# Patient Record
Sex: FEMALE | Race: WHITE | NOT HISPANIC OR LATINO | Employment: OTHER | URBAN - METROPOLITAN AREA
[De-identification: names, ages, dates, MRNs, and addresses within clinical notes are randomized per-mention and may not be internally consistent; named-entity substitution may affect disease eponyms.]

---

## 2017-03-20 ENCOUNTER — OFFICE VISIT (OUTPATIENT)
Dept: GASTROENTEROLOGY | Facility: CLINIC | Age: 67
End: 2017-03-20
Payer: MEDICARE

## 2017-03-20 VITALS
WEIGHT: 131.38 LBS | HEART RATE: 60 BPM | HEIGHT: 59 IN | SYSTOLIC BLOOD PRESSURE: 138 MMHG | BODY MASS INDEX: 26.48 KG/M2 | DIASTOLIC BLOOD PRESSURE: 80 MMHG

## 2017-03-20 DIAGNOSIS — R19.8 ALTERED BOWEL FUNCTION: Primary | ICD-10-CM

## 2017-03-20 DIAGNOSIS — K58.1 IRRITABLE BOWEL SYNDROME WITH CONSTIPATION: ICD-10-CM

## 2017-03-20 PROCEDURE — 1160F RVW MEDS BY RX/DR IN RCRD: CPT | Mod: S$GLB,,, | Performed by: INTERNAL MEDICINE

## 2017-03-20 PROCEDURE — 99999 PR PBB SHADOW E&M-NEW PATIENT-LVL III: CPT | Mod: PBBFAC,,, | Performed by: INTERNAL MEDICINE

## 2017-03-20 PROCEDURE — 99204 OFFICE O/P NEW MOD 45 MIN: CPT | Mod: S$GLB,,, | Performed by: INTERNAL MEDICINE

## 2017-03-20 RX ORDER — SODIUM, POTASSIUM,MAG SULFATES 17.5-3.13G
SOLUTION, RECONSTITUTED, ORAL ORAL
Qty: 1 BOTTLE | Refills: 0 | Status: ON HOLD | OUTPATIENT
Start: 2017-03-20 | End: 2017-04-06 | Stop reason: HOSPADM

## 2017-03-20 RX ORDER — SERTRALINE HYDROCHLORIDE 100 MG/1
TABLET, FILM COATED ORAL
COMMUNITY
Start: 2017-02-17 | End: 2018-04-20 | Stop reason: SDUPTHER

## 2017-03-20 RX ORDER — WARFARIN SODIUM 5 MG/1
5 TABLET ORAL DAILY
COMMUNITY
End: 2018-04-20 | Stop reason: SDUPTHER

## 2017-03-20 RX ORDER — FUROSEMIDE 20 MG/1
20 TABLET ORAL 2 TIMES DAILY
COMMUNITY
End: 2018-04-20 | Stop reason: SDUPTHER

## 2017-03-20 RX ORDER — ALPRAZOLAM 1 MG/1
1 TABLET ORAL NIGHTLY
Refills: 0 | COMMUNITY
Start: 2017-02-27 | End: 2018-04-20 | Stop reason: SDUPTHER

## 2017-03-20 RX ORDER — ENALAPRIL MALEATE 20 MG/1
TABLET ORAL
COMMUNITY
Start: 2017-02-17 | End: 2018-04-20 | Stop reason: SDUPTHER

## 2017-03-20 RX ORDER — ZOLPIDEM TARTRATE 10 MG/1
10 TABLET ORAL NIGHTLY
Refills: 0 | COMMUNITY
Start: 2017-02-27 | End: 2018-04-20 | Stop reason: SDUPTHER

## 2017-03-20 RX ORDER — BENZONATATE 200 MG/1
CAPSULE ORAL
Refills: 3 | Status: ON HOLD | COMMUNITY
Start: 2016-12-28 | End: 2017-04-06 | Stop reason: HOSPADM

## 2017-03-20 NOTE — MR AVS SNAPSHOT
[facility]  [address] [facility] [address]  Phone: [phone]  Fax: [phone]                  Adalgisa Wright   3/20/2017 3:00 PM   Office Visit    Description:  Female : 1950   Provider:  Sander Ulloa MD   Department:  [facility]           Reason for Visit     Constipation           Diagnoses this Visit        Comments    Altered bowel function    -  Primary     Irritable bowel syndrome with constipation                To Do List           Goals (5 Years of Data)     None      Follow-Up and Disposition     Return if symptoms worsen or fail to improve.       These Medications        Disp Refills Start End    sodium,potassium,mag sulfates (SUPREP BOWEL PREP KIT) 17.5-3.13-1.6 gram SolR 1 Bottle 0 3/20/2017     As directed    Pharmacy: [facility]/pharmacy #[id number] - [place] #: [phone]         [facility] On Call     [facility] On Call Nurse [facility] -  Assistance  Registered nurses in the [facility] provide clinical advisement, health education, appointment booking, and other advisory services.  Call for this free service at [phone].             Medications           Message regarding Medications     Verify the changes and/or additions to your medication regime listed below are the same as discussed with your clinician today.  If any of these changes or additions are incorrect, please notify your healthcare provider.        START taking these NEW medications        Refills    sodium,potassium,mag sulfates (SUPREP BOWEL PREP KIT) 17.5-3.13-1.6 gram SolR 0    Sig: As directed    Class: Normal           Verify that the below list of medications is an accurate representation of the medications you are currently taking.  If none reported, the list may be blank. If incorrect, please contact your healthcare provider. Carry this list with you in case of emergency.           Current Medications      "alprazolam (XANAX) 1 MG tablet Take 1 mg by mouth nightly.    benzonatate (TESSALON) 200 MG capsule TAKE 1 CAPSULE BY MOUTH 3 (THREE) TIMES DAILY AS NEEDED FOR COUGH FOR UP TO 7 DAYS.    enalapril (VASOTEC) 20 MG tablet     furosemide (LASIX) 20 MG tablet Take 20 mg by mouth 2 (two) times daily.    lovastatin (ALTOPREV) 40 mg 24 hr tablet Take 40 mg by mouth every evening.    sertraline (ZOLOFT) 100 MG tablet     sodium,potassium,mag sulfates (SUPREP BOWEL PREP KIT) 17.5-3.13-1.6 gram SolR As directed    warfarin (COUMADIN) 5 MG tablet Take 5 mg by mouth once daily.    zolpidem (AMBIEN) 10 mg Tab Take 10 mg by mouth nightly.           Clinical Reference Information           Your Vitals Were     BP Pulse Height Weight BMI    138/80 60 4' 11" (1.499 m) 59.6 kg (131 lb 6.3 oz) 26.54 kg/m2      Blood Pressure          Most Recent Value    BP  138/80      Allergies as of 3/20/2017     Penicillins      Immunizations Administered on Date of Encounter - 3/20/2017     None      Orders Placed During Today's Visit      Normal Orders This Visit    Case request GI: COLONOSCOPY       [facility] Sign-Up     Activating your [facility] account is as easy as 1-2-3!     1) Visit [email or web address], select Sign Up Now, enter this activation code and your date of birth, then select Next.  [email or web address]  Expires: 5/4/2017  3:04 PM      2) Create a username and password to use when you visit [facility] in the future and select a security question in case you lose your password and select Next.    3) Enter your e-mail address and click Sign Up!    Additional Information  If you have questions, please e-mail [email or web address] or call [phone] to talk to our [facility] staff. Remember, [facility] is NOT to be used for urgent needs. For medical emergencies, dial 911.         Instructions      Constipation (Adult)  Constipation means that you have bowel movements that are less frequent than usual. Stools often become very hard and " difficult to pass.  Constipation is very common. At some point in life it affects almost everyone. Since everyone's bowel habits are different, what is constipation to one person may not be to another. Your healthcare provider may do tests to diagnose constipation. It depends on what he or she finds when evaluating you.    Symptoms of constipation include:  · Abdominal pain  · Bloating  · Vomiting  · Painful bowel movements  · Itching, swelling, bleeding, or pain around the anus  Causes  Constipation can have many causes. These include:  · Diet low in fiber  · Too much dairy  · Not drinking enough liquids  · Lack of exercise or physical activity. This is especially true for older adults.  · Changes in lifestyle or daily routine, including pregnancy, aging, work, and travel  · Frequent use or misuse of laxatives  · Ignoring the urge to have a bowel movement or delaying it until later  · Medicines, such as certain prescription pain medicines, iron supplements, antacids, certain antidepressants, and calcium supplements  · Diseases like irritable bowel syndrome, bowel obstructions, stroke, diabetes, thyroid disease, Parkinson disease, hemorrhoids, and colon cancer  Complications  Potential complications of constipation can include:  · Hemorrhoids  · Rectal bleeding from hemorrhoids or anal fissures (skin tears)  · Hernias  · Dependency on laxatives  · Chronic constipation  · Fecal impaction  · Bowel obstruction or perforation  Home care  All treatment should be done after talking with your healthcare provider. This is especially true if you have another medical problems, are taking prescription medicines, or are an older adult. Treatment most often involves lifestyle changes. You may also need medicines. Your healthcare provider will tell you which will work best for you. Follow the advice below to help avoid this problem in the future.  Lifestyle changes  These lifestyle changes can help prevent constipation:  · Diet.  Eat a high-fiber diet, with fresh fruit and vegetables, and reduce dairy intake, meats, and processed foods  · Fluids. It's important to get enough fluids each day. Drink plenty of water when you eat more fiber. If you are on diet that limits the amount of fluid you can have, talk about this with your healthcare provider.  · Regular exercise. Check with your healthcare provider first.  Medications  Take any medicines as directed. Some laxatives are safe to use only every now and then. Others can be taken on a regular basis. Talk with your doctor or pharmacist if you have questions.  Prescription pain medicines can cause constipation. If you are taking this kind of medicine, ask your healthcare provider if you should also take a stool softener.  Medicines you may take to treat constipation include:  · Fiber supplements  · Stool softeners  · Laxatives  · Enemas  · Rectal suppositories  Follow-up care  Follow up with your healthcare provider if symptoms don't get better in the next few days. You may need to have more tests or see a specialist.  Call 911  Call 911 if any of these occur:  · Trouble breathing  · Stiff, rigid abdomen that is severely painful to touch  · Confusion  · Fainting or loss of consciousness  · Rapid heart rate  · Chest pain  When to seek medical advice  Call your healthcare provider right away if any of these occur:  · Fever over 100.4°F (38°C)  · Failure to resume normal bowel movements  · Pain in your abdomen or back gets worse  · Nausea or vomiting  · Swelling in your abdomen  · Blood in the stool  · Black, tarry stool  · Involuntary weight loss  · Weakness  Date Last Reviewed: 12/30/2015 © 2000-2016 [employer]. [address] [place] [address], [place] [address]. All rights reserved. This information is not intended as a substitute for professional medical care. Always follow your healthcare professional's instructions.        Eating a High-Fiber Diet  Fiber is what gives strength and  structure to plants. Most grains, beans, vegetables, and fruits contain fiber. Foods rich in fiber are often low in calories and fat, and they fill you up more. They may also reduce your risks for certain health problems. To find out the amount of fiber in canned, packaged, or frozen foods, read the Nutrition Facts label. It tells you how much fiber is in a serving.    Types of fiber and their benefits  There are two types of fiber: insoluble and soluble. They both aid digestion and help you maintain a healthy weight.  · Insoluble fiber. This is found in whole grains, cereals, certain fruits and vegetables such as apple skin, corn, and carrots. Insoluble fiber may prevent constipation and reduce the risk for certain types of cancer.  · Soluble fiber. This type of fiber is in oats, beans, and certain fruits and vegetables such as strawberries and peas. Soluble fiber can reduce cholesterol, which may help lower the risk for heart disease. It also helps control blood sugar levels.  Look for high-fiber foods  Try these foods to add fiber to your diet:  · Whole-grain breads and cereals. Try to eat 6 to 8 ounces a day. Include wheat and oat bran cereals, whole-wheat muffins or toast, and corn tortillas in your meals.  · Fruits. Try to eat 2 cups a day. Apples, oranges, strawberries, pears, and bananas are good sources. (Note: Fruit juice is low in fiber.)  · Vegetables. Try to eat at least 2.5 cups a day. Add asparagus, carrots, broccoli, peas, and corn to your meals.  · Beans. One cup of cooked lentils gives you over 15 grams of fiber. Try navy beans, lentils, and chickpeas.  · Seeds. A small handful of seeds gives you about 3 grams of fiber. Try sunflower seeds.  Keep track of your fiber  Keep track of how much fiber you eat. Start by reading food labels. Then eat a variety of foods high in fiber. As you begin to eat more fiber, ask your healthcare provider how much water you should be drinking to keep your digestive  system working smoothly.  You should aim for a certain amount of fiber in your diet each day. If you are a woman, that amount is between 25 and 28 grams per day. Men should aim for 30 to 33 grams per day. After age 50, your daily fiber needs drop to 22 grams for women and 28 grams for men.  Before you reach for the fiber supplements, think about this. Fiber is found naturally in healthy whole foods. It gives you that feeling of fullness after you eat. Taking fiber supplements or eating fiber-enriched foods will not give you this full feeling.  Your fiber intake is a good measure for the quality of your overall diet. If you are missing out on your daily amount of fiber, you may be lacking other important nutrients as well.  Date Last Reviewed: 5/11/2015  © 8350-0649 [employer]. [address] [place] [address]. All rights reserved. This information is not intended as a substitute for professional medical care. Always follow your healthcare professional's instructions.             Language Assistance Services     ATTENTION: Language assistance services are available, free of charge. Please call [phone].      ATENCIÓN: Si habla español, tiene a carter disposición servicios gratuitos de asistencia lingüística. Llame al [phone].     DENIZ Ý: N?u b?n nói Ti?ng Vi?t, có các d?ch v? h? tr? ngôn ng? mi?n phí dành cho b?n. G?i s? [phone].         [facility] complies with applicable Federal civil rights laws and does not discriminate on the basis of race, color, national origin, age, disability, or sex.

## 2017-03-20 NOTE — LETTER
March 23, 2017      Shady Jiménez MD  [address] [facility] [address]           [facility]  [id number] [place] [address]  Phone: [phone]  Fax: [phone]        Patient: Adalgisa Wright   MR Number: [id number]   YOB: 1950   Date of Visit: 3/20/2017       Dear Dr. Shady Jiménez:    Thank you for referring Adalgisa Wright to me for evaluation. Attached you will find relevant portions of my assessment and plan of care.    Assessment:       1. Altered bowel function    2. Irritable bowel syndrome with constipation        Plan:    -     sodium,potassium,mag sulfates (SUPREP BOWEL PREP KIT) 17.5-3.13-1.6 gram SolR; As directed  Dispense: 1 Bottle; COLONOSCOPY  Patient can use Mg Citrate PRN to manage constipation. If this does not work, we can try Amitiza. If you have questions, please do not hesitate to call me. I look forward to following Adalgisa Wright along with you.    Sincerely,    Sander Ulloa MD    Enclosure  CC:  No Recipients    If you would like to receive this communication electronically, please contact [email or web address] or [phone] to request more information on [employer] access. For providers and/or their staff who would like to refer a patient to [facility], please contact us through our one-stop-shop provider referral line, [facility], at [phone].  If you feel you have received this communication in error or would no longer like to receive these types of communications, please e-mail [email or web address]

## 2017-03-20 NOTE — PATIENT INSTRUCTIONS
Constipation (Adult)  Constipation means that you have bowel movements that are less frequent than usual. Stools often become very hard and difficult to pass.  Constipation is very common. At some point in life it affects almost everyone. Since everyone's bowel habits are different, what is constipation to one person may not be to another. Your healthcare provider may do tests to diagnose constipation. It depends on what he or she finds when evaluating you.    Symptoms of constipation include:  · Abdominal pain  · Bloating  · Vomiting  · Painful bowel movements  · Itching, swelling, bleeding, or pain around the anus  Causes  Constipation can have many causes. These include:  · Diet low in fiber  · Too much dairy  · Not drinking enough liquids  · Lack of exercise or physical activity. This is especially true for older adults.  · Changes in lifestyle or daily routine, including pregnancy, aging, work, and travel  · Frequent use or misuse of laxatives  · Ignoring the urge to have a bowel movement or delaying it until later  · Medicines, such as certain prescription pain medicines, iron supplements, antacids, certain antidepressants, and calcium supplements  · Diseases like irritable bowel syndrome, bowel obstructions, stroke, diabetes, thyroid disease, Parkinson disease, hemorrhoids, and colon cancer  Complications  Potential complications of constipation can include:  · Hemorrhoids  · Rectal bleeding from hemorrhoids or anal fissures (skin tears)  · Hernias  · Dependency on laxatives  · Chronic constipation  · Fecal impaction  · Bowel obstruction or perforation  Home care  All treatment should be done after talking with your healthcare provider. This is especially true if you have another medical problems, are taking prescription medicines, or are an older adult. Treatment most often involves lifestyle changes. You may also need medicines. Your healthcare provider will tell you which will work best for you. Follow  the advice below to help avoid this problem in the future.  Lifestyle changes  These lifestyle changes can help prevent constipation:  · Diet. Eat a high-fiber diet, with fresh fruit and vegetables, and reduce dairy intake, meats, and processed foods  · Fluids. It's important to get enough fluids each day. Drink plenty of water when you eat more fiber. If you are on diet that limits the amount of fluid you can have, talk about this with your healthcare provider.  · Regular exercise. Check with your healthcare provider first.  Medications  Take any medicines as directed. Some laxatives are safe to use only every now and then. Others can be taken on a regular basis. Talk with your doctor or pharmacist if you have questions.  Prescription pain medicines can cause constipation. If you are taking this kind of medicine, ask your healthcare provider if you should also take a stool softener.  Medicines you may take to treat constipation include:  · Fiber supplements  · Stool softeners  · Laxatives  · Enemas  · Rectal suppositories  Follow-up care  Follow up with your healthcare provider if symptoms don't get better in the next few days. You may need to have more tests or see a specialist.  Call 911  Call 911 if any of these occur:  · Trouble breathing  · Stiff, rigid abdomen that is severely painful to touch  · Confusion  · Fainting or loss of consciousness  · Rapid heart rate  · Chest pain  When to seek medical advice  Call your healthcare provider right away if any of these occur:  · Fever over 100.4°F (38°C)  · Failure to resume normal bowel movements  · Pain in your abdomen or back gets worse  · Nausea or vomiting  · Swelling in your abdomen  · Blood in the stool  · Black, tarry stool  · Involuntary weight loss  · Weakness  Date Last Reviewed: 12/30/2015  © 2800-9446 [employer]. [address] [place] [address], [place] [address]. All rights reserved. This information is not intended as a substitute for  professional medical care. Always follow your healthcare professional's instructions.        Eating a High-Fiber Diet  Fiber is what gives strength and structure to plants. Most grains, beans, vegetables, and fruits contain fiber. Foods rich in fiber are often low in calories and fat, and they fill you up more. They may also reduce your risks for certain health problems. To find out the amount of fiber in canned, packaged, or frozen foods, read the Nutrition Facts label. It tells you how much fiber is in a serving.    Types of fiber and their benefits  There are two types of fiber: insoluble and soluble. They both aid digestion and help you maintain a healthy weight.  · Insoluble fiber. This is found in whole grains, cereals, certain fruits and vegetables such as apple skin, corn, and carrots. Insoluble fiber may prevent constipation and reduce the risk for certain types of cancer.  · Soluble fiber. This type of fiber is in oats, beans, and certain fruits and vegetables such as strawberries and peas. Soluble fiber can reduce cholesterol, which may help lower the risk for heart disease. It also helps control blood sugar levels.  Look for high-fiber foods  Try these foods to add fiber to your diet:  · Whole-grain breads and cereals. Try to eat 6 to 8 ounces a day. Include wheat and oat bran cereals, whole-wheat muffins or toast, and corn tortillas in your meals.  · Fruits. Try to eat 2 cups a day. Apples, oranges, strawberries, pears, and bananas are good sources. (Note: Fruit juice is low in fiber.)  · Vegetables. Try to eat at least 2.5 cups a day. Add asparagus, carrots, broccoli, peas, and corn to your meals.  · Beans. One cup of cooked lentils gives you over 15 grams of fiber. Try navy beans, lentils, and chickpeas.  · Seeds. A small handful of seeds gives you about 3 grams of fiber. Try sunflower seeds.  Keep track of your fiber  Keep track of how much fiber you eat. Start by reading food labels. Then eat a  variety of foods high in fiber. As you begin to eat more fiber, ask your healthcare provider how much water you should be drinking to keep your digestive system working smoothly.  You should aim for a certain amount of fiber in your diet each day. If you are a woman, that amount is between 25 and 28 grams per day. Men should aim for 30 to 33 grams per day. After age 50, your daily fiber needs drop to 22 grams for women and 28 grams for men.  Before you reach for the fiber supplements, think about this. Fiber is found naturally in healthy whole foods. It gives you that feeling of fullness after you eat. Taking fiber supplements or eating fiber-enriched foods will not give you this full feeling.  Your fiber intake is a good measure for the quality of your overall diet. If you are missing out on your daily amount of fiber, you may be lacking other important nutrients as well.  Date Last Reviewed: 5/11/2015  © 7051-2116 [employer]. [address] [place] [address], [place] [address]. All rights reserved. This information is not intended as a substitute for professional medical care. Always follow your healthcare professional's instructions.

## 2017-03-21 ENCOUNTER — TELEPHONE (OUTPATIENT)
Dept: GASTROENTEROLOGY | Facility: CLINIC | Age: 67
End: 2017-03-21

## 2017-03-21 DIAGNOSIS — K59.00 CONSTIPATION, UNSPECIFIED CONSTIPATION TYPE: Primary | ICD-10-CM

## 2017-03-21 RX ORDER — LUBIPROSTONE 24 UG/1
24 CAPSULE ORAL 2 TIMES DAILY WITH MEALS
Qty: 60 CAPSULE | Refills: 6 | Status: SHIPPED | OUTPATIENT
Start: 2017-03-21 | End: 2018-04-20 | Stop reason: SDUPTHER

## 2017-03-21 NOTE — TELEPHONE ENCOUNTER
----- Message from Jenni Barragan sent at 3/21/2017  7:51 AM CDT -----  Contact: patient  Calling concerning taking a laxative and not sure what the outcome should be. Please call patient ASAP @ [phone]. Thanks, fei

## 2017-03-21 NOTE — TELEPHONE ENCOUNTER
Constipation, unspecified constipation type  -     lubiprostone (AMITIZA) 24 MCG Cap; Take 1 capsule (24 mcg total) by mouth 2 (two) times daily with meals.  Dispense: 60 capsule; Refill: 6

## 2017-03-27 NOTE — PROGRESS NOTES
[facility]  Gastroenterology Note    Patient referred at the request of:  Shady Jiménez MD  [address] [place] [id number]      Subjective:       Patient ID: Adalgisa Wright is a 66 y.o. female.    Chief Complaint: Constipation    Constipation   This is a chronic problem. The current episode started more than 1 year ago. The problem is unchanged. Her stool frequency is 2 to 3 times per week. The stool is described as firm and pellet like. The patient is not on a high fiber diet. She does not exercise regularly. There has not been adequate water intake. Associated symptoms include abdominal pain, bloating, flatus and rectal pain. Pertinent negatives include no anorexia, back pain, diarrhea, difficulty urinating, fecal incontinence, fever, hematochezia, hemorrhoids, melena, nausea, vomiting or weight loss. Risk factors include obesity and stress. She has tried laxatives for the symptoms. The treatment provided no relief. Her past medical history is significant for irritable bowel syndrome and metabolic disease. There is no history of abdominal surgery, endocrine disease, inflammatory bowel disease, neurologic disease, neuromuscular disease, psychiatric history or radiation treatment.     Past Medical History:   Diagnosis Date    Aortic valve defect     GERD (gastroesophageal reflux disease)     Hypertension      Past Surgical History:   Procedure Laterality Date    AORTIC VALVE REPLACEMENT  2003     No current outpatient prescriptions on file prior to visit.     No current facility-administered medications on file prior to visit.      Review of patient's allergies indicates:   Allergen Reactions    Penicillins Rash     Social History     Social History    Marital status: Single     Spouse name: N/A    Number of children: N/A    Years of education: N/A     Occupational History    Not on file.     Social History Main Topics    Smoking status: Never Smoker    Smokeless tobacco: Never  Used    Alcohol use No    Drug use: Not on file    Sexual activity: Not on file     Other Topics Concern    Not on file     Social History Narrative     No current outpatient prescriptions on file prior to visit.     No current facility-administered medications on file prior to visit.      Family History   Problem Relation Age of Onset    Colon cancer Neg Hx      Current Outpatient Prescriptions   Medication Sig Dispense Refill    alprazolam (XANAX) 1 MG tablet Take 1 mg by mouth nightly.  0    benzonatate (TESSALON) 200 MG capsule TAKE 1 CAPSULE BY MOUTH 3 (THREE) TIMES DAILY AS NEEDED FOR COUGH FOR UP TO 7 DAYS.  3    enalapril (VASOTEC) 20 MG tablet       furosemide (LASIX) 20 MG tablet Take 20 mg by mouth 2 (two) times daily.      lovastatin (ALTOPREV) 40 mg 24 hr tablet Take 40 mg by mouth every evening.      lubiprostone (AMITIZA) 24 MCG Cap Take 1 capsule (24 mcg total) by mouth 2 (two) times daily with meals. 60 capsule 6    sertraline (ZOLOFT) 100 MG tablet       sodium,potassium,mag sulfates (SUPREP BOWEL PREP KIT) 17.5-3.13-1.6 gram SolR As directed 1 Bottle 0    warfarin (COUMADIN) 5 MG tablet Take 5 mg by mouth once daily.      zolpidem (AMBIEN) 10 mg Tab Take 10 mg by mouth nightly.  0     No current facility-administered medications for this visit.        Review of Systems   Constitutional: Negative for activity change, chills, diaphoresis, fatigue, fever and weight loss.   HENT: Negative for ear pain, facial swelling, nosebleeds, rhinorrhea, sneezing, sore throat and trouble swallowing.    Eyes: Negative for photophobia, pain and visual disturbance.   Respiratory: Negative for apnea, cough, chest tightness, shortness of breath and wheezing.    Gastrointestinal: Positive for abdominal pain, bloating, constipation, flatus and rectal pain. Negative for anorexia, blood in stool, diarrhea, hematochezia, hemorrhoids, melena, nausea and vomiting.   Genitourinary: Negative for decreased urine  volume, difficulty urinating, dysuria, flank pain and hematuria.   Musculoskeletal: Negative for arthralgias, back pain, gait problem, neck pain and neck stiffness.   Skin: Negative for pallor and rash.   Neurological: Negative for seizures, syncope, speech difficulty, weakness and headaches.   Hematological: Negative for adenopathy. Does not bruise/bleed easily.   Psychiatric/Behavioral: Negative for behavioral problems, confusion and hallucinations.       Objective:      Physical Exam   Constitutional: She is oriented to person, place, and time. She appears well-developed and well-nourished.   HENT:   Head: Normocephalic and atraumatic.   Eyes: EOM are normal. Pupils are equal, round, and reactive to light.   Neck: Normal range of motion. Neck supple. No thyromegaly present.   Cardiovascular: Normal rate, regular rhythm and intact distal pulses.    Murmur heard.  Pulmonary/Chest: Effort normal and breath sounds normal. No respiratory distress. She has no wheezes. She has no rales.   Abdominal: Soft. Bowel sounds are normal. She exhibits no distension and no mass. There is no tenderness.   Musculoskeletal: Normal range of motion. She exhibits no edema or tenderness.   Lymphadenopathy:     She has no cervical adenopathy.   Neurological: She is alert and oriented to person, place, and time. She has normal reflexes. No cranial nerve deficit.   Skin: Skin is warm and dry. No erythema.   Psychiatric: She has a normal mood and affect. Her behavior is normal.       Assessment:       1. Altered bowel function    2. Irritable bowel syndrome with constipation        Plan:       Altered bowel function  -     sodium,potassium,mag sulfates (SUPREP BOWEL PREP KIT) 17.5-3.13-1.6 gram SolR; As directed  Dispense: 1 Bottle; Refill: 0  -     Case request GI: COLONOSCOPY    Irritable bowel syndrome with constipation      Patient can use Mg Citrate PRN to manage constipation. If this does not work, we can try Amitiza.       Risks,  benefits and alternative options were discussed with the patient. Risks including but not limited to infection, bleeding, heart or respiratory problems and perforation that may require surgery were all explained to the patient. The patient had a chance for questions if there were doubts or concerns about the test. The referring provider will be notified that our consultation is complete and available through the patient's records.    Thanks for letting us participate in the care of this nice patient,    Sander Ulloa

## 2017-03-31 ENCOUNTER — TELEPHONE (OUTPATIENT)
Dept: GASTROENTEROLOGY | Facility: CLINIC | Age: 67
End: 2017-03-31

## 2017-03-31 NOTE — TELEPHONE ENCOUNTER
Called and spoke with patient and a message was sent to Dr. Combs to see if their is any other test she can do besides a colonoscopy.

## 2017-03-31 NOTE — TELEPHONE ENCOUNTER
----- Message from Sherry Reyez sent at 3/31/2017  9:46 AM CDT -----  Contact: pt  Please call pt @ [phone] regarding procedure on 4/6, pt need to know what other procedure she can do beside colostomy.

## 2017-04-04 ENCOUNTER — TELEPHONE (OUTPATIENT)
Dept: GASTROENTEROLOGY | Facility: CLINIC | Age: 67
End: 2017-04-04

## 2017-04-04 NOTE — TELEPHONE ENCOUNTER
----- Message from Sean Edwards sent at 4/3/2017  4:31 PM CDT -----  Contact: [phone]   Pt would like to consult with the nurse about procedures.  Please call back  @[phone].  Thanks-[facility]

## 2017-04-04 NOTE — TELEPHONE ENCOUNTER
----- Message from Emely Musa sent at 4/4/2017 12:45 PM CDT -----  Pt at [phone]//states she is going to have a colonoscopy done on Thursday morning//4-6-17//she has questions//please call//pepe/dary

## 2017-04-04 NOTE — TELEPHONE ENCOUNTER
----- Message from Aliya Villaseñor sent at 4/4/2017  1:49 PM CDT -----  Call pt at [phone]//returning your call//ledy shannon

## 2017-04-05 ENCOUNTER — TELEPHONE (OUTPATIENT)
Dept: GASTROENTEROLOGY | Facility: HOSPITAL | Age: 67
End: 2017-04-05

## 2017-04-05 DIAGNOSIS — Z12.11 SPECIAL SCREENING FOR MALIGNANT NEOPLASMS, COLON: Primary | ICD-10-CM

## 2017-04-05 NOTE — TELEPHONE ENCOUNTER
Special screening for malignant neoplasms, colon  -     CT Virtual Colonoscopy Screening; Future; Expected date: 4/5/17      Please schedule.    Thanks

## 2017-04-05 NOTE — TELEPHONE ENCOUNTER
----- Message from Waqas Nicole MA sent at 4/3/2017  4:13 PM CDT -----  Contact: pt  Patient is willing to do the ct.Please put in orders.   ----- Message -----     From: Sander Ulloa MD     Sent: 4/1/2017   9:35 AM       To: Waqas Nicole MA    She needs to stop Coumadin for several days to have the colonoscopy. If not, we can order a Barium Enema, which is an X-Ray or a CT colonography. If she is willing to have CT scan, I can enter the orders.     Thanks    ----- Message -----     From: Waqas Nicole MA     Sent: 3/31/2017   1:58 PM       To: Sander Ulloa MD    Patient stated she does not mind drinking the prep she just don't want to get off her Coumadin.  ----- Message -----     From: Sander Ulloa MD     Sent: 3/31/2017   1:32 PM       To: Waqas Nicole MA    She can do either:  A- a barium enema  B- a CT Colonography.     Both of these are radiology tests but require for her to drink a prep. Please let the patient know.    Sander Ulloa    ----- Message -----     From: Waqas Nicole MA     Sent: 3/31/2017  11:13 AM       To: Sander lUloa MD     Patient is scheduled to have a colonoscopy 4/6/17. Patient stated her cardiologist did okay her to be cleared off coumadin but is scared to have the procedure. She would like to know is their any other test she can do besides the coloscopy.  ----- Message -----     From: Sherry Reyez     Sent: 3/31/2017   9:46 AM       To: Artemio LOZANO Staff    Please call pt @ [phone] regarding procedure on 4/6, pt need to know what other procedure she can do beside colostomy.

## 2017-04-06 ENCOUNTER — ANESTHESIA (OUTPATIENT)
Dept: ENDOSCOPY | Facility: HOSPITAL | Age: 67
End: 2017-04-06
Payer: MEDICARE

## 2017-04-06 ENCOUNTER — HOSPITAL ENCOUNTER (OUTPATIENT)
Facility: HOSPITAL | Age: 67
Discharge: HOME | End: 2017-04-06
Attending: INTERNAL MEDICINE | Admitting: INTERNAL MEDICINE
Payer: MEDICARE

## 2017-04-06 ENCOUNTER — SURGERY (OUTPATIENT)
Age: 67
End: 2017-04-06

## 2017-04-06 ENCOUNTER — ANESTHESIA EVENT (OUTPATIENT)
Dept: ENDOSCOPY | Facility: HOSPITAL | Age: 67
End: 2017-04-06
Payer: MEDICARE

## 2017-04-06 VITALS — RESPIRATION RATE: 17 BRPM

## 2017-04-06 VITALS
BODY MASS INDEX: 25.8 KG/M2 | HEIGHT: 59 IN | OXYGEN SATURATION: 98 % | HEART RATE: 60 BPM | TEMPERATURE: 98 F | WEIGHT: 128 LBS | DIASTOLIC BLOOD PRESSURE: 79 MMHG | SYSTOLIC BLOOD PRESSURE: 125 MMHG | RESPIRATION RATE: 16 BRPM

## 2017-04-06 DIAGNOSIS — R19.8 OTHER SYMPTOMS INVOLVING DIGESTIVE SYSTEM(787.99): ICD-10-CM

## 2017-04-06 DIAGNOSIS — R19.8 ALTERED BOWEL FUNCTION: Primary | ICD-10-CM

## 2017-04-06 DIAGNOSIS — D12.2 BENIGN NEOPLASM OF ASCENDING COLON: ICD-10-CM

## 2017-04-06 PROBLEM — K59.02 ANISMUS: Status: ACTIVE | Noted: 2017-04-06

## 2017-04-06 LAB
INR PPP: 1.1
PROTHROMBIN TIME: 10.9 SEC

## 2017-04-06 PROCEDURE — 25000003 PHARM REV CODE 250: Performed by: INTERNAL MEDICINE

## 2017-04-06 PROCEDURE — 88305 TISSUE EXAM BY PATHOLOGIST: CPT | Performed by: PATHOLOGY

## 2017-04-06 PROCEDURE — 25000003 PHARM REV CODE 250: Performed by: NURSE ANESTHETIST, CERTIFIED REGISTERED

## 2017-04-06 PROCEDURE — 88305 TISSUE EXAM BY PATHOLOGIST: CPT | Mod: 26,,, | Performed by: PATHOLOGY

## 2017-04-06 PROCEDURE — 45385 COLONOSCOPY W/LESION REMOVAL: CPT | Performed by: INTERNAL MEDICINE

## 2017-04-06 PROCEDURE — 37000008 HC ANESTHESIA 1ST 15 MINUTES: Performed by: INTERNAL MEDICINE

## 2017-04-06 PROCEDURE — 85610 PROTHROMBIN TIME: CPT

## 2017-04-06 PROCEDURE — 45385 COLONOSCOPY W/LESION REMOVAL: CPT | Mod: PT,,, | Performed by: INTERNAL MEDICINE

## 2017-04-06 PROCEDURE — 63600175 PHARM REV CODE 636 W HCPCS: Performed by: NURSE ANESTHETIST, CERTIFIED REGISTERED

## 2017-04-06 PROCEDURE — 37000009 HC ANESTHESIA EA ADD 15 MINS: Performed by: INTERNAL MEDICINE

## 2017-04-06 PROCEDURE — 27201089 HC SNARE, DISP (ANY): Performed by: INTERNAL MEDICINE

## 2017-04-06 RX ORDER — SODIUM CHLORIDE, SODIUM LACTATE, POTASSIUM CHLORIDE, CALCIUM CHLORIDE 600; 310; 30; 20 MG/100ML; MG/100ML; MG/100ML; MG/100ML
INJECTION, SOLUTION INTRAVENOUS CONTINUOUS
Status: DISCONTINUED | OUTPATIENT
Start: 2017-04-06 | End: 2017-04-06 | Stop reason: HOSPADM

## 2017-04-06 RX ORDER — ENOXAPARIN SODIUM 150 MG/ML
60 INJECTION SUBCUTANEOUS 2 TIMES DAILY
COMMUNITY
End: 2018-04-20 | Stop reason: SDUPTHER

## 2017-04-06 RX ORDER — PROPOFOL 10 MG/ML
VIAL (ML) INTRAVENOUS
Status: DISCONTINUED | OUTPATIENT
Start: 2017-04-06 | End: 2017-04-06

## 2017-04-06 RX ORDER — LIDOCAINE HYDROCHLORIDE 10 MG/ML
INJECTION INFILTRATION; PERINEURAL
Status: DISCONTINUED | OUTPATIENT
Start: 2017-04-06 | End: 2017-04-06

## 2017-04-06 RX ADMIN — PROPOFOL 30 MG: 10 INJECTION, EMULSION INTRAVENOUS at 11:04

## 2017-04-06 RX ADMIN — LIDOCAINE HYDROCHLORIDE 50 MG: 10 INJECTION, SOLUTION INFILTRATION; PERINEURAL at 11:04

## 2017-04-06 RX ADMIN — PROPOFOL 50 MG: 10 INJECTION, EMULSION INTRAVENOUS at 11:04

## 2017-04-06 RX ADMIN — PROPOFOL 20 MG: 10 INJECTION, EMULSION INTRAVENOUS at 11:04

## 2017-04-06 RX ADMIN — SODIUM CHLORIDE, SODIUM LACTATE, POTASSIUM CHLORIDE, AND CALCIUM CHLORIDE: .6; .31; .03; .02 INJECTION, SOLUTION INTRAVENOUS at 10:04

## 2017-04-06 NOTE — DISCHARGE SUMMARY
Endoscopy Discharge Summary      Admit Date: 4/6/2017    Discharge Date and Time:  4/6/2017 11:50 AM    Attending Physician: Sander Ulloa MD     Discharge Physician: Sander Ulloa MD     Principal Admitting Diagnoses: Altered bowel function         Discharge Diagnosis: The primary encounter diagnosis was Altered bowel function. Diagnoses of Other symptoms involving digestive system and Benign neoplasm of ascending colon were also pertinent to this visit.     Discharged Condition: Good    Indication for Admission: Altered bowel function     Hospital Course: Patient was admitted for an inpatient procedure and tolerated the procedure well with no complications.    Significant Diagnostic Studies: COLONOSCOPY    Pathology (if any):  Specimen (12h ago through future)    Start     Ordered    04/06/17 1143  Specimen to Pathology - Surgery  Once     Comments:  Jar 1 Colon polyp x 2    04/06/17 1150          Disposition: Home.    Bleeding: None    No Implants    Follow Up/Patient Instructions:   Current Discharge Medication List      CONTINUE these medications which have NOT CHANGED    Details   alprazolam (XANAX) 1 MG tablet Take 1 mg by mouth nightly.  Refills: 0      enalapril (VASOTEC) 20 MG tablet       enoxaparin (LOVENOX) 150 mg/mL Syrg Inject 60 mg into the skin 2 (two) times daily.      furosemide (LASIX) 20 MG tablet Take 20 mg by mouth 2 (two) times daily.      lovastatin (ALTOPREV) 40 mg 24 hr tablet Take 40 mg by mouth every evening.      lubiprostone (AMITIZA) 24 MCG Cap Take 1 capsule (24 mcg total) by mouth 2 (two) times daily with meals.  Qty: 60 capsule, Refills: 6    Associated Diagnoses: Constipation, unspecified constipation type      sertraline (ZOLOFT) 100 MG tablet       zolpidem (AMBIEN) 10 mg Tab Take 10 mg by mouth nightly.  Refills: 0      warfarin (COUMADIN) 5 MG tablet Take 5 mg by mouth once daily.         STOP taking these medications       sodium,potassium,mag sulfates (SUPREP BOWEL PREP  KIT) 17.5-3.13-1.6 gram SolR Comments:   Reason for Stopping:         benzonatate (TESSALON) 200 MG capsule Comments:   Reason for Stopping:                 Discharge Procedure Orders  Diet general     Activity as tolerated     Call MD for:  temperature >100.4     Call MD for:  persistent nausea and vomiting     Call MD for:  severe uncontrolled pain     Call MD for:  difficulty breathing, headache or visual disturbances     Call MD for:  redness, tenderness, or signs of infection (pain, swelling, redness, odor or green/yellow discharge around incision site)     Call MD for:  hives     Call MD for:  persistent dizziness or light-headedness     No dressing needed         Follow-up Information     Follow up with Shady Jiménez MD.    Specialty:  Family Medicine    Why:  As needed    Contact information:    [address] [place] [id number] [phone]

## 2017-04-06 NOTE — H&P (VIEW-ONLY)
[facility]  Gastroenterology Note    Patient referred at the request of:  Shady Jiménez MD  [address] [place] [id number]      Subjective:       Patient ID: Adalgisa Wright is a 66 y.o. female.    Chief Complaint: Constipation    Constipation   This is a chronic problem. The current episode started more than 1 year ago. The problem is unchanged. Her stool frequency is 2 to 3 times per week. The stool is described as firm and pellet like. The patient is not on a high fiber diet. She does not exercise regularly. There has not been adequate water intake. Associated symptoms include abdominal pain, bloating, flatus and rectal pain. Pertinent negatives include no anorexia, back pain, diarrhea, difficulty urinating, fecal incontinence, fever, hematochezia, hemorrhoids, melena, nausea, vomiting or weight loss. Risk factors include obesity and stress. She has tried laxatives for the symptoms. The treatment provided no relief. Her past medical history is significant for irritable bowel syndrome and metabolic disease. There is no history of abdominal surgery, endocrine disease, inflammatory bowel disease, neurologic disease, neuromuscular disease, psychiatric history or radiation treatment.     Past Medical History:   Diagnosis Date    Aortic valve defect     GERD (gastroesophageal reflux disease)     Hypertension      Past Surgical History:   Procedure Laterality Date    AORTIC VALVE REPLACEMENT  2003     No current outpatient prescriptions on file prior to visit.     No current facility-administered medications on file prior to visit.      Review of patient's allergies indicates:   Allergen Reactions    Penicillins Rash     Social History     Social History    Marital status: Single     Spouse name: N/A    Number of children: N/A    Years of education: N/A     Occupational History    Not on file.     Social History Main Topics    Smoking status: Never Smoker    Smokeless tobacco: Never  Used    Alcohol use No    Drug use: Not on file    Sexual activity: Not on file     Other Topics Concern    Not on file     Social History Narrative     No current outpatient prescriptions on file prior to visit.     No current facility-administered medications on file prior to visit.      Family History   Problem Relation Age of Onset    Colon cancer Neg Hx      Current Outpatient Prescriptions   Medication Sig Dispense Refill    alprazolam (XANAX) 1 MG tablet Take 1 mg by mouth nightly.  0    benzonatate (TESSALON) 200 MG capsule TAKE 1 CAPSULE BY MOUTH 3 (THREE) TIMES DAILY AS NEEDED FOR COUGH FOR UP TO 7 DAYS.  3    enalapril (VASOTEC) 20 MG tablet       furosemide (LASIX) 20 MG tablet Take 20 mg by mouth 2 (two) times daily.      lovastatin (ALTOPREV) 40 mg 24 hr tablet Take 40 mg by mouth every evening.      lubiprostone (AMITIZA) 24 MCG Cap Take 1 capsule (24 mcg total) by mouth 2 (two) times daily with meals. 60 capsule 6    sertraline (ZOLOFT) 100 MG tablet       sodium,potassium,mag sulfates (SUPREP BOWEL PREP KIT) 17.5-3.13-1.6 gram SolR As directed 1 Bottle 0    warfarin (COUMADIN) 5 MG tablet Take 5 mg by mouth once daily.      zolpidem (AMBIEN) 10 mg Tab Take 10 mg by mouth nightly.  0     No current facility-administered medications for this visit.        Review of Systems   Constitutional: Negative for activity change, chills, diaphoresis, fatigue, fever and weight loss.   HENT: Negative for ear pain, facial swelling, nosebleeds, rhinorrhea, sneezing, sore throat and trouble swallowing.    Eyes: Negative for photophobia, pain and visual disturbance.   Respiratory: Negative for apnea, cough, chest tightness, shortness of breath and wheezing.    Gastrointestinal: Positive for abdominal pain, bloating, constipation, flatus and rectal pain. Negative for anorexia, blood in stool, diarrhea, hematochezia, hemorrhoids, melena, nausea and vomiting.   Genitourinary: Negative for decreased urine  volume, difficulty urinating, dysuria, flank pain and hematuria.   Musculoskeletal: Negative for arthralgias, back pain, gait problem, neck pain and neck stiffness.   Skin: Negative for pallor and rash.   Neurological: Negative for seizures, syncope, speech difficulty, weakness and headaches.   Hematological: Negative for adenopathy. Does not bruise/bleed easily.   Psychiatric/Behavioral: Negative for behavioral problems, confusion and hallucinations.       Objective:      Physical Exam   Constitutional: She is oriented to person, place, and time. She appears well-developed and well-nourished.   HENT:   Head: Normocephalic and atraumatic.   Eyes: EOM are normal. Pupils are equal, round, and reactive to light.   Neck: Normal range of motion. Neck supple. No thyromegaly present.   Cardiovascular: Normal rate, regular rhythm and intact distal pulses.    Murmur heard.  Pulmonary/Chest: Effort normal and breath sounds normal. No respiratory distress. She has no wheezes. She has no rales.   Abdominal: Soft. Bowel sounds are normal. She exhibits no distension and no mass. There is no tenderness.   Musculoskeletal: Normal range of motion. She exhibits no edema or tenderness.   Lymphadenopathy:     She has no cervical adenopathy.   Neurological: She is alert and oriented to person, place, and time. She has normal reflexes. No cranial nerve deficit.   Skin: Skin is warm and dry. No erythema.   Psychiatric: She has a normal mood and affect. Her behavior is normal.       Assessment:       1. Altered bowel function    2. Irritable bowel syndrome with constipation        Plan:       Altered bowel function  -     sodium,potassium,mag sulfates (SUPREP BOWEL PREP KIT) 17.5-3.13-1.6 gram SolR; As directed  Dispense: 1 Bottle; Refill: 0  -     Case request GI: COLONOSCOPY    Irritable bowel syndrome with constipation      Patient can use Mg Citrate PRN to manage constipation. If this does not work, we can try Amitiza.       Risks,  benefits and alternative options were discussed with the patient. Risks including but not limited to infection, bleeding, heart or respiratory problems and perforation that may require surgery were all explained to the patient. The patient had a chance for questions if there were doubts or concerns about the test. The referring provider will be notified that our consultation is complete and available through the patient's records.    Thanks for letting us participate in the care of this nice patient,    Sander Ulloa

## 2017-04-06 NOTE — ANESTHESIA POSTPROCEDURE EVALUATION
"Anesthesia Post Evaluation    Patient: Adalgisa Wright    Procedure(s) Performed: Procedure(s) (LRB):  COLONOSCOPY (Left)    Final Anesthesia Type: MAC  Patient location during evaluation: PACU  Patient participation: Yes- Able to Participate  Level of consciousness: awake  Post-procedure vital signs: reviewed and stable  Pain management: adequate  Airway patency: patent  PONV status at discharge: No PONV  Anesthetic complications: no      Cardiovascular status: blood pressure returned to baseline and hemodynamically stable  Respiratory status: unassisted, spontaneous ventilation and room air  Hydration status: euvolemic  Follow-up not needed.        Visit Vitals    BP (!) 144/89 (BP Location: Left arm, Patient Position: Lying, BP Method: Automatic)    Pulse 68    Temp 36.8 °C (98.3 °F) (Oral)    Resp 16    Ht 4' 11" (1.499 m)    Wt 58.1 kg (128 lb)    LMP     SpO2 97%    Breastfeeding No    BMI 25.85 kg/m2       Pain/Keny Score: Pain Assessment Performed: Yes (4/6/2017 10:01 AM)  Presence of Pain: denies (4/6/2017 10:01 AM)      "

## 2017-04-06 NOTE — INTERVAL H&P NOTE
The patient has been examined and the H&P has been reviewed:    I concur with the findings and no changes have occurred since H&P was written.    Anesthesia/Surgery risks, benefits and alternative options discussed and understood by patient/family.          Active Hospital Problems    Diagnosis  POA    *Altered bowel function [R19.4]  Yes    Anismus [R19.8]  Yes      Resolved Hospital Problems    Diagnosis Date Resolved POA   No resolved problems to display.

## 2017-04-06 NOTE — ANESTHESIA RELEASE NOTE
"Anesthesia Release from PACU Note    Patient: Adalgisa Wright    Procedure(s) Performed: Procedure(s) (LRB):  COLONOSCOPY (Left)    Anesthesia type: MAC    Post pain: Adequate analgesia    Post assessment: no apparent anesthetic complications, tolerated procedure well and no evidence of recall    Last Vitals:   Visit Vitals    BP (!) 144/89 (BP Location: Left arm, Patient Position: Lying, BP Method: Automatic)    Pulse 68    Temp 36.8 °C (98.3 °F) (Oral)    Resp 16    Ht 4' 11" (1.499 m)    Wt 58.1 kg (128 lb)    LMP     SpO2 97%    Breastfeeding No    BMI 25.85 kg/m2       Post vital signs: stable    Level of consciousness: awake    Nausea/Vomiting: no nausea/no vomiting    Complications: none    Airway Patency: patent    Respiratory: unassisted, spontaneous ventilation, room air    Cardiovascular: stable and blood pressure at baseline    Hydration: euvolemic  "

## 2017-04-06 NOTE — IP AVS SNAPSHOT
[address] [place] [address]           Patient Discharge Instructions   Our goal is to set you up for success. This packet includes information on your condition, medications, and your home care.  It will help you care for yourself to prevent having to return to the hospital.     Please ask your nurse if you have any questions.      There are many details to remember when preparing to leave the hospital. Here is what you will need to do:    1. Take your medicine. If you are prescribed medications, review your Medication List on the following pages. You may have new medications to  at the pharmacy and others that you'll need to stop taking. Review the instructions for how and when to take your medications. Talk with your doctor or nurses if you are unsure of what to do.     2. Go to your follow-up appointments. Specific follow-up information is listed in the following pages. Your may be contacted by a nurse or clinical provider about future appointments. Be sure we have all of the phone numbers to reach you. Please contact your provider's office if you are unable to make an appointment.     3. Watch for warning signs. Your doctor or nurse will give you detailed warning signs to watch for and when to call for assistance. These instructions may also include educational information about your condition. If you experience any of warning signs to your health, call your doctor.               ** Verify the list of medication(s) below is accurate and up to date. Carry this with you in case of emergency. If your medications have changed, please notify your healthcare provider.             Medication List      CONTINUE taking these medications        Additional Info                      alprazolam 1 MG tablet   Commonly known as:  XANAX   Refills:  0   Dose:  1 mg    Instructions:  Take 1 mg by mouth nightly.     Begin Date    AM    Noon    PM    Bedtime       enalapril  20 MG tablet   Commonly known as:  VASOTEC   Refills:  0      Begin Date    AM    Noon    PM    Bedtime       enoxaparin 150 mg/mL Syrg   Commonly known as:  LOVENOX   Refills:  0   Dose:  60 mg    Instructions:  Inject 60 mg into the skin 2 (two) times daily.     Begin Date    AM    Noon    PM    Bedtime       furosemide 20 MG tablet   Commonly known as:  LASIX   Refills:  0   Dose:  20 mg    Instructions:  Take 20 mg by mouth 2 (two) times daily.     Begin Date    AM    Noon    PM    Bedtime       lovastatin 40 mg 24 hr tablet   Commonly known as:  ALTOPREV   Refills:  0   Dose:  40 mg    Instructions:  Take 40 mg by mouth every evening.     Begin Date    AM    Noon    PM    Bedtime       lubiprostone 24 MCG Cap   Commonly known as:  AMITIZA   Quantity:  60 capsule   Refills:  6   Dose:  24 mcg    Instructions:  Take 1 capsule (24 mcg total) by mouth 2 (two) times daily with meals.     Begin Date    AM    Noon    PM    Bedtime       sertraline 100 MG tablet   Commonly known as:  ZOLOFT   Refills:  0      Begin Date    AM    Noon    PM    Bedtime       warfarin 5 MG tablet   Commonly known as:  COUMADIN   Refills:  0   Dose:  5 mg    Instructions:  Take 5 mg by mouth once daily.     Begin Date    AM    Noon    PM    Bedtime       zolpidem 10 mg Tab   Commonly known as:  AMBIEN   Refills:  0   Dose:  10 mg    Instructions:  Take 10 mg by mouth nightly.     Begin Date    AM    Noon    PM    Bedtime         STOP taking these medications     benzonatate 200 MG capsule   Commonly known as:  TESSALON       sodium,potassium,mag sulfates 17.5-3.13-1.6 gram Solr   Commonly known as:  SUPREP BOWEL PREP KIT                  Please bring to all follow up appointments:    1. A copy of your discharge instructions.  2. All medicines you are currently taking in their original bottles.  3. Identification and insurance card.    Please arrive 15 minutes ahead of scheduled appointment time.    Please call 24 hours in advance if you  must reschedule your appointment and/or time.        Follow-up Information     Follow up with Shady Jiménez MD.    Specialty:  Family Medicine    Why:  As needed    Contact information:    [address] [place] [address]  [phone]          Discharge Instructions     Future Orders    Activity as tolerated     Call MD for:  difficulty breathing, headache or visual disturbances     Call MD for:  hives     Call MD for:  persistent dizziness or light-headedness     Call MD for:  persistent nausea and vomiting     Call MD for:  redness, tenderness, or signs of infection (pain, swelling, redness, odor or green/yellow discharge around incision site)     Call MD for:  severe uncontrolled pain     Call MD for:  temperature >100.4     Diet general     Questions:    Total calories:      Fat restriction, if any:      Protein restriction, if any:      Na restriction, if any:      Fluid restriction:      Additional restrictions:      No dressing needed         Discharge Instructions       If you develop fevers, severe abdominal pain, significant bleeding, nausea or vomiting, please contact us or come to our Emergency Department at [facility].  Our  contact number is [phone] available for emergencies or any question that you may have.      You may return to normal activities tomorrow.  Written discharge instructions were provided to you today and have them handy since you may not remember our conversation today.       I also recommend that you follow a high fiber diet and take calcium supplements daily as well as to continue your present medications.      If you take Aspirin, please resume taking it at your prior dose today. If we obtained biopsies or removed polyps during your procedure, we will contact you within 5 to 6 days with the results.      Thanks for trusting us with your healthcare needs.      Sincerely,     Sander Ulloa M.D.        To rate your experience with Dr. Ulloa,  "please click on the link below     [email or web address]      Discharge References/Attachments     COLON AND RECTAL POLYPS, UNDERSTANDING (ENGLISH)        Primary Diagnosis     Your primary diagnosis was:  Change In Bowel Habits      Admission Information     Date & Time Provider Department CSN    4/6/2017  8:45 AM Sander Ulloa MD [facility] -  [id number]      Care Providers     Provider Role Specialty Primary office phone    Sander Ulloa MD Attending Provider Gastroenterology [phone]    Sander Ulloa MD Surgeon  Gastroenterology [phone]      Your Vitals Were     BP Pulse Temp Resp Height Weight    144/89 (BP Location: Left arm, Patient Position: Lying, BP Method: Automatic) 68 98.3 °F (36.8 °C) (Oral) 16 4' 11" (1.499 m) 58.1 kg (128 lb)    SpO2 BMI             97% 25.85 kg/m2         Recent Lab Values     No lab values to display.      Pending Labs     Order Current Status    Specimen to Pathology - Surgery Collected (04/06/17 1150)      Allergies as of 4/6/2017        Reactions    Penicillins Rash      [facility]     [facility] Nurse Care Line - 24/7 Assistance  Unless otherwise directed by your provider, please contact [facility], our nurse care line that is available for 24/7 assistance.     Registered nurses in the [facility] provide clinical advisement, health education, appointment booking, and other advisory services.  Call for this free service at [phone].        Advance Directives     An advance directive is a document which, in the event you are no longer able to make decisions for yourself, tells your healthcare team what kind of treatment you do or do not want to receive, or who you would like to make those decisions for you.  If you do not currently have an advance directive, [facility] encourages you to create one.  For more information call:  [phone]), [phone] ([phone]),  or log " on to [email or web address].        Language Assistance Services     ATTENTION: Language assistance services are available, free of charge. Please call [phone].      ATENCIÓN: Si kaitlyn gallego, tiene a carter disposición servicios gratuitos de asistencia lingüística. Llame al [phone].     CHÚ Ý: N?u b?n nói Ti?ng Vi?t, có các d?ch v? h? tr? ngôn ng? mi?n phí dành cho b?n. G?i s? [phone].        Coumadin Discharge Instructions                         [facility] Sign-Up     Activating your [facility] account is as easy as 1-2-3!     1) Visit [email or web address], select Sign Up Now, enter this activation code and your date of birth, then select Next.  [email or web address]  Expires: 5/4/2017  3:04 PM      2) Create a username and password to use when you visit [facility] in the future and select a security question in case you lose your password and select Next.    3) Enter your e-mail address and click Sign Up!    Additional Information  If you have questions, please e-mail [email or web address] or call [phone] to talk to our [facility] staff. Remember, [facility] is NOT to be used for urgent needs. For medical emergencies, dial 911.          [facility] complies with applicable Federal civil rights laws and does not discriminate on the basis of race, color, national origin, age, disability, or sex.

## 2017-04-06 NOTE — TRANSFER OF CARE
"Anesthesia Transfer of Care Note    Patient: Adalgisa Wright    Procedure(s) Performed: Procedure(s) (LRB):  COLONOSCOPY (Left)    Patient location: PACU    Anesthesia Type: MAC    Transport from OR: Transported from OR on room air with adequate spontaneous ventilation    Post pain: adequate analgesia    Post assessment: no apparent anesthetic complications and tolerated procedure well    Post vital signs: stable    Level of consciousness: awake    Nausea/Vomiting: no nausea/vomiting    Complications: none          Last vitals:   Visit Vitals    BP (!) 144/89 (BP Location: Left arm, Patient Position: Lying, BP Method: Automatic)    Pulse 68    Temp 36.8 °C (98.3 °F) (Oral)    Resp 16    Ht 4' 11" (1.499 m)    Wt 58.1 kg (128 lb)    LMP     SpO2 97%    Breastfeeding No    BMI 25.85 kg/m2     "

## 2017-04-06 NOTE — PLAN OF CARE
See anesthesia for meds, vs, and monitoring .Pt adequately sedated per all staff in procedure room. Final timeout done.

## 2017-04-15 NOTE — PROGRESS NOTES
Please let the patient know that biopsies taken during procedure are essentially OK. No further action is needed.     FINAL PATHOLOGIC DIAGNOSIS  Colon polyps:  Normal colonic mucosa with lymphoid aggregate;    Sander Ulloa M.D.

## 2018-04-20 ENCOUNTER — HOSPITAL ENCOUNTER (EMERGENCY)
Facility: HOSPITAL | Age: 68
Discharge: HOME | End: 2018-04-20
Attending: EMERGENCY MEDICINE
Payer: MEDICARE

## 2018-04-20 VITALS
RESPIRATION RATE: 20 BRPM | TEMPERATURE: 98 F | SYSTOLIC BLOOD PRESSURE: 167 MMHG | WEIGHT: 130 LBS | HEIGHT: 59 IN | HEART RATE: 86 BPM | DIASTOLIC BLOOD PRESSURE: 76 MMHG | OXYGEN SATURATION: 97 % | BODY MASS INDEX: 26.21 KG/M2

## 2018-04-20 DIAGNOSIS — E87.6 HYPOKALEMIA: ICD-10-CM

## 2018-04-20 DIAGNOSIS — S09.8XXA BLUNT HEAD TRAUMA: ICD-10-CM

## 2018-04-20 DIAGNOSIS — Z79.01 ANTICOAGULATED ON COUMADIN: ICD-10-CM
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
## 2018-04-20 DIAGNOSIS — S00.03XA RIGHT TEMPORAL FRONTAL SCALP CONTUSIONS, INITIAL ENCOUNTER: Primary | ICD-10-CM

## 2018-04-20 DIAGNOSIS — M54.50 ACUTE RIGHT-SIDED LOW BACK PAIN WITHOUT SCIATICA: ICD-10-CM

## 2018-04-20 DIAGNOSIS — M25.579 ANKLE PAIN: ICD-10-CM

## 2018-04-20 DIAGNOSIS — S90.01XA TRAUMATIC HEMATOMA OF RIGHT ANKLE, INITIAL ENCOUNTER: ICD-10-CM

## 2018-04-20 LAB
ALBUMIN SERPL BCP-MCNC: 4.7 G/DL
ALP SERPL-CCNC: 101 U/L
ALT SERPL W/O P-5'-P-CCNC: 19 U/L
ANION GAP SERPL CALC-SCNC: 15 MMOL/L
APTT BLDCRRT: 39 SEC
AST SERPL-CCNC: 23 U/L
BASOPHILS # BLD AUTO: 0.02 K/UL
BASOPHILS NFR BLD: 0.4 %
BILIRUB SERPL-MCNC: 0.5 MG/DL
BUN SERPL-MCNC: 11 MG/DL
CALCIUM SERPL-MCNC: 9.8 MG/DL
CHLORIDE SERPL-SCNC: 103 MMOL/L
CO2 SERPL-SCNC: 25 MMOL/L
CREAT SERPL-MCNC: 0.7 MG/DL
DIFFERENTIAL METHOD: NORMAL
EOSINOPHIL # BLD AUTO: 0 K/UL
EOSINOPHIL NFR BLD: 0.6 %
ERYTHROCYTE [DISTWIDTH] IN BLOOD BY AUTOMATED COUNT: 13.4 %
EST. GFR  (AFRICAN AMERICAN): >60 ML/MIN/1.73 M^2
EST. GFR  (NON AFRICAN AMERICAN): >60 ML/MIN/1.73 M^2
GLUCOSE SERPL-MCNC: 95 MG/DL
HCT VFR BLD AUTO: 40.1 %
HGB BLD-MCNC: 13.2 G/DL
INR PPP: 2.4
LYMPHOCYTES # BLD AUTO: 2 K/UL
LYMPHOCYTES NFR BLD: 41.7 %
MCH RBC QN AUTO: 30.4 PG
MCHC RBC AUTO-ENTMCNC: 32.9 G/DL
MCV RBC AUTO: 92 FL
MONOCYTES # BLD AUTO: 0.3 K/UL
MONOCYTES NFR BLD: 7.3 %
NEUTROPHILS # BLD AUTO: 2.3 K/UL
NEUTROPHILS NFR BLD: 49.4 %
PLATELET # BLD AUTO: 225 K/UL
PMV BLD AUTO: 10.3 FL
POTASSIUM SERPL-SCNC: 3.3 MMOL/L
PROT SERPL-MCNC: 8.2 G/DL
PROTHROMBIN TIME: 24.4 SEC
RBC # BLD AUTO: 4.34 M/UL
SODIUM SERPL-SCNC: 143 MMOL/L
WBC # BLD AUTO: 4.68 K/UL

## 2018-04-20 PROCEDURE — 85610 PROTHROMBIN TIME: CPT

## 2018-04-20 PROCEDURE — 99284 EMERGENCY DEPT VISIT MOD MDM: CPT | Mod: 25

## 2018-04-20 PROCEDURE — 25000003 PHARM REV CODE 250: Performed by: EMERGENCY MEDICINE

## 2018-04-20 PROCEDURE — 85025 COMPLETE CBC W/AUTO DIFF WBC: CPT

## 2018-04-20 PROCEDURE — 80053 COMPREHEN METABOLIC PANEL: CPT

## 2018-04-20 PROCEDURE — 85730 THROMBOPLASTIN TIME PARTIAL: CPT

## 2018-04-20 RX ORDER — ACETAMINOPHEN 500 MG
500 TABLET ORAL
Status: COMPLETED | OUTPATIENT
Start: 2018-04-20 | End: 2018-04-20

## 2018-04-20 RX ORDER — ACEBUTOLOL HYDROCHLORIDE 200 MG/1
400 CAPSULE ORAL 2 TIMES DAILY
COMMUNITY
End: 2020-04-20

## 2018-04-20 RX ORDER — SERTRALINE HYDROCHLORIDE 100 MG/1
1 TABLET, FILM COATED ORAL DAILY
COMMUNITY
Start: 2018-01-15 | End: 2021-06-07

## 2018-04-20 RX ORDER — ERGOCALCIFEROL 1.25 MG/1
1 CAPSULE ORAL
COMMUNITY
Start: 2017-03-22

## 2018-04-20 RX ORDER — WARFARIN SODIUM 5 MG/1
5 TABLET ORAL
Status: ON HOLD | COMMUNITY
End: 2019-10-09 | Stop reason: SDUPTHER

## 2018-04-20 RX ORDER — ALENDRONATE SODIUM 70 MG/1
70 TABLET ORAL
COMMUNITY
Start: 2018-03-20 | End: 2018-07-20

## 2018-04-20 RX ORDER — FUROSEMIDE 40 MG/1
40 TABLET ORAL DAILY
COMMUNITY

## 2018-04-20 RX ORDER — BUTALBITAL, ACETAMINOPHEN AND CAFFEINE 50; 325; 40 MG/1; MG/1; MG/1
1 TABLET ORAL EVERY 6 HOURS PRN
COMMUNITY
Start: 2018-02-22 | End: 2019-02-22

## 2018-04-20 RX ORDER — ENALAPRIL MALEATE 20 MG/1
20 TABLET ORAL DAILY
COMMUNITY
End: 2019-10-08

## 2018-04-20 RX ORDER — ALPRAZOLAM 1 MG/1
0.5 TABLET ORAL NIGHTLY
COMMUNITY
Start: 2018-04-20 | End: 2018-07-20

## 2018-04-20 RX ORDER — CALC/MAG/B COMPLEX/D3/HERB 61
15 TABLET ORAL DAILY
Status: ON HOLD | COMMUNITY
End: 2019-10-09 | Stop reason: HOSPADM

## 2018-04-20 RX ORDER — LOVASTATIN 40 MG/1
40 TABLET ORAL NIGHTLY
Status: ON HOLD | COMMUNITY
End: 2023-03-18 | Stop reason: HOSPADM

## 2018-04-20 RX ORDER — ZOLPIDEM TARTRATE 10 MG/1
5 TABLET ORAL NIGHTLY PRN
Status: ON HOLD | COMMUNITY
Start: 2018-03-06 | End: 2023-03-18 | Stop reason: HOSPADM

## 2018-04-20 RX ADMIN — ACETAMINOPHEN 500 MG: 500 TABLET, FILM COATED ORAL at 08:04

## 2018-04-21 NOTE — ED PROVIDER NOTES
"Encounter Date: 4/20/2018       History     Chief Complaint   Patient presents with    Fall     c/o falling after being hit by door now has pain to head, right ankle and right lower back pt is on coumadin     The patient is a 67-year-old female who is on chronic oral anticoagulation secondary to mechanical valve replacement.  She presents to the emergency room secondary to "a door falling on her".  Patient had been at a local restaurant when she excused herself from the room to go use the bathroom.  She reports that when she went to go through a double door, one of the halves of the door became unhinged, falling off of the frame, hitting her in the head, this knocked her to the floor.  She landed on her butt.  Then the  Door had  landed on her right ankle.  Patient is on warfarin.  She denies loss of consciousness, nausea, vomiting, severe headache, dizziness, abdominal pain, chest pain, numbness or weakness to one side.  She notes that she did urinate on herself, only in that she was unable to get to the bathroom in time.  Pain is rated as moderate.  Nothing makes it better, nothing makes it worse.  There's been no prior treatment.          Review of patient's allergies indicates:   Allergen Reactions    Penicillins Rash     Past Medical History:   Diagnosis Date    Aortic valve defect     GERD (gastroesophageal reflux disease)     HLD (hyperlipidemia)     Hypertension      Past Surgical History:   Procedure Laterality Date    AORTIC VALVE REPLACEMENT  2003    COLONOSCOPY Left 4/6/2017    Procedure: COLONOSCOPY;  Surgeon: Sander Ulloa MD;  Location: [facility];  Service: Endoscopy;  Laterality: Left;     Family History   Problem Relation Age of Onset    Diabetes Mother     Heart disease Mother     Hypertension Mother     Heart disease Father     Hypertension Father     Hypertension Sister     Hypertension Son     Heart disease Son     Colon cancer Neg Hx      Social History   Substance Use Topics "    Smoking status: Never Smoker    Smokeless tobacco: Never Used    Alcohol use Yes      Comment: occasionally      Review of Systems   Constitutional: Negative for fever.   HENT: Negative for sore throat.    Respiratory: Negative for shortness of breath.    Cardiovascular: Negative for chest pain.   Gastrointestinal: Negative for nausea and vomiting.   Genitourinary: Negative for dysuria.   Musculoskeletal: Positive for joint swelling (Right ankle.). Negative for arthralgias, back pain, neck pain and neck stiffness.   Skin: Negative for rash.   Neurological: Negative for dizziness, weakness and headaches.   Hematological: Does not bruise/bleed easily.       Physical Exam     Initial Vitals [04/20/18 1934]   BP Pulse Resp Temp SpO2   (!) 194/88 83 20 98.5 °F (36.9 °C) 97 %      MAP       123.33         Physical Exam    Nursing note and vitals reviewed.  Constitutional: She appears well-developed and well-nourished.   HENT:   Head: Normocephalic. Head is with abrasion and with contusion. Head is without raccoon's eyes and without Dillard's sign.       Right Ear: Tympanic membrane and external ear normal. No mastoid tenderness. No hemotympanum.   Left Ear: Tympanic membrane and external ear normal. No mastoid tenderness. No hemotympanum.   Eyes: Conjunctivae and EOM are normal. Pupils are equal, round, and reactive to light.   Neck: Trachea normal and normal range of motion. No spinous process tenderness present.   Cardiovascular: Normal rate, regular rhythm and normal heart sounds.   Pulmonary/Chest: Breath sounds normal. No respiratory distress. She has no wheezes. She has no rhonchi. She has no rales. She exhibits no tenderness.   Abdominal: Soft. Bowel sounds are normal. She exhibits no mass. There is no rebound and no guarding.   Musculoskeletal: Normal range of motion.        Right ankle: She exhibits ecchymosis.        Thoracic back: Normal. She exhibits no bony tenderness and no deformity.        Lumbar  back: Normal. She exhibits no bony tenderness and no deformity.   Neurological: She is alert and oriented to person, place, and time. She has normal strength. No cranial nerve deficit or sensory deficit. She displays a negative Romberg sign. GCS eye subscore is 4. GCS verbal subscore is 5. GCS motor subscore is 6.   Cranial nerves II-XII intact   Skin: Skin is warm and dry. Capillary refill takes less than 2 seconds.   There is a hematoma noted to the right medial ankle.  No bony tenderness.  No firm compartments.   Psychiatric: She has a normal mood and affect. Her speech is normal and behavior is normal. Thought content normal.       Right medial ankle        Right frontal/temperal region.        ED Course   Orthopedic Injury  Date/Time: 4/20/2018 10:08 PM  Performed by: JUSTIN NORIEGA  Authorized by: CHICO PRESSLEY     Injury:     Injury location:  Ankle    Location details:  Right ankle    Injury type:  Soft tissue      Pre-procedure assessment:     Neurovascular status: Neurovascularly intact        Selections made in this section will also lock the Injury type section above.:     Immobilization: Ace bandage.  Post-procedure assessment:     Neurovascular status: Neurovascularly intact      Patient tolerance:  Patient tolerated the procedure well with no immediate complications      Labs Reviewed   PROTIME-INR - Abnormal; Notable for the following:        Result Value    Prothrombin Time 24.4 (*)     INR 2.4 (*)     All other components within normal limits   APTT - Abnormal; Notable for the following:     aPTT 39.0 (*)     All other components within normal limits   COMPREHENSIVE METABOLIC PANEL - Abnormal; Notable for the following:     Potassium 3.3 (*)     All other components within normal limits   CBC W/ AUTO DIFFERENTIAL         Results for orders placed or performed during the hospital encounter of 04/20/18   Protime-INR   Result Value Ref Range    Prothrombin Time 24.4 (H) 9.0 - 12.5 sec    INR  2.4 (H) 0.8 - 1.2   APTT   Result Value Ref Range    aPTT 39.0 (H) 21.0 - 32.0 sec   CBC auto differential   Result Value Ref Range    WBC 4.68 3.90 - 12.70 K/uL    RBC 4.34 4.00 - 5.40 M/uL    Hemoglobin 13.2 12.0 - 16.0 g/dL    Hematocrit 40.1 37.0 - 48.5 %    MCV 92 82 - 98 fL    MCH 30.4 27.0 - 31.0 pg    MCHC 32.9 32.0 - 36.0 g/dL    RDW 13.4 11.5 - 14.5 %    Platelets 225 150 - 350 K/uL    MPV 10.3 9.2 - 12.9 fL    Gran # (ANC) 2.3 1.8 - 7.7 K/uL    Lymph # 2.0 1.0 - 4.8 K/uL    Mono # 0.3 0.3 - 1.0 K/uL    Eos # 0.0 0.0 - 0.5 K/uL    Baso # 0.02 0.00 - 0.20 K/uL    Gran% 49.4 38.0 - 73.0 %    Lymph% 41.7 18.0 - 48.0 %    Mono% 7.3 4.0 - 15.0 %    Eosinophil% 0.6 0.0 - 8.0 %    Basophil% 0.4 0.0 - 1.9 %    Differential Method Automated    Comprehensive metabolic panel   Result Value Ref Range    Sodium 143 136 - 145 mmol/L    Potassium 3.3 (L) 3.5 - 5.1 mmol/L    Chloride 103 95 - 110 mmol/L    CO2 25 23 - 29 mmol/L    Glucose 95 70 - 110 mg/dL    BUN, Bld 11 8 - 23 mg/dL    Creatinine 0.7 0.5 - 1.4 mg/dL    Calcium 9.8 8.7 - 10.5 mg/dL    Total Protein 8.2 6.0 - 8.4 g/dL    Albumin 4.7 3.5 - 5.2 g/dL    Total Bilirubin 0.5 0.1 - 1.0 mg/dL    Alkaline Phosphatase 101 55 - 135 U/L    AST 23 10 - 40 U/L    ALT 19 10 - 44 U/L    Anion Gap 15 8 - 16 mmol/L    eGFR if African American >60.0 >60 mL/min/1.73 m^2    eGFR if non African American >60.0 >60 mL/min/1.73 m^2       Imaging Results          X-Ray Ankle Complete Right (Final result)  Result time 04/20/18 20:50:12    Final result by Ryan Proctor III, MD (04/20/18 20:50:12)                 Impression:     No acute abnormality identified.          Electronically signed by: RYAN PROCTOR MD  Date:     04/20/18  Time:    20:50              Narrative:    XR ANKLE COMPLETE 3 VIEW RIGHT    Clinical history: M25.579 Pain in unspecified ankle and joints of unspecified foot    Findings: No fracture is identified. Joint alignment is anatomic. The joint spaces appear  relatively well maintained.No osteochondral defect identified.                             CT Head Without Contrast (Final result)  Result time 04/20/18 19:57:22    Final result by Ryan Proctor III, MD (04/20/18 19:57:22)                 Impression:       No acute intracranial disease identified.  Right frontal scalp contusion.      Electronically signed by: RYAN RPOCTOR MD  Date:     04/20/18  Time:    19:57              Narrative:    Head CT without contrast    Clinical history: S09.8XXA Other specified injuries of head, initial encounter;head trauma, on coumadin     TECHNIQUE: Routine noncontrast axial head CT. All CT scans at this facility use dose modulation, iterative reconstruction, and/or weight based dosing when appropriate to reduce radiation dose to as low as reasonably achievable.    Comparison: none    FINDINGS: There is no evidence of intracranial hemorrhage, mass-effect, hydrocephalus or other acute disease. There is no CT evidence of acute infarct or cerebral edema. The visualized paranasal sinuses and mastoid air cells are clear.  There is a small right frontal scalp contusion.  No calvarial fracture is identified.                                             Medications   acetaminophen tablet 500 mg (500 mg Oral Given 4/20/18 2020)       10:06 PM Reassessment: Dr. Vazquez reassessed the pt.  The pt is resting comfortably and is NAD.  Pt states their sx have improved. Discussed test results, shared treatment plan, specific conditions for return, and the need for f/u.  Answered their questions at this time.  Pt understands and agrees to the plan.  The pt has remained hemodynamically stable through ED course and is stable for discharge.        New Prescriptions    No medications on file        Discontinued Medications    ALPRAZOLAM (XANAX) 1 MG TABLET    Take 1 mg by mouth nightly.    ENALAPRIL (VASOTEC) 20 MG TABLET        ENOXAPARIN (LOVENOX) 150 MG/ML SYRG    Inject 60 mg into the skin 2 (two)  times daily.    FUROSEMIDE (LASIX) 20 MG TABLET    Take 20 mg by mouth 2 (two) times daily.    LOVASTATIN (ALTOPREV) 40 MG 24 HR TABLET    Take 40 mg by mouth every evening.    LUBIPROSTONE (AMITIZA) 24 MCG CAP    Take 1 capsule (24 mcg total) by mouth 2 (two) times daily with meals.    SERTRALINE (ZOLOFT) 100 MG TABLET        WARFARIN (COUMADIN) 5 MG TABLET    Take 5 mg by mouth once daily.    ZOLPIDEM (AMBIEN) 10 MG TAB    Take 10 mg by mouth nightly.       I discussed with patient and/or family/caretaker that evaluation in the ED does not suggest any emergent or life threatening medical conditions requiring immediate intervention beyond what was provided in the ED, and I believe patient is safe for discharge.  Regardless, an unremarkable evaluation in the ED does not preclude the development or presence of a serious of life threatening condition. As such, patient was instructed to return immediately for any worsening or change in current symptoms.             Medications   acetaminophen tablet 500 mg (500 mg Oral Given 4/20/18 2020)       10:10 PM Reassessment: Dr. Vazquez reassessed the pt.  The pt is resting comfortably and is NAD.  Pt states their sx have improved. Discussed test results, shared treatment plan, specific conditions for return, and the need for f/u.  Answered their questions at this time.  Pt understands and agrees to the plan.  The pt has remained hemodynamically stable through ED course and is stable for discharge.  Discussed that was okay for patient to take her usual dose of Xanax tonight.  Discussed with patient and spouse that she is not to take her sleeping pill nor she supposed to take her Lortab tonight.  Head injury instructions were given in detail.  Also compartment syndrome instructions were also given.  Patient is awake alert oriented.  [place] Coma Scale of 15.        New Prescriptions    No medications on file        Discontinued Medications    ALPRAZOLAM (XANAX) 1 MG TABLET     Take 1 mg by mouth nightly.    ENALAPRIL (VASOTEC) 20 MG TABLET        ENOXAPARIN (LOVENOX) 150 MG/ML SYRG    Inject 60 mg into the skin 2 (two) times daily.    FUROSEMIDE (LASIX) 20 MG TABLET    Take 20 mg by mouth 2 (two) times daily.    LOVASTATIN (ALTOPREV) 40 MG 24 HR TABLET    Take 40 mg by mouth every evening.    LUBIPROSTONE (AMITIZA) 24 MCG CAP    Take 1 capsule (24 mcg total) by mouth 2 (two) times daily with meals.    SERTRALINE (ZOLOFT) 100 MG TABLET        WARFARIN (COUMADIN) 5 MG TABLET    Take 5 mg by mouth once daily.    ZOLPIDEM (AMBIEN) 10 MG TAB    Take 10 mg by mouth nightly.       I discussed with patient and/or family/caretaker that evaluation in the ED does not suggest any emergent or life threatening medical conditions requiring immediate intervention beyond what was provided in the ED, and I believe patient is safe for discharge.  Regardless, an unremarkable evaluation in the ED does not preclude the development or presence of a serious of life threatening condition. As such, patient was instructed to return immediately for any worsening or change in current symptoms.           MIPS #[id number]:  A CT scan was ordered by the treating physician secondary to head trauma and the patient being on oral anticoagulants.  Some of the signs include contusion noted to the right temporal/frontal region.     Clinical Impression:       ICD-10-CM ICD-9-CM   1. Right temporal frontal scalp contusions, initial encounter S00.03XA 920   2. Blunt head trauma S09.8XXA 959.01   3. Ankle pain M25.579 719.47   4. Anticoagulated on Coumadin Z51.81 V58.83    Z79.01 V58.61   5. Traumatic hematoma of right ankle, initial encounter S90.01XA 924.21   6. Acute right-sided low back pain without sciatica M54.5 724.2   7. Contact with glass door as cause of accidental injury W25.XXXA E920.8       Disposition:   Disposition: Discharged  Condition: Stable                        Winter EVELYN Vazquez, DO  04/21/18 0344

## 2018-04-21 NOTE — ED NOTES
Pt reports having a sliding door fall on her at restaurant causing her to fall. Reports hitting her head and injuring her R ankle. Denies LOC.

## 2018-06-05 ENCOUNTER — HOSPITAL ENCOUNTER (OUTPATIENT)
Dept: RADIOLOGY | Facility: HOSPITAL | Age: 68
Discharge: HOME | End: 2018-06-05
Attending: INTERNAL MEDICINE
Payer: MEDICARE

## 2018-06-05 DIAGNOSIS — M54.89 OTHER BACK PAIN, UNSPECIFIED CHRONICITY: Primary | ICD-10-CM

## 2018-06-05 DIAGNOSIS — M54.89 OTHER BACK PAIN, UNSPECIFIED CHRONICITY: ICD-10-CM

## 2018-06-05 PROCEDURE — 72100 X-RAY EXAM L-S SPINE 2/3 VWS: CPT | Mod: TC,PO

## 2018-06-05 PROCEDURE — 72100 X-RAY EXAM L-S SPINE 2/3 VWS: CPT | Mod: 26,,, | Performed by: RADIOLOGY

## 2018-07-20 ENCOUNTER — HOSPITAL ENCOUNTER (EMERGENCY)
Facility: HOSPITAL | Age: 68
Discharge: HOME | End: 2018-07-20
Attending: EMERGENCY MEDICINE
Payer: MEDICARE

## 2018-07-20 VITALS
WEIGHT: 134.69 LBS | DIASTOLIC BLOOD PRESSURE: 81 MMHG | SYSTOLIC BLOOD PRESSURE: 135 MMHG | TEMPERATURE: 99 F | HEIGHT: 59 IN | BODY MASS INDEX: 27.15 KG/M2 | OXYGEN SATURATION: 98 % | HEART RATE: 74 BPM | RESPIRATION RATE: 18 BRPM

## 2018-07-20 DIAGNOSIS — S80.211A ABRASION, RIGHT KNEE, INITIAL ENCOUNTER: ICD-10-CM

## 2018-07-20 DIAGNOSIS — S52.124A CLOSED NONDISPLACED FRACTURE OF HEAD OF RIGHT RADIUS, INITIAL ENCOUNTER: Primary | ICD-10-CM
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
## 2018-07-20 DIAGNOSIS — M79.631 RIGHT FOREARM PAIN: ICD-10-CM

## 2018-07-20 DIAGNOSIS — M25.521 RIGHT ELBOW PAIN: ICD-10-CM

## 2018-07-20 PROBLEM — D12.2 BENIGN NEOPLASM OF ASCENDING COLON: Status: RESOLVED | Noted: 2017-04-06 | Resolved: 2018-07-20

## 2018-07-20 PROBLEM — M81.0 POSTMENOPAUSAL OSTEOPOROSIS: Status: ACTIVE | Noted: 2018-07-20

## 2018-07-20 PROBLEM — I10 HYPERTENSION: Status: ACTIVE | Noted: 2018-07-20

## 2018-07-20 PROBLEM — K58.1 IRRITABLE BOWEL SYNDROME WITH CONSTIPATION: Status: ACTIVE | Noted: 2018-04-09

## 2018-07-20 PROBLEM — Z79.01 CHRONIC ANTICOAGULATION: Status: ACTIVE | Noted: 2018-04-09

## 2018-07-20 PROBLEM — M50.30 DDD (DEGENERATIVE DISC DISEASE), CERVICAL: Status: ACTIVE | Noted: 2018-07-20

## 2018-07-20 PROBLEM — K21.9 ACID REFLUX: Status: ACTIVE | Noted: 2018-07-20

## 2018-07-20 PROCEDURE — 25000003 PHARM REV CODE 250: Performed by: NURSE PRACTITIONER

## 2018-07-20 PROCEDURE — 29105 APPLICATION LONG ARM SPLINT: CPT | Mod: RT

## 2018-07-20 PROCEDURE — 99283 EMERGENCY DEPT VISIT LOW MDM: CPT | Mod: 25

## 2018-07-20 RX ORDER — ACETAMINOPHEN 500 MG
1000 TABLET ORAL
Status: COMPLETED | OUTPATIENT
Start: 2018-07-20 | End: 2018-07-20

## 2018-07-20 RX ORDER — HYDROCODONE BITARTRATE AND ACETAMINOPHEN 5; 325 MG/1; MG/1
1 TABLET ORAL
Qty: 12 TABLET | Refills: 0 | Status: SHIPPED | OUTPATIENT
Start: 2018-07-20 | End: 2019-08-05

## 2018-07-20 RX ORDER — ALPRAZOLAM 1 MG/1
1 TABLET ORAL NIGHTLY PRN
COMMUNITY
Start: 2018-06-14 | End: 2020-04-06

## 2018-07-20 RX ORDER — LUBIPROSTONE 8 UG/1
8 CAPSULE ORAL 2 TIMES DAILY PRN
COMMUNITY
Start: 2018-07-02 | End: 2018-08-01

## 2018-07-20 RX ADMIN — ACETAMINOPHEN 1000 MG: 500 TABLET, FILM COATED ORAL at 06:07

## 2018-07-20 NOTE — ED NOTES
Aaox3, skin warm and dry, resp unlabored and even. amb with steady gait. Pain to sheela knees post fall. Tripped while walking in parking lot and pain to right forearm. Pt arrived with arm in sling.

## 2018-07-21 NOTE — ED PROVIDER NOTES
Encounter Date: 7/20/2018       History     Chief Complaint   Patient presents with    Fall     tripped in parking lot at [facility] just prior to arrival . pain to right forearm and sheela knees     The history is provided by the patient.   Fall   The accident occurred just prior to arrival. The fall occurred while walking (states that she tripped while walking in a parking lot). She fell from a height of 3 to 5 ft. She landed on concrete. The point of impact was the right elbow, right knee and left knee. The pain is present in the right elbow and right knee. The pain is at a severity of 10/10. She was ambulatory at the scene. There was no entrapment after the fall. There was no drug use involved in the accident. There was no alcohol use involved in the accident. Pertinent negatives include no neck pain, no back pain, no paresthesias, no fever, no numbness, no abdominal pain, no nausea, no vomiting, no headaches, no hearing loss, no loss of consciousness and no tingling. The symptoms are aggravated by use of the injured limb and activity. Treatments tried: sling. The treatment provided mild relief.       PCP:   Shady Jiménez MD          Review of patient's allergies indicates:   Allergen Reactions    Penicillins Rash     Past Medical History:   Diagnosis Date    Anticoagulant long-term use     Aortic valve defect     Benign neoplasm of ascending colon 4/6/2017    DDD (degenerative disc disease), cervical 7/20/2018    GERD (gastroesophageal reflux disease)     HLD (hyperlipidemia)     Hypertension     Insomnia 12/1/2014    Irritable bowel syndrome with constipation 4/9/2018    Postmenopausal osteoporosis 7/20/2018     Past Surgical History:   Procedure Laterality Date    AORTIC VALVE REPLACEMENT  2003    COLONOSCOPY Left 4/6/2017    Procedure: COLONOSCOPY;  Surgeon: Sander Ulloa MD;  Location: [facility];  Service: Endoscopy;  Laterality: Left;     Family History   Problem Relation Age of Onset     Diabetes Mother     Heart disease Mother     Hypertension Mother     Heart disease Father     Hypertension Father     Hypertension Sister     Hypertension Son     Heart disease Son     Colon cancer Neg Hx      Social History   Substance Use Topics    Smoking status: Never Smoker    Smokeless tobacco: Never Used    Alcohol use Yes      Comment: occasionally      Review of Systems   Constitutional: Negative for chills and fever.   HENT: Negative for congestion and sore throat.    Eyes: Negative for visual disturbance.   Respiratory: Negative for chest tightness and shortness of breath.    Cardiovascular: Negative for chest pain and palpitations.   Gastrointestinal: Negative for abdominal pain, diarrhea, nausea and vomiting.   Genitourinary: Negative for dysuria.   Musculoskeletal: Negative for back pain and neck pain.        Positive for right elbow and forearm pain.    Skin: Positive for wound (superficial abrasion of right knee). Negative for rash.   Neurological: Negative for dizziness, tingling, loss of consciousness, weakness, numbness, headaches and paresthesias.   Hematological: Does not bruise/bleed easily.   Psychiatric/Behavioral: Negative for confusion.       Physical Exam     Initial Vitals [07/20/18 1816]   BP Pulse Resp Temp SpO2   (!) 141/83 78 20 99.2 °F (37.3 °C) 100 %      MAP       --         Physical Exam    Nursing note and vitals reviewed.  Constitutional: She appears well-developed and well-nourished. She is cooperative. She does not appear ill. She appears distressed (secondary to right elbow and forearm pain).   HENT:   Head: Normocephalic and atraumatic.   Nose: Nose normal.   Mouth/Throat: Uvula is midline, oropharynx is clear and moist and mucous membranes are normal.   Eyes: Conjunctivae, EOM and lids are normal. Pupils are equal, round, and reactive to light.   Neck: Trachea normal and normal range of motion. Neck supple.   Cardiovascular: Normal rate, regular rhythm, intact  distal pulses and normal pulses.   Pulmonary/Chest: Effort normal and breath sounds normal. No respiratory distress. She has no wheezes. She has no rhonchi. She has no rales.   Abdominal: Soft. She exhibits no distension and no mass. There is no tenderness. There is no rebound and no guarding.   Musculoskeletal: She exhibits no edema.        Right elbow: She exhibits decreased range of motion and swelling. She exhibits no effusion. Tenderness found. Radial head tenderness noted.        Right forearm: She exhibits tenderness and bony tenderness (proximal aspect of the radial and ulna - no crepitus noted - mild swelling present - neurovascular intact distally). She exhibits no edema and no deformity.   Neurological: She is alert and oriented to person, place, and time. She has normal strength. Gait normal. GCS eye subscore is 4. GCS verbal subscore is 5. GCS motor subscore is 6.   Neurovascular intact to all extremities.    Skin: Skin is warm and dry. Capillary refill takes less than 2 seconds. Abrasion (small superficial abrasion noted to the anterior aspect of the right knee) noted. No rash noted.        Psychiatric: She has a normal mood and affect. Her speech is normal and behavior is normal. Cognition and memory are normal.         ED Course   Orthopedic Injury  Date/Time: 7/20/2018 8:00 PM  Performed by: KAREY YANEZ  Authorized by: KAREY YANEZ     Location procedure was performed:  [facility] EMERGENCY DEPARTMENT  Assisting Provider:  ELIZABETH ODONNELL  Pre-operative diagnosis:  Right radial head fracture  Consent Done?:  Yes  Universal Protocol:     Verbal consent obtained?: Yes      Risks and benefits: Risks, benefits and alternatives were discussed      Consent given by:  Patient    Patient states understanding of procedure being performed: Yes      Site marked: Yes      Imaging studies available: Yes      Required items: Required blood products, implants, devices and special equipment  avialable      Patient identity confirmed:  , MRN, name and verbally with patient    Time Out: Immediately prior to the procedure a time out was called    Injury:     Injury location:  Elbow    Location details:  Right elbow    Injury type:  Fracture    Fracture type: radial head      Fracture type: radial head        Pre-procedure assessment:     Neurovascular status: Neurovascularly intact      Distal perfusion: normal      Neurological function: normal      Range of motion: reduced      Range of motion comment:  Secondary to pain    Patient sedated?: No        Selections made in this section will also lock the Injury type section above.:     Manipulation performed?: No      Immobilization:  Splint    Splint type:  Long arm (dorso-lateral elbow splint)    Supplies used:  Ortho-Glass    Complications: No    Post-procedure assessment:     Neurovascular status: Neurovascularly intact      Distal perfusion: normal      Neurological function: normal      Range of motion: splinted      Patient tolerance:  Patient tolerated the procedure well with no immediate complications           ED Imaging Results:   Imaging Results          X-Ray Elbow Complete Right (Final result)  Result time 18 19:38:50    Final result by Rboert Briscoe MD (18 19:38:50)                 Impression:      Right radial head fracture.      Electronically signed by: Robert Briscoe MD  Date:    2018  Time:    19:38             Narrative:    EXAMINATION:  XR ELBOW COMPLETE 3 VIEW RIGHT    CLINICAL HISTORY:  Pain in right elbow    TECHNIQUE:  Standard radiography performed.    COMPARISON:  None    FINDINGS:  Mildly depressed, multifragmentary right radial head fracture is present.  The fracture extends through the articular surface.    There is a probable small associated joint effusion or hemarthrosis.    Underlying DJD of the elbow joint notably of the ulnar trochlear joint is identified.                                "X-Ray Forearm Right (Final result)  Result time 07/20/18 19:15:56    Final result by Robert Briscoe MD (07/20/18 19:15:56)                 Impression:      Deformity of the radial head, possibly chronic.  Wrist DJD.      Electronically signed by: Robert Briscoe MD  Date:    07/20/2018  Time:    19:15             Narrative:    EXAMINATION:  XR FOREARM RIGHT    CLINICAL HISTORY:  Pain in right forearm    TECHNIQUE:  Standard radiography performed.    COMPARISON:  None    FINDINGS:  The radial head is atypical in appearance.  Possible old fracture.  Please correlate with clinical exam.  If warranted dedicated right elbow x-rays suggested.    The distal forearm is negative for acute fracture.  There are degenerative changes of the wrist joint.                                ED Medications:   Medications   acetaminophen tablet 1,000 mg (1,000 mg Oral Given 7/20/18 1850)           ED Course Vitals  Vitals:    07/20/18 1816 07/20/18 2041   BP: (!) 141/83 135/81   Pulse: 78 74   Resp: 20 18   Temp: 99.2 °F (37.3 °C)    TempSrc: Oral    SpO2: 100% 98%   Weight: 61.1 kg (134 lb 11.2 oz)    Height: 4' 11" (1.499 m)          2025 HOURS RE-EVALUATION & DISPOSITION:   Reassessment at the time of disposition demonstrates that the patient is resting comfortably in no acute distress. She rates her pain as an  8/10 following administration of the Tylenol 1g and splint application.  She has remained hemodynamically stable throughout the entire ED visit and is without objective evidence for acute process requiring urgent intervention or hospitalization. I discussed test results and provided counseling to patient with regard to condition, the treatment plan, specific conditions for return, and the importance of following up with orthopedics as soon as possible. Ms. Wright states that she does not like taking pain medication and declined offer for Norco while in the emergency department. She also states that she is unable to take " NSAIDs due to her use of anticoagulants.  I advised Ms. Wright that I would be prescribing Norco for her pain related to her fracture and that she should eat before taking the pain medication and that she may also cut the pill in half if the Norco 5 mg is too strong.  She was further instructed to avoid driving or operating heavy machinery after taking pain medication.  Answered questions at this time. The patient is stable for discharge.        Medical Decision Making:   History:   Old Records Summarized: records from clinic visits.  Clinical Tests:   Radiological Study: Ordered and Reviewed                      Clinical Impression:       ICD-10-CM ICD-9-CM   1. Closed nondisplaced fracture of head of right radius, initial encounter S52.124A 813.05   2. Right forearm pain M79.631 729.5   3. Right elbow pain M25.521 719.42   4. Fall, initial encounter W19.XXXA E888.9   5. Abrasion, right knee, initial encounter S80.211A 916.0           Disposition:   Disposition: Discharged  Condition: Stable  I discussed with patient that the evaluation in the emergency department does not suggest any emergent or life threatening medical condition requiring immediate intervention beyond what was provided in the ED, and I believe patient is safe for discharge.  Regardless, an unremarkable evaluation in the ED does not preclude the development or presence of a serious of life threatening condition. As such, patient was instructed to return immediately for any worsening or change in current symptoms. I also discussed the results of my evaluation and diagnosis with patient and she concurs with the evaluation and management plan.  Detailed written and verbal instructions provided to patient and she expressed a verbal understanding of the discharge instructions and overall management plan. Reiterated the importance of medication administration and safety and advised patient to follow up with primary care provider in 3-5 days or sooner if  needed.  Also advised patient to return to the ER for any complications.     Regarding FRACTURE CARE, I advised patient and guardian that patient should:  expect some discomfort and take medications as prescribed for pain management; keep extremity elevated above the level of the heart to reduce swelling; apply ice bag to outside of splint to help reduce swelling; and follow up with primary care provider or orthopedic specialist as instructed.  Instructed patient and guardian to keep splint in place to hold fracture in place and prevent further injury and to keep it clean and dry.  Patient and guardian advised to return to the emergency department for any complications (numbness to extremity, lack of circulation, damage to splint, etc).    Regarding FALL PRECAUTIONS, I advised patient to: exercise regularly, keep all appointments with caregivers and bring updated, current list of all medications, keep diet healthy and include calcium and Vitamin D, and drink plenty of fluids.    Also recommended that patient keep home safe by: keeping pathways clear; have carpet installed in bathroom; have enough lighting to clearly see all pathways; keep all cords secured and out of the way; secure carpeting to the floor around all edges; remove throw rugs, or secure them with double-sided tape or special backing; if using stairs, install sturdy handrails; leave a light on at night to help you find way to the bathroom and kitchen; and select shoes with non-slip soles that are not too big or too small for your feet. Other home safety tips: do not leave objects in the bathtub or on the floor of the shower; install grab bars on walls around tubs or shower enclosures, and next to toilets; and install non-skid mats on shower floors and in the bathtub.         Discharge Medication List as of 7/20/2018  8:19 PM      START taking these medications    Details   HYDROcodone-acetaminophen (NORCO) 5-325 mg per tablet Take 1 tablet by mouth  every 4 to 6 hours as needed for Pain., Starting Fri 7/20/2018, Print             Follow-up Information     Call  Shady Jiménez MD.    Specialty:  Family Medicine  Contact information:  [address] [place] [address] [address]IVE  [facility] [address] [phone]             Schedule an appointment as soon as possible for a visit  with Orthopedic Surgeon.                                Justin Parada NP  07/21/18 1148

## 2019-08-05 ENCOUNTER — OFFICE VISIT (OUTPATIENT)
Dept: GASTROENTEROLOGY | Facility: CLINIC | Age: 69
End: 2019-08-05
Payer: MEDICARE

## 2019-08-05 VITALS
HEIGHT: 59 IN | HEART RATE: 68 BPM | SYSTOLIC BLOOD PRESSURE: 130 MMHG | DIASTOLIC BLOOD PRESSURE: 80 MMHG | OXYGEN SATURATION: 99 % | BODY MASS INDEX: 25.2 KG/M2 | WEIGHT: 125 LBS

## 2019-08-05 DIAGNOSIS — K59.09 CHRONIC CONSTIPATION: ICD-10-CM

## 2019-08-05 DIAGNOSIS — D50.9 IRON DEFICIENCY ANEMIA, UNSPECIFIED IRON DEFICIENCY ANEMIA TYPE: Primary | ICD-10-CM

## 2019-08-05 PROCEDURE — 99214 OFFICE O/P EST MOD 30 MIN: CPT | Mod: S$GLB,,, | Performed by: PHYSICIAN ASSISTANT

## 2019-08-05 PROCEDURE — 99214 PR OFFICE/OUTPT VISIT, EST, LEVL IV, 30-39 MIN: ICD-10-PCS | Mod: S$GLB,,, | Performed by: PHYSICIAN ASSISTANT

## 2019-08-05 PROCEDURE — 99999 PR PBB SHADOW E&M-EST. PATIENT-LVL V: CPT | Mod: PBBFAC,,, | Performed by: PHYSICIAN ASSISTANT

## 2019-08-05 PROCEDURE — 99999 PR PBB SHADOW E&M-EST. PATIENT-LVL V: ICD-10-PCS | Mod: PBBFAC,,, | Performed by: PHYSICIAN ASSISTANT

## 2019-08-05 RX ORDER — LUBIPROSTONE 8 UG/1
CAPSULE ORAL
Status: ON HOLD | COMMUNITY
Start: 2019-04-15 | End: 2019-10-09 | Stop reason: HOSPADM

## 2019-08-05 RX ORDER — BUTALBITAL, ACETAMINOPHEN AND CAFFEINE 50; 325; 40 MG/1; MG/1; MG/1
TABLET ORAL
COMMUNITY
Start: 2018-12-05 | End: 2021-01-25

## 2019-08-05 RX ORDER — POLYETHYLENE GLYCOL 3350, SODIUM SULFATE, SODIUM CHLORIDE, POTASSIUM CHLORIDE, ASCORBIC ACID, SODIUM ASCORBATE 7.5-2.691G
KIT ORAL
Status: ON HOLD | COMMUNITY
Start: 2019-07-26 | End: 2019-10-09 | Stop reason: HOSPADM

## 2019-08-05 NOTE — PATIENT INSTRUCTIONS
Start Miralax every morning.   Take Amitiza in the evening before your last meal.         Constipation (Adult)  Constipation means that you have bowel movements that are less frequent than usual. Stools often become very hard and difficult to pass.  Constipation is very common. At some point in life it affects almost everyone. Since everyone's bowel habits are different, what is constipation to one person may not be to another. Your healthcare provider may do tests to diagnose constipation. It depends on what he or she finds when evaluating you.    Symptoms of constipation include:  · Abdominal pain  · Bloating  · Vomiting  · Painful bowel movements  · Itching, swelling, bleeding, or pain around the anus  Causes  Constipation can have many causes. These include:  · Diet low in fiber  · Too much dairy  · Not drinking enough liquids  · Lack of exercise or physical activity. This is especially true for older adults.  · Changes in lifestyle or daily routine, including pregnancy, aging, work, and travel  · Frequent use or misuse of laxatives  · Ignoring the urge to have a bowel movement or delaying it until later  · Medicines, such as certain prescription pain medicines, iron supplements, antacids, certain antidepressants, and calcium supplements  · Diseases like irritable bowel syndrome, bowel obstructions, stroke, diabetes, thyroid disease, Parkinson disease, hemorrhoids, and colon cancer  Complications  Potential complications of constipation can include:  · Hemorrhoids  · Rectal bleeding from hemorrhoids or anal fissures (skin tears)  · Hernias  · Dependency on laxatives  · Chronic constipation  · Fecal impaction  · Bowel obstruction or perforation  Home care  All treatment should be done after talking with your healthcare provider. This is especially true if you have another medical problems, are taking prescription medicines, or are an older adult. Treatment most often involves lifestyle changes. You may also need  medicines. Your healthcare provider will tell you which will work best for you. Follow the advice below to help avoid this problem in the future.  Lifestyle changes  These lifestyle changes can help prevent constipation:  · Diet. Eat a high-fiber diet, with fresh fruit and vegetables, and reduce dairy intake, meats, and processed foods  · Fluids. It's important to get enough fluids each day. Drink plenty of water when you eat more fiber. If you are on diet that limits the amount of fluid you can have, talk about this with your healthcare provider.  · Regular exercise. Check with your healthcare provider first.  Medications  Take any medicines as directed. Some laxatives are safe to use only every now and then. Others can be taken on a regular basis. Talk with your doctor or pharmacist if you have questions.  Prescription pain medicines can cause constipation. If you are taking this kind of medicine, ask your healthcare provider if you should also take a stool softener.  Medicines you may take to treat constipation include:  · Fiber supplements  · Stool softeners  · Laxatives  · Enemas  · Rectal suppositories  Follow-up care  Follow up with your healthcare provider if symptoms don't get better in the next few days. You may need to have more tests or see a specialist.  Call 911  Call 911 if any of these occur:  · Trouble breathing  · Stiff, rigid abdomen that is severely painful to touch  · Confusion  · Fainting or loss of consciousness  · Rapid heart rate  · Chest pain  When to seek medical advice  Call your healthcare provider right away if any of these occur:  · Fever over 100.4°F (38°C)  · Failure to resume normal bowel movements  · Pain in your abdomen or back gets worse  · Nausea or vomiting  · Swelling in your abdomen  · Blood in the stool  · Black, tarry stool  · Involuntary weight loss  · Weakness  Date Last Reviewed: 12/30/2015  © 9737-5927 [employer]. [address] [place] [address], [place]  [address]. All rights reserved. This information is not intended as a substitute for professional medical care. Always follow your healthcare professional's instructions.

## 2019-08-05 NOTE — PROGRESS NOTES
GI OUTPATIENT NOTE    PCP: Shan Stroud No address on file    Chief Complaint   Patient presents with    Anemia       HISTORY OF PRESENT ILLNESS:  68 y.o. female presents to the GI clinic today for initial evaluation. The patient complains of anemia diagnosed by her PCP. She was prescribed an iron supplement but she had GI intolerance. She has baseline constipation with a BM 1-2 times a week. She frequently has to strain and occasionally see blood with wiping. She was prescribed Amitiza 8 mcg but isn't taking it often. She says it causes pain when she takes it with food. She tolerates it better on an empty stomach. She says it produces a BM within an hour, but she is worried about doing that at work. Therefore, she isn't taking the medicine often. She uses a fleets enema occasionally, but hasn't tried anything else. She has intermittent abdominal pain which is better after a BM. She denies weight loss, change in appetite, nausea or vomiting. She takes Prevacid for heartburn, which she says works well. She denies regular NSAID use. Her last colonoscopy was in 2017. She had benign polyps. She didn't have anemia at that time. The patient is on Coumadin for valvular disease. She will need a lovenox bridge.     Past Medical History:   Diagnosis Date    Anticoagulant long-term use     Aortic valve defect     Benign neoplasm of ascending colon 4/6/2017    DDD (degenerative disc disease), cervical 7/20/2018    GERD (gastroesophageal reflux disease)     HLD (hyperlipidemia)     Hypertension     Insomnia 12/1/2014    Irritable bowel syndrome with constipation 4/9/2018    Postmenopausal osteoporosis 7/20/2018       Past Surgical History:   Procedure Laterality Date    AORTIC VALVE REPLACEMENT  2003    COLONOSCOPY Left 4/6/2017    Performed by Sander Ulloa MD at [facility]       Social History     Socioeconomic History    Marital status: Single     Spouse name: Not on file    Number of children: Not on file     Years of education: Not on file    Highest education level: Not on file   Occupational History    Not on file   Social Needs    Financial resource strain: Not on file    Food insecurity:     Worry: Not on file     Inability: Not on file    Transportation needs:     Medical: Not on file     Non-medical: Not on file   Tobacco Use    Smoking status: Never Smoker    Smokeless tobacco: Never Used   Substance and Sexual Activity    Alcohol use: Not Currently     Comment: occasionally     Drug use: No    Sexual activity: Yes     Partners: Male   Lifestyle    Physical activity:     Days per week: Not on file     Minutes per session: Not on file    Stress: Not on file   Relationships    Social connections:     Talks on phone: Not on file     Gets together: Not on file     Attends [employer] service: Not on file     Active member of club or organization: Not on file     Attends meetings of clubs or organizations: Not on file     Relationship status: Not on file   Other Topics Concern    Not on file   Social History Narrative    Not on file       Family History   Problem Relation Age of Onset    Diabetes Mother     Heart disease Mother     Hypertension Mother     Heart disease Father     Hypertension Father     Hypertension Sister     Hypertension Son     Heart disease Son     Colon cancer Neg Hx        MEDS/ALLERGY:  The patient's medications and allergies were reviewed and updated in the EPIC chart.     Review of Systems   Constitutional: Negative for fever.   HENT: Negative for hearing loss.    Eyes: Negative for visual disturbance.   Respiratory: Negative for cough and shortness of breath.    Cardiovascular: Negative for chest pain.   Gastrointestinal:        As per HPI.   Genitourinary: Positive for frequency. Negative for dysuria and hematuria.   Musculoskeletal: Positive for back pain. Negative for arthralgias.   Skin: Negative for rash.   Neurological: Positive for headaches. Negative for  seizures, syncope and numbness.   Hematological: Bruises/bleeds easily.   Psychiatric/Behavioral: The patient is nervous/anxious.        Physical Exam   Constitutional: She is oriented to person, place, and time. Vital signs are normal. She appears well-developed and well-nourished.   HENT:   Head: Normocephalic and atraumatic.   Eyes: EOM are normal.   Neck: Normal range of motion. Neck supple. Carotid bruit is not present. No thyromegaly present.   Cardiovascular: Normal rate and regular rhythm.   Murmur heard.  Pulmonary/Chest: Effort normal and breath sounds normal. No respiratory distress. She has no wheezes.   Abdominal: Soft. Normal appearance and bowel sounds are normal. She exhibits no distension and no mass. There is tenderness (mild, lower abdomen). There is no rebound and no guarding.   Musculoskeletal: She exhibits no edema.   Neurological: She is alert and oriented to person, place, and time. No cranial nerve deficit. Gait normal.   Skin: Skin is warm and dry. No rash noted.   Psychiatric: Thought content normal.       LABS/IMAGING:   Pertinent results were reviewed.     ASSESSMENT:  1. Iron deficiency anemia, unspecified iron deficiency anemia type    2. Chronic constipation        PLAN:  EGD and colonoscopy to be scheduled to work up her iron deficiency anemia.   Refer to Hematology for additional work up and possible iron infusions. She reports intolerance to oral iron.   Start Amitiza every evening and Miralax every morning. Additional information about constipation was included in her AVS.     ORDER SUMMARY:  Orders Placed This Encounter   Procedures    Ambulatory Referral to Hematology / Oncology        Follow up in about 8 weeks (around 9/30/2019).     Thank you for the opportunity to participate in the care of this patient. This consult was designated to me by my supervising physician. He fully participated in the development of the assessment and recommendations.    Isiah Franklin PA-C.

## 2019-08-12 ENCOUNTER — LAB VISIT (OUTPATIENT)
Dept: LAB | Facility: HOSPITAL | Age: 69
End: 2019-08-12
Attending: INTERNAL MEDICINE
Payer: MEDICARE

## 2019-08-12 ENCOUNTER — OFFICE VISIT (OUTPATIENT)
Dept: HEMATOLOGY/ONCOLOGY | Facility: CLINIC | Age: 69
End: 2019-08-12
Payer: MEDICARE

## 2019-08-12 VITALS
DIASTOLIC BLOOD PRESSURE: 77 MMHG | RESPIRATION RATE: 16 BRPM | HEIGHT: 58 IN | WEIGHT: 126.56 LBS | BODY MASS INDEX: 26.57 KG/M2 | TEMPERATURE: 98 F | HEART RATE: 67 BPM | OXYGEN SATURATION: 100 % | SYSTOLIC BLOOD PRESSURE: 144 MMHG

## 2019-08-12 DIAGNOSIS — Z79.01 CHRONIC ANTICOAGULATION: ICD-10-CM

## 2019-08-12 DIAGNOSIS — Z95.2 AORTIC VALVE REPLACED: ICD-10-CM

## 2019-08-12 DIAGNOSIS — D50.0 IRON DEFICIENCY ANEMIA DUE TO CHRONIC BLOOD LOSS: Primary | ICD-10-CM

## 2019-08-12 DIAGNOSIS — D50.0 IRON DEFICIENCY ANEMIA DUE TO CHRONIC BLOOD LOSS: ICD-10-CM

## 2019-08-12 LAB
ALBUMIN SERPL BCP-MCNC: 4.2 G/DL (ref 3.5–5.2)
ALP SERPL-CCNC: 90 U/L (ref 55–135)
ALT SERPL W/O P-5'-P-CCNC: 11 U/L (ref 10–44)
ANION GAP SERPL CALC-SCNC: 7 MMOL/L (ref 8–16)
AST SERPL-CCNC: 16 U/L (ref 10–40)
BASOPHILS # BLD AUTO: 0.01 K/UL (ref 0–0.2)
BASOPHILS NFR BLD: 0.3 % (ref 0–1.9)
BILIRUB SERPL-MCNC: 0.2 MG/DL (ref 0.1–1)
BUN SERPL-MCNC: 21 MG/DL (ref 8–23)
CALCIUM SERPL-MCNC: 9.3 MG/DL (ref 8.7–10.5)
CHLORIDE SERPL-SCNC: 103 MMOL/L (ref 95–110)
CO2 SERPL-SCNC: 27 MMOL/L (ref 23–29)
CREAT SERPL-MCNC: 0.8 MG/DL (ref 0.5–1.4)
CRP SERPL-MCNC: 3.2 MG/L (ref 0–8.2)
DIFFERENTIAL METHOD: ABNORMAL
EOSINOPHIL # BLD AUTO: 0 K/UL (ref 0–0.5)
EOSINOPHIL NFR BLD: 0.5 % (ref 0–8)
ERYTHROCYTE [DISTWIDTH] IN BLOOD BY AUTOMATED COUNT: 17 % (ref 11.5–14.5)
EST. GFR  (AFRICAN AMERICAN): >60 ML/MIN/1.73 M^2
EST. GFR  (NON AFRICAN AMERICAN): >60 ML/MIN/1.73 M^2
FERRITIN SERPL-MCNC: 9 NG/ML (ref 20–300)
GLUCOSE SERPL-MCNC: 96 MG/DL (ref 70–110)
HCT VFR BLD AUTO: 28.3 % (ref 37–48.5)
HGB BLD-MCNC: 8.7 G/DL (ref 12–16)
IRON SERPL-MCNC: 25 UG/DL (ref 30–160)
LDH SERPL L TO P-CCNC: 227 U/L (ref 110–260)
LYMPHOCYTES # BLD AUTO: 1.5 K/UL (ref 1–4.8)
LYMPHOCYTES NFR BLD: 39.3 % (ref 18–48)
MCH RBC QN AUTO: 24.6 PG (ref 27–31)
MCHC RBC AUTO-ENTMCNC: 30.7 G/DL (ref 32–36)
MCV RBC AUTO: 80 FL (ref 82–98)
MONOCYTES # BLD AUTO: 0.3 K/UL (ref 0.3–1)
MONOCYTES NFR BLD: 7.8 % (ref 4–15)
NEUTROPHILS # BLD AUTO: 2 K/UL (ref 1.8–7.7)
NEUTROPHILS NFR BLD: 52.1 % (ref 38–73)
PLATELET # BLD AUTO: 223 K/UL (ref 150–350)
PMV BLD AUTO: 9.5 FL (ref 9.2–12.9)
POTASSIUM SERPL-SCNC: 3.6 MMOL/L (ref 3.5–5.1)
PROT SERPL-MCNC: 7.1 G/DL (ref 6–8.4)
RBC # BLD AUTO: 3.54 M/UL (ref 4–5.4)
SATURATED IRON: 5 % (ref 20–50)
SODIUM SERPL-SCNC: 137 MMOL/L (ref 136–145)
TOTAL IRON BINDING CAPACITY: 549 UG/DL (ref 250–450)
TRANSFERRIN SERPL-MCNC: 371 MG/DL (ref 200–375)
WBC # BLD AUTO: 3.84 K/UL (ref 3.9–12.7)

## 2019-08-12 PROCEDURE — 82728 ASSAY OF FERRITIN: CPT

## 2019-08-12 PROCEDURE — 85025 COMPLETE CBC W/AUTO DIFF WBC: CPT

## 2019-08-12 PROCEDURE — 82607 VITAMIN B-12: CPT

## 2019-08-12 PROCEDURE — 99204 OFFICE O/P NEW MOD 45 MIN: CPT | Mod: S$GLB,,, | Performed by: INTERNAL MEDICINE

## 2019-08-12 PROCEDURE — 83540 ASSAY OF IRON: CPT

## 2019-08-12 PROCEDURE — 83010 ASSAY OF HAPTOGLOBIN QUANT: CPT

## 2019-08-12 PROCEDURE — 80053 COMPREHEN METABOLIC PANEL: CPT

## 2019-08-12 PROCEDURE — 36415 COLL VENOUS BLD VENIPUNCTURE: CPT

## 2019-08-12 PROCEDURE — 83615 LACTATE (LD) (LDH) ENZYME: CPT

## 2019-08-12 PROCEDURE — 99999 PR PBB SHADOW E&M-EST. PATIENT-LVL IV: CPT | Mod: PBBFAC,,, | Performed by: INTERNAL MEDICINE

## 2019-08-12 PROCEDURE — 99204 PR OFFICE/OUTPT VISIT, NEW, LEVL IV, 45-59 MIN: ICD-10-PCS | Mod: S$GLB,,, | Performed by: INTERNAL MEDICINE

## 2019-08-12 PROCEDURE — 82746 ASSAY OF FOLIC ACID SERUM: CPT

## 2019-08-12 PROCEDURE — 99999 PR PBB SHADOW E&M-EST. PATIENT-LVL IV: ICD-10-PCS | Mod: PBBFAC,,, | Performed by: INTERNAL MEDICINE

## 2019-08-12 PROCEDURE — 86140 C-REACTIVE PROTEIN: CPT

## 2019-08-12 NOTE — LETTER
August 12, 2019      Isiah Franklin PA-C  [address]  [place] [address]            [facility]  [address] [facility] [address]  Phone: [phone]  Fax: [phone]          Patient: Adalgisa Wright   MR Number: [id number]   YOB: 1950   Date of Visit: 8/12/2019       Dear Isiah Franklin:    Thank you for referring Adalgisa Wright to me for evaluation. Attached you will find relevant portions of my assessment and plan of care.    If you have questions, please do not hesitate to call me. I look forward to following Adalgisa Wright along with you.    Sincerely,    Landon Haq MD    Enclosure  CC:  No Recipients    If you would like to receive this communication electronically, please contact [email or web address] or [phone] to request more information on [facility] access.    For providers and/or their staff who would like to refer a patient to [facility], please contact us through our one-stop-shop provider referral line, [facility], at [phone].    If you feel you have received this communication in error or would no longer like to receive these types of communications, please e-mail [email or web address]

## 2019-08-12 NOTE — PROGRESS NOTES
Subjective:      Patient ID: Adalgisa Wright is a 68 y.o. female.    Chief Complaint: Anemia    The patient is a 68-year-old  female who presents to the Hematology Oncology Clinic today in consultation to discuss further evaluation and management recommendations for iron deficiency anemia.  The patient has been referred to me by Ms. Isiah TIWARI with gastroenterology.  I have reviewed all the patient's relevant clinical history available in the medical record including in Care everywhere and have utilized this in my evaluation and management recommendations today.  The patient's past medical history significant for mechanical aortic valve replacement approximately 16 years ago.  She is on therapeutic anticoagulation.  She denies any melena, hematochezia, hematemesis, hemoptysis or hematuria.  She denies any fevers, chills or night sweats.  She denies any loss of appetite or unintentional weight loss.  She denies any melena, hematochezia, hematemesis, hemoptysis or hematuria.  The patient is scheduled to undergo endoscopic evaluation including EGD/colonoscopy in the near future.      Review of Systems   Constitutional: Negative for activity change, appetite change, chills, diaphoresis, fatigue, fever and unexpected weight change.   HENT: Negative for congestion, dental problem, ear discharge, ear pain, hearing loss, mouth sores, postnasal drip, sinus pressure, sore throat, tinnitus, trouble swallowing and voice change.    Eyes: Negative for photophobia, pain and visual disturbance.   Respiratory: Negative for cough, chest tightness, shortness of breath and wheezing.    Cardiovascular: Negative for chest pain, palpitations and leg swelling.   Gastrointestinal: Negative for abdominal distention, abdominal pain, blood in stool, constipation, diarrhea, nausea and vomiting.   Endocrine: Negative for cold intolerance, heat intolerance, polydipsia, polyphagia and polyuria.   Genitourinary: Negative for  difficulty urinating, dysuria, flank pain, frequency, hematuria, urgency, vaginal bleeding and vaginal discharge.   Musculoskeletal: Negative for arthralgias, back pain, gait problem, joint swelling and myalgias.   Skin: Negative for pallor and rash.   Allergic/Immunologic: Negative for immunocompromised state.   Neurological: Negative for dizziness, syncope, weakness, light-headedness, numbness and headaches.   Hematological: Negative for adenopathy. Does not bruise/bleed easily.   Psychiatric/Behavioral: Negative for agitation, confusion and dysphoric mood. The patient is not nervous/anxious.        Medication List with Changes/Refills   Current Medications    ACEBUTOLOL (SECTRAL) 200 MG CAPSULE    Take 200 mg by mouth Daily.    ALPRAZOLAM (XANAX) 1 MG TABLET    Take 1 tablet by mouth nightly as needed for Insomnia.    BUTALBITAL-ACETAMINOPHEN-CAFFEINE -40 MG (FIORICET, ESGIC) -40 MG PER TABLET    TAKE ONE TABLET BY MOUTH EVERY SIX HOURS AS NEEDED    ENALAPRIL (VASOTEC) 20 MG TABLET    Take 20 mg by mouth Daily.    ERGOCALCIFEROL (ERGOCALCIFEROL) 50,000 UNIT CAP    Take 1 capsule by mouth every 30 days.    FUROSEMIDE (LASIX) 40 MG TABLET    Take 40 mg by mouth Daily.    LANSOPRAZOLE (PREVACID) 15 MG CAPSULE    Take 15 mg by mouth Daily.    LOVASTATIN (MEVACOR) 40 MG TABLET    Take 40 mg by mouth every evening.    LUBIPROSTONE (AMITIZA) 8 MCG CAP    TAKE 1 CAPSULE BY MOUTH 2 (TWO) TIMES DAILY WITH MEALS FOR 30 DAYS.    MOVIPREP 100-7.5-2.691 GRAM SOLUTION        MULTIVITAMIN (THERAGRAN) TABLET    Take 1 tablet by mouth.    SERTRALINE (ZOLOFT) 100 MG TABLET    Take 1 tablet by mouth Daily.    WARFARIN (COUMADIN) 5 MG TABLET    Take 5 mg by mouth. 5 mg by mouth for two days then alternate with 2.5 mg for one day.    ZOLPIDEM (AMBIEN) 10 MG TAB    Take 5 mg by mouth nightly.     Review of patient's allergies indicates:   Allergen Reactions    Penicillins Rash       Lab: I have reviewed all of the  patient's relevant lab work available in the medical record and have utilized this in my evaluation and management recommendations today    Imaging: I have reviewed all of the patient's diagnostic/imaging results available in the medical record and have utilized this in my evaluation and management recommendations today.      Objective:     Vitals:    08/12/19 1324   BP: (!) 144/77   Pulse: 67   Resp: 16   Temp: 98.2 °F (36.8 °C)       Physical Exam   Constitutional: She is oriented to person, place, and time. She appears well-developed and well-nourished. She is active and cooperative.   HENT:   Head: Normocephalic and atraumatic.   Nose: Nose normal.   Mouth/Throat: Oropharynx is clear and moist. No oropharyngeal exudate.   Eyes: Pupils are equal, round, and reactive to light. No scleral icterus.   Neck: Neck supple. No thyromegaly present.   Cardiovascular: Normal rate, regular rhythm, normal heart sounds and intact distal pulses.   No murmur heard.  Pulmonary/Chest: Effort normal and breath sounds normal. No stridor. No respiratory distress. She has no wheezes. She has no rales.   Abdominal: Soft. Bowel sounds are normal. She exhibits no distension, no ascites and no mass. There is no hepatosplenomegaly. There is no tenderness. There is no rigidity, no rebound and no guarding.   Musculoskeletal: She exhibits no edema or tenderness.   Lymphadenopathy:     She has no cervical adenopathy.   Neurological: She is alert and oriented to person, place, and time. No cranial nerve deficit. She exhibits normal muscle tone. Coordination normal.   Skin: Skin is warm and dry. No rash noted. No pallor.   Psychiatric: She has a normal mood and affect. Her behavior is normal. Judgment and thought content normal.   Nursing note and vitals reviewed.      Assessment:     1. Iron deficiency anemia due to chronic blood loss    2. Aortic valve replaced    3. Chronic anticoagulation        Plan:   1.  I had a detailed discussion with  the patient today with regard to her current clinical situation.  I will check lab work today.  I have recommended proceeding with 2 weekly doses of IV injectafer for treatment of her iron deficiency anemia.  The patient reported that she has tried oral iron supplementation but had to stop it after 3 weeks because of severe problems related to abdominal cramping and constipation.  Risks/benefits/side effects of IV iron therapy were reviewed in detail and she expressed understanding and agreement to proceed with this.  2.  She knows to seek immediate attention for any signs/symptoms of GI/ bleeding.    Follow-up will be determined after above.  She knows to call for any additional questions or new problems.  Iron deficiency anemia due to chronic blood loss  -     CBC auto differential; Future; Expected date: 08/12/2019  -     CMP; Future; Expected date: 08/12/2019  -     Iron and TIBC; Future; Expected date: 08/12/2019  -     Ferritin; Future; Expected date: 08/12/2019  -     C-REACTIVE PROTEIN; Future; Expected date: 08/12/2019  -     Vitamin B12; Future; Expected date: 08/12/2019  -     Folate; Future; Expected date: 08/12/2019  -     Lactate dehydrogenase; Future; Expected date: 08/12/2019  -     Haptoglobin; Future; Expected date: 08/12/2019    Aortic valve replaced  -     CBC auto differential; Future; Expected date: 08/12/2019  -     CMP; Future; Expected date: 08/12/2019  -     Iron and TIBC; Future; Expected date: 08/12/2019  -     Ferritin; Future; Expected date: 08/12/2019  -     C-REACTIVE PROTEIN; Future; Expected date: 08/12/2019  -     Vitamin B12; Future; Expected date: 08/12/2019  -     Folate; Future; Expected date: 08/12/2019  -     Lactate dehydrogenase; Future; Expected date: 08/12/2019  -     Haptoglobin; Future; Expected date: 08/12/2019    Chronic anticoagulation  -     CBC auto differential; Future; Expected date: 08/12/2019  -     CMP; Future; Expected date: 08/12/2019  -     Iron and TIBC;  Future; Expected date: 08/12/2019  -     Ferritin; Future; Expected date: 08/12/2019  -     C-REACTIVE PROTEIN; Future; Expected date: 08/12/2019  -     Vitamin B12; Future; Expected date: 08/12/2019  -     Folate; Future; Expected date: 08/12/2019  -     Lactate dehydrogenase; Future; Expected date: 08/12/2019  -     Haptoglobin; Future; Expected date: 08/12/2019

## 2019-08-13 LAB
FOLATE SERPL-MCNC: 17.1 NG/ML (ref 4–24)
HAPTOGLOB SERPL-MCNC: <10 MG/DL (ref 30–250)
VIT B12 SERPL-MCNC: 437 PG/ML (ref 210–950)

## 2019-08-19 ENCOUNTER — TELEPHONE (OUTPATIENT)
Dept: HEMATOLOGY/ONCOLOGY | Facility: CLINIC | Age: 69
End: 2019-08-19

## 2019-08-19 NOTE — TELEPHONE ENCOUNTER
----- Message from Landon Haq MD sent at 8/16/2019  3:22 PM CDT -----  She needs to be added to my clinic schedule on August 21, 2019 when she comes for her iron infusion.  I need to review her lab results with her.  Thank you.

## 2019-08-21 ENCOUNTER — INFUSION (OUTPATIENT)
Dept: INFUSION THERAPY | Facility: HOSPITAL | Age: 69
End: 2019-08-21
Attending: INTERNAL MEDICINE
Payer: MEDICARE

## 2019-08-21 ENCOUNTER — OFFICE VISIT (OUTPATIENT)
Dept: HEMATOLOGY/ONCOLOGY | Facility: CLINIC | Age: 69
End: 2019-08-21
Payer: MEDICARE

## 2019-08-21 VITALS — DIASTOLIC BLOOD PRESSURE: 75 MMHG | SYSTOLIC BLOOD PRESSURE: 142 MMHG | HEART RATE: 65 BPM

## 2019-08-21 VITALS — WEIGHT: 127.44 LBS | SYSTOLIC BLOOD PRESSURE: 98 MMHG | DIASTOLIC BLOOD PRESSURE: 64 MMHG | BODY MASS INDEX: 26.63 KG/M2

## 2019-08-21 DIAGNOSIS — R77.8 LOW SERUM HAPTOGLOBIN: ICD-10-CM

## 2019-08-21 DIAGNOSIS — D59.4 OTHER NON-AUTOIMMUNE HEMOLYTIC ANEMIAS: ICD-10-CM

## 2019-08-21 DIAGNOSIS — D50.0 IRON DEFICIENCY ANEMIA DUE TO CHRONIC BLOOD LOSS: Primary | ICD-10-CM

## 2019-08-21 DIAGNOSIS — Z79.01 CHRONIC ANTICOAGULATION: ICD-10-CM

## 2019-08-21 DIAGNOSIS — Z95.2 AORTIC VALVE REPLACED: ICD-10-CM

## 2019-08-21 PROCEDURE — 99215 PR OFFICE/OUTPT VISIT, EST, LEVL V, 40-54 MIN: ICD-10-PCS | Mod: 25,S$GLB,, | Performed by: INTERNAL MEDICINE

## 2019-08-21 PROCEDURE — 99999 PR PBB SHADOW E&M-EST. PATIENT-LVL III: ICD-10-PCS | Mod: PBBFAC,,, | Performed by: INTERNAL MEDICINE

## 2019-08-21 PROCEDURE — 25000003 PHARM REV CODE 250: Performed by: INTERNAL MEDICINE

## 2019-08-21 PROCEDURE — 63600175 PHARM REV CODE 636 W HCPCS: Performed by: INTERNAL MEDICINE

## 2019-08-21 PROCEDURE — 96365 THER/PROPH/DIAG IV INF INIT: CPT

## 2019-08-21 PROCEDURE — A4216 STERILE WATER/SALINE, 10 ML: HCPCS | Performed by: INTERNAL MEDICINE

## 2019-08-21 PROCEDURE — 99999 PR PBB SHADOW E&M-EST. PATIENT-LVL III: CPT | Mod: PBBFAC,,, | Performed by: INTERNAL MEDICINE

## 2019-08-21 PROCEDURE — 99215 OFFICE O/P EST HI 40 MIN: CPT | Mod: 25,S$GLB,, | Performed by: INTERNAL MEDICINE

## 2019-08-21 RX ORDER — SODIUM CHLORIDE 0.9 % (FLUSH) 0.9 %
10 SYRINGE (ML) INJECTION
Status: DISCONTINUED | OUTPATIENT
Start: 2019-08-21 | End: 2019-08-21 | Stop reason: HOSPADM

## 2019-08-21 RX ORDER — HEPARIN 100 UNIT/ML
500 SYRINGE INTRAVENOUS
Status: CANCELLED | OUTPATIENT
Start: 2019-08-28

## 2019-08-21 RX ORDER — SODIUM CHLORIDE 0.9 % (FLUSH) 0.9 %
10 SYRINGE (ML) INJECTION
Status: CANCELLED | OUTPATIENT
Start: 2019-08-28

## 2019-08-21 RX ORDER — HEPARIN 100 UNIT/ML
500 SYRINGE INTRAVENOUS
Status: CANCELLED | OUTPATIENT
Start: 2019-08-21

## 2019-08-21 RX ORDER — SODIUM CHLORIDE 0.9 % (FLUSH) 0.9 %
10 SYRINGE (ML) INJECTION
Status: CANCELLED | OUTPATIENT
Start: 2019-08-21

## 2019-08-21 RX ADMIN — FERRIC CARBOXYMALTOSE INJECTION 750 MG: 50 INJECTION, SOLUTION INTRAVENOUS at 02:08

## 2019-08-21 RX ADMIN — Medication 10 ML: at 02:08

## 2019-08-21 NOTE — PROGRESS NOTES
Subjective:      Patient ID: Adalgisa Wright is a 68 y.o. female.    Chief Complaint: Anemia    The patient is a 68-year-old  female who presents to the Hematology Oncology Clinic today in consultation to discuss further evaluation and management recommendations for iron deficiency anemia.  The patient has been referred to me by Ms. Isiah TIWARI with gastroenterology.  I have reviewed all the patient's relevant clinical history available in the medical record including in Care everywhere and have utilized this in my evaluation and management recommendations today.  The patient's past medical history significant for mechanical aortic valve replacement approximately 16 years ago.  She is on therapeutic anticoagulation.  She denies any melena, hematochezia, hematemesis, hemoptysis or hematuria.  She denies any fevers, chills or night sweats.  She denies any loss of appetite or unintentional weight loss.  She denies any melena, hematochezia, hematemesis, hemoptysis or hematuria.  The patient is scheduled to undergo endoscopic evaluation including EGD/colonoscopy in the near future.      Anemia   There has been no abdominal pain, bruising/bleeding easily, confusion, fever, light-headedness, pallor or palpitations. Signs of blood loss that are not present include vaginal bleeding.     Review of Systems   Constitutional: Negative for activity change, appetite change, chills, diaphoresis, fatigue, fever and unexpected weight change.   HENT: Negative for congestion, dental problem, ear discharge, ear pain, hearing loss, mouth sores, postnasal drip, sinus pressure, sore throat, tinnitus, trouble swallowing and voice change.    Eyes: Negative for photophobia, pain and visual disturbance.   Respiratory: Negative for cough, chest tightness, shortness of breath and wheezing.    Cardiovascular: Negative for chest pain, palpitations and leg swelling.   Gastrointestinal: Negative for abdominal distention, abdominal pain,  blood in stool, constipation, diarrhea, nausea and vomiting.   Endocrine: Negative for cold intolerance, heat intolerance, polydipsia, polyphagia and polyuria.   Genitourinary: Negative for difficulty urinating, dysuria, flank pain, frequency, hematuria, urgency, vaginal bleeding and vaginal discharge.   Musculoskeletal: Negative for arthralgias, back pain, gait problem, joint swelling and myalgias.   Skin: Negative for pallor and rash.   Allergic/Immunologic: Negative for immunocompromised state.   Neurological: Negative for dizziness, syncope, weakness, light-headedness, numbness and headaches.   Hematological: Negative for adenopathy. Does not bruise/bleed easily.   Psychiatric/Behavioral: Negative for agitation, confusion and dysphoric mood. The patient is not nervous/anxious.        Medication List with Changes/Refills   Current Medications    ACEBUTOLOL (SECTRAL) 200 MG CAPSULE    Take 400 mg by mouth Daily.     ALPRAZOLAM (XANAX) 1 MG TABLET    Take 1 tablet by mouth nightly as needed for Insomnia.    BUTALBITAL-ACETAMINOPHEN-CAFFEINE -40 MG (FIORICET, ESGIC) -40 MG PER TABLET    TAKE ONE TABLET BY MOUTH EVERY SIX HOURS AS NEEDED    ENALAPRIL (VASOTEC) 20 MG TABLET    Take 20 mg by mouth Daily.    ERGOCALCIFEROL (ERGOCALCIFEROL) 50,000 UNIT CAP    Take 1 capsule by mouth every 30 days.    FUROSEMIDE (LASIX) 40 MG TABLET    Take 40 mg by mouth Daily.    LANSOPRAZOLE (PREVACID) 15 MG CAPSULE    Take 15 mg by mouth Daily.    LOVASTATIN (MEVACOR) 40 MG TABLET    Take 40 mg by mouth every evening.    LUBIPROSTONE (AMITIZA) 8 MCG CAP    TAKE 1 CAPSULE BY MOUTH 2 (TWO) TIMES DAILY WITH MEALS FOR 30 DAYS. PRN    MOVIPREP 100-7.5-2.691 GRAM SOLUTION        MULTIVITAMIN (THERAGRAN) TABLET    Take 1 tablet by mouth.    SERTRALINE (ZOLOFT) 100 MG TABLET    Take 1 tablet by mouth Daily.    WARFARIN (COUMADIN) 5 MG TABLET    Take 5 mg by mouth. 5 mg by mouth for five days then alternate with 2.5 mg for two  days.    ZOLPIDEM (AMBIEN) 10 MG TAB    Take 5 mg by mouth nightly as needed.      Review of patient's allergies indicates:   Allergen Reactions    Penicillins Rash       Lab: I have reviewed all of the patient's relevant lab work available in the medical record and have utilized this in my evaluation and management recommendations today    Imaging: I have reviewed all of the patient's diagnostic/imaging results available in the medical record and have utilized this in my evaluation and management recommendations today.      Objective:     Vitals:    08/21/19 1307   BP: 98/64       Physical Exam   Constitutional: She is oriented to person, place, and time. She appears well-developed and well-nourished. She is active and cooperative.   HENT:   Head: Normocephalic and atraumatic.   Nose: Nose normal.   Mouth/Throat: Oropharynx is clear and moist. No oropharyngeal exudate.   Eyes: Pupils are equal, round, and reactive to light. No scleral icterus.   Neck: Neck supple. No thyromegaly present.   Cardiovascular: Normal rate, regular rhythm, normal heart sounds and intact distal pulses.   No murmur heard.  Pulmonary/Chest: Effort normal and breath sounds normal. No stridor. No respiratory distress. She has no wheezes. She has no rales.   Abdominal: Soft. Bowel sounds are normal. She exhibits no distension, no ascites and no mass. There is no hepatosplenomegaly. There is no tenderness. There is no rigidity, no rebound and no guarding.   Musculoskeletal: She exhibits no edema or tenderness.   Lymphadenopathy:     She has no cervical adenopathy.   Neurological: She is alert and oriented to person, place, and time. No cranial nerve deficit. She exhibits normal muscle tone. Coordination normal.   Skin: Skin is warm and dry. No rash noted. No pallor.   Psychiatric: She has a normal mood and affect. Her behavior is normal. Judgment and thought content normal.   Nursing note and vitals reviewed.      Assessment:     1. Iron  deficiency anemia due to chronic blood loss    2. Aortic valve replaced    3. Chronic anticoagulation    4. Low serum haptoglobin    5. Other non-autoimmune hemolytic anemias        Plan:   1.  I had a detailed discussion with the patient today with regard to her current clinical situation. I have recommended proceeding with 2 weekly doses of IV injectafer for treatment of her iron deficiency anemia.  She is supposed to get her 1st weekly dose today.  The patient reported that she has tried oral iron supplementation but had to stop it after 3 weeks because of severe problems related to abdominal cramping and constipation.  Risks/benefits/side effects of IV iron therapy were reviewed in detail and she expressed understanding and agreement to proceed with this.  2.  She knows to seek immediate attention for any signs/symptoms of GI/ bleeding.  3.  I had a detailed discussion with the patient today with regard to results of recent lab work.  She has low haptoglobin.  This is consistent with her possibly having intravascular hemolysis due to presence of mechanical aortic valve.  We discussed that in her case this might be a slow low-grade intravascular hemolysis as her LDH is noted to be normal. I will check urine hemosiderin today.  The patient will be given copy of her lab work and a copy of this note to take to an appointment with her cardiologist Dr. Mitchell so that her aortic valve can be evaluated.  The rationale for this was reviewed in detail with the patient and her fiancee today and they expressed understanding and agreement with this.    Follow-up in 3 months with labs 1-2 days before clinic visit.  She knows to call sooner if any new problems or questions.   Iron deficiency anemia due to chronic blood loss  -     CBC auto differential; Future; Expected date: 08/21/2019  -     CMP; Future; Expected date: 08/21/2019  -     Reticulocytes; Future; Expected date: 08/21/2019  -     HAPTOGLOBIN; Future;  Expected date: 08/21/2019  -     Iron and TIBC; Future; Expected date: 08/21/2019  -     C-REACTIVE PROTEIN; Future; Expected date: 08/21/2019  -     Lactate dehydrogenase; Future; Expected date: 08/21/2019  -     Hemosiderin, urine; Future; Expected date: 08/21/2019    Aortic valve replaced  -     CBC auto differential; Future; Expected date: 08/21/2019  -     CMP; Future; Expected date: 08/21/2019  -     Reticulocytes; Future; Expected date: 08/21/2019  -     HAPTOGLOBIN; Future; Expected date: 08/21/2019  -     Iron and TIBC; Future; Expected date: 08/21/2019  -     C-REACTIVE PROTEIN; Future; Expected date: 08/21/2019  -     Lactate dehydrogenase; Future; Expected date: 08/21/2019  -     Hemosiderin, urine; Future; Expected date: 08/21/2019    Chronic anticoagulation  -     CBC auto differential; Future; Expected date: 08/21/2019  -     CMP; Future; Expected date: 08/21/2019  -     Reticulocytes; Future; Expected date: 08/21/2019  -     HAPTOGLOBIN; Future; Expected date: 08/21/2019  -     Iron and TIBC; Future; Expected date: 08/21/2019  -     C-REACTIVE PROTEIN; Future; Expected date: 08/21/2019  -     Lactate dehydrogenase; Future; Expected date: 08/21/2019    Low serum haptoglobin  -     CBC auto differential; Future; Expected date: 08/21/2019  -     CMP; Future; Expected date: 08/21/2019  -     Reticulocytes; Future; Expected date: 08/21/2019  -     HAPTOGLOBIN; Future; Expected date: 08/21/2019  -     Iron and TIBC; Future; Expected date: 08/21/2019  -     C-REACTIVE PROTEIN; Future; Expected date: 08/21/2019  -     Lactate dehydrogenase; Future; Expected date: 08/21/2019  -     Hemosiderin, urine; Future; Expected date: 08/21/2019    Other non-autoimmune hemolytic anemias  -     CBC auto differential; Future; Expected date: 08/21/2019  -     CMP; Future; Expected date: 08/21/2019  -     Reticulocytes; Future; Expected date: 08/21/2019  -     HAPTOGLOBIN; Future; Expected date: 08/21/2019  -     Iron and  TIBC; Future; Expected date: 08/21/2019  -     C-REACTIVE PROTEIN; Future; Expected date: 08/21/2019  -     Lactate dehydrogenase; Future; Expected date: 08/21/2019

## 2019-08-21 NOTE — PLAN OF CARE
Problem: Adult Inpatient Plan of Care  Goal: Plan of Care Review  Outcome: Ongoing (interventions implemented as appropriate)  Pt states I've been tired

## 2019-08-28 ENCOUNTER — INFUSION (OUTPATIENT)
Dept: INFUSION THERAPY | Facility: HOSPITAL | Age: 69
End: 2019-08-28
Attending: INTERNAL MEDICINE
Payer: MEDICARE

## 2019-08-28 VITALS
OXYGEN SATURATION: 97 % | DIASTOLIC BLOOD PRESSURE: 60 MMHG | RESPIRATION RATE: 18 BRPM | WEIGHT: 127.44 LBS | BODY MASS INDEX: 26.75 KG/M2 | HEIGHT: 58 IN | SYSTOLIC BLOOD PRESSURE: 110 MMHG | HEART RATE: 75 BPM | TEMPERATURE: 99 F

## 2019-08-28 DIAGNOSIS — D50.0 IRON DEFICIENCY ANEMIA DUE TO CHRONIC BLOOD LOSS: Primary | ICD-10-CM

## 2019-08-28 PROCEDURE — 96365 THER/PROPH/DIAG IV INF INIT: CPT

## 2019-08-28 PROCEDURE — A4216 STERILE WATER/SALINE, 10 ML: HCPCS | Performed by: INTERNAL MEDICINE

## 2019-08-28 PROCEDURE — 63600175 PHARM REV CODE 636 W HCPCS: Mod: JG | Performed by: INTERNAL MEDICINE

## 2019-08-28 PROCEDURE — 25000003 PHARM REV CODE 250: Performed by: INTERNAL MEDICINE

## 2019-08-28 RX ORDER — SODIUM CHLORIDE 0.9 % (FLUSH) 0.9 %
10 SYRINGE (ML) INJECTION
Status: DISCONTINUED | OUTPATIENT
Start: 2019-08-28 | End: 2019-08-28 | Stop reason: HOSPADM

## 2019-08-28 RX ADMIN — Medication 10 ML: at 01:08

## 2019-08-28 RX ADMIN — FERRIC CARBOXYMALTOSE INJECTION 750 MG: 50 INJECTION, SOLUTION INTRAVENOUS at 01:08

## 2019-08-28 NOTE — PLAN OF CARE
"Problem: Adult Inpatient Plan of Care  Goal: Plan of Care Review  Outcome: Ongoing (interventions implemented as appropriate)  Pt. Stated,"I'm doing pretty good."      "

## 2019-08-28 NOTE — DISCHARGE INSTRUCTIONS
[facility] [place]  [address] [address]  [address]  [phone] phone     [phone] fax  Hours of Operation: Monday- Friday 8:00am- 5:00pm  After hours phone  [phone]  Hematology / Oncology Physicians on call      Dr. Bill Corcoran      Please call with any concerns regarding your appointment today.

## 2019-08-28 NOTE — PLAN OF CARE
Problem: Anemia  Goal: Anemia Symptom Improvement  Outcome: Ongoing (interventions implemented as appropriate)  Pt. Has no complaints related to BAILEY today.

## 2019-08-28 NOTE — PLAN OF CARE
Problem: Anemia  Goal: Anemia Symptom Improvement    Intervention: Monitor and Manage Anemia  Pt. Ordered and given injectafer times two doses.

## 2019-09-05 ENCOUNTER — TELEPHONE (OUTPATIENT)
Dept: HEMATOLOGY/ONCOLOGY | Facility: CLINIC | Age: 69
End: 2019-09-05

## 2019-09-05 NOTE — TELEPHONE ENCOUNTER
----- Message from Landon Haq MD sent at 8/26/2019  8:11 AM CDT -----  Please let the patient know that her urine test is negative which is good news.  This means that there may not be a lot of destruction of red blood cells going on. Thank you

## 2019-09-26 ENCOUNTER — HOSPITAL ENCOUNTER (OUTPATIENT)
Dept: RADIOLOGY | Facility: HOSPITAL | Age: 69
Discharge: HOME | End: 2019-09-26
Attending: INTERNAL MEDICINE
Payer: MEDICARE

## 2019-09-26 DIAGNOSIS — M54.9 ACUTE BILATERAL BACK PAIN, UNSPECIFIED BACK LOCATION: ICD-10-CM

## 2019-09-26 PROCEDURE — 72070 X-RAY EXAM THORAC SPINE 2VWS: CPT | Mod: TC,PO

## 2019-09-26 PROCEDURE — 72100 X-RAY EXAM L-S SPINE 2/3 VWS: CPT | Mod: TC,PO

## 2019-09-26 PROCEDURE — 72070 X-RAY EXAM THORAC SPINE 2VWS: CPT | Mod: 26,,, | Performed by: RADIOLOGY

## 2019-09-26 PROCEDURE — 72070 XR THORACIC SPINE AP LATERAL: ICD-10-PCS | Mod: 26,,, | Performed by: RADIOLOGY

## 2019-09-26 PROCEDURE — 72100 X-RAY EXAM L-S SPINE 2/3 VWS: CPT | Mod: 26,,, | Performed by: RADIOLOGY

## 2019-09-26 PROCEDURE — 72100 XR LUMBAR SPINE AP AND LATERAL: ICD-10-PCS | Mod: 26,,, | Performed by: RADIOLOGY

## 2019-10-08 ENCOUNTER — HOSPITAL ENCOUNTER (OUTPATIENT)
Facility: HOSPITAL | Age: 69
Discharge: HOME | End: 2019-10-09
Attending: EMERGENCY MEDICINE | Admitting: EMERGENCY MEDICINE
Payer: MEDICARE

## 2019-10-08 DIAGNOSIS — K62.5 RECTAL BLEEDING: Primary | ICD-10-CM

## 2019-10-08 DIAGNOSIS — K92.2 LOWER GI BLEED: ICD-10-CM

## 2019-10-08 DIAGNOSIS — K21.9 GASTROESOPHAGEAL REFLUX DISEASE WITHOUT ESOPHAGITIS: ICD-10-CM

## 2019-10-08 PROBLEM — E78.5 HLD (HYPERLIPIDEMIA): Status: ACTIVE | Noted: 2019-10-08

## 2019-10-08 LAB
ALBUMIN SERPL BCP-MCNC: 4.6 G/DL (ref 3.5–5.2)
ALP SERPL-CCNC: 121 U/L (ref 55–135)
ALT SERPL W/O P-5'-P-CCNC: 17 U/L (ref 10–44)
ANION GAP SERPL CALC-SCNC: 11 MMOL/L (ref 8–16)
APTT BLDCRRT: 39.6 SEC (ref 21–32)
APTT BLDCRRT: 39.9 SEC (ref 21–32)
AST SERPL-CCNC: 21 U/L (ref 10–40)
BASOPHILS # BLD AUTO: 0.03 K/UL (ref 0–0.2)
BASOPHILS # BLD AUTO: 0.05 K/UL (ref 0–0.2)
BASOPHILS NFR BLD: 0.8 % (ref 0–1.9)
BASOPHILS NFR BLD: 1 % (ref 0–1.9)
BILIRUB SERPL-MCNC: 0.3 MG/DL (ref 0.1–1)
BILIRUB UR QL STRIP: NEGATIVE
BUN SERPL-MCNC: 9 MG/DL (ref 8–23)
CALCIUM SERPL-MCNC: 9.7 MG/DL (ref 8.7–10.5)
CHLORIDE SERPL-SCNC: 105 MMOL/L (ref 95–110)
CLARITY UR REFRACT.AUTO: CLEAR
CO2 SERPL-SCNC: 26 MMOL/L (ref 23–29)
COLOR UR AUTO: YELLOW
CREAT SERPL-MCNC: 0.7 MG/DL (ref 0.5–1.4)
DIFFERENTIAL METHOD: ABNORMAL
DIFFERENTIAL METHOD: ABNORMAL
EOSINOPHIL # BLD AUTO: 0 K/UL (ref 0–0.5)
EOSINOPHIL # BLD AUTO: 0.1 K/UL (ref 0–0.5)
EOSINOPHIL NFR BLD: 0.4 % (ref 0–8)
EOSINOPHIL NFR BLD: 1.3 % (ref 0–8)
ERYTHROCYTE [DISTWIDTH] IN BLOOD BY AUTOMATED COUNT: 20 % (ref 11.5–14.5)
ERYTHROCYTE [DISTWIDTH] IN BLOOD BY AUTOMATED COUNT: 20.2 % (ref 11.5–14.5)
EST. GFR  (AFRICAN AMERICAN): >60 ML/MIN/1.73 M^2
EST. GFR  (NON AFRICAN AMERICAN): >60 ML/MIN/1.73 M^2
GLUCOSE SERPL-MCNC: 90 MG/DL (ref 70–110)
GLUCOSE UR QL STRIP: NEGATIVE
HCT VFR BLD AUTO: 39.4 % (ref 37–48.5)
HCT VFR BLD AUTO: 40 % (ref 37–48.5)
HGB BLD-MCNC: 12.2 G/DL (ref 12–16)
HGB BLD-MCNC: 12.2 G/DL (ref 12–16)
HGB UR QL STRIP: ABNORMAL
IMM GRANULOCYTES # BLD AUTO: 0 K/UL (ref 0–0.04)
IMM GRANULOCYTES # BLD AUTO: 0.02 K/UL (ref 0–0.04)
IMM GRANULOCYTES NFR BLD AUTO: 0 % (ref 0–0.5)
IMM GRANULOCYTES NFR BLD AUTO: 0.4 % (ref 0–0.5)
INR PPP: 1.8 (ref 0.8–1.2)
INR PPP: 1.9 (ref 0.8–1.2)
KETONES UR QL STRIP: NEGATIVE
LEUKOCYTE ESTERASE UR QL STRIP: NEGATIVE
LYMPHOCYTES # BLD AUTO: 1 K/UL (ref 1–4.8)
LYMPHOCYTES # BLD AUTO: 1.1 K/UL (ref 1–4.8)
LYMPHOCYTES NFR BLD: 21 % (ref 18–48)
LYMPHOCYTES NFR BLD: 26.7 % (ref 18–48)
MCH RBC QN AUTO: 28.6 PG (ref 27–31)
MCH RBC QN AUTO: 29.1 PG (ref 27–31)
MCHC RBC AUTO-ENTMCNC: 30.5 G/DL (ref 32–36)
MCHC RBC AUTO-ENTMCNC: 31 G/DL (ref 32–36)
MCV RBC AUTO: 94 FL (ref 82–98)
MCV RBC AUTO: 94 FL (ref 82–98)
MONOCYTES # BLD AUTO: 0.3 K/UL (ref 0.3–1)
MONOCYTES # BLD AUTO: 0.3 K/UL (ref 0.3–1)
MONOCYTES NFR BLD: 6.8 % (ref 4–15)
MONOCYTES NFR BLD: 8.7 % (ref 4–15)
NEUTROPHILS # BLD AUTO: 2.4 K/UL (ref 1.8–7.7)
NEUTROPHILS # BLD AUTO: 3.5 K/UL (ref 1.8–7.7)
NEUTROPHILS NFR BLD: 62.5 % (ref 38–73)
NEUTROPHILS NFR BLD: 70.4 % (ref 38–73)
NITRITE UR QL STRIP: NEGATIVE
NRBC BLD-RTO: 0 /100 WBC
NRBC BLD-RTO: 0 /100 WBC
PH UR STRIP: 6 [PH] (ref 5–8)
PLATELET # BLD AUTO: 202 K/UL (ref 150–350)
PLATELET # BLD AUTO: 219 K/UL (ref 150–350)
PMV BLD AUTO: 10 FL (ref 9.2–12.9)
PMV BLD AUTO: 9.4 FL (ref 9.2–12.9)
POTASSIUM SERPL-SCNC: 3.2 MMOL/L (ref 3.5–5.1)
PROT SERPL-MCNC: 7.4 G/DL (ref 6–8.4)
PROT UR QL STRIP: NEGATIVE
PROTHROMBIN TIME: 19.1 SEC (ref 9–12.5)
PROTHROMBIN TIME: 20 SEC (ref 9–12.5)
RBC # BLD AUTO: 4.19 M/UL (ref 4–5.4)
RBC # BLD AUTO: 4.27 M/UL (ref 4–5.4)
SODIUM SERPL-SCNC: 142 MMOL/L (ref 136–145)
SP GR UR STRIP: 1.01 (ref 1–1.03)
URN SPEC COLLECT METH UR: ABNORMAL
UROBILINOGEN UR STRIP-ACNC: NEGATIVE EU/DL
WBC # BLD AUTO: 3.78 K/UL (ref 3.9–12.7)
WBC # BLD AUTO: 5 K/UL (ref 3.9–12.7)

## 2019-10-08 PROCEDURE — 85730 THROMBOPLASTIN TIME PARTIAL: CPT | Mod: ER

## 2019-10-08 PROCEDURE — 36415 COLL VENOUS BLD VENIPUNCTURE: CPT

## 2019-10-08 PROCEDURE — 81003 URINALYSIS AUTO W/O SCOPE: CPT | Mod: ER

## 2019-10-08 PROCEDURE — 85025 COMPLETE CBC W/AUTO DIFF WBC: CPT | Mod: ER

## 2019-10-08 PROCEDURE — G0378 HOSPITAL OBSERVATION PER HR: HCPCS | Mod: ER

## 2019-10-08 PROCEDURE — 96365 THER/PROPH/DIAG IV INF INIT: CPT

## 2019-10-08 PROCEDURE — 99285 EMERGENCY DEPT VISIT HI MDM: CPT | Mod: ER

## 2019-10-08 PROCEDURE — 80053 COMPREHEN METABOLIC PANEL: CPT | Mod: ER

## 2019-10-08 PROCEDURE — 85610 PROTHROMBIN TIME: CPT | Mod: ER

## 2019-10-08 PROCEDURE — 85610 PROTHROMBIN TIME: CPT | Mod: 91

## 2019-10-08 PROCEDURE — G0378 HOSPITAL OBSERVATION PER HR: HCPCS

## 2019-10-08 PROCEDURE — 85025 COMPLETE CBC W/AUTO DIFF WBC: CPT | Mod: 91

## 2019-10-08 PROCEDURE — 85730 THROMBOPLASTIN TIME PARTIAL: CPT | Mod: 91

## 2019-10-08 PROCEDURE — 63600175 PHARM REV CODE 636 W HCPCS: Performed by: NURSE PRACTITIONER

## 2019-10-08 PROCEDURE — 25000003 PHARM REV CODE 250: Performed by: NURSE PRACTITIONER

## 2019-10-08 RX ORDER — POTASSIUM CHLORIDE 20 MEQ/1
40 TABLET, EXTENDED RELEASE ORAL ONCE
Status: COMPLETED | OUTPATIENT
Start: 2019-10-08 | End: 2019-10-08

## 2019-10-08 RX ORDER — TRAMADOL HYDROCHLORIDE 50 MG/1
50 TABLET ORAL EVERY 8 HOURS PRN
COMMUNITY
Start: 2019-09-26 | End: 2021-01-25

## 2019-10-08 RX ORDER — PANTOPRAZOLE SODIUM 40 MG/1
40 TABLET, DELAYED RELEASE ORAL DAILY
Status: DISCONTINUED | OUTPATIENT
Start: 2019-10-09 | End: 2019-10-09 | Stop reason: HOSPADM

## 2019-10-08 RX ORDER — LUBIPROSTONE 8 UG/1
8 CAPSULE ORAL 2 TIMES DAILY WITH MEALS
Status: DISCONTINUED | OUTPATIENT
Start: 2019-10-08 | End: 2019-10-09 | Stop reason: HOSPADM

## 2019-10-08 RX ORDER — ACETAMINOPHEN 325 MG/1
650 TABLET ORAL EVERY 4 HOURS PRN
Status: DISCONTINUED | OUTPATIENT
Start: 2019-10-08 | End: 2019-10-09 | Stop reason: HOSPADM

## 2019-10-08 RX ORDER — TIZANIDINE HYDROCHLORIDE 2 MG/1
2 CAPSULE, GELATIN COATED ORAL 3 TIMES DAILY PRN
COMMUNITY
Start: 2019-09-26 | End: 2019-10-08

## 2019-10-08 RX ORDER — ZOLPIDEM TARTRATE 5 MG/1
5 TABLET ORAL NIGHTLY PRN
Status: DISCONTINUED | OUTPATIENT
Start: 2019-10-08 | End: 2019-10-09 | Stop reason: HOSPADM

## 2019-10-08 RX ORDER — BUTALBITAL, ACETAMINOPHEN AND CAFFEINE 50; 325; 40 MG/1; MG/1; MG/1
1 TABLET ORAL EVERY 6 HOURS PRN
Status: DISCONTINUED | OUTPATIENT
Start: 2019-10-08 | End: 2019-10-09 | Stop reason: HOSPADM

## 2019-10-08 RX ORDER — ALPRAZOLAM 1 MG/1
1 TABLET ORAL NIGHTLY PRN
Status: DISCONTINUED | OUTPATIENT
Start: 2019-10-08 | End: 2019-10-09 | Stop reason: HOSPADM

## 2019-10-08 RX ORDER — SERTRALINE HYDROCHLORIDE 50 MG/1
100 TABLET, FILM COATED ORAL DAILY
Status: DISCONTINUED | OUTPATIENT
Start: 2019-10-09 | End: 2019-10-09 | Stop reason: HOSPADM

## 2019-10-08 RX ORDER — SODIUM CHLORIDE 0.9 % (FLUSH) 0.9 %
10 SYRINGE (ML) INJECTION
Status: DISCONTINUED | OUTPATIENT
Start: 2019-10-08 | End: 2019-10-09 | Stop reason: HOSPADM

## 2019-10-08 RX ORDER — FUROSEMIDE 40 MG/1
40 TABLET ORAL DAILY
Status: DISCONTINUED | OUTPATIENT
Start: 2019-10-09 | End: 2019-10-09 | Stop reason: HOSPADM

## 2019-10-08 RX ORDER — LOVASTATIN 20 MG/1
40 TABLET ORAL NIGHTLY
Status: DISCONTINUED | OUTPATIENT
Start: 2019-10-08 | End: 2019-10-09 | Stop reason: HOSPADM

## 2019-10-08 RX ORDER — MULTIVITAMIN
1 TABLET ORAL
COMMUNITY
End: 2019-10-08

## 2019-10-08 RX ORDER — ONDANSETRON 8 MG/1
8 TABLET, ORALLY DISINTEGRATING ORAL EVERY 8 HOURS PRN
Status: DISCONTINUED | OUTPATIENT
Start: 2019-10-08 | End: 2019-10-09 | Stop reason: HOSPADM

## 2019-10-08 RX ORDER — TRAMADOL HYDROCHLORIDE 50 MG/1
50 TABLET ORAL EVERY 8 HOURS PRN
Status: DISCONTINUED | OUTPATIENT
Start: 2019-10-08 | End: 2019-10-09 | Stop reason: HOSPADM

## 2019-10-08 RX ORDER — HEPARIN SODIUM,PORCINE/D5W 25000/250
12 INTRAVENOUS SOLUTION INTRAVENOUS CONTINUOUS
Status: DISCONTINUED | OUTPATIENT
Start: 2019-10-08 | End: 2019-10-09

## 2019-10-08 RX ADMIN — ZOLPIDEM TARTRATE 5 MG: 5 TABLET ORAL at 08:10

## 2019-10-08 RX ADMIN — HEPARIN SODIUM 12 UNITS/KG/HR: 10000 INJECTION, SOLUTION INTRAVENOUS at 06:10

## 2019-10-08 RX ADMIN — LOVASTATIN 40 MG: 20 TABLET ORAL at 08:10

## 2019-10-08 RX ADMIN — ALPRAZOLAM 1 MG: 1 TABLET ORAL at 08:10

## 2019-10-08 RX ADMIN — POTASSIUM CHLORIDE 40 MEQ: 20 TABLET, EXTENDED RELEASE ORAL at 06:10

## 2019-10-08 NOTE — ED PROVIDER NOTES
Encounter Date: 10/8/2019       History     Chief Complaint   Patient presents with    Rectal Bleeding     bright red blood this morning in stool      The history is provided by the patient.   Rectal Bleeding    The current episode started today. The onset was gradual. The problem occurs occasionally. The problem has been unchanged. The stool is described as soft and bloody. Pertinent negatives include no fever, no diarrhea, no hematemesis, no nausea, no vomiting, no vaginal discharge, no chest pain, no coughing, no difficulty breathing and no rash.     Review of patient's allergies indicates:   Allergen Reactions    Penicillins Rash     Past Medical History:   Diagnosis Date    Anticoagulant long-term use     Aortic valve defect     Benign neoplasm of ascending colon 4/6/2017    DDD (degenerative disc disease), cervical 7/20/2018    GERD (gastroesophageal reflux disease)     Hemorrhoids     HLD (hyperlipidemia)     Hypertension     Insomnia 12/1/2014    Irritable bowel syndrome with constipation 4/9/2018    Postmenopausal osteoporosis 7/20/2018     Past Surgical History:   Procedure Laterality Date    AORTIC VALVE REPLACEMENT  2003    COLONOSCOPY Left 4/6/2017    Procedure: COLONOSCOPY;  Surgeon: Sander Ulloa MD;  Location: [facility];  Service: Endoscopy;  Laterality: Left;     Family History   Problem Relation Age of Onset    Diabetes Mother     Heart disease Mother     Hypertension Mother     Heart disease Father     Hypertension Father     Hypertension Sister     Hypertension Son     Heart disease Son     Colon cancer Neg Hx      Social History     Tobacco Use    Smoking status: Never Smoker    Smokeless tobacco: Never Used   Substance Use Topics    Alcohol use: Not Currently     Comment: occasionally     Drug use: No     Review of Systems   Constitutional: Negative for fever.   HENT: Negative for sore throat.    Respiratory: Negative for cough and shortness of breath.     Cardiovascular: Negative for chest pain.   Gastrointestinal: Positive for hematochezia. Negative for diarrhea, hematemesis, nausea and vomiting.   Genitourinary: Negative for dysuria and vaginal discharge.   Musculoskeletal: Negative for back pain.   Skin: Negative for rash.   Neurological: Negative for weakness.   Hematological: Does not bruise/bleed easily.       Physical Exam     Initial Vitals [10/08/19 0708]   BP Pulse Resp Temp SpO2   (!) 164/85 77 16 98 °F (36.7 °C) 98 %      MAP       --         Physical Exam    Nursing note and vitals reviewed.  Constitutional: She appears well-developed and well-nourished. No distress.   HENT:   Head: Normocephalic and atraumatic.   Mouth/Throat: Oropharynx is clear and moist.   Eyes: Conjunctivae and EOM are normal. Pupils are equal, round, and reactive to light.   Neck: Normal range of motion. Neck supple.   Cardiovascular: Normal rate, regular rhythm and normal heart sounds. Exam reveals no gallop and no friction rub.    No murmur heard.  Pulmonary/Chest: Breath sounds normal. No respiratory distress. She has no wheezes. She has no rhonchi. She has no rales.   Abdominal: Soft. Bowel sounds are normal. She exhibits no distension and no mass. There is no tenderness. There is no rebound and no guarding.   Musculoskeletal: Normal range of motion. She exhibits no edema or tenderness.   Neurological: She is alert and oriented to person, place, and time. She has normal strength.   Skin: Skin is warm and dry. No rash noted.   Psychiatric: She has a normal mood and affect. Thought content normal.         ED Course   Procedures  Labs Reviewed   CBC W/ AUTO DIFFERENTIAL - Abnormal; Notable for the following components:       Result Value    WBC 3.78 (*)     Mean Corpuscular Hemoglobin Conc 31.0 (*)     RDW 20.2 (*)     All other components within normal limits   COMPREHENSIVE METABOLIC PANEL - Abnormal; Notable for the following components:    Potassium 3.2 (*)     All other  "components within normal limits   URINALYSIS, REFLEX TO URINE CULTURE - Abnormal; Notable for the following components:    Occult Blood UA Trace (*)     All other components within normal limits    Narrative:     Preferred Collection Type->Urine, Clean Catch   APTT - Abnormal; Notable for the following components:    aPTT 39.9 (*)     All other components within normal limits   PROTIME-INR - Abnormal; Notable for the following components:    Prothrombin Time 20.0 (*)     INR 1.9 (*)     All other components within normal limits          Imaging Results    None                 ED Vital Signs:  Vitals:    10/08/19 0708 10/08/19 0738 10/08/19 0746 10/08/19 0801   BP: (!) 164/85 (!) 151/66 (!) 150/71 (!) 152/73   Pulse: 77 80 76 76   Resp: 16      Temp: 98 °F (36.7 °C)      TempSrc: Oral      SpO2: 98% 97% 97% 97%   Weight: 58 kg (127 lb 13.9 oz)      Height: 4' 10" (1.473 m)       10/08/19 0814 10/08/19 0816 10/08/19 0818 10/08/19 0821   BP: (!) 147/63 (S) (!) 155/72 (S) (!) 158/77 (S) (!) 156/75   Pulse:  (S) 83 (S) 80 (S) 78   Resp:       Temp:       TempSrc:       SpO2:  (S) (!) 94% 98% 98%   Weight:       Height:        10/08/19 0831   BP: (!) 152/77   Pulse: 77   Resp:    Temp:    TempSrc:    SpO2: 96%   Weight:    Height:          Abnormal Lab Results:  Labs Reviewed   CBC W/ AUTO DIFFERENTIAL - Abnormal; Notable for the following components:       Result Value    WBC 3.78 (*)     Mean Corpuscular Hemoglobin Conc 31.0 (*)     RDW 20.2 (*)     All other components within normal limits   COMPREHENSIVE METABOLIC PANEL - Abnormal; Notable for the following components:    Potassium 3.2 (*)     All other components within normal limits   URINALYSIS, REFLEX TO URINE CULTURE - Abnormal; Notable for the following components:    Occult Blood UA Trace (*)     All other components within normal limits    Narrative:     Preferred Collection Type->Urine, Clean Catch   APTT - Abnormal; Notable for the following components:    " aPTT 39.9 (*)     All other components within normal limits   PROTIME-INR - Abnormal; Notable for the following components:    Prothrombin Time 20.0 (*)     INR 1.9 (*)     All other components within normal limits          All Lab Results:  Results for orders placed or performed during the hospital encounter of 10/08/19   CBC auto differential   Result Value Ref Range    WBC 3.78 (L) 3.90 - 12.70 K/uL    RBC 4.19 4.00 - 5.40 M/uL    Hemoglobin 12.2 12.0 - 16.0 g/dL    Hematocrit 39.4 37.0 - 48.5 %    Mean Corpuscular Volume 94 82 - 98 fL    Mean Corpuscular Hemoglobin 29.1 27.0 - 31.0 pg    Mean Corpuscular Hemoglobin Conc 31.0 (L) 32.0 - 36.0 g/dL    RDW 20.2 (H) 11.5 - 14.5 %    Platelets 202 150 - 350 K/uL    MPV 9.4 9.2 - 12.9 fL    Immature Granulocytes 0.0 0.0 - 0.5 %    Gran # (ANC) 2.4 1.8 - 7.7 K/uL    Immature Grans (Abs) 0.00 0.00 - 0.04 K/uL    Lymph # 1.0 1.0 - 4.8 K/uL    Mono # 0.3 0.3 - 1.0 K/uL    Eos # 0.1 0.0 - 0.5 K/uL    Baso # 0.03 0.00 - 0.20 K/uL    nRBC 0 0 /100 WBC    Gran% 62.5 38.0 - 73.0 %    Lymph% 26.7 18.0 - 48.0 %    Mono% 8.7 4.0 - 15.0 %    Eosinophil% 1.3 0.0 - 8.0 %    Basophil% 0.8 0.0 - 1.9 %    Differential Method Automated    Comprehensive metabolic panel   Result Value Ref Range    Sodium 142 136 - 145 mmol/L    Potassium 3.2 (L) 3.5 - 5.1 mmol/L    Chloride 105 95 - 110 mmol/L    CO2 26 23 - 29 mmol/L    Glucose 90 70 - 110 mg/dL    BUN, Bld 9 8 - 23 mg/dL    Creatinine 0.7 0.5 - 1.4 mg/dL    Calcium 9.7 8.7 - 10.5 mg/dL    Total Protein 7.4 6.0 - 8.4 g/dL    Albumin 4.6 3.5 - 5.2 g/dL    Total Bilirubin 0.3 0.1 - 1.0 mg/dL    Alkaline Phosphatase 121 55 - 135 U/L    AST 21 10 - 40 U/L    ALT 17 10 - 44 U/L    Anion Gap 11 8 - 16 mmol/L    eGFR if African American >60.0 >60 mL/min/1.73 m^2    eGFR if non African American >60.0 >60 mL/min/1.73 m^2   Urinalysis, Reflex to Urine Culture Urine, Clean Catch   Result Value Ref Range    Specimen UA Urine, Clean Catch     Color,  UA Yellow Yellow, Straw, Padmini    Appearance, UA Clear Clear    pH, UA 6.0 5.0 - 8.0    Specific Gravity, UA 1.010 1.005 - 1.030    Protein, UA Negative Negative    Glucose, UA Negative Negative    Ketones, UA Negative Negative    Bilirubin (UA) Negative Negative    Occult Blood UA Trace (A) Negative    Nitrite, UA Negative Negative    Urobilinogen, UA Negative <2.0 EU/dL    Leukocytes, UA Negative Negative   APTT   Result Value Ref Range    aPTT 39.9 (H) 21.0 - 32.0 sec   Protime-INR   Result Value Ref Range    Prothrombin Time 20.0 (H) 9.0 - 12.5 sec    INR 1.9 (H) 0.8 - 1.2           Imaging Results:  Imaging Results    None            The Emergency Provider reviewed the vital signs and test results, which are outlined above.    ED Discussions:  8:46 AM: Re-evaluated pt. I have discussed test results, shared treatment plan, and the need for admission with patient and family at bedside. Pt and family express understanding at this time and agree with all information. All questions answered. Pt and family have no further questions or concerns at this time. Pt is ready for admit.    All historical, clinical, radiographic, and laboratory findings were reviewed with the patient/family in detail along with the indications for transport to the facility in [place] in order to receive monitoring and repeat cvc, gi consult.  All remaining questions and concerns were addressed at this time and the patient/family communicates understanding and agrees to proceed accordingly.  Similarly, all pertinent details of the encounter were discussed with Dr. Carrillo, via Lou TIWARI who agrees to receive the patient at [facility] for further care as outlined above.  The patient will be transferred by [facility] ambulance services secondary to a need for ongoing cardiac monitoring en route.  Serg Granger MD  8:46 AM                         Clinical Impression:       ICD-10-CM ICD-9-CM   1. Rectal bleeding K62.5 569.3   2.  Lower GI bleed K92.2 578.9           Disposition:   Disposition: Placed in Observation  Condition: Hardik Granger MD  10/08/19 0847

## 2019-10-08 NOTE — ED TRIAGE NOTES
Patient presents to ED with c/o bright red rectal bleeding.  Patient has a history of hemorrhoids and constipation.  Patient is also taking coumadin

## 2019-10-08 NOTE — ASSESSMENT & PLAN NOTE
--H/H 12.0/40.0  --likely 2/2 hemorrhoids - no change in H/H today  --check every 6 hours  --hold coumadin  --Heparin gtt 2/2 mechanical valve  --consider GI consult depending on course

## 2019-10-08 NOTE — HPI
67 y/o female with PMHx of HTN, mechanical valve replacement, who presented to [facility] ED with c/o BRB per rectum that onset suddenly last night. No associated symptoms reported. Symptoms are consistent and moderate in severity. No mitigating or exacerbating factors reported. Patient denies headache, SOB, dizziness, chest pain, palpitations, abd pain, and all other symptoms at this time.    She is a full code and her SDM is her S/O, Rubén.  She will be kept on OBS for GI bleed under the care of Hospital Medicine.

## 2019-10-08 NOTE — SUBJECTIVE & OBJECTIVE
Past Medical History:   Diagnosis Date    Anticoagulant long-term use     Aortic valve defect     Benign neoplasm of ascending colon 4/6/2017    DDD (degenerative disc disease), cervical 7/20/2018    GERD (gastroesophageal reflux disease)     Hemorrhoids     HLD (hyperlipidemia)     Hypertension     Insomnia 12/1/2014    Irritable bowel syndrome with constipation 4/9/2018    Postmenopausal osteoporosis 7/20/2018       Past Surgical History:   Procedure Laterality Date    AORTIC VALVE REPLACEMENT  2003    COLONOSCOPY Left 4/6/2017    Procedure: COLONOSCOPY;  Surgeon: Sander Ulloa MD;  Location: [facility];  Service: Endoscopy;  Laterality: Left;       Review of patient's allergies indicates:   Allergen Reactions    Penicillins Rash       No current facility-administered medications on file prior to encounter.      Current Outpatient Medications on File Prior to Encounter   Medication Sig    acebutolol (SECTRAL) 200 MG capsule Take 400 mg by mouth 2 (two) times daily.     ALPRAZolam (XANAX) 1 MG tablet Take 1 tablet by mouth nightly as needed for Insomnia.    butalbital-acetaminophen-caffeine -40 mg (FIORICET, ESGIC) -40 mg per tablet TAKE ONE TABLET BY MOUTH EVERY SIX HOURS AS NEEDED    ergocalciferol (ERGOCALCIFEROL) 50,000 unit Cap Take 1 capsule by mouth every 30 days.    furosemide (LASIX) 40 MG tablet Take 40 mg by mouth Daily.    lansoprazole (PREVACID) 15 MG capsule Take 15 mg by mouth Daily.    lovastatin (MEVACOR) 40 MG tablet Take 40 mg by mouth every evening.    lubiprostone (AMITIZA) 8 MCG Cap TAKE 1 CAPSULE BY MOUTH 2 (TWO) TIMES DAILY WITH MEALS FOR 30 DAYS. PRN    multivitamin (THERAGRAN) tablet Take 1 tablet by mouth.    sertraline (ZOLOFT) 100 MG tablet Take 1 tablet by mouth Daily.    traMADol (ULTRAM) 50 mg tablet 50 mg every 8 (eight) hours as needed.     warfarin (COUMADIN) 5 MG tablet Take 5 mg by mouth. 5 mg by mouth for five days then alternate with 2.5  mg for two days.    zolpidem (AMBIEN) 10 mg Tab Take 5 mg by mouth nightly as needed.     [DISCONTINUED] tiZANidine 2 mg Cap Take 2 mg by mouth 3 (three) times daily as needed.    MOVIPREP 100-7.5-2.691 gram solution     [DISCONTINUED] enalapril (VASOTEC) 20 MG tablet Take 20 mg by mouth Daily.    [DISCONTINUED] multivitamin with folic acid 400 mcg Tab Take 1 tablet by mouth.     Family History     Problem Relation (Age of Onset)    Diabetes Mother    Heart disease Mother, Father, Son    Hypertension Mother, Father, Sister, Son        Tobacco Use    Smoking status: Never Smoker    Smokeless tobacco: Never Used   Substance and Sexual Activity    Alcohol use: Not Currently     Comment: occasionally     Drug use: No    Sexual activity: Yes     Partners: Male     Review of Systems   Constitutional: Negative for activity change, appetite change, chills, fatigue and fever.   HENT: Negative for congestion, dental problem, ear pain, hearing loss, mouth sores, sinus pressure, sore throat, tinnitus and trouble swallowing.    Eyes: Negative for pain, discharge, redness and visual disturbance.   Respiratory: Negative for apnea, cough, choking, chest tightness, shortness of breath and wheezing.    Cardiovascular: Negative for chest pain, palpitations and leg swelling.   Gastrointestinal: Positive for anal bleeding and constipation. Negative for abdominal distention, abdominal pain, blood in stool, diarrhea, nausea, rectal pain and vomiting.   Endocrine: Negative for cold intolerance, heat intolerance, polydipsia and polyuria.   Genitourinary: Negative for difficulty urinating, dysuria, flank pain, frequency, hematuria and urgency.   Musculoskeletal: Negative for arthralgias, back pain, gait problem, joint swelling, myalgias, neck pain and neck stiffness.   Skin: Negative for color change, rash and wound.   Allergic/Immunologic: Negative.    Neurological: Negative for dizziness, tremors, seizures, syncope, speech  difficulty, light-headedness and headaches.   Hematological: Bruises/bleeds easily.   Psychiatric/Behavioral: Negative for agitation, behavioral problems, confusion and sleep disturbance. The patient is nervous/anxious.    All other systems reviewed and are negative.    Objective:     Vital Signs (Most Recent):  Temp: 98 °F (36.7 °C) (10/08/19 1601)  Pulse: 77 (10/08/19 1601)  Resp: 14 (10/08/19 1601)  BP: (!) 154/69 (10/08/19 1601)  SpO2: 98 % (10/08/19 1601) Vital Signs (24h Range):  Temp:  [98 °F (36.7 °C)-99.5 °F (37.5 °C)] 98 °F (36.7 °C)  Pulse:  [65-83] 77  Resp:  [14-16] 14  SpO2:  [94 %-98 %] 98 %  BP: (120-167)/(59-86) 154/69     Weight: 58 kg (127 lb 13.9 oz)  Body mass index is 26.72 kg/m².    Physical Exam   Constitutional: She is oriented to person, place, and time. She appears well-developed and well-nourished. No distress.   HENT:   Head: Normocephalic and atraumatic.   Nose: Nose normal.   Eyes: Conjunctivae and EOM are normal. Right eye exhibits no discharge. Left eye exhibits no discharge.   Neck: Normal range of motion. Neck supple. No JVD present.   Cardiovascular: Normal rate, regular rhythm, normal heart sounds and intact distal pulses. Exam reveals no gallop and no friction rub.   No murmur heard.  Pulmonary/Chest: Effort normal and breath sounds normal. No respiratory distress. She has no wheezes. She has no rales. She exhibits no tenderness.   Abdominal: Soft. Bowel sounds are normal. She exhibits no distension and no mass. There is no tenderness.   Genitourinary:   Genitourinary Comments: deferred   Musculoskeletal: Normal range of motion. She exhibits no edema, tenderness or deformity.   Neurological: She is alert and oriented to person, place, and time. No cranial nerve deficit. She exhibits normal muscle tone. Coordination normal.   Skin: Skin is warm and dry. Capillary refill takes less than 2 seconds. No rash noted. She is not diaphoretic. No erythema. No pallor.   Psychiatric: She  has a normal mood and affect. Her behavior is normal.   Nursing note and vitals reviewed.        CRANIAL NERVES     CN III, IV, VI   Extraocular motions are normal.        Significant Labs:   Recent Lab Results       10/08/19  0741   10/08/19  0715        Albumin 4.6       Alkaline Phosphatase 121       ALT 17       Anion Gap 11       Appearance, UA   Clear     aPTT 39.9  Comment:  aPTT therapeutic range = 39-69 seconds       AST 21       Baso # 0.03       Basophil% 0.8       Bilirubin (UA)   Negative     BILIRUBIN TOTAL 0.3  Comment:  For infants and newborns, interpretation of results should be based  on gestational age, weight and in agreement with clinical  observations.  Premature Infant recommended reference ranges:  Up to 24 hours.............<8.0 mg/dL  Up to 48 hours............<12.0 mg/dL  3-5 days..................<15.0 mg/dL  6-29 days.................<15.0 mg/dL         BUN, Bld 9       Calcium 9.7       Chloride 105       CO2 26       Color, UA   Yellow     Creatinine 0.7       Differential Method Automated       eGFR if  >60.0       eGFR if non  >60.0  Comment:  Calculation used to obtain the estimated glomerular filtration  rate (eGFR) is the CKD-EPI equation.          Eos # 0.1       Eosinophil% 1.3       Glucose 90       Glucose, UA   Negative     Gran # (ANC) 2.4       Gran% 62.5       Hematocrit 39.4       Hemoglobin 12.2       Immature Grans (Abs) 0.00  Comment:  Mild elevation in immature granulocytes is non specific and   can be seen in a variety of conditions including stress response,   acute inflammation, trauma and pregnancy. Correlation with other   laboratory and clinical findings is essential.         Immature Granulocytes 0.0       Coumadin Monitoring INR 1.9  Comment:  Coumadin Therapy:  2.0 - 3.0 for INR for all indicators except mechanical heart valves  and antiphospholipid syndromes which should use 2.5 - 3.5.         Ketones, UA   Negative      Leukocytes, UA   Negative     Lymph # 1.0       Lymph% 26.7       MCH 29.1       MCHC 31.0       MCV 94       Mono # 0.3       Mono% 8.7       MPV 9.4       NITRITE UA   Negative     nRBC 0       Occult Blood UA   Trace     pH, UA   6.0     Platelets 202       Potassium 3.2       PROTEIN TOTAL 7.4       Protein, UA   Negative  Comment:  Recommend a 24 hour urine protein or a urine   protein/creatinine ratio if globulin induced proteinuria is  clinically suspected.       Protime 20.0       RBC 4.19       RDW 20.2       Sodium 142       Specific Gravity, UA   1.010     Specimen UA   Urine, Clean Catch     UROBILINOGEN UA   Negative     WBC 3.78           All pertinent labs within the past 24 hours have been reviewed.    Significant Imaging: I have reviewed all pertinent imaging results/findings within the past 24 hours.   Imaging Results    None

## 2019-10-08 NOTE — H&P
[facility] Hospital Medicine  History & Physical    Patient Name: Adalgisa Wright  MRN: [id number]  Admission Date: 10/8/2019  Attending Physician: Jacqueline Clifford MD   Primary Care Provider: Shan Stroud MD         Patient information was obtained from patient, spouse/SO, past medical records and ER records.     Subjective:     Principal Problem:Rectal bleeding    Chief Complaint:   Chief Complaint   Patient presents with    Rectal Bleeding     bright red blood this morning in stool         HPI: 67 y/o female with PMHx of HTN, mechanical valve replacement, who presented to [facility] ED with c/o BRB per rectum that onset suddenly last night. No associated symptoms reported. Symptoms are consistent and moderate in severity. No mitigating or exacerbating factors reported. Patient denies headache, SOB, dizziness, chest pain, palpitations, abd pain, and all other symptoms at this time.    She is a full code and her SDM is her S/O, Rubén.  She will be kept on OBS for GI bleed under the care of Hospital Medicine.     Past Medical History:   Diagnosis Date    Anticoagulant long-term use     Aortic valve defect     Benign neoplasm of ascending colon 4/6/2017    DDD (degenerative disc disease), cervical 7/20/2018    GERD (gastroesophageal reflux disease)     Hemorrhoids     HLD (hyperlipidemia)     Hypertension     Insomnia 12/1/2014    Irritable bowel syndrome with constipation 4/9/2018    Postmenopausal osteoporosis 7/20/2018       Past Surgical History:   Procedure Laterality Date    AORTIC VALVE REPLACEMENT  2003    COLONOSCOPY Left 4/6/2017    Procedure: COLONOSCOPY;  Surgeon: Sander Ulloa MD;  Location: [facility];  Service: Endoscopy;  Laterality: Left;       Review of patient's allergies indicates:   Allergen Reactions    Penicillins Rash       No current facility-administered medications on file prior to encounter.      Current Outpatient Medications on File Prior to Encounter    Medication Sig    acebutolol (SECTRAL) 200 MG capsule Take 400 mg by mouth 2 (two) times daily.     ALPRAZolam (XANAX) 1 MG tablet Take 1 tablet by mouth nightly as needed for Insomnia.    butalbital-acetaminophen-caffeine -40 mg (FIORICET, ESGIC) -40 mg per tablet TAKE ONE TABLET BY MOUTH EVERY SIX HOURS AS NEEDED    ergocalciferol (ERGOCALCIFEROL) 50,000 unit Cap Take 1 capsule by mouth every 30 days.    furosemide (LASIX) 40 MG tablet Take 40 mg by mouth Daily.    lansoprazole (PREVACID) 15 MG capsule Take 15 mg by mouth Daily.    lovastatin (MEVACOR) 40 MG tablet Take 40 mg by mouth every evening.    lubiprostone (AMITIZA) 8 MCG Cap TAKE 1 CAPSULE BY MOUTH 2 (TWO) TIMES DAILY WITH MEALS FOR 30 DAYS. PRN    multivitamin (THERAGRAN) tablet Take 1 tablet by mouth.    sertraline (ZOLOFT) 100 MG tablet Take 1 tablet by mouth Daily.    traMADol (ULTRAM) 50 mg tablet 50 mg every 8 (eight) hours as needed.     warfarin (COUMADIN) 5 MG tablet Take 5 mg by mouth. 5 mg by mouth for five days then alternate with 2.5 mg for two days.    zolpidem (AMBIEN) 10 mg Tab Take 5 mg by mouth nightly as needed.     [DISCONTINUED] tiZANidine 2 mg Cap Take 2 mg by mouth 3 (three) times daily as needed.    MOVIPREP 100-7.5-2.691 gram solution     [DISCONTINUED] enalapril (VASOTEC) 20 MG tablet Take 20 mg by mouth Daily.    [DISCONTINUED] multivitamin with folic acid 400 mcg Tab Take 1 tablet by mouth.     Family History     Problem Relation (Age of Onset)    Diabetes Mother    Heart disease Mother, Father, Son    Hypertension Mother, Father, Sister, Son        Tobacco Use    Smoking status: Never Smoker    Smokeless tobacco: Never Used   Substance and Sexual Activity    Alcohol use: Not Currently     Comment: occasionally     Drug use: No    Sexual activity: Yes     Partners: Male     Review of Systems   Constitutional: Negative for activity change, appetite change, chills, fatigue and fever.   HENT:  Negative for congestion, dental problem, ear pain, hearing loss, mouth sores, sinus pressure, sore throat, tinnitus and trouble swallowing.    Eyes: Negative for pain, discharge, redness and visual disturbance.   Respiratory: Negative for apnea, cough, choking, chest tightness, shortness of breath and wheezing.    Cardiovascular: Negative for chest pain, palpitations and leg swelling.   Gastrointestinal: Positive for anal bleeding and constipation. Negative for abdominal distention, abdominal pain, blood in stool, diarrhea, nausea, rectal pain and vomiting.   Endocrine: Negative for cold intolerance, heat intolerance, polydipsia and polyuria.   Genitourinary: Negative for difficulty urinating, dysuria, flank pain, frequency, hematuria and urgency.   Musculoskeletal: Negative for arthralgias, back pain, gait problem, joint swelling, myalgias, neck pain and neck stiffness.   Skin: Negative for color change, rash and wound.   Allergic/Immunologic: Negative.    Neurological: Negative for dizziness, tremors, seizures, syncope, speech difficulty, light-headedness and headaches.   Hematological: Bruises/bleeds easily.   Psychiatric/Behavioral: Negative for agitation, behavioral problems, confusion and sleep disturbance. The patient is nervous/anxious.    All other systems reviewed and are negative.    Objective:     Vital Signs (Most Recent):  Temp: 98 °F (36.7 °C) (10/08/19 1601)  Pulse: 77 (10/08/19 1601)  Resp: 14 (10/08/19 1601)  BP: (!) 154/69 (10/08/19 1601)  SpO2: 98 % (10/08/19 1601) Vital Signs (24h Range):  Temp:  [98 °F (36.7 °C)-99.5 °F (37.5 °C)] 98 °F (36.7 °C)  Pulse:  [65-83] 77  Resp:  [14-16] 14  SpO2:  [94 %-98 %] 98 %  BP: (120-167)/(59-86) 154/69     Weight: 58 kg (127 lb 13.9 oz)  Body mass index is 26.72 kg/m².    Physical Exam   Constitutional: She is oriented to person, place, and time. She appears well-developed and well-nourished. No distress.   HENT:   Head: Normocephalic and atraumatic.   Nose:  Nose normal.   Eyes: Conjunctivae and EOM are normal. Right eye exhibits no discharge. Left eye exhibits no discharge.   Neck: Normal range of motion. Neck supple. No JVD present.   Cardiovascular: Normal rate, regular rhythm, normal heart sounds and intact distal pulses. Exam reveals no gallop and no friction rub.   No murmur heard.  Pulmonary/Chest: Effort normal and breath sounds normal. No respiratory distress. She has no wheezes. She has no rales. She exhibits no tenderness.   Abdominal: Soft. Bowel sounds are normal. She exhibits no distension and no mass. There is no tenderness.   Genitourinary:   Genitourinary Comments: deferred   Musculoskeletal: Normal range of motion. She exhibits no edema, tenderness or deformity.   Neurological: She is alert and oriented to person, place, and time. No cranial nerve deficit. She exhibits normal muscle tone. Coordination normal.   Skin: Skin is warm and dry. Capillary refill takes less than 2 seconds. No rash noted. She is not diaphoretic. No erythema. No pallor.   Psychiatric: She has a normal mood and affect. Her behavior is normal.   Nursing note and vitals reviewed.        CRANIAL NERVES     CN III, IV, VI   Extraocular motions are normal.        Significant Labs:   Recent Lab Results       10/08/19  0741   10/08/19  0715        Albumin 4.6       Alkaline Phosphatase 121       ALT 17       Anion Gap 11       Appearance, UA   Clear     aPTT 39.9  Comment:  aPTT therapeutic range = 39-69 seconds       AST 21       Baso # 0.03       Basophil% 0.8       Bilirubin (UA)   Negative     BILIRUBIN TOTAL 0.3  Comment:  For infants and newborns, interpretation of results should be based  on gestational age, weight and in agreement with clinical  observations.  Premature Infant recommended reference ranges:  Up to 24 hours.............<8.0 mg/dL  Up to 48 hours............<12.0 mg/dL  3-5 days..................<15.0 mg/dL  6-29 days.................<15.0 mg/dL         BUN, Bld 9        Calcium 9.7       Chloride 105       CO2 26       Color, UA   Yellow     Creatinine 0.7       Differential Method Automated       eGFR if  >60.0       eGFR if non  >60.0  Comment:  Calculation used to obtain the estimated glomerular filtration  rate (eGFR) is the CKD-EPI equation.          Eos # 0.1       Eosinophil% 1.3       Glucose 90       Glucose, UA   Negative     Gran # (ANC) 2.4       Gran% 62.5       Hematocrit 39.4       Hemoglobin 12.2       Immature Grans (Abs) 0.00  Comment:  Mild elevation in immature granulocytes is non specific and   can be seen in a variety of conditions including stress response,   acute inflammation, trauma and pregnancy. Correlation with other   laboratory and clinical findings is essential.         Immature Granulocytes 0.0       Coumadin Monitoring INR 1.9  Comment:  Coumadin Therapy:  2.0 - 3.0 for INR for all indicators except mechanical heart valves  and antiphospholipid syndromes which should use 2.5 - 3.5.         Ketones, UA   Negative     Leukocytes, UA   Negative     Lymph # 1.0       Lymph% 26.7       MCH 29.1       MCHC 31.0       MCV 94       Mono # 0.3       Mono% 8.7       MPV 9.4       NITRITE UA   Negative     nRBC 0       Occult Blood UA   Trace     pH, UA   6.0     Platelets 202       Potassium 3.2       PROTEIN TOTAL 7.4       Protein, UA   Negative  Comment:  Recommend a 24 hour urine protein or a urine   protein/creatinine ratio if globulin induced proteinuria is  clinically suspected.       Protime 20.0       RBC 4.19       RDW 20.2       Sodium 142       Specific Gravity, UA   1.010     Specimen UA   Urine, Clean Catch     UROBILINOGEN UA   Negative     WBC 3.78           All pertinent labs within the past 24 hours have been reviewed.    Significant Imaging: I have reviewed all pertinent imaging results/findings within the past 24 hours.   Imaging Results    None           Assessment/Plan:     * Rectal bleeding  --H/H  12.0/40.0  --likely 2/2 hemorrhoids - no change in H/H today  --check every 6 hours  --hold coumadin  --Heparin gtt 2/2 mechanical valve  --consider GI consult depending on course      HLD (hyperlipidemia)  --continue lovastatin  --cardiac diet      Gastroesophageal reflux disease without esophagitis  --pantoprazole         Aortic valve replaced  --hold coumadin  --heparin gtt        Irritable bowel syndrome with constipation  --amitiza BID      VTE Risk Mitigation (From admission, onward)         Ordered     heparin 25,000 units in dextrose 5% (100 units/ml) IV bolus from bag INITIAL BOLUS  Once      10/08/19 1802     heparin 25,000 units in dextrose 5% 250 mL (100 units/mL) infusion LOW INTENSITY nomogram - [facility]  Continuous      10/08/19 1802     heparin 25,000 units in dextrose 5% (100 units/ml) IV bolus from bag - ADDITIONAL PRN BOLUS - 60 units/kg  As needed (PRN)      10/08/19 1802     heparin 25,000 units in dextrose 5% (100 units/ml) IV bolus from bag - ADDITIONAL PRN BOLUS - 30 units/kg  As needed (PRN)      10/08/19 1802     IP VTE HIGH RISK PATIENT  Once      10/08/19 1710     Place sequential compression device  Until discontinued      10/08/19 1710                   ARMIDA Haskins  Department of Hospital Medicine   [facility]

## 2019-10-09 VITALS
DIASTOLIC BLOOD PRESSURE: 65 MMHG | BODY MASS INDEX: 24.39 KG/M2 | HEIGHT: 58 IN | SYSTOLIC BLOOD PRESSURE: 130 MMHG | OXYGEN SATURATION: 99 % | HEART RATE: 67 BPM | WEIGHT: 116.19 LBS | TEMPERATURE: 97 F | RESPIRATION RATE: 16 BRPM

## 2019-10-09 PROBLEM — K62.5 RECTAL BLEEDING: Status: RESOLVED | Noted: 2019-10-08 | Resolved: 2019-10-09

## 2019-10-09 LAB
ANION GAP SERPL CALC-SCNC: 9 MMOL/L (ref 8–16)
APTT BLDCRRT: 103.5 SEC (ref 21–32)
APTT BLDCRRT: 145.7 SEC (ref 21–32)
APTT BLDCRRT: 33.7 SEC (ref 21–32)
BASOPHILS # BLD AUTO: 0.02 K/UL (ref 0–0.2)
BASOPHILS NFR BLD: 0.5 % (ref 0–1.9)
BUN SERPL-MCNC: 10 MG/DL (ref 8–23)
CALCIUM SERPL-MCNC: 9.5 MG/DL (ref 8.7–10.5)
CHLORIDE SERPL-SCNC: 106 MMOL/L (ref 95–110)
CO2 SERPL-SCNC: 25 MMOL/L (ref 23–29)
CREAT SERPL-MCNC: 0.7 MG/DL (ref 0.5–1.4)
DIFFERENTIAL METHOD: ABNORMAL
EOSINOPHIL # BLD AUTO: 0 K/UL (ref 0–0.5)
EOSINOPHIL NFR BLD: 1.1 % (ref 0–8)
ERYTHROCYTE [DISTWIDTH] IN BLOOD BY AUTOMATED COUNT: 19.9 % (ref 11.5–14.5)
EST. GFR  (AFRICAN AMERICAN): >60 ML/MIN/1.73 M^2
EST. GFR  (NON AFRICAN AMERICAN): >60 ML/MIN/1.73 M^2
GLUCOSE SERPL-MCNC: 85 MG/DL (ref 70–110)
HCT VFR BLD AUTO: 35.7 % (ref 37–48.5)
HCT VFR BLD AUTO: 39.7 % (ref 37–48.5)
HGB BLD-MCNC: 11.2 G/DL (ref 12–16)
HGB BLD-MCNC: 12 G/DL (ref 12–16)
IMM GRANULOCYTES # BLD AUTO: 0.01 K/UL (ref 0–0.04)
IMM GRANULOCYTES NFR BLD AUTO: 0.3 % (ref 0–0.5)
INR PPP: 1.6 (ref 0.8–1.2)
LYMPHOCYTES # BLD AUTO: 1.1 K/UL (ref 1–4.8)
LYMPHOCYTES NFR BLD: 27.9 % (ref 18–48)
MCH RBC QN AUTO: 28.3 PG (ref 27–31)
MCHC RBC AUTO-ENTMCNC: 30.2 G/DL (ref 32–36)
MCV RBC AUTO: 94 FL (ref 82–98)
MONOCYTES # BLD AUTO: 0.3 K/UL (ref 0.3–1)
MONOCYTES NFR BLD: 7.4 % (ref 4–15)
NEUTROPHILS # BLD AUTO: 2.4 K/UL (ref 1.8–7.7)
NEUTROPHILS NFR BLD: 62.8 % (ref 38–73)
NRBC BLD-RTO: 0 /100 WBC
PLATELET # BLD AUTO: 181 K/UL (ref 150–350)
PMV BLD AUTO: 10.1 FL (ref 9.2–12.9)
POTASSIUM SERPL-SCNC: 3.5 MMOL/L (ref 3.5–5.1)
PROTHROMBIN TIME: 16.7 SEC (ref 9–12.5)
RBC # BLD AUTO: 4.24 M/UL (ref 4–5.4)
SODIUM SERPL-SCNC: 140 MMOL/L (ref 136–145)
WBC # BLD AUTO: 3.8 K/UL (ref 3.9–12.7)

## 2019-10-09 PROCEDURE — 85025 COMPLETE CBC W/AUTO DIFF WBC: CPT

## 2019-10-09 PROCEDURE — 80048 BASIC METABOLIC PNL TOTAL CA: CPT

## 2019-10-09 PROCEDURE — G0008 ADMIN INFLUENZA VIRUS VAC: HCPCS | Performed by: INTERNAL MEDICINE

## 2019-10-09 PROCEDURE — 85018 HEMOGLOBIN: CPT | Mod: 91

## 2019-10-09 PROCEDURE — 90471 IMMUNIZATION ADMIN: CPT | Performed by: INTERNAL MEDICINE

## 2019-10-09 PROCEDURE — 25000003 PHARM REV CODE 250: Performed by: NURSE PRACTITIONER

## 2019-10-09 PROCEDURE — 85610 PROTHROMBIN TIME: CPT

## 2019-10-09 PROCEDURE — 96366 THER/PROPH/DIAG IV INF ADDON: CPT

## 2019-10-09 PROCEDURE — G0378 HOSPITAL OBSERVATION PER HR: HCPCS

## 2019-10-09 PROCEDURE — 85730 THROMBOPLASTIN TIME PARTIAL: CPT | Mod: 91

## 2019-10-09 PROCEDURE — 36415 COLL VENOUS BLD VENIPUNCTURE: CPT

## 2019-10-09 PROCEDURE — 63600175 PHARM REV CODE 636 W HCPCS: Performed by: NURSE PRACTITIONER

## 2019-10-09 PROCEDURE — 90662 IIV NO PRSV INCREASED AG IM: CPT | Performed by: INTERNAL MEDICINE

## 2019-10-09 PROCEDURE — 63600175 PHARM REV CODE 636 W HCPCS: Performed by: INTERNAL MEDICINE

## 2019-10-09 PROCEDURE — 85014 HEMATOCRIT: CPT

## 2019-10-09 PROCEDURE — 85730 THROMBOPLASTIN TIME PARTIAL: CPT

## 2019-10-09 RX ORDER — LACTULOSE 10 G/15ML
10 SOLUTION ORAL DAILY
Qty: 45 ML | Refills: 0 | Status: SHIPPED | OUTPATIENT
Start: 2019-10-09 | End: 2019-10-11 | Stop reason: SDUPTHER

## 2019-10-09 RX ORDER — WARFARIN SODIUM 5 MG/1
5 TABLET ORAL DAILY
Start: 2019-10-09 | End: 2020-06-19 | Stop reason: DRUGHIGH

## 2019-10-09 RX ORDER — PANTOPRAZOLE SODIUM 20 MG/1
20 TABLET, DELAYED RELEASE ORAL DAILY
Qty: 30 TABLET | Refills: 0 | Status: SHIPPED | OUTPATIENT
Start: 2019-10-09 | End: 2019-12-06 | Stop reason: SDUPTHER

## 2019-10-09 RX ORDER — CYCLOBENZAPRINE HCL 10 MG
10 TABLET ORAL 3 TIMES DAILY PRN
Status: DISCONTINUED | OUTPATIENT
Start: 2019-10-09 | End: 2019-10-09 | Stop reason: HOSPADM

## 2019-10-09 RX ADMIN — CYCLOBENZAPRINE HYDROCHLORIDE 10 MG: 10 TABLET, FILM COATED ORAL at 02:10

## 2019-10-09 RX ADMIN — INFLUENZA A VIRUS A/MICHIGAN/45/2015 X-275 (H1N1) ANTIGEN (FORMALDEHYDE INACTIVATED), INFLUENZA A VIRUS A/SINGAPORE/INFIMH-16-0019/2016 IVR-186 (H3N2) ANTIGEN (FORMALDEHYDE INACTIVATED), AND INFLUENZA B VIRUS B/MARYLAND/15/2016 BX-69A (A B/COLORADO/6/2017-LIKE VIRUS) ANTIGEN (FORMALDEHYDE INACTIVATED) 0.5 ML: 60; 60; 60 INJECTION, SUSPENSION INTRAMUSCULAR at 10:10

## 2019-10-09 RX ADMIN — LUBIPROSTONE 8 MCG: 8 CAPSULE, GELATIN COATED ORAL at 09:10

## 2019-10-09 RX ADMIN — HEPARIN SODIUM 8 UNITS/KG/HR: 10000 INJECTION, SOLUTION INTRAVENOUS at 05:10

## 2019-10-09 RX ADMIN — PANTOPRAZOLE SODIUM 40 MG: 40 TABLET, DELAYED RELEASE ORAL at 09:10

## 2019-10-09 NOTE — DISCHARGE SUMMARY
[facility] -   Hospital Medicine  Discharge Summary      Patient Name: Adalgisa Wright  MRN: [id number]  Admission Date: 10/8/2019  Hospital Length of Stay: 0 days  Discharge Date and Time:  10/09/2019 9:24 AM  Attending Physician: Jacqueline Clifford MD   Discharging Provider: BLADE HaskinsC  Primary Care Provider: Shan Stroud MD      HPI:   69 y/o female with PMHx of HTN, mechanical valve replacement, who presented to [facility] ED with c/o BRB per rectum that onset suddenly last night. No associated symptoms reported. Symptoms are consistent and moderate in severity. No mitigating or exacerbating factors reported. Patient denies headache, SOB, dizziness, chest pain, palpitations, abd pain, and all other symptoms at this time.    She is a full code and her SDM is her S/O, Rubén.  She will be kept on OBS for GI bleed under the care of Hospital Medicine.     * No surgery found *      Hospital Course:   Patient was kept on OBS for rectal bleeding under the care of Hospital Medicine. Coumadin was held and heparin gtt was started because of mechanical valve. Patient had no additional bleeding episodes and hgb was 12.2 on admit, is 12.0 this morning. Had long discussion about constipation and straining while on coumadin. Patient has internal hemorrhoids. Patient will take Lactulose daily and f/u with GI for constipation. She is scheduled for Colonoscopy on 11/7/19. She will f/u with  cardiology coumadin clinic and Isiah Franklin in GI. She missed 1 dose of coumadin, will continue coumadin tonight and have INR checked on Friday. Patient aware of all. Patient was seen and examined today and deemed stable for discharge home.     Consults:     * Rectal bleeding-resolved as of 10/9/2019  --H/H 12.0/40.0  --likely 2/2 hemorrhoids - no change in H/H today  --check every 6 hours  --hold coumadin  --Heparin gtt 2/2 mechanical valve  --consider GI consult depending on course        Final Active  Diagnoses:    Diagnosis Date Noted POA    HLD (hyperlipidemia) [E78.5] 10/08/2019 Yes    Gastroesophageal reflux disease without esophagitis [K21.9] 07/20/2018 Yes    Irritable bowel syndrome with constipation [K58.1] 04/09/2018 Yes    Aortic valve replaced [Z95.2] 12/01/2014 Not Applicable      Problems Resolved During this Admission:    Diagnosis Date Noted Date Resolved POA    PRINCIPAL PROBLEM:  Rectal bleeding [K62.5] 10/08/2019 10/09/2019 Yes       Discharged Condition: stable    Disposition: Home or Self Care    Follow Up:  Follow-up Information     [facility] In 2 days.    Why:  PT/INR follow up  Contact information:  [address] [place] [address] [phone]             Isiah Franklin PA-C In 2 weeks.    Specialty:  Gastroenterology  Why:  hospital follow up  Contact information:  [address]  [place] [id number] [phone]                 Patient Instructions:      Diet Cardiac   Order Comments: Coumadin diet     Notify your health care provider if you experience any of the following:   Order Comments: bleeding     Activity as tolerated       Significant Diagnostic Studies: Labs:   BMP:   Recent Labs   Lab 10/08/19  0741 10/09/19  0440   GLU 90 85    140   K 3.2* 3.5    106   CO2 26 25   BUN 9 10   CREATININE 0.7 0.7   CALCIUM 9.7 9.5   , CMP   Recent Labs   Lab 10/08/19  0741 10/09/19  0440    140   K 3.2* 3.5    106   CO2 26 25   GLU 90 85   BUN 9 10   CREATININE 0.7 0.7   CALCIUM 9.7 9.5   PROT 7.4  --    ALBUMIN 4.6  --    BILITOT 0.3  --    ALKPHOS 121  --    AST 21  --    ALT 17  --    ANIONGAP 11 9   ESTGFRAFRICA >60.0 >60   EGFRNONAA >60.0 >60   , CBC   Recent Labs   Lab 10/08/19  0741 10/08/19  1630 10/09/19  0017 10/09/19  0440   WBC 3.78* 5.00  --  3.80*   HGB 12.2 12.2 11.2* 12.0   HCT 39.4 40.0 35.7* 39.7    219  --  181    and All labs within the past 24 hours have been reviewed    Pending Diagnostic Studies:      None         Medications:  Reconciled Home Medications:      Medication List      START taking these medications    lactulose 10 gram/15 ml 10 gram/15 mL (15 mL) solution  Commonly known as:  CHRONULAC  Take 15 mLs (10 g total) by mouth once daily.        CONTINUE taking these medications    acebutolol 200 MG capsule  Commonly known as:  SECTRAL  Take 400 mg by mouth 2 (two) times daily.     ALPRAZolam 1 MG tablet  Commonly known as:  XANAX  Take 1 tablet by mouth nightly as needed for Insomnia.     butalbital-acetaminophen-caffeine -40 mg -40 mg per tablet  Commonly known as:  FIORICET, ESGIC  TAKE ONE TABLET BY MOUTH EVERY SIX HOURS AS NEEDED     ergocalciferol 50,000 unit Cap  Commonly known as:  ERGOCALCIFEROL  Take 1 capsule by mouth every 30 days.     furosemide 40 MG tablet  Commonly known as:  LASIX  Take 40 mg by mouth Daily.     lansoprazole 15 MG capsule  Commonly known as:  PREVACID  Take 15 mg by mouth Daily.     lovastatin 40 MG tablet  Commonly known as:  MEVACOR  Take 40 mg by mouth every evening.     multivitamin tablet  Commonly known as:  THERAGRAN  Take 1 tablet by mouth.     sertraline 100 MG tablet  Commonly known as:  ZOLOFT  Take 1 tablet by mouth Daily.     traMADol 50 mg tablet  Commonly known as:  ULTRAM  50 mg every 8 (eight) hours as needed.     warfarin 5 MG tablet  Commonly known as:  COUMADIN  Take 5 mg by mouth. 5 mg by mouth for five days then alternate with 2.5 mg for two days.     zolpidem 10 mg Tab  Commonly known as:  AMBIEN  Take 5 mg by mouth nightly as needed.        STOP taking these medications    AMITIZA 8 MCG Cap  Generic drug:  lubiprostone     enalapril 20 MG tablet  Commonly known as:  VASOTEC     MOVIPREP 100-7.5-2.691 gram solution  Generic drug:  polyethylene glycol     multivitamin with folic acid 400 mcg Tab     tiZANidine 2 mg Cap            Indwelling Lines/Drains at time of discharge:   Lines/Drains/Airways     None                 Time spent on the  discharge of patient: 50 minutes  Patient was seen and examined on the date of discharge and determined to be suitable for discharge.         JENNIFER Haskins-VASILE  Department of Hospital Medicine  [facility] -

## 2019-10-09 NOTE — PLAN OF CARE
CM spoke to patient who is awake, alert, and able to make needs known. Patient states that before she came to the hospital she was not using any assistive equipment at home or the use of oxygen. Patient states that she is established with Dr. Stroud and currently goes to the [facility]  [place] coumadin clinic.  Patient states that she has transportation to get to and from her scheduled appointments and has help at home. Patient states that at this time she does not need any resources set up for her. CM provided a transitional care folder, information on advanced directives, information on pharmacy bedside delivery, and discharge planning begins on admission with contact information for any needs/questions.    D/C Plan: Home   PCP: Dr. Stroud   Preferred Pharmacy: [facility]  Discharge transportation: family   [facility]: pending   Pharmacy Bedside Delivery: declined       10/09/19 1026   Discharge Assessment   Assessment Type Discharge Planning Assessment   Confirmed/corrected address and phone number on facesheet? Yes   Assessment information obtained from? Patient   Expected Length of Stay (days)   (tbd )   Communicated expected length of stay with patient/caregiver yes   Prior to hospitilization cognitive status: Alert/Oriented   Prior to hospitalization functional status: Independent   Current cognitive status: Alert/Oriented   Current Functional Status: Independent   Facility Arrived From: Home    Lives With significant other   Able to Return to Prior Arrangements yes   Is patient able to care for self after discharge? Yes   Who are your caregiver(s) and their phone number(s)? Rubén (significant other) [phone]   Patient's perception of discharge disposition home or selfcare   Patient currently being followed by outpatient case management? No   Patient currently receives any other outside agency services? No   Equipment Currently Used at Home none   Do you have any problems affording any  of your prescribed medications? No   Is the patient taking medications as prescribed? yes   Does the patient have transportation home? Yes   Transportation Anticipated family or friend will provide   Does the patient receive services at the Coumadin Clinic? Yes   Discharge Plan A Home with family   Patient/Family in Agreement with Plan yes

## 2019-10-09 NOTE — NURSING
Went over discharge instructions with patient.   Stressed importance of making and keeping all follow ups as well as making prescribed medication changes.   Prescriptions sent to pt's requested pharmacy.  Patient verbalized understanding and has no questions in regards to discharge.  IV and tele monitor removed by primary nurse CASSIE Mora.  PCT sent to pt's room to discharge pt via wheelchair to personal transportation.  Primary nurse notified of pt's discharge status.

## 2019-10-09 NOTE — NURSING
MD made aware of pts critical APTT of 145.7. Followed nomogram orders. Will continue to monitor pt.

## 2019-10-09 NOTE — HOSPITAL COURSE
Patient was kept on OBS for rectal bleeding under the care of Hospital Medicine. Coumadin was held and heparin gtt was started because of mechanical valve. Patient had no additional bleeding episodes and hgb was 12.2 on admit, is 12.0 this morning. Had long discussion about constipation and straining while on coumadin. Patient has internal hemorrhoids. Patient will take Lactulose daily and f/u with GI for constipation. She is scheduled for Colonoscopy on 11/7/19. She will f/u with  cardiology coumadin clinic and Isiah Franklin in GI. She missed 1 dose of coumadin, will continue coumadin tonight and have INR checked on Friday. Patient aware of all. Patient was seen and examined today and deemed stable for discharge home.

## 2019-10-09 NOTE — PLAN OF CARE
Shift assessment completed.    Patient updated on POC for the day, denies pain, sob.   IVF infusing: IV heparin infusing per MD order  Neuro: A&OX4  CVR: Continuous telemetry monitoring maintained, patient SR/SB with HR 60s-50s  Resp: pt on rm air.  GI: pt independently ambulates and can walk to the restroom  : pt ambulates to restroom   MS: pt can independently ambulate.   Skin: no apparent skin issues, will prep patient for possible procedure today.  Reviewed fall precautions with patient.

## 2019-10-09 NOTE — DISCHARGE INSTRUCTIONS
Please note:  Take 7.5mg of coumadin tonight (a one time only), then take 5mg every day EXCEPT take 2.5mg on Tuesdays.

## 2019-10-11 NOTE — TELEPHONE ENCOUNTER
Pt is requesting a refill on her lactulose. She got a prescription from the hospital but has taken 3 doses and is already almost out. Does it come in a larger bottle?  She needs a prescription and with refills? ( And how long does he need to take it? So enough refills to last until her appt on 10/23/19 when she sees you)

## 2019-10-11 NOTE — TELEPHONE ENCOUNTER
----- Message from Mayte Raya sent at 10/11/2019 10:29 AM CDT -----  Contact: Pt   Pt is calling Type:  RX Refill Request    Who Called:  Pt   Refill or New Rx: Refill   RX Name and Strength: lactulose 10 gram/15 ml (CHRONULAC) 10 gram/15 mL (15 mL) solutionHow is the patient currently taking it? (ex. 1XDay): Take 15 mLs (10 g total) by mouth once daily.  Is this a 30 day or 90 day RX:   Preferred Pharmacy with phone number: .  [facility]/pharmacy #[id number] - [place], [place] - [id number] [facility]  [place] [address]  Phone: [phone] Fax: [phone]  Local or Mail Order:Local   Ordering Provider Irlanda Adan, BLADEC, Pt states that she was told to contact CATRACHITA Franklin if she ran out   Would the patient rather a call back or a response via [facility]? Call back   Best Call Back Number: [phone]           .Thank You  Mayte Raya

## 2019-10-14 ENCOUNTER — TELEPHONE (OUTPATIENT)
Dept: GASTROENTEROLOGY | Facility: CLINIC | Age: 69
End: 2019-10-14

## 2019-10-14 RX ORDER — LACTULOSE 10 G/15ML
20 SOLUTION ORAL DAILY
Qty: 900 ML | Refills: 0 | Status: SHIPPED | OUTPATIENT
Start: 2019-10-14 | End: 2019-11-13

## 2019-10-14 NOTE — TELEPHONE ENCOUNTER
----- Message from Aparna Balderas sent at 10/14/2019  8:39 AM CDT -----  Contact: pt   Type:  RX Refill Request    Who Called: pt   Refill or New Rx:refill  RX Name and Strength: lactulose 10 gram/15 ml (CHRONULAC) 10 gram/15 mL (15 mL) solution  How is the patient currently taking it? (ex. 1XDay):  Is this a 30 day or 90 day RX:30  Preferred Pharmacy with phone number:  [facility]/pharmacy #[id number] - [place], [place] [id number]  [place] [address]  Phone: [phone] Fax: [phone]  Local or Mail Order:local   Ordering Provider:   Would the patient rather a call back or a response via [facility]? CALL BACK   Best Call Back Number: [phone] (home)   Additional Information:

## 2019-10-14 NOTE — TELEPHONE ENCOUNTER
Notified pt that her refill for the lactulose was sent to [facility] pharmacy in [place]. Pt wanted to know if it comes in a larger bottle. The size she was getting is only enough for 3 days.   Advised her to check with the pharmacy and see what size Mr Franklin sent her and see what size the pharmacy has. She agreed to plan.

## 2019-10-15 ENCOUNTER — TELEPHONE (OUTPATIENT)
Dept: GASTROENTEROLOGY | Facility: CLINIC | Age: 69
End: 2019-10-15

## 2019-10-15 NOTE — TELEPHONE ENCOUNTER
----- Message from Leslye Nuno sent at 10/15/2019  8:10 AM CDT -----  Contact: PT  Type:  RX Refill Request    Who Called: Pt   Refill or New Rx: Refill  RX Name and Strength: constipation medication   How is the patient currently taking it? (ex. 1XDay): as needed 2-3 times a day  Is this a 30 day or 90 day RX: unknown   Preferred Pharmacy with phone number: [facility] in [place], [place] [facility] [address]  Local or Mail Order:local   Ordering Provider:nelly   Would the patient rather a call back or a response via [facility]? Call back   Best Call Back Number: [phone] (home)   Additional Information: The patient is calling back for the second time in regards to getting the refill,please advise.

## 2019-10-15 NOTE — TELEPHONE ENCOUNTER
Notified pt that her refill has been sent to [facility] pharmacy. Pt verbalized understanding.

## 2019-10-19 RX ORDER — LACTULOSE 10 G/15ML
SOLUTION ORAL; RECTAL
Qty: 45 ML | Refills: 0 | OUTPATIENT
Start: 2019-10-19

## 2019-10-23 ENCOUNTER — OFFICE VISIT (OUTPATIENT)
Dept: GASTROENTEROLOGY | Facility: CLINIC | Age: 69
End: 2019-10-23
Payer: MEDICARE

## 2019-10-23 VITALS
HEART RATE: 92 BPM | DIASTOLIC BLOOD PRESSURE: 78 MMHG | WEIGHT: 121.06 LBS | OXYGEN SATURATION: 97 % | BODY MASS INDEX: 25.41 KG/M2 | SYSTOLIC BLOOD PRESSURE: 116 MMHG | HEIGHT: 58 IN

## 2019-10-23 DIAGNOSIS — D50.9 IRON DEFICIENCY ANEMIA, UNSPECIFIED IRON DEFICIENCY ANEMIA TYPE: Primary | ICD-10-CM

## 2019-10-23 DIAGNOSIS — K59.09 CHRONIC CONSTIPATION: ICD-10-CM

## 2019-10-23 PROCEDURE — 99999 PR PBB SHADOW E&M-EST. PATIENT-LVL IV: ICD-10-PCS | Mod: PBBFAC,,, | Performed by: PHYSICIAN ASSISTANT

## 2019-10-23 PROCEDURE — 99999 PR PBB SHADOW E&M-EST. PATIENT-LVL IV: CPT | Mod: PBBFAC,,, | Performed by: PHYSICIAN ASSISTANT

## 2019-10-23 PROCEDURE — 99213 PR OFFICE/OUTPT VISIT, EST, LEVL III, 20-29 MIN: ICD-10-PCS | Mod: S$GLB,,, | Performed by: PHYSICIAN ASSISTANT

## 2019-10-23 PROCEDURE — 99213 OFFICE O/P EST LOW 20 MIN: CPT | Mod: S$GLB,,, | Performed by: PHYSICIAN ASSISTANT

## 2019-10-23 RX ORDER — ACEBUTOLOL HYDROCHLORIDE 400 MG/1
400 CAPSULE ORAL 2 TIMES DAILY
COMMUNITY
Start: 2019-10-15

## 2019-10-23 NOTE — PROGRESS NOTES
Subjective:      Patient ID: Adalgisa Wright is a 68 y.o. female.    Chief Complaint: Hospital Follow Up (rectal bleeding)    HPI  The patient has a history of iron deficiency anemia. She is here for a hospital follow up. She is scheduled for EGD and colonoscopy November 7. Two weeks ago, she went to the ER for rectal bleeding. She was admitted to [facility] for observation. The bleeding didn't recur and Hgb remained stable. HM thought it was related to hemorrhoids because the patient reported straining the day before. She was started her on lactulose 15 mL bid-tid. Stools have been softer, and she is going at least daily. She is having some pain under her ribs bilaterally.     Review of Systems  As per HPI.     Objective:     Physical Exam   Constitutional: She is oriented to person, place, and time. She appears well-developed and well-nourished. No distress.   HENT:   Head: Normocephalic and atraumatic.   Eyes: EOM are normal.   Cardiovascular: Normal rate and regular rhythm.   Murmur heard.  Pulmonary/Chest: Effort normal and breath sounds normal. No respiratory distress. She has no wheezes.   Abdominal: Soft. Bowel sounds are normal. She exhibits no distension. There is no tenderness.   Neurological: She is alert and oriented to person, place, and time. No cranial nerve deficit.   Skin: She is not diaphoretic.   Psychiatric: Her behavior is normal.       Assessment:     1. Iron deficiency anemia, unspecified iron deficiency anemia type    2. Chronic constipation        Plan:     EGD and colonoscopy as scheduled November 7.   Continue Lactulose bid. Ok to adjust as needed.     Follow up if symptoms worsen or fail to improve.    Thank you for the opportunity to participate in the care of this patient.   Isiah Franklin PA-C.

## 2019-10-25 ENCOUNTER — TELEPHONE (OUTPATIENT)
Dept: GASTROENTEROLOGY | Facility: CLINIC | Age: 69
End: 2019-10-25

## 2019-10-25 NOTE — TELEPHONE ENCOUNTER
----- Message from Bonnie Mcgee sent at 10/25/2019  8:58 AM CDT -----  Contact: Patient  Type:  Needs Medical Advice    Who Called: Patient  Symptoms (please be specific):    How long has patient had these symptoms:    Pharmacy name and phone #:    Would the patient rather a call back or a response via [facility]? call  Best Call Back Number: [phone]  Additional Information: Patient needs to talk top nurse about Lactulose, states its not working, would like to have something else called in.

## 2019-10-25 NOTE — TELEPHONE ENCOUNTER
Pt reports she it taking the lactulose and is having watery stools but it causing a lot of gas. Causing abdomen pain and some back pain. She is taking gas-x but no relief.  She wants to see if she needs to try to take something else instead of the lactulose?

## 2019-11-02 RX ORDER — PANTOPRAZOLE SODIUM 20 MG/1
TABLET, DELAYED RELEASE ORAL
Qty: 30 TABLET | Refills: 0 | OUTPATIENT
Start: 2019-11-02

## 2019-11-04 ENCOUNTER — TELEPHONE (OUTPATIENT)
Dept: GASTROENTEROLOGY | Facility: CLINIC | Age: 69
End: 2019-11-04

## 2019-11-04 NOTE — TELEPHONE ENCOUNTER
----- Message from Isiah Franklin PA-C sent at 11/3/2019  8:02 PM CST -----  Thanks. Put the clearance in a progress note so endo can see it. She needs to get the Lovenox script and instructions from her Cardiologist.   Isiah    ----- Message -----  From: Vijaya John LPN  Sent: 10/31/2019   3:43 PM CST  To: Isiah Franklin PA-C    Pt is cleared by  cardiology center, Dr Lyndon Kemp, to have colonoscopy clearance. Pt needs to do a Lovenox.    Notes under media.

## 2019-11-04 NOTE — TELEPHONE ENCOUNTER
Spoke to pt and advised her that Dr Kemp okay to hold coumadin and will have to do a Lovenox bridge. Pt agreed to plan and was notified.

## 2019-11-06 RX ORDER — LACTULOSE 10 G/15ML
SOLUTION ORAL; RECTAL
Qty: 900 ML | Refills: 0 | OUTPATIENT
Start: 2019-11-06

## 2019-11-07 ENCOUNTER — HOSPITAL ENCOUNTER (OUTPATIENT)
Facility: HOSPITAL | Age: 69
Discharge: HOME | End: 2019-11-07
Attending: INTERNAL MEDICINE | Admitting: INTERNAL MEDICINE
Payer: MEDICARE

## 2019-11-07 ENCOUNTER — ANESTHESIA EVENT (OUTPATIENT)
Dept: ENDOSCOPY | Facility: HOSPITAL | Age: 69
End: 2019-11-07
Payer: MEDICARE

## 2019-11-07 ENCOUNTER — ANESTHESIA (OUTPATIENT)
Dept: ENDOSCOPY | Facility: HOSPITAL | Age: 69
End: 2019-11-07
Payer: MEDICARE

## 2019-11-07 VITALS
HEIGHT: 59 IN | DIASTOLIC BLOOD PRESSURE: 77 MMHG | WEIGHT: 120.13 LBS | RESPIRATION RATE: 17 BRPM | TEMPERATURE: 98 F | SYSTOLIC BLOOD PRESSURE: 132 MMHG | BODY MASS INDEX: 24.22 KG/M2 | HEART RATE: 64 BPM | OXYGEN SATURATION: 98 %

## 2019-11-07 DIAGNOSIS — D50.9 IDA (IRON DEFICIENCY ANEMIA): Primary | ICD-10-CM

## 2019-11-07 DIAGNOSIS — K63.89 COLONIC MASS: Primary | ICD-10-CM

## 2019-11-07 LAB
INR PPP: 1 (ref 0.8–1.2)
PROTHROMBIN TIME: 10.9 SEC (ref 9–12.5)

## 2019-11-07 PROCEDURE — 43239 PR EGD, FLEX, W/BIOPSY, SGL/MULTI: ICD-10-PCS | Mod: 51,,, | Performed by: INTERNAL MEDICINE

## 2019-11-07 PROCEDURE — 45381 COLONOSCOPY SUBMUCOUS NJX: CPT | Performed by: INTERNAL MEDICINE

## 2019-11-07 PROCEDURE — 88305 TISSUE EXAM BY PATHOLOGIST: CPT | Mod: 26,,, | Performed by: PATHOLOGY

## 2019-11-07 PROCEDURE — 37000009 HC ANESTHESIA EA ADD 15 MINS: Performed by: INTERNAL MEDICINE

## 2019-11-07 PROCEDURE — 88341 IMHCHEM/IMCYTCHM EA ADD ANTB: CPT | Mod: 26,,, | Performed by: PATHOLOGY

## 2019-11-07 PROCEDURE — 43239 EGD BIOPSY SINGLE/MULTIPLE: CPT | Mod: 51,,, | Performed by: INTERNAL MEDICINE

## 2019-11-07 PROCEDURE — 88342 TISSUE SPECIMEN TO PATHOLOGY - SURGERY: ICD-10-PCS | Mod: 26,,, | Performed by: PATHOLOGY

## 2019-11-07 PROCEDURE — 88305 TISSUE SPECIMEN TO PATHOLOGY - SURGERY: ICD-10-PCS | Mod: 26,,, | Performed by: PATHOLOGY

## 2019-11-07 PROCEDURE — 25000003 PHARM REV CODE 250: Performed by: NURSE ANESTHETIST, CERTIFIED REGISTERED

## 2019-11-07 PROCEDURE — 45380 COLONOSCOPY AND BIOPSY: CPT | Performed by: INTERNAL MEDICINE

## 2019-11-07 PROCEDURE — 88342 IMHCHEM/IMCYTCHM 1ST ANTB: CPT | Mod: 26,,, | Performed by: PATHOLOGY

## 2019-11-07 PROCEDURE — 63600175 PHARM REV CODE 636 W HCPCS: Performed by: INTERNAL MEDICINE

## 2019-11-07 PROCEDURE — 88305 TISSUE EXAM BY PATHOLOGIST: CPT | Mod: 59 | Performed by: PATHOLOGY

## 2019-11-07 PROCEDURE — 37000008 HC ANESTHESIA 1ST 15 MINUTES: Performed by: INTERNAL MEDICINE

## 2019-11-07 PROCEDURE — 88342 IMHCHEM/IMCYTCHM 1ST ANTB: CPT | Performed by: PATHOLOGY

## 2019-11-07 PROCEDURE — 27201012 HC FORCEPS, HOT/COLD, DISP: Performed by: INTERNAL MEDICINE

## 2019-11-07 PROCEDURE — 45381 PR COLONOSCPY,FLEX,W/DIR SUBMUC INJECT: ICD-10-PCS | Mod: 51,,, | Performed by: INTERNAL MEDICINE

## 2019-11-07 PROCEDURE — 85610 PROTHROMBIN TIME: CPT

## 2019-11-07 PROCEDURE — 88341 PR IHC OR ICC EACH ADD'L SINGLE ANTIBODY  STAINPR: ICD-10-PCS | Mod: 26,,, | Performed by: PATHOLOGY

## 2019-11-07 PROCEDURE — 43239 EGD BIOPSY SINGLE/MULTIPLE: CPT | Performed by: INTERNAL MEDICINE

## 2019-11-07 PROCEDURE — 45381 COLONOSCOPY SUBMUCOUS NJX: CPT | Mod: 51,,, | Performed by: INTERNAL MEDICINE

## 2019-11-07 PROCEDURE — 45380 PR COLONOSCOPY,BIOPSY: ICD-10-PCS | Mod: ,,, | Performed by: INTERNAL MEDICINE

## 2019-11-07 PROCEDURE — 63600175 PHARM REV CODE 636 W HCPCS: Performed by: NURSE ANESTHETIST, CERTIFIED REGISTERED

## 2019-11-07 PROCEDURE — 45380 COLONOSCOPY AND BIOPSY: CPT | Mod: ,,, | Performed by: INTERNAL MEDICINE

## 2019-11-07 RX ORDER — PROPOFOL 10 MG/ML
VIAL (ML) INTRAVENOUS
Status: DISCONTINUED | OUTPATIENT
Start: 2019-11-07 | End: 2019-11-07

## 2019-11-07 RX ORDER — LIDOCAINE HYDROCHLORIDE 10 MG/ML
INJECTION, SOLUTION EPIDURAL; INFILTRATION; INTRACAUDAL; PERINEURAL
Status: DISCONTINUED | OUTPATIENT
Start: 2019-11-07 | End: 2019-11-07

## 2019-11-07 RX ORDER — SODIUM CHLORIDE, SODIUM LACTATE, POTASSIUM CHLORIDE, CALCIUM CHLORIDE 600; 310; 30; 20 MG/100ML; MG/100ML; MG/100ML; MG/100ML
INJECTION, SOLUTION INTRAVENOUS CONTINUOUS
Status: DISCONTINUED | OUTPATIENT
Start: 2019-11-07 | End: 2019-11-07 | Stop reason: HOSPADM

## 2019-11-07 RX ADMIN — PROPOFOL 50 MG: 10 INJECTION, EMULSION INTRAVENOUS at 07:11

## 2019-11-07 RX ADMIN — LIDOCAINE HYDROCHLORIDE 50 MG: 10 INJECTION, SOLUTION EPIDURAL; INFILTRATION; INTRACAUDAL; PERINEURAL at 07:11

## 2019-11-07 RX ADMIN — SODIUM CHLORIDE, SODIUM LACTATE, POTASSIUM CHLORIDE, AND CALCIUM CHLORIDE: 600; 310; 30; 20 INJECTION, SOLUTION INTRAVENOUS at 07:11

## 2019-11-07 RX ADMIN — PROPOFOL 80 MG: 10 INJECTION, EMULSION INTRAVENOUS at 07:11

## 2019-11-07 RX ADMIN — PROPOFOL 50 MG: 10 INJECTION, EMULSION INTRAVENOUS at 08:11

## 2019-11-07 NOTE — ANESTHESIA POSTPROCEDURE EVALUATION
Anesthesia Post Evaluation    Patient: Adalgisa Wright    Procedure(s) Performed: Procedure(s) (LRB):  ESOPHAGOGASTRODUODENOSCOPY (EGD) needs PT/INR (N/A)  COLONOSCOPY (N/A)    Final Anesthesia Type: MAC  Patient location during evaluation: GI PACU  Patient participation: Yes- Able to Participate  Level of consciousness: awake and alert  Post-procedure vital signs: reviewed and stable  Pain management: adequate  Airway patency: patent  PONV status at discharge: No PONV  Anesthetic complications: no      Cardiovascular status: blood pressure returned to baseline  Respiratory status: unassisted and spontaneous ventilation  Hydration status: euvolemic  Follow-up not needed.          Vitals Value Taken Time   /77 11/7/2019  9:00 AM   Temp 36.4 °C (97.5 °F) 11/7/2019  8:30 AM   Pulse 64 11/7/2019  9:00 AM   Resp 17 11/7/2019  9:00 AM   SpO2 98 % 11/7/2019  9:00 AM         Event Time     Out of Recovery 09:12:33          Pain/Keny Score: Keny Score: 10 (11/7/2019  9:00 AM)

## 2019-11-07 NOTE — DISCHARGE SUMMARY
Endoscopy Discharge Summary      Admit Date: 11/7/2019    Discharge Date and Time:  11/7/2019 8:31 AM    Attending Physician: Brenda Ochoa MD     Discharge Physician: Brenda Ochoa MD     Principal Admitting Diagnoses: BAILEY (iron deficiency anemia)         Discharge Diagnosis: The encounter diagnosis was BAILEY (iron deficiency anemia).     Discharged Condition: Good    Indication for Admission: BAILEY (iron deficiency anemia)     Hospital Course: Patient was admitted for an inpatient procedure and tolerated the procedure well with no complications.    Significant Diagnostic Studies: Colonoscopy with biopsy and EGD with biopsy    Pathology (if any):  Specimen (12h ago, onward)     Start     Ordered    11/07/19 0752  Specimen to Pathology - Surgery  Once     Comments:  1. Gastric Boipsies r/o H. Pylori2. Rectal Mass Biopsies      11/07/19 0816                Estimated Blood Loss: 1 ml.    Discussed with: patient.    Disposition: Home.    Follow Up/Patient Instructions:   Current Discharge Medication List      CONTINUE these medications which have NOT CHANGED    Details   !! acebutolol (SECTRAL) 200 MG capsule Take 400 mg by mouth 2 (two) times daily.       !! acebutolol (SECTRAL) 400 mg capsule Take 400 mg by mouth 2 (two) times daily.      ALPRAZolam (XANAX) 1 MG tablet Take 1 tablet by mouth nightly as needed for Insomnia.      ergocalciferol (ERGOCALCIFEROL) 50,000 unit Cap Take 1 capsule by mouth every 30 days.      furosemide (LASIX) 40 MG tablet Take 40 mg by mouth Daily.      lactulose 10 gram/15 ml (CHRONULAC) 10 gram/15 mL (15 mL) solution Take 30 mLs (20 g total) by mouth once daily.  Qty: 900 mL, Refills: 0      linaCLOtide (LINZESS) 145 mcg Cap capsule Take 1 capsule (145 mcg total) by mouth once daily.  Qty: 30 capsule, Refills: 2      lovastatin (MEVACOR) 40 MG tablet Take 40 mg by mouth every evening.      multivitamin (THERAGRAN) tablet Take 1 tablet by mouth.      sertraline (ZOLOFT) 100 MG tablet  Take 1 tablet by mouth Daily.      zolpidem (AMBIEN) 10 mg Tab Take 5 mg by mouth nightly as needed.       butalbital-acetaminophen-caffeine -40 mg (FIORICET, ESGIC) -40 mg per tablet TAKE ONE TABLET BY MOUTH EVERY SIX HOURS AS NEEDED      pantoprazole (PROTONIX) 20 MG tablet Take 1 tablet (20 mg total) by mouth once daily.  Qty: 30 tablet, Refills: 0      traMADol (ULTRAM) 50 mg tablet 50 mg every 8 (eight) hours as needed.     Comments: Quantity prescribed more than 7 day supply? Press F2 and select one:[id number]        warfarin (COUMADIN) 5 MG tablet Take 1 tablet (5 mg total) by mouth Daily. 5 mg by mouth for five days then alternate with 2.5 mg for one day.       !! - Potential duplicate medications found. Please discuss with provider.          Discharge Procedure Orders   Diet general     Call MD for:  temperature >100.4     Call MD for:  persistent nausea and vomiting     Call MD for:  severe uncontrolled pain     Call MD for:  difficulty breathing, headache or visual disturbances     Activity as tolerated       Follow-up Information     Brenda Ochoa MD. Call in 1 week.    Specialty:  Gastroenterology  Why:  For pathology results  Contact information:  [address]  [place] [address] [phone]             Ankur Smith MD. Schedule an appointment as soon as possible for a visit in 1 week.    Specialty:  Colon and Rectal Surgery  Contact information:  [id number] [place] [address] [address] [place] [address] [phone]

## 2019-11-07 NOTE — TRANSFER OF CARE
"Anesthesia Transfer of Care Note    Patient: Adalgisa Wright    Procedure(s) Performed: Procedure(s) (LRB):  ESOPHAGOGASTRODUODENOSCOPY (EGD) needs PT/INR (N/A)  COLONOSCOPY (N/A)    Patient location: GI    Anesthesia Type: MAC    Transport from OR: Transported from OR on room air with adequate spontaneous ventilation    Post pain: adequate analgesia    Post assessment: no apparent anesthetic complications    Post vital signs: stable    Level of consciousness: awake, alert and oriented    Nausea/Vomiting: no nausea/vomiting    Complications: none    Transfer of care protocol was followed      Last vitals:   Visit Vitals  BP (!) 148/81 (BP Location: Left arm, Patient Position: Lying)   Pulse 65   Temp 36.4 °C (97.5 °F) (Temporal)   Resp 18   Ht 4' 11" (1.499 m)   Wt 54.5 kg (120 lb 2.4 oz)   SpO2 98%   Breastfeeding? No   BMI 24.27 kg/m²     "

## 2019-11-07 NOTE — DISCHARGE INSTRUCTIONS

## 2019-11-07 NOTE — PLAN OF CARE
Dr Ochoa came to bedside and discussed findings. NO N/V,  no abdominal pain, no GI bleeding, and vitals stable.  Pt discharged from unit.

## 2019-11-07 NOTE — H&P
PRE PROCEDURE H&P    Patient Name: Adalgisa Wright  MRN: [id number]  : 1950  Date of Procedure:  2019  Referring Physician: Isiah Franklin PA-C  Primary Physician: Shan Stroud MD  Procedure Physician: Brenda Ochoa MD       Planned Procedure: Colonoscopy and EGD  Diagnosis: iron deficiency anemia  Chief Complaint: Same as above    HPI: Patient is an 68 y.o. female is here for the above.     Last colonoscopy: 2 years ago   Family history: negative   Anticoagulation: coumadin, INR 1.0    Past Medical History:   Past Medical History:   Diagnosis Date    Anticoagulant long-term use     Aortic valve defect     Benign neoplasm of ascending colon 2017    DDD (degenerative disc disease), cervical 2018    GERD (gastroesophageal reflux disease)     Hemorrhoids     HLD (hyperlipidemia)     Hypertension     Insomnia 2014    Irritable bowel syndrome with constipation 2018    Postmenopausal osteoporosis 2018        Past Surgical History:  Past Surgical History:   Procedure Laterality Date    AORTIC VALVE REPLACEMENT      COLONOSCOPY Left 2017    Procedure: COLONOSCOPY;  Surgeon: Sander Ulloa MD;  Location: [facility];  Service: Endoscopy;  Laterality: Left;        Home Medications:  Prior to Admission medications    Medication Sig Start Date End Date Taking? Authorizing Provider   acebutolol (SECTRAL) 200 MG capsule Take 400 mg by mouth 2 (two) times daily.    Yes Lyndon Kemp MD   acebutolol (SECTRAL) 400 mg capsule Take 400 mg by mouth 2 (two) times daily. 10/15/19  Yes Historical Provider, MD   ALPRAZolam (XANAX) 1 MG tablet Take 1 tablet by mouth nightly as needed for Insomnia. 18  Yes Historical Provider, MD   ergocalciferol (ERGOCALCIFEROL) 50,000 unit Cap Take 1 capsule by mouth every 30 days. 3/22/17  Yes Historical Provider, MD   furosemide (LASIX) 40 MG tablet Take 40 mg by mouth Daily.   Yes Historical Provider, MD   lactulose 10 gram/15 ml  (CHRONULAC) 10 gram/15 mL (15 mL) solution Take 30 mLs (20 g total) by mouth once daily. 10/14/19 11/13/19 Yes Isiah Franklin PA-C   linaCLOtide (LINZESS) 145 mcg Cap capsule Take 1 capsule (145 mcg total) by mouth once daily. 10/25/19  Yes Isiah Franklin PA-C   lovastatin (MEVACOR) 40 MG tablet Take 40 mg by mouth every evening.   Yes Historical Provider, MD   multivitamin (THERAGRAN) tablet Take 1 tablet by mouth.   Yes Historical Provider, MD   sertraline (ZOLOFT) 100 MG tablet Take 1 tablet by mouth Daily. 1/15/18  Yes Historical Provider, MD   zolpidem (AMBIEN) 10 mg Tab Take 5 mg by mouth nightly as needed.  3/6/18  Yes Shan Stroud MD   butalbital-acetaminophen-caffeine -40 mg (FIORICET, ESGIC) -40 mg per tablet TAKE ONE TABLET BY MOUTH EVERY SIX HOURS AS NEEDED 12/5/18   Historical Provider, MD   pantoprazole (PROTONIX) 20 MG tablet Take 1 tablet (20 mg total) by mouth once daily.  Patient not taking: Reported on 10/23/2019 10/9/19 10/8/20  ARMIDA Bahena   traMADol (ULTRAM) 50 mg tablet 50 mg every 8 (eight) hours as needed.  9/26/19   Historical Provider, MD   warfarin (COUMADIN) 5 MG tablet Take 1 tablet (5 mg total) by mouth Daily. 5 mg by mouth for five days then alternate with 2.5 mg for one day. 10/9/19   ARMIDA Bahena        Allergies:  Review of patient's allergies indicates:   Allergen Reactions    Penicillins Rash        Social History:   Social History     Socioeconomic History    Marital status: Single     Spouse name: Not on file    Number of children: Not on file    Years of education: Not on file    Highest education level: Not on file   Occupational History    Not on file   Social Needs    Financial resource strain: Not on file    Food insecurity:     Worry: Not on file     Inability: Not on file    Transportation needs:     Medical: Not on file     Non-medical: Not on file   Tobacco Use    Smoking status: Never Smoker    Smokeless  "tobacco: Never Used   Substance and Sexual Activity    Alcohol use: Not Currently     Comment: occasionally     Drug use: No    Sexual activity: Yes     Partners: Male   Lifestyle    Physical activity:     Days per week: Not on file     Minutes per session: Not on file    Stress: Not on file   Relationships    Social connections:     Talks on phone: Not on file     Gets together: Not on file     Attends Evangelical service: Not on file     Active member of club or organization: Not on file     Attends meetings of clubs or organizations: Not on file     Relationship status: Not on file   Other Topics Concern    Not on file   Social History Narrative    Not on file       Family History:  Family History   Problem Relation Age of Onset    Diabetes Mother     Heart disease Mother     Hypertension Mother     Heart disease Father     Hypertension Father     Hypertension Sister     Hypertension Son     Heart disease Son     Colon cancer Neg Hx        ROS: No acute cardiac events, no acute respiratory complaints.     Physical Exam (all patients):    BP (!) 148/81 (BP Location: Left arm, Patient Position: Lying)   Pulse 65   Temp 97.5 °F (36.4 °C) (Temporal)   Resp 18   Ht 4' 11" (1.499 m)   Wt 54.5 kg (120 lb 2.4 oz)   SpO2 98%   Breastfeeding? No   BMI 24.27 kg/m²   Lungs: Clear to auscultation bilaterally, respirations unlabored  Heart: Regular rate and rhythm, S1 and S2 normal, no obvious murmurs  Abdomen:         Soft, non-tender, bowel sounds normal, no masses, no organomegaly    Lab Results   Component Value Date    WBC 3.80 (L) 10/09/2019    MCV 94 10/09/2019    RDW 19.9 (H) 10/09/2019     10/09/2019    INR 1.0 11/07/2019    GLU 85 10/09/2019    BUN 10 10/09/2019     10/09/2019    K 3.5 10/09/2019     10/09/2019        SEDATION PLAN: per anesthesia      History reviewed, vital signs satisfactory, cardiopulmonary status satisfactory, sedation options, risks and plans have been " discussed with the patient  All their questions were answered and the patient agrees to the sedation procedures as planned and the patient is deemed an appropriate candidate for the sedation as planned.    Procedure explained to patient, informed consent obtained and placed in chart.    Brenda Ochoa  11/7/2019  7:46 AM

## 2019-11-07 NOTE — [employer] PATIENT INSTRUCTIONS
Discharge Summary/Instructions after an Endoscopic Procedure  Patient Name: Adalgisa Wright  Patient MRN: [id number]  Patient YOB: 1950 Thursday, November 07, 2019 Brenda Ochoa MD  RESTRICTIONS:  During your procedure today, you received medications for sedation.  These   medications may affect your judgment, balance and coordination.  Therefore,   for 24 hours, you have the following restrictions:   - DO NOT drive a car, operate machinery, make legal/financial decisions,   sign important papers or drink alcohol.    ACTIVITY:  Today: no heavy lifting, straining or running due to procedural   sedation/anesthesia.  The following day: return to full activity including work.  DIET:  Eat and drink normally unless instructed otherwise.     TREATMENT FOR COMMON SIDE EFFECTS:  - Mild abdominal pain, nausea, belching, bloating or excessive gas:  rest,   eat lightly and use a heating pad.  - Sore Throat: treat with throat lozenges and/or gargle with warm salt   water.  - Because air was used during the procedure, expelling large amounts of air   from your rectum or belching is normal.  - If a bowel prep was taken, you may not have a bowel movement for 1-3 days.    This is normal.  SYMPTOMS TO WATCH FOR AND REPORT TO YOUR PHYSICIAN:  1. Abdominal pain or bloating, other than gas cramps.  2. Chest pain.  3. Back pain.  4. Signs of infection such as: chills or fever occurring within 24 hours   after the procedure.  5. Rectal bleeding, which would show as bright red, maroon, or black stools.   (A tablespoon of blood from the rectum is not serious, especially if   hemorrhoids are present.)  6. Vomiting.  7. Weakness or dizziness.  GO DIRECTLY TO THE NEAREST EMERGENCY ROOM IF YOU HAVE ANY OF THE FOLLOWING:      Difficulty breathing              Chills and/or fever over 101 F   Persistent vomiting and/or vomiting blood   Severe abdominal pain   Severe chest pain   Black, tarry stools   Bleeding- more than one  tablespoon   Any other symptom or condition that you feel may need urgent attention  Your doctor recommends these additional instructions:  If any biopsies were taken, your doctors clinic will contact you in 1 to 2   weeks with any results.  - Patient has a contact number available for emergencies.  The signs and   symptoms of potential delayed complications were discussed with the   patient.  Return to normal activities tomorrow.  Written discharge   instructions were provided to the patient.   - Discharge patient to home (via wheelchair).   - Continue present medications.   - Resume Coumadin (warfarin) at prior dose today.  Refer to Coumadin Clinic   for further adjustment of therapy.   - Await pathology results.  For questions, problems or results please call your physician Brenda Ochoa MD at Work:  [phone]  If you have any questions about the above instructions, call the GI   department at [phone] or call the endoscopy unit at [phone]   from 7am until 3 pm.  [facility] [place], EMERGENCY ROOM PHONE NUMBER:   [phone]  IF A COMPLICATION OR EMERGENCY SITUATION ARISES AND YOU ARE UNABLE TO REACH   YOUR PHYSICIAN - GO DIRECTLY TO THE EMERGENCY ROOM.  I have read or have had read to me these discharge instructions for my   procedure and have received a written copy.  I understand these   instructions and will follow-up with my physician if I have any questions.     __________________________________       _____________________________________  Nurse Signature                                          Patient/Designated   Responsible Party Signature  MD Brenda Wesley MD  11/7/2019 8:29:53 AM  This report has been verified and signed electronically.  [employer]

## 2019-11-07 NOTE — PROVATION PATIENT INSTRUCTIONS
Discharge Summary/Instructions after an Endoscopic Procedure  Patient Name: Adalgisa Wright  Patient MRN: [id number]  Patient YOB: 1950 Thursday, November 07, 2019 Brenda Ochoa MD  RESTRICTIONS:  During your procedure today, you received medications for sedation.  These   medications may affect your judgment, balance and coordination.  Therefore,   for 24 hours, you have the following restrictions:   - DO NOT drive a car, operate machinery, make legal/financial decisions,   sign important papers or drink alcohol.    ACTIVITY:  Today: no heavy lifting, straining or running due to procedural   sedation/anesthesia.  The following day: return to full activity including work.  DIET:  Eat and drink normally unless instructed otherwise.     TREATMENT FOR COMMON SIDE EFFECTS:  - Mild abdominal pain, nausea, belching, bloating or excessive gas:  rest,   eat lightly and use a heating pad.  - Sore Throat: treat with throat lozenges and/or gargle with warm salt   water.  - Because air was used during the procedure, expelling large amounts of air   from your rectum or belching is normal.  - If a bowel prep was taken, you may not have a bowel movement for 1-3 days.    This is normal.  SYMPTOMS TO WATCH FOR AND REPORT TO YOUR PHYSICIAN:  1. Abdominal pain or bloating, other than gas cramps.  2. Chest pain.  3. Back pain.  4. Signs of infection such as: chills or fever occurring within 24 hours   after the procedure.  5. Rectal bleeding, which would show as bright red, maroon, or black stools.   (A tablespoon of blood from the rectum is not serious, especially if   hemorrhoids are present.)  6. Vomiting.  7. Weakness or dizziness.  GO DIRECTLY TO THE NEAREST EMERGENCY ROOM IF YOU HAVE ANY OF THE FOLLOWING:      Difficulty breathing              Chills and/or fever over 101 F   Persistent vomiting and/or vomiting blood   Severe abdominal pain   Severe chest pain   Black, tarry stools   Bleeding- more than one  tablespoon   Any other symptom or condition that you feel may need urgent attention  Your doctor recommends these additional instructions:  If any biopsies were taken, your doctors clinic will contact you in 1 to 2   weeks with any results.  - Patient has a contact number available for emergencies.  The signs and   symptoms of potential delayed complications were discussed with the   patient.  Return to normal activities tomorrow.  Written discharge   instructions were provided to the patient.   - Discharge patient to home (via wheelchair).   - Resume previous diet today.   - Continue present medications.   - Resume Coumadin (warfarin) at prior dose today.  Refer to Coumadin Clinic   for further adjustment of therapy.   - Await pathology results.   - Refer to a colo-rectal surgeon in 1 week.   - Repeat colonoscopy based on pathology and subsequent treatment.  For questions, problems or results please call your physician Brenda Ochoa MD at Work:  [phone]  If you have any questions about the above instructions, call the GI   department at [phone] or call the endoscopy unit at [phone]   from 7am until 3 pm.  [facility], EMERGENCY ROOM PHONE NUMBER:   [phone]  IF A COMPLICATION OR EMERGENCY SITUATION ARISES AND YOU ARE UNABLE TO REACH   YOUR PHYSICIAN - GO DIRECTLY TO THE EMERGENCY ROOM.  I have read or have had read to me these discharge instructions for my   procedure and have received a written copy.  I understand these   instructions and will follow-up with my physician if I have any questions.     __________________________________       _____________________________________  Nurse Signature                                          Patient/Designated   Responsible Party Signature  MD Brenda Wesley MD  11/7/2019 8:27:01 AM  This report has been verified and signed electronically.  [employer]

## 2019-11-07 NOTE — ANESTHESIA PREPROCEDURE EVALUATION
11/07/2019  Adalgisa Wright is a 68 y.o., female.    Pre-op Assessment    I have reviewed the Patient Summary Reports.     I have reviewed the Nursing Notes.   I have reviewed the Medications.     Review of Systems  Anesthesia Hx:  No problems with previous Anesthesia  Denies Family Hx of Anesthesia complications.   Denies Personal Hx of Anesthesia complications.   Social:  Non-Smoker, No Alcohol Use    Hematology/Oncology:  Hematology Normal   Oncology Normal     Cardiovascular:   Hypertension, well controlled Valvular problems/Murmurs Denies MI.   Denies CABG/stent.      hyperlipidemia S/P AVR   Pulmonary:   Denies COPD.  Denies Asthma.  Denies Sleep Apnea.    Renal/:  Renal/ Normal     Hepatic/GI:   Bowel Prep. GERD, well controlled Denies Liver Disease. Denies Hepatitis.    Musculoskeletal:   Arthritis   Spine Disorders: Degenerative disease and Disc disease    Neurological:   Denies CVA. Denies Seizures.    Endocrine:  Endocrine Normal        Physical Exam  General:  Well nourished    Airway/Jaw/Neck:  Airway Findings: Mouth Opening: Normal Tongue: Normal  General Airway Assessment: Adult  Mallampati: II      Dental:  Dental Findings: In tact, upper partial dentures   Chest/Lungs:  Chest/Lungs Findings: Clear to auscultation, Normal Respiratory Rate     Heart/Vascular:  Heart Findings: Rate: Normal  Rhythm: Regular Rhythm  Sounds: Normal             Anesthesia Plan  Type of Anesthesia, risks & benefits discussed:  Anesthesia Type:  MAC  Patient's Preference:   Intra-op Monitoring Plan: standard ASA monitors  Intra-op Monitoring Plan Comments:   Post Op Pain Control Plan:   Post Op Pain Control Plan Comments:   Induction:   IV  Beta Blocker:  Patient is not currently on a Beta-Blocker (No further documentation required).       Informed Consent: Patient understands risks and agrees with Anesthesia  plan.  Questions answered. Anesthesia consent signed with patient.  ASA Score: 2     Day of Surgery Review of History & Physical: I have interviewed and examined the patient. I have reviewed the patient's H&P dated:  There are no significant changes.  H&P update referred to the surgeon.         Ready For Surgery From Anesthesia Perspective.

## 2019-11-12 ENCOUNTER — TELEPHONE (OUTPATIENT)
Dept: SURGERY | Facility: CLINIC | Age: 69
End: 2019-11-12

## 2019-11-12 NOTE — TELEPHONE ENCOUNTER
Spoke to pt, offered to move her appt with Dr Smith from Monday 11/18/19 at [facility] to Wednesday 11/13/19 at [facility]. Pt advised she would call me back and let me know if she is able to get transportation to [facility] for tomorrow. Gave pt my name and call back number [phone]    ----- Message from Ankur Smith MD sent at 11/11/2019  4:21 PM CST -----  If this lady wants to be seen this week, she can come to [facility] Wednesday so we can get rolling on her workup/treatment.    ----- Message -----  From: Brenda Ochoa MD  Sent: 11/7/2019  10:03 AM CST  To: Ankur Smith MD    Recto-sigmoid mass found on colonoscopy for BAILEY.

## 2019-11-12 NOTE — TELEPHONE ENCOUNTER
Spoke to pt, she advised that she would be able to see you at [facility] tomorrow morning but not in the afternoon. Therefore she wishes to keep her appt for Monday when her  can go with her.   Dr Smith has been notified via In Basket    ----- Message from Rachael Redman sent at 11/12/2019 10:16 AM CST -----  Contact: [phone]  Would like to consult with the nurse, Patient will keep her Appt that is on Monday , Please call back at [phone], Thanks sj

## 2019-11-18 ENCOUNTER — LAB VISIT (OUTPATIENT)
Dept: LAB | Facility: HOSPITAL | Age: 69
End: 2019-11-18
Attending: COLON & RECTAL SURGERY
Payer: MEDICARE

## 2019-11-18 ENCOUNTER — OFFICE VISIT (OUTPATIENT)
Dept: SURGERY | Facility: CLINIC | Age: 69
End: 2019-11-18
Payer: MEDICARE

## 2019-11-18 VITALS
HEART RATE: 68 BPM | WEIGHT: 121.5 LBS | TEMPERATURE: 99 F | BODY MASS INDEX: 24.53 KG/M2 | SYSTOLIC BLOOD PRESSURE: 140 MMHG | DIASTOLIC BLOOD PRESSURE: 74 MMHG

## 2019-11-18 DIAGNOSIS — C19 ADENOCARCINOMA OF RECTOSIGMOID JUNCTION: Primary | ICD-10-CM

## 2019-11-18 DIAGNOSIS — Z95.2 AORTIC VALVE REPLACED: ICD-10-CM

## 2019-11-18 DIAGNOSIS — C19 ADENOCARCINOMA OF RECTOSIGMOID JUNCTION: ICD-10-CM

## 2019-11-18 DIAGNOSIS — Z79.01 CHRONIC ANTICOAGULATION: ICD-10-CM

## 2019-11-18 DIAGNOSIS — D50.9 IRON DEFICIENCY ANEMIA, UNSPECIFIED IRON DEFICIENCY ANEMIA TYPE: ICD-10-CM

## 2019-11-18 LAB
ALBUMIN SERPL BCP-MCNC: 4.6 G/DL (ref 3.5–5.2)
ALP SERPL-CCNC: 108 U/L (ref 55–135)
ALT SERPL W/O P-5'-P-CCNC: 18 U/L (ref 10–44)
ANION GAP SERPL CALC-SCNC: 8 MMOL/L (ref 8–16)
AST SERPL-CCNC: 20 U/L (ref 10–40)
BASOPHILS # BLD AUTO: 0.01 K/UL (ref 0–0.2)
BASOPHILS NFR BLD: 0.3 % (ref 0–1.9)
BILIRUB SERPL-MCNC: 0.3 MG/DL (ref 0.1–1)
BUN SERPL-MCNC: 9 MG/DL (ref 8–23)
CALCIUM SERPL-MCNC: 9.7 MG/DL (ref 8.7–10.5)
CHLORIDE SERPL-SCNC: 105 MMOL/L (ref 95–110)
CO2 SERPL-SCNC: 30 MMOL/L (ref 23–29)
CREAT SERPL-MCNC: 0.8 MG/DL (ref 0.5–1.4)
DIFFERENTIAL METHOD: ABNORMAL
EOSINOPHIL # BLD AUTO: 0 K/UL (ref 0–0.5)
EOSINOPHIL NFR BLD: 0.6 % (ref 0–8)
ERYTHROCYTE [DISTWIDTH] IN BLOOD BY AUTOMATED COUNT: 15.9 % (ref 11.5–14.5)
EST. GFR  (AFRICAN AMERICAN): >60 ML/MIN/1.73 M^2
EST. GFR  (NON AFRICAN AMERICAN): >60 ML/MIN/1.73 M^2
GLUCOSE SERPL-MCNC: 105 MG/DL (ref 70–110)
HCT VFR BLD AUTO: 37.9 % (ref 37–48.5)
HGB BLD-MCNC: 11.9 G/DL (ref 12–16)
IMM GRANULOCYTES # BLD AUTO: 0.01 K/UL (ref 0–0.04)
IMM GRANULOCYTES NFR BLD AUTO: 0.3 % (ref 0–0.5)
LYMPHOCYTES # BLD AUTO: 1.4 K/UL (ref 1–4.8)
LYMPHOCYTES NFR BLD: 42.1 % (ref 18–48)
MCH RBC QN AUTO: 30.3 PG (ref 27–31)
MCHC RBC AUTO-ENTMCNC: 31.4 G/DL (ref 32–36)
MCV RBC AUTO: 96 FL (ref 82–98)
MONOCYTES # BLD AUTO: 0.3 K/UL (ref 0.3–1)
MONOCYTES NFR BLD: 8.2 % (ref 4–15)
NEUTROPHILS # BLD AUTO: 1.7 K/UL (ref 1.8–7.7)
NEUTROPHILS NFR BLD: 48.5 % (ref 38–73)
NRBC BLD-RTO: 0 /100 WBC
PLATELET # BLD AUTO: 227 K/UL (ref 150–350)
PMV BLD AUTO: 9.7 FL (ref 9.2–12.9)
POTASSIUM SERPL-SCNC: 3.1 MMOL/L (ref 3.5–5.1)
PROT SERPL-MCNC: 7.5 G/DL (ref 6–8.4)
RBC # BLD AUTO: 3.93 M/UL (ref 4–5.4)
SODIUM SERPL-SCNC: 143 MMOL/L (ref 136–145)
WBC # BLD AUTO: 3.4 K/UL (ref 3.9–12.7)

## 2019-11-18 PROCEDURE — 99205 PR OFFICE/OUTPT VISIT, NEW, LEVL V, 60-74 MIN: ICD-10-PCS | Mod: 25,S$GLB,, | Performed by: COLON & RECTAL SURGERY

## 2019-11-18 PROCEDURE — 36415 COLL VENOUS BLD VENIPUNCTURE: CPT

## 2019-11-18 PROCEDURE — 99999 PR PBB SHADOW E&M-EST. PATIENT-LVL IV: ICD-10-PCS | Mod: PBBFAC,,, | Performed by: COLON & RECTAL SURGERY

## 2019-11-18 PROCEDURE — 45300 PR PROCTOSIGMOIDOSCOPY,RIGID,DIAG2S: ICD-10-PCS | Mod: S$GLB,,, | Performed by: COLON & RECTAL SURGERY

## 2019-11-18 PROCEDURE — 99999 PR PBB SHADOW E&M-EST. PATIENT-LVL IV: CPT | Mod: PBBFAC,,, | Performed by: COLON & RECTAL SURGERY

## 2019-11-18 PROCEDURE — 45300 PROCTOSIGMOIDOSCOPY DX: CPT | Mod: S$GLB,,, | Performed by: COLON & RECTAL SURGERY

## 2019-11-18 PROCEDURE — 99205 OFFICE O/P NEW HI 60 MIN: CPT | Mod: 25,S$GLB,, | Performed by: COLON & RECTAL SURGERY

## 2019-11-18 PROCEDURE — 82378 CARCINOEMBRYONIC ANTIGEN: CPT

## 2019-11-18 PROCEDURE — 80053 COMPREHEN METABOLIC PANEL: CPT

## 2019-11-18 RX ORDER — METRONIDAZOLE 500 MG/1
500 TABLET ORAL 3 TIMES DAILY
Qty: 3 TABLET | Refills: 0 | Status: ON HOLD | OUTPATIENT
Start: 2019-11-18 | End: 2020-02-13

## 2019-11-18 RX ORDER — METRONIDAZOLE 500 MG/100ML
500 INJECTION, SOLUTION INTRAVENOUS
Status: CANCELLED | OUTPATIENT
Start: 2019-11-18

## 2019-11-18 RX ORDER — GABAPENTIN 100 MG/1
800 CAPSULE ORAL
Status: CANCELLED | OUTPATIENT
Start: 2019-11-18 | End: 2019-11-18

## 2019-11-18 RX ORDER — HEPARIN SODIUM 5000 [USP'U]/ML
5000 INJECTION, SOLUTION INTRAVENOUS; SUBCUTANEOUS
Status: CANCELLED | OUTPATIENT
Start: 2019-11-18 | End: 2019-11-19

## 2019-11-18 RX ORDER — CELECOXIB 100 MG/1
400 CAPSULE ORAL
Status: CANCELLED | OUTPATIENT
Start: 2019-11-18 | End: 2019-11-18

## 2019-11-18 RX ORDER — SODIUM CHLORIDE, SODIUM LACTATE, POTASSIUM CHLORIDE, CALCIUM CHLORIDE 600; 310; 30; 20 MG/100ML; MG/100ML; MG/100ML; MG/100ML
INJECTION, SOLUTION INTRAVENOUS CONTINUOUS
Status: CANCELLED | OUTPATIENT
Start: 2019-11-18

## 2019-11-18 RX ORDER — POLYETHYLENE GLYCOL 3350 17 G/17G
238 POWDER, FOR SOLUTION ORAL DAILY
Qty: 1 BOTTLE | Refills: 0 | Status: ON HOLD | OUTPATIENT
Start: 2019-11-18 | End: 2020-02-13

## 2019-11-18 RX ORDER — LIDOCAINE HYDROCHLORIDE 10 MG/ML
1 INJECTION, SOLUTION EPIDURAL; INFILTRATION; INTRACAUDAL; PERINEURAL ONCE
Status: DISCONTINUED | OUTPATIENT
Start: 2019-11-18 | End: 2020-02-16 | Stop reason: HOSPADM

## 2019-11-18 RX ORDER — ACETAMINOPHEN 325 MG/1
1000 TABLET ORAL ONCE
Status: CANCELLED | OUTPATIENT
Start: 2019-11-18 | End: 2019-11-18

## 2019-11-18 RX ORDER — ONDANSETRON 2 MG/ML
4 INJECTION INTRAMUSCULAR; INTRAVENOUS EVERY 12 HOURS PRN
Status: CANCELLED | OUTPATIENT
Start: 2019-11-18

## 2019-11-18 RX ORDER — NEOMYCIN SULFATE 500 MG/1
1000 TABLET ORAL 3 TIMES DAILY
Qty: 6 TABLET | Refills: 0 | Status: ON HOLD | OUTPATIENT
Start: 2019-11-18 | End: 2020-02-13

## 2019-11-18 NOTE — PROGRESS NOTES
History & Physical    SUBJECTIVE:     CC: rectosigmoid cancer  Ref MD: Brenda Ochoa MD    History of Present Illness:  Patient is a 68 y.o. 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  Patient was undergoing a colonoscopy on November 7, 2019 where a rectosigmoid mass was found and biopsied which showed moderately differentiated adenocarcinoma.  Patient had no other intramucosal lesions at that time.  She is undergoing colonoscopy for iron deficiency anemia.  She had previously been found the month before to have iron deficiency anemia as well as blood when wiping after a bowel movement.  She is on chronic anticoagulation therapy of Coumadin for mechanical heart valve which was done in 2003.  She denies any fever, chills, nausea, vomiting, diarrhea, melena, weight loss or any other signs or symptoms.  She has no personal family history of colorectal cancer or IBD.  Her last colonoscopy prior to this was in 2017 were no lesion was found in this area. Her only significant past surgical history is this mechanical valve replacement.  No episodes of fecal incontinence per her report and some chronic constipation noted. She is referred to Colorectal surgery for evaluation.  I have personally spoken with her referring provider and reviewed her previous records.      Review of patient's allergies indicates:   Allergen Reactions    Penicillins Rash       Current Outpatient Medications   Medication Sig Dispense Refill    acebutolol (SECTRAL) 200 MG capsule Take 400 mg by mouth 2 (two) times daily.       acebutolol (SECTRAL) 400 mg capsule Take 400 mg by mouth 2 (two) times daily.      ALPRAZolam (XANAX) 1 MG tablet Take 1 tablet by mouth nightly as needed for Insomnia.      butalbital-acetaminophen-caffeine -40 mg (FIORICET, ESGIC) -40 mg per tablet TAKE ONE TABLET BY MOUTH EVERY SIX HOURS AS NEEDED      ergocalciferol (ERGOCALCIFEROL) 50,000 unit Cap Take 1 capsule by mouth every 30 days.       furosemide (LASIX) 40 MG tablet Take 40 mg by mouth Daily.      linaCLOtide (LINZESS) 145 mcg Cap capsule Take 1 capsule (145 mcg total) by mouth once daily. 30 capsule 2    lovastatin (MEVACOR) 40 MG tablet Take 40 mg by mouth every evening.      multivitamin (THERAGRAN) tablet Take 1 tablet by mouth.      pantoprazole (PROTONIX) 20 MG tablet Take 1 tablet (20 mg total) by mouth once daily. 30 tablet 0    sertraline (ZOLOFT) 100 MG tablet Take 1 tablet by mouth Daily.      traMADol (ULTRAM) 50 mg tablet 50 mg every 8 (eight) hours as needed.       warfarin (COUMADIN) 5 MG tablet Take 1 tablet (5 mg total) by mouth Daily. 5 mg by mouth for five days then alternate with 2.5 mg for one day.      zolpidem (AMBIEN) 10 mg Tab Take 5 mg by mouth nightly as needed.       metroNIDAZOLE (FLAGYL) 500 MG tablet Take 1 tablet (500 mg total) by mouth 3 (three) times daily. Take initial dose@2pm, second dose@3pm and final dose@10pm day before surgery 3 tablet 0    neomycin (MYCIFRADIN) 500 mg Tab Take 2 tablets (1,000 mg total) by mouth 3 (three) times daily. Take 1st dose@2pm,take 2nd dose@3pm, take last dose@10pm day before surgery 6 tablet 0    polyethylene glycol (GLYCOLAX) 17 gram/dose powder Take 238 g by mouth once daily. Mix with 2L of gatorade, drink all mixed solution starting@12pm day before surgery, finish by 10pm 1 Bottle 0     Current Facility-Administered Medications   Medication Dose Route Frequency Provider Last Rate Last Dose    lidocaine (PF) 10 mg/ml (1%) injection 10 mg  1 mL Intradermal Once Ankur Smith MD           Past Medical History:   Diagnosis Date    Anticoagulant long-term use     Aortic valve defect     Benign neoplasm of ascending colon 4/6/2017    DDD (degenerative disc disease), cervical 7/20/2018    GERD (gastroesophageal reflux disease)     Hemorrhoids     HLD (hyperlipidemia)     Hypertension     Insomnia 12/1/2014    Irritable bowel syndrome with  constipation 4/9/2018    Postmenopausal osteoporosis 7/20/2018     Past Surgical History:   Procedure Laterality Date    AORTIC VALVE REPLACEMENT  2003    COLONOSCOPY Left 4/6/2017    Procedure: COLONOSCOPY;  Surgeon: Sander Ulloa MD;  Location: [facility] ENDO;  Service: Endoscopy;  Laterality: Left;    COLONOSCOPY N/A 11/7/2019    Procedure: COLONOSCOPY;  Surgeon: Brenda Ochoa MD;  Location: [facility] ENDO;  Service: Endoscopy;  Laterality: N/A;    ESOPHAGOGASTRODUODENOSCOPY N/A 11/7/2019    Procedure: ESOPHAGOGASTRODUODENOSCOPY (EGD) needs PT/INR;  Surgeon: Brenda Ochoa MD;  Location: [facility] ENDO;  Service: Endoscopy;  Laterality: N/A;     Family History   Problem Relation Age of Onset    Diabetes Mother     Heart disease Mother     Hypertension Mother     Heart disease Father     Hypertension Father     Hypertension Sister     Hypertension Son     Heart disease Son     Colon cancer Neg Hx      Social History     Tobacco Use    Smoking status: Never Smoker    Smokeless tobacco: Never Used   Substance Use Topics    Alcohol use: Not Currently     Comment: occasionally     Drug use: No        Review of Systems:  Review of Systems   Constitutional: Negative for activity change, appetite change, chills, fatigue, fever and unexpected weight change.   HENT: Negative for congestion, ear pain, sore throat and trouble swallowing.    Eyes: Negative for pain, redness and itching.   Respiratory: Negative for cough, shortness of breath and wheezing.    Cardiovascular: Negative for chest pain, palpitations and leg swelling.   Gastrointestinal: Positive for anal bleeding and constipation. Negative for abdominal distention, abdominal pain, blood in stool, diarrhea, nausea, rectal pain and vomiting.   Endocrine: Negative for cold intolerance, heat intolerance and polyuria.   Genitourinary: Negative for dysuria, flank pain, frequency and hematuria.   Musculoskeletal: Negative for gait problem, joint swelling and neck  pain.   Skin: Negative for color change, rash and wound.   Allergic/Immunologic: Negative for environmental allergies and immunocompromised state.   Neurological: Negative for dizziness, speech difficulty, weakness and numbness.   Psychiatric/Behavioral: Negative for agitation, confusion and hallucinations.       OBJECTIVE:     Vital Signs (Most Recent)  Temp: 98.6 °F (37 °C) (11/18/19 1423)  Pulse: 68 (11/18/19 1423)  BP: (!) 140/74 (11/18/19 1423)     55.1 kg (121 lb 7.6 oz)     Physical Exam:  Physical Exam   Constitutional: She is oriented to person, place, and time. She appears well-developed.   HENT:   Head: Normocephalic and atraumatic.   Eyes: Conjunctivae and EOM are normal.   Neck: Normal range of motion. No thyromegaly present.   Cardiovascular: Normal rate and regular rhythm.   Pulmonary/Chest: Effort normal. No respiratory distress.   Abdominal: Soft. She exhibits no distension and no mass. There is no tenderness.   No previous incisions noted   Genitourinary:   Genitourinary Comments: Anorectal: no external lesions or abnormalities; LOGAN with good tone, no blood, no palpable masses or lesions   Musculoskeletal: Normal range of motion. She exhibits no edema or tenderness.   Neurological: She is alert and oriented to person, place, and time.   Skin: Skin is warm and dry. Capillary refill takes less than 2 seconds. No rash noted.   Psychiatric: She has a normal mood and affect.     Proctoscopy Procedure Note    Pre-procedure diagnosis: Rectosigmoid adenocarcinoma    Post-procedure diagnosis: Rectosigmoid adenocarcinoma    Procedure: Proctoscopy    Surgeon: Ankur Smith MD    Assistant: Clarisse Auguste LPN    Specimen: none    Findings:  Proctoscope inserted to 15 cm.  Moderate amount of stool in the vault limiting visualization completely.  However, no gross masses or lesions seen in the mid or distal rectum.  Most proximal rectum and rectosigmoid junction not completely visualized due to stool  burden.    Patient tolerated procedure well.      Laboratory  Lab Results   Component Value Date    WBC 3.80 (L) 10/09/2019    HGB 12.0 10/09/2019    HCT 39.7 10/09/2019     10/09/2019    ALT 17 10/08/2019    AST 21 10/08/2019     10/09/2019    K 3.5 10/09/2019     10/09/2019    CREATININE 0.7 10/09/2019    BUN 10 10/09/2019    CO2 25 10/09/2019    INR 1.0 11/07/2019       Diagnostic Results:  Reviewed colonoscopy report images well as pathology showing rectosigmoid mass consistent with adenocarcinoma    PATHOLOGY from colonoscopy:   Rectal mass, biopsy:  - Moderately differentiated invasive adenocarcinoma with focal mucosal ulceration arising in a tubular  adenoma, see comment  Comment: Adenocarcinoma measures at least 0.6 cm and occupies approximately 70% of tissue submitted. No  definitive perineural or lymphovascular invasion is identified. This case was seen in consultation with Dr. Amaya  who agrees with the above diagnosis. Please see results of the MMR panel below:  Immunohistochemical stains for MLH1, MSH2, MSH6 & PMS2 reveal distinct nuclear staining for all markers.  These findings indicate an INTACT mismatch repair (MMR) gene function without evidence of significant  microsatellite instability (MSI). Although these findings make the diagnosis of hereditary non-polyposis colon cancer  (HNPCC) or Urbano Syndrome very unlikely in this patient, approximately 5% of colorectal carcinomas with defective  MMR genes and high MSI may show this apparent normal pattern of staining. Correlation with the clinical findings  & family history is encouraged.    ASSESSMENT/PLAN:     68-year-old female with adenocarcinoma of the rectosigmoid junction    - needs to complete staging  - will sent for lab work to include CBC, CMP and CEA level  - will send for CT scan of the chest abdomen pelvis to rule out any metastatic disease.  Also look for primary tumor at the rectosigmoid junction or in sigmoid  colon  - if metastatic workup is negative, but the tumor cannot be definitively localized, she may warrant a flexible sigmoidoscopy prior to surgical intervention to definitively evaluate the tumor  - if metastatic workup is negative, discussed with the patient that she should undergo a minimally invasive versus open sigmoidectomy/LAR for definitive treatment. All risks, benefits and alternatives fully explained to patient. Risks include, but are not limited to, bleeding, infection, anastomotic leak, damage to ureter, damage to other intra-abdominal organs such as colon, rectum, small bowel, stomach, liver, bladder, reproductive organs, sexual dysfunction, urinary dysfunction, postoperative abscess, conversion to open operation, perioperative MI, CVA and death.  All questions field and appropriately answered to patient's satisfaction.  Consent signed and placed on chart.  - she will need to see her cardiologist prior to surgical intervention for cardiac clearance as well as evaluation of her mechanical valve.  - she will need a Lovenox bridge and to hold Coumadin for at least 5 days prior to surgical intervention  - she will need a full mechanical and oral antibiotic bowel prep the day before surgery  - ERAS protocol    Adenocarcinoma of rectosigmoid junction  -     CBC auto differential; Future; Expected date: 11/18/2019  -     Comprehensive metabolic panel; Future; Expected date: 11/18/2019  -     CEA; Future; Expected date: 11/18/2019  -     CT Chest Abdoment Pelvis With Contrast; Future; Expected date: 11/18/2019  -     metroNIDAZOLE (FLAGYL) 500 MG tablet; Take 1 tablet (500 mg total) by mouth 3 (three) times daily. Take initial dose@2pm, second dose@3pm and final dose@10pm day before surgery  Dispense: 3 tablet; Refill: 0  -     neomycin (MYCIFRADIN) 500 mg Tab; Take 2 tablets (1,000 mg total) by mouth 3 (three) times daily. Take 1st dose@2pm,take 2nd dose@3pm, take last dose@10pm day before surgery  Dispense:  6 tablet; Refill: 0  -     polyethylene glycol (GLYCOLAX) 17 gram/dose powder; Take 238 g by mouth once daily. Mix with 2L of gatorade, drink all mixed solution starting@12pm day before surgery, finish by 10pm  Dispense: 1 Bottle; Refill: 0  -     Insert peripheral IV; Standing  -     Case Request Operating Room:  ROBOTIC RESECTION, COLON, LOW ANTERIOR (room 2)  -     Full code; Standing    Other orders  -     lidocaine (PF) 10 mg/ml (1%) injection 10 mg  -     Progressive Mobility Protocol (mobilize patient to their highest level of functioning at least twice daily); Standing      Ankur Smith MD  Colon and Rectal Surgery  [facility]

## 2019-11-18 NOTE — LETTER
November 18, 2019      Brenda Ochoa MD  [address]  [place] [address]            [facility] - Colon [facility] Rectal Surgery  [address], [address]  [place] [address]  Phone: [phone]  Fax: [phone]          Patient: Adalgisa Wright   MR Number: [id number]   YOB: 1950   Date of Visit: 11/18/2019       Dear Dr. Brenda Ochoa:    Thank you for referring Adalgisa Wright to me for evaluation. Attached you will find relevant portions of my assessment and plan of care.    If you have questions, please do not hesitate to call me. I look forward to following Adalgisa Wright along with you.    Sincerely,    Ankur Smith MD    Enclosure  CC:  No Recipients    If you would like to receive this communication electronically, please contact [email or web address] or [phone] to request more information on [employer] access.    For providers and/or their staff who would like to refer a patient to [facility], please contact us through our one-stop-shop provider referral line, [facility], at [phone].    If you feel you have received this communication in error or would no longer like to receive these types of communications, please e-mail [email or web address]

## 2019-11-19 LAB — CEA SERPL-MCNC: 3.5 NG/ML (ref 0–5)

## 2019-11-21 ENCOUNTER — HOSPITAL ENCOUNTER (OUTPATIENT)
Dept: RADIOLOGY | Facility: HOSPITAL | Age: 69
Discharge: HOME | End: 2019-11-21
Attending: COLON & RECTAL SURGERY
Payer: MEDICARE

## 2019-11-21 DIAGNOSIS — C19 ADENOCARCINOMA OF RECTOSIGMOID JUNCTION: ICD-10-CM

## 2019-11-21 PROCEDURE — 74177 CT ABD & PELVIS W/CONTRAST: CPT | Mod: TC

## 2019-11-21 PROCEDURE — 25500020 PHARM REV CODE 255: Performed by: COLON & RECTAL SURGERY

## 2019-11-21 RX ADMIN — IOHEXOL 75 ML: 350 INJECTION, SOLUTION INTRAVENOUS at 05:11

## 2019-11-21 RX ADMIN — IOHEXOL 30 ML: 350 INJECTION, SOLUTION INTRAVENOUS at 02:11

## 2019-11-22 ENCOUNTER — LAB VISIT (OUTPATIENT)
Dept: LAB | Facility: HOSPITAL | Age: 69
End: 2019-11-22
Attending: INTERNAL MEDICINE
Payer: MEDICARE

## 2019-11-22 DIAGNOSIS — Z95.2 AORTIC VALVE REPLACED: ICD-10-CM

## 2019-11-22 DIAGNOSIS — D59.4 OTHER NON-AUTOIMMUNE HEMOLYTIC ANEMIAS: ICD-10-CM

## 2019-11-22 DIAGNOSIS — Z79.01 CHRONIC ANTICOAGULATION: ICD-10-CM

## 2019-11-22 DIAGNOSIS — D50.0 IRON DEFICIENCY ANEMIA DUE TO CHRONIC BLOOD LOSS: ICD-10-CM

## 2019-11-22 DIAGNOSIS — R77.8 LOW SERUM HAPTOGLOBIN: ICD-10-CM

## 2019-11-22 LAB
ALBUMIN SERPL BCP-MCNC: 4.3 G/DL (ref 3.5–5.2)
ALP SERPL-CCNC: 98 U/L (ref 55–135)
ALT SERPL W/O P-5'-P-CCNC: 20 U/L (ref 10–44)
ANION GAP SERPL CALC-SCNC: 8 MMOL/L (ref 8–16)
AST SERPL-CCNC: 19 U/L (ref 10–40)
BASOPHILS # BLD AUTO: 0.02 K/UL (ref 0–0.2)
BASOPHILS NFR BLD: 0.6 % (ref 0–1.9)
BILIRUB SERPL-MCNC: 0.4 MG/DL (ref 0.1–1)
BUN SERPL-MCNC: 11 MG/DL (ref 8–23)
CALCIUM SERPL-MCNC: 9.3 MG/DL (ref 8.7–10.5)
CHLORIDE SERPL-SCNC: 104 MMOL/L (ref 95–110)
CO2 SERPL-SCNC: 29 MMOL/L (ref 23–29)
CREAT SERPL-MCNC: 0.8 MG/DL (ref 0.5–1.4)
CRP SERPL-MCNC: 7.8 MG/L (ref 0–8.2)
DIFFERENTIAL METHOD: ABNORMAL
EOSINOPHIL # BLD AUTO: 0 K/UL (ref 0–0.5)
EOSINOPHIL NFR BLD: 0.6 % (ref 0–8)
ERYTHROCYTE [DISTWIDTH] IN BLOOD BY AUTOMATED COUNT: 15.9 % (ref 11.5–14.5)
EST. GFR  (AFRICAN AMERICAN): >60 ML/MIN/1.73 M^2
EST. GFR  (NON AFRICAN AMERICAN): >60 ML/MIN/1.73 M^2
GLUCOSE SERPL-MCNC: 116 MG/DL (ref 70–110)
HAPTOGLOB SERPL-MCNC: <10 MG/DL (ref 30–250)
HCT VFR BLD AUTO: 37.3 % (ref 37–48.5)
HGB BLD-MCNC: 11.6 G/DL (ref 12–16)
IMM GRANULOCYTES # BLD AUTO: 0.01 K/UL (ref 0–0.04)
IMM GRANULOCYTES NFR BLD AUTO: 0.3 % (ref 0–0.5)
LDH SERPL L TO P-CCNC: 304 U/L (ref 110–260)
LYMPHOCYTES # BLD AUTO: 0.9 K/UL (ref 1–4.8)
LYMPHOCYTES NFR BLD: 24.9 % (ref 18–48)
MCH RBC QN AUTO: 30 PG (ref 27–31)
MCHC RBC AUTO-ENTMCNC: 31.1 G/DL (ref 32–36)
MCV RBC AUTO: 96 FL (ref 82–98)
MONOCYTES # BLD AUTO: 0.3 K/UL (ref 0.3–1)
MONOCYTES NFR BLD: 8.3 % (ref 4–15)
NEUTROPHILS # BLD AUTO: 2.3 K/UL (ref 1.8–7.7)
NEUTROPHILS NFR BLD: 65.3 % (ref 38–73)
NRBC BLD-RTO: 0 /100 WBC
PLATELET # BLD AUTO: 216 K/UL (ref 150–350)
PMV BLD AUTO: 10.2 FL (ref 9.2–12.9)
POTASSIUM SERPL-SCNC: 3.5 MMOL/L (ref 3.5–5.1)
PROT SERPL-MCNC: 7.1 G/DL (ref 6–8.4)
RBC # BLD AUTO: 3.87 M/UL (ref 4–5.4)
RETICS/RBC NFR AUTO: 1.8 % (ref 0.5–2.5)
SODIUM SERPL-SCNC: 141 MMOL/L (ref 136–145)
WBC # BLD AUTO: 3.5 K/UL (ref 3.9–12.7)

## 2019-11-22 PROCEDURE — 83615 LACTATE (LD) (LDH) ENZYME: CPT | Mod: PO

## 2019-11-22 PROCEDURE — 36415 COLL VENOUS BLD VENIPUNCTURE: CPT | Mod: PO

## 2019-11-22 PROCEDURE — 86140 C-REACTIVE PROTEIN: CPT | Mod: PO

## 2019-11-22 PROCEDURE — 83010 ASSAY OF HAPTOGLOBIN QUANT: CPT

## 2019-11-22 PROCEDURE — 85025 COMPLETE CBC W/AUTO DIFF WBC: CPT | Mod: PO

## 2019-11-22 PROCEDURE — 85045 AUTOMATED RETICULOCYTE COUNT: CPT | Mod: PO

## 2019-11-22 PROCEDURE — 83540 ASSAY OF IRON: CPT

## 2019-11-22 PROCEDURE — 80053 COMPREHEN METABOLIC PANEL: CPT | Mod: PO

## 2019-11-23 LAB
IRON SERPL-MCNC: 55 UG/DL (ref 30–160)
SATURATED IRON: 15 % (ref 20–50)
TOTAL IRON BINDING CAPACITY: 376 UG/DL (ref 250–450)
TRANSFERRIN SERPL-MCNC: 254 MG/DL (ref 200–375)

## 2019-11-25 ENCOUNTER — OFFICE VISIT (OUTPATIENT)
Dept: HEMATOLOGY/ONCOLOGY | Facility: CLINIC | Age: 69
End: 2019-11-25
Payer: MEDICARE

## 2019-11-25 VITALS
HEART RATE: 73 BPM | BODY MASS INDEX: 25.54 KG/M2 | WEIGHT: 121.69 LBS | TEMPERATURE: 98 F | SYSTOLIC BLOOD PRESSURE: 138 MMHG | DIASTOLIC BLOOD PRESSURE: 83 MMHG | RESPIRATION RATE: 16 BRPM | HEIGHT: 58 IN

## 2019-11-25 DIAGNOSIS — E78.5 HYPERLIPIDEMIA, UNSPECIFIED HYPERLIPIDEMIA TYPE: ICD-10-CM

## 2019-11-25 DIAGNOSIS — D50.0 IRON DEFICIENCY ANEMIA DUE TO CHRONIC BLOOD LOSS: Primary | ICD-10-CM

## 2019-11-25 DIAGNOSIS — C20 RECTAL ADENOCARCINOMA: ICD-10-CM

## 2019-11-25 DIAGNOSIS — Z95.2 AORTIC VALVE REPLACED: ICD-10-CM

## 2019-11-25 DIAGNOSIS — D50.9 IRON DEFICIENCY ANEMIA, UNSPECIFIED IRON DEFICIENCY ANEMIA TYPE: ICD-10-CM

## 2019-11-25 DIAGNOSIS — R16.0 LIVER MASS: ICD-10-CM

## 2019-11-25 PROCEDURE — 99999 PR PBB SHADOW E&M-EST. PATIENT-LVL IV: ICD-10-PCS | Mod: PBBFAC,,, | Performed by: INTERNAL MEDICINE

## 2019-11-25 PROCEDURE — 99215 PR OFFICE/OUTPT VISIT, EST, LEVL V, 40-54 MIN: ICD-10-PCS | Mod: S$GLB,,, | Performed by: INTERNAL MEDICINE

## 2019-11-25 PROCEDURE — 99999 PR PBB SHADOW E&M-EST. PATIENT-LVL IV: CPT | Mod: PBBFAC,,, | Performed by: INTERNAL MEDICINE

## 2019-11-25 PROCEDURE — 1126F AMNT PAIN NOTED NONE PRSNT: CPT | Mod: S$GLB,,, | Performed by: INTERNAL MEDICINE

## 2019-11-25 PROCEDURE — 1159F MED LIST DOCD IN RCRD: CPT | Mod: S$GLB,,, | Performed by: INTERNAL MEDICINE

## 2019-11-25 PROCEDURE — 1126F PR PAIN SEVERITY QUANTIFIED, NO PAIN PRESENT: ICD-10-PCS | Mod: S$GLB,,, | Performed by: INTERNAL MEDICINE

## 2019-11-25 PROCEDURE — 1159F PR MEDICATION LIST DOCUMENTED IN MEDICAL RECORD: ICD-10-PCS | Mod: S$GLB,,, | Performed by: INTERNAL MEDICINE

## 2019-11-25 PROCEDURE — 99215 OFFICE O/P EST HI 40 MIN: CPT | Mod: S$GLB,,, | Performed by: INTERNAL MEDICINE

## 2019-11-25 RX ORDER — SODIUM CHLORIDE 0.9 % (FLUSH) 0.9 %
10 SYRINGE (ML) INJECTION
Status: CANCELLED | OUTPATIENT
Start: 2019-12-02

## 2019-11-25 RX ORDER — HEPARIN 100 UNIT/ML
500 SYRINGE INTRAVENOUS
Status: CANCELLED | OUTPATIENT
Start: 2019-12-02

## 2019-11-25 RX ORDER — ENOXAPARIN SODIUM 100 MG/ML
INJECTION SUBCUTANEOUS
COMMUNITY
Start: 2019-11-21 | End: 2019-12-05 | Stop reason: SDUPTHER

## 2019-11-25 RX ORDER — SODIUM CHLORIDE 0.9 % (FLUSH) 0.9 %
10 SYRINGE (ML) INJECTION
Status: CANCELLED | OUTPATIENT
Start: 2019-11-25

## 2019-11-25 RX ORDER — HEPARIN 100 UNIT/ML
500 SYRINGE INTRAVENOUS
Status: CANCELLED | OUTPATIENT
Start: 2019-11-25

## 2019-11-25 NOTE — PROGRESS NOTES
Subjective:      DATE OF VISIT: 11/26/2019   ?   ?   Patient ID:?Adalgisa Wright is a 68 y.o. female.?? MR#: [id number]   ?   PRIMARY PROVIDER: Dr. Haq, transferring to Dr. Quintanilla    CHIEF COMPLAINT:  Follow-up?????   ?   ONCOLOGIC DIAGNOSIS: Rectal adenocarcinoma, newly diagnosed during workup for iron deficiency anemia  ?   CURRENT TREATMENT: TBD    HPI    Today I have the pleasure meeting with Ms. Wright as she transfers her care.  She was previously seen by my colleague Dr. Haq last on 08/21/2019 for iron deficiency anemia.  She has a mechanical aortic valve replacement 16 years ago on therapeutic anticoagulation.  Labs notable for low haptoglobin in it is felt hemolytic process may be contributing to her anemia.  She notes having recent EKG as part of preoperative evaluation but has not had follow-up with her cardiologist recently or repeat echocardiogram.  She denies edema, shortness of breath or chest pain.    She was referred to Gastroenterology for further evaluation of iron deficiency anemia and concern for GI bleed.     11/7/19 EGD and colonoscopy revealed a nonobstructing ulcerated mass 20 cm from the anal verge. Pathology:  Moderately differentiated adenocarcinoma, MSI intact.    During her visit with me today she denies melena, hematochezia, hemoptysis or hematuria, abdominal or other pain, constipation/diarrhea, nausea/vomiting or weight loss.      Review of Systems    ?   A comprehensive 14-point review of systems was reviewed with patient and was negative other than as specified above.   ?     Objective:      Physical Exam      ?   Vitals:    11/25/19 0945   BP: 138/83   Pulse: 73   Resp: 16   Temp: 98.2 °F (36.8 °C)      ?   ECOG:?0   General appearance: Generally well appearing, in no acute distress.   Head, eyes, ears, nose, and throat: Oropharynx clear with moist mucous membranes.   Cardiovascular: Regular rate and rhythm, S1, S2, no audible murmurs.   Respiratory: Lungs clear to  auscultation bilaterally.   Abdomen: nontender, nondistended.   Extremities: Warm, without edema.   Neurologic: Alert and oriented. Grossly normal strength, coordination, and gait.   Skin: No rashes, ecchymoses or petechial lesion.   Psychiatric: normal mood and affect, conversant and appropriate    ?   Laboratory:  ?   No visits with results within 1 Day(s) from this visit.   Latest known visit with results is:   Lab Visit on 11/22/2019   Component Date Value Ref Range Status    WBC 11/22/2019 3.50* 3.90 - 12.70 K/uL Final    RBC 11/22/2019 3.87* 4.00 - 5.40 M/uL Final    Hemoglobin 11/22/2019 11.6* 12.0 - 16.0 g/dL Final    Hematocrit 11/22/2019 37.3  37.0 - 48.5 % Final    Mean Corpuscular Volume 11/22/2019 96  82 - 98 fL Final    Mean Corpuscular Hemoglobin 11/22/2019 30.0  27.0 - 31.0 pg Final    Mean Corpuscular Hemoglobin Conc 11/22/2019 31.1* 32.0 - 36.0 g/dL Final    RDW 11/22/2019 15.9* 11.5 - 14.5 % Final    Platelets 11/22/2019 216  150 - 350 K/uL Final    MPV 11/22/2019 10.2  9.2 - 12.9 fL Final    Immature Granulocytes 11/22/2019 0.3  0.0 - 0.5 % Final    Gran # (ANC) 11/22/2019 2.3  1.8 - 7.7 K/uL Final    Immature Grans (Abs) 11/22/2019 0.01  0.00 - 0.04 K/uL Final    Lymph # 11/22/2019 0.9* 1.0 - 4.8 K/uL Final    Mono # 11/22/2019 0.3  0.3 - 1.0 K/uL Final    Eos # 11/22/2019 0.0  0.0 - 0.5 K/uL Final    Baso # 11/22/2019 0.02  0.00 - 0.20 K/uL Final    nRBC 11/22/2019 0  0 /100 WBC Final    Gran% 11/22/2019 65.3  38.0 - 73.0 % Final    Lymph% 11/22/2019 24.9  18.0 - 48.0 % Final    Mono% 11/22/2019 8.3  4.0 - 15.0 % Final    Eosinophil% 11/22/2019 0.6  0.0 - 8.0 % Final    Basophil% 11/22/2019 0.6  0.0 - 1.9 % Final    Differential Method 11/22/2019 Automated   Final    Sodium 11/22/2019 141  136 - 145 mmol/L Final    Potassium 11/22/2019 3.5  3.5 - 5.1 mmol/L Final    Chloride 11/22/2019 104  95 - 110 mmol/L Final    CO2 11/22/2019 29  23 - 29 mmol/L Final    Glucose  11/22/2019 116* 70 - 110 mg/dL Final    BUN, Bld 11/22/2019 11  8 - 23 mg/dL Final    Creatinine 11/22/2019 0.8  0.5 - 1.4 mg/dL Final    Calcium 11/22/2019 9.3  8.7 - 10.5 mg/dL Final    Total Protein 11/22/2019 7.1  6.0 - 8.4 g/dL Final    Albumin 11/22/2019 4.3  3.5 - 5.2 g/dL Final    Total Bilirubin 11/22/2019 0.4  0.1 - 1.0 mg/dL Final    Alkaline Phosphatase 11/22/2019 98  55 - 135 U/L Final    AST 11/22/2019 19  10 - 40 U/L Final    ALT 11/22/2019 20  10 - 44 U/L Final    Anion Gap 11/22/2019 8  8 - 16 mmol/L Final    eGFR if African American 11/22/2019 >60.0  >60 mL/min/1.73 m^2 Final    eGFR if non African American 11/22/2019 >60.0  >60 mL/min/1.73 m^2 Final    Retic 11/22/2019 1.8  0.5 - 2.5 % Final    Haptoglobin 11/22/2019 <10* 30 - 250 mg/dL Final    Iron 11/22/2019 55  30 - 160 ug/dL Final    Transferrin 11/22/2019 254  200 - 375 mg/dL Final    TIBC 11/22/2019 376  250 - 450 ug/dL Final    Saturated Iron 11/22/2019 15* 20 - 50 % Final    CRP 11/22/2019 7.8  0.0 - 8.2 mg/L Final    LD 11/22/2019 304* 110 - 260 U/L Final        Tumor markers    CEA    CEA   Date Value Ref Range Status   11/18/2019 3.5 0.0 - 5.0 ng/mL Final     Comment:     CEA Normal Range:  Non-Smokers: 0-3.0 ng/mL  Smokers:     0-5.0 ng/mL              ? IMAGING  11/21/19  CT CHEST ABDOMEN PELVIS WITH CONTRAST (XPD)    CLINICAL HISTORY:  Malignant neoplasm of rectosigmoid junctionknown rectosigmoid cancer, eval for metastatic disease;    COMPARISON:  None    FINDINGS:  Chest:    The heart is enlarged.  There is no evidence of mediastinal or hilar or axillary lymphadenopathy.  Patient has had a prior median sternotomy.  There are no lung nodules.  No pleural effusions.    Skeletal structures are intact.    Abdomen pelvis:    There appears to be a subtle hypodense lesion involving the right lobe of the liver measuring about 1.7 cm.  There may be a smaller hypodensity more anteriorly measuring 8 mm.    Pancreas is  unremarkable the spleen is unremarkable.    The gallbladder is unremarkable.  There is no bile duct dilatation.    The kidneys are normal.    The aorta and inferior vena cava are unremarkable.    There are no acute bowel abnormalities.     No evidence of appendicitis.  No evidence of diverticulitis.    Bladder is normal. No abnormal masses or fluid collections in the pelvis.  No definite pelvic lymphadenopathy.  No abdominal retroperitoneal lymph node enlargement.    There are few degenerative changes seen involving the lumbar spine as well as a few mild compression fractures of the thoracic spine which appear chronic.      Impression       No evidence of metastatic disease involving the chest.  Findings suspicious for a liver mass possibly related to a metastasis.  Further evaluation recommended with a PET scan or MRI of the liver.    There appear to be a few compression fractures of the thoracic spine which are chronic.  No definite metastatic disease involving the skeleton.       ?   Assessment/Plan:       1. Iron deficiency anemia due to chronic blood loss    2. Rectal adenocarcinoma    3. Liver mass    4. Iron deficiency anemia, unspecified iron deficiency anemia type    5. Aortic valve replaced    6. Hyperlipidemia, unspecified hyperlipidemia type          Plan:     # rectal adenocarcinoma, MSI stable:  CEA negative.  Lesion appears to be in upper rectal/sigmoid adenocarcinoma 20 cm above the anal verge; defer to colorectal surgery if further evaluation via flex sig or MRI pelvis indicated to exclude rectal malignancy indicating alternative chemoradiation approach.  I reviewed her staging imaging with CT chest abdomen pelvis notable for a very subtle hypodensity in the liver less than 2 cm; otherwise no other evidence of metastatic disease; and chronic compression fractures are noted. I recommended further evaluation of liver lesion with PET-CT scheduled for 12/02/2019. I have discussed her case with   Gig[place] and will be in communication as further metastatic workup is completed to determine surgical plans.     # mild normocytic anemia:  Folate and vitamin B12 previously checked within normal limits.  Iron deficiency noted on prior labs on 08/12/2019 with 5% saturation and ferritin 9.  Status post IV iron repeat iron indices show improvement to 15% saturation (ferritin not repeated) and downtrending TIBC and transferrin indicating response  Repeat labs on 11/22/2019 reveal similar mild anemia with hemoglobin 11.6, MCV 96, iron indices show 15% iron saturation.  With iron saturation 15% she may still have degree of iron deficiency and possible ongoing blood loss given evidence of rectal malignancy per above; I have ordered re-dosing of IV iron for 2 doses.  Additionally she has mechanical aortic valve with haptoglobin less than 10 and elevated  suggesting potential hemolytic component to her anemia.  No peripheral smear however to confirm schistocytes.  Recommended follow-up with her cardiologist.    # hyperlipidemia:  Recommended follow-up with PCP.    Follow-Up:   After PET-CT

## 2019-11-27 ENCOUNTER — TELEPHONE (OUTPATIENT)
Dept: RADIOLOGY | Facility: HOSPITAL | Age: 69
End: 2019-11-27

## 2019-11-27 ENCOUNTER — INFUSION (OUTPATIENT)
Dept: INFUSION THERAPY | Facility: HOSPITAL | Age: 69
End: 2019-11-27
Attending: INTERNAL MEDICINE
Payer: MEDICARE

## 2019-11-27 VITALS
HEART RATE: 61 BPM | TEMPERATURE: 98 F | DIASTOLIC BLOOD PRESSURE: 70 MMHG | OXYGEN SATURATION: 95 % | SYSTOLIC BLOOD PRESSURE: 122 MMHG | RESPIRATION RATE: 16 BRPM

## 2019-11-27 DIAGNOSIS — D50.0 IRON DEFICIENCY ANEMIA DUE TO CHRONIC BLOOD LOSS: Primary | ICD-10-CM

## 2019-11-27 PROCEDURE — 63600175 PHARM REV CODE 636 W HCPCS: Performed by: INTERNAL MEDICINE

## 2019-11-27 PROCEDURE — 96365 THER/PROPH/DIAG IV INF INIT: CPT

## 2019-11-27 RX ADMIN — FERRIC CARBOXYMALTOSE INJECTION 750 MG: 50 INJECTION, SOLUTION INTRAVENOUS at 01:11

## 2019-11-27 NOTE — DISCHARGE INSTRUCTIONS
[facility]  [address] [address]  [phone] phone     [phone] fax  Hours of Operation: Monday- Friday 8:00am- 5:00pm  After hours phone  [phone]  Hematology / Oncology Physicians on call      Dr. Bill Quintanilla    Please call with any concerns regarding your appointment today.

## 2019-12-02 ENCOUNTER — TELEPHONE (OUTPATIENT)
Dept: SURGERY | Facility: CLINIC | Age: 69
End: 2019-12-02

## 2019-12-02 ENCOUNTER — HOSPITAL ENCOUNTER (OUTPATIENT)
Dept: RADIOLOGY | Facility: HOSPITAL | Age: 69
Discharge: HOME | End: 2019-12-02
Attending: INTERNAL MEDICINE
Payer: MEDICARE

## 2019-12-02 DIAGNOSIS — R16.0 LIVER MASS: ICD-10-CM

## 2019-12-02 DIAGNOSIS — C20 RECTAL ADENOCARCINOMA: ICD-10-CM

## 2019-12-02 PROCEDURE — 78815 NM PET CT ROUTINE: ICD-10-PCS | Mod: 26,PS,, | Performed by: RADIOLOGY

## 2019-12-02 PROCEDURE — 78815 PET IMAGE W/CT SKULL-THIGH: CPT | Mod: TC

## 2019-12-02 PROCEDURE — A9552 F18 FDG: HCPCS

## 2019-12-02 PROCEDURE — 78815 PET IMAGE W/CT SKULL-THIGH: CPT | Mod: 26,PS,, | Performed by: RADIOLOGY

## 2019-12-02 NOTE — TELEPHONE ENCOUNTER
"After speaking to pt, the following message was sent to Herminia Redman MA:  "Spoke to pt, reminded her that the Sx was pushed back due to her insurance. She needed to be set up with Financial Assistance. Pt stated she remembered. Advised pt to contact her Cardiologist to confirm how he/she wants her to go forward with her Coumadin and Lovenox Bridge since the Sx was moved back to 12/12.- Pt verbalized understanding."        ----- Message from Herminia Redman MA sent at 12/2/2019  3:16 PM CST -----  Patient has questions about her surgery date. States that her paper work states that her surgery is on 12/5/19 but looking at her chart it is on 12/12/19. She has stopped her coumadin and is scheduled to start her Lovenox bridge. States that she spoke with you. I tried reading his note but it did not list the Sx date. Can you please call her    "

## 2019-12-04 ENCOUNTER — INFUSION (OUTPATIENT)
Dept: INFUSION THERAPY | Facility: HOSPITAL | Age: 69
End: 2019-12-04
Attending: INTERNAL MEDICINE
Payer: MEDICARE

## 2019-12-04 ENCOUNTER — OFFICE VISIT (OUTPATIENT)
Dept: HEMATOLOGY/ONCOLOGY | Facility: CLINIC | Age: 69
End: 2019-12-04
Payer: MEDICARE

## 2019-12-04 VITALS
OXYGEN SATURATION: 99 % | HEART RATE: 63 BPM | TEMPERATURE: 98 F | SYSTOLIC BLOOD PRESSURE: 141 MMHG | RESPIRATION RATE: 16 BRPM | DIASTOLIC BLOOD PRESSURE: 78 MMHG

## 2019-12-04 VITALS
SYSTOLIC BLOOD PRESSURE: 142 MMHG | HEIGHT: 58 IN | BODY MASS INDEX: 25.17 KG/M2 | TEMPERATURE: 98 F | DIASTOLIC BLOOD PRESSURE: 79 MMHG | OXYGEN SATURATION: 97 % | HEART RATE: 70 BPM | WEIGHT: 119.94 LBS | RESPIRATION RATE: 16 BRPM

## 2019-12-04 DIAGNOSIS — D50.0 IRON DEFICIENCY ANEMIA DUE TO CHRONIC BLOOD LOSS: Primary | ICD-10-CM

## 2019-12-04 DIAGNOSIS — Z95.2 AORTIC VALVE REPLACED: ICD-10-CM

## 2019-12-04 DIAGNOSIS — Z79.01 CHRONIC ANTICOAGULATION: ICD-10-CM

## 2019-12-04 DIAGNOSIS — D50.0 IRON DEFICIENCY ANEMIA DUE TO CHRONIC BLOOD LOSS: ICD-10-CM

## 2019-12-04 DIAGNOSIS — R16.0 LIVER MASS: Primary | ICD-10-CM

## 2019-12-04 PROCEDURE — 1126F AMNT PAIN NOTED NONE PRSNT: CPT | Mod: S$GLB,,, | Performed by: INTERNAL MEDICINE

## 2019-12-04 PROCEDURE — 99999 PR PBB SHADOW E&M-EST. PATIENT-LVL IV: CPT | Mod: PBBFAC,,, | Performed by: INTERNAL MEDICINE

## 2019-12-04 PROCEDURE — 96365 THER/PROPH/DIAG IV INF INIT: CPT

## 2019-12-04 PROCEDURE — 1126F PR PAIN SEVERITY QUANTIFIED, NO PAIN PRESENT: ICD-10-PCS | Mod: S$GLB,,, | Performed by: INTERNAL MEDICINE

## 2019-12-04 PROCEDURE — 63600175 PHARM REV CODE 636 W HCPCS: Performed by: INTERNAL MEDICINE

## 2019-12-04 PROCEDURE — 99999 PR PBB SHADOW E&M-EST. PATIENT-LVL IV: ICD-10-PCS | Mod: PBBFAC,,, | Performed by: INTERNAL MEDICINE

## 2019-12-04 PROCEDURE — 99214 PR OFFICE/OUTPT VISIT, EST, LEVL IV, 30-39 MIN: ICD-10-PCS | Mod: S$GLB,,, | Performed by: INTERNAL MEDICINE

## 2019-12-04 PROCEDURE — 99214 OFFICE O/P EST MOD 30 MIN: CPT | Mod: S$GLB,,, | Performed by: INTERNAL MEDICINE

## 2019-12-04 PROCEDURE — 1159F PR MEDICATION LIST DOCUMENTED IN MEDICAL RECORD: ICD-10-PCS | Mod: S$GLB,,, | Performed by: INTERNAL MEDICINE

## 2019-12-04 PROCEDURE — 1159F MED LIST DOCD IN RCRD: CPT | Mod: S$GLB,,, | Performed by: INTERNAL MEDICINE

## 2019-12-04 RX ADMIN — FERRIC CARBOXYMALTOSE INJECTION 750 MG: 50 INJECTION, SOLUTION INTRAVENOUS at 01:12

## 2019-12-04 NOTE — H&P (VIEW-ONLY)
Subjective:      DATE OF VISIT: 12/4/2019   ?   ?   Patient ID:?Adalgisa Wright is a 68 y.o. female.?? MR#: [id number]   ?   PRIMARY PROVIDER: Dr. Haq, transferring to Dr. Quintanilla    CHIEF COMPLAINT:  Follow-up?????   ?   ONCOLOGIC DIAGNOSIS: Rectal adenocarcinoma, stage TBD  ?   CURRENT TREATMENT: TBD    HPI    Today I have the pleasure meeting with Ms. Wright as she transfers her care.  She was previously seen by my colleague Dr. Haq last on 08/21/2019 for iron deficiency anemia.  She has a mechanical aortic valve replacement 16 years ago on therapeutic anticoagulation.  Labs notable for low haptoglobin in it is felt hemolytic process may be contributing to her anemia.  She notes having recent EKG as part of preoperative evaluation but has not had follow-up with her cardiologist recently or repeat echocardiogram.  She denies edema, shortness of breath or chest pain.    She was referred to Gastroenterology for further evaluation of iron deficiency anemia and concern for GI bleed.     11/7/19 EGD and colonoscopy revealed a nonobstructing ulcerated mass 20 cm from the anal verge. Pathology:  Moderately differentiated adenocarcinoma, MSI intact.    12/02/2019 PET-CT notable for avid sigmoid lesion SUV 15.7 as well as avid right lobe liver lesion 19 mm, SUV 4.1.  No other areas of avidity concerning for metastatic disease.    INTERVAL EVENTS    She returns to discuss results of testing and next steps.  She denies any clinical changes from her last visit.  Since then she has had IV iron therapy completed today with 2nd dose.  She has been of Coumadin anticoagulation for the last several days due to anticipated surgery; she was not bridged with Lovenox.    Review of Systems    ?   A comprehensive 14-point review of systems was reviewed with patient and was negative other than as specified above.   ?     Objective:      Physical Exam      ?   Vitals:    12/04/19 1449   BP: (!) 142/79   Pulse: 70   Resp: 16    Temp: 98 °F (36.7 °C)      ?   ECOG:?0   General appearance: Generally well appearing, in no acute distress.   No repeat exam today.  ?   Laboratory:  ?   No visits with results within 1 Day(s) from this visit.   Latest known visit with results is:   Lab Visit on 11/22/2019   Component Date Value Ref Range Status    WBC 11/22/2019 3.50* 3.90 - 12.70 K/uL Final    RBC 11/22/2019 3.87* 4.00 - 5.40 M/uL Final    Hemoglobin 11/22/2019 11.6* 12.0 - 16.0 g/dL Final    Hematocrit 11/22/2019 37.3  37.0 - 48.5 % Final    Mean Corpuscular Volume 11/22/2019 96  82 - 98 fL Final    Mean Corpuscular Hemoglobin 11/22/2019 30.0  27.0 - 31.0 pg Final    Mean Corpuscular Hemoglobin Conc 11/22/2019 31.1* 32.0 - 36.0 g/dL Final    RDW 11/22/2019 15.9* 11.5 - 14.5 % Final    Platelets 11/22/2019 216  150 - 350 K/uL Final    MPV 11/22/2019 10.2  9.2 - 12.9 fL Final    Immature Granulocytes 11/22/2019 0.3  0.0 - 0.5 % Final    Gran # (ANC) 11/22/2019 2.3  1.8 - 7.7 K/uL Final    Immature Grans (Abs) 11/22/2019 0.01  0.00 - 0.04 K/uL Final    Lymph # 11/22/2019 0.9* 1.0 - 4.8 K/uL Final    Mono # 11/22/2019 0.3  0.3 - 1.0 K/uL Final    Eos # 11/22/2019 0.0  0.0 - 0.5 K/uL Final    Baso # 11/22/2019 0.02  0.00 - 0.20 K/uL Final    nRBC 11/22/2019 0  0 /100 WBC Final    Gran% 11/22/2019 65.3  38.0 - 73.0 % Final    Lymph% 11/22/2019 24.9  18.0 - 48.0 % Final    Mono% 11/22/2019 8.3  4.0 - 15.0 % Final    Eosinophil% 11/22/2019 0.6  0.0 - 8.0 % Final    Basophil% 11/22/2019 0.6  0.0 - 1.9 % Final    Differential Method 11/22/2019 Automated   Final    Sodium 11/22/2019 141  136 - 145 mmol/L Final    Potassium 11/22/2019 3.5  3.5 - 5.1 mmol/L Final    Chloride 11/22/2019 104  95 - 110 mmol/L Final    CO2 11/22/2019 29  23 - 29 mmol/L Final    Glucose 11/22/2019 116* 70 - 110 mg/dL Final    BUN, Bld 11/22/2019 11  8 - 23 mg/dL Final    Creatinine 11/22/2019 0.8  0.5 - 1.4 mg/dL Final    Calcium 11/22/2019 9.3   8.7 - 10.5 mg/dL Final    Total Protein 11/22/2019 7.1  6.0 - 8.4 g/dL Final    Albumin 11/22/2019 4.3  3.5 - 5.2 g/dL Final    Total Bilirubin 11/22/2019 0.4  0.1 - 1.0 mg/dL Final    Alkaline Phosphatase 11/22/2019 98  55 - 135 U/L Final    AST 11/22/2019 19  10 - 40 U/L Final    ALT 11/22/2019 20  10 - 44 U/L Final    Anion Gap 11/22/2019 8  8 - 16 mmol/L Final    eGFR if African American 11/22/2019 >60.0  >60 mL/min/1.73 m^2 Final    eGFR if non African American 11/22/2019 >60.0  >60 mL/min/1.73 m^2 Final    Retic 11/22/2019 1.8  0.5 - 2.5 % Final    Haptoglobin 11/22/2019 <10* 30 - 250 mg/dL Final    Iron 11/22/2019 55  30 - 160 ug/dL Final    Transferrin 11/22/2019 254  200 - 375 mg/dL Final    TIBC 11/22/2019 376  250 - 450 ug/dL Final    Saturated Iron 11/22/2019 15* 20 - 50 % Final    CRP 11/22/2019 7.8  0.0 - 8.2 mg/L Final    LD 11/22/2019 304* 110 - 260 U/L Final        Tumor markers    CEA    CEA   Date Value Ref Range Status   11/18/2019 3.5 0.0 - 5.0 ng/mL Final     Comment:     CEA Normal Range:  Non-Smokers: 0-3.0 ng/mL  Smokers:     0-5.0 ng/mL              ? IMAGING  11/21/19  CT CHEST ABDOMEN PELVIS WITH CONTRAST (XPD)    CLINICAL HISTORY:  Malignant neoplasm of rectosigmoid junctionknown rectosigmoid cancer, eval for metastatic disease;    COMPARISON:  None    FINDINGS:  Chest:    The heart is enlarged.  There is no evidence of mediastinal or hilar or axillary lymphadenopathy.  Patient has had a prior median sternotomy.  There are no lung nodules.  No pleural effusions.    Skeletal structures are intact.    Abdomen pelvis:    There appears to be a subtle hypodense lesion involving the right lobe of the liver measuring about 1.7 cm.  There may be a smaller hypodensity more anteriorly measuring 8 mm.    Pancreas is unremarkable the spleen is unremarkable.    The gallbladder is unremarkable.  There is no bile duct dilatation.    The kidneys are normal.    The aorta and inferior  vena cava are unremarkable.    There are no acute bowel abnormalities.     No evidence of appendicitis.  No evidence of diverticulitis.    Bladder is normal. No abnormal masses or fluid collections in the pelvis.  No definite pelvic lymphadenopathy.  No abdominal retroperitoneal lymph node enlargement.    There are few degenerative changes seen involving the lumbar spine as well as a few mild compression fractures of the thoracic spine which appear chronic.      Impression       No evidence of metastatic disease involving the chest.  Findings suspicious for a liver mass possibly related to a metastasis.  Further evaluation recommended with a PET scan or MRI of the liver.    There appear to be a few compression fractures of the thoracic spine which are chronic.  No definite metastatic disease involving the skeleton.       ?   Assessment/Plan:       1. Liver mass    2. Chronic anticoagulation          Plan:     # rectal adenocarcinoma, MSI stable:  CEA negative.  Lesion appears to be in upper rectal/sigmoid adenocarcinoma 20 cm above the anal verge. CT chest abdomen pelvis was notable for a very subtle hypodensity in the liver less than 2 cm; otherwise no other evidence of metastatic disease; and chronic compression fractures are noted.12/02/2019 PET-CT was obtained which I have reviewed with her in family member today notable for avid sigmoid lesion SUV 15.7 as well as avid right lobe liver lesion 19 mm, SUV 4.1.  No other areas of avidity concerning for metastatic disease.  I have discussed PET-CT findings with her surgeon Dr. Smith who agrees with recommended IR biopsy of mildly avid liver lesion to clarify if metastatic disease.  Should oligometastatic disease be confirmed we will discuss possible concomitant resection of sigmoid and liver lesion if possible; this may be preferred over neoadjuvant therapy given limited metastatic disease and normal CEA although she would certainly require adjuvant chemotherapy.   We will plan to discuss this in a multidisciplinary fashion following biopsy results.    # Elevated INR/mechanical aortic valve:  On chronic anticoagulation due to mechanical valve.  She had been holding Coumadin without Lovenox bridge over the last couple days.  I discussed with interventional radiology; ideally would arrange biopsy in the next day so that she could limit time off of anticoagulation.  I did discuss concern for prolonged lack of anticoagulation and if not able to be performed in next day I would recommend bridging with Lovenox in collaboration with her cardiologist.    # mild normocytic anemia:  Folate and vitamin B12 previously checked within normal limits.  Iron deficiency noted on prior labs on 08/12/2019 with 5% saturation and ferritin 9.  Status post IV iron repeat iron indices show improvement to 15% saturation (ferritin not repeated) and downtrending TIBC and transferrin indicating response  Repeat labs on 11/22/2019 reveal similar mild anemia with hemoglobin 11.6, MCV 96, iron indices show 15% iron saturation.  With iron saturation 15% she may still have degree of iron deficiency and possible ongoing blood loss given evidence of rectal malignancy per above; I have ordered re-dosing of IV iron for 2 doses, completed 12/04/2019.  Additionally she has mechanical aortic valve with haptoglobin less than 10 and elevated  suggesting potential hemolytic component to her anemia.  No peripheral smear however to confirm schistocytes.  Recommended follow-up with her cardiologist.    # hyperlipidemia:  Recommended follow-up with PCP.    Follow-Up:   One week after biopsy    I have spent 25 minutes in this office visit in the direct face-to-face communication with the patient, with greater than 50% of the time spend in the counseling and coordination of care.

## 2019-12-04 NOTE — DISCHARGE INSTRUCTIONS
[facility]  [address] [address]  [phone] phone     [phone] fax  Hours of Operation: Monday- Friday 8:00am- 5:00pm  After hours phone  [phone]  Hematology / Oncology Physicians on call      Dr. Bill Quintanilla    Please call with any concerns regarding your appointment today.

## 2019-12-04 NOTE — NURSING
Patient did not wait 45 minutes after infusion because patient will remain in clinic to see Dr. Quintanilla. Patient verbalized understanding. Patient discharged without complication.

## 2019-12-04 NOTE — PROGRESS NOTES
Subjective:      DATE OF VISIT: 12/4/2019   ?   ?   Patient ID:?Adalgisa Wright is a 68 y.o. female.?? MR#: [id number]   ?   PRIMARY PROVIDER: Dr. Haq, transferring to Dr. Quintanilla    CHIEF COMPLAINT:  Follow-up?????   ?   ONCOLOGIC DIAGNOSIS: Rectal adenocarcinoma, stage TBD  ?   CURRENT TREATMENT: TBD    HPI    Today I have the pleasure meeting with Ms. Wright as she transfers her care.  She was previously seen by my colleague Dr. Haq last on 08/21/2019 for iron deficiency anemia.  She has a mechanical aortic valve replacement 16 years ago on therapeutic anticoagulation.  Labs notable for low haptoglobin in it is felt hemolytic process may be contributing to her anemia.  She notes having recent EKG as part of preoperative evaluation but has not had follow-up with her cardiologist recently or repeat echocardiogram.  She denies edema, shortness of breath or chest pain.    She was referred to Gastroenterology for further evaluation of iron deficiency anemia and concern for GI bleed.     11/7/19 EGD and colonoscopy revealed a nonobstructing ulcerated mass 20 cm from the anal verge. Pathology:  Moderately differentiated adenocarcinoma, MSI intact.    12/02/2019 PET-CT notable for avid sigmoid lesion SUV 15.7 as well as avid right lobe liver lesion 19 mm, SUV 4.1.  No other areas of avidity concerning for metastatic disease.    INTERVAL EVENTS    She returns to discuss results of testing and next steps.  She denies any clinical changes from her last visit.  Since then she has had IV iron therapy completed today with 2nd dose.  She has been of Coumadin anticoagulation for the last several days due to anticipated surgery; she was not bridged with Lovenox.    Review of Systems    ?   A comprehensive 14-point review of systems was reviewed with patient and was negative other than as specified above.   ?     Objective:      Physical Exam      ?   Vitals:    12/04/19 1449   BP: (!) 142/79   Pulse: 70   Resp: 16    Temp: 98 °F (36.7 °C)      ?   ECOG:?0   General appearance: Generally well appearing, in no acute distress.   No repeat exam today.  ?   Laboratory:  ?   No visits with results within 1 Day(s) from this visit.   Latest known visit with results is:   Lab Visit on 11/22/2019   Component Date Value Ref Range Status    WBC 11/22/2019 3.50* 3.90 - 12.70 K/uL Final    RBC 11/22/2019 3.87* 4.00 - 5.40 M/uL Final    Hemoglobin 11/22/2019 11.6* 12.0 - 16.0 g/dL Final    Hematocrit 11/22/2019 37.3  37.0 - 48.5 % Final    Mean Corpuscular Volume 11/22/2019 96  82 - 98 fL Final    Mean Corpuscular Hemoglobin 11/22/2019 30.0  27.0 - 31.0 pg Final    Mean Corpuscular Hemoglobin Conc 11/22/2019 31.1* 32.0 - 36.0 g/dL Final    RDW 11/22/2019 15.9* 11.5 - 14.5 % Final    Platelets 11/22/2019 216  150 - 350 K/uL Final    MPV 11/22/2019 10.2  9.2 - 12.9 fL Final    Immature Granulocytes 11/22/2019 0.3  0.0 - 0.5 % Final    Gran # (ANC) 11/22/2019 2.3  1.8 - 7.7 K/uL Final    Immature Grans (Abs) 11/22/2019 0.01  0.00 - 0.04 K/uL Final    Lymph # 11/22/2019 0.9* 1.0 - 4.8 K/uL Final    Mono # 11/22/2019 0.3  0.3 - 1.0 K/uL Final    Eos # 11/22/2019 0.0  0.0 - 0.5 K/uL Final    Baso # 11/22/2019 0.02  0.00 - 0.20 K/uL Final    nRBC 11/22/2019 0  0 /100 WBC Final    Gran% 11/22/2019 65.3  38.0 - 73.0 % Final    Lymph% 11/22/2019 24.9  18.0 - 48.0 % Final    Mono% 11/22/2019 8.3  4.0 - 15.0 % Final    Eosinophil% 11/22/2019 0.6  0.0 - 8.0 % Final    Basophil% 11/22/2019 0.6  0.0 - 1.9 % Final    Differential Method 11/22/2019 Automated   Final    Sodium 11/22/2019 141  136 - 145 mmol/L Final    Potassium 11/22/2019 3.5  3.5 - 5.1 mmol/L Final    Chloride 11/22/2019 104  95 - 110 mmol/L Final    CO2 11/22/2019 29  23 - 29 mmol/L Final    Glucose 11/22/2019 116* 70 - 110 mg/dL Final    BUN, Bld 11/22/2019 11  8 - 23 mg/dL Final    Creatinine 11/22/2019 0.8  0.5 - 1.4 mg/dL Final    Calcium 11/22/2019 9.3   8.7 - 10.5 mg/dL Final    Total Protein 11/22/2019 7.1  6.0 - 8.4 g/dL Final    Albumin 11/22/2019 4.3  3.5 - 5.2 g/dL Final    Total Bilirubin 11/22/2019 0.4  0.1 - 1.0 mg/dL Final    Alkaline Phosphatase 11/22/2019 98  55 - 135 U/L Final    AST 11/22/2019 19  10 - 40 U/L Final    ALT 11/22/2019 20  10 - 44 U/L Final    Anion Gap 11/22/2019 8  8 - 16 mmol/L Final    eGFR if African American 11/22/2019 >60.0  >60 mL/min/1.73 m^2 Final    eGFR if non African American 11/22/2019 >60.0  >60 mL/min/1.73 m^2 Final    Retic 11/22/2019 1.8  0.5 - 2.5 % Final    Haptoglobin 11/22/2019 <10* 30 - 250 mg/dL Final    Iron 11/22/2019 55  30 - 160 ug/dL Final    Transferrin 11/22/2019 254  200 - 375 mg/dL Final    TIBC 11/22/2019 376  250 - 450 ug/dL Final    Saturated Iron 11/22/2019 15* 20 - 50 % Final    CRP 11/22/2019 7.8  0.0 - 8.2 mg/L Final    LD 11/22/2019 304* 110 - 260 U/L Final        Tumor markers    CEA    CEA   Date Value Ref Range Status   11/18/2019 3.5 0.0 - 5.0 ng/mL Final     Comment:     CEA Normal Range:  Non-Smokers: 0-3.0 ng/mL  Smokers:     0-5.0 ng/mL              ? IMAGING  11/21/19  CT CHEST ABDOMEN PELVIS WITH CONTRAST (XPD)    CLINICAL HISTORY:  Malignant neoplasm of rectosigmoid junctionknown rectosigmoid cancer, eval for metastatic disease;    COMPARISON:  None    FINDINGS:  Chest:    The heart is enlarged.  There is no evidence of mediastinal or hilar or axillary lymphadenopathy.  Patient has had a prior median sternotomy.  There are no lung nodules.  No pleural effusions.    Skeletal structures are intact.    Abdomen pelvis:    There appears to be a subtle hypodense lesion involving the right lobe of the liver measuring about 1.7 cm.  There may be a smaller hypodensity more anteriorly measuring 8 mm.    Pancreas is unremarkable the spleen is unremarkable.    The gallbladder is unremarkable.  There is no bile duct dilatation.    The kidneys are normal.    The aorta and inferior  vena cava are unremarkable.    There are no acute bowel abnormalities.     No evidence of appendicitis.  No evidence of diverticulitis.    Bladder is normal. No abnormal masses or fluid collections in the pelvis.  No definite pelvic lymphadenopathy.  No abdominal retroperitoneal lymph node enlargement.    There are few degenerative changes seen involving the lumbar spine as well as a few mild compression fractures of the thoracic spine which appear chronic.      Impression       No evidence of metastatic disease involving the chest.  Findings suspicious for a liver mass possibly related to a metastasis.  Further evaluation recommended with a PET scan or MRI of the liver.    There appear to be a few compression fractures of the thoracic spine which are chronic.  No definite metastatic disease involving the skeleton.       ?   Assessment/Plan:       1. Liver mass    2. Chronic anticoagulation          Plan:     # rectal adenocarcinoma, MSI stable:  CEA negative.  Lesion appears to be in upper rectal/sigmoid adenocarcinoma 20 cm above the anal verge. CT chest abdomen pelvis was notable for a very subtle hypodensity in the liver less than 2 cm; otherwise no other evidence of metastatic disease; and chronic compression fractures are noted.12/02/2019 PET-CT was obtained which I have reviewed with her in family member today notable for avid sigmoid lesion SUV 15.7 as well as avid right lobe liver lesion 19 mm, SUV 4.1.  No other areas of avidity concerning for metastatic disease.  I have discussed PET-CT findings with her surgeon Dr. Smith who agrees with recommended IR biopsy of mildly avid liver lesion to clarify if metastatic disease.  Should oligometastatic disease be confirmed we will discuss possible concomitant resection of sigmoid and liver lesion if possible; this may be preferred over neoadjuvant therapy given limited metastatic disease and normal CEA although she would certainly require adjuvant chemotherapy.   We will plan to discuss this in a multidisciplinary fashion following biopsy results.    # Elevated INR/mechanical aortic valve:  On chronic anticoagulation due to mechanical valve.  She had been holding Coumadin without Lovenox bridge over the last couple days.  I discussed with interventional radiology; ideally would arrange biopsy in the next day so that she could limit time off of anticoagulation.  I did discuss concern for prolonged lack of anticoagulation and if not able to be performed in next day I would recommend bridging with Lovenox in collaboration with her cardiologist.    # mild normocytic anemia:  Folate and vitamin B12 previously checked within normal limits.  Iron deficiency noted on prior labs on 08/12/2019 with 5% saturation and ferritin 9.  Status post IV iron repeat iron indices show improvement to 15% saturation (ferritin not repeated) and downtrending TIBC and transferrin indicating response  Repeat labs on 11/22/2019 reveal similar mild anemia with hemoglobin 11.6, MCV 96, iron indices show 15% iron saturation.  With iron saturation 15% she may still have degree of iron deficiency and possible ongoing blood loss given evidence of rectal malignancy per above; I have ordered re-dosing of IV iron for 2 doses, completed 12/04/2019.  Additionally she has mechanical aortic valve with haptoglobin less than 10 and elevated  suggesting potential hemolytic component to her anemia.  No peripheral smear however to confirm schistocytes.  Recommended follow-up with her cardiologist.    # hyperlipidemia:  Recommended follow-up with PCP.    Follow-Up:   One week after biopsy    I have spent 25 minutes in this office visit in the direct face-to-face communication with the patient, with greater than 50% of the time spend in the counseling and coordination of care.

## 2019-12-05 ENCOUNTER — TELEPHONE (OUTPATIENT)
Dept: HEMATOLOGY/ONCOLOGY | Facility: CLINIC | Age: 69
End: 2019-12-05

## 2019-12-05 ENCOUNTER — TELEPHONE (OUTPATIENT)
Dept: RADIOLOGY | Facility: HOSPITAL | Age: 69
End: 2019-12-05

## 2019-12-05 DIAGNOSIS — Z79.01 CHRONIC ANTICOAGULATION: Primary | ICD-10-CM

## 2019-12-05 RX ORDER — ENOXAPARIN SODIUM 100 MG/ML
60 INJECTION SUBCUTANEOUS 2 TIMES DAILY
Qty: 2 SYRINGE | Refills: 0 | Status: SHIPPED | OUTPATIENT
Start: 2019-12-05 | End: 2020-04-06

## 2019-12-05 NOTE — TELEPHONE ENCOUNTER
Called patient and scheduled for liver mass biopsy for Monday, December 9 at 1000am. Pt verbalized understanding. Instructed pt to hold lovenox dose Sunday pm and Monday am. Instructed to have nothing to eat or drink after midnight and she will need a  for the procedure. Pt verbalized understanding and all questions answered.

## 2019-12-05 NOTE — TELEPHONE ENCOUNTER
Patient was scheduled for IR biopsy of liver lesion on Monday.  I instructed her to bridge with Lovenox.  She already has 60 mg Lovenox shots.  I instructed her to start taking today twice daily with last dose Sunday morning and to hold dose on Sunday evening and Monday morning.  I asked her to please discussed with interventional radiology staff is okay to restart Coumadin and Lovenox bridge on Tuesday pending outcome procedure.  She signed understanding.  I have refilled to additional 60 mg syringes to get her through to prior to procedure.  I discussed that bridging after procedure she can likely switch to once daily dosing and can prescribe this later so as not to confuse.

## 2019-12-06 ENCOUNTER — TELEPHONE (OUTPATIENT)
Dept: RADIOLOGY | Facility: HOSPITAL | Age: 69
End: 2019-12-06

## 2019-12-06 DIAGNOSIS — R16.0 LIVER MASS: ICD-10-CM

## 2019-12-06 DIAGNOSIS — Z79.01 CHRONIC ANTICOAGULATION: Primary | ICD-10-CM

## 2019-12-06 RX ORDER — PANTOPRAZOLE SODIUM 20 MG/1
20 TABLET, DELAYED RELEASE ORAL DAILY
Qty: 30 TABLET | Refills: 11 | Status: SHIPPED | OUTPATIENT
Start: 2019-12-06 | End: 2020-10-05 | Stop reason: SDUPTHER

## 2019-12-06 NOTE — TELEPHONE ENCOUNTER
Spoke to patient regarding pre procedure instructions.  Pt to be at hospital at 915. Npo after mn except for small sips of water with medications. Must have ride.  All questions answered, verbalized understanding of instructions.

## 2019-12-09 ENCOUNTER — HOSPITAL ENCOUNTER (OUTPATIENT)
Dept: RADIOLOGY | Facility: HOSPITAL | Age: 69
Discharge: HOME | End: 2019-12-09
Attending: INTERNAL MEDICINE
Payer: MEDICARE

## 2019-12-09 VITALS
DIASTOLIC BLOOD PRESSURE: 67 MMHG | HEART RATE: 60 BPM | OXYGEN SATURATION: 96 % | TEMPERATURE: 98 F | SYSTOLIC BLOOD PRESSURE: 144 MMHG | BODY MASS INDEX: 24.77 KG/M2 | RESPIRATION RATE: 20 BRPM | HEIGHT: 58 IN | WEIGHT: 118 LBS

## 2019-12-09 DIAGNOSIS — R16.0 LIVER MASS: ICD-10-CM

## 2019-12-09 PROCEDURE — 77012 CT SCAN FOR NEEDLE BIOPSY: CPT | Mod: TC

## 2019-12-09 PROCEDURE — 88307 PR  SURG PATH,LEVEL V: ICD-10-PCS | Mod: 26,,, | Performed by: PATHOLOGY

## 2019-12-09 PROCEDURE — 88313 SPECIAL STAINS GROUP 2: CPT | Mod: 26,,, | Performed by: PATHOLOGY

## 2019-12-09 PROCEDURE — 63600175 PHARM REV CODE 636 W HCPCS: Performed by: RADIOLOGY

## 2019-12-09 PROCEDURE — 88313 SPECIAL STAINS GROUP 2: CPT | Performed by: PATHOLOGY

## 2019-12-09 PROCEDURE — 88313 PR  SPECIAL STAINS,GROUP II: ICD-10-PCS | Mod: 26,,, | Performed by: PATHOLOGY

## 2019-12-09 PROCEDURE — 88307 TISSUE EXAM BY PATHOLOGIST: CPT | Mod: 26,,, | Performed by: PATHOLOGY

## 2019-12-09 PROCEDURE — 88307 TISSUE EXAM BY PATHOLOGIST: CPT | Performed by: PATHOLOGY

## 2019-12-09 RX ORDER — FENTANYL CITRATE 50 UG/ML
INJECTION, SOLUTION INTRAMUSCULAR; INTRAVENOUS CODE/TRAUMA/SEDATION MEDICATION
Status: COMPLETED | OUTPATIENT
Start: 2019-12-09 | End: 2019-12-09

## 2019-12-09 RX ORDER — MIDAZOLAM HYDROCHLORIDE 1 MG/ML
INJECTION INTRAMUSCULAR; INTRAVENOUS CODE/TRAUMA/SEDATION MEDICATION
Status: COMPLETED | OUTPATIENT
Start: 2019-12-09 | End: 2019-12-09

## 2019-12-09 RX ADMIN — MIDAZOLAM HYDROCHLORIDE 0.5 MG: 1 INJECTION, SOLUTION INTRAMUSCULAR; INTRAVENOUS at 10:12

## 2019-12-09 RX ADMIN — FENTANYL CITRATE 25 MCG: 50 INJECTION, SOLUTION INTRAMUSCULAR; INTRAVENOUS at 10:12

## 2019-12-09 NOTE — SEDATION DOCUMENTATION
Procedure complete. Patient tolerated well. VS Stable. Pt transferred to stretcher. Awake and alert, able to follow simple commands. Denies pain or discomfort. Bandaid at right side abdomen  Puncture site, site WDL. Transported to radiology recovery room. Will continue to monitor.

## 2019-12-09 NOTE — DISCHARGE SUMMARY
Pre Op Diagnosis: liver mass     Post Op Diagnosis: same     Procedure:  Liver mass biopsy     Procedure performed by: Dimitry GRANT, Champ CHO     Written Informed Consent Obtained: Yes     Specimen Removed:  yes     Estimated Blood Loss:  minimal     Findings: Local anesthesia and moderate sedation were used.     The patient tolerated the procedure well and there were no complications.      Sterile technique was performed in the RUQ, lidocaine was used as a local anesthetic.  Multiple samples taken from the liver mass.  Pt tolerated the procedure well without immediate complications.  Please see radiologist report for details. F/u with PCP and/or ordering physician.

## 2019-12-09 NOTE — SEDATION DOCUMENTATION
Patient on CT table in Supine position, BUE above head, CM in place, Vital Signs stable. Pt and family verbalized understanding of procedure.

## 2019-12-09 NOTE — PLAN OF CARE
Pt and Significant other educated on discharge instructions related to the liver mass biopsy procedure and sedation. In addition, patient and sig. other educated on signs and symptoms to watch for to return to hospital. Both verbalized understanding and were able to teach back. IV removed. Site WDL, dressed with 2x4 and Kerlix. Vital Signs stable and no pain or discomfort noted. Patient wheeled out via wheelchair. Significant other transportating patient home.

## 2019-12-16 ENCOUNTER — TELEPHONE (OUTPATIENT)
Dept: HEMATOLOGY/ONCOLOGY | Facility: CLINIC | Age: 69
End: 2019-12-16

## 2019-12-16 NOTE — NURSING
Noted pt coming in today for liver biopsy result from biopsy on 12/9.  No results noted in chart so I contacted pathology.  Spoke with Dr. Chi in pathology here in [place].  Dr. Chi stated that she had sent the path to Laureen Fatima in [place] To review prior to submitting final result.  Dr. Chi stated that she will call the [place] Pathologist and see when she will be finished.  Notified Dr. Quintanilla that path results are not back at this time and pt is scheduled to see provider.  Explained to provider that I have been in contact with Dr. Chi and that she is waiting on someone else to look at the slides.  Dr. Quintanilla stated that if we do not have the path it would be a wasted appt for the pt and that we should reschedule it to when path is back.  Contacted pt regarding appt.  Notified pt that the pathology is not yet back and that we do not need to see her in clinic today unless she has another problem.  Pt stated that she does not at this time.  Explained to pt that I am personally going to be watching for the pathology report and call her to schedule  appt once the result is back.  Pt verbalized understanding and will be awaiting my phone call.

## 2019-12-17 ENCOUNTER — TELEPHONE (OUTPATIENT)
Dept: SURGERY | Facility: CLINIC | Age: 69
End: 2019-12-17

## 2019-12-17 NOTE — TELEPHONE ENCOUNTER
"Pt stated she was concerned that she would miss a call letting her know about her path results from her liver biopsy - I advised her Delmis May is keeping an eye out for the results and that she will let her know as soon as the results come back. Confirmed we have the correct contact numbers for pt. Pt stated "Yes those are correct." She also had me confirm we have her boyfriends' contact number, (it's in her demographics) Pt stated "I'm just a worry wart" Advised pt that it's okay and that we will let her know as soon as we know. Pt verbalized understanding.     ----- Message from Rima John sent at 12/17/2019 11:39 AM CST -----  Contact: pt  States she is scheduled for surgery on Thursday ans she's trying to see what's going on. Please fall pt [phone]. Thank you     "

## 2019-12-18 ENCOUNTER — TELEPHONE (OUTPATIENT)
Dept: HEMATOLOGY/ONCOLOGY | Facility: CLINIC | Age: 69
End: 2019-12-18

## 2019-12-18 LAB
FINAL PATHOLOGIC DIAGNOSIS: NORMAL
GROSS: NORMAL
MICROSCOPIC EXAM: NORMAL

## 2019-12-18 NOTE — NURSING
Pathology report back.  Notified Dr. Quintanilla of results and she would like to see pt back in clinic tomorrow morning to discuss.  Called pt and scheduled pt to come for 0820 tomorrow morning.  Pt given date, time, and location of appt/  Pt verbalized understanding of all appt info and verbalized her appreciation.

## 2019-12-19 ENCOUNTER — OFFICE VISIT (OUTPATIENT)
Dept: HEMATOLOGY/ONCOLOGY | Facility: CLINIC | Age: 69
End: 2019-12-19
Payer: MEDICARE

## 2019-12-19 VITALS
RESPIRATION RATE: 18 BRPM | HEIGHT: 58 IN | HEART RATE: 65 BPM | SYSTOLIC BLOOD PRESSURE: 146 MMHG | WEIGHT: 121.5 LBS | OXYGEN SATURATION: 95 % | BODY MASS INDEX: 25.5 KG/M2 | TEMPERATURE: 98 F | DIASTOLIC BLOOD PRESSURE: 74 MMHG

## 2019-12-19 DIAGNOSIS — Z79.01 CHRONIC ANTICOAGULATION: ICD-10-CM

## 2019-12-19 DIAGNOSIS — C20 RECTAL ADENOCARCINOMA: Primary | ICD-10-CM

## 2019-12-19 DIAGNOSIS — Z95.2 AORTIC VALVE REPLACED: ICD-10-CM

## 2019-12-19 DIAGNOSIS — D50.0 IRON DEFICIENCY ANEMIA DUE TO CHRONIC BLOOD LOSS: ICD-10-CM

## 2019-12-19 DIAGNOSIS — R16.0 LIVER MASS: ICD-10-CM

## 2019-12-19 PROCEDURE — 1126F PR PAIN SEVERITY QUANTIFIED, NO PAIN PRESENT: ICD-10-PCS | Mod: S$GLB,,, | Performed by: INTERNAL MEDICINE

## 2019-12-19 PROCEDURE — 99213 OFFICE O/P EST LOW 20 MIN: CPT | Mod: S$GLB,,, | Performed by: INTERNAL MEDICINE

## 2019-12-19 PROCEDURE — 1159F MED LIST DOCD IN RCRD: CPT | Mod: S$GLB,,, | Performed by: INTERNAL MEDICINE

## 2019-12-19 PROCEDURE — 1126F AMNT PAIN NOTED NONE PRSNT: CPT | Mod: S$GLB,,, | Performed by: INTERNAL MEDICINE

## 2019-12-19 PROCEDURE — 99999 PR PBB SHADOW E&M-EST. PATIENT-LVL III: CPT | Mod: PBBFAC,,, | Performed by: INTERNAL MEDICINE

## 2019-12-19 PROCEDURE — 1159F PR MEDICATION LIST DOCUMENTED IN MEDICAL RECORD: ICD-10-PCS | Mod: S$GLB,,, | Performed by: INTERNAL MEDICINE

## 2019-12-19 PROCEDURE — 99999 PR PBB SHADOW E&M-EST. PATIENT-LVL III: ICD-10-PCS | Mod: PBBFAC,,, | Performed by: INTERNAL MEDICINE

## 2019-12-19 PROCEDURE — 99213 PR OFFICE/OUTPT VISIT, EST, LEVL III, 20-29 MIN: ICD-10-PCS | Mod: S$GLB,,, | Performed by: INTERNAL MEDICINE

## 2019-12-19 NOTE — H&P (VIEW-ONLY)
Subjective:      DATE OF VISIT: 12/19/2019   ?   ?   Patient ID:?Adalgisa Wright is a 69 y.o. female.?? MR#: [id number]   ?   PRIMARY PROVIDER: Dr. Quintanilla    CHIEF COMPLAINT:  Follow-up?????   ?   ONCOLOGIC DIAGNOSIS: Rectal adenocarcinoma  ?   CURRENT TREATMENT: TBD    HPI    Today I have the pleasure meeting with Ms. Wright as she transfers her care.  She was previously seen by my colleague Dr. Haq last on 08/21/2019 for iron deficiency anemia.  She has a mechanical aortic valve replacement 16 years ago on therapeutic anticoagulation.  Labs notable for low haptoglobin in it is felt hemolytic process may be contributing to her anemia.  She notes having recent EKG as part of preoperative evaluation but has not had follow-up with her cardiologist recently or repeat echocardiogram.  She denies edema, shortness of breath or chest pain.    She was referred to Gastroenterology for further evaluation of iron deficiency anemia and concern for GI bleed.     11/7/19 EGD and colonoscopy revealed a nonobstructing ulcerated mass 20 cm from the anal verge. Pathology:  Moderately differentiated adenocarcinoma, MSI intact.    12/02/2019 PET-CT notable for avid sigmoid lesion SUV 15.7 as well as avid right lobe liver lesion 19 mm, SUV 4.1.  No other areas of avidity concerning for metastatic disease.    12/9/19 liver biopsy, Pathology:  Diagnosis 1.  Liver mass, biopsy:   - Not diagnostic of malignancy   - Scant atypical liver sampling, see comments     Comments: The sections show a scant fragmented sample of liver tissue with   psuedoglands in one core and mild architectural distortion. Cytologic atypiia   is not prominent. Reticulin stain shows mildly irregular pattern of staining.   No portal tracts present for evaluation. Back to back pseudoglands raises the   possibly of a well differetniated hepatocellular lesion. However, the scant   sample limits interpretation. Due to the presence of a clinical mass.   Resampling is  indicated.          INTERVAL EVENTS    She returns with family to discuss results of liver biopsy, see further discussion below.    Review of Systems    ?   A comprehensive 14-point review of systems was reviewed with patient and was negative other than as specified above.   ?     Objective:      Physical Exam      ?   Vitals:    12/19/19 0921   BP: (!) 146/74   Pulse: 65   Resp: 18   Temp: 97.5 °F (36.4 °C)      ?   ECOG:?0   General appearance: Generally well appearing, in no acute distress.   No repeat exam today.  ?   Laboratory:  ?   No visits with results within 1 Day(s) from this visit.   Latest known visit with results is:   Hospital Outpatient Visit on 12/09/2019   Component Date Value Ref Range Status    Final Pathologic Diagnosis 12/09/2019    Final                    Value:1.  Liver mass, biopsy:  - Not diagnostic of malignancy  - Scant atypical liver sampling, see comments    Comments: The sections show a scant fragmented sample of liver tissue with  psuedoglands in one core and mild architectural distortion. Cytologic atypiia  is not prominent. Reticulin stain shows mildly irregular pattern of staining.  No portal tracts present for evaluation. Back to back pseudoglands raises the  possibly of a well differetniated hepatocellular lesion. However, the scant  sample limits interpretation. Due to the presence of a clinical mass.  Resampling is indicated.      Gross 12/09/2019    Final                    Value:1.  ID/AP Label:  [id number], designated liver mass, rule out metastatic disease    Received in formalin labeled with patient's name medical record number  multiple yellow-tan needle core biopsy fragments ranging in size from 0.3-1.2  cm in length.  The specimens are filtered through a biopsy bag and submitted  entirely in cassette [id number].      Microscopic Exam 12/09/2019 Reticulin  stain is mildly irregular. The controls reacted appropriately.   Final        Tumor markers    CEA    CEA   Date  Value Ref Range Status   11/18/2019 3.5 0.0 - 5.0 ng/mL Final     Comment:     CEA Normal Range:  Non-Smokers: 0-3.0 ng/mL  Smokers:     0-5.0 ng/mL              ? IMAGING  11/21/19  CT CHEST ABDOMEN PELVIS WITH CONTRAST (XPD)    CLINICAL HISTORY:  Malignant neoplasm of rectosigmoid junctionknown rectosigmoid cancer, eval for metastatic disease;    COMPARISON:  None    FINDINGS:  Chest:    The heart is enlarged.  There is no evidence of mediastinal or hilar or axillary lymphadenopathy.  Patient has had a prior median sternotomy.  There are no lung nodules.  No pleural effusions.    Skeletal structures are intact.    Abdomen pelvis:    There appears to be a subtle hypodense lesion involving the right lobe of the liver measuring about 1.7 cm.  There may be a smaller hypodensity more anteriorly measuring 8 mm.    Pancreas is unremarkable the spleen is unremarkable.    The gallbladder is unremarkable.  There is no bile duct dilatation.    The kidneys are normal.    The aorta and inferior vena cava are unremarkable.    There are no acute bowel abnormalities.     No evidence of appendicitis.  No evidence of diverticulitis.    Bladder is normal. No abnormal masses or fluid collections in the pelvis.  No definite pelvic lymphadenopathy.  No abdominal retroperitoneal lymph node enlargement.    There are few degenerative changes seen involving the lumbar spine as well as a few mild compression fractures of the thoracic spine which appear chronic.      Impression       No evidence of metastatic disease involving the chest.  Findings suspicious for a liver mass possibly related to a metastasis.  Further evaluation recommended with a PET scan or MRI of the liver.    There appear to be a few compression fractures of the thoracic spine which are chronic.  No definite metastatic disease involving the skeleton.       ?   Assessment/Plan:       1. Rectal adenocarcinoma    2. Liver mass    3. Iron deficiency anemia due to chronic  blood loss    4. Chronic anticoagulation    5. Aortic valve replaced          Plan:     # rectal adenocarcinoma, MSI stable:  CEA negative.  Lesion appears to be in upper rectal/sigmoid adenocarcinoma 20 cm above the anal verge. CT chest abdomen pelvis was notable for a very subtle hypodensity in the liver less than 2 cm; otherwise no other evidence of metastatic disease; and chronic compression fractures are noted.12/02/2019 PET-CT notable for avid sigmoid lesion SUV 15.7 as well as avid right lobe liver lesion 19 mm, SUV 4.1.  No other areas of avidity concerning for metastatic disease.  Today today I discussed with her liver biopsy was not diagnostic of metastatic disease in the liver however unclear if representative sample.  I have discussed her case with Dr. Smith and will plan to discuss her case at our multidisciplinary tumor conference 12/20/2019.  I reviewed potential options including repeat attempted biopsy, pursuing surgical resection of primary and intraoperative evaluation of liver, and close surveillance of liver lesion.  We discussed adjuvant chemotherapy depending on final pathology.  I will plan to call her following multidisciplinary discussion about next steps.    # chronic anticoagulation with a  mechanical aortic valve:  On chronic anticoagulation due to mechanical valve.  Recommend follow-up with anticoagulation clinic.  She understands with surgery will need bridging again.    # mild normocytic anemia:  Folate and vitamin B12 previously checked within normal limits.  Iron deficiency noted on prior labs on 08/12/2019 with 5% saturation and ferritin 9.  Status post IV iron repeat iron indices show improvement to 15% saturation (ferritin not repeated) and downtrending TIBC and transferrin indicating response  Repeat labs on 11/22/2019 reveal similar mild anemia with hemoglobin 11.6, MCV 96, iron indices show 15% iron saturation.  With iron saturation 15% she may still have degree of iron  deficiency and possible ongoing blood loss given evidence of rectal malignancy per above; I have ordered re-dosing of IV iron for 2 doses, completed 12/04/2019.  Additionally she has mechanical aortic valve with haptoglobin less than 10 and elevated  suggesting potential hemolytic component to her anemia.  No peripheral smear however to confirm schistocytes.  Recommended follow-up with her cardiologist.    # hyperlipidemia:  Recommended follow-up with PCP.    Follow-Up:   Per above    I have spent 15 minutes in this office visit in the direct face-to-face communication with the patient, with greater than 50% of the time spend in the counseling and coordination of care.

## 2019-12-19 NOTE — PROGRESS NOTES
Subjective:      DATE OF VISIT: 12/19/2019   ?   ?   Patient ID:?Adalgisa Wright is a 69 y.o. female.?? MR#: [id number]   ?   PRIMARY PROVIDER: Dr. Quintanilla    CHIEF COMPLAINT:  Follow-up?????   ?   ONCOLOGIC DIAGNOSIS: Rectal adenocarcinoma  ?   CURRENT TREATMENT: TBD    HPI    Today I have the pleasure meeting with Ms. Wright as she transfers her care.  She was previously seen by my colleague Dr. Haq last on 08/21/2019 for iron deficiency anemia.  She has a mechanical aortic valve replacement 16 years ago on therapeutic anticoagulation.  Labs notable for low haptoglobin in it is felt hemolytic process may be contributing to her anemia.  She notes having recent EKG as part of preoperative evaluation but has not had follow-up with her cardiologist recently or repeat echocardiogram.  She denies edema, shortness of breath or chest pain.    She was referred to Gastroenterology for further evaluation of iron deficiency anemia and concern for GI bleed.     11/7/19 EGD and colonoscopy revealed a nonobstructing ulcerated mass 20 cm from the anal verge. Pathology:  Moderately differentiated adenocarcinoma, MSI intact.    12/02/2019 PET-CT notable for avid sigmoid lesion SUV 15.7 as well as avid right lobe liver lesion 19 mm, SUV 4.1.  No other areas of avidity concerning for metastatic disease.    12/9/19 liver biopsy, Pathology:  Diagnosis 1.  Liver mass, biopsy:   - Not diagnostic of malignancy   - Scant atypical liver sampling, see comments     Comments: The sections show a scant fragmented sample of liver tissue with   psuedoglands in one core and mild architectural distortion. Cytologic atypiia   is not prominent. Reticulin stain shows mildly irregular pattern of staining.   No portal tracts present for evaluation. Back to back pseudoglands raises the   possibly of a well differetniated hepatocellular lesion. However, the scant   sample limits interpretation. Due to the presence of a clinical mass.   Resampling is  indicated.          INTERVAL EVENTS    She returns with family to discuss results of liver biopsy, see further discussion below.    Review of Systems    ?   A comprehensive 14-point review of systems was reviewed with patient and was negative other than as specified above.   ?     Objective:      Physical Exam      ?   Vitals:    12/19/19 0921   BP: (!) 146/74   Pulse: 65   Resp: 18   Temp: 97.5 °F (36.4 °C)      ?   ECOG:?0   General appearance: Generally well appearing, in no acute distress.   No repeat exam today.  ?   Laboratory:  ?   No visits with results within 1 Day(s) from this visit.   Latest known visit with results is:   Hospital Outpatient Visit on 12/09/2019   Component Date Value Ref Range Status    Final Pathologic Diagnosis 12/09/2019    Final                    Value:1.  Liver mass, biopsy:  - Not diagnostic of malignancy  - Scant atypical liver sampling, see comments    Comments: The sections show a scant fragmented sample of liver tissue with  psuedoglands in one core and mild architectural distortion. Cytologic atypiia  is not prominent. Reticulin stain shows mildly irregular pattern of staining.  No portal tracts present for evaluation. Back to back pseudoglands raises the  possibly of a well differetniated hepatocellular lesion. However, the scant  sample limits interpretation. Due to the presence of a clinical mass.  Resampling is indicated.      Gross 12/09/2019    Final                    Value:1.  ID/AP Label:  [id number], designated liver mass, rule out metastatic disease    Received in formalin labeled with patient's name medical record number  multiple yellow-tan needle core biopsy fragments ranging in size from 0.3-1.2  cm in length.  The specimens are filtered through a biopsy bag and submitted  entirely in cassette [id number].      Microscopic Exam 12/09/2019 Reticulin  stain is mildly irregular. The controls reacted appropriately.   Final        Tumor markers    CEA    CEA   Date  Value Ref Range Status   11/18/2019 3.5 0.0 - 5.0 ng/mL Final     Comment:     CEA Normal Range:  Non-Smokers: 0-3.0 ng/mL  Smokers:     0-5.0 ng/mL              ? IMAGING  11/21/19  CT CHEST ABDOMEN PELVIS WITH CONTRAST (XPD)    CLINICAL HISTORY:  Malignant neoplasm of rectosigmoid junctionknown rectosigmoid cancer, eval for metastatic disease;    COMPARISON:  None    FINDINGS:  Chest:    The heart is enlarged.  There is no evidence of mediastinal or hilar or axillary lymphadenopathy.  Patient has had a prior median sternotomy.  There are no lung nodules.  No pleural effusions.    Skeletal structures are intact.    Abdomen pelvis:    There appears to be a subtle hypodense lesion involving the right lobe of the liver measuring about 1.7 cm.  There may be a smaller hypodensity more anteriorly measuring 8 mm.    Pancreas is unremarkable the spleen is unremarkable.    The gallbladder is unremarkable.  There is no bile duct dilatation.    The kidneys are normal.    The aorta and inferior vena cava are unremarkable.    There are no acute bowel abnormalities.     No evidence of appendicitis.  No evidence of diverticulitis.    Bladder is normal. No abnormal masses or fluid collections in the pelvis.  No definite pelvic lymphadenopathy.  No abdominal retroperitoneal lymph node enlargement.    There are few degenerative changes seen involving the lumbar spine as well as a few mild compression fractures of the thoracic spine which appear chronic.      Impression       No evidence of metastatic disease involving the chest.  Findings suspicious for a liver mass possibly related to a metastasis.  Further evaluation recommended with a PET scan or MRI of the liver.    There appear to be a few compression fractures of the thoracic spine which are chronic.  No definite metastatic disease involving the skeleton.       ?   Assessment/Plan:       1. Rectal adenocarcinoma    2. Liver mass    3. Iron deficiency anemia due to chronic  blood loss    4. Chronic anticoagulation    5. Aortic valve replaced          Plan:     # rectal adenocarcinoma, MSI stable:  CEA negative.  Lesion appears to be in upper rectal/sigmoid adenocarcinoma 20 cm above the anal verge. CT chest abdomen pelvis was notable for a very subtle hypodensity in the liver less than 2 cm; otherwise no other evidence of metastatic disease; and chronic compression fractures are noted.12/02/2019 PET-CT notable for avid sigmoid lesion SUV 15.7 as well as avid right lobe liver lesion 19 mm, SUV 4.1.  No other areas of avidity concerning for metastatic disease.  Today today I discussed with her liver biopsy was not diagnostic of metastatic disease in the liver however unclear if representative sample.  I have discussed her case with Dr. Smith and will plan to discuss her case at our multidisciplinary tumor conference 12/20/2019.  I reviewed potential options including repeat attempted biopsy, pursuing surgical resection of primary and intraoperative evaluation of liver, and close surveillance of liver lesion.  We discussed adjuvant chemotherapy depending on final pathology.  I will plan to call her following multidisciplinary discussion about next steps.    # chronic anticoagulation with a  mechanical aortic valve:  On chronic anticoagulation due to mechanical valve.  Recommend follow-up with anticoagulation clinic.  She understands with surgery will need bridging again.    # mild normocytic anemia:  Folate and vitamin B12 previously checked within normal limits.  Iron deficiency noted on prior labs on 08/12/2019 with 5% saturation and ferritin 9.  Status post IV iron repeat iron indices show improvement to 15% saturation (ferritin not repeated) and downtrending TIBC and transferrin indicating response  Repeat labs on 11/22/2019 reveal similar mild anemia with hemoglobin 11.6, MCV 96, iron indices show 15% iron saturation.  With iron saturation 15% she may still have degree of iron  deficiency and possible ongoing blood loss given evidence of rectal malignancy per above; I have ordered re-dosing of IV iron for 2 doses, completed 12/04/2019.  Additionally she has mechanical aortic valve with haptoglobin less than 10 and elevated  suggesting potential hemolytic component to her anemia.  No peripheral smear however to confirm schistocytes.  Recommended follow-up with her cardiologist.    # hyperlipidemia:  Recommended follow-up with PCP.    Follow-Up:   Per above    I have spent 15 minutes in this office visit in the direct face-to-face communication with the patient, with greater than 50% of the time spend in the counseling and coordination of care.

## 2019-12-20 ENCOUNTER — TUMOR BOARD CONFERENCE (OUTPATIENT)
Dept: SURGERY | Facility: CLINIC | Age: 69
End: 2019-12-20

## 2019-12-20 ENCOUNTER — TELEPHONE (OUTPATIENT)
Dept: HEMATOLOGY/ONCOLOGY | Facility: CLINIC | Age: 69
End: 2019-12-20

## 2019-12-20 NOTE — NURSING
Called pt per Dr. Quintanilla request to give tumor board recommendation.  Notified pt that her case was reviewed this morning and it was found that they need to do another biopsy of lesion to obtain enough tissue for diagnosis.  Explained that after the meeting this morning there has been conversation between Dr. Moraes, Dr. Quintanilla, and Dr. Smith and they all agreed on the biopsy.  Explained to pt that I am going to contact radiology to have them schedule and that she will either hear back from me or from radiology to schedule.  Pt verbalized understanding and appreciation for the call.

## 2019-12-20 NOTE — PROGRESS NOTES
Tumor Board Documentation      Adalgisa Wright was presented by Ankur Smith MD at our Tumor Board on 12/20/2019, which included representatives from Medical Oncology, Surgical Oncology, Radiation Oncology, Pathology, Navigation, Radiology.    Adalgisa currently presents as a current patient with Colon cancer, with history of the following treatments:  .    Additionally, we reviewed previous medical and familial history, history of present illness, and recent lab results along with all available histopathologic and imaging studies. The tumor board considered available treatment options and made the following recommendations:     Biopsy    Re-biopsy of liver lesion with IR/Dr. Dominguez - could consult liver transplant team to see what they think.   If liver is oligometastatic colon ca met, resect. If liver is new primary, treat separately    The following procedures/referrals were also placed: No orders of the defined types were placed in this encounter.      Clinical Trial Status: Not discussed     National site-specific guidelines were discussed with respect to the case.    Tumor board is a meeting of clinicians from various specialty areas who evaluate and discuss patients for whom a multidisciplinary approach is being considered. Final determinations in the plan of care are those of the provider(s). The responsibility for follow up of recommendations given during tumor board is that of the provider.     Ankur Smith MD

## 2019-12-27 ENCOUNTER — TELEPHONE (OUTPATIENT)
Dept: HEMATOLOGY/ONCOLOGY | Facility: CLINIC | Age: 69
End: 2019-12-27

## 2019-12-27 NOTE — TELEPHONE ENCOUNTER
Called patient to discuss biopsy denial and possibly moving it to after the new year under her new insurance. Patient did not answer, left voicemail with my callback number requesting return call at earliest convenience

## 2019-12-27 NOTE — TELEPHONE ENCOUNTER
Patient called me back, we discussed the denial of her CT guided biopsy. Explained to patient that preservice states she is out of network on her current plan for [facility]. However, she will be switching to [employer] in the new year, and they believe that should not be a problem with authorization. Suggested to patient that we hold the biopsy for a few more days and reschedule after the 1st of the year. Patient does not have her new member ID handy right now but will call me later and provide it to me. Then we can reschedule the biopsy for another date.     Cancelled biopsy for 12/30. Patient verbalized understanding of all information

## 2019-12-27 NOTE — TELEPHONE ENCOUNTER
----- Message from Joan Avina sent at 12/27/2019 10:37 AM CST -----  Contact: Pt  Pt asked that nurse return her call as soon as today.

## 2019-12-27 NOTE — TELEPHONE ENCOUNTER
Nurse informed pt to notify Dr Quintanilla's nurse as soon as possible with new insurance information so her procedure can be scheduled.

## 2019-12-27 NOTE — TELEPHONE ENCOUNTER
Nurse spoke to Ms Wright , she wanted to inform Dr Quintanilla that she will contact her cardiologist regarding the procedure being put off until January and he blood thinners/injcetion.

## 2019-12-30 ENCOUNTER — TELEPHONE (OUTPATIENT)
Dept: HEMATOLOGY/ONCOLOGY | Facility: CLINIC | Age: 69
End: 2019-12-30

## 2019-12-30 NOTE — TELEPHONE ENCOUNTER
----- Message from Teri Osman sent at 12/27/2019 11:34 AM CST -----  Contact: pt  Requesting a call back regarding procedure.Please call back at [phone].      Thanks,  Teri Osman

## 2020-01-02 ENCOUNTER — TELEPHONE (OUTPATIENT)
Dept: HEMATOLOGY/ONCOLOGY | Facility: CLINIC | Age: 70
End: 2020-01-02

## 2020-01-02 NOTE — TELEPHONE ENCOUNTER
Called patient regarding new insurance and rescheduling IR guided biopsy. Patient's new insurance was added to the system today, rescheduled biopsy for Wednesday. Reviewed NPO at midnight, hold coumadin for 5 days prior, and where to check in for appt. Patient is going to call her cardiologist again, she cannot remember their instructions related to lovenox. TOld her to call me if she can't get them on the phone, and I'll send an urgent message. Verbalized understanding.

## 2020-01-06 ENCOUNTER — TELEPHONE (OUTPATIENT)
Dept: RADIOLOGY | Facility: HOSPITAL | Age: 70
End: 2020-01-06

## 2020-01-08 ENCOUNTER — HOSPITAL ENCOUNTER (OUTPATIENT)
Dept: RADIOLOGY | Facility: HOSPITAL | Age: 70
Discharge: HOME | End: 2020-01-08
Attending: INTERNAL MEDICINE
Payer: COMMERCIAL

## 2020-01-08 VITALS
OXYGEN SATURATION: 100 % | BODY MASS INDEX: 25.19 KG/M2 | HEIGHT: 58 IN | WEIGHT: 120 LBS | DIASTOLIC BLOOD PRESSURE: 89 MMHG | HEART RATE: 67 BPM | SYSTOLIC BLOOD PRESSURE: 162 MMHG | RESPIRATION RATE: 15 BRPM

## 2020-01-08 DIAGNOSIS — Z79.01 CHRONIC ANTICOAGULATION: Primary | ICD-10-CM

## 2020-01-08 DIAGNOSIS — Z01.818 PRE-OP TESTING: ICD-10-CM

## 2020-01-08 DIAGNOSIS — R16.0 LIVER MASS: ICD-10-CM

## 2020-01-08 DIAGNOSIS — C20 RECTAL ADENOCARCINOMA: ICD-10-CM

## 2020-01-08 PROCEDURE — 25000003 PHARM REV CODE 250: Performed by: RADIOLOGY

## 2020-01-08 PROCEDURE — 88342 IMHCHEM/IMCYTCHM 1ST ANTB: CPT | Performed by: PATHOLOGY

## 2020-01-08 PROCEDURE — 88313 SPECIAL STAINS GROUP 2: CPT | Mod: 59 | Performed by: PATHOLOGY

## 2020-01-08 PROCEDURE — 77012 CT SCAN FOR NEEDLE BIOPSY: CPT | Mod: TC

## 2020-01-08 PROCEDURE — 88313 PR  SPECIAL STAINS,GROUP II: ICD-10-PCS | Mod: 26,,, | Performed by: PATHOLOGY

## 2020-01-08 PROCEDURE — 88341 IMHCHEM/IMCYTCHM EA ADD ANTB: CPT | Performed by: PATHOLOGY

## 2020-01-08 PROCEDURE — 88307 TISSUE EXAM BY PATHOLOGIST: CPT | Performed by: PATHOLOGY

## 2020-01-08 PROCEDURE — 88307 TISSUE EXAM BY PATHOLOGIST: CPT | Mod: 26,,, | Performed by: PATHOLOGY

## 2020-01-08 PROCEDURE — 88307 PR  SURG PATH,LEVEL V: ICD-10-PCS | Mod: 26,,, | Performed by: PATHOLOGY

## 2020-01-08 PROCEDURE — 88313 SPECIAL STAINS GROUP 2: CPT | Mod: 26,,, | Performed by: PATHOLOGY

## 2020-01-08 PROCEDURE — 63600175 PHARM REV CODE 636 W HCPCS: Performed by: RADIOLOGY

## 2020-01-08 RX ORDER — FENTANYL CITRATE 50 UG/ML
INJECTION, SOLUTION INTRAMUSCULAR; INTRAVENOUS CODE/TRAUMA/SEDATION MEDICATION
Status: COMPLETED | OUTPATIENT
Start: 2020-01-08 | End: 2020-01-08

## 2020-01-08 RX ORDER — LIDOCAINE HYDROCHLORIDE 10 MG/ML
INJECTION INFILTRATION; PERINEURAL CODE/TRAUMA/SEDATION MEDICATION
Status: COMPLETED | OUTPATIENT
Start: 2020-01-08 | End: 2020-01-08

## 2020-01-08 RX ORDER — MIDAZOLAM HYDROCHLORIDE 1 MG/ML
INJECTION INTRAMUSCULAR; INTRAVENOUS CODE/TRAUMA/SEDATION MEDICATION
Status: COMPLETED | OUTPATIENT
Start: 2020-01-08 | End: 2020-01-08

## 2020-01-08 RX ADMIN — MIDAZOLAM HYDROCHLORIDE 1 MG: 1 INJECTION, SOLUTION INTRAMUSCULAR; INTRAVENOUS at 09:01

## 2020-01-08 RX ADMIN — LIDOCAINE HYDROCHLORIDE 5 ML: 10 INJECTION, SOLUTION INFILTRATION; PERINEURAL at 09:01

## 2020-01-08 RX ADMIN — FENTANYL CITRATE 50 MCG: 50 INJECTION, SOLUTION INTRAMUSCULAR; INTRAVENOUS at 09:01

## 2020-01-08 NOTE — SEDATION DOCUMENTATION
Patient placed on CT table supine with bilateral arms above head.  VSS, CM in place,  Patient verbalizes understanding of procedure

## 2020-01-08 NOTE — PLAN OF CARE
Recovery complete.  VSS.  Band aid to puncture site - C/D/I.  Patient has a little soreness.  Discussed DC instructions with patient and spouse.  Both verbalized understanding.  Patient dc'd via WC with spouse to home

## 2020-01-08 NOTE — DISCHARGE INSTRUCTIONS
Recovery After Procedural Sedation (Adult)  You have been given medicine by vein to make you sleep during your surgery. This may have included both a pain medicine and sleeping medicine. Most of the effects have worn off. But you may still have some drowsiness for the next 6 to 8 hours.  Home care  Follow these guidelines when you get home:  · For the next 8 hours, you should be watched by a responsible adult. This person should make sure your condition is not getting worse.  · Don't drink any alcohol for the next 24 hours.  · Don't drive, operate dangerous machinery, or make important business or personal decisions during the next 24 hours.  Note: Your healthcare provider may tell you not to take any medicine by mouth for pain or sleep in the next 4 hours. These medicines may react with the medicines you were given in the hospital. This could cause a much stronger response than usual.  Follow-up care  Follow up with your healthcare provider if you are not alert and back to your usual level of activity within 12 hours.  When to seek medical advice  Call your healthcare provider right away if any of these occur:  · Drowsiness gets worse  · Weakness or dizziness gets worse  · Repeated vomiting  · You can't be awakened   Date Last Reviewed: 10/18/2016  © 2045-3133 [employer]. [address] [place] [address], [place] [address]. All rights reserved. This information is not intended as a substitute for professional medical care. Always follow your healthcare professional's instructions.  Discharge Instructions for Liver Biopsy  You had a procedure called liver biopsy. A healthcare provider used a special needle to remove a small piece of tissue from your liver. Then it was examined for signs of damage or disease. A liver biopsy is ordered after other tests have shown that your liver is not working properly. You may also have a liver biopsy when liver disease is suspected, to determine whether there is too much  iron in the liver, or to rule out cancer.  Home care  Recommendations include the following:   · Because you had anesthesia, you should not drive until the day after your biopsy.   · Remove the bandage covering the biopsy site 48 hours after the procedure.  · Rest for 6 hours and take it easy when you arrive home.  · Dont shower for 24 hours after the biopsy. If you wish, you may wash yourself with a sponge or washcloth. When you are able to shower, dont scrub the site. Gently wash the area and pat it dry.  · Dont lift anything heavier than 10 pounds for up to 1 week after the procedure, or as advised by your healthcare provider.  · Don't do strenuous activities or exercises for up to 1 week after the procedure.  · Ask your healthcare provider when you can return to work.  · Do not start taking blood thinners without clear instructions from your healthcare provider.  Follow-up care  Make a follow-up appointment as directed by our staff.     When to call your healthcare provider  Call your healthcare provider immediately if you have any of the following:  · Bleeding from the biopsy site  · Dizziness or lightheadedness  · Sudden or increased shortness of breath  · Sudden chest pain  · Fever of 100.4°F (38.0°C) or higher, or as directed by your healthcare provider  · Shaking chills  · Yellow eyes or skin  · Increasing redness, tenderness, or swelling at the biopsy site  · Drainage from the biopsy site  · Opening of the biopsy site  · Vomiting blood  · Rectal bleeding or bloody stools  · Increasing pain, with or without activity, in the liver or belly area, or pain shooting to the right shoulder   Date Last Reviewed: 7/1/2016 © 2000-2017 [employer]. [address] [place] [address]. All rights reserved. This information is not intended as a substitute for professional medical care. Always follow your healthcare professional's instructions.

## 2020-01-08 NOTE — SEDATION DOCUMENTATION
Procedure complete.  VSS,  Patient tolerated well.  Pressure held to puncture site.  Band aid to puncture site - C/D/I.

## 2020-01-17 ENCOUNTER — TELEPHONE (OUTPATIENT)
Dept: HEMATOLOGY/ONCOLOGY | Facility: CLINIC | Age: 70
End: 2020-01-17

## 2020-01-17 NOTE — TELEPHONE ENCOUNTER
----- Message from Sherry Reyez sent at 1/17/2020  2:06 PM CST -----  Contact: pt  Type:  Test Results    Who Called: Patient  Name of Test (Lab/Mammo/Etc):Lab Results  Date of Test: 1/8  Ordering Provider: Dr Quintanilla  Where the test was performed: [facility]  Would the patient rather a call back or a response via [facility]? Call back  Best Call Back Number: [phone]  Additional Information:  na

## 2020-01-20 NOTE — TELEPHONE ENCOUNTER
Nurse left a message with pathology to get a possible date result will post.pt notified nurse will call with any update information.

## 2020-01-22 ENCOUNTER — TELEPHONE (OUTPATIENT)
Dept: HEMATOLOGY/ONCOLOGY | Facility: CLINIC | Age: 70
End: 2020-01-22

## 2020-01-22 LAB
FINAL PATHOLOGIC DIAGNOSIS: NORMAL
GROSS: NORMAL

## 2020-01-22 RX ORDER — LINACLOTIDE 145 UG/1
CAPSULE, GELATIN COATED ORAL
Qty: 30 CAPSULE | Refills: 5 | Status: SHIPPED | OUTPATIENT
Start: 2020-01-22 | End: 2021-02-26 | Stop reason: ALTCHOICE

## 2020-01-22 NOTE — TELEPHONE ENCOUNTER
----- Message from Monserrat Jimenez sent at 1/22/2020  1:23 PM CST -----  Contact: self [phone]  .Type:  Test Results    Who Called: Adalgisa Wright  Name of Test (Lab/Mammo/Etc): Biopsy  Date of Test: 01/08/20  Ordering Provider: Dr. Quintanilla  Where the test was performed: [facility]  Would the patient rather a call back or a response via [facility]? Call back  Best Call Back Number: [phone]  Additional Information:

## 2020-01-22 NOTE — TELEPHONE ENCOUNTER
I called patient to let her know about biopsy results which do not show evidence of malignancy in the liver.  Sent message to Dr. Smith colorectal surgery to discuss next steps in management of primary.

## 2020-01-23 ENCOUNTER — TELEPHONE (OUTPATIENT)
Dept: HEMATOLOGY/ONCOLOGY | Facility: CLINIC | Age: 70
End: 2020-01-23

## 2020-01-23 NOTE — TELEPHONE ENCOUNTER
----- Message from Tigist Balderas sent at 1/23/2020  2:59 PM CST -----  Contact: Patient   Patient got a miss call and she think it may have came from this office, Please call her at [phone].    Thanks  Td

## 2020-01-27 ENCOUNTER — OFFICE VISIT (OUTPATIENT)
Dept: SURGERY | Facility: CLINIC | Age: 70
End: 2020-01-27
Payer: COMMERCIAL

## 2020-01-27 ENCOUNTER — HOSPITAL ENCOUNTER (OUTPATIENT)
Dept: RADIOLOGY | Facility: HOSPITAL | Age: 70
Discharge: HOME | End: 2020-01-27
Attending: COLON & RECTAL SURGERY
Payer: COMMERCIAL

## 2020-01-27 VITALS
DIASTOLIC BLOOD PRESSURE: 81 MMHG | TEMPERATURE: 98 F | HEART RATE: 67 BPM | WEIGHT: 122.56 LBS | BODY MASS INDEX: 25.62 KG/M2 | SYSTOLIC BLOOD PRESSURE: 137 MMHG

## 2020-01-27 DIAGNOSIS — K76.9 LIVER LESION: ICD-10-CM

## 2020-01-27 DIAGNOSIS — C19 ADENOCARCINOMA OF RECTOSIGMOID JUNCTION: Primary | ICD-10-CM

## 2020-01-27 PROCEDURE — 1126F PR PAIN SEVERITY QUANTIFIED, NO PAIN PRESENT: ICD-10-PCS | Mod: S$GLB,,, | Performed by: COLON & RECTAL SURGERY

## 2020-01-27 PROCEDURE — 25500020 PHARM REV CODE 255: Performed by: COLON & RECTAL SURGERY

## 2020-01-27 PROCEDURE — 99214 PR OFFICE/OUTPT VISIT, EST, LEVL IV, 30-39 MIN: ICD-10-PCS | Mod: S$GLB,,, | Performed by: COLON & RECTAL SURGERY

## 2020-01-27 PROCEDURE — 99999 PR PBB SHADOW E&M-EST. PATIENT-LVL III: CPT | Mod: PBBFAC,,, | Performed by: COLON & RECTAL SURGERY

## 2020-01-27 PROCEDURE — 99999 PR PBB SHADOW E&M-EST. PATIENT-LVL III: ICD-10-PCS | Mod: PBBFAC,,, | Performed by: COLON & RECTAL SURGERY

## 2020-01-27 PROCEDURE — 1101F PR PT FALLS ASSESS DOC 0-1 FALLS W/OUT INJ PAST YR: ICD-10-PCS | Mod: CPTII,S$GLB,, | Performed by: COLON & RECTAL SURGERY

## 2020-01-27 PROCEDURE — 74183 MRI ABDOMEN W WO CONTRAST: ICD-10-PCS | Mod: 26,,, | Performed by: RADIOLOGY

## 2020-01-27 PROCEDURE — 1101F PT FALLS ASSESS-DOCD LE1/YR: CPT | Mod: CPTII,S$GLB,, | Performed by: COLON & RECTAL SURGERY

## 2020-01-27 PROCEDURE — 1159F MED LIST DOCD IN RCRD: CPT | Mod: S$GLB,,, | Performed by: COLON & RECTAL SURGERY

## 2020-01-27 PROCEDURE — 74183 MRI ABD W/O CNTR FLWD CNTR: CPT | Mod: 26,,, | Performed by: RADIOLOGY

## 2020-01-27 PROCEDURE — 99214 OFFICE O/P EST MOD 30 MIN: CPT | Mod: S$GLB,,, | Performed by: COLON & RECTAL SURGERY

## 2020-01-27 PROCEDURE — 1159F PR MEDICATION LIST DOCUMENTED IN MEDICAL RECORD: ICD-10-PCS | Mod: S$GLB,,, | Performed by: COLON & RECTAL SURGERY

## 2020-01-27 PROCEDURE — 74183 MRI ABD W/O CNTR FLWD CNTR: CPT | Mod: TC

## 2020-01-27 PROCEDURE — A9581 GADOXETATE DISODIUM INJ: HCPCS | Performed by: COLON & RECTAL SURGERY

## 2020-01-27 PROCEDURE — 1126F AMNT PAIN NOTED NONE PRSNT: CPT | Mod: S$GLB,,, | Performed by: COLON & RECTAL SURGERY

## 2020-01-27 RX ADMIN — GADOXETATE DISODIUM 5 ML: 181.43 INJECTION, SOLUTION INTRAVENOUS at 11:01

## 2020-01-27 NOTE — PROGRESS NOTES
History & Physical    SUBJECTIVE:     CC: rectosigmoid cancer  Ref MD: Brenda Ochoa MD    History of Present Illness:  Patient is a 69 y.o. 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  Patient was undergoing a colonoscopy on November 7, 2019 where a rectosigmoid mass was found and biopsied which showed moderately differentiated adenocarcinoma.  Patient had no other intramucosal lesions at that time.  She is undergoing colonoscopy for iron deficiency anemia.  She had previously been found the month before to have iron deficiency anemia as well as blood when wiping after a bowel movement.  She is on chronic anticoagulation therapy of Coumadin for mechanical heart valve which was done in 2003.  She denies any fever, chills, nausea, vomiting, diarrhea, melena, weight loss or any other signs or symptoms.  She has no personal family history of colorectal cancer or IBD.  Her last colonoscopy prior to this was in 2017 were no lesion was found in this area. Her only significant past surgical history is this mechanical valve replacement.  No episodes of fecal incontinence per her report and some chronic constipation noted. She is referred to Colorectal surgery for evaluation.  I have personally spoken with her referring provider and reviewed her previous records.    Interval history:  Since last clinic visit, the patient has done well.  She has undergone a CT scan which was concerning for possible liver lesion.  PET scan showed FDG avid lesion in this area of the liver, as well as the primary location of the rectosigmoid.  Patient underwent 2 separate IR biopsies of the liver lesion that did not show any definitive evidence of metastatic disease. Represents to discuss further treatment.  Denies any current fever, chills, nausea, vomiting, diarrhea.  Still taking Linzess for constipation.  Denies any blood per rectum.    Review of patient's allergies indicates:   Allergen Reactions    Penicillins Rash        Current Outpatient Medications   Medication Sig Dispense Refill    acebutolol (SECTRAL) 200 MG capsule Take 400 mg by mouth 2 (two) times daily.       acebutolol (SECTRAL) 400 mg capsule Take 400 mg by mouth 2 (two) times daily.      ALPRAZolam (XANAX) 1 MG tablet Take 1 tablet by mouth nightly as needed for Insomnia.      butalbital-acetaminophen-caffeine -40 mg (FIORICET, ESGIC) -40 mg per tablet TAKE ONE TABLET BY MOUTH EVERY SIX HOURS AS NEEDED      enoxaparin (LOVENOX) 60 mg/0.6 mL Syrg Inject 0.6 mLs (60 mg total) into the skin 2 (two) times daily. 2 Syringe 0    ergocalciferol (ERGOCALCIFEROL) 50,000 unit Cap Take 1 capsule by mouth every 30 days.      furosemide (LASIX) 40 MG tablet Take 40 mg by mouth Daily.      LINZESS 145 mcg Cap capsule TAKE 1 CAPSULE (145 MCG TOTAL) BY MOUTH ONCE DAILY. 30 capsule 5    lovastatin (MEVACOR) 40 MG tablet Take 40 mg by mouth every evening.      metroNIDAZOLE (FLAGYL) 500 MG tablet Take 1 tablet (500 mg total) by mouth 3 (three) times daily. Take initial dose@2pm, second dose@3pm and final dose@10pm day before surgery 3 tablet 0    multivitamin (THERAGRAN) tablet Take 1 tablet by mouth.      neomycin (MYCIFRADIN) 500 mg Tab Take 2 tablets (1,000 mg total) by mouth 3 (three) times daily. Take 1st dose@2pm,take 2nd dose@3pm, take last dose@10pm day before surgery 6 tablet 0    pantoprazole (PROTONIX) 20 MG tablet Take 1 tablet (20 mg total) by mouth once daily. 30 tablet 11    polyethylene glycol (GLYCOLAX) 17 gram/dose powder Take 238 g by mouth once daily. Mix with 2L of gatorade, drink all mixed solution starting@12pm day before surgery, finish by 10pm 1 Bottle 0    sertraline (ZOLOFT) 100 MG tablet Take 1 tablet by mouth Daily.      traMADol (ULTRAM) 50 mg tablet 50 mg every 8 (eight) hours as needed.       warfarin (COUMADIN) 5 MG tablet Take 1 tablet (5 mg total) by mouth Daily. 5 mg by mouth for five days then alternate with 2.5 mg  for one day.      zolpidem (AMBIEN) 10 mg Tab Take 5 mg by mouth nightly as needed.        Current Facility-Administered Medications   Medication Dose Route Frequency Provider Last Rate Last Dose    lidocaine (PF) 10 mg/ml (1%) injection 10 mg  1 mL Intradermal Once Ankur Smith MD           Past Medical History:   Diagnosis Date    Anticoagulant long-term use     Aortic valve defect     Benign neoplasm of ascending colon 4/6/2017    DDD (degenerative disc disease), cervical 7/20/2018    GERD (gastroesophageal reflux disease)     Hemorrhoids     HLD (hyperlipidemia)     Hypertension     Insomnia 12/1/2014    Irritable bowel syndrome with constipation 4/9/2018    Postmenopausal osteoporosis 7/20/2018     Past Surgical History:   Procedure Laterality Date    AORTIC VALVE REPLACEMENT  2003    COLONOSCOPY Left 4/6/2017    Procedure: COLONOSCOPY;  Surgeon: Sander Ulloa MD;  Location: [facility];  Service: Endoscopy;  Laterality: Left;    COLONOSCOPY N/A 11/7/2019    Procedure: COLONOSCOPY;  Surgeon: Brenda Ochoa MD;  Location: [facility];  Service: Endoscopy;  Laterality: N/A;    ESOPHAGOGASTRODUODENOSCOPY N/A 11/7/2019    Procedure: ESOPHAGOGASTRODUODENOSCOPY (EGD) needs PT/INR;  Surgeon: Brenda Ochoa MD;  Location: [facility];  Service: Endoscopy;  Laterality: N/A;     Family History   Problem Relation Age of Onset    Diabetes Mother     Heart disease Mother     Hypertension Mother     Heart disease Father     Hypertension Father     Hypertension Sister     Hypertension Son     Heart disease Son     Colon cancer Neg Hx      Social History     Tobacco Use    Smoking status: Never Smoker    Smokeless tobacco: Never Used   Substance Use Topics    Alcohol use: Not Currently     Comment: occasionally     Drug use: No        Review of Systems:  Review of Systems   Constitutional: Negative for activity change, appetite change, chills, fatigue, fever and unexpected weight change.    HENT: Negative for congestion, ear pain, sore throat and trouble swallowing.    Eyes: Negative for pain, redness and itching.   Respiratory: Negative for cough, shortness of breath and wheezing.    Cardiovascular: Negative for chest pain, palpitations and leg swelling.   Gastrointestinal: Positive for constipation. Negative for abdominal distention, abdominal pain, anal bleeding, blood in stool, diarrhea, nausea, rectal pain and vomiting.   Endocrine: Negative for cold intolerance, heat intolerance and polyuria.   Genitourinary: Negative for dysuria, flank pain, frequency and hematuria.   Musculoskeletal: Negative for gait problem, joint swelling and neck pain.   Skin: Negative for color change, rash and wound.   Allergic/Immunologic: Negative for environmental allergies and immunocompromised state.   Neurological: Negative for dizziness, speech difficulty, weakness and numbness.   Psychiatric/Behavioral: Negative for agitation, confusion and hallucinations.       OBJECTIVE:     Vital Signs (Most Recent)  Temp: 97.6 °F (36.4 °C) (01/27/20 0857)  Pulse: 67 (01/27/20 0857)  BP: 137/81 (01/27/20 0857)     55.6 kg (122 lb 9.2 oz)     Physical Exam:  Physical Exam   Constitutional: She is oriented to person, place, and time. She appears well-developed.   HENT:   Head: Normocephalic and atraumatic.   Eyes: Conjunctivae and EOM are normal.   Neck: Normal range of motion. No thyromegaly present.   Cardiovascular: Normal rate and regular rhythm.   Pulmonary/Chest: Effort normal. No respiratory distress.   Abdominal: Soft. She exhibits no distension and no mass. There is no tenderness.   No previous incisions noted   Genitourinary:   Genitourinary Comments: Anorectal: deferred   Musculoskeletal: Normal range of motion. She exhibits no edema or tenderness.   Neurological: She is alert and oriented to person, place, and time.   Skin: Skin is warm and dry. Capillary refill takes less than 2 seconds. No rash noted.    Psychiatric: She has a normal mood and affect.     Laboratory  Lab Results   Component Value Date    WBC 2.57 (L) 01/08/2020    HGB 11.9 (L) 01/08/2020    HCT 38.0 01/08/2020     (L) 01/08/2020    ALT 20 11/22/2019    AST 19 11/22/2019     11/22/2019    K 3.5 11/22/2019     11/22/2019    CREATININE 0.8 11/22/2019    BUN 11 11/22/2019    CO2 29 11/22/2019    INR 1.1 01/08/2020       Diagnostic Results:  Reviewed colonoscopy report images well as pathology showing rectosigmoid mass consistent with adenocarcinoma    PATHOLOGY from colonoscopy:   Rectal mass, biopsy:  - Moderately differentiated invasive adenocarcinoma with focal mucosal ulceration arising in a tubular  adenoma, see comment  Comment: Adenocarcinoma measures at least 0.6 cm and occupies approximately 70% of tissue submitted. No  definitive perineural or lymphovascular invasion is identified. This case was seen in consultation with Dr. Amaya  who agrees with the above diagnosis. Please see results of the MMR panel below:  Immunohistochemical stains for MLH1, MSH2, MSH6 & PMS2 reveal distinct nuclear staining for all markers.  These findings indicate an INTACT mismatch repair (MMR) gene function without evidence of significant  microsatellite instability (MSI). Although these findings make the diagnosis of hereditary non-polyposis colon cancer  (HNPCC) or Urbano Syndrome very unlikely in this patient, approximately 5% of colorectal carcinomas with defective  MMR genes and high MSI may show this apparent normal pattern of staining. Correlation with the clinical findings  & family history is encouraged.    PATH IR biopsies:  1. Liver mass, biopsy:  - Not diagnostic of malignancy  - Scant atypical liver sampling, see comments  Comments: The sections show a scant fragmented sample of liver tissue with psuedoglands in one core  and mild architectural distortion. Cytologic atypiia is not prominent. Reticulin stain shows mildly  irregular  pattern of staining. No portal tracts present for evaluation. Back to back pseudoglands raises the possibly of  a well differetniated hepatocellular lesion. However, the scant sample limits interpretation. Due to the  presence of a clinical mass. Resampling is indicated.    LIVER MASS, BIOPSY:  Negative for malignancy  Benign liver parenchyma  Macrosteatosis - 5%  Glycogenated nuclei  Negative for steatohepatitis  Minimal focal sinusoidal dilatation  Focal bile duct dilatation  Negative for fibrosis  Patient's history of rectal carcinoma is noticed. There is no evidence of metastatic malignancy in this biopsy.  If clinically suspicious, sampling issues should be considered.  Special stains trichrome, reticulin, beta-catenin and glutamine synthatase have also been performed and  support the above diagnosis.    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 to liver    - given concern for metastatic disease but 2-biopsies, after discussion with surgical oncology in [place], will plan for MRI of the liver with [facility] to evaluate the liver further  - she may require synchronous resection of her primary as well as the liver lesion  - will discussed at tumor board as well  - will have patient return to clinic within 1 week to discuss further treatment    Adenocarcinoma of rectosigmoid junction    Liver lesion  -     Cancel: MRI Abdomen With Contrast; Future; Expected date: 01/27/2020  -     MRI Abdomen W WO Contrast; Future; Expected date: 01/27/2020      Ankur Smith MD  Colon and Rectal Surgery  [facility]

## 2020-02-03 ENCOUNTER — OFFICE VISIT (OUTPATIENT)
Dept: SURGERY | Facility: CLINIC | Age: 70
End: 2020-02-03
Payer: MEDICARE

## 2020-02-03 VITALS
HEART RATE: 63 BPM | WEIGHT: 121.25 LBS | TEMPERATURE: 98 F | SYSTOLIC BLOOD PRESSURE: 124 MMHG | BODY MASS INDEX: 25.34 KG/M2 | DIASTOLIC BLOOD PRESSURE: 79 MMHG

## 2020-02-03 DIAGNOSIS — C20 RECTAL ADENOCARCINOMA: ICD-10-CM

## 2020-02-03 DIAGNOSIS — C19 ADENOCARCINOMA OF RECTOSIGMOID JUNCTION: Primary | ICD-10-CM

## 2020-02-03 PROCEDURE — 1126F AMNT PAIN NOTED NONE PRSNT: CPT | Mod: S$GLB,,, | Performed by: COLON & RECTAL SURGERY

## 2020-02-03 PROCEDURE — 1101F PT FALLS ASSESS-DOCD LE1/YR: CPT | Mod: CPTII,S$GLB,, | Performed by: COLON & RECTAL SURGERY

## 2020-02-03 PROCEDURE — 99214 OFFICE O/P EST MOD 30 MIN: CPT | Mod: S$GLB,,, | Performed by: COLON & RECTAL SURGERY

## 2020-02-03 PROCEDURE — 1159F MED LIST DOCD IN RCRD: CPT | Mod: S$GLB,,, | Performed by: COLON & RECTAL SURGERY

## 2020-02-03 PROCEDURE — 99999 PR PBB SHADOW E&M-EST. PATIENT-LVL III: CPT | Mod: PBBFAC,,, | Performed by: COLON & RECTAL SURGERY

## 2020-02-03 PROCEDURE — 1101F PR PT FALLS ASSESS DOC 0-1 FALLS W/OUT INJ PAST YR: ICD-10-PCS | Mod: CPTII,S$GLB,, | Performed by: COLON & RECTAL SURGERY

## 2020-02-03 PROCEDURE — 99214 PR OFFICE/OUTPT VISIT, EST, LEVL IV, 30-39 MIN: ICD-10-PCS | Mod: S$GLB,,, | Performed by: COLON & RECTAL SURGERY

## 2020-02-03 PROCEDURE — 1159F PR MEDICATION LIST DOCUMENTED IN MEDICAL RECORD: ICD-10-PCS | Mod: S$GLB,,, | Performed by: COLON & RECTAL SURGERY

## 2020-02-03 PROCEDURE — 1126F PR PAIN SEVERITY QUANTIFIED, NO PAIN PRESENT: ICD-10-PCS | Mod: S$GLB,,, | Performed by: COLON & RECTAL SURGERY

## 2020-02-03 PROCEDURE — 99999 PR PBB SHADOW E&M-EST. PATIENT-LVL III: ICD-10-PCS | Mod: PBBFAC,,, | Performed by: COLON & RECTAL SURGERY

## 2020-02-03 RX ORDER — SODIUM CHLORIDE, SODIUM LACTATE, POTASSIUM CHLORIDE, CALCIUM CHLORIDE 600; 310; 30; 20 MG/100ML; MG/100ML; MG/100ML; MG/100ML
INJECTION, SOLUTION INTRAVENOUS CONTINUOUS
Status: CANCELLED | OUTPATIENT
Start: 2020-02-03

## 2020-02-03 RX ORDER — ONDANSETRON 2 MG/ML
4 INJECTION INTRAMUSCULAR; INTRAVENOUS EVERY 6 HOURS PRN
Status: CANCELLED | OUTPATIENT
Start: 2020-02-03

## 2020-02-03 RX ORDER — LIDOCAINE HYDROCHLORIDE 10 MG/ML
1 INJECTION, SOLUTION EPIDURAL; INFILTRATION; INTRACAUDAL; PERINEURAL ONCE
Status: DISCONTINUED | OUTPATIENT
Start: 2020-02-03 | End: 2020-02-16 | Stop reason: HOSPADM

## 2020-02-03 RX ORDER — METRONIDAZOLE 500 MG/100ML
500 INJECTION, SOLUTION INTRAVENOUS
Status: CANCELLED | OUTPATIENT
Start: 2020-02-03

## 2020-02-03 RX ORDER — HEPARIN SODIUM 5000 [USP'U]/ML
5000 INJECTION, SOLUTION INTRAVENOUS; SUBCUTANEOUS
Status: CANCELLED | OUTPATIENT
Start: 2020-02-03 | End: 2020-02-03

## 2020-02-03 NOTE — H&P (VIEW-ONLY)
History & Physical    SUBJECTIVE:     CC: rectosigmoid cancer  Ref MD: Brenda Ochoa MD    History of Present Illness:  Patient is a 69 y.o. 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  Patient was undergoing a colonoscopy on November 7, 2019 where a rectosigmoid mass was found and biopsied which showed moderately differentiated adenocarcinoma.  Patient had no other intramucosal lesions at that time.  She is undergoing colonoscopy for iron deficiency anemia.  She had previously been found the month before to have iron deficiency anemia as well as blood when wiping after a bowel movement.  She is on chronic anticoagulation therapy of Coumadin for mechanical heart valve which was done in 2003.  She denies any fever, chills, nausea, vomiting, diarrhea, melena, weight loss or any other signs or symptoms.  She has no personal family history of colorectal cancer or IBD.  Her last colonoscopy prior to this was in 2017 were no lesion was found in this area. Her only significant past surgical history is this mechanical valve replacement.  No episodes of fecal incontinence per her report and some chronic constipation noted. She is referred to Colorectal surgery for evaluation.  I have personally spoken with her referring provider and reviewed her previous records.    Interval history:  Since last clinic visit, the patient underwent MRI of the liver showing likely benign lesion.  Discussed with surgical oncology in [place] to feels it is reasonable to have surveillance of the area and proceed with primary tumor resection.  Continues to deny any fever, chills, nausea, vomiting or diarrhea.  Taking Linzess for constipation.      Review of patient's allergies indicates:   Allergen Reactions    Penicillins Rash       Current Outpatient Medications   Medication Sig Dispense Refill    acebutolol (SECTRAL) 200 MG capsule Take 400 mg by mouth 2 (two) times daily.       acebutolol (SECTRAL) 400 mg capsule Take  400 mg by mouth 2 (two) times daily.      ALPRAZolam (XANAX) 1 MG tablet Take 1 tablet by mouth nightly as needed for Insomnia.      butalbital-acetaminophen-caffeine -40 mg (FIORICET, ESGIC) -40 mg per tablet TAKE ONE TABLET BY MOUTH EVERY SIX HOURS AS NEEDED      enoxaparin (LOVENOX) 60 mg/0.6 mL Syrg Inject 0.6 mLs (60 mg total) into the skin 2 (two) times daily. 2 Syringe 0    ergocalciferol (ERGOCALCIFEROL) 50,000 unit Cap Take 1 capsule by mouth every 30 days.      furosemide (LASIX) 40 MG tablet Take 40 mg by mouth Daily.      LINZESS 145 mcg Cap capsule TAKE 1 CAPSULE (145 MCG TOTAL) BY MOUTH ONCE DAILY. 30 capsule 5    lovastatin (MEVACOR) 40 MG tablet Take 40 mg by mouth every evening.      metroNIDAZOLE (FLAGYL) 500 MG tablet Take 1 tablet (500 mg total) by mouth 3 (three) times daily. Take initial dose@2pm, second dose@3pm and final dose@10pm day before surgery 3 tablet 0    multivitamin (THERAGRAN) tablet Take 1 tablet by mouth.      neomycin (MYCIFRADIN) 500 mg Tab Take 2 tablets (1,000 mg total) by mouth 3 (three) times daily. Take 1st dose@2pm,take 2nd dose@3pm, take last dose@10pm day before surgery 6 tablet 0    pantoprazole (PROTONIX) 20 MG tablet Take 1 tablet (20 mg total) by mouth once daily. 30 tablet 11    polyethylene glycol (GLYCOLAX) 17 gram/dose powder Take 238 g by mouth once daily. Mix with 2L of gatorade, drink all mixed solution starting@12pm day before surgery, finish by 10pm 1 Bottle 0    sertraline (ZOLOFT) 100 MG tablet Take 1 tablet by mouth Daily.      traMADol (ULTRAM) 50 mg tablet 50 mg every 8 (eight) hours as needed.       warfarin (COUMADIN) 5 MG tablet Take 1 tablet (5 mg total) by mouth Daily. 5 mg by mouth for five days then alternate with 2.5 mg for one day.      zolpidem (AMBIEN) 10 mg Tab Take 5 mg by mouth nightly as needed.        Current Facility-Administered Medications   Medication Dose Route Frequency Provider Last Rate Last Dose     lidocaine (PF) 10 mg/ml (1%) injection 10 mg  1 mL Intradermal Once Ankur Smith MD        lidocaine (PF) 10 mg/ml (1%) injection 10 mg  1 mL Intradermal Once Ankur Smith MD           Past Medical History:   Diagnosis Date    Anticoagulant long-term use     Aortic valve defect     Benign neoplasm of ascending colon 4/6/2017    DDD (degenerative disc disease), cervical 7/20/2018    GERD (gastroesophageal reflux disease)     Hemorrhoids     HLD (hyperlipidemia)     Hypertension     Insomnia 12/1/2014    Irritable bowel syndrome with constipation 4/9/2018    Postmenopausal osteoporosis 7/20/2018     Past Surgical History:   Procedure Laterality Date    AORTIC VALVE REPLACEMENT  2003    COLONOSCOPY Left 4/6/2017    Procedure: COLONOSCOPY;  Surgeon: Sander Ulloa MD;  Location: [facility];  Service: Endoscopy;  Laterality: Left;    COLONOSCOPY N/A 11/7/2019    Procedure: COLONOSCOPY;  Surgeon: Brenda Ochoa MD;  Location: [facility];  Service: Endoscopy;  Laterality: N/A;    ESOPHAGOGASTRODUODENOSCOPY N/A 11/7/2019    Procedure: ESOPHAGOGASTRODUODENOSCOPY (EGD) needs PT/INR;  Surgeon: Brenda Ochoa MD;  Location: [facility];  Service: Endoscopy;  Laterality: N/A;     Family History   Problem Relation Age of Onset    Diabetes Mother     Heart disease Mother     Hypertension Mother     Heart disease Father     Hypertension Father     Hypertension Sister     Hypertension Son     Heart disease Son     Colon cancer Neg Hx      Social History     Tobacco Use    Smoking status: Never Smoker    Smokeless tobacco: Never Used   Substance Use Topics    Alcohol use: Not Currently     Comment: occasionally     Drug use: No        Review of Systems:  Review of Systems   Constitutional: Negative for activity change, appetite change, chills, fatigue, fever and unexpected weight change.   HENT: Negative for congestion, ear pain, sore throat and trouble swallowing.    Eyes: Negative for pain,  redness and itching.   Respiratory: Negative for cough, shortness of breath and wheezing.    Cardiovascular: Negative for chest pain, palpitations and leg swelling.   Gastrointestinal: Positive for constipation. Negative for abdominal distention, abdominal pain, anal bleeding, blood in stool, diarrhea, nausea, rectal pain and vomiting.   Endocrine: Negative for cold intolerance, heat intolerance and polyuria.   Genitourinary: Negative for dysuria, flank pain, frequency and hematuria.   Musculoskeletal: Negative for gait problem, joint swelling and neck pain.   Skin: Negative for color change, rash and wound.   Allergic/Immunologic: Negative for environmental allergies and immunocompromised state.   Neurological: Negative for dizziness, speech difficulty, weakness and numbness.   Psychiatric/Behavioral: Negative for agitation, confusion and hallucinations.       OBJECTIVE:     Vital Signs (Most Recent)  Temp: 97.6 °F (36.4 °C) (02/03/20 0937)  Pulse: 63 (02/03/20 0937)  BP: 124/79 (02/03/20 0937)     55 kg (121 lb 4.1 oz)     Physical Exam:  Physical Exam   Constitutional: She is oriented to person, place, and time. She appears well-developed.   HENT:   Head: Normocephalic and atraumatic.   Eyes: Conjunctivae and EOM are normal.   Neck: Normal range of motion. No thyromegaly present.   Cardiovascular: Normal rate and regular rhythm.   Pulmonary/Chest: Effort normal. No respiratory distress.   Abdominal: Soft. She exhibits no distension and no mass. There is no tenderness.   No previous incisions noted   Genitourinary:   Genitourinary Comments: Anorectal: deferred   Musculoskeletal: Normal range of motion. She exhibits no edema or tenderness.   Neurological: She is alert and oriented to person, place, and time.   Skin: Skin is warm and dry. Capillary refill takes less than 2 seconds. No rash noted.   Psychiatric: She has a normal mood and affect.     Laboratory  Lab Results   Component Value Date    WBC 2.57 (L)  01/08/2020    HGB 11.9 (L) 01/08/2020    HCT 38.0 01/08/2020     (L) 01/08/2020    ALT 22 01/27/2020    AST 23 01/27/2020     01/27/2020    K 4.0 01/27/2020     01/27/2020    CREATININE 0.8 01/27/2020    BUN 11 01/27/2020    CO2 28 01/27/2020    INR 1.1 01/08/2020       Diagnostic Results:  Reviewed colonoscopy report images well as pathology showing rectosigmoid mass consistent with adenocarcinoma    MRI Liver:  FINDINGS:  There is a rounded lesion in the posterior right hepatic lobe (segment 7) which correlates with prior CT findings and is essentially unchanged in size measuring to 18 mm.  This lesion is isointense on precontrast T1 and T2 sequences and appears isointense to liver parenchyma on postcontrast images.  No definite associated internal enhancement or washout demonstrated.  No definite capsular enhancement.  This lesion does appear to demonstrate some mild intrinsic T1 signal dropout on out of phase images, suggesting high intracellular lipid content and may potentially represent focal fatty infiltration.  No associated restricted diffusion.  Few scattered subcentimeter nonenhancing cyst are present in the liver.    No discrete biliary, splenic, pancreatic, adrenal, or renal abnormality demonstrated.  No upper abdominal adenopathy demonstrated.      Impression       1. Rounded 18 mm lesion in the posterior right hepatic lobe which correlates with prior CT findings.  Lesion remains indeterminate.  While the lack of enhancement and suspected high intracellular lipid content suggests benign etiology, malignancy can not be entirely excluded.  Consider close interval follow-up.         PATHOLOGY from colonoscopy:   Rectal mass, biopsy:  - Moderately differentiated invasive adenocarcinoma with focal mucosal ulceration arising in a tubular  adenoma, see comment  Comment: Adenocarcinoma measures at least 0.6 cm and occupies approximately 70% of tissue submitted. No  definitive perineural or  lymphovascular invasion is identified. This case was seen in consultation with Dr. Amaya  who agrees with the above diagnosis. Please see results of the MMR panel below:  Immunohistochemical stains for MLH1, MSH2, MSH6 & PMS2 reveal distinct nuclear staining for all markers.  These findings indicate an INTACT mismatch repair (MMR) gene function without evidence of significant  microsatellite instability (MSI). Although these findings make the diagnosis of hereditary non-polyposis colon cancer  (HNPCC) or Urbano Syndrome very unlikely in this patient, approximately 5% of colorectal carcinomas with defective  MMR genes and high MSI may show this apparent normal pattern of staining. Correlation with the clinical findings  & family history is encouraged.    PATH IR biopsies:  1. Liver mass, biopsy:  - Not diagnostic of malignancy  - Scant atypical liver sampling, see comments  Comments: The sections show a scant fragmented sample of liver tissue with psuedoglands in one core  and mild architectural distortion. Cytologic atypiia is not prominent. Reticulin stain shows mildly irregular  pattern of staining. No portal tracts present for evaluation. Back to back pseudoglands raises the possibly of  a well differetniated hepatocellular lesion. However, the scant sample limits interpretation. Due to the  presence of a clinical mass. Resampling is indicated.    LIVER MASS, BIOPSY:  Negative for malignancy  Benign liver parenchyma  Macrosteatosis - 5%  Glycogenated nuclei  Negative for steatohepatitis  Minimal focal sinusoidal dilatation  Focal bile duct dilatation  Negative for fibrosis  Patient's history of rectal carcinoma is noticed. There is no evidence of metastatic malignancy in this biopsy.  If clinically suspicious, sampling issues should be considered.  Special stains trichrome, reticulin, beta-catenin and glutamine synthatase have also been performed and  support the above diagnosis.    ASSESSMENT/PLAN:      69-year-old female with adenocarcinoma of the rectosigmoid junction with liver lesion with negative biopsy x2 and negative MRI liver for definitive metastatic disease    - after discussion with surgical oncology in [place], felt it best to proceed with either primary resection of primary tumor with surveillance of the liver lesion versus synchronous resection.  After discussion of the risks and benefits of both options with the patient, she has chosen to proceed with resection of the primary tumor in surveillance the liver, which is reasonable given the negative biopsy x2 and MRI findings.  - will plan for robotic/laparoscopic vs open LAR in the near future  - All risks, benefits and alternatives fully explained to patient. Risks include, but are not limited to, bleeding, infection, anastomotic leak, damage to ureter, damage to other intra-abdominal organs such as colon, rectum, small bowel, stomach, liver, bladder, reproductive organs, sexual dysfunction, urinary dysfunction, postoperative abscess, conversion to open operation, perioperative MI, CVA and death.  All questions field and appropriately answered to patient's satisfaction.  Consent signed and placed on chart.  - oral abx and mechanical bowel prep prior to surgery  - patient will contact her Cardiology office and hold Coumadin for at least 5 days prior to surgery with a Lovenox bridge to to her mechanical heart valve  - ERAS protocol    Ankur Smith MD  Colon and Rectal Surgery  [facility] [place]

## 2020-02-03 NOTE — PROGRESS NOTES
History & Physical    SUBJECTIVE:     CC: rectosigmoid cancer  Ref MD: Brenda Ochoa MD    History of Present Illness:  Patient is a 69 y.o. 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  Patient was undergoing a colonoscopy on November 7, 2019 where a rectosigmoid mass was found and biopsied which showed moderately differentiated adenocarcinoma.  Patient had no other intramucosal lesions at that time.  She is undergoing colonoscopy for iron deficiency anemia.  She had previously been found the month before to have iron deficiency anemia as well as blood when wiping after a bowel movement.  She is on chronic anticoagulation therapy of Coumadin for mechanical heart valve which was done in 2003.  She denies any fever, chills, nausea, vomiting, diarrhea, melena, weight loss or any other signs or symptoms.  She has no personal family history of colorectal cancer or IBD.  Her last colonoscopy prior to this was in 2017 were no lesion was found in this area. Her only significant past surgical history is this mechanical valve replacement.  No episodes of fecal incontinence per her report and some chronic constipation noted. She is referred to Colorectal surgery for evaluation.  I have personally spoken with her referring provider and reviewed her previous records.    Interval history:  Since last clinic visit, the patient underwent MRI of the liver showing likely benign lesion.  Discussed with surgical oncology in [place] to feels it is reasonable to have surveillance of the area and proceed with primary tumor resection.  Continues to deny any fever, chills, nausea, vomiting or diarrhea.  Taking Linzess for constipation.      Review of patient's allergies indicates:   Allergen Reactions    Penicillins Rash       Current Outpatient Medications   Medication Sig Dispense Refill    acebutolol (SECTRAL) 200 MG capsule Take 400 mg by mouth 2 (two) times daily.       acebutolol (SECTRAL) 400 mg capsule Take  400 mg by mouth 2 (two) times daily.      ALPRAZolam (XANAX) 1 MG tablet Take 1 tablet by mouth nightly as needed for Insomnia.      butalbital-acetaminophen-caffeine -40 mg (FIORICET, ESGIC) -40 mg per tablet TAKE ONE TABLET BY MOUTH EVERY SIX HOURS AS NEEDED      enoxaparin (LOVENOX) 60 mg/0.6 mL Syrg Inject 0.6 mLs (60 mg total) into the skin 2 (two) times daily. 2 Syringe 0    ergocalciferol (ERGOCALCIFEROL) 50,000 unit Cap Take 1 capsule by mouth every 30 days.      furosemide (LASIX) 40 MG tablet Take 40 mg by mouth Daily.      LINZESS 145 mcg Cap capsule TAKE 1 CAPSULE (145 MCG TOTAL) BY MOUTH ONCE DAILY. 30 capsule 5    lovastatin (MEVACOR) 40 MG tablet Take 40 mg by mouth every evening.      metroNIDAZOLE (FLAGYL) 500 MG tablet Take 1 tablet (500 mg total) by mouth 3 (three) times daily. Take initial dose@2pm, second dose@3pm and final dose@10pm day before surgery 3 tablet 0    multivitamin (THERAGRAN) tablet Take 1 tablet by mouth.      neomycin (MYCIFRADIN) 500 mg Tab Take 2 tablets (1,000 mg total) by mouth 3 (three) times daily. Take 1st dose@2pm,take 2nd dose@3pm, take last dose@10pm day before surgery 6 tablet 0    pantoprazole (PROTONIX) 20 MG tablet Take 1 tablet (20 mg total) by mouth once daily. 30 tablet 11    polyethylene glycol (GLYCOLAX) 17 gram/dose powder Take 238 g by mouth once daily. Mix with 2L of gatorade, drink all mixed solution starting@12pm day before surgery, finish by 10pm 1 Bottle 0    sertraline (ZOLOFT) 100 MG tablet Take 1 tablet by mouth Daily.      traMADol (ULTRAM) 50 mg tablet 50 mg every 8 (eight) hours as needed.       warfarin (COUMADIN) 5 MG tablet Take 1 tablet (5 mg total) by mouth Daily. 5 mg by mouth for five days then alternate with 2.5 mg for one day.      zolpidem (AMBIEN) 10 mg Tab Take 5 mg by mouth nightly as needed.        Current Facility-Administered Medications   Medication Dose Route Frequency Provider Last Rate Last Dose     lidocaine (PF) 10 mg/ml (1%) injection 10 mg  1 mL Intradermal Once Ankur Smith MD        lidocaine (PF) 10 mg/ml (1%) injection 10 mg  1 mL Intradermal Once Ankur Smith MD           Past Medical History:   Diagnosis Date    Anticoagulant long-term use     Aortic valve defect     Benign neoplasm of ascending colon 4/6/2017    DDD (degenerative disc disease), cervical 7/20/2018    GERD (gastroesophageal reflux disease)     Hemorrhoids     HLD (hyperlipidemia)     Hypertension     Insomnia 12/1/2014    Irritable bowel syndrome with constipation 4/9/2018    Postmenopausal osteoporosis 7/20/2018     Past Surgical History:   Procedure Laterality Date    AORTIC VALVE REPLACEMENT  2003    COLONOSCOPY Left 4/6/2017    Procedure: COLONOSCOPY;  Surgeon: Sander Ulloa MD;  Location: [facility];  Service: Endoscopy;  Laterality: Left;    COLONOSCOPY N/A 11/7/2019    Procedure: COLONOSCOPY;  Surgeon: Brenda Ochoa MD;  Location: [facility];  Service: Endoscopy;  Laterality: N/A;    ESOPHAGOGASTRODUODENOSCOPY N/A 11/7/2019    Procedure: ESOPHAGOGASTRODUODENOSCOPY (EGD) needs PT/INR;  Surgeon: Brenda Ochoa MD;  Location: [facility];  Service: Endoscopy;  Laterality: N/A;     Family History   Problem Relation Age of Onset    Diabetes Mother     Heart disease Mother     Hypertension Mother     Heart disease Father     Hypertension Father     Hypertension Sister     Hypertension Son     Heart disease Son     Colon cancer Neg Hx      Social History     Tobacco Use    Smoking status: Never Smoker    Smokeless tobacco: Never Used   Substance Use Topics    Alcohol use: Not Currently     Comment: occasionally     Drug use: No        Review of Systems:  Review of Systems   Constitutional: Negative for activity change, appetite change, chills, fatigue, fever and unexpected weight change.   HENT: Negative for congestion, ear pain, sore throat and trouble swallowing.    Eyes: Negative for pain,  redness and itching.   Respiratory: Negative for cough, shortness of breath and wheezing.    Cardiovascular: Negative for chest pain, palpitations and leg swelling.   Gastrointestinal: Positive for constipation. Negative for abdominal distention, abdominal pain, anal bleeding, blood in stool, diarrhea, nausea, rectal pain and vomiting.   Endocrine: Negative for cold intolerance, heat intolerance and polyuria.   Genitourinary: Negative for dysuria, flank pain, frequency and hematuria.   Musculoskeletal: Negative for gait problem, joint swelling and neck pain.   Skin: Negative for color change, rash and wound.   Allergic/Immunologic: Negative for environmental allergies and immunocompromised state.   Neurological: Negative for dizziness, speech difficulty, weakness and numbness.   Psychiatric/Behavioral: Negative for agitation, confusion and hallucinations.       OBJECTIVE:     Vital Signs (Most Recent)  Temp: 97.6 °F (36.4 °C) (02/03/20 0937)  Pulse: 63 (02/03/20 0937)  BP: 124/79 (02/03/20 0937)     55 kg (121 lb 4.1 oz)     Physical Exam:  Physical Exam   Constitutional: She is oriented to person, place, and time. She appears well-developed.   HENT:   Head: Normocephalic and atraumatic.   Eyes: Conjunctivae and EOM are normal.   Neck: Normal range of motion. No thyromegaly present.   Cardiovascular: Normal rate and regular rhythm.   Pulmonary/Chest: Effort normal. No respiratory distress.   Abdominal: Soft. She exhibits no distension and no mass. There is no tenderness.   No previous incisions noted   Genitourinary:   Genitourinary Comments: Anorectal: deferred   Musculoskeletal: Normal range of motion. She exhibits no edema or tenderness.   Neurological: She is alert and oriented to person, place, and time.   Skin: Skin is warm and dry. Capillary refill takes less than 2 seconds. No rash noted.   Psychiatric: She has a normal mood and affect.     Laboratory  Lab Results   Component Value Date    WBC 2.57 (L)  01/08/2020    HGB 11.9 (L) 01/08/2020    HCT 38.0 01/08/2020     (L) 01/08/2020    ALT 22 01/27/2020    AST 23 01/27/2020     01/27/2020    K 4.0 01/27/2020     01/27/2020    CREATININE 0.8 01/27/2020    BUN 11 01/27/2020    CO2 28 01/27/2020    INR 1.1 01/08/2020       Diagnostic Results:  Reviewed colonoscopy report images well as pathology showing rectosigmoid mass consistent with adenocarcinoma    MRI Liver:  FINDINGS:  There is a rounded lesion in the posterior right hepatic lobe (segment 7) which correlates with prior CT findings and is essentially unchanged in size measuring to 18 mm.  This lesion is isointense on precontrast T1 and T2 sequences and appears isointense to liver parenchyma on postcontrast images.  No definite associated internal enhancement or washout demonstrated.  No definite capsular enhancement.  This lesion does appear to demonstrate some mild intrinsic T1 signal dropout on out of phase images, suggesting high intracellular lipid content and may potentially represent focal fatty infiltration.  No associated restricted diffusion.  Few scattered subcentimeter nonenhancing cyst are present in the liver.    No discrete biliary, splenic, pancreatic, adrenal, or renal abnormality demonstrated.  No upper abdominal adenopathy demonstrated.      Impression       1. Rounded 18 mm lesion in the posterior right hepatic lobe which correlates with prior CT findings.  Lesion remains indeterminate.  While the lack of enhancement and suspected high intracellular lipid content suggests benign etiology, malignancy can not be entirely excluded.  Consider close interval follow-up.         PATHOLOGY from colonoscopy:   Rectal mass, biopsy:  - Moderately differentiated invasive adenocarcinoma with focal mucosal ulceration arising in a tubular  adenoma, see comment  Comment: Adenocarcinoma measures at least 0.6 cm and occupies approximately 70% of tissue submitted. No  definitive perineural or  lymphovascular invasion is identified. This case was seen in consultation with Dr. Amaya  who agrees with the above diagnosis. Please see results of the MMR panel below:  Immunohistochemical stains for MLH1, MSH2, MSH6 & PMS2 reveal distinct nuclear staining for all markers.  These findings indicate an INTACT mismatch repair (MMR) gene function without evidence of significant  microsatellite instability (MSI). Although these findings make the diagnosis of hereditary non-polyposis colon cancer  (HNPCC) or Urbano Syndrome very unlikely in this patient, approximately 5% of colorectal carcinomas with defective  MMR genes and high MSI may show this apparent normal pattern of staining. Correlation with the clinical findings  & family history is encouraged.    PATH IR biopsies:  1. Liver mass, biopsy:  - Not diagnostic of malignancy  - Scant atypical liver sampling, see comments  Comments: The sections show a scant fragmented sample of liver tissue with psuedoglands in one core  and mild architectural distortion. Cytologic atypiia is not prominent. Reticulin stain shows mildly irregular  pattern of staining. No portal tracts present for evaluation. Back to back pseudoglands raises the possibly of  a well differetniated hepatocellular lesion. However, the scant sample limits interpretation. Due to the  presence of a clinical mass. Resampling is indicated.    LIVER MASS, BIOPSY:  Negative for malignancy  Benign liver parenchyma  Macrosteatosis - 5%  Glycogenated nuclei  Negative for steatohepatitis  Minimal focal sinusoidal dilatation  Focal bile duct dilatation  Negative for fibrosis  Patient's history of rectal carcinoma is noticed. There is no evidence of metastatic malignancy in this biopsy.  If clinically suspicious, sampling issues should be considered.  Special stains trichrome, reticulin, beta-catenin and glutamine synthatase have also been performed and  support the above diagnosis.    ASSESSMENT/PLAN:      69-year-old female with adenocarcinoma of the rectosigmoid junction with liver lesion with negative biopsy x2 and negative MRI liver for definitive metastatic disease    - after discussion with surgical oncology in [place], felt it best to proceed with either primary resection of primary tumor with surveillance of the liver lesion versus synchronous resection.  After discussion of the risks and benefits of both options with the patient, she has chosen to proceed with resection of the primary tumor in surveillance the liver, which is reasonable given the negative biopsy x2 and MRI findings.  - will plan for robotic/laparoscopic vs open LAR in the near future  - All risks, benefits and alternatives fully explained to patient. Risks include, but are not limited to, bleeding, infection, anastomotic leak, damage to ureter, damage to other intra-abdominal organs such as colon, rectum, small bowel, stomach, liver, bladder, reproductive organs, sexual dysfunction, urinary dysfunction, postoperative abscess, conversion to open operation, perioperative MI, CVA and death.  All questions field and appropriately answered to patient's satisfaction.  Consent signed and placed on chart.  - oral abx and mechanical bowel prep prior to surgery  - patient will contact her Cardiology office and hold Coumadin for at least 5 days prior to surgery with a Lovenox bridge to to her mechanical heart valve  - ERAS protocol    Ankur Smith MD  Colon and Rectal Surgery  [facility]

## 2020-02-07 PROBLEM — C20 RECTAL ADENOCARCINOMA: Status: ACTIVE | Noted: 2019-11-07

## 2020-02-07 NOTE — PROGRESS NOTES
[facility] UGI MULTIDISCIPLINARY TUMOR BOARD  PATIENT REVIEW FORM   ____________________________________________________________    CLINIC #: [id number]  DATE: 2/10/2020    DIAGNOSIS: sigmoid colon CA    PRESENTER: Serg Blue    PATIENT SUMMARY:   This 68 y/o female followed in [place] for BAILEY. EGD and c-scope performed in 11/2019 for concern for GIB. Found nonobstructing ulcerated 20 cm mass at anal verge. Pathology of bx in 11/2019 revealed moderately differentiated invasive adenocarcinoma.  PET avid lesion in liver seen in 12/2019. First liver biopsy in 12/2019 inconclusive, but pathology was benign on repeat bx of liver last month.   Reviewed MRI liver - appears to be benign lesion, primary diff dx would be adenoma. Reviewed pathology.     BOARD RECOMMENDATIONS:   Proceed with primary resection - lap sigmoid colectomy with surveillance of liver  Consider ablation or wedge liver bx to R posterior liver    CONSULT NEEDED:     [] Surgery    [] Hem/Onc    [] Rad/Onc    [] Dietary                 [] Social Service    [] Psychology       [] AES  [] Radiology     Clinical Stage: Tumor ** Node(s) ** Metastasis **      GROUP STAGE:  [] O    [] 1A    [] IB    [] IIA    [] IIB     [] IIIA     [] IIIB     [] IIIC    []IV                               [] Local recurrence     [] Regional recurrence     [] Distant recurrence   Metastatic site(s): none         [x] Joann'[facility] Treatment Guidelines reviewed and care planned is consistent with guidelines.         (i.e., NCCN, NCI, PD, ACO, AUA, etc.)    PRESENTATION AT CANCER CONFERENCE:         [x] Prospective    [] Retrospective     [] Follow-Up

## 2020-02-10 ENCOUNTER — TUMOR BOARD CONFERENCE (OUTPATIENT)
Dept: SURGERY | Facility: CLINIC | Age: 70
End: 2020-02-10

## 2020-02-10 DIAGNOSIS — C20 RECTAL ADENOCARCINOMA: ICD-10-CM

## 2020-02-11 NOTE — PRE ADMISSION SCREENING
Pre op instructions reviewed with patient per phone:    To confirm, Your surgeon has instructed you:  Surgery is scheduled 02/13/20 at 0700.      Please report to [facility] 1st floor main lobby by 0530.   Pre admit office to call afternoon prior to surgery with final arrival time      INSTRUCTIONS IMPORTANT!!!  ¨ Do not eat or smoke after 12 midnight, but may have water/apple juice 3 hours prior to surgery. OK to brush teeth, no gum, candy or mints!    ¨ Take only these medicines with a small swallow of water-morning of surgery.  Acebutolol, Protonix, Zoloft    Reviewed with pt the instructions on how to take Flagyl, Neomycin and mixing the Glycolax/Miralax with 2L of Gatorade. Instructed pt to finish drinking the Glycolax by 2200. Also reminded pt to remain on a clear liquid day the entire day prior to surgery. Pt verbalized understanding all instructions and was able to repeat them back to me.    ____  Do not wear makeup, including mascara.  ____  No powder, lotions or creams to surgical area.  ____  Please remove all jewelry, including piercings and leave at home.  ____  No money or valuables needed. Please leave at home.  ____  Please bring identification and insurance information to hospital.  ____  If going home the same day, arrange for a ride home. You will not be able to   drive if Anesthesia was used.  ____  Children, under 12 years old, must remain in the waiting room with an adult.  They are not allowed in patient areas.  ____  Wear loose fitting clothing. Allow for dressings, bandages.  ____  Stop Aspirin, Ibuprofen, Motrin and Aleve at least 5-7 days before surgery, unless otherwise instructed by your doctor, or the nurse.   You MAY use Tylenol/acetaminophen until day of surgery.  ____  If you take diabetic medication, do not take am of surgery unless instructed by   Doctor.  ____ Stop taking any Fish Oil supplement or any Vitamins that contain Vitamin E at least 5 days prior  to surgery.          Bathing Instructions-- The night before surgery and the morning prior to coming to the hospital:   -Do not shave the surgical area.   -Shower and wash your hair and body as usual with anti-bacterial  soap and shampoo.   -Rinse your hair and body completely.   -Use one packet of hibiclens to wash the surgical site (using your hand) gently for 5 minutes.  Do not scrub you skin too hard.   -Do not use hibiclens on your head, face, or genitals.   -Do not wash with anti-bacterial soap after you use the hibiclens.   -Rinse your body thoroughly.   -Dry with clean, soft towel.  Do not use lotion, cream, deodorant, or powders on   the surgical site.    Use antibacterial soap in place of hibiclens if your surgery is on the head, face or genitals.         Surgical Site Infection    Prevention of surgical site infections:     -Keep incisions clean and dry.   -Do not soak/submerge incisions in water until completely healed.   -Do not apply lotions, powders, creams, or deodorants to site.   -Always make sure hands are cleaned with antibacterial soap/ alcohol-based   prior to touching the surgical site.  (This includes doctors, nurses, staff, and yourself.)    Signs and symptoms:   -Redness and pain around the area where you had surgery   -Drainage of cloudy fluid from your surgical wound   -Fever over 100.4  I have read or had read and explained to me, and understand the above information.

## 2020-02-13 ENCOUNTER — ANESTHESIA (OUTPATIENT)
Dept: SURGERY | Facility: HOSPITAL | Age: 70
DRG: 330 | End: 2020-02-13
Payer: MEDICARE

## 2020-02-13 ENCOUNTER — ANESTHESIA EVENT (OUTPATIENT)
Dept: SURGERY | Facility: HOSPITAL | Age: 70
DRG: 330 | End: 2020-02-13
Payer: MEDICARE

## 2020-02-13 ENCOUNTER — HOSPITAL ENCOUNTER (INPATIENT)
Facility: HOSPITAL | Age: 70
LOS: 3 days | Discharge: HOME | DRG: 330 | End: 2020-02-16
Attending: COLON & RECTAL SURGERY | Admitting: COLON & RECTAL SURGERY
Payer: MEDICARE

## 2020-02-13 DIAGNOSIS — C20 RECTAL ADENOCARCINOMA: ICD-10-CM

## 2020-02-13 DIAGNOSIS — I10 HYPERTENSION, UNSPECIFIED TYPE: Primary | ICD-10-CM

## 2020-02-13 DIAGNOSIS — Z95.2 AORTIC VALVE REPLACED: ICD-10-CM

## 2020-02-13 DIAGNOSIS — E78.5 HYPERLIPIDEMIA, UNSPECIFIED HYPERLIPIDEMIA TYPE: ICD-10-CM

## 2020-02-13 DIAGNOSIS — C19 ADENOCARCINOMA OF RECTOSIGMOID JUNCTION: ICD-10-CM

## 2020-02-13 LAB
ABO + RH BLD: NORMAL
BLD GP AB SCN CELLS X3 SERPL QL: NORMAL
INR PPP: 1.1 (ref 0.8–1.2)
PROTHROMBIN TIME: 11.9 SEC (ref 9–12.5)

## 2020-02-13 PROCEDURE — 63600175 PHARM REV CODE 636 W HCPCS: Performed by: COLON & RECTAL SURGERY

## 2020-02-13 PROCEDURE — 36000712 HC OR TIME LEV V 1ST 15 MIN: Performed by: COLON & RECTAL SURGERY

## 2020-02-13 PROCEDURE — 25000003 PHARM REV CODE 250: Performed by: NURSE ANESTHETIST, CERTIFIED REGISTERED

## 2020-02-13 PROCEDURE — 27000221 HC OXYGEN, UP TO 24 HOURS

## 2020-02-13 PROCEDURE — 63600175 PHARM REV CODE 636 W HCPCS: Performed by: NURSE ANESTHETIST, CERTIFIED REGISTERED

## 2020-02-13 PROCEDURE — 25000003 PHARM REV CODE 250: Performed by: COLON & RECTAL SURGERY

## 2020-02-13 PROCEDURE — 37000009 HC ANESTHESIA EA ADD 15 MINS: Performed by: COLON & RECTAL SURGERY

## 2020-02-13 PROCEDURE — 94799 UNLISTED PULMONARY SVC/PX: CPT

## 2020-02-13 PROCEDURE — 44227 PR LAP, SURG CLOSE ENTEROSTOMY RESECT ANAST: ICD-10-PCS | Mod: ,,, | Performed by: COLON & RECTAL SURGERY

## 2020-02-13 PROCEDURE — 99900035 HC TECH TIME PER 15 MIN (STAT)

## 2020-02-13 PROCEDURE — 27201423 OPTIME MED/SURG SUP & DEVICES STERILE SUPPLY: Performed by: COLON & RECTAL SURGERY

## 2020-02-13 PROCEDURE — 88309 TISSUE EXAM BY PATHOLOGIST: CPT | Mod: 26,,, | Performed by: PATHOLOGY

## 2020-02-13 PROCEDURE — 71000033 HC RECOVERY, INTIAL HOUR: Performed by: COLON & RECTAL SURGERY

## 2020-02-13 PROCEDURE — 85610 PROTHROMBIN TIME: CPT

## 2020-02-13 PROCEDURE — 37000008 HC ANESTHESIA 1ST 15 MINUTES: Performed by: COLON & RECTAL SURGERY

## 2020-02-13 PROCEDURE — 88302 TISSUE EXAM BY PATHOLOGIST: CPT | Mod: 26,,, | Performed by: PATHOLOGY

## 2020-02-13 PROCEDURE — 88302 TISSUE EXAM BY PATHOLOGIST: CPT | Performed by: PATHOLOGY

## 2020-02-13 PROCEDURE — C9290 INJ, BUPIVACAINE LIPOSOME: HCPCS | Performed by: COLON & RECTAL SURGERY

## 2020-02-13 PROCEDURE — 88302 PR  SURG PATH,LEVEL II: ICD-10-PCS | Mod: 26,,, | Performed by: PATHOLOGY

## 2020-02-13 PROCEDURE — 36000713 HC OR TIME LEV V EA ADD 15 MIN: Performed by: COLON & RECTAL SURGERY

## 2020-02-13 PROCEDURE — 88309 TISSUE EXAM BY PATHOLOGIST: CPT | Mod: 59 | Performed by: PATHOLOGY

## 2020-02-13 PROCEDURE — 86901 BLOOD TYPING SEROLOGIC RH(D): CPT

## 2020-02-13 PROCEDURE — C1729 CATH, DRAINAGE: HCPCS | Performed by: COLON & RECTAL SURGERY

## 2020-02-13 PROCEDURE — 44227 LAP CLOSE ENTEROSTOMY: CPT | Mod: ,,, | Performed by: COLON & RECTAL SURGERY

## 2020-02-13 PROCEDURE — S0030 INJECTION, METRONIDAZOLE: HCPCS | Performed by: COLON & RECTAL SURGERY

## 2020-02-13 PROCEDURE — 11000001 HC ACUTE MED/SURG PRIVATE ROOM

## 2020-02-13 PROCEDURE — 88309 PR  SURG PATH,LEVEL VI: ICD-10-PCS | Mod: 26,,, | Performed by: PATHOLOGY

## 2020-02-13 RX ORDER — PANTOPRAZOLE SODIUM 20 MG/1
20 TABLET, DELAYED RELEASE ORAL DAILY
Status: DISCONTINUED | OUTPATIENT
Start: 2020-02-14 | End: 2020-02-13 | Stop reason: SDUPTHER

## 2020-02-13 RX ORDER — CIPROFLOXACIN 2 MG/ML
400 INJECTION, SOLUTION INTRAVENOUS
Status: COMPLETED | OUTPATIENT
Start: 2020-02-13 | End: 2020-02-13

## 2020-02-13 RX ORDER — ACETAMINOPHEN 10 MG/ML
1000 INJECTION, SOLUTION INTRAVENOUS ONCE
Status: DISCONTINUED | OUTPATIENT
Start: 2020-02-13 | End: 2020-02-13 | Stop reason: HOSPADM

## 2020-02-13 RX ORDER — ONDANSETRON 2 MG/ML
4 INJECTION INTRAMUSCULAR; INTRAVENOUS DAILY PRN
Status: DISCONTINUED | OUTPATIENT
Start: 2020-02-13 | End: 2020-02-13 | Stop reason: HOSPADM

## 2020-02-13 RX ORDER — ONDANSETRON 2 MG/ML
4 INJECTION INTRAMUSCULAR; INTRAVENOUS EVERY 6 HOURS PRN
Status: DISCONTINUED | OUTPATIENT
Start: 2020-02-13 | End: 2020-02-16 | Stop reason: HOSPADM

## 2020-02-13 RX ORDER — KETOROLAC TROMETHAMINE 30 MG/ML
15 INJECTION, SOLUTION INTRAMUSCULAR; INTRAVENOUS EVERY 8 HOURS PRN
Status: DISCONTINUED | OUTPATIENT
Start: 2020-02-13 | End: 2020-02-13 | Stop reason: HOSPADM

## 2020-02-13 RX ORDER — METRONIDAZOLE 500 MG/100ML
500 INJECTION, SOLUTION INTRAVENOUS
Status: COMPLETED | OUTPATIENT
Start: 2020-02-13 | End: 2020-02-13

## 2020-02-13 RX ORDER — PROPOFOL 10 MG/ML
VIAL (ML) INTRAVENOUS
Status: DISCONTINUED | OUTPATIENT
Start: 2020-02-13 | End: 2020-02-13

## 2020-02-13 RX ORDER — ONDANSETRON 2 MG/ML
4 INJECTION INTRAMUSCULAR; INTRAVENOUS EVERY 6 HOURS PRN
Status: DISCONTINUED | OUTPATIENT
Start: 2020-02-13 | End: 2020-02-13 | Stop reason: HOSPADM

## 2020-02-13 RX ORDER — DIPHENHYDRAMINE HYDROCHLORIDE 50 MG/ML
12.5 INJECTION INTRAMUSCULAR; INTRAVENOUS EVERY 6 HOURS PRN
Status: DISCONTINUED | OUTPATIENT
Start: 2020-02-13 | End: 2020-02-16 | Stop reason: HOSPADM

## 2020-02-13 RX ORDER — NEOSTIGMINE METHYLSULFATE 1 MG/ML
INJECTION, SOLUTION INTRAVENOUS
Status: DISCONTINUED | OUTPATIENT
Start: 2020-02-13 | End: 2020-02-13

## 2020-02-13 RX ORDER — ROCURONIUM BROMIDE 10 MG/ML
INJECTION, SOLUTION INTRAVENOUS
Status: DISCONTINUED | OUTPATIENT
Start: 2020-02-13 | End: 2020-02-13

## 2020-02-13 RX ORDER — SERTRALINE HYDROCHLORIDE 50 MG/1
100 TABLET, FILM COATED ORAL DAILY
Status: DISCONTINUED | OUTPATIENT
Start: 2020-02-14 | End: 2020-02-16 | Stop reason: HOSPADM

## 2020-02-13 RX ORDER — PANTOPRAZOLE SODIUM 40 MG/1
40 TABLET, DELAYED RELEASE ORAL
Status: DISCONTINUED | OUTPATIENT
Start: 2020-02-14 | End: 2020-02-16 | Stop reason: HOSPADM

## 2020-02-13 RX ORDER — SODIUM CHLORIDE, SODIUM LACTATE, POTASSIUM CHLORIDE, CALCIUM CHLORIDE 600; 310; 30; 20 MG/100ML; MG/100ML; MG/100ML; MG/100ML
INJECTION, SOLUTION INTRAVENOUS CONTINUOUS
Status: DISCONTINUED | OUTPATIENT
Start: 2020-02-13 | End: 2020-02-15

## 2020-02-13 RX ORDER — GABAPENTIN 300 MG/1
300 CAPSULE ORAL 3 TIMES DAILY
Status: DISCONTINUED | OUTPATIENT
Start: 2020-02-13 | End: 2020-02-16 | Stop reason: HOSPADM

## 2020-02-13 RX ORDER — LIDOCAINE HYDROCHLORIDE 20 MG/ML
INJECTION INTRAVENOUS
Status: DISCONTINUED | OUTPATIENT
Start: 2020-02-13 | End: 2020-02-13

## 2020-02-13 RX ORDER — FENTANYL CITRATE 50 UG/ML
INJECTION, SOLUTION INTRAMUSCULAR; INTRAVENOUS
Status: DISCONTINUED | OUTPATIENT
Start: 2020-02-13 | End: 2020-02-13

## 2020-02-13 RX ORDER — CHLORHEXIDINE GLUCONATE ORAL RINSE 1.2 MG/ML
10 SOLUTION DENTAL 2 TIMES DAILY
Status: DISCONTINUED | OUTPATIENT
Start: 2020-02-13 | End: 2020-02-16 | Stop reason: HOSPADM

## 2020-02-13 RX ORDER — BUPIVACAINE HYDROCHLORIDE 2.5 MG/ML
INJECTION, SOLUTION EPIDURAL; INFILTRATION; INTRACAUDAL
Status: DISCONTINUED | OUTPATIENT
Start: 2020-02-13 | End: 2020-02-13 | Stop reason: HOSPADM

## 2020-02-13 RX ORDER — MIDAZOLAM HYDROCHLORIDE 1 MG/ML
INJECTION, SOLUTION INTRAMUSCULAR; INTRAVENOUS
Status: DISCONTINUED | OUTPATIENT
Start: 2020-02-13 | End: 2020-02-13

## 2020-02-13 RX ORDER — ACETAMINOPHEN 10 MG/ML
1000 INJECTION, SOLUTION INTRAVENOUS EVERY 8 HOURS
Status: COMPLETED | OUTPATIENT
Start: 2020-02-14 | End: 2020-02-14

## 2020-02-13 RX ORDER — LOVASTATIN 20 MG/1
40 TABLET ORAL NIGHTLY
Status: DISCONTINUED | OUTPATIENT
Start: 2020-02-13 | End: 2020-02-16 | Stop reason: HOSPADM

## 2020-02-13 RX ORDER — HEPARIN SODIUM 5000 [USP'U]/ML
5000 INJECTION, SOLUTION INTRAVENOUS; SUBCUTANEOUS
Status: DISCONTINUED | OUTPATIENT
Start: 2020-02-13 | End: 2020-02-13

## 2020-02-13 RX ORDER — SUCCINYLCHOLINE CHLORIDE 20 MG/ML
INJECTION INTRAMUSCULAR; INTRAVENOUS
Status: DISCONTINUED | OUTPATIENT
Start: 2020-02-13 | End: 2020-02-13

## 2020-02-13 RX ORDER — ONDANSETRON 2 MG/ML
INJECTION INTRAMUSCULAR; INTRAVENOUS
Status: DISCONTINUED | OUTPATIENT
Start: 2020-02-13 | End: 2020-02-13

## 2020-02-13 RX ORDER — OXYCODONE HYDROCHLORIDE 5 MG/1
5 TABLET ORAL EVERY 4 HOURS PRN
Status: DISCONTINUED | OUTPATIENT
Start: 2020-02-13 | End: 2020-02-16 | Stop reason: HOSPADM

## 2020-02-13 RX ORDER — ACETAMINOPHEN 10 MG/ML
INJECTION, SOLUTION INTRAVENOUS
Status: DISCONTINUED | OUTPATIENT
Start: 2020-02-13 | End: 2020-02-13

## 2020-02-13 RX ORDER — OXYCODONE HYDROCHLORIDE 5 MG/1
10 TABLET ORAL EVERY 4 HOURS PRN
Status: DISCONTINUED | OUTPATIENT
Start: 2020-02-13 | End: 2020-02-16 | Stop reason: HOSPADM

## 2020-02-13 RX ORDER — HYDROMORPHONE HYDROCHLORIDE 2 MG/ML
INJECTION, SOLUTION INTRAMUSCULAR; INTRAVENOUS; SUBCUTANEOUS
Status: DISCONTINUED | OUTPATIENT
Start: 2020-02-13 | End: 2020-02-13

## 2020-02-13 RX ORDER — SODIUM CHLORIDE, SODIUM LACTATE, POTASSIUM CHLORIDE, CALCIUM CHLORIDE 600; 310; 30; 20 MG/100ML; MG/100ML; MG/100ML; MG/100ML
INJECTION, SOLUTION INTRAVENOUS CONTINUOUS
Status: DISCONTINUED | OUTPATIENT
Start: 2020-02-13 | End: 2020-02-13

## 2020-02-13 RX ORDER — HYDROMORPHONE HYDROCHLORIDE 2 MG/ML
0.2 INJECTION, SOLUTION INTRAMUSCULAR; INTRAVENOUS; SUBCUTANEOUS EVERY 5 MIN PRN
Status: DISCONTINUED | OUTPATIENT
Start: 2020-02-13 | End: 2020-02-13 | Stop reason: HOSPADM

## 2020-02-13 RX ORDER — ACEBUTOLOL HYDROCHLORIDE 200 MG/1
400 CAPSULE ORAL 2 TIMES DAILY
Status: DISCONTINUED | OUTPATIENT
Start: 2020-02-13 | End: 2020-02-16 | Stop reason: HOSPADM

## 2020-02-13 RX ORDER — GLYCOPYRROLATE 0.2 MG/ML
INJECTION INTRAMUSCULAR; INTRAVENOUS
Status: DISCONTINUED | OUTPATIENT
Start: 2020-02-13 | End: 2020-02-13

## 2020-02-13 RX ORDER — CHLORHEXIDINE GLUCONATE ORAL RINSE 1.2 MG/ML
10 SOLUTION DENTAL 2 TIMES DAILY
Status: DISCONTINUED | OUTPATIENT
Start: 2020-02-13 | End: 2020-02-13

## 2020-02-13 RX ORDER — ONDANSETRON 2 MG/ML
4 INJECTION INTRAMUSCULAR; INTRAVENOUS EVERY 6 HOURS PRN
Status: DISCONTINUED | OUTPATIENT
Start: 2020-02-13 | End: 2020-02-13 | Stop reason: SDUPTHER

## 2020-02-13 RX ADMIN — SODIUM CHLORIDE, SODIUM LACTATE, POTASSIUM CHLORIDE, AND CALCIUM CHLORIDE: .6; .31; .03; .02 INJECTION, SOLUTION INTRAVENOUS at 08:02

## 2020-02-13 RX ADMIN — ROBINUL 0.4 MG: 0.2 INJECTION INTRAMUSCULAR; INTRAVENOUS at 07:02

## 2020-02-13 RX ADMIN — LOVASTATIN 40 MG: 20 TABLET ORAL at 10:02

## 2020-02-13 RX ADMIN — CIPROFLOXACIN 400 MG: 2 INJECTION INTRAVENOUS at 03:02

## 2020-02-13 RX ADMIN — ROCURONIUM BROMIDE 45 MG: 10 INJECTION, SOLUTION INTRAVENOUS at 03:02

## 2020-02-13 RX ADMIN — METRONIDAZOLE 500 MG: 500 SOLUTION INTRAVENOUS at 03:02

## 2020-02-13 RX ADMIN — MIDAZOLAM 2 MG: 1 INJECTION INTRAMUSCULAR; INTRAVENOUS at 02:02

## 2020-02-13 RX ADMIN — HYDROMORPHONE HYDROCHLORIDE 0.5 MG: 2 INJECTION INTRAMUSCULAR; INTRAVENOUS; SUBCUTANEOUS at 07:02

## 2020-02-13 RX ADMIN — FENTANYL CITRATE 100 MCG: 50 INJECTION, SOLUTION INTRAMUSCULAR; INTRAVENOUS at 02:02

## 2020-02-13 RX ADMIN — BUPIVACAINE 266 MG: 13.3 INJECTION, SUSPENSION, LIPOSOMAL INFILTRATION at 03:02

## 2020-02-13 RX ADMIN — SODIUM CHLORIDE, SODIUM LACTATE, POTASSIUM CHLORIDE, AND CALCIUM CHLORIDE: 600; 310; 30; 20 INJECTION, SOLUTION INTRAVENOUS at 02:02

## 2020-02-13 RX ADMIN — ONDANSETRON 4 MG: 2 INJECTION, SOLUTION INTRAMUSCULAR; INTRAVENOUS at 06:02

## 2020-02-13 RX ADMIN — SODIUM CHLORIDE, SODIUM LACTATE, POTASSIUM CHLORIDE, AND CALCIUM CHLORIDE: 600; 310; 30; 20 INJECTION, SOLUTION INTRAVENOUS at 09:02

## 2020-02-13 RX ADMIN — ROCURONIUM BROMIDE 30 MG: 10 INJECTION, SOLUTION INTRAVENOUS at 04:02

## 2020-02-13 RX ADMIN — SUCCINYLCHOLINE CHLORIDE 140 MG: 20 INJECTION, SOLUTION INTRAMUSCULAR; INTRAVENOUS at 02:02

## 2020-02-13 RX ADMIN — PROPOFOL 150 MG: 10 INJECTION, EMULSION INTRAVENOUS at 02:02

## 2020-02-13 RX ADMIN — NEOSTIGMINE METHYLSULFATE 3 MG: 1 INJECTION INTRAVENOUS at 07:02

## 2020-02-13 RX ADMIN — CHLORHEXIDINE GLUCONATE 0.12% ORAL RINSE 10 ML: 1.2 LIQUID ORAL at 08:02

## 2020-02-13 RX ADMIN — Medication 100 MG: at 02:02

## 2020-02-13 RX ADMIN — ACEBUTOLOL HYDROCHLORIDE 400 MG: 200 CAPSULE ORAL at 10:02

## 2020-02-13 RX ADMIN — ACETAMINOPHEN 1000 MG: 10 INJECTION, SOLUTION INTRAVENOUS at 06:02

## 2020-02-13 RX ADMIN — ROCURONIUM BROMIDE 5 MG: 10 INJECTION, SOLUTION INTRAVENOUS at 02:02

## 2020-02-13 NOTE — PLAN OF CARE
Ambulated without difficulty to preop. Dr king at bedside. Informed pt took lovenox 60mg at 0430 this am. Coumadin last taken sat with lovenox bridge per cardiologist  . Pt was not instructed to hold morning of surgery. Do not admin heparin ordered preop. Dr king discussed at length with pt, zenaida and her son. Decision made to admit pt and delay surgery until 3pm. Pt agreed with plan. Will continue to monitor until room available.

## 2020-02-13 NOTE — PROGRESS NOTES
Called to bathroom by pt for c/o smal amt blood noted to tissue after bowel movement. Pt reports rust colo liquid stool. Dr king informed. No new orders at this time.

## 2020-02-13 NOTE — PROGRESS NOTES
Ok for pt to have clear liquids until 12noon per chidi alvarez and christine. ivf started at 75 mls/hr. Pt states she just wants water. Pt transferred to pt bed for comfort. Will continue to monitor. Surgery scheduled for 3pm.

## 2020-02-13 NOTE — PROGRESS NOTES
Pt doing well. Without complaints. jannie bottle and 1/2 of water today. Nothing by mouth after 12pm. ivf cont at 75ml/hr as ordered. Pt able to ambulate without difficulty to and from the bathroom prn. Family in and out at bedside. Pt in bed in preop. Will proceed to surgery at aprox 3pm. Pt will be admitted to med/tele after surgery.

## 2020-02-13 NOTE — INTERVAL H&P NOTE
The patient has been examined and the H&P has been reviewed:    I concur with the findings and no changes have occurred since H&P was written.   Patient did her prep well and is ready for surgery. She transitioned off of Coumadin therapy and on to therapeutic anticoagulation with Lovenox.  However she did take a dose of therapeutic Lovenox very early this morning prior to coming to the hospital therefore we will wait additional 12 hr prior to proceeding to the operating room.    Anesthesia/Surgery risks, benefits and alternative options discussed and understood by patient/family.          Active Hospital Problems    Diagnosis  POA    Rectal adenocarcinoma [C20]  Yes      Resolved Hospital Problems   No resolved problems to display.

## 2020-02-14 LAB
ANION GAP SERPL CALC-SCNC: 9 MMOL/L (ref 8–16)
APTT BLDCRRT: 30.8 SEC (ref 21–32)
APTT BLDCRRT: 49.2 SEC (ref 21–32)
BASOPHILS # BLD AUTO: 0.02 K/UL (ref 0–0.2)
BASOPHILS # BLD AUTO: 0.03 K/UL (ref 0–0.2)
BASOPHILS NFR BLD: 0.4 % (ref 0–1.9)
BASOPHILS NFR BLD: 0.6 % (ref 0–1.9)
BUN SERPL-MCNC: 4 MG/DL (ref 8–23)
CALCIUM SERPL-MCNC: 9.1 MG/DL (ref 8.7–10.5)
CHLORIDE SERPL-SCNC: 106 MMOL/L (ref 95–110)
CO2 SERPL-SCNC: 27 MMOL/L (ref 23–29)
CREAT SERPL-MCNC: 0.6 MG/DL (ref 0.5–1.4)
DIFFERENTIAL METHOD: ABNORMAL
DIFFERENTIAL METHOD: ABNORMAL
EOSINOPHIL # BLD AUTO: 0 K/UL (ref 0–0.5)
EOSINOPHIL # BLD AUTO: 0.1 K/UL (ref 0–0.5)
EOSINOPHIL NFR BLD: 0 % (ref 0–8)
EOSINOPHIL NFR BLD: 1.4 % (ref 0–8)
ERYTHROCYTE [DISTWIDTH] IN BLOOD BY AUTOMATED COUNT: 12.7 % (ref 11.5–14.5)
ERYTHROCYTE [DISTWIDTH] IN BLOOD BY AUTOMATED COUNT: 12.7 % (ref 11.5–14.5)
EST. GFR  (AFRICAN AMERICAN): >60 ML/MIN/1.73 M^2
EST. GFR  (NON AFRICAN AMERICAN): >60 ML/MIN/1.73 M^2
GLUCOSE SERPL-MCNC: 90 MG/DL (ref 70–110)
HCT VFR BLD AUTO: 35.1 % (ref 37–48.5)
HCT VFR BLD AUTO: 35.2 % (ref 37–48.5)
HGB BLD-MCNC: 11 G/DL (ref 12–16)
HGB BLD-MCNC: 11.1 G/DL (ref 12–16)
IMM GRANULOCYTES # BLD AUTO: 0.01 K/UL (ref 0–0.04)
IMM GRANULOCYTES # BLD AUTO: 0.06 K/UL (ref 0–0.04)
IMM GRANULOCYTES NFR BLD AUTO: 0.2 % (ref 0–0.5)
IMM GRANULOCYTES NFR BLD AUTO: 1.2 % (ref 0–0.5)
INR PPP: 1.1 (ref 0.8–1.2)
LYMPHOCYTES # BLD AUTO: 0.7 K/UL (ref 1–4.8)
LYMPHOCYTES # BLD AUTO: 0.8 K/UL (ref 1–4.8)
LYMPHOCYTES NFR BLD: 14.7 % (ref 18–48)
LYMPHOCYTES NFR BLD: 15.7 % (ref 18–48)
MAGNESIUM SERPL-MCNC: 1.8 MG/DL (ref 1.6–2.6)
MCH RBC QN AUTO: 31.8 PG (ref 27–31)
MCH RBC QN AUTO: 32.2 PG (ref 27–31)
MCHC RBC AUTO-ENTMCNC: 31.3 G/DL (ref 32–36)
MCHC RBC AUTO-ENTMCNC: 31.5 G/DL (ref 32–36)
MCV RBC AUTO: 101 FL (ref 82–98)
MCV RBC AUTO: 102 FL (ref 82–98)
MONOCYTES # BLD AUTO: 0.4 K/UL (ref 0.3–1)
MONOCYTES # BLD AUTO: 0.6 K/UL (ref 0.3–1)
MONOCYTES NFR BLD: 11.1 % (ref 4–15)
MONOCYTES NFR BLD: 9.1 % (ref 4–15)
NEUTROPHILS # BLD AUTO: 3.5 K/UL (ref 1.8–7.7)
NEUTROPHILS # BLD AUTO: 3.6 K/UL (ref 1.8–7.7)
NEUTROPHILS NFR BLD: 71 % (ref 38–73)
NEUTROPHILS NFR BLD: 74.6 % (ref 38–73)
NRBC BLD-RTO: 0 /100 WBC
NRBC BLD-RTO: 0 /100 WBC
PHOSPHATE SERPL-MCNC: 3.1 MG/DL (ref 2.7–4.5)
PLATELET # BLD AUTO: 168 K/UL (ref 150–350)
PLATELET # BLD AUTO: 172 K/UL (ref 150–350)
PMV BLD AUTO: 10 FL (ref 9.2–12.9)
PMV BLD AUTO: 10.2 FL (ref 9.2–12.9)
POTASSIUM SERPL-SCNC: 3.6 MMOL/L (ref 3.5–5.1)
PROTHROMBIN TIME: 11.9 SEC (ref 9–12.5)
RBC # BLD AUTO: 3.45 M/UL (ref 4–5.4)
RBC # BLD AUTO: 3.46 M/UL (ref 4–5.4)
SODIUM SERPL-SCNC: 142 MMOL/L (ref 136–145)
WBC # BLD AUTO: 4.84 K/UL (ref 3.9–12.7)
WBC # BLD AUTO: 4.97 K/UL (ref 3.9–12.7)

## 2020-02-14 PROCEDURE — 80048 BASIC METABOLIC PNL TOTAL CA: CPT

## 2020-02-14 PROCEDURE — 99900035 HC TECH TIME PER 15 MIN (STAT)

## 2020-02-14 PROCEDURE — 21400001 HC TELEMETRY ROOM

## 2020-02-14 PROCEDURE — 25000003 PHARM REV CODE 250: Performed by: NURSE PRACTITIONER

## 2020-02-14 PROCEDURE — 85730 THROMBOPLASTIN TIME PARTIAL: CPT

## 2020-02-14 PROCEDURE — 99024 POSTOP FOLLOW-UP VISIT: CPT | Mod: ,,, | Performed by: COLON & RECTAL SURGERY

## 2020-02-14 PROCEDURE — 85025 COMPLETE CBC W/AUTO DIFF WBC: CPT | Mod: 91

## 2020-02-14 PROCEDURE — 63600175 PHARM REV CODE 636 W HCPCS: Performed by: COLON & RECTAL SURGERY

## 2020-02-14 PROCEDURE — 99024 PR POST-OP FOLLOW-UP VISIT: ICD-10-PCS | Mod: ,,, | Performed by: COLON & RECTAL SURGERY

## 2020-02-14 PROCEDURE — 83735 ASSAY OF MAGNESIUM: CPT

## 2020-02-14 PROCEDURE — 94761 N-INVAS EAR/PLS OXIMETRY MLT: CPT

## 2020-02-14 PROCEDURE — 94799 UNLISTED PULMONARY SVC/PX: CPT

## 2020-02-14 PROCEDURE — 11000001 HC ACUTE MED/SURG PRIVATE ROOM

## 2020-02-14 PROCEDURE — 36415 COLL VENOUS BLD VENIPUNCTURE: CPT

## 2020-02-14 PROCEDURE — 63600175 PHARM REV CODE 636 W HCPCS: Performed by: NURSE PRACTITIONER

## 2020-02-14 PROCEDURE — 25000003 PHARM REV CODE 250: Performed by: COLON & RECTAL SURGERY

## 2020-02-14 PROCEDURE — 85610 PROTHROMBIN TIME: CPT

## 2020-02-14 PROCEDURE — 84100 ASSAY OF PHOSPHORUS: CPT

## 2020-02-14 RX ORDER — ACETAMINOPHEN 325 MG/1
650 TABLET ORAL EVERY 6 HOURS
Status: DISCONTINUED | OUTPATIENT
Start: 2020-02-14 | End: 2020-02-16 | Stop reason: HOSPADM

## 2020-02-14 RX ORDER — TALC
6 POWDER (GRAM) TOPICAL NIGHTLY PRN
Status: DISCONTINUED | OUTPATIENT
Start: 2020-02-14 | End: 2020-02-16 | Stop reason: HOSPADM

## 2020-02-14 RX ORDER — HEPARIN SODIUM,PORCINE/D5W 25000/250
12 INTRAVENOUS SOLUTION INTRAVENOUS CONTINUOUS
Status: DISCONTINUED | OUTPATIENT
Start: 2020-02-14 | End: 2020-02-15

## 2020-02-14 RX ADMIN — ONDANSETRON 4 MG: 2 INJECTION INTRAMUSCULAR; INTRAVENOUS at 12:02

## 2020-02-14 RX ADMIN — LOVASTATIN 40 MG: 20 TABLET ORAL at 09:02

## 2020-02-14 RX ADMIN — PANTOPRAZOLE SODIUM 40 MG: 40 TABLET, DELAYED RELEASE ORAL at 05:02

## 2020-02-14 RX ADMIN — ACETAMINOPHEN 650 MG: 325 TABLET ORAL at 07:02

## 2020-02-14 RX ADMIN — SODIUM CHLORIDE, SODIUM LACTATE, POTASSIUM CHLORIDE, AND CALCIUM CHLORIDE: .6; .31; .03; .02 INJECTION, SOLUTION INTRAVENOUS at 11:02

## 2020-02-14 RX ADMIN — SERTRALINE HYDROCHLORIDE 100 MG: 50 TABLET ORAL at 05:02

## 2020-02-14 RX ADMIN — ACEBUTOLOL HYDROCHLORIDE 400 MG: 200 CAPSULE ORAL at 09:02

## 2020-02-14 RX ADMIN — ACETAMINOPHEN 1000 MG: 10 INJECTION, SOLUTION INTRAVENOUS at 05:02

## 2020-02-14 RX ADMIN — CHLORHEXIDINE GLUCONATE 0.12% ORAL RINSE 10 ML: 1.2 LIQUID ORAL at 08:02

## 2020-02-14 RX ADMIN — MELATONIN 6 MG: at 09:02

## 2020-02-14 RX ADMIN — HEPARIN SODIUM 12 UNITS/KG/HR: 10000 INJECTION, SOLUTION INTRAVENOUS at 02:02

## 2020-02-14 RX ADMIN — GABAPENTIN 300 MG: 300 CAPSULE ORAL at 09:02

## 2020-02-14 RX ADMIN — OXYCODONE 10 MG: 5 TABLET ORAL at 12:02

## 2020-02-14 RX ADMIN — GABAPENTIN 300 MG: 300 CAPSULE ORAL at 03:02

## 2020-02-14 RX ADMIN — CHLORHEXIDINE GLUCONATE 0.12% ORAL RINSE 10 ML: 1.2 LIQUID ORAL at 09:02

## 2020-02-14 RX ADMIN — ACETAMINOPHEN 1000 MG: 10 INJECTION, SOLUTION INTRAVENOUS at 01:02

## 2020-02-14 RX ADMIN — OXYCODONE 10 MG: 5 TABLET ORAL at 01:02

## 2020-02-14 RX ADMIN — OXYCODONE 10 MG: 5 TABLET ORAL at 05:02

## 2020-02-14 NOTE — ANESTHESIA POSTPROCEDURE EVALUATION
Anesthesia Post Evaluation    Patient: Adalgisa Wright    Procedure(s) Performed: Procedure(s) (LRB):  XI ROBOTIC RESECTION, COLON, LOW ANTERIOR (N/A)  BLOCK, TRANSVERSUS ABDOMINIS PLANE (N/A)  SIGMOIDOSCOPY, FLEXIBLE (N/A)    Final Anesthesia Type: general    Patient location during evaluation: PACU  Patient participation: Yes- Able to Participate  Level of consciousness: awake and alert  Post-procedure vital signs: reviewed and stable  Pain management: adequate  Airway patency: patent    PONV status at discharge: No PONV  Anesthetic complications: no      Cardiovascular status: blood pressure returned to baseline  Respiratory status: unassisted  Hydration status: euvolemic  Follow-up not needed.          Vitals Value Taken Time   /79 2/13/2020  2:00 PM   Temp 36.9 °C (98.4 °F) 2/13/2020  2:00 PM   Pulse 67 2/13/2020  2:00 PM   Resp 18 2/13/2020  2:00 PM   SpO2 95 % 2/13/2020  2:00 PM         No case tracking events are documented in the log.      Pain/Keny Score: No data recorded

## 2020-02-14 NOTE — OP NOTE
[facility]  Surgery Department  Operative Note    SUMMARY     Date of Procedure: 2/13/2020     Procedure:  1.  Robotic low anterior resection with colorectal anastomosis  2.  Laparoscopic transversus abdominis plane block  3.  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  Robotic low anterior resection with colorectal anastomosis  2.  Laparoscopic transversus abdominis plane block  3.  Flexible sigmoidoscopy    Indications for Procedure:  A 69-year-old female who was found to have upper rectal/rectosigmoid adenocarcinoma who presents for definitive surgical resection    Findings of the Procedure:  Tattoo in tumor just at the rectosigmoid junction and below it in the upper rectum.  No evidence of metastatic disease    Description of the Procedure:  Patient was brought to the operating placed supine on table. General endotracheal anesthesia was then induced.  Patient was then moved into the lithotomy position.  Schmidt catheter was then sterilely placed.  Rectal washout was then performed in usual fashion.  The abdomen was then prepped and draped usual sterile fashion. A preop surgical time-out was then performed confirming the correct patient, procedure and preoperative medications given.    A left upper quadrant 8 mm incision was made just below the costal margin and a Veress needle was inserted into the abdominal cavity and confirmed in good position with aspiration and saline drop test. The abdomen was then insufflated to a pressure 15 mm of mercury.  An 8 mm robotic trocar was then inserted using laparoscopic camera via Optiview technique in usual fashion. The abdomen was then checked for any signs  of injury upon entry which there was none.  The abdomen was then checked for any signs of metastatic disease both in the peritoneal lining, the omentum, the small bowel and the liver which there was none.  A laparoscopic transversus abdominis plane block was then performed using a mixture of 20 cc of Exparel, 30 cc of 0.25% bupivacaine plain and 10 cc of injectable saline mixture.  12.5 cc injected into each abdominal quadrant into the transversus abdominis plane in all 4 quadrants under direct laparoscopic visualization for a total of 50 cc given for the block.  Remaining 10 cc of this mixture was used at the end of the case for local wound infiltration.  Additional 8 mm robotic trocars were then placed from left upper quadrant to the right lower quadrant in usual fashion with care to have no injuries upon entry.  The small bowel was swept out of the pelvis and the robot was docked in usual fashion.    The tattoo was identified with palpable tumor at the rectosigmoid junction and in the upper rectum as expected.  The sigmoid colon was then elevated and the JEOVANNY pedicle was identified. The mesentery was scored inferior to the superior rectal artery and a medial to lateral dissection was undertaken with attempt to identify the ureter.  Initially the ureter could not be safely identified and therefore a lateral to medial dissection was then undertaken with mobilizing the sigmoid colon descending colon off of the retroperitoneal structures along the white line of Toldt with sharp dissection. Care was taken not to enter the retroperitoneum during this time.  The left ureter was then easily identified laterally to the sigmoid colon and its mesentery. At this point, a medial to lateral dissection was then undertaken again and the ureter was then finally identified and a medial to lateral dissection.  This dissection was carried up as far as could be superiorly and inferiorly taking care to sweep down the ureter and the  retroperitoneum during the process.  Once the ureter was safely away, the JEOVANNY pedicle was identified and was carefully dissected out using the robotic vessel sealer.  The JEOVANNY was dissected out via high ligation where it had its takeoff from the aorta and a high ligation of the JEOVANNY was then performed using the vessel sealer.  Care was taken not to catch any retroperitoneal structures in the ureter was identified and safely away during this time.  The stump was then checked and found to be hemostatic. The IMV was then taken in a similar fashion near this location.  Attention was then turned to lateral mobilization and the descending colon was then fully mobilized up to the splenic flexure off of its lateral attachments to lateral abdominal wall as well as to the posterior retroperitoneal structures enteritis fascia with blunt dissection.     Once the descending colon was fully mobilized, attention was turned to the rectosigmoid junction. The retrorectal plane was entered posteriorly and dissection was carried down using sharp dissection to around 5 cm beneath the tattoo and tumor.  Once this was taken down to this level, the lateral dissection was carried down to the same level using the vessel sealer with care taken not to injure the lateral sidewall and with both ureters on the left and right identified. Once we were safely below the tumor and tattoo and a point of transection was chosen just above the peritoneal reflection, a mesenteric window was made beneath the rectal wall at this location and the mesorectum was transected using the vessel sealer.  Once this was accomplished, the right lower quadrant port was then upsized to a 12 mm port. A robotic 60 mm blue load was then used to transect the rectum at this chosen location in the mid to upper rectum.  Two loads of the stapler were used to completely transect the rectum.  With the rectum fully taken, attention was turned to an area above the tumor and at least 5  cm proximal to this where the JEOVANNY pedicle was.  The mesentery just proximal to the JEOVANNY was then taken using the vessel sealer up near the descending colon wall. Care was taken not to take the full mesentery as this would be taken later with the anastomosis. The robot was de docked.      A left lower quadrant incision was then made and the fascia was sharply incised.  The rectus muscle was spread and the peritoneum was then sharply incised using electrocautery to gain access to the peritoneal cavity. The abdomen was then desufflated. A an Amor wound retractor was placed through this defect and the specimen was exteriorized through the left lower quadrant incision. The tumor was palpable at least 5 cm above the distal resection margin.  The JEOVANNY pedicle was identified and the mesentery was identified where the sigmoid descending junction was.  A mesenteric window was made beneath this location and the mesentery was flash to confirm pulsatile blood flow at this junction and then tied with 0 silk suture in usual fashion. The bowel was then clamped at the descending sigmoid junction with a Blue clamp and the bowel was divided using a scalpel. The specimen was then passed off the field for final pathology. The Blue clamp was removed from the descending colon stump and a 2-0 Prolene suture was used to create a pursestring and the anvil of a 29 mm EEA suture was then inserted into this stump and the pursestring was completed in usual fashion. A small amount of mesenteric fat was cleared off of the anvil.  The anvil was then placed back within the abdominal cavity and the wound retractor was clamped to allow for re-insufflated in.      The abdomen was then reinsufflated.  The anvil reach easily into the pelvis without tension. EEA sizers were then used via the anus and found to easily reach the end of the rectal stump at the staple line.  The 29 mm EEA stapler was then inserted to the anus up to the rectal stump and  the spike was brought out.  Anvil was then  to the spike and the mesentery was checked to ensure there was no twisting which there was none and the anastomosis was checked to make sure there was no tension which there was none.  A end-to-end stapled 29 mm EEA anastomosis was then performed in usual fashion.  After withdrawing of the stapler, the donuts were checked and found to be completely intact. The pelvis was then filled with irrigation and the proximal descending colon was clamped.  A flexible sigmoidoscope was then inserted to the anus to level the anastomosis which was then insufflated.  There was a negative air leak test and the anastomosis appeared completely intact and hemostatic on direct endoscopic view.  The colon and rectum were then desufflated in usual fashion. The irrigation was then sucked out of the pelvis.  A [employer] device was then used to close the right lower quadrant 12 mm port after was removed using a 0 Vicryl suture in a figure-of-eight fashion. Once this was remove the remaining ports were then removed and the wound protector was removed and the abdomen was desufflated. The fascia of the extraction site left lower quadrant was then closed with running 0 Prolene suture. All wounds were then copiously irrigated and made hemostatic.  Additional local infiltration was then completed.  The skin was then closed of all the sites using 4-0 Monocryl suture.  Skin glue was applied. The patient was then moved into the supine position.  She was woken from general endotracheal anesthesia taken the postanesthesia care in stable condition. She tolerated procedure well.  All sponge, needle and instrument counts were correct at the end of the case.    Significant Surgical Tasks Conducted by the Assistant(s), if Applicable: N/A    Complications: No    Estimated Blood Loss (EBL): 25mL           Implants: * No implants in log *    Specimens:   Specimen (12h ago, onward)    None                   Condition: Good    Disposition: PACU - hemodynamically stable.    Attestation: I performed the procedure.

## 2020-02-14 NOTE — ASSESSMENT & PLAN NOTE
She is on chronic anticoagulation with Coumadin , will plan to start Heparin infusion in AM, discussed with primary team .

## 2020-02-14 NOTE — SUBJECTIVE & OBJECTIVE
"Interval History: Pt describes her abdominal pain "4" (1-10). Denies nausea/vomiting. Moving around well.     Review of Systems   Constitutional: Negative for chills and fatigue.   HENT: Negative for congestion, ear pain, facial swelling, sinus pressure and sore throat.    Eyes: Negative for pain.   Respiratory: Negative for apnea, chest tightness, shortness of breath and stridor.    Cardiovascular: Negative for chest pain, palpitations and leg swelling.   Gastrointestinal: Positive for abdominal pain. Negative for abdominal distention, diarrhea and nausea.   Endocrine: Negative for polydipsia and polyphagia.   Genitourinary: Negative for decreased urine volume, difficulty urinating, frequency and genital sores.   Musculoskeletal: Negative for arthralgias and gait problem.   Skin: Positive for wound.   Neurological: Negative for light-headedness and headaches.   Hematological: Negative for adenopathy.   Psychiatric/Behavioral: Negative for agitation, confusion and decreased concentration.     Objective:     Vital Signs (Most Recent):  Temp: 98.1 °F (36.7 °C) (02/14/20 1210)  Pulse: 78 (02/14/20 1210)  Resp: 18 (02/14/20 1210)  BP: 135/61 (02/14/20 1210)  SpO2: (!) 94 % (02/14/20 1210) Vital Signs (24h Range):  Temp:  [97.6 °F (36.4 °C)-99.5 °F (37.5 °C)] 98.1 °F (36.7 °C)  Pulse:  [72-84] 78  Resp:  [11-18] 18  SpO2:  [93 %-100 %] 94 %  BP: (124-146)/(61-78) 135/61     Weight: 53.5 kg (117 lb 15.1 oz)  Body mass index is 24.65 kg/m².    Intake/Output Summary (Last 24 hours) at 2/14/2020 1535  Last data filed at 2/14/2020 1200  Gross per 24 hour   Intake 2713.75 ml   Output 850 ml   Net 1863.75 ml      Physical Exam   Constitutional: She is oriented to person, place, and time. She appears well-developed and well-nourished.   HENT:   Head: Normocephalic and atraumatic.   Eyes: Pupils are equal, round, and reactive to light. EOM are normal.   Neck: Normal range of motion. Neck supple. No tracheal deviation present. No " thyromegaly present.   Cardiovascular: Normal rate and regular rhythm.   Mechanical click noted   Pulmonary/Chest: No respiratory distress. She has no wheezes. She has no rales.   Abdominal: Soft. Bowel sounds are normal. She exhibits no distension. There is no tenderness.   Scattered lap sites with surgical glue - approximated   Neurological: She is alert and oriented to person, place, and time. She has normal reflexes. No cranial nerve deficit. Coordination normal.   Skin: Skin is warm and dry. No pallor.   Psychiatric: She has a normal mood and affect. Judgment and thought content normal.   Nursing note and vitals reviewed.      Significant Labs:   CBC:   Recent Labs   Lab 02/14/20  0503 02/14/20  1237   WBC 4.84 4.97   HGB 11.0* 11.1*   HCT 35.1* 35.2*    168     CMP:   Recent Labs   Lab 02/14/20  0503      K 3.6      CO2 27   GLU 90   BUN 4*   CREATININE 0.6   CALCIUM 9.1   ANIONGAP 9   EGFRNONAA >60     All pertinent labs within the past 24 hours have been reviewed.    Significant Imaging: I have reviewed all pertinent imaging results/findings within the past 24 hours.

## 2020-02-14 NOTE — HOSPITAL COURSE
S/P 1.  Robotic low anterior resection with colorectal anastomosis  2.  Laparoscopic transversus abdominis plane block  3.  Flexible sigmoidoscopy  General Surgery is following. IV fluids and regular diet in progress. Heparin infusion for mechanical aortic valve started 2/14/2020.      As of 02/15 Labs reviewed, stable. Discussed case with Dr. Smith, stop heparin gtt today. Lovenox bridge. Coumadin to start tomorrow, 02/16. Pt will need f/u with primary cardiologist, Dr. Kemp at [facility], upon discharge. Cardiologist also manages INR level. Likely d/c in AM. On 2/16/2020, pt verbalized symptom improvement and remains stable post procedure.  Coumadin resumed and pt to follow up with established Cardiologist to manage INR.  Pt seen and examined today and deemed suitable for discharge to home per primary team.  Pt denies any evidence of active bleeding.

## 2020-02-14 NOTE — CONSULTS
[facility] Medicine  Consult Note    Patient Name: Adalgisa Wright  MRN: [id number]  Admission Date: 2/13/2020  Hospital Length of Stay: 0 days  Attending Physician: Ankur Smith MD   Primary Care Provider: Shan Stroud MD           Patient information was obtained from patient, past medical records and ER records.     Consults  Subjective:     Principal Problem: <principal problem not specified>    Chief Complaint: No chief complaint on file.       HPI: 68 year old woman with history of hypertension s/p mechanical aortic   valve replacement on lovenox bridge ,adenocarcinoma of the rectosigmoid junction with liver lesion with negative biopsy x2 and negative MRI liver for definitive metastatic disease.She had Robotic low anterior resection with colorectal anastomosis today . Hospital medicine was consulted for medical management.   Her fiancee of 16 years is in the room with her at this time.    Past Medical History:   Diagnosis Date    Anticoagulant long-term use     Aortic valve defect     Benign neoplasm of ascending colon 4/6/2017    Cancer     CHF (congestive heart failure)     DDD (degenerative disc disease), cervical 7/20/2018    GERD (gastroesophageal reflux disease)     Hemorrhoids     HLD (hyperlipidemia)     Hypertension     Insomnia 12/1/2014    Irritable bowel syndrome with constipation 4/9/2018    Postmenopausal osteoporosis 7/20/2018       Past Surgical History:   Procedure Laterality Date    AORTIC VALVE REPLACEMENT  2003    COLONOSCOPY Left 4/6/2017    Procedure: COLONOSCOPY;  Surgeon: Sander Ulloa MD;  Location: [facility];  Service: Endoscopy;  Laterality: Left;    COLONOSCOPY N/A 11/7/2019    Procedure: COLONOSCOPY;  Surgeon: Brenda Ochoa MD;  Location: [facility];  Service: Endoscopy;  Laterality: N/A;    ESOPHAGOGASTRODUODENOSCOPY N/A 11/7/2019    Procedure: ESOPHAGOGASTRODUODENOSCOPY (EGD) needs PT/INR;  Surgeon: Brenda Ochoa MD;   Location: [facility];  Service: Endoscopy;  Laterality: N/A;       Review of patient's allergies indicates:   Allergen Reactions    Penicillins Rash       No current facility-administered medications on file prior to encounter.      Current Outpatient Medications on File Prior to Encounter   Medication Sig    acebutolol (SECTRAL) 400 mg capsule Take 400 mg by mouth 2 (two) times daily.    ALPRAZolam (XANAX) 1 MG tablet Take 1 tablet by mouth nightly as needed for Insomnia.    enoxaparin (LOVENOX) 60 mg/0.6 mL Syrg Inject 0.6 mLs (60 mg total) into the skin 2 (two) times daily.    furosemide (LASIX) 40 MG tablet Take 40 mg by mouth Daily.    LINZESS 145 mcg Cap capsule TAKE 1 CAPSULE (145 MCG TOTAL) BY MOUTH ONCE DAILY. (Patient taking differently: daily as needed. )    lovastatin (MEVACOR) 40 MG tablet Take 40 mg by mouth every evening.    pantoprazole (PROTONIX) 20 MG tablet Take 1 tablet (20 mg total) by mouth once daily.    sertraline (ZOLOFT) 100 MG tablet Take 1 tablet by mouth Daily.    zolpidem (AMBIEN) 10 mg Tab Take 5 mg by mouth nightly as needed.     [DISCONTINUED] metroNIDAZOLE (FLAGYL) 500 MG tablet Take 1 tablet (500 mg total) by mouth 3 (three) times daily. Take initial dose@2pm, second dose@3pm and final dose@10pm day before surgery    [DISCONTINUED] neomycin (MYCIFRADIN) 500 mg Tab Take 2 tablets (1,000 mg total) by mouth 3 (three) times daily. Take 1st dose@2pm,take 2nd dose@3pm, take last dose@10pm day before surgery    [DISCONTINUED] polyethylene glycol (GLYCOLAX) 17 gram/dose powder Take 238 g by mouth once daily. Mix with 2L of gatorade, drink all mixed solution starting@12pm day before surgery, finish by 10pm    acebutolol (SECTRAL) 200 MG capsule Take 400 mg by mouth 2 (two) times daily.     butalbital-acetaminophen-caffeine -40 mg (FIORICET, ESGIC) -40 mg per tablet TAKE ONE TABLET BY MOUTH EVERY SIX HOURS AS NEEDED    ergocalciferol (ERGOCALCIFEROL) 50,000 unit  Cap Take 1 capsule by mouth every 30 days.    multivitamin (THERAGRAN) tablet Take 1 tablet by mouth.    traMADol (ULTRAM) 50 mg tablet 50 mg every 8 (eight) hours as needed.     warfarin (COUMADIN) 5 MG tablet Take 1 tablet (5 mg total) by mouth Daily. 5 mg by mouth for five days then alternate with 2.5 mg for one day.     Family History     Problem Relation (Age of Onset)    Diabetes Mother    Heart disease Mother, Father, Son    Hypertension Mother, Father, Sister, Son        Tobacco Use    Smoking status: Never Smoker    Smokeless tobacco: Never Used   Substance and Sexual Activity    Alcohol use: Not Currently    Drug use: No    Sexual activity: Yes     Partners: Male     Review of Systems   Constitutional: Negative for chills and fatigue.   HENT: Negative for congestion, ear pain, facial swelling, sinus pressure and sore throat.    Eyes: Negative for pain.   Respiratory: Negative for apnea, chest tightness, shortness of breath and stridor.    Cardiovascular: Negative for chest pain, palpitations and leg swelling.   Gastrointestinal: Positive for abdominal pain. Negative for abdominal distention, diarrhea and nausea.   Endocrine: Negative for polydipsia and polyphagia.   Genitourinary: Negative for decreased urine volume, difficulty urinating, frequency and genital sores.   Musculoskeletal: Negative for arthralgias and gait problem.   Neurological: Negative for light-headedness and headaches.   Hematological: Negative for adenopathy.   Psychiatric/Behavioral: Negative for agitation, confusion and decreased concentration.     Objective:     Vital Signs (Most Recent):  Temp: 97.7 °F (36.5 °C) (02/13/20 2011)  Pulse: 79 (02/13/20 2044)  Resp: 16 (02/13/20 2044)  BP: (!) 146/68 (02/13/20 2011)  SpO2: 99 % (02/13/20 2044) Vital Signs (24h Range):  Temp:  [97.7 °F (36.5 °C)-98.5 °F (36.9 °C)] 97.7 °F (36.5 °C)  Pulse:  [64-79] 79  Resp:  [11-18] 16  SpO2:  [95 %-100 %] 99 %  BP: (124-170)/(67-79) 146/68      Weight: 53.5 kg (117 lb 15.1 oz)  Body mass index is 24.65 kg/m².    Physical Exam   Constitutional: She is oriented to person, place, and time. She appears well-developed and well-nourished.   HENT:   Head: Normocephalic and atraumatic.   Eyes: Pupils are equal, round, and reactive to light. EOM are normal.   Neck: Normal range of motion. Neck supple. No tracheal deviation present. No thyromegaly present.   Cardiovascular: Normal rate and regular rhythm.   Mechanical click noted   Pulmonary/Chest: No respiratory distress. She has no wheezes. She has no rales.   Abdominal: Soft. Bowel sounds are normal. She exhibits no distension. There is no tenderness.   Recent abdominal surgery scar noted    Neurological: She is alert and oriented to person, place, and time. She has normal reflexes. No cranial nerve deficit. Coordination normal.   Skin: Skin is warm and dry. No pallor.   Psychiatric: She has a normal mood and affect. Judgment and thought content normal.   Nursing note and vitals reviewed.      Significant Labs: BMP: No results for input(s): GLU, NA, K, CL, CO2, BUN, CREATININE, CALCIUM, MG in the last 48 hours.  CBC: No results for input(s): WBC, HGB, HCT, PLT in the last 48 hours.  All pertinent labs within the past 24 hours have been reviewed.    Significant Imaging: I have reviewed and interpreted all pertinent imaging results/findings within the past 24 hours.    Assessment/Plan:     Rectal adenocarcinoma    S/p resection -will continue gen surgery /oncology follow up     HLD (hyperlipidemia)    On lovastatin     Aortic valve replaced    She is on chronic anticoagulation with Coumadin , will plan to start Heparin infusion in AM, discussed with primary team .     Hypertension    On Acetebutol -will monitor BP      VTE Risk Mitigation (From admission, onward)         Ordered     IP VTE HIGH RISK PATIENT  Once      02/13/20 2016     Place sequential compression device  Until discontinued      02/13/20 2016                     Thank you for your consult. I will follow-up with patient. Please contact us if you have any additional questions.    Rubén Woodson MD  Department of Hospital Medicine   [facility]

## 2020-02-14 NOTE — HOSPITAL COURSE
02/13/2020: Underwent robotic LAR    02/14/2020: POD1.  No acute events overnight. Pain well controlled.  No BM or flatus.  Tolerating diet.     02/15/2020: POD2. Multiple BMs, initially had blood but now nonbloody. Tolerating diet. Pain well controlled. Mild distention. Hep gtt stopped and lvnx started.    02/16/2020: POD3.  Continues to have bowel function with nonbloody bowel movements and flatus.  Tolerating regular diet.  Ambulating well.  Pain well controlled.  Distention resolved. Coumadin restarted and remains on th.lvnx.  Ready for discharge.

## 2020-02-14 NOTE — SUBJECTIVE & OBJECTIVE
Interval History: No acute events overnight. Pain well controlled.  No BM or flatus.  Tolerating diet.   Medications:  Continuous Infusions:   lactated ringers 75 mL/hr at 02/13/20 2035     Scheduled Meds:   acebutoloL  400 mg Oral BID    acetaminophen  1,000 mg Intravenous Q8H    acetaminophen  650 mg Oral Q6H    chlorhexidine  10 mL Mouth/Throat BID    gabapentin  300 mg Oral TID    lovastatin  40 mg Oral QHS    nozaseptin   Each Nostril BID    pantoprazole  40 mg Oral Before breakfast    sertraline  100 mg Oral Daily     PRN Meds:diphenhydrAMINE, ondansetron, oxyCODONE, oxyCODONE, promethazine (PHENERGAN) IVPB     Review of patient's allergies indicates:   Allergen Reactions    Penicillins Rash     Objective:     Vital Signs (Most Recent):  Temp: 99.5 °F (37.5 °C) (02/14/20 0727)  Pulse: 74 (02/14/20 0727)  Resp: 16 (02/14/20 0727)  BP: (!) 146/66 (02/14/20 0727)  SpO2: (!) 93 % (02/14/20 0817) Vital Signs (24h Range):  Temp:  [97.6 °F (36.4 °C)-99.5 °F (37.5 °C)] 99.5 °F (37.5 °C)  Pulse:  [67-84] 74  Resp:  [11-18] 16  SpO2:  [93 %-100 %] 93 %  BP: (124-165)/(66-79) 146/66     Weight: 53.5 kg (117 lb 15.1 oz)  Body mass index is 24.65 kg/m².    Intake/Output - Last 3 Shifts       02/12 0700 - 02/13 0659 02/13 0700 - 02/14 0659 02/14 0700 - 02/15 0659    P.O.  725     I.V. (mL/kg)  2252.5 (42.1)     IV Piggyback  100     Total Intake(mL/kg)  3077.5 (57.5)     Urine (mL/kg/hr)  800 (0.6)     Blood  50     Total Output  850     Net  +2227.5                  Physical Exam   Constitutional: She is oriented to person, place, and time. She appears well-developed.   HENT:   Head: Normocephalic and atraumatic.   Eyes: Conjunctivae and EOM are normal.   Neck: Normal range of motion. No thyromegaly present.   Cardiovascular: Normal rate and regular rhythm.   Pulmonary/Chest: Effort normal. No respiratory distress.   Abdominal:   Soft, nondistended, appropriately TTP; incisions c/d/i without erythema or drainage    Musculoskeletal: Normal range of motion. She exhibits no edema or tenderness.   Neurological: She is alert and oriented to person, place, and time.   Skin: Skin is warm and dry. Capillary refill takes less than 2 seconds. No rash noted.   Psychiatric: She has a normal mood and affect.       Significant Labs:  CBC:   Recent Labs   Lab 02/14/20  0503   WBC 4.84   RBC 3.46*   HGB 11.0*   HCT 35.1*      *   MCH 31.8*   MCHC 31.3*     BMP:   Recent Labs   Lab 02/14/20  0503   GLU 90      K 3.6      CO2 27   BUN 4*   CREATININE 0.6   CALCIUM 9.1   MG 1.8

## 2020-02-14 NOTE — HPI
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 Hospital medicine was consulted for medical management.   Her fiancee of 16 years is in the room with her at this time.

## 2020-02-14 NOTE — ASSESSMENT & PLAN NOTE
S/p resection -will continue gen surgery /oncology follow up   IV fluids  Prn analgesia, prn antiemetics  Regular diet

## 2020-02-14 NOTE — PROGRESS NOTES
[facility]  Colorectal Surgery  Progress Note    Subjective:     History of Present Illness:  69-year-old female with rectal/rectosigmoid adenocarcinoma who presents for definitive surgical resection    Post-Op Info:  Procedure(s) (LRB):  XI ROBOTIC RESECTION, COLON, LOW ANTERIOR (N/A)  BLOCK, TRANSVERSUS ABDOMINIS PLANE (N/A)  SIGMOIDOSCOPY, FLEXIBLE (N/A)   1 Day Post-Op     Interval History: No acute events overnight. Pain well controlled.  No BM or flatus.  Tolerating diet.   Medications:  Continuous Infusions:   lactated ringers 75 mL/hr at 02/13/20 2035     Scheduled Meds:   acebutoloL  400 mg Oral BID    acetaminophen  1,000 mg Intravenous Q8H    acetaminophen  650 mg Oral Q6H    chlorhexidine  10 mL Mouth/Throat BID    gabapentin  300 mg Oral TID    lovastatin  40 mg Oral QHS    nozaseptin   Each Nostril BID    pantoprazole  40 mg Oral Before breakfast    sertraline  100 mg Oral Daily     PRN Meds:diphenhydrAMINE, ondansetron, oxyCODONE, oxyCODONE, promethazine (PHENERGAN) IVPB     Review of patient's allergies indicates:   Allergen Reactions    Penicillins Rash     Objective:     Vital Signs (Most Recent):  Temp: 99.5 °F (37.5 °C) (02/14/20 0727)  Pulse: 74 (02/14/20 0727)  Resp: 16 (02/14/20 0727)  BP: (!) 146/66 (02/14/20 0727)  SpO2: (!) 93 % (02/14/20 0817) Vital Signs (24h Range):  Temp:  [97.6 °F (36.4 °C)-99.5 °F (37.5 °C)] 99.5 °F (37.5 °C)  Pulse:  [67-84] 74  Resp:  [11-18] 16  SpO2:  [93 %-100 %] 93 %  BP: (124-165)/(66-79) 146/66     Weight: 53.5 kg (117 lb 15.1 oz)  Body mass index is 24.65 kg/m².    Intake/Output - Last 3 Shifts       02/12 0700 - 02/13 0659 02/13 0700 - 02/14 0659 02/14 0700 - 02/15 0659    P.O.  725     I.V. (mL/kg)  2252.5 (42.1)     IV Piggyback  100     Total Intake(mL/kg)  3077.5 (57.5)     Urine (mL/kg/hr)  800 (0.6)     Blood  50     Total Output  850     Net  +2227.5                  Physical Exam   Constitutional: She is oriented to  person, place, and time. She appears well-developed.   HENT:   Head: Normocephalic and atraumatic.   Eyes: Conjunctivae and EOM are normal.   Neck: Normal range of motion. No thyromegaly present.   Cardiovascular: Normal rate and regular rhythm.   Pulmonary/Chest: Effort normal. No respiratory distress.   Abdominal:   Soft, nondistended, appropriately TTP; incisions c/d/i without erythema or drainage   Musculoskeletal: Normal range of motion. She exhibits no edema or tenderness.   Neurological: She is alert and oriented to person, place, and time.   Skin: Skin is warm and dry. Capillary refill takes less than 2 seconds. No rash noted.   Psychiatric: She has a normal mood and affect.       Significant Labs:  CBC:   Recent Labs   Lab 02/14/20  0503   WBC 4.84   RBC 3.46*   HGB 11.0*   HCT 35.1*      *   MCH 31.8*   MCHC 31.3*     BMP:   Recent Labs   Lab 02/14/20  0503   GLU 90      K 3.6      CO2 27   BUN 4*   CREATININE 0.6   CALCIUM 9.1   MG 1.8     Assessment/Plan:     Rectal adenocarcinoma  70yo F with rectal adenocarcinoma now s/p robotic LAR on 2/13/2020, doing well postop    - Reg diet  - minimize IVF  - PO pain meds  - OOB, ambulate  - await return of bowel function  - Will likely start hep gtt for anticoagulation for now     HLD (hyperlipidemia)  Management per hospital medicine    Aortic valve replaced  Management per hospital medicine    Hypertension  Management per hospital medicine        Ankur Smith MD  Colorectal Surgery  [facility]

## 2020-02-14 NOTE — HPI
69-year-old female with rectal/rectosigmoid adenocarcinoma who presents for definitive surgical resection

## 2020-02-14 NOTE — PLAN OF CARE
02/14/20 0844   Discharge Assessment   Assessment Type Discharge Planning Assessment   Confirmed/corrected address and phone number on facesheet? Yes   Assessment information obtained from? Patient   Prior to hospitilization cognitive status: Alert/Oriented   Prior to hospitalization functional status: Independent   Current cognitive status: Alert/Oriented   Current Functional Status: Independent   Lives With significant other   Able to Return to Prior Arrangements yes   Is patient able to care for self after discharge? Yes   Who are your caregiver(s) and their phone number(s)? Rubén Godinez (significant other) [phone]   Patient's perception of discharge disposition home or selfcare   Readmission Within the Last 30 Days no previous admission in last 30 days   Patient currently being followed by outpatient case management? No   Patient currently receives any other outside agency services? No   Equipment Currently Used at Home none   Do you have any problems affording any of your prescribed medications? No   Is the patient taking medications as prescribed? yes   Does the patient have transportation home? Yes   Transportation Anticipated family or friend will provide   Does the patient receive services at the [facility] Clinic? No   Discharge Plan A Home with family   Discharge Plan B Home Health   DME Needed Upon Discharge  none   Patient/Family in Agreement with Plan yes     Met with pt and her boyfriend at bedside for DC assessment. Pt lives at home with her boyfriend and does not use any DME. Pt may benefit from HH pending hospital course. SWer explained the possibility of HH to pt and received a signed preference form for [facility] should it be appropriate. No other CM DC needs identified at this time. SWer provided a transitional care folder, information on advanced directives, information on pharmacy bedside delivery, and discharge planning begins on admission with contact information for any needs/questions. Pt  opted for bedside medication delivery. Her usual pharmacy is [facility] in [place]. Information below.    [facility]/pharmacy #[id number] - [place], [place] - [address] [address]  [address] [facility] [address]  Phone: [phone] Fax: [phone]    PCP: MD Deni Alarcon LMSW 2/14/2020 8:51 AM

## 2020-02-14 NOTE — PLAN OF CARE
Pt complains of generalized abdominal pain. Pain medication is effective. Lap sites are intact. Abdomen is non distended. Pt denies n/v. Pt tolerating diet. IV fluids are infusing. No injuries. Will continue to monitor. 12 hour chart check is completed.

## 2020-02-14 NOTE — PROGRESS NOTES
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 Hospital medicine was consulted for medical management.   Her fiancee of 16 years is in the room with her at this time.    Overview/Hospital Course:  S/P 1.  Robotic low anterior resection with colorectal anastomosis  2.  Laparoscopic transversus abdominis plane block  3.  Flexible sigmoidoscopy  General Surgery is following. IV fluids and regular diet in progress. Heparin infusion for mechanical aortic valve started 2/14/2020.        Interval History: Pt describes her abdominal pain "4" (1-10). Denies nausea/vomiting. Moving around well.     Review of Systems   Constitutional: Negative for chills and fatigue.   HENT: Negative for congestion, ear pain, facial swelling, sinus pressure and sore throat.    Eyes: Negative for pain.   Respiratory: Negative for apnea, chest tightness, shortness of breath and stridor.    Cardiovascular: Negative for chest pain, palpitations and leg swelling.   Gastrointestinal: Positive for abdominal pain. Negative for abdominal distention, diarrhea and nausea.   Endocrine: Negative for polydipsia and polyphagia.   Genitourinary: Negative for decreased urine volume, difficulty urinating, frequency and genital sores.   Musculoskeletal: Negative for arthralgias and gait problem.   Skin: Positive for " wound.   Neurological: Negative for light-headedness and headaches.   Hematological: Negative for adenopathy.   Psychiatric/Behavioral: Negative for agitation, confusion and decreased concentration.     Objective:     Vital Signs (Most Recent):  Temp: 98.1 °F (36.7 °C) (02/14/20 1210)  Pulse: 78 (02/14/20 1210)  Resp: 18 (02/14/20 1210)  BP: 135/61 (02/14/20 1210)  SpO2: (!) 94 % (02/14/20 1210) Vital Signs (24h Range):  Temp:  [97.6 °F (36.4 °C)-99.5 °F (37.5 °C)] 98.1 °F (36.7 °C)  Pulse:  [72-84] 78  Resp:  [11-18] 18  SpO2:  [93 %-100 %] 94 %  BP: (124-146)/(61-78) 135/61     Weight: 53.5 kg (117 lb 15.1 oz)  Body mass index is 24.65 kg/m².    Intake/Output Summary (Last 24 hours) at 2/14/2020 1535  Last data filed at 2/14/2020 1200  Gross per 24 hour   Intake 2713.75 ml   Output 850 ml   Net 1863.75 ml      Physical Exam   Constitutional: She is oriented to person, place, and time. She appears well-developed and well-nourished.   HENT:   Head: Normocephalic and atraumatic.   Eyes: Pupils are equal, round, and reactive to light. EOM are normal.   Neck: Normal range of motion. Neck supple. No tracheal deviation present. No thyromegaly present.   Cardiovascular: Normal rate and regular rhythm.   Mechanical click noted   Pulmonary/Chest: No respiratory distress. She has no wheezes. She has no rales.   Abdominal: Soft. Bowel sounds are normal. She exhibits no distension. There is no tenderness.   Scattered lap sites with surgical glue - approximated   Neurological: She is alert and oriented to person, place, and time. She has normal reflexes. No cranial nerve deficit. Coordination normal.   Skin: Skin is warm and dry. No pallor.   Psychiatric: She has a normal mood and affect. Judgment and thought content normal.   Nursing note and vitals reviewed.      Significant Labs:   CBC:   Recent Labs   Lab 02/14/20  0503 02/14/20  1237   WBC 4.84 4.97   HGB 11.0* 11.1*   HCT 35.1* 35.2*    168     CMP:   Recent Labs    Lab 02/14/20  0503      K 3.6      CO2 27   GLU 90   BUN 4*   CREATININE 0.6   CALCIUM 9.1   ANIONGAP 9   EGFRNONAA >60     All pertinent labs within the past 24 hours have been reviewed.    Significant Imaging: I have reviewed all pertinent imaging results/findings within the past 24 hours.      Assessment/Plan:      Rectal adenocarcinoma    S/p resection -will continue gen surgery /oncology follow up   IV fluids  Prn analgesia, prn antiemetics  Regular diet    HLD (hyperlipidemia)    On lovastatin     Aortic valve replaced    She is on chronic anticoagulation with Coumadin , will plan to start Heparin infusion in AM, discussed with primary team .   2/14/2020 - heparin infusion started as advised by Dr. Smith    Hypertension  On Acetebutol -will monitor BP      VTE Risk Mitigation (From admission, onward)         Ordered     heparin 25,000 units in dextrose 5% 250 mL (100 units/mL) infusion LOW INTENSITY nomogram - [facility]  Continuous     Question:  Heparin Infusion Adjustment (DO NOT MODIFY ANSWER)  Answer:  \[email or web address]\Images\Pharmacy\HeparinInfusions\heparin LOW INTENSITY nomogram for [facility] [id number].pdf    02/14/20 1203     heparin 25,000 units in dextrose 5% (100 units/ml) IV bolus from bag - ADDITIONAL PRN BOLUS - 60 units/kg  As needed (PRN)     Question:  Heparin Infusion Adjustment (DO NOT MODIFY ANSWER)  Answer:  \[email or web address]\epic\Images\Pharmacy\HeparinInfusions\heparin LOW INTENSITY nomogram for [facility] [id number].pdf    02/14/20 1203     heparin 25,000 units in dextrose 5% (100 units/ml) IV bolus from bag - ADDITIONAL PRN BOLUS - 30 units/kg  As needed (PRN)     Question:  Heparin Infusion Adjustment (DO NOT MODIFY ANSWER)  Answer:  \[email or web address]\epic\Images\Pharmacy\HeparinInfusions\heparin LOW INTENSITY nomogram for [facility] [id number].pdf    02/14/20 1203     IP VTE HIGH RISK PATIENT  Once      02/13/20 2016     Place sequential compression device  Until discontinued      02/13/20 2016                       Ese Vásquez NP  Department of Hospital Medicine   [facility]

## 2020-02-14 NOTE — SUBJECTIVE & OBJECTIVE
Past Medical History:   Diagnosis Date    Anticoagulant long-term use     Aortic valve defect     Benign neoplasm of ascending colon 4/6/2017    Cancer     CHF (congestive heart failure)     DDD (degenerative disc disease), cervical 7/20/2018    GERD (gastroesophageal reflux disease)     Hemorrhoids     HLD (hyperlipidemia)     Hypertension     Insomnia 12/1/2014    Irritable bowel syndrome with constipation 4/9/2018    Postmenopausal osteoporosis 7/20/2018       Past Surgical History:   Procedure Laterality Date    AORTIC VALVE REPLACEMENT  2003    COLONOSCOPY Left 4/6/2017    Procedure: COLONOSCOPY;  Surgeon: Sander Ulloa MD;  Location: [facility] ENDO;  Service: Endoscopy;  Laterality: Left;    COLONOSCOPY N/A 11/7/2019    Procedure: COLONOSCOPY;  Surgeon: Brenda Ochoa MD;  Location: [facility] ENDO;  Service: Endoscopy;  Laterality: N/A;    ESOPHAGOGASTRODUODENOSCOPY N/A 11/7/2019    Procedure: ESOPHAGOGASTRODUODENOSCOPY (EGD) needs PT/INR;  Surgeon: Brenda Ochoa MD;  Location: [facility];  Service: Endoscopy;  Laterality: N/A;       Review of patient's allergies indicates:   Allergen Reactions    Penicillins Rash       No current facility-administered medications on file prior to encounter.      Current Outpatient Medications on File Prior to Encounter   Medication Sig    acebutolol (SECTRAL) 400 mg capsule Take 400 mg by mouth 2 (two) times daily.    ALPRAZolam (XANAX) 1 MG tablet Take 1 tablet by mouth nightly as needed for Insomnia.    enoxaparin (LOVENOX) 60 mg/0.6 mL Syrg Inject 0.6 mLs (60 mg total) into the skin 2 (two) times daily.    furosemide (LASIX) 40 MG tablet Take 40 mg by mouth Daily.    LINZESS 145 mcg Cap capsule TAKE 1 CAPSULE (145 MCG TOTAL) BY MOUTH ONCE DAILY. (Patient taking differently: daily as needed. )    lovastatin (MEVACOR) 40 MG tablet Take 40 mg by mouth every evening.    pantoprazole (PROTONIX) 20 MG tablet Take 1 tablet (20 mg total) by mouth once daily.     sertraline (ZOLOFT) 100 MG tablet Take 1 tablet by mouth Daily.    zolpidem (AMBIEN) 10 mg Tab Take 5 mg by mouth nightly as needed.     [DISCONTINUED] metroNIDAZOLE (FLAGYL) 500 MG tablet Take 1 tablet (500 mg total) by mouth 3 (three) times daily. Take initial dose@2pm, second dose@3pm and final dose@10pm day before surgery    [DISCONTINUED] neomycin (MYCIFRADIN) 500 mg Tab Take 2 tablets (1,000 mg total) by mouth 3 (three) times daily. Take 1st dose@2pm,take 2nd dose@3pm, take last dose@10pm day before surgery    [DISCONTINUED] polyethylene glycol (GLYCOLAX) 17 gram/dose powder Take 238 g by mouth once daily. Mix with 2L of gatorade, drink all mixed solution starting@12pm day before surgery, finish by 10pm    acebutolol (SECTRAL) 200 MG capsule Take 400 mg by mouth 2 (two) times daily.     butalbital-acetaminophen-caffeine -40 mg (FIORICET, ESGIC) -40 mg per tablet TAKE ONE TABLET BY MOUTH EVERY SIX HOURS AS NEEDED    ergocalciferol (ERGOCALCIFEROL) 50,000 unit Cap Take 1 capsule by mouth every 30 days.    multivitamin (THERAGRAN) tablet Take 1 tablet by mouth.    traMADol (ULTRAM) 50 mg tablet 50 mg every 8 (eight) hours as needed.     warfarin (COUMADIN) 5 MG tablet Take 1 tablet (5 mg total) by mouth Daily. 5 mg by mouth for five days then alternate with 2.5 mg for one day.     Family History     Problem Relation (Age of Onset)    Diabetes Mother    Heart disease Mother, Father, Son    Hypertension Mother, Father, Sister, Son        Tobacco Use    Smoking status: Never Smoker    Smokeless tobacco: Never Used   Substance and Sexual Activity    Alcohol use: Not Currently    Drug use: No    Sexual activity: Yes     Partners: Male     Review of Systems   Constitutional: Negative for chills and fatigue.   HENT: Negative for congestion, ear pain, facial swelling, sinus pressure and sore throat.    Eyes: Negative for pain.   Respiratory: Negative for apnea, chest tightness, shortness of  breath and stridor.    Cardiovascular: Negative for chest pain, palpitations and leg swelling.   Gastrointestinal: Positive for abdominal pain. Negative for abdominal distention, diarrhea and nausea.   Endocrine: Negative for polydipsia and polyphagia.   Genitourinary: Negative for decreased urine volume, difficulty urinating, frequency and genital sores.   Musculoskeletal: Negative for arthralgias and gait problem.   Neurological: Negative for light-headedness and headaches.   Hematological: Negative for adenopathy.   Psychiatric/Behavioral: Negative for agitation, confusion and decreased concentration.     Objective:     Vital Signs (Most Recent):  Temp: 97.7 °F (36.5 °C) (02/13/20 2011)  Pulse: 79 (02/13/20 2044)  Resp: 16 (02/13/20 2044)  BP: (!) 146/68 (02/13/20 2011)  SpO2: 99 % (02/13/20 2044) Vital Signs (24h Range):  Temp:  [97.7 °F (36.5 °C)-98.5 °F (36.9 °C)] 97.7 °F (36.5 °C)  Pulse:  [64-79] 79  Resp:  [11-18] 16  SpO2:  [95 %-100 %] 99 %  BP: (124-170)/(67-79) 146/68     Weight: 53.5 kg (117 lb 15.1 oz)  Body mass index is 24.65 kg/m².    Physical Exam   Constitutional: She is oriented to person, place, and time. She appears well-developed and well-nourished.   HENT:   Head: Normocephalic and atraumatic.   Eyes: Pupils are equal, round, and reactive to light. EOM are normal.   Neck: Normal range of motion. Neck supple. No tracheal deviation present. No thyromegaly present.   Cardiovascular: Normal rate and regular rhythm.   Mechanical click noted   Pulmonary/Chest: No respiratory distress. She has no wheezes. She has no rales.   Abdominal: Soft. Bowel sounds are normal. She exhibits no distension. There is no tenderness.   Recent abdominal surgery scar noted    Neurological: She is alert and oriented to person, place, and time. She has normal reflexes. No cranial nerve deficit. Coordination normal.   Skin: Skin is warm and dry. No pallor.   Psychiatric: She has a normal mood and affect. Judgment and  thought content normal.   Nursing note and vitals reviewed.      Significant Labs: BMP: No results for input(s): GLU, NA, K, CL, CO2, BUN, CREATININE, CALCIUM, MG in the last 48 hours.  CBC: No results for input(s): WBC, HGB, HCT, PLT in the last 48 hours.  All pertinent labs within the past 24 hours have been reviewed.    Significant Imaging: I have reviewed and interpreted all pertinent imaging results/findings within the past 24 hours.

## 2020-02-14 NOTE — ANESTHESIA RELEASE NOTE
"Anesthesia Release from PACU Note    Patient: Adalgisa Wright    Procedure(s) Performed: Procedure(s) (LRB):  XI ROBOTIC RESECTION, COLON, LOW ANTERIOR (N/A)  BLOCK, TRANSVERSUS ABDOMINIS PLANE (N/A)  SIGMOIDOSCOPY, FLEXIBLE (N/A)    Anesthesia type: general    Post pain: Adequate analgesia    Post assessment: no apparent anesthetic complications    Last Vitals:   Visit Vitals  BP (!) 165/79 (BP Location: Right arm, Patient Position: Lying)   Pulse 67   Temp 36.9 °C (98.4 °F) (Skin)   Resp 18   Ht 4' 10" (1.473 m)   Wt 53.5 kg (117 lb 15.1 oz)   SpO2 95%   Breastfeeding? No   BMI 24.65 kg/m²       Post vital signs: stable    Level of consciousness: awake    Nausea/Vomiting: no nausea/no vomiting    Complications: none    Airway Patency: patent    Respiratory: unassisted    Cardiovascular: stable and blood pressure at baseline    Hydration: euvolemic  "

## 2020-02-14 NOTE — ASSESSMENT & PLAN NOTE
She is on chronic anticoagulation with Coumadin , will plan to start Heparin infusion in AM, discussed with primary team .   2/14/2020 - heparin infusion started as advised by Dr. Smith

## 2020-02-14 NOTE — TRANSFER OF CARE
"Anesthesia Transfer of Care Note    Patient: Adalgisa Wright    Procedure(s) Performed: Procedure(s) (LRB):  XI ROBOTIC RESECTION, COLON, LOW ANTERIOR (N/A)  BLOCK, TRANSVERSUS ABDOMINIS PLANE (N/A)  SIGMOIDOSCOPY, FLEXIBLE (N/A)    Patient location: PACU    Anesthesia Type: general    Transport from OR: Transported from OR on room air with adequate spontaneous ventilation    Post pain: adequate analgesia    Post assessment: no apparent anesthetic complications    Post vital signs: stable    Level of consciousness: awake    Nausea/Vomiting: no nausea/vomiting    Complications: none    Transfer of care protocol was followed      Last vitals:   Visit Vitals  BP (!) 165/79 (BP Location: Right arm, Patient Position: Lying)   Pulse 67   Temp 36.9 °C (98.4 °F) (Skin)   Resp 18   Ht 4' 10" (1.473 m)   Wt 53.5 kg (117 lb 15.1 oz)   SpO2 95%   Breastfeeding? No   BMI 24.65 kg/m²     "

## 2020-02-14 NOTE — ASSESSMENT & PLAN NOTE
68yo F with rectal adenocarcinoma now s/p robotic LAR on 2/13/2020, doing well postop    - Reg diet  - minimize IVF  - PO pain meds  - OOB, ambulate  - await return of bowel function  - Will likely start hep gtt for anticoagulation for now

## 2020-02-15 LAB
ANION GAP SERPL CALC-SCNC: 7 MMOL/L (ref 8–16)
APTT BLDCRRT: 43.5 SEC (ref 21–32)
APTT BLDCRRT: 43.5 SEC (ref 21–32)
BASOPHILS # BLD AUTO: 0.02 K/UL (ref 0–0.2)
BASOPHILS # BLD AUTO: 0.02 K/UL (ref 0–0.2)
BASOPHILS NFR BLD: 0.4 % (ref 0–1.9)
BASOPHILS NFR BLD: 0.4 % (ref 0–1.9)
BUN SERPL-MCNC: 5 MG/DL (ref 8–23)
CALCIUM SERPL-MCNC: 9.4 MG/DL (ref 8.7–10.5)
CHLORIDE SERPL-SCNC: 106 MMOL/L (ref 95–110)
CO2 SERPL-SCNC: 29 MMOL/L (ref 23–29)
CREAT SERPL-MCNC: 0.7 MG/DL (ref 0.5–1.4)
DIFFERENTIAL METHOD: ABNORMAL
DIFFERENTIAL METHOD: ABNORMAL
EOSINOPHIL # BLD AUTO: 0 K/UL (ref 0–0.5)
EOSINOPHIL # BLD AUTO: 0 K/UL (ref 0–0.5)
EOSINOPHIL NFR BLD: 0.2 % (ref 0–8)
EOSINOPHIL NFR BLD: 0.2 % (ref 0–8)
ERYTHROCYTE [DISTWIDTH] IN BLOOD BY AUTOMATED COUNT: 12.9 % (ref 11.5–14.5)
ERYTHROCYTE [DISTWIDTH] IN BLOOD BY AUTOMATED COUNT: 12.9 % (ref 11.5–14.5)
EST. GFR  (AFRICAN AMERICAN): >60 ML/MIN/1.73 M^2
EST. GFR  (NON AFRICAN AMERICAN): >60 ML/MIN/1.73 M^2
GLUCOSE SERPL-MCNC: 106 MG/DL (ref 70–110)
HCT VFR BLD AUTO: 35.4 % (ref 37–48.5)
HCT VFR BLD AUTO: 35.4 % (ref 37–48.5)
HGB BLD-MCNC: 10.6 G/DL (ref 12–16)
HGB BLD-MCNC: 10.6 G/DL (ref 12–16)
IMM GRANULOCYTES # BLD AUTO: 0.03 K/UL (ref 0–0.04)
IMM GRANULOCYTES # BLD AUTO: 0.03 K/UL (ref 0–0.04)
IMM GRANULOCYTES NFR BLD AUTO: 0.7 % (ref 0–0.5)
IMM GRANULOCYTES NFR BLD AUTO: 0.7 % (ref 0–0.5)
LYMPHOCYTES # BLD AUTO: 1 K/UL (ref 1–4.8)
LYMPHOCYTES # BLD AUTO: 1 K/UL (ref 1–4.8)
LYMPHOCYTES NFR BLD: 20.9 % (ref 18–48)
LYMPHOCYTES NFR BLD: 20.9 % (ref 18–48)
MAGNESIUM SERPL-MCNC: 1.9 MG/DL (ref 1.6–2.6)
MCH RBC QN AUTO: 31.5 PG (ref 27–31)
MCH RBC QN AUTO: 31.5 PG (ref 27–31)
MCHC RBC AUTO-ENTMCNC: 29.9 G/DL (ref 32–36)
MCHC RBC AUTO-ENTMCNC: 29.9 G/DL (ref 32–36)
MCV RBC AUTO: 105 FL (ref 82–98)
MCV RBC AUTO: 105 FL (ref 82–98)
MONOCYTES # BLD AUTO: 0.4 K/UL (ref 0.3–1)
MONOCYTES # BLD AUTO: 0.4 K/UL (ref 0.3–1)
MONOCYTES NFR BLD: 9.2 % (ref 4–15)
MONOCYTES NFR BLD: 9.2 % (ref 4–15)
NEUTROPHILS # BLD AUTO: 3.2 K/UL (ref 1.8–7.7)
NEUTROPHILS # BLD AUTO: 3.2 K/UL (ref 1.8–7.7)
NEUTROPHILS NFR BLD: 68.6 % (ref 38–73)
NEUTROPHILS NFR BLD: 68.6 % (ref 38–73)
NRBC BLD-RTO: 0 /100 WBC
NRBC BLD-RTO: 0 /100 WBC
PHOSPHATE SERPL-MCNC: 2.6 MG/DL (ref 2.7–4.5)
PLATELET # BLD AUTO: 164 K/UL (ref 150–350)
PLATELET # BLD AUTO: 164 K/UL (ref 150–350)
PMV BLD AUTO: 10.4 FL (ref 9.2–12.9)
PMV BLD AUTO: 10.4 FL (ref 9.2–12.9)
POTASSIUM SERPL-SCNC: 3.8 MMOL/L (ref 3.5–5.1)
RBC # BLD AUTO: 3.36 M/UL (ref 4–5.4)
RBC # BLD AUTO: 3.36 M/UL (ref 4–5.4)
SODIUM SERPL-SCNC: 142 MMOL/L (ref 136–145)
WBC # BLD AUTO: 4.59 K/UL (ref 3.9–12.7)
WBC # BLD AUTO: 4.59 K/UL (ref 3.9–12.7)

## 2020-02-15 PROCEDURE — 25000003 PHARM REV CODE 250: Performed by: NURSE PRACTITIONER

## 2020-02-15 PROCEDURE — 99900035 HC TECH TIME PER 15 MIN (STAT)

## 2020-02-15 PROCEDURE — 84100 ASSAY OF PHOSPHORUS: CPT

## 2020-02-15 PROCEDURE — 99024 PR POST-OP FOLLOW-UP VISIT: ICD-10-PCS | Mod: ,,, | Performed by: COLON & RECTAL SURGERY

## 2020-02-15 PROCEDURE — 94760 N-INVAS EAR/PLS OXIMETRY 1: CPT

## 2020-02-15 PROCEDURE — 11000001 HC ACUTE MED/SURG PRIVATE ROOM

## 2020-02-15 PROCEDURE — 85025 COMPLETE CBC W/AUTO DIFF WBC: CPT

## 2020-02-15 PROCEDURE — 80048 BASIC METABOLIC PNL TOTAL CA: CPT

## 2020-02-15 PROCEDURE — 99024 POSTOP FOLLOW-UP VISIT: CPT | Mod: ,,, | Performed by: COLON & RECTAL SURGERY

## 2020-02-15 PROCEDURE — 36415 COLL VENOUS BLD VENIPUNCTURE: CPT

## 2020-02-15 PROCEDURE — 85730 THROMBOPLASTIN TIME PARTIAL: CPT

## 2020-02-15 PROCEDURE — 25000003 PHARM REV CODE 250: Performed by: COLON & RECTAL SURGERY

## 2020-02-15 PROCEDURE — 94799 UNLISTED PULMONARY SVC/PX: CPT

## 2020-02-15 PROCEDURE — 63600175 PHARM REV CODE 636 W HCPCS: Performed by: COLON & RECTAL SURGERY

## 2020-02-15 PROCEDURE — 83735 ASSAY OF MAGNESIUM: CPT

## 2020-02-15 PROCEDURE — 21400001 HC TELEMETRY ROOM

## 2020-02-15 RX ORDER — ENOXAPARIN SODIUM 100 MG/ML
60 INJECTION SUBCUTANEOUS
Status: DISCONTINUED | OUTPATIENT
Start: 2020-02-15 | End: 2020-02-16 | Stop reason: HOSPADM

## 2020-02-15 RX ORDER — ALPRAZOLAM 0.5 MG/1
0.5 TABLET ORAL EVERY 12 HOURS PRN
Status: DISCONTINUED | OUTPATIENT
Start: 2020-02-15 | End: 2020-02-16 | Stop reason: HOSPADM

## 2020-02-15 RX ADMIN — ACEBUTOLOL HYDROCHLORIDE 400 MG: 200 CAPSULE ORAL at 09:02

## 2020-02-15 RX ADMIN — MELATONIN 6 MG: at 09:02

## 2020-02-15 RX ADMIN — PANTOPRAZOLE SODIUM 40 MG: 40 TABLET, DELAYED RELEASE ORAL at 05:02

## 2020-02-15 RX ADMIN — GABAPENTIN 300 MG: 300 CAPSULE ORAL at 03:02

## 2020-02-15 RX ADMIN — GABAPENTIN 300 MG: 300 CAPSULE ORAL at 09:02

## 2020-02-15 RX ADMIN — ACETAMINOPHEN 650 MG: 325 TABLET ORAL at 06:02

## 2020-02-15 RX ADMIN — LOVASTATIN 40 MG: 20 TABLET ORAL at 09:02

## 2020-02-15 RX ADMIN — ALPRAZOLAM 0.5 MG: 0.5 TABLET ORAL at 09:02

## 2020-02-15 RX ADMIN — ACETAMINOPHEN 650 MG: 325 TABLET ORAL at 12:02

## 2020-02-15 RX ADMIN — SERTRALINE HYDROCHLORIDE 100 MG: 50 TABLET ORAL at 06:02

## 2020-02-15 RX ADMIN — ENOXAPARIN SODIUM 60 MG: 100 INJECTION SUBCUTANEOUS at 10:02

## 2020-02-15 RX ADMIN — CHLORHEXIDINE GLUCONATE 0.12% ORAL RINSE 10 ML: 1.2 LIQUID ORAL at 09:02

## 2020-02-15 RX ADMIN — ACETAMINOPHEN 650 MG: 325 TABLET ORAL at 11:02

## 2020-02-15 RX ADMIN — OXYCODONE 5 MG: 5 TABLET ORAL at 10:02

## 2020-02-15 RX ADMIN — ENOXAPARIN SODIUM 60 MG: 100 INJECTION SUBCUTANEOUS at 11:02

## 2020-02-15 NOTE — PROGRESS NOTES
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 Hospital medicine was consulted for medical management.   Her fiancee of 16 years is in the room with her at this time.    Overview/Hospital Course:  S/P 1.  Robotic low anterior resection with colorectal anastomosis  2.  Laparoscopic transversus abdominis plane block  3.  Flexible sigmoidoscopy  General Surgery is following. IV fluids and regular diet in progress. Heparin infusion for mechanical aortic valve started 2/14/2020.      As of 02/15 Labs reviewed, stable. Discussed case with Dr. Smith, stop heparin gtt today. Lovenox bridge. Coumadin to start tomorrow, 02/16. Pt will need f/u with primary cardiologist, Dr. Kemp at [facility], upon discharge. Cardiologist also manages INR level. Likely d/c in AM.     Interval History: Pt seen and examined. Pt has no complaints at this time. Will continue to monitor.     Review of Systems   Constitutional: Negative for chills and fatigue.   HENT: Negative for congestion, ear pain, facial swelling, sinus pressure and sore throat.    Eyes: Negative for pain.   Respiratory: Negative for apnea, chest tightness, shortness of breath and stridor.    Cardiovascular: Negative for chest pain, palpitations and leg swelling.   Gastrointestinal: Positive for abdominal pain. Negative for abdominal distention,  diarrhea and nausea.   Endocrine: Negative for polydipsia and polyphagia.   Genitourinary: Negative for decreased urine volume, difficulty urinating, frequency and genital sores.   Musculoskeletal: Negative for arthralgias and gait problem.   Skin: Positive for wound.   Neurological: Negative for light-headedness and headaches.   Hematological: Negative for adenopathy.   Psychiatric/Behavioral: Negative for agitation, confusion and decreased concentration.     Objective:     Vital Signs (Most Recent):  Temp: 98 °F (36.7 °C) (02/15/20 1239)  Pulse: 86 (02/15/20 1239)  Resp: 18 (02/15/20 1239)  BP: 132/84 (02/15/20 1239)  SpO2: (!) 94 % (02/15/20 1239) Vital Signs (24h Range):  Temp:  [97.9 °F (36.6 °C)-99.2 °F (37.3 °C)] 98 °F (36.7 °C)  Pulse:  [68-86] 86  Resp:  [15-18] 18  SpO2:  [91 %-95 %] 94 %  BP: (131-158)/(65-84) 132/84     Weight: 53.5 kg (117 lb 15.1 oz)  Body mass index is 24.65 kg/m².    Intake/Output Summary (Last 24 hours) at 2/15/2020 1415  Last data filed at 2/15/2020 1239  Gross per 24 hour   Intake 3945.28 ml   Output 3200 ml   Net 745.28 ml      Physical Exam   Constitutional: She is oriented to person, place, and time. She appears well-developed and well-nourished. She is cooperative. She is easily aroused. No distress.   HENT:   Head: Normocephalic and atraumatic.   Eyes: Pupils are equal, round, and reactive to light. EOM are normal.   Neck: Normal range of motion. Neck supple. No tracheal deviation present. No thyromegaly present.   Cardiovascular: Normal rate, regular rhythm and intact distal pulses.   Mechanical click noted   Pulmonary/Chest: Effort normal and breath sounds normal. No accessory muscle usage or stridor. No tachypnea. No respiratory distress. She has no wheezes. She has no rhonchi. She has no rales. She exhibits no tenderness.   Abdominal: Soft. Bowel sounds are normal. She exhibits no distension. There is no tenderness.   Scattered lap sites with surgical glue -  approximated  No erythema, edema, and/or drainage noted   Neurological: She is alert, oriented to person, place, and time and easily aroused. She has normal reflexes. No cranial nerve deficit. Coordination normal.   Skin: Skin is warm and dry. Capillary refill takes less than 2 seconds. No pallor.   Psychiatric: She has a normal mood and affect. Her behavior is normal. Judgment and thought content normal.   Nursing note and vitals reviewed.      Significant Labs:   BMP:   Recent Labs   Lab 02/15/20  0441         K 3.8      CO2 29   BUN 5*   CREATININE 0.7   CALCIUM 9.4   MG 1.9     CBC:   Recent Labs   Lab 02/14/20  0503 02/14/20  1237 02/15/20  0441   WBC 4.84 4.97 4.59  4.59   HGB 11.0* 11.1* 10.6*  10.6*   HCT 35.1* 35.2* 35.4*  35.4*    168 164  164     Magnesium:   Recent Labs   Lab 02/14/20  0503 02/15/20  0441   MG 1.8 1.9       Significant Imaging: I have reviewed all pertinent imaging results/findings within the past 24 hours.      Assessment/Plan:      * Rectal adenocarcinoma  General surgery primary  S/p resection - will continue gen surgery/oncology follow up   Prn analgesia, prn antiemetics  Regular diet    HLD (hyperlipidemia)  - Continue lovastatin     Aortic valve replaced    She is on chronic anticoagulation with Coumadin , will plan to start Heparin infusion in AM, discussed with primary team .   2/14/2020 - heparin infusion started as advised by Dr. Smith  02/15 -- discussed with Dr. Smith -- stop heparin gtt. lovenox bridge. Start coumadin tomorrow, 02/16. F/U with primary cardiologist Dr. Kemp with [facility] upon discharge    Hypertension  - On Acetebutol - stable. Continue monitor BP      VTE Risk Mitigation (From admission, onward)         Ordered     enoxaparin injection 60 mg  Every 12 hours (non-standard times)      02/15/20 0955     IP VTE HIGH RISK PATIENT  Once      02/13/20 2016     Place sequential compression device  Until discontinued      02/13/20  2016                      JENNIFER Lehman  Department of Hospital Medicine   [facility]

## 2020-02-15 NOTE — PLAN OF CARE
IVF infusing. Heparin gtt initiated. PRN Oxy given, relief noted. Pt ambulated in hallway several times. NADN. Will continue to monitor.

## 2020-02-15 NOTE — ASSESSMENT & PLAN NOTE
General surgery primary  S/p resection - will continue gen surgery/oncology follow up   Prn analgesia, prn antiemetics  Regular diet

## 2020-02-15 NOTE — NURSING
Pt had a bm with black tarry red stringed in appearance. Heparin was previous stopped for 1st 6 hour blood draw. Advised hospital medicine and was advised to hold heparin and make surgery aware. Sent secure message to on call Sarah and awaiting further instructions.

## 2020-02-15 NOTE — ASSESSMENT & PLAN NOTE
70yo F with rectal adenocarcinoma now s/p robotic LAR on 2/13/2020, doing well postop    - Reg diet  - minimize IVF  - PO pain meds  - OOB, ambulate  - Will convert hep gtt to th.lvnx. Will likely restart coumadin tomorrow

## 2020-02-15 NOTE — NURSING
Per Dr. Sarah pereyra for black tarry after surgery, just monitor for bright red stool. Aptt is back at 49.2 making it therapeutic range, will start back up per Dr. Smith and hospital medicine. Started infusion back at 2209. aptt will be due around 0409 am.

## 2020-02-15 NOTE — PLAN OF CARE
Chart reviewed, pt resting in bed with call light and personal belongings within reach. Vitals stable. Heparin infusing @12 units. Lr infusing at 75ml/hr. Will remove mccoy at 0500 rounds. Incision lab sites intact with no drainage. Heart monitor 8604, NS. Pt absent of injury throughout shift, will continue to montior.

## 2020-02-15 NOTE — ASSESSMENT & PLAN NOTE
She is on chronic anticoagulation with Coumadin , will plan to start Heparin infusion in AM, discussed with primary team .   2/14/2020 - heparin infusion started as advised by Dr. Smith  02/15 -- discussed with Dr. Smith -- stop heparin gtt. lovenox bridge. Start coumadin tomorrow, 02/16. F/U with primary cardiologist Dr. Kemp with [facility] upon discharge

## 2020-02-15 NOTE — SUBJECTIVE & OBJECTIVE
Interval History: Multiple BMs, initially had blood but now nonbloody. Tolerating diet. Pain well controlled. Mild distention.    Medications:  Continuous Infusions:   heparin (porcine) in D5W 12 Units/kg/hr (02/15/20 0434)    lactated ringers 75 mL/hr at 02/14/20 1118     Scheduled Meds:   acebutoloL  400 mg Oral BID    acetaminophen  650 mg Oral Q6H    chlorhexidine  10 mL Mouth/Throat BID    gabapentin  300 mg Oral TID    lovastatin  40 mg Oral QHS    nozaseptin   Each Nostril BID    pantoprazole  40 mg Oral Before breakfast    sertraline  100 mg Oral Daily     PRN Meds:diphenhydrAMINE, heparin (PORCINE), heparin (PORCINE), melatonin, ondansetron, oxyCODONE, oxyCODONE, promethazine (PHENERGAN) IVPB     Review of patient's allergies indicates:   Allergen Reactions    Penicillins Rash     Objective:     Vital Signs (Most Recent):  Temp: 98.7 °F (37.1 °C) (02/15/20 0738)  Pulse: 74 (02/15/20 0829)  Resp: 18 (02/15/20 0829)  BP: (!) 158/71 (02/15/20 0738)  SpO2: 95 % (02/15/20 0829) Vital Signs (24h Range):  Temp:  [97.9 °F (36.6 °C)-99.2 °F (37.3 °C)] 98.7 °F (37.1 °C)  Pulse:  [68-84] 74  Resp:  [15-18] 18  SpO2:  [91 %-95 %] 95 %  BP: (131-158)/(61-71) 158/71     Weight: 53.5 kg (117 lb 15.1 oz)  Body mass index is 24.65 kg/m².    Intake/Output - Last 3 Shifts       02/13 0700 - 02/14 0659 02/14 0700 - 02/15 0659 02/15 0700 - 02/16 0659    P.O. 725 960     I.V. (mL/kg) 2252.5 (42.1) 1821.1 (34)     IV Piggyback 100      Total Intake(mL/kg) 3077.5 (57.5) 2781.1 (52)     Urine (mL/kg/hr) 800 (0.6) 2450 (1.9) 350 (2.3)    Stool  0     Blood 50      Total Output 850 2450 350    Net +2227.5 +331.1 -350           Stool Occurrence  2 x           Physical Exam   Constitutional: She is oriented to person, place, and time. She appears well-developed.   HENT:   Head: Normocephalic and atraumatic.   Eyes: Conjunctivae and EOM are normal.   Neck: Normal range of motion. No thyromegaly present.   Cardiovascular:  Normal rate and regular rhythm.   Pulmonary/Chest: Effort normal. No respiratory distress.   Abdominal:   Soft, nondistended, appropriately TTP; incisions c/d/i without erythema or drainage   Musculoskeletal: Normal range of motion. She exhibits no edema or tenderness.   Neurological: She is alert and oriented to person, place, and time.   Skin: Skin is warm and dry. Capillary refill takes less than 2 seconds. No rash noted.   Psychiatric: She has a normal mood and affect.       Significant Labs:  CBC:   Recent Labs   Lab 02/15/20  0441   WBC 4.59  4.59   RBC 3.36*  3.36*   HGB 10.6*  10.6*   HCT 35.4*  35.4*     164   *  105*   MCH 31.5*  31.5*   MCHC 29.9*  29.9*     BMP:   Recent Labs   Lab 02/15/20  0441         K 3.8      CO2 29   BUN 5*   CREATININE 0.7   CALCIUM 9.4   MG 1.9     CMP:   Recent Labs   Lab 02/15/20  0441      CALCIUM 9.4      K 3.8   CO2 29      BUN 5*   CREATININE 0.7     LFTs: No results for input(s): ALT, AST, ALKPHOS, BILITOT, PROT, ALBUMIN in the last 168 hours.  Coagulation:   Recent Labs   Lab 02/14/20  1237  02/15/20  0441   LABPROT 11.9  --   --    INR 1.1  --   --    APTT 30.8   < > 43.5*  43.5*    < > = values in this interval not displayed.

## 2020-02-15 NOTE — SUBJECTIVE & OBJECTIVE
Interval History: Pt seen and examined. Pt has no complaints at this time. Will continue to monitor.     Review of Systems   Constitutional: Negative for chills and fatigue.   HENT: Negative for congestion, ear pain, facial swelling, sinus pressure and sore throat.    Eyes: Negative for pain.   Respiratory: Negative for apnea, chest tightness, shortness of breath and stridor.    Cardiovascular: Negative for chest pain, palpitations and leg swelling.   Gastrointestinal: Positive for abdominal pain. Negative for abdominal distention, diarrhea and nausea.   Endocrine: Negative for polydipsia and polyphagia.   Genitourinary: Negative for decreased urine volume, difficulty urinating, frequency and genital sores.   Musculoskeletal: Negative for arthralgias and gait problem.   Skin: Positive for wound.   Neurological: Negative for light-headedness and headaches.   Hematological: Negative for adenopathy.   Psychiatric/Behavioral: Negative for agitation, confusion and decreased concentration.     Objective:     Vital Signs (Most Recent):  Temp: 98 °F (36.7 °C) (02/15/20 1239)  Pulse: 86 (02/15/20 1239)  Resp: 18 (02/15/20 1239)  BP: 132/84 (02/15/20 1239)  SpO2: (!) 94 % (02/15/20 1239) Vital Signs (24h Range):  Temp:  [97.9 °F (36.6 °C)-99.2 °F (37.3 °C)] 98 °F (36.7 °C)  Pulse:  [68-86] 86  Resp:  [15-18] 18  SpO2:  [91 %-95 %] 94 %  BP: (131-158)/(65-84) 132/84     Weight: 53.5 kg (117 lb 15.1 oz)  Body mass index is 24.65 kg/m².    Intake/Output Summary (Last 24 hours) at 2/15/2020 1415  Last data filed at 2/15/2020 1239  Gross per 24 hour   Intake 3945.28 ml   Output 3200 ml   Net 745.28 ml      Physical Exam   Constitutional: She is oriented to person, place, and time. She appears well-developed and well-nourished. She is cooperative. She is easily aroused. No distress.   HENT:   Head: Normocephalic and atraumatic.   Eyes: Pupils are equal, round, and reactive to light. EOM are normal.   Neck: Normal range of motion.  Neck supple. No tracheal deviation present. No thyromegaly present.   Cardiovascular: Normal rate, regular rhythm and intact distal pulses.   Mechanical click noted   Pulmonary/Chest: Effort normal and breath sounds normal. No accessory muscle usage or stridor. No tachypnea. No respiratory distress. She has no wheezes. She has no rhonchi. She has no rales. She exhibits no tenderness.   Abdominal: Soft. Bowel sounds are normal. She exhibits no distension. There is no tenderness.   Scattered lap sites with surgical glue - approximated  No erythema, edema, and/or drainage noted   Neurological: She is alert, oriented to person, place, and time and easily aroused. She has normal reflexes. No cranial nerve deficit. Coordination normal.   Skin: Skin is warm and dry. Capillary refill takes less than 2 seconds. No pallor.   Psychiatric: She has a normal mood and affect. Her behavior is normal. Judgment and thought content normal.   Nursing note and vitals reviewed.      Significant Labs:   BMP:   Recent Labs   Lab 02/15/20  0441         K 3.8      CO2 29   BUN 5*   CREATININE 0.7   CALCIUM 9.4   MG 1.9     CBC:   Recent Labs   Lab 02/14/20  0503 02/14/20  1237 02/15/20  0441   WBC 4.84 4.97 4.59  4.59   HGB 11.0* 11.1* 10.6*  10.6*   HCT 35.1* 35.2* 35.4*  35.4*    168 164  164     Magnesium:   Recent Labs   Lab 02/14/20  0503 02/15/20  0441   MG 1.8 1.9       Significant Imaging: I have reviewed all pertinent imaging results/findings within the past 24 hours.

## 2020-02-15 NOTE — PLAN OF CARE
Discussed poc with pt, vs wnl, no acute distress noted, ivfs and heparin drip d/c'ed, aptt has been therapeutic for the last three draws, repeat am labs from here on out, remains injury free, voiding on her own, pain under control with meds, chart check complete, will cont to monitor

## 2020-02-15 NOTE — PROGRESS NOTES
[facility]  Colorectal Surgery  Progress Note    Subjective:     History of Present Illness:  69-year-old female with rectal/rectosigmoid adenocarcinoma who presents for definitive surgical resection    Post-Op Info:  Procedure(s) (LRB):  XI ROBOTIC RESECTION, COLON, LOW ANTERIOR (N/A)  BLOCK, TRANSVERSUS ABDOMINIS PLANE (N/A)  SIGMOIDOSCOPY, FLEXIBLE (N/A)   2 Days Post-Op     Interval History: Multiple BMs, initially had blood but now nonbloody. Tolerating diet. Pain well controlled. Mild distention.    Medications:  Continuous Infusions:   heparin (porcine) in D5W 12 Units/kg/hr (02/15/20 0434)    lactated ringers 75 mL/hr at 02/14/20 1118     Scheduled Meds:   acebutoloL  400 mg Oral BID    acetaminophen  650 mg Oral Q6H    chlorhexidine  10 mL Mouth/Throat BID    gabapentin  300 mg Oral TID    lovastatin  40 mg Oral QHS    nozaseptin   Each Nostril BID    pantoprazole  40 mg Oral Before breakfast    sertraline  100 mg Oral Daily     PRN Meds:diphenhydrAMINE, heparin (PORCINE), heparin (PORCINE), melatonin, ondansetron, oxyCODONE, oxyCODONE, promethazine (PHENERGAN) IVPB     Review of patient's allergies indicates:   Allergen Reactions    Penicillins Rash     Objective:     Vital Signs (Most Recent):  Temp: 98.7 °F (37.1 °C) (02/15/20 0738)  Pulse: 74 (02/15/20 0829)  Resp: 18 (02/15/20 0829)  BP: (!) 158/71 (02/15/20 0738)  SpO2: 95 % (02/15/20 0829) Vital Signs (24h Range):  Temp:  [97.9 °F (36.6 °C)-99.2 °F (37.3 °C)] 98.7 °F (37.1 °C)  Pulse:  [68-84] 74  Resp:  [15-18] 18  SpO2:  [91 %-95 %] 95 %  BP: (131-158)/(61-71) 158/71     Weight: 53.5 kg (117 lb 15.1 oz)  Body mass index is 24.65 kg/m².    Intake/Output - Last 3 Shifts       02/13 0700 - 02/14 0659 02/14 0700 - 02/15 0659 02/15 0700 - 02/16 0659    P.O. 725 960     I.V. (mL/kg) 2252.5 (42.1) 1821.1 (34)     IV Piggyback 100      Total Intake(mL/kg) 3077.5 (57.5) 2781.1 (52)     Urine (mL/kg/hr) 800 (0.6) 2450 (1.9) 350  (2.3)    Stool  0     Blood 50      Total Output 850 2450 350    Net +2227.5 +331.1 -350           Stool Occurrence  2 x           Physical Exam   Constitutional: She is oriented to person, place, and time. She appears well-developed.   HENT:   Head: Normocephalic and atraumatic.   Eyes: Conjunctivae and EOM are normal.   Neck: Normal range of motion. No thyromegaly present.   Cardiovascular: Normal rate and regular rhythm.   Pulmonary/Chest: Effort normal. No respiratory distress.   Abdominal:   Soft, nondistended, appropriately TTP; incisions c/d/i without erythema or drainage   Musculoskeletal: Normal range of motion. She exhibits no edema or tenderness.   Neurological: She is alert and oriented to person, place, and time.   Skin: Skin is warm and dry. Capillary refill takes less than 2 seconds. No rash noted.   Psychiatric: She has a normal mood and affect.       Significant Labs:  CBC:   Recent Labs   Lab 02/15/20  0441   WBC 4.59  4.59   RBC 3.36*  3.36*   HGB 10.6*  10.6*   HCT 35.4*  35.4*     164   *  105*   MCH 31.5*  31.5*   MCHC 29.9*  29.9*     BMP:   Recent Labs   Lab 02/15/20  0441         K 3.8      CO2 29   BUN 5*   CREATININE 0.7   CALCIUM 9.4   MG 1.9     CMP:   Recent Labs   Lab 02/15/20  0441      CALCIUM 9.4      K 3.8   CO2 29      BUN 5*   CREATININE 0.7     LFTs: No results for input(s): ALT, AST, ALKPHOS, BILITOT, PROT, ALBUMIN in the last 168 hours.  Coagulation:   Recent Labs   Lab 02/14/20  1237  02/15/20  0441   LABPROT 11.9  --   --    INR 1.1  --   --    APTT 30.8   < > 43.5*  43.5*    < > = values in this interval not displayed.     Assessment/Plan:     Rectal adenocarcinoma  70yo F with rectal adenocarcinoma now s/p robotic LAR on 2/13/2020, doing well postop    - Reg diet  - minimize IVF  - PO pain meds  - OOB, ambulate  - Will convert hep gtt to th.lvnx. Will likely restart coumadin tomorrow    HLD  (hyperlipidemia)  Management per hospital medicine    Aortic valve replaced  Management per hospital medicine    Hypertension  Management per hospital medicine        Ankur Smith MD  Colorectal Surgery  [facility]

## 2020-02-16 VITALS
BODY MASS INDEX: 24.76 KG/M2 | OXYGEN SATURATION: 95 % | HEIGHT: 58 IN | DIASTOLIC BLOOD PRESSURE: 70 MMHG | HEART RATE: 71 BPM | SYSTOLIC BLOOD PRESSURE: 152 MMHG | TEMPERATURE: 98 F | WEIGHT: 117.94 LBS | RESPIRATION RATE: 18 BRPM

## 2020-02-16 LAB
ANION GAP SERPL CALC-SCNC: 7 MMOL/L (ref 8–16)
APTT BLDCRRT: 32.1 SEC (ref 21–32)
BASOPHILS # BLD AUTO: 0.02 K/UL (ref 0–0.2)
BASOPHILS NFR BLD: 0.6 % (ref 0–1.9)
BUN SERPL-MCNC: 5 MG/DL (ref 8–23)
CALCIUM SERPL-MCNC: 8.8 MG/DL (ref 8.7–10.5)
CHLORIDE SERPL-SCNC: 107 MMOL/L (ref 95–110)
CO2 SERPL-SCNC: 31 MMOL/L (ref 23–29)
CREAT SERPL-MCNC: 0.6 MG/DL (ref 0.5–1.4)
DIFFERENTIAL METHOD: ABNORMAL
EOSINOPHIL # BLD AUTO: 0 K/UL (ref 0–0.5)
EOSINOPHIL NFR BLD: 1.2 % (ref 0–8)
ERYTHROCYTE [DISTWIDTH] IN BLOOD BY AUTOMATED COUNT: 12.8 % (ref 11.5–14.5)
EST. GFR  (AFRICAN AMERICAN): >60 ML/MIN/1.73 M^2
EST. GFR  (NON AFRICAN AMERICAN): >60 ML/MIN/1.73 M^2
GLUCOSE SERPL-MCNC: 94 MG/DL (ref 70–110)
HCT VFR BLD AUTO: 32.5 % (ref 37–48.5)
HGB BLD-MCNC: 10.1 G/DL (ref 12–16)
IMM GRANULOCYTES # BLD AUTO: 0.04 K/UL (ref 0–0.04)
IMM GRANULOCYTES NFR BLD AUTO: 1.2 % (ref 0–0.5)
LYMPHOCYTES # BLD AUTO: 1.1 K/UL (ref 1–4.8)
LYMPHOCYTES NFR BLD: 32.4 % (ref 18–48)
MAGNESIUM SERPL-MCNC: 1.8 MG/DL (ref 1.6–2.6)
MCH RBC QN AUTO: 32.3 PG (ref 27–31)
MCHC RBC AUTO-ENTMCNC: 31.1 G/DL (ref 32–36)
MCV RBC AUTO: 104 FL (ref 82–98)
MONOCYTES # BLD AUTO: 0.4 K/UL (ref 0.3–1)
MONOCYTES NFR BLD: 10.6 % (ref 4–15)
NEUTROPHILS # BLD AUTO: 1.8 K/UL (ref 1.8–7.7)
NEUTROPHILS NFR BLD: 54 % (ref 38–73)
NRBC BLD-RTO: 0 /100 WBC
PHOSPHATE SERPL-MCNC: 2.5 MG/DL (ref 2.7–4.5)
PLATELET # BLD AUTO: 135 K/UL (ref 150–350)
PMV BLD AUTO: 10.6 FL (ref 9.2–12.9)
POTASSIUM SERPL-SCNC: 3.2 MMOL/L (ref 3.5–5.1)
RBC # BLD AUTO: 3.13 M/UL (ref 4–5.4)
SODIUM SERPL-SCNC: 145 MMOL/L (ref 136–145)
WBC # BLD AUTO: 3.3 K/UL (ref 3.9–12.7)

## 2020-02-16 PROCEDURE — 80048 BASIC METABOLIC PNL TOTAL CA: CPT

## 2020-02-16 PROCEDURE — 84100 ASSAY OF PHOSPHORUS: CPT

## 2020-02-16 PROCEDURE — 94761 N-INVAS EAR/PLS OXIMETRY MLT: CPT

## 2020-02-16 PROCEDURE — 63600175 PHARM REV CODE 636 W HCPCS: Performed by: COLON & RECTAL SURGERY

## 2020-02-16 PROCEDURE — 36415 COLL VENOUS BLD VENIPUNCTURE: CPT

## 2020-02-16 PROCEDURE — 85730 THROMBOPLASTIN TIME PARTIAL: CPT

## 2020-02-16 PROCEDURE — 96372 THER/PROPH/DIAG INJ SC/IM: CPT

## 2020-02-16 PROCEDURE — 99900035 HC TECH TIME PER 15 MIN (STAT)

## 2020-02-16 PROCEDURE — 99024 PR POST-OP FOLLOW-UP VISIT: ICD-10-PCS | Mod: ,,, | Performed by: COLON & RECTAL SURGERY

## 2020-02-16 PROCEDURE — 85025 COMPLETE CBC W/AUTO DIFF WBC: CPT

## 2020-02-16 PROCEDURE — 25000003 PHARM REV CODE 250: Performed by: NURSE PRACTITIONER

## 2020-02-16 PROCEDURE — 25000003 PHARM REV CODE 250: Performed by: COLON & RECTAL SURGERY

## 2020-02-16 PROCEDURE — 83735 ASSAY OF MAGNESIUM: CPT

## 2020-02-16 PROCEDURE — 99024 POSTOP FOLLOW-UP VISIT: CPT | Mod: ,,, | Performed by: COLON & RECTAL SURGERY

## 2020-02-16 RX ORDER — WARFARIN SODIUM 5 MG/1
5 TABLET ORAL
Status: DISCONTINUED | OUTPATIENT
Start: 2020-02-22 | End: 2020-02-16 | Stop reason: HOSPADM

## 2020-02-16 RX ORDER — WARFARIN SODIUM 5 MG/1
5 TABLET ORAL
Status: DISCONTINUED | OUTPATIENT
Start: 2020-02-16 | End: 2020-02-16 | Stop reason: HOSPADM

## 2020-02-16 RX ORDER — POTASSIUM CHLORIDE 20 MEQ/1
40 TABLET, EXTENDED RELEASE ORAL ONCE
Status: COMPLETED | OUTPATIENT
Start: 2020-02-16 | End: 2020-02-16

## 2020-02-16 RX ORDER — WARFARIN 2.5 MG/1
2.5 TABLET ORAL
Status: DISCONTINUED | OUTPATIENT
Start: 2020-02-18 | End: 2020-02-16 | Stop reason: HOSPADM

## 2020-02-16 RX ORDER — ACETAMINOPHEN 325 MG/1
650 TABLET ORAL EVERY 6 HOURS
Refills: 0
Start: 2020-02-16 | End: 2021-02-26 | Stop reason: ALTCHOICE

## 2020-02-16 RX ORDER — OXYCODONE HYDROCHLORIDE 5 MG/1
5 TABLET ORAL EVERY 4 HOURS PRN
Qty: 20 TABLET | Refills: 0 | Status: SHIPPED | OUTPATIENT
Start: 2020-02-16 | End: 2021-01-25

## 2020-02-16 RX ADMIN — PANTOPRAZOLE SODIUM 40 MG: 40 TABLET, DELAYED RELEASE ORAL at 05:02

## 2020-02-16 RX ADMIN — GABAPENTIN 300 MG: 300 CAPSULE ORAL at 09:02

## 2020-02-16 RX ADMIN — ACETAMINOPHEN 650 MG: 325 TABLET ORAL at 11:02

## 2020-02-16 RX ADMIN — POTASSIUM CHLORIDE 40 MEQ: 1500 TABLET, EXTENDED RELEASE ORAL at 09:02

## 2020-02-16 RX ADMIN — OXYCODONE 5 MG: 5 TABLET ORAL at 12:02

## 2020-02-16 RX ADMIN — CHLORHEXIDINE GLUCONATE 0.12% ORAL RINSE 10 ML: 1.2 LIQUID ORAL at 09:02

## 2020-02-16 RX ADMIN — ACETAMINOPHEN 650 MG: 325 TABLET ORAL at 05:02

## 2020-02-16 RX ADMIN — WARFARIN SODIUM 5 MG: 5 TABLET ORAL at 11:02

## 2020-02-16 RX ADMIN — ENOXAPARIN SODIUM 60 MG: 100 INJECTION SUBCUTANEOUS at 11:02

## 2020-02-16 RX ADMIN — ACEBUTOLOL HYDROCHLORIDE 400 MG: 200 CAPSULE ORAL at 09:02

## 2020-02-16 NOTE — DISCHARGE SUMMARY
[facility] -   Colorectal Surgery  Discharge Summary      Patient Name: Adalgisa Wright  MRN: [id number]  Admission Date: 2/13/2020  Hospital Length of Stay: 3 days  Discharge Date and Time:  02/16/2020 10:46 AM  Attending Physician: Brandie Mathew MD   Discharging Provider: Brandie Mathew MD  Primary Care Provider: Shan Stroud MD    HPI:   69-year-old female with rectal/rectosigmoid adenocarcinoma who presents for definitive surgical resection    Procedure(s) (LRB):  XI ROBOTIC RESECTION, COLON, LOW ANTERIOR (N/A)  BLOCK, TRANSVERSUS ABDOMINIS PLANE (N/A)  SIGMOIDOSCOPY, FLEXIBLE (N/A)      Indwelling Lines/Drains at time of discharge:   Lines/Drains/Airways     None               Hospital Course: 02/13/2020: Underwent robotic LAR    02/14/2020: POD1.  No acute events overnight. Pain well controlled.  No BM or flatus.  Tolerating diet.     02/15/2020: POD2. Multiple BMs, initially had blood but now nonbloody. Tolerating diet. Pain well controlled. Mild distention. Hep gtt stopped and lvnx started.    02/16/2020: POD3.  Continues to have bowel function with nonbloody bowel movements and flatus.  Tolerating regular diet.  Ambulating well.  Pain well controlled.  Distention resolved. Coumadin restarted and remains on th.lvnx.  Ready for discharge.    Consults:   Consults (From admission, onward)        Status Ordering Provider     Inpatient consult to Internal Medicine  Once     Provider:  Chiquis Ruiz NP    Acknowledged BRANDIE MATHEW     Pharmacy to dose Warfarin consult  Once     Provider:  (Not yet assigned)    Acknowledged KHANH CAMARA          Significant Diagnostic Studies: Labs:   BMP:   Recent Labs   Lab 02/15/20  0441 02/16/20  0434    94    145   K 3.8 3.2*    107   CO2 29 31*   BUN 5* 5*   CREATININE 0.7 0.6   CALCIUM 9.4 8.8   MG 1.9 1.8   , CMP   Recent Labs   Lab 02/15/20  0441 02/16/20  0434    145   K 3.8 3.2*    107   CO2 29 31*     94   BUN 5* 5*   CREATININE 0.7 0.6   CALCIUM 9.4 8.8   ANIONGAP 7* 7*   ESTGFRAFRICA >60 >60   EGFRNONAA >60 >60   , CBC   Recent Labs   Lab 02/14/20  1237 02/15/20  0441 02/16/20  0434   WBC 4.97 4.59  4.59 3.30*   HGB 11.1* 10.6*  10.6* 10.1*   HCT 35.2* 35.4*  35.4* 32.5*    164  164 135*    and INR   Lab Results   Component Value Date    INR 1.1 02/14/2020    INR 1.1 02/13/2020    INR 1.1 01/08/2020       Pending Diagnostic Studies:     Procedure Component Value Units Date/Time    Specimen to Pathology, Surgery General Surgery [[id number]] Collected:  02/13/20 1924    Order Status:  Sent Lab Status:  In process Updated:  02/14/20 0825        Final Active Diagnoses:    Diagnosis Date Noted POA    PRINCIPAL PROBLEM:  Rectal adenocarcinoma [C20] 11/07/2019 Yes    HLD (hyperlipidemia) [E78.5] 10/08/2019 Yes    Hypertension [I10] 07/20/2018 Yes    Aortic valve replaced [Z95.2] 12/01/2014 Not Applicable      Problems Resolved During this Admission:      Discharged Condition: good    Disposition: Home or Self Care    Follow Up:  Follow-up Information     Ankur Smith MD In 2 weeks.    Specialty:  Colon and Rectal Surgery  Why:  postop check  Contact information:  [address] [place] [address] DR Tiffany SALAS [address] [phone]                 Patient Instructions:      Diet Adult Regular     Lifting restrictions   Order Comments: No lifting over 10 lb for 6 weeks following surgery     Other restrictions (specify):   Order Comments: No baths for 2 weeks.  Showers only.  No submerging incisions underneath water to include baths, hot tubs, pools or other bodies of water.     No driving until:   Order Comments: Off narcotic pain medication and able to safely react to other drivers     Notify your health care provider if you experience any of the following:  increased confusion or weakness     Notify your health care provider if you experience any of the following:  persistent dizziness,  light-headedness, or visual disturbances     Notify your health care provider if you experience any of the following:  severe persistent headache     Notify your health care provider if you experience any of the following:  worsening rash     Notify your health care provider if you experience any of the following:  difficulty breathing or increased cough     Notify your health care provider if you experience any of the following:  redness, tenderness, or signs of infection (pain, swelling, redness, odor or green/yellow discharge around incision site)     Notify your health care provider if you experience any of the following:  severe uncontrolled pain     Notify your health care provider if you experience any of the following:  persistent nausea and vomiting or diarrhea     Notify your health care provider if you experience any of the following:  temperature >100.4     No dressing needed     Activity as tolerated     Medications:  Reconciled Home Medications:      Medication List      START taking these medications    acetaminophen 325 MG tablet  Commonly known as:  TYLENOL  Take 2 tablets (650 mg total) by mouth every 6 (six) hours.     nozaseptin  nasal   Commonly known as:  NOZIN  1 each by Each Nostril route 2 (two) times daily.     oxyCODONE 5 MG immediate release tablet  Commonly known as:  ROXICODONE  Take 1 tablet (5 mg total) by mouth every 4 (four) hours as needed.        CHANGE how you take these medications    Linzess 145 mcg Cap capsule  Generic drug:  linaCLOtide  TAKE 1 CAPSULE (145 MCG TOTAL) BY MOUTH ONCE DAILY.  What changed:  See the new instructions.        CONTINUE taking these medications    * acebutoloL 200 MG capsule  Commonly known as:  SECTRAL  Take 400 mg by mouth 2 (two) times daily.     * acebutoloL 400 mg capsule  Commonly known as:  SECTRAL  Take 400 mg by mouth 2 (two) times daily.     ALPRAZolam 1 MG tablet  Commonly known as:  XANAX  Take 1 tablet by mouth nightly as needed  for Insomnia.     butalbital-acetaminophen-caffeine -40 mg -40 mg per tablet  Commonly known as:  FIORICET, ESGIC  TAKE ONE TABLET BY MOUTH EVERY SIX HOURS AS NEEDED     enoxaparin 60 mg/0.6 mL Syrg  Commonly known as:  LOVENOX  Inject 0.6 mLs (60 mg total) into the skin 2 (two) times daily.     ergocalciferol 50,000 unit Cap  Commonly known as:  ERGOCALCIFEROL  Take 1 capsule by mouth every 30 days.     furosemide 40 MG tablet  Commonly known as:  LASIX  Take 40 mg by mouth Daily.     lovastatin 40 MG tablet  Commonly known as:  MEVACOR  Take 40 mg by mouth every evening.     multivitamin tablet  Commonly known as:  THERAGRAN  Take 1 tablet by mouth.     pantoprazole 20 MG tablet  Commonly known as:  PROTONIX  Take 1 tablet (20 mg total) by mouth once daily.     sertraline 100 MG tablet  Commonly known as:  ZOLOFT  Take 1 tablet by mouth Daily.     traMADol 50 mg tablet  Commonly known as:  ULTRAM  50 mg every 8 (eight) hours as needed.     warfarin 5 MG tablet  Commonly known as:  COUMADIN  Take 1 tablet (5 mg total) by mouth Daily. 5 mg by mouth for five days then alternate with 2.5 mg for one day.     zolpidem 10 mg Tab  Commonly known as:  AMBIEN  Take 5 mg by mouth nightly as needed.         * This list has 2 medication(s) that are the same as other medications prescribed for you. Read the directions carefully, and ask your doctor or other care provider to review them with you.            STOP taking these medications    metroNIDAZOLE 500 MG tablet  Commonly known as:  FLAGYL     neomycin 500 mg Tab  Commonly known as:  MYCIFRADIN     polyethylene glycol 17 gram/dose powder  Commonly known as:  GLYCOLAX          Time spent on the discharge of patient: 35 minutes    Ankur Smith MD  Colorectal Surgery  [facility] -

## 2020-02-16 NOTE — CONSULTS
Clinical Pharmacy Consult Note: Coumadin Dosing and Monitoring     Adalgisa mansfield Coumadin will be dosed and monitored by Pharmacy at the request of Keshia Obando NP.   Indication: s/p Atrial valve replacement, paroxysmal atrial fibrillation   Target INR is 2-3.   INR   Date Value Ref Range Status   02/14/2020 1.1 0.8 - 1.2 Final     Comment:     Coumadin Therapy:  2.0 - 3.0 for INR for all indicators except mechanical heart valves  and antiphospholipid syndromes which should use 2.5 - 3.5.       Patient will be continued on her home dose of 5 mg mg per day every day except for 2.5 mg on Tues/Thurs.  PT/INR will be monitored daily. Dose adjustments will be made accordingly.    Pharmacy will attempt to educate patient today.     Thank you for allowing us to participate in this patient's care.     Ines Alonso 2/16/2020 10:29 AM

## 2020-02-16 NOTE — ASSESSMENT & PLAN NOTE
She is on chronic anticoagulation with Coumadin   - Heparin infusion initiated    2/14/2020 - heparin infusion started as advised by Dr. Smith  02/15 -- discussed with Dr. Smith -- stop heparin gtt. lovenox bridge. Start coumadin tomorrow, 02/16-F/U with primary cardiologist Dr. Kemp with [facility] upon discharge to manage INR

## 2020-02-16 NOTE — PLAN OF CARE
Remains free from harm, no c/o pain, tolerating diet, positions self, no acute distress noted. 24 hour chart check complete.

## 2020-02-16 NOTE — PROGRESS NOTES
[facility] Hospital Medicine  Progress Note    Patient Name: Adalgisa Wright  MRN: [id number]  Patient Class: IP- Surgery Admit   Admission Date: 2/13/2020  Length of Stay: 3 days  Attending Physician: Ankur Smith MD  Primary Care Provider: Shan Stroud MD        Subjective:     Principal Problem:Rectal adenocarcinoma        HPI:  68 year old woman with history of hypertension s/p mechanical aortic   valve replacement on lovenox bridge ,adenocarcinoma of the rectosigmoid junction with liver lesion with negative biopsy x2 and negative MRI liver for definitive metastatic disease.She had Robotic low anterior resection with colorectal anastomosis today . Hospital medicine was consulted for medical management.   Her fiancee of 16 years is in the room with her at this time.    Overview/Hospital Course:  S/P 1.  Robotic low anterior resection with colorectal anastomosis  2.  Laparoscopic transversus abdominis plane block  3.  Flexible sigmoidoscopy  General Surgery is following. IV fluids and regular diet in progress. Heparin infusion for mechanical aortic valve started 2/14/2020.      As of 02/15 Labs reviewed, stable. Discussed case with Dr. Smith, stop heparin gtt today. Lovenox bridge. Coumadin to start tomorrow, 02/16. Pt will need f/u with primary cardiologist, Dr. Kemp at [facility], upon discharge. Cardiologist also manages INR level. Likely d/c in AM. On 2/16/2020, pt verbalized symptom improvement and remains stable post procedure.  Coumadin resumed and pt to follow up with established Cardiologist to manage INR.  Pt seen and examined today and deemed suitable for discharge to home per primary team.  Pt denies any evidence of active bleeding.     Interval History: pt verbalized symptom improvement and remains stable post procedure.  Coumadin resumed and pt to follow up with established Cardiologist to manage INR.  Pt seen and examined today and deemed suitable for discharge to home  per primary team.  Pt denies any evidence of active bleeding. Potassium replaced.        Review of Systems   Constitutional: Negative for chills and fatigue.   HENT: Negative for congestion, ear pain, facial swelling, sinus pressure and sore throat.    Eyes: Negative for pain.   Respiratory: Negative for apnea, chest tightness, shortness of breath and stridor.    Cardiovascular: Negative for chest pain, palpitations and leg swelling.   Gastrointestinal: Positive for abdominal pain. Negative for abdominal distention, diarrhea and nausea.   Endocrine: Negative for polydipsia and polyphagia.   Genitourinary: Negative for decreased urine volume, difficulty urinating, frequency and genital sores.   Musculoskeletal: Negative for arthralgias and gait problem.   Skin: Positive for wound.   Neurological: Negative for light-headedness and headaches.   Hematological: Negative for adenopathy.   Psychiatric/Behavioral: Negative for agitation, confusion and decreased concentration.     Objective:     Vital Signs (Most Recent):  Temp: 98.3 °F (36.8 °C) (02/16/20 0757)  Pulse: 71 (02/16/20 0915)  Resp: 18 (02/16/20 0915)  BP: (!) 152/70 (02/16/20 0757)  SpO2: 95 % (02/16/20 0915) Vital Signs (24h Range):  Temp:  [98 °F (36.7 °C)-98.3 °F (36.8 °C)] 98.3 °F (36.8 °C)  Pulse:  [69-88] 71  Resp:  [17-18] 18  SpO2:  [93 %-96 %] 95 %  BP: (112-152)/(55-84) 152/70     Weight: 53.5 kg (117 lb 15.1 oz)  Body mass index is 24.65 kg/m².    Intake/Output Summary (Last 24 hours) at 2/16/2020 1158  Last data filed at 2/16/2020 1007  Gross per 24 hour   Intake 1480 ml   Output --   Net 1480 ml      Physical Exam   Constitutional: She is oriented to person, place, and time. She appears well-developed and well-nourished. She is cooperative. She is easily aroused. No distress.   HENT:   Head: Normocephalic and atraumatic.   Eyes: Pupils are equal, round, and reactive to light. EOM are normal.   Neck: Normal range of motion. Neck supple. No tracheal  deviation present. No thyromegaly present.   Cardiovascular: Normal rate, regular rhythm and intact distal pulses.   Mechanical click noted   Pulmonary/Chest: Effort normal and breath sounds normal. No accessory muscle usage or stridor. No tachypnea. No respiratory distress. She has no wheezes. She has no rhonchi. She has no rales. She exhibits no tenderness.   Abdominal: Soft. Bowel sounds are normal. She exhibits no distension. There is no tenderness.   Scattered lap sites with surgical glue - approximated  No erythema, edema, and/or drainage noted   Neurological: She is alert, oriented to person, place, and time and easily aroused. She has normal reflexes. No cranial nerve deficit. Coordination normal.   Skin: Skin is warm and dry. Capillary refill takes less than 2 seconds. No pallor.   Psychiatric: She has a normal mood and affect. Her behavior is normal. Judgment and thought content normal.   Nursing note and vitals reviewed.      Significant Labs:   CBC:   Recent Labs   Lab 02/14/20  1237 02/15/20  0441 02/16/20  0434   WBC 4.97 4.59  4.59 3.30*   HGB 11.1* 10.6*  10.6* 10.1*   HCT 35.2* 35.4*  35.4* 32.5*    164  164 135*     CMP:   Recent Labs   Lab 02/15/20  0441 02/16/20  0434    145   K 3.8 3.2*    107   CO2 29 31*    94   BUN 5* 5*   CREATININE 0.7 0.6   CALCIUM 9.4 8.8   ANIONGAP 7* 7*   EGFRNONAA >60 >60     Magnesium:   Recent Labs   Lab 02/15/20  0441 02/16/20  0434   MG 1.9 1.8       Significant Imaging:           Assessment/Plan:      * Rectal adenocarcinoma  General surgery primary  S/p resection - will continue gen surgery/oncology follow up   Prn analgesia  prn antiemetics  Regular diet tolerated     HLD (hyperlipidemia)  - Continue lovastatin     Aortic valve replaced  She is on chronic anticoagulation with Coumadin   - Heparin infusion initiated    2/14/2020 - heparin infusion started as advised by Dr. Smith  02/15 -- discussed with Dr. Smith -- stop heparin  gtt. lovenox bridge. Start coumadin tomorrow, 02/16-F/U with primary cardiologist Dr. Kemp with [facility] upon discharge to manage INR    Hypertension  - On Acetebutol - stable. Continue monitor BP      VTE Risk Mitigation (From admission, onward)         Ordered     warfarin (COUMADIN) tablet 5 mg  Every Saturday 02/16/20 1029     warfarin (COUMADIN) tablet 2.5 mg  Every Tues, Thurs 02/16/20 1029     warfarin (COUMADIN) tablet 5 mg  Every Mon, Wed, Fri, Sun      02/16/20 1029     enoxaparin injection 60 mg  Every 12 hours (non-standard times)      02/15/20 0955     IP VTE HIGH RISK PATIENT  Once      02/13/20 2016     Place sequential compression device  Until discontinued      02/13/20 2016                      Keshia Obando NP  Department of Hospital Medicine   [facility]

## 2020-02-16 NOTE — ASSESSMENT & PLAN NOTE
General surgery primary  S/p resection - will continue gen surgery/oncology follow up   Prn analgesia  prn antiemetics  Regular diet tolerated

## 2020-02-16 NOTE — SUBJECTIVE & OBJECTIVE
Interval History: pt verbalized symptom improvement and remains stable post procedure.  Coumadin resumed and pt to follow up with established Cardiologist to manage INR.  Pt seen and examined today and deemed suitable for discharge to home per primary team.  Pt denies any evidence of active bleeding. Potassium replaced.        Review of Systems   Constitutional: Negative for chills and fatigue.   HENT: Negative for congestion, ear pain, facial swelling, sinus pressure and sore throat.    Eyes: Negative for pain.   Respiratory: Negative for apnea, chest tightness, shortness of breath and stridor.    Cardiovascular: Negative for chest pain, palpitations and leg swelling.   Gastrointestinal: Positive for abdominal pain. Negative for abdominal distention, diarrhea and nausea.   Endocrine: Negative for polydipsia and polyphagia.   Genitourinary: Negative for decreased urine volume, difficulty urinating, frequency and genital sores.   Musculoskeletal: Negative for arthralgias and gait problem.   Skin: Positive for wound.   Neurological: Negative for light-headedness and headaches.   Hematological: Negative for adenopathy.   Psychiatric/Behavioral: Negative for agitation, confusion and decreased concentration.     Objective:     Vital Signs (Most Recent):  Temp: 98.3 °F (36.8 °C) (02/16/20 0757)  Pulse: 71 (02/16/20 0915)  Resp: 18 (02/16/20 0915)  BP: (!) 152/70 (02/16/20 0757)  SpO2: 95 % (02/16/20 0915) Vital Signs (24h Range):  Temp:  [98 °F (36.7 °C)-98.3 °F (36.8 °C)] 98.3 °F (36.8 °C)  Pulse:  [69-88] 71  Resp:  [17-18] 18  SpO2:  [93 %-96 %] 95 %  BP: (112-152)/(55-84) 152/70     Weight: 53.5 kg (117 lb 15.1 oz)  Body mass index is 24.65 kg/m².    Intake/Output Summary (Last 24 hours) at 2/16/2020 1158  Last data filed at 2/16/2020 1007  Gross per 24 hour   Intake 1480 ml   Output --   Net 1480 ml      Physical Exam   Constitutional: She is oriented to person, place, and time. She appears well-developed and  well-nourished. She is cooperative. She is easily aroused. No distress.   HENT:   Head: Normocephalic and atraumatic.   Eyes: Pupils are equal, round, and reactive to light. EOM are normal.   Neck: Normal range of motion. Neck supple. No tracheal deviation present. No thyromegaly present.   Cardiovascular: Normal rate, regular rhythm and intact distal pulses.   Mechanical click noted   Pulmonary/Chest: Effort normal and breath sounds normal. No accessory muscle usage or stridor. No tachypnea. No respiratory distress. She has no wheezes. She has no rhonchi. She has no rales. She exhibits no tenderness.   Abdominal: Soft. Bowel sounds are normal. She exhibits no distension. There is no tenderness.   Scattered lap sites with surgical glue - approximated  No erythema, edema, and/or drainage noted   Neurological: She is alert, oriented to person, place, and time and easily aroused. She has normal reflexes. No cranial nerve deficit. Coordination normal.   Skin: Skin is warm and dry. Capillary refill takes less than 2 seconds. No pallor.   Psychiatric: She has a normal mood and affect. Her behavior is normal. Judgment and thought content normal.   Nursing note and vitals reviewed.      Significant Labs:   CBC:   Recent Labs   Lab 02/14/20  1237 02/15/20  0441 02/16/20  0434   WBC 4.97 4.59  4.59 3.30*   HGB 11.1* 10.6*  10.6* 10.1*   HCT 35.2* 35.4*  35.4* 32.5*    164  164 135*     CMP:   Recent Labs   Lab 02/15/20  0441 02/16/20  0434    145   K 3.8 3.2*    107   CO2 29 31*    94   BUN 5* 5*   CREATININE 0.7 0.6   CALCIUM 9.4 8.8   ANIONGAP 7* 7*   EGFRNONAA >60 >60     Magnesium:   Recent Labs   Lab 02/15/20  0441 02/16/20  0434   MG 1.9 1.8       Significant Imaging:

## 2020-02-16 NOTE — PLAN OF CARE
Remains injury free. Denies c/o pain. Lap sites to abdomen CDI. Tolerating diet. Bowel movements with  absence of blood. Ambulates without difficulty. Adequate for discharge.

## 2020-02-17 ENCOUNTER — TELEPHONE (OUTPATIENT)
Dept: SURGERY | Facility: CLINIC | Age: 70
End: 2020-02-17

## 2020-02-17 NOTE — PLAN OF CARE
02/17/20 1420   Final Note   Assessment Type Final Discharge Note   Anticipated Discharge Disposition Home   Right Care Referral Info   Post Acute Recommendation No Care

## 2020-02-17 NOTE — TELEPHONE ENCOUNTER
Message was forwarded to Dr Smith via In Basket    ----- Message from Mis Mathew sent at 2/17/2020  7:51 AM CST -----  Contact: Adalgisa pt  Type: Needs Medical Advice    Who Called:  Adalgisa  Symptoms (please be specific):  Pt had surgery yesterday/she has been passing blood.  How long has patient had these symptoms:  Last night  Pharmacy name and phone #:    [id number] - [place], [place] - [id number] [facility]  [place] [address]  Phone: [phone] Fax: [phone]      Best Call Back Number: [phone]  Additional Information: Pls call pt to adv if this is normal.

## 2020-02-17 NOTE — TELEPHONE ENCOUNTER
After talking to Dr Smith I spoke to pt - Advised that the bleeding she is experiencing is normal - Advised that as long as she continues to see improvement each day it's ok. Instructed her to let us know should it go back to bleeding a lot or if this persist towards the end of the week. Pt verbalized understanding     ----- Message from Tova Kendrick sent at 2/17/2020 10:39 AM CST -----  Contact: self  Patient requesting a call back regarding her recent surgery. She states that she has been experiencing some bleeding and is concerned. This is pt's second message. Please call pt back at [phone].

## 2020-02-18 ENCOUNTER — NURSE TRIAGE (OUTPATIENT)
Dept: ADMINISTRATIVE | Facility: CLINIC | Age: 70
End: 2020-02-18

## 2020-02-18 NOTE — TELEPHONE ENCOUNTER
Patient called in to return a call to the post discharge unit. The patient was advised per protocol and was advised to call back with any questions/concerns or symptoms the patient verbalized understanding.     Reason for Disposition   Health Information question, no triage required and triager able to answer question    Additional Information   Negative: [1] Caller is not with the adult (patient) AND [2] reporting urgent symptoms   Negative: Lab result questions   Negative: Medication questions   Negative: Caller can't be reached by phone   Negative: Caller has already spoken to PCP or another triager   Negative: RN needs further essential information from caller in order to complete triage   Negative: Requesting regular office appointment   Negative: [1] Caller requesting NON-URGENT health information AND [2] PCP's office is the best resource    Protocols used: INFORMATION ONLY CALL-A-

## 2020-02-24 LAB
FINAL PATHOLOGIC DIAGNOSIS: NORMAL
GROSS: NORMAL

## 2020-02-26 NOTE — PHYSICIAN QUERY
PT Name: Adalgisa Wright  MR #: [id number]    Physician Query Form - Pathology Findings Clarification     CDS: Tisha BYERS RN  Contact information: [email or web address]  Phone number: [phone]  This form is a permanent document in the medical record.     Query Date: February 26, 2020      By submitting this query, we are merely seeking further clarification of documentation.  Please utilize your independent clinical judgment when addressing the question(s) below.      The medical record contains the following:     Findings Supporting Clinical Information Location in Medical Record   Six of fourteen lymph nodes are positive for metastatic adenocarcinoma          Patient is a 69 y.o. 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  Patient was undergoing a colonoscopy on November 7, 2019 where a rectosigmoid mass was found and biopsied which showed moderately differentiated adenocarcinoma.  Patient had no other intramucosal lesions at that time.    Procedure:  1.  Robotic low anterior resection with colorectal anastomosis  2.  Laparoscopic transversus abdominis plane block  3.  Flexible sigmoidoscopy    Indications for Procedure:  A 69-year-old female who was found to have upper rectal/rectosigmoid adenocarcinoma who presents for definitive surgical resection     Findings of the Procedure:  Tattoo in tumor just at the rectosigmoid junction and below it in the upper rectum.  No evidence of metastatic disease         H&P (View-Only) 2/3/20            Op Note 2/13/20          Op Note 2/13/20        Op Note 2/13/20     Please document the clinical significance of the Pathologists findings of _Six of fourteen lymph nodes are positive for metastatic adenocarcinoma _____.    [  x ] I agree with the Pathology Findings   [   ] I do not agree with the Pathology Findings   [   ] Other/Clarification of Findings:   [  ] Clinically Undetermined       Please document in your progress notes daily for the  duration of treatment until resolved and include in your discharge summary.

## 2020-02-27 ENCOUNTER — TELEPHONE (OUTPATIENT)
Dept: HEMATOLOGY/ONCOLOGY | Facility: CLINIC | Age: 70
End: 2020-02-27

## 2020-02-27 NOTE — TELEPHONE ENCOUNTER
----- Message from Danika Quintanilla MD sent at 2/27/2020  4:33 PM CST -----  Please book next week sometime I have available, do not double book.  ----- Message -----  From: Ankur Smith MD  Sent: 2/27/2020   2:56 PM CST  To: Danika Quintanilla MD, #    This is the patient with now T3N2 high rectal cancer. Will need adjuvant chemotherapy. She's coming to see me on Monday 3/2 at 10am. Will Dr Quintanilla be at the cancer center then or in clinic elsewhere then? She'd like to be seen same time to discuss chemotx if possible.    Thanks,  Sin Smith

## 2020-02-28 ENCOUNTER — TELEPHONE (OUTPATIENT)
Dept: SURGERY | Facility: CLINIC | Age: 70
End: 2020-02-28

## 2020-02-28 NOTE — TELEPHONE ENCOUNTER
Spoke to pt - Sent Dr Smith an In Basket message letting him know pt is inquiring about Liver Biopsy results. - Advised pt as soon as I hear back from Dr Smith I will call back and let her know. Pt verbalized understanding     ----- Message from Clyde Copeland sent at 2/28/2020  9:04 AM CST -----  Contact: self  Requesting call back from Nurse Robb regarding going over liver biopsy from previous surgery. Please call back at [phone].    Thanks,  Clyde Copeland

## 2020-02-28 NOTE — TELEPHONE ENCOUNTER
Spoke to and advised pt there was no liver biopsy done during her surgery. - Pt verbalized understanding.    ----- Message from Ankur Smith MD sent at 2/28/2020 10:35 AM CST -----  Contact: self  There was no liver biopsy done during surgery    ----- Message -----  From: Clarisse Baltazar LPN  Sent: 2/28/2020   9:39 AM CST  To: Ankur Smith MD    Pt states she spoke to you yesterday, and was wondering if the results from her liver biopsy have come back.     ----- Message -----  From: Clyde Copeland  Sent: 2/28/2020   9:04 AM CST  To: Sarah Pascual Staff    Requesting call back from Nurse Clarisse regarding going over liver biopsy from previous surgery. Please call back at [phone].    Thanks,  Clyde Copeland

## 2020-03-02 ENCOUNTER — OFFICE VISIT (OUTPATIENT)
Dept: SURGERY | Facility: CLINIC | Age: 70
End: 2020-03-02
Payer: MEDICARE

## 2020-03-02 ENCOUNTER — HOSPITAL ENCOUNTER (OUTPATIENT)
Dept: RADIOLOGY | Facility: HOSPITAL | Age: 70
Discharge: HOME | End: 2020-03-02
Attending: COLON & RECTAL SURGERY
Payer: MEDICARE

## 2020-03-02 ENCOUNTER — HOSPITAL ENCOUNTER (OUTPATIENT)
Dept: CARDIOLOGY | Facility: HOSPITAL | Age: 70
Discharge: HOME | End: 2020-03-02
Attending: COLON & RECTAL SURGERY
Payer: MEDICARE

## 2020-03-02 VITALS
BODY MASS INDEX: 24.56 KG/M2 | DIASTOLIC BLOOD PRESSURE: 68 MMHG | HEART RATE: 66 BPM | TEMPERATURE: 97 F | SYSTOLIC BLOOD PRESSURE: 122 MMHG | WEIGHT: 117.5 LBS

## 2020-03-02 DIAGNOSIS — C20 RECTAL ADENOCARCINOMA: Primary | ICD-10-CM

## 2020-03-02 DIAGNOSIS — Z01.818 PRE-OP TESTING: ICD-10-CM

## 2020-03-02 DIAGNOSIS — Z01.818 PRE-OP TESTING: Primary | ICD-10-CM

## 2020-03-02 PROCEDURE — 93010 EKG 12-LEAD: ICD-10-PCS | Mod: ,,, | Performed by: INTERNAL MEDICINE

## 2020-03-02 PROCEDURE — 71046 X-RAY EXAM CHEST 2 VIEWS: CPT | Mod: 26,,, | Performed by: RADIOLOGY

## 2020-03-02 PROCEDURE — 99999 PR PBB SHADOW E&M-EST. PATIENT-LVL III: CPT | Mod: PBBFAC,,, | Performed by: COLON & RECTAL SURGERY

## 2020-03-02 PROCEDURE — 71046 X-RAY EXAM CHEST 2 VIEWS: CPT | Mod: TC

## 2020-03-02 PROCEDURE — 99024 PR POST-OP FOLLOW-UP VISIT: ICD-10-PCS | Mod: S$GLB,,, | Performed by: COLON & RECTAL SURGERY

## 2020-03-02 PROCEDURE — 71046 XR CHEST PA AND LATERAL: ICD-10-PCS | Mod: 26,,, | Performed by: RADIOLOGY

## 2020-03-02 PROCEDURE — 99024 POSTOP FOLLOW-UP VISIT: CPT | Mod: S$GLB,,, | Performed by: COLON & RECTAL SURGERY

## 2020-03-02 PROCEDURE — 93010 ELECTROCARDIOGRAM REPORT: CPT | Mod: ,,, | Performed by: INTERNAL MEDICINE

## 2020-03-02 PROCEDURE — 93005 ELECTROCARDIOGRAM TRACING: CPT

## 2020-03-02 PROCEDURE — 99999 PR PBB SHADOW E&M-EST. PATIENT-LVL III: ICD-10-PCS | Mod: PBBFAC,,, | Performed by: COLON & RECTAL SURGERY

## 2020-03-02 RX ORDER — LIDOCAINE HYDROCHLORIDE 10 MG/ML
1 INJECTION, SOLUTION EPIDURAL; INFILTRATION; INTRACAUDAL; PERINEURAL ONCE
Status: DISCONTINUED | OUTPATIENT
Start: 2020-03-02 | End: 2023-03-18 | Stop reason: HOSPADM

## 2020-03-02 RX ORDER — SODIUM CHLORIDE 9 MG/ML
INJECTION, SOLUTION INTRAVENOUS CONTINUOUS
Status: CANCELLED | OUTPATIENT
Start: 2020-03-02

## 2020-03-02 RX ORDER — ONDANSETRON 2 MG/ML
4 INJECTION INTRAMUSCULAR; INTRAVENOUS EVERY 12 HOURS PRN
Status: CANCELLED | OUTPATIENT
Start: 2020-03-02

## 2020-03-02 NOTE — H&P (VIEW-ONLY)
History & Physical    SUBJECTIVE:     CC: rectosigmoid cancer  Ref MD: Brenda Ochoa MD    History of Present Illness:  Patient is a 69 y.o. 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  Patient was undergoing a colonoscopy on November 7, 2019 where a rectosigmoid mass was found and biopsied which showed moderately differentiated adenocarcinoma.  Patient had no other intramucosal lesions at that time.  She is undergoing colonoscopy for iron deficiency anemia.  She had previously been found the month before to have iron deficiency anemia as well as blood when wiping after a bowel movement.  She is on chronic anticoagulation therapy of Coumadin for mechanical heart valve which was done in 2003.  She denies any fever, chills, nausea, vomiting, diarrhea, melena, weight loss or any other signs or symptoms.  She has no personal family history of colorectal cancer or IBD.  Her last colonoscopy prior to this was in 2017 were no lesion was found in this area. Her only significant past surgical history is this mechanical valve replacement.  No episodes of fecal incontinence per her report and some chronic constipation noted. She is referred to Colorectal surgery for evaluation.  I have personally spoken with her referring provider and reviewed her previous records.    2/13/2020: Robotic LAR with CR anastomosis    Interval history:  Since last clinic visit, the patient underwent robotic LAR on 02/13/2020.  Final pathology showed a T3 N2 rectal adenocarcinoma.  Postoperatively she has recovered well. She had uneventful course in the hospital and at home.  She is tolerating regular diet having normal bowel movements.  Still has some constipation postoperatively but feels it is slightly improved.  She denies any fever, chills, nausea, vomiting.  No blood in the stool or hematochezia or melena.    Review of patient's allergies indicates:   Allergen Reactions    Penicillins Rash       Current Outpatient  Medications   Medication Sig Dispense Refill    acebutolol (SECTRAL) 200 MG capsule Take 400 mg by mouth 2 (two) times daily.       acebutolol (SECTRAL) 400 mg capsule Take 400 mg by mouth 2 (two) times daily.      acetaminophen (TYLENOL) 325 MG tablet Take 2 tablets (650 mg total) by mouth every 6 (six) hours.  0    ALPRAZolam (XANAX) 1 MG tablet Take 1 tablet by mouth nightly as needed for Insomnia.      butalbital-acetaminophen-caffeine -40 mg (FIORICET, ESGIC) -40 mg per tablet TAKE ONE TABLET BY MOUTH EVERY SIX HOURS AS NEEDED      enoxaparin (LOVENOX) 60 mg/0.6 mL Syrg Inject 0.6 mLs (60 mg total) into the skin 2 (two) times daily. 2 Syringe 0    ergocalciferol (ERGOCALCIFEROL) 50,000 unit Cap Take 1 capsule by mouth every 30 days.      furosemide (LASIX) 40 MG tablet Take 40 mg by mouth Daily.      LINZESS 145 mcg Cap capsule TAKE 1 CAPSULE (145 MCG TOTAL) BY MOUTH ONCE DAILY. (Patient taking differently: daily as needed. ) 30 capsule 5    lovastatin (MEVACOR) 40 MG tablet Take 40 mg by mouth every evening.      multivitamin (THERAGRAN) tablet Take 1 tablet by mouth.      nozaseptin (NOZIN) nasal  1 each by Each Nostril route 2 (two) times daily. 1 applicator 0    oxyCODONE (ROXICODONE) 5 MG immediate release tablet Take 1 tablet (5 mg total) by mouth every 4 (four) hours as needed. 20 tablet 0    pantoprazole (PROTONIX) 20 MG tablet Take 1 tablet (20 mg total) by mouth once daily. 30 tablet 11    sertraline (ZOLOFT) 100 MG tablet Take 1 tablet by mouth Daily.      traMADol (ULTRAM) 50 mg tablet 50 mg every 8 (eight) hours as needed.       warfarin (COUMADIN) 5 MG tablet Take 1 tablet (5 mg total) by mouth Daily. 5 mg by mouth for five days then alternate with 2.5 mg for one day.      zolpidem (AMBIEN) 10 mg Tab Take 5 mg by mouth nightly as needed.        Current Facility-Administered Medications   Medication Dose Route Frequency Provider Last Rate Last Dose     lidocaine (PF) 10 mg/ml (1%) injection 10 mg  1 mL Intradermal Once Ankur Smith MD           Past Medical History:   Diagnosis Date    Anticoagulant long-term use     Aortic valve defect     Benign neoplasm of ascending colon 4/6/2017    Cancer     CHF (congestive heart failure)     DDD (degenerative disc disease), cervical 7/20/2018    GERD (gastroesophageal reflux disease)     Hemorrhoids     HLD (hyperlipidemia)     Hypertension     Insomnia 12/1/2014    Irritable bowel syndrome with constipation 4/9/2018    Postmenopausal osteoporosis 7/20/2018     Past Surgical History:   Procedure Laterality Date    AORTIC VALVE REPLACEMENT  2003    COLONOSCOPY Left 4/6/2017    Procedure: COLONOSCOPY;  Surgeon: Sander Ulloa MD;  Location: [facility] ENDO;  Service: Endoscopy;  Laterality: Left;    COLONOSCOPY N/A 11/7/2019    Procedure: COLONOSCOPY;  Surgeon: Brenda Ochoa MD;  Location: [facility];  Service: Endoscopy;  Laterality: N/A;    ESOPHAGOGASTRODUODENOSCOPY N/A 11/7/2019    Procedure: ESOPHAGOGASTRODUODENOSCOPY (EGD) needs PT/INR;  Surgeon: Brenda Ochoa MD;  Location: [facility];  Service: Endoscopy;  Laterality: N/A;    FLEXIBLE SIGMOIDOSCOPY N/A 2/13/2020    Procedure: SIGMOIDOSCOPY, FLEXIBLE;  Surgeon: Ankur Smith MD;  Location: [facility] OR;  Service: General;  Laterality: N/A;    INJECTION OF ANESTHETIC AGENT INTO TISSUE PLANE DEFINED BY TRANSVERSUS ABDOMINIS MUSCLE N/A 2/13/2020    Procedure: BLOCK, TRANSVERSUS ABDOMINIS PLANE;  Surgeon: Ankur Smith MD;  Location: [facility] OR;  Service: General;  Laterality: N/A;    ROBOT-ASSISTED LOW ANTERIOR RESECTION OF COLON N/A 2/13/2020    Procedure: XI ROBOTIC RESECTION, COLON, LOW ANTERIOR;  Surgeon: Ankur Smith MD;  Location: [facility] OR;  Service: General;  Laterality: N/A;     Family History   Problem Relation Age of Onset    Diabetes Mother     Heart disease Mother     Hypertension Mother     Heart disease Father      Hypertension Father     Hypertension Sister     Hypertension Son     Heart disease Son     Colon cancer Neg Hx      Social History     Tobacco Use    Smoking status: Never Smoker    Smokeless tobacco: Never Used   Substance Use Topics    Alcohol use: Not Currently    Drug use: No        Review of Systems:  Review of Systems   Constitutional: Negative for activity change, appetite change, chills, fatigue, fever and unexpected weight change.   HENT: Negative for congestion, ear pain, sore throat and trouble swallowing.    Eyes: Negative for pain, redness and itching.   Respiratory: Negative for cough, shortness of breath and wheezing.    Cardiovascular: Negative for chest pain, palpitations and leg swelling.   Gastrointestinal: Positive for constipation. Negative for abdominal distention, abdominal pain, anal bleeding, blood in stool, diarrhea, nausea, rectal pain and vomiting.   Endocrine: Negative for cold intolerance, heat intolerance and polyuria.   Genitourinary: Negative for dysuria, flank pain, frequency and hematuria.   Musculoskeletal: Negative for gait problem, joint swelling and neck pain.   Skin: Negative for color change, rash and wound.   Allergic/Immunologic: Negative for environmental allergies and immunocompromised state.   Neurological: Negative for dizziness, speech difficulty, weakness and numbness.   Psychiatric/Behavioral: Negative for agitation, confusion and hallucinations.       OBJECTIVE:     Vital Signs (Most Recent)  Temp: 97.3 °F (36.3 °C) (03/02/20 0932)  Pulse: 66 (03/02/20 0932)  BP: 122/68 (03/02/20 0932)     53.3 kg (117 lb 8.1 oz)     Physical Exam:  Physical Exam   Constitutional: She is oriented to person, place, and time. She appears well-developed.   HENT:   Head: Normocephalic and atraumatic.   Eyes: Conjunctivae and EOM are normal.   Neck: Normal range of motion. No thyromegaly present.   Cardiovascular: Normal rate and regular rhythm.   Pulmonary/Chest: Effort normal.  No respiratory distress.   Abdominal: Soft. She exhibits no distension and no mass. There is no tenderness.   Incisions well-healing, clean, dry and intact with skin glue still in place; no erythema or drainage   Musculoskeletal: Normal range of motion. She exhibits no edema or tenderness.   Neurological: She is alert and oriented to person, place, and time.   Skin: Skin is warm and dry. Capillary refill takes less than 2 seconds. No rash noted.   Psychiatric: She has a normal mood and affect.     Laboratory  Lab Results   Component Value Date    WBC 3.30 (L) 02/16/2020    HGB 10.1 (L) 02/16/2020    HCT 32.5 (L) 02/16/2020     (L) 02/16/2020    ALT 22 01/27/2020    AST 23 01/27/2020     02/16/2020    K 3.2 (L) 02/16/2020     02/16/2020    CREATININE 0.6 02/16/2020    BUN 5 (L) 02/16/2020    CO2 31 (H) 02/16/2020    INR 1.1 02/14/2020       Diagnostic Results:  Reviewed colonoscopy report images well as pathology showing rectosigmoid mass consistent with adenocarcinoma    MRI Liver:  FINDINGS:  There is a rounded lesion in the posterior right hepatic lobe (segment 7) which correlates with prior CT findings and is essentially unchanged in size measuring to 18 mm.  This lesion is isointense on precontrast T1 and T2 sequences and appears isointense to liver parenchyma on postcontrast images.  No definite associated internal enhancement or washout demonstrated.  No definite capsular enhancement.  This lesion does appear to demonstrate some mild intrinsic T1 signal dropout on out of phase images, suggesting high intracellular lipid content and may potentially represent focal fatty infiltration.  No associated restricted diffusion.  Few scattered subcentimeter nonenhancing cyst are present in the liver.    No discrete biliary, splenic, pancreatic, adrenal, or renal abnormality demonstrated.  No upper abdominal adenopathy demonstrated.      Impression       1. Rounded 18 mm lesion in the posterior right  hepatic lobe which correlates with prior CT findings.  Lesion remains indeterminate.  While the lack of enhancement and suspected high intracellular lipid content suggests benign etiology, malignancy can not be entirely excluded.  Consider close interval follow-up.         PATHOLOGY from colonoscopy:   Rectal mass, biopsy:  - Moderately differentiated invasive adenocarcinoma with focal mucosal ulceration arising in a tubular  adenoma, see comment  Comment: Adenocarcinoma measures at least 0.6 cm and occupies approximately 70% of tissue submitted. No  definitive perineural or lymphovascular invasion is identified. This case was seen in consultation with Dr. Amaya  who agrees with the above diagnosis. Please see results of the MMR panel below:  Immunohistochemical stains for MLH1, MSH2, MSH6 & PMS2 reveal distinct nuclear staining for all markers.  These findings indicate an INTACT mismatch repair (MMR) gene function without evidence of significant  microsatellite instability (MSI). Although these findings make the diagnosis of hereditary non-polyposis colon cancer  (HNPCC) or Urbano Syndrome very unlikely in this patient, approximately 5% of colorectal carcinomas with defective  MMR genes and high MSI may show this apparent normal pattern of staining. Correlation with the clinical findings  & family history is encouraged.    PATH IR biopsies:  1. Liver mass, biopsy:  - Not diagnostic of malignancy  - Scant atypical liver sampling, see comments  Comments: The sections show a scant fragmented sample of liver tissue with psuedoglands in one core  and mild architectural distortion. Cytologic atypiia is not prominent. Reticulin stain shows mildly irregular  pattern of staining. No portal tracts present for evaluation. Back to back pseudoglands raises the possibly of  a well differetniated hepatocellular lesion. However, the scant sample limits interpretation. Due to the  presence of a clinical mass. Resampling is  indicated.    LIVER MASS, BIOPSY:  Negative for malignancy  Benign liver parenchyma  Macrosteatosis - 5%  Glycogenated nuclei  Negative for steatohepatitis  Minimal focal sinusoidal dilatation  Focal bile duct dilatation  Negative for fibrosis  Patient's history of rectal carcinoma is noticed. There is no evidence of metastatic malignancy in this biopsy.  If clinically suspicious, sampling issues should be considered.  Special stains trichrome, reticulin, beta-catenin and glutamine synthatase have also been performed and  support the above diagnosis.    PATHOLOGY from surgery Feb 2020:  1. SIGMOID AND UPPER RECTUM, RESECTION:  Adenocarcinoma, moderately differentiated  Margins negative  Six of fourteen lymph nodes are positive for metastatic adenocarcinoma (6/14)  Many tumor deposits in mesenteric tissue  2. ANASTOMOTIC DONUT:  Negative for malignancy  SYNOPTIC REPORT  Procedure - Low-anterior resection  Tumor Site - Rectum  Tumor Location - Circumferential  Tumor Size - 2.5 x 2cm  Macroscopic Tumor Perforation - Not Identified  Macroscopic Intactness Of Mesorectum - Not applicable  Histologic Type - Adenocarcinoma  Histologic Grade - Moderately differentiated  Microscopic Tumor Extension - Tumor invades subserosal adipose tissue  Margins - Proximal, distal and mesenteric margins are negative (grossly mesentric margin is 4.0cm from the  closest tumor)  Treatment Effect - Not applicable  Lymphovascular Invasion - Present  Perineural Invasion - Not Identified  Tumor Deposits - Present, multiple  Pathologic Staging - p T3 N2 Mx    ASSESSMENT/PLAN:     69-year-old female with adenocarcinoma of the rectosigmoid junction with liver lesion with negative biopsy x2 and negative MRI liver for definitive metastatic disease who is now s/p robotic LAR on 2/13/2020 with final path showing Stage III (T3N2) rectal adenocarcinoma    - given the stage III disease, patient will require adjuvant chemotherapy.  Will place a MediPort in  the near future for chemotherapy administration.  - All risks, benefits and alternatives fully explained to patient. Risks include, but are not limited to, bleeding, infection, port malfunction, port site infection, pneumothorax requiring chest tube insertion, damage to great vessels, cardiac arrhythmias, perioperative MI, CVA and death.  All questions field and appropriately answered to patient's satisfaction.  Consent signed and placed on chart.  - Will send back to [facility] for discussion and initiation of chemotx. Will advise to hold chemo until at least 1 month postop  - Will need lovenox bridge prior to port placement and hold coumadin. She will coordinate this with her cardiologist like last time  - RTC 3 months    Ankur Smith MD  Colon and Rectal Surgery  [facility]

## 2020-03-02 NOTE — PROGRESS NOTES
History & Physical    SUBJECTIVE:     CC: rectosigmoid cancer  Ref MD: Brenda Ochoa MD    History of Present Illness:  Patient is a 69 y.o. 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  Patient was undergoing a colonoscopy on November 7, 2019 where a rectosigmoid mass was found and biopsied which showed moderately differentiated adenocarcinoma.  Patient had no other intramucosal lesions at that time.  She is undergoing colonoscopy for iron deficiency anemia.  She had previously been found the month before to have iron deficiency anemia as well as blood when wiping after a bowel movement.  She is on chronic anticoagulation therapy of Coumadin for mechanical heart valve which was done in 2003.  She denies any fever, chills, nausea, vomiting, diarrhea, melena, weight loss or any other signs or symptoms.  She has no personal family history of colorectal cancer or IBD.  Her last colonoscopy prior to this was in 2017 were no lesion was found in this area. Her only significant past surgical history is this mechanical valve replacement.  No episodes of fecal incontinence per her report and some chronic constipation noted. She is referred to Colorectal surgery for evaluation.  I have personally spoken with her referring provider and reviewed her previous records.    2/13/2020: Robotic LAR with CR anastomosis    Interval history:  Since last clinic visit, the patient underwent robotic LAR on 02/13/2020.  Final pathology showed a T3 N2 rectal adenocarcinoma.  Postoperatively she has recovered well. She had uneventful course in the hospital and at home.  She is tolerating regular diet having normal bowel movements.  Still has some constipation postoperatively but feels it is slightly improved.  She denies any fever, chills, nausea, vomiting.  No blood in the stool or hematochezia or melena.    Review of patient's allergies indicates:   Allergen Reactions    Penicillins Rash       Current Outpatient  Medications   Medication Sig Dispense Refill    acebutolol (SECTRAL) 200 MG capsule Take 400 mg by mouth 2 (two) times daily.       acebutolol (SECTRAL) 400 mg capsule Take 400 mg by mouth 2 (two) times daily.      acetaminophen (TYLENOL) 325 MG tablet Take 2 tablets (650 mg total) by mouth every 6 (six) hours.  0    ALPRAZolam (XANAX) 1 MG tablet Take 1 tablet by mouth nightly as needed for Insomnia.      butalbital-acetaminophen-caffeine -40 mg (FIORICET, ESGIC) -40 mg per tablet TAKE ONE TABLET BY MOUTH EVERY SIX HOURS AS NEEDED      enoxaparin (LOVENOX) 60 mg/0.6 mL Syrg Inject 0.6 mLs (60 mg total) into the skin 2 (two) times daily. 2 Syringe 0    ergocalciferol (ERGOCALCIFEROL) 50,000 unit Cap Take 1 capsule by mouth every 30 days.      furosemide (LASIX) 40 MG tablet Take 40 mg by mouth Daily.      LINZESS 145 mcg Cap capsule TAKE 1 CAPSULE (145 MCG TOTAL) BY MOUTH ONCE DAILY. (Patient taking differently: daily as needed. ) 30 capsule 5    lovastatin (MEVACOR) 40 MG tablet Take 40 mg by mouth every evening.      multivitamin (THERAGRAN) tablet Take 1 tablet by mouth.      nozaseptin (NOZIN) nasal  1 each by Each Nostril route 2 (two) times daily. 1 applicator 0    oxyCODONE (ROXICODONE) 5 MG immediate release tablet Take 1 tablet (5 mg total) by mouth every 4 (four) hours as needed. 20 tablet 0    pantoprazole (PROTONIX) 20 MG tablet Take 1 tablet (20 mg total) by mouth once daily. 30 tablet 11    sertraline (ZOLOFT) 100 MG tablet Take 1 tablet by mouth Daily.      traMADol (ULTRAM) 50 mg tablet 50 mg every 8 (eight) hours as needed.       warfarin (COUMADIN) 5 MG tablet Take 1 tablet (5 mg total) by mouth Daily. 5 mg by mouth for five days then alternate with 2.5 mg for one day.      zolpidem (AMBIEN) 10 mg Tab Take 5 mg by mouth nightly as needed.        Current Facility-Administered Medications   Medication Dose Route Frequency Provider Last Rate Last Dose     lidocaine (PF) 10 mg/ml (1%) injection 10 mg  1 mL Intradermal Once Ankur Smith MD           Past Medical History:   Diagnosis Date    Anticoagulant long-term use     Aortic valve defect     Benign neoplasm of ascending colon 4/6/2017    Cancer     CHF (congestive heart failure)     DDD (degenerative disc disease), cervical 7/20/2018    GERD (gastroesophageal reflux disease)     Hemorrhoids     HLD (hyperlipidemia)     Hypertension     Insomnia 12/1/2014    Irritable bowel syndrome with constipation 4/9/2018    Postmenopausal osteoporosis 7/20/2018     Past Surgical History:   Procedure Laterality Date    AORTIC VALVE REPLACEMENT  2003    COLONOSCOPY Left 4/6/2017    Procedure: COLONOSCOPY;  Surgeon: Sander Ulloa MD;  Location: [facility];  Service: Endoscopy;  Laterality: Left;    COLONOSCOPY N/A 11/7/2019    Procedure: COLONOSCOPY;  Surgeon: Brenda Ochoa MD;  Location: [facility];  Service: Endoscopy;  Laterality: N/A;    ESOPHAGOGASTRODUODENOSCOPY N/A 11/7/2019    Procedure: ESOPHAGOGASTRODUODENOSCOPY (EGD) needs PT/INR;  Surgeon: Brenda Ochoa MD;  Location: [facility];  Service: Endoscopy;  Laterality: N/A;    FLEXIBLE SIGMOIDOSCOPY N/A 2/13/2020    Procedure: SIGMOIDOSCOPY, FLEXIBLE;  Surgeon: Ankur Smith MD;  Location: [facility];  Service: General;  Laterality: N/A;    INJECTION OF ANESTHETIC AGENT INTO TISSUE PLANE DEFINED BY TRANSVERSUS ABDOMINIS MUSCLE N/A 2/13/2020    Procedure: BLOCK, TRANSVERSUS ABDOMINIS PLANE;  Surgeon: Ankur Smith MD;  Location: [facility];  Service: General;  Laterality: N/A;    ROBOT-ASSISTED LOW ANTERIOR RESECTION OF COLON N/A 2/13/2020    Procedure: XI ROBOTIC RESECTION, COLON, LOW ANTERIOR;  Surgeon: Ankur Smith MD;  Location: [facility];  Service: General;  Laterality: N/A;     Family History   Problem Relation Age of Onset    Diabetes Mother     Heart disease Mother     Hypertension Mother     Heart disease Father      Hypertension Father     Hypertension Sister     Hypertension Son     Heart disease Son     Colon cancer Neg Hx      Social History     Tobacco Use    Smoking status: Never Smoker    Smokeless tobacco: Never Used   Substance Use Topics    Alcohol use: Not Currently    Drug use: No        Review of Systems:  Review of Systems   Constitutional: Negative for activity change, appetite change, chills, fatigue, fever and unexpected weight change.   HENT: Negative for congestion, ear pain, sore throat and trouble swallowing.    Eyes: Negative for pain, redness and itching.   Respiratory: Negative for cough, shortness of breath and wheezing.    Cardiovascular: Negative for chest pain, palpitations and leg swelling.   Gastrointestinal: Positive for constipation. Negative for abdominal distention, abdominal pain, anal bleeding, blood in stool, diarrhea, nausea, rectal pain and vomiting.   Endocrine: Negative for cold intolerance, heat intolerance and polyuria.   Genitourinary: Negative for dysuria, flank pain, frequency and hematuria.   Musculoskeletal: Negative for gait problem, joint swelling and neck pain.   Skin: Negative for color change, rash and wound.   Allergic/Immunologic: Negative for environmental allergies and immunocompromised state.   Neurological: Negative for dizziness, speech difficulty, weakness and numbness.   Psychiatric/Behavioral: Negative for agitation, confusion and hallucinations.       OBJECTIVE:     Vital Signs (Most Recent)  Temp: 97.3 °F (36.3 °C) (03/02/20 0932)  Pulse: 66 (03/02/20 0932)  BP: 122/68 (03/02/20 0932)     53.3 kg (117 lb 8.1 oz)     Physical Exam:  Physical Exam   Constitutional: She is oriented to person, place, and time. She appears well-developed.   HENT:   Head: Normocephalic and atraumatic.   Eyes: Conjunctivae and EOM are normal.   Neck: Normal range of motion. No thyromegaly present.   Cardiovascular: Normal rate and regular rhythm.   Pulmonary/Chest: Effort normal.  No respiratory distress.   Abdominal: Soft. She exhibits no distension and no mass. There is no tenderness.   Incisions well-healing, clean, dry and intact with skin glue still in place; no erythema or drainage   Musculoskeletal: Normal range of motion. She exhibits no edema or tenderness.   Neurological: She is alert and oriented to person, place, and time.   Skin: Skin is warm and dry. Capillary refill takes less than 2 seconds. No rash noted.   Psychiatric: She has a normal mood and affect.     Laboratory  Lab Results   Component Value Date    WBC 3.30 (L) 02/16/2020    HGB 10.1 (L) 02/16/2020    HCT 32.5 (L) 02/16/2020     (L) 02/16/2020    ALT 22 01/27/2020    AST 23 01/27/2020     02/16/2020    K 3.2 (L) 02/16/2020     02/16/2020    CREATININE 0.6 02/16/2020    BUN 5 (L) 02/16/2020    CO2 31 (H) 02/16/2020    INR 1.1 02/14/2020       Diagnostic Results:  Reviewed colonoscopy report images well as pathology showing rectosigmoid mass consistent with adenocarcinoma    MRI Liver:  FINDINGS:  There is a rounded lesion in the posterior right hepatic lobe (segment 7) which correlates with prior CT findings and is essentially unchanged in size measuring to 18 mm.  This lesion is isointense on precontrast T1 and T2 sequences and appears isointense to liver parenchyma on postcontrast images.  No definite associated internal enhancement or washout demonstrated.  No definite capsular enhancement.  This lesion does appear to demonstrate some mild intrinsic T1 signal dropout on out of phase images, suggesting high intracellular lipid content and may potentially represent focal fatty infiltration.  No associated restricted diffusion.  Few scattered subcentimeter nonenhancing cyst are present in the liver.    No discrete biliary, splenic, pancreatic, adrenal, or renal abnormality demonstrated.  No upper abdominal adenopathy demonstrated.      Impression       1. Rounded 18 mm lesion in the posterior right  hepatic lobe which correlates with prior CT findings.  Lesion remains indeterminate.  While the lack of enhancement and suspected high intracellular lipid content suggests benign etiology, malignancy can not be entirely excluded.  Consider close interval follow-up.         PATHOLOGY from colonoscopy:   Rectal mass, biopsy:  - Moderately differentiated invasive adenocarcinoma with focal mucosal ulceration arising in a tubular  adenoma, see comment  Comment: Adenocarcinoma measures at least 0.6 cm and occupies approximately 70% of tissue submitted. No  definitive perineural or lymphovascular invasion is identified. This case was seen in consultation with Dr. Amaya  who agrees with the above diagnosis. Please see results of the MMR panel below:  Immunohistochemical stains for MLH1, MSH2, MSH6 & PMS2 reveal distinct nuclear staining for all markers.  These findings indicate an INTACT mismatch repair (MMR) gene function without evidence of significant  microsatellite instability (MSI). Although these findings make the diagnosis of hereditary non-polyposis colon cancer  (HNPCC) or Urbano Syndrome very unlikely in this patient, approximately 5% of colorectal carcinomas with defective  MMR genes and high MSI may show this apparent normal pattern of staining. Correlation with the clinical findings  & family history is encouraged.    PATH IR biopsies:  1. Liver mass, biopsy:  - Not diagnostic of malignancy  - Scant atypical liver sampling, see comments  Comments: The sections show a scant fragmented sample of liver tissue with psuedoglands in one core  and mild architectural distortion. Cytologic atypiia is not prominent. Reticulin stain shows mildly irregular  pattern of staining. No portal tracts present for evaluation. Back to back pseudoglands raises the possibly of  a well differetniated hepatocellular lesion. However, the scant sample limits interpretation. Due to the  presence of a clinical mass. Resampling is  indicated.    LIVER MASS, BIOPSY:  Negative for malignancy  Benign liver parenchyma  Macrosteatosis - 5%  Glycogenated nuclei  Negative for steatohepatitis  Minimal focal sinusoidal dilatation  Focal bile duct dilatation  Negative for fibrosis  Patient's history of rectal carcinoma is noticed. There is no evidence of metastatic malignancy in this biopsy.  If clinically suspicious, sampling issues should be considered.  Special stains trichrome, reticulin, beta-catenin and glutamine synthatase have also been performed and  support the above diagnosis.    PATHOLOGY from surgery Feb 2020:  1. SIGMOID AND UPPER RECTUM, RESECTION:  Adenocarcinoma, moderately differentiated  Margins negative  Six of fourteen lymph nodes are positive for metastatic adenocarcinoma (6/14)  Many tumor deposits in mesenteric tissue  2. ANASTOMOTIC DONUT:  Negative for malignancy  SYNOPTIC REPORT  Procedure - Low-anterior resection  Tumor Site - Rectum  Tumor Location - Circumferential  Tumor Size - 2.5 x 2cm  Macroscopic Tumor Perforation - Not Identified  Macroscopic Intactness Of Mesorectum - Not applicable  Histologic Type - Adenocarcinoma  Histologic Grade - Moderately differentiated  Microscopic Tumor Extension - Tumor invades subserosal adipose tissue  Margins - Proximal, distal and mesenteric margins are negative (grossly mesentric margin is 4.0cm from the  closest tumor)  Treatment Effect - Not applicable  Lymphovascular Invasion - Present  Perineural Invasion - Not Identified  Tumor Deposits - Present, multiple  Pathologic Staging - p T3 N2 Mx    ASSESSMENT/PLAN:     69-year-old female with adenocarcinoma of the rectosigmoid junction with liver lesion with negative biopsy x2 and negative MRI liver for definitive metastatic disease who is now s/p robotic LAR on 2/13/2020 with final path showing Stage III (T3N2) rectal adenocarcinoma    - given the stage III disease, patient will require adjuvant chemotherapy.  Will place a MediPort in  the near future for chemotherapy administration.  - All risks, benefits and alternatives fully explained to patient. Risks include, but are not limited to, bleeding, infection, port malfunction, port site infection, pneumothorax requiring chest tube insertion, damage to great vessels, cardiac arrhythmias, perioperative MI, CVA and death.  All questions field and appropriately answered to patient's satisfaction.  Consent signed and placed on chart.  - Will send back to [facility] for discussion and initiation of chemotx. Will advise to hold chemo until at least 1 month postop  - Will need lovenox bridge prior to port placement and hold coumadin. She will coordinate this with her cardiologist like last time  - RTC 3 months    Ankur Smith MD  Colon and Rectal Surgery  [facility]

## 2020-03-03 ENCOUNTER — OFFICE VISIT (OUTPATIENT)
Dept: HEMATOLOGY/ONCOLOGY | Facility: CLINIC | Age: 70
End: 2020-03-03
Payer: MEDICARE

## 2020-03-03 VITALS
OXYGEN SATURATION: 98 % | BODY MASS INDEX: 24.39 KG/M2 | HEIGHT: 58 IN | DIASTOLIC BLOOD PRESSURE: 72 MMHG | WEIGHT: 116.19 LBS | SYSTOLIC BLOOD PRESSURE: 127 MMHG | TEMPERATURE: 98 F | HEART RATE: 63 BPM

## 2020-03-03 DIAGNOSIS — C18.9 COLON ADENOCARCINOMA: ICD-10-CM

## 2020-03-03 PROCEDURE — 99214 OFFICE O/P EST MOD 30 MIN: CPT | Mod: S$GLB,,, | Performed by: INTERNAL MEDICINE

## 2020-03-03 PROCEDURE — 99214 PR OFFICE/OUTPT VISIT, EST, LEVL IV, 30-39 MIN: ICD-10-PCS | Mod: S$GLB,,, | Performed by: INTERNAL MEDICINE

## 2020-03-03 PROCEDURE — 99999 PR PBB SHADOW E&M-EST. PATIENT-LVL III: CPT | Mod: PBBFAC,,, | Performed by: INTERNAL MEDICINE

## 2020-03-03 PROCEDURE — 99999 PR PBB SHADOW E&M-EST. PATIENT-LVL III: ICD-10-PCS | Mod: PBBFAC,,, | Performed by: INTERNAL MEDICINE

## 2020-03-03 PROCEDURE — 1126F AMNT PAIN NOTED NONE PRSNT: CPT | Mod: S$GLB,,, | Performed by: INTERNAL MEDICINE

## 2020-03-03 PROCEDURE — 1126F PR PAIN SEVERITY QUANTIFIED, NO PAIN PRESENT: ICD-10-PCS | Mod: S$GLB,,, | Performed by: INTERNAL MEDICINE

## 2020-03-03 PROCEDURE — 1101F PT FALLS ASSESS-DOCD LE1/YR: CPT | Mod: CPTII,S$GLB,, | Performed by: INTERNAL MEDICINE

## 2020-03-03 PROCEDURE — 1159F MED LIST DOCD IN RCRD: CPT | Mod: S$GLB,,, | Performed by: INTERNAL MEDICINE

## 2020-03-03 PROCEDURE — 3074F SYST BP LT 130 MM HG: CPT | Mod: CPTII,S$GLB,, | Performed by: INTERNAL MEDICINE

## 2020-03-03 PROCEDURE — 1159F PR MEDICATION LIST DOCUMENTED IN MEDICAL RECORD: ICD-10-PCS | Mod: S$GLB,,, | Performed by: INTERNAL MEDICINE

## 2020-03-03 PROCEDURE — 3078F DIAST BP <80 MM HG: CPT | Mod: CPTII,S$GLB,, | Performed by: INTERNAL MEDICINE

## 2020-03-03 PROCEDURE — 1101F PR PT FALLS ASSESS DOC 0-1 FALLS W/OUT INJ PAST YR: ICD-10-PCS | Mod: CPTII,S$GLB,, | Performed by: INTERNAL MEDICINE

## 2020-03-03 PROCEDURE — 3074F PR MOST RECENT SYSTOLIC BLOOD PRESSURE < 130 MM HG: ICD-10-PCS | Mod: CPTII,S$GLB,, | Performed by: INTERNAL MEDICINE

## 2020-03-03 PROCEDURE — 3078F PR MOST RECENT DIASTOLIC BLOOD PRESSURE < 80 MM HG: ICD-10-PCS | Mod: CPTII,S$GLB,, | Performed by: INTERNAL MEDICINE

## 2020-03-03 NOTE — PLAN OF CARE
START ON PATHWAY REGIMEN - Colorectal    COS67        Oxaliplatin (Eloxatin(R))       Leucovorin       5-Fluorouracil       5-Fluorouracil           Additional Orders: **Note: order sheet contains two q14 day cycles**    **Always confirm dose/schedule in your pharmacy ordering system**    Patient Characteristics:  Postoperative without Neoadjuvant Therapy (Pathologic Staging), Colon, Stage   III, High Risk (pT4 or pN2)  Therapeutic Status: Postoperative without Neoadjuvant Therapy (Pathologic   Staging)  Tumor Location: Colon  AJCC M Category: cM0  AJCC T Category: pT3  AJCC N Category: pN2  AJCC 8 Stage Grouping: Unknown  Intent of Therapy:  Curative Intent, Discussed with Patient

## 2020-03-03 NOTE — PATIENT INSTRUCTIONS
- NP teaching FOLFOX   - port placement with Dr. Smith already set  - C1D1 3/23/20 requested with labs

## 2020-03-03 NOTE — PROGRESS NOTES
Subjective:      DATE OF VISIT: 3/3/2020   ?   ?   Patient ID:?Adalgisa Wright is a 69 y.o. female.?? MR#: [id number]   ?   PRIMARY PROVIDER: Dr. Quintanilla    CHIEF COMPLAINT:  Follow-up??postoperatively  ?   ONCOLOGIC DIAGNOSIS: Rectal adenocarcinoma  ?   CURRENT TREATMENT: FOLFOX, C1D1 tentatively 3/23/20    HPI    Today I have the pleasure meeting with Ms. Wright as she transfers her care.  She was previously seen by my colleague Dr. Haq last on 08/21/2019 for iron deficiency anemia.  She has a mechanical aortic valve replacement 16 years ago on therapeutic anticoagulation.  Labs notable for low haptoglobin in it is felt hemolytic process may be contributing to her anemia.  She notes having recent EKG as part of preoperative evaluation but has not had follow-up with her cardiologist recently or repeat echocardiogram.  She denies edema, shortness of breath or chest pain.    She was referred to Gastroenterology for further evaluation of iron deficiency anemia and concern for GI bleed.     11/7/19 EGD and colonoscopy revealed a nonobstructing ulcerated mass 20 cm from the anal verge. Pathology:  Moderately differentiated adenocarcinoma, MSI intact.    12/02/2019 PET-CT notable for avid sigmoid lesion SUV 15.7 as well as avid right lobe liver lesion 19 mm, SUV 4.1.  No other areas of avidity concerning for metastatic disease.    12/9/19 liver biopsy, Pathology:  Diagnosis 1.  Liver mass, biopsy:   - Not diagnostic of malignancy   - Scant atypical liver sampling, see comments     Comments: The sections show a scant fragmented sample of liver tissue with   psuedoglands in one core and mild architectural distortion. Cytologic atypiia   is not prominent. Reticulin stain shows mildly irregular pattern of staining.   No portal tracts present for evaluation. Back to back pseudoglands raises the   possibly of a well differetniated hepatocellular lesion. However, the scant   sample limits interpretation. Due to the presence  of a clinical mass.   Resampling is indicated.          INTERVAL EVENTS    She returns with family to discuss results of liver biopsy, see further discussion below.    Review of Systems    ?   A comprehensive 14-point review of systems was reviewed with patient and was negative other than as specified above.   ?     Objective:      Physical Exam      ?   Vitals:    03/03/20 1511   BP: 127/72   Pulse: 63   Temp: 97.9 °F (36.6 °C)      ?   ECOG:?0   General appearance: Generally well appearing, in no acute distress.   No repeat exam today.  ?   Laboratory:  ?   No visits with results within 1 Day(s) from this visit.   Latest known visit with results is:   Admission on 02/13/2020, Discharged on 02/16/2020   Component Date Value Ref Range Status    Group & Rh 02/13/2020 O POS   Final    Indirect Virginie 02/13/2020 NEG   Final    Prothrombin Time 02/13/2020 11.9  9.0 - 12.5 sec Final    INR 02/13/2020 1.1  0.8 - 1.2 Final    Final Pathologic Diagnosis 02/13/2020    Final                    Value:1. SIGMOID AND UPPER RECTUM, RESECTION:  Adenocarcinoma, moderately differentiated  Margins negative  Six of fourteen lymph nodes are positive for metastatic adenocarcinoma (6/14)  Many tumor deposits in mesenteric tissue    2. ANASTOMOTIC DONUT:  Negative for malignancy    SYNOPTIC REPORT    Procedure - Low-anterior resection  Tumor Site - Rectum  Tumor Location - Circumferential  Tumor Size - 2.5 x 2cm  Macroscopic Tumor Perforation - Not Identified  Macroscopic Intactness Of Mesorectum - Not applicable  Histologic Type - Adenocarcinoma  Histologic Grade - Moderately differentiated  Microscopic Tumor Extension - Tumor invades subserosal adipose tissue  Margins - Proximal, distal and mesenteric margins are negative (grossly  mesentric margin is 4.0cm from the closest tumor)  Treatment Effect - Not applicable  Lymphovascular Invasion - Present  Perineural Invasion - Not Identified  Tumor Deposits - Present,  "multiple  Pathologic Staging - p T3 N2 Mx  Number Of Lymph Nodes Examined - 14                            Number Of Lymph Nodes Involved - 6  Ancillary studies: Per EPIC records, MMR has been performed at outside  facility and shows "Immunohistochemical stains for MLH1, MSH2, MSH6 & PMS2  reveal distinct nuclear staining for all markers".  Tumor block ¿ 1E  Non-tumor block ¿ 1A      Gross 02/13/2020    Final                    Value:1:  Surgery ID [id number];  Pathology ID:  [id number]  1.  Received in formalin labeled "sigmoid colon and upper rectum" is a 10.5 x  2 cm segment of colon with one stapled margin and one open margin.  The  serosa is pale tan and smooth with tattoo ink 1 cm from the stapled margin.  The peritoneal reflection could not be grossly determined.  Sectioning  reveals a 2.5 x 2 cm fungating, ulcerated lesion with elevated edges, 2.5 cm  from the stapled margin, and 5 cm from the open margin.  The lesion grossly  invades through the muscularis propria and involves the surrounding  mesenteric fat.  Sectioning the fat reveals a 1 cm pale tan, firm area,  candidate positive lymph node.  Representative sections are embedded in  cassette 861, 1A-1Q.    1A:  Stapled margin  1B:  Open margin  1C-1G:  Lesion  1H:  Uninvolved mucosa  1I:  3 candidate lymph nodes  1J:  Firm candidate lymph node, serially sectioned  1K:  Single candidate lymph node  1L-1Q:  Fat for lymph node identification    Grossed by: Raheem Alves   2: Surgery ID [id number];  Pathology ID:  [id number]  2.  Received in formalin labeled "anastomotic donut" is a 1.7 x 1.5 x 0.8 cm  ring of colon mucosa.  Representative sections are embedded in cassettes 861,  2A.    Grossed by: Lyndon Alves      Sodium 02/14/2020 142  136 - 145 mmol/L Final    Potassium 02/14/2020 3.6  3.5 - 5.1 mmol/L Final    Chloride 02/14/2020 106  95 - 110 mmol/L Final    CO2 02/14/2020 27  23 - 29 mmol/L Final    Glucose 02/14/2020 90  70 - 110 " mg/dL Final    BUN, Bld 02/14/2020 4* 8 - 23 mg/dL Final    Creatinine 02/14/2020 0.6  0.5 - 1.4 mg/dL Final    Calcium 02/14/2020 9.1  8.7 - 10.5 mg/dL Final    Anion Gap 02/14/2020 9  8 - 16 mmol/L Final    eGFR if African American 02/14/2020 >60  >60 mL/min/1.73 m^2 Final    eGFR if non African American 02/14/2020 >60  >60 mL/min/1.73 m^2 Final    WBC 02/14/2020 4.84  3.90 - 12.70 K/uL Final    RBC 02/14/2020 3.46* 4.00 - 5.40 M/uL Final    Hemoglobin 02/14/2020 11.0* 12.0 - 16.0 g/dL Final    Hematocrit 02/14/2020 35.1* 37.0 - 48.5 % Final    Mean Corpuscular Volume 02/14/2020 101* 82 - 98 fL Final    Mean Corpuscular Hemoglobin 02/14/2020 31.8* 27.0 - 31.0 pg Final    Mean Corpuscular Hemoglobin Conc 02/14/2020 31.3* 32.0 - 36.0 g/dL Final    RDW 02/14/2020 12.7  11.5 - 14.5 % Final    Platelets 02/14/2020 172  150 - 350 K/uL Final    MPV 02/14/2020 10.2  9.2 - 12.9 fL Final    Immature Granulocytes 02/14/2020 1.2* 0.0 - 0.5 % Final    Gran # (ANC) 02/14/2020 3.6  1.8 - 7.7 K/uL Final    Immature Grans (Abs) 02/14/2020 0.06* 0.00 - 0.04 K/uL Final    Lymph # 02/14/2020 0.7* 1.0 - 4.8 K/uL Final    Mono # 02/14/2020 0.4  0.3 - 1.0 K/uL Final    Eos # 02/14/2020 0.0  0.0 - 0.5 K/uL Final    Baso # 02/14/2020 0.02  0.00 - 0.20 K/uL Final    nRBC 02/14/2020 0  0 /100 WBC Final    Gran% 02/14/2020 74.6* 38.0 - 73.0 % Final    Lymph% 02/14/2020 14.7* 18.0 - 48.0 % Final    Mono% 02/14/2020 9.1  4.0 - 15.0 % Final    Eosinophil% 02/14/2020 0.0  0.0 - 8.0 % Final    Basophil% 02/14/2020 0.4  0.0 - 1.9 % Final    Differential Method 02/14/2020 Automated   Final    Magnesium 02/14/2020 1.8  1.6 - 2.6 mg/dL Final    Phosphorus 02/14/2020 3.1  2.7 - 4.5 mg/dL Final    aPTT 02/14/2020 30.8  21.0 - 32.0 sec Final    Prothrombin Time 02/14/2020 11.9  9.0 - 12.5 sec Final    INR 02/14/2020 1.1  0.8 - 1.2 Final    WBC 02/14/2020 4.97  3.90 - 12.70 K/uL Final    RBC 02/14/2020 3.45* 4.00 -  5.40 M/uL Final    Hemoglobin 02/14/2020 11.1* 12.0 - 16.0 g/dL Final    Hematocrit 02/14/2020 35.2* 37.0 - 48.5 % Final    Mean Corpuscular Volume 02/14/2020 102* 82 - 98 fL Final    Mean Corpuscular Hemoglobin 02/14/2020 32.2* 27.0 - 31.0 pg Final    Mean Corpuscular Hemoglobin Conc 02/14/2020 31.5* 32.0 - 36.0 g/dL Final    RDW 02/14/2020 12.7  11.5 - 14.5 % Final    Platelets 02/14/2020 168  150 - 350 K/uL Final    MPV 02/14/2020 10.0  9.2 - 12.9 fL Final    Immature Granulocytes 02/14/2020 0.2  0.0 - 0.5 % Final    Gran # (ANC) 02/14/2020 3.5  1.8 - 7.7 K/uL Final    Immature Grans (Abs) 02/14/2020 0.01  0.00 - 0.04 K/uL Final    Lymph # 02/14/2020 0.8* 1.0 - 4.8 K/uL Final    Mono # 02/14/2020 0.6  0.3 - 1.0 K/uL Final    Eos # 02/14/2020 0.1  0.0 - 0.5 K/uL Final    Baso # 02/14/2020 0.03  0.00 - 0.20 K/uL Final    nRBC 02/14/2020 0  0 /100 WBC Final    Gran% 02/14/2020 71.0  38.0 - 73.0 % Final    Lymph% 02/14/2020 15.7* 18.0 - 48.0 % Final    Mono% 02/14/2020 11.1  4.0 - 15.0 % Final    Eosinophil% 02/14/2020 1.4  0.0 - 8.0 % Final    Basophil% 02/14/2020 0.6  0.0 - 1.9 % Final    Differential Method 02/14/2020 Automated   Final    aPTT 02/14/2020 49.2* 21.0 - 32.0 sec Final    Sodium 02/15/2020 142  136 - 145 mmol/L Final    Potassium 02/15/2020 3.8  3.5 - 5.1 mmol/L Final    Chloride 02/15/2020 106  95 - 110 mmol/L Final    CO2 02/15/2020 29  23 - 29 mmol/L Final    Glucose 02/15/2020 106  70 - 110 mg/dL Final    BUN, Bld 02/15/2020 5* 8 - 23 mg/dL Final    Creatinine 02/15/2020 0.7  0.5 - 1.4 mg/dL Final    Calcium 02/15/2020 9.4  8.7 - 10.5 mg/dL Final    Anion Gap 02/15/2020 7* 8 - 16 mmol/L Final    eGFR if African American 02/15/2020 >60  >60 mL/min/1.73 m^2 Final    eGFR if non African American 02/15/2020 >60  >60 mL/min/1.73 m^2 Final    WBC 02/15/2020 4.59  3.90 - 12.70 K/uL Final    RBC 02/15/2020 3.36* 4.00 - 5.40 M/uL Final    Hemoglobin 02/15/2020 10.6*  12.0 - 16.0 g/dL Final    Hematocrit 02/15/2020 35.4* 37.0 - 48.5 % Final    Mean Corpuscular Volume 02/15/2020 105* 82 - 98 fL Final    Mean Corpuscular Hemoglobin 02/15/2020 31.5* 27.0 - 31.0 pg Final    Mean Corpuscular Hemoglobin Conc 02/15/2020 29.9* 32.0 - 36.0 g/dL Final    RDW 02/15/2020 12.9  11.5 - 14.5 % Final    Platelets 02/15/2020 164  150 - 350 K/uL Final    MPV 02/15/2020 10.4  9.2 - 12.9 fL Final    Immature Granulocytes 02/15/2020 0.7* 0.0 - 0.5 % Final    Gran # (ANC) 02/15/2020 3.2  1.8 - 7.7 K/uL Final    Immature Grans (Abs) 02/15/2020 0.03  0.00 - 0.04 K/uL Final    Lymph # 02/15/2020 1.0  1.0 - 4.8 K/uL Final    Mono # 02/15/2020 0.4  0.3 - 1.0 K/uL Final    Eos # 02/15/2020 0.0  0.0 - 0.5 K/uL Final    Baso # 02/15/2020 0.02  0.00 - 0.20 K/uL Final    nRBC 02/15/2020 0  0 /100 WBC Final    Gran% 02/15/2020 68.6  38.0 - 73.0 % Final    Lymph% 02/15/2020 20.9  18.0 - 48.0 % Final    Mono% 02/15/2020 9.2  4.0 - 15.0 % Final    Eosinophil% 02/15/2020 0.2  0.0 - 8.0 % Final    Basophil% 02/15/2020 0.4  0.0 - 1.9 % Final    Differential Method 02/15/2020 Automated   Final    Magnesium 02/15/2020 1.9  1.6 - 2.6 mg/dL Final    Phosphorus 02/15/2020 2.6* 2.7 - 4.5 mg/dL Final    aPTT 02/15/2020 43.5* 21.0 - 32.0 sec Final    WBC 02/15/2020 4.59  3.90 - 12.70 K/uL Final    RBC 02/15/2020 3.36* 4.00 - 5.40 M/uL Final    Hemoglobin 02/15/2020 10.6* 12.0 - 16.0 g/dL Final    Hematocrit 02/15/2020 35.4* 37.0 - 48.5 % Final    Mean Corpuscular Volume 02/15/2020 105* 82 - 98 fL Final    Mean Corpuscular Hemoglobin 02/15/2020 31.5* 27.0 - 31.0 pg Final    Mean Corpuscular Hemoglobin Conc 02/15/2020 29.9* 32.0 - 36.0 g/dL Final    RDW 02/15/2020 12.9  11.5 - 14.5 % Final    Platelets 02/15/2020 164  150 - 350 K/uL Final    MPV 02/15/2020 10.4  9.2 - 12.9 fL Final    Immature Granulocytes 02/15/2020 0.7* 0.0 - 0.5 % Final    Gran # (ANC) 02/15/2020 3.2  1.8 - 7.7 K/uL Final     Immature Grans (Abs) 02/15/2020 0.03  0.00 - 0.04 K/uL Final    Lymph # 02/15/2020 1.0  1.0 - 4.8 K/uL Final    Mono # 02/15/2020 0.4  0.3 - 1.0 K/uL Final    Eos # 02/15/2020 0.0  0.0 - 0.5 K/uL Final    Baso # 02/15/2020 0.02  0.00 - 0.20 K/uL Final    nRBC 02/15/2020 0  0 /100 WBC Final    Gran% 02/15/2020 68.6  38.0 - 73.0 % Final    Lymph% 02/15/2020 20.9  18.0 - 48.0 % Final    Mono% 02/15/2020 9.2  4.0 - 15.0 % Final    Eosinophil% 02/15/2020 0.2  0.0 - 8.0 % Final    Basophil% 02/15/2020 0.4  0.0 - 1.9 % Final    Differential Method 02/15/2020 Automated   Final    aPTT 02/15/2020 43.5* 21.0 - 32.0 sec Final    Sodium 02/16/2020 145  136 - 145 mmol/L Final    Potassium 02/16/2020 3.2* 3.5 - 5.1 mmol/L Final    Chloride 02/16/2020 107  95 - 110 mmol/L Final    CO2 02/16/2020 31* 23 - 29 mmol/L Final    Glucose 02/16/2020 94  70 - 110 mg/dL Final    BUN, Bld 02/16/2020 5* 8 - 23 mg/dL Final    Creatinine 02/16/2020 0.6  0.5 - 1.4 mg/dL Final    Calcium 02/16/2020 8.8  8.7 - 10.5 mg/dL Final    Anion Gap 02/16/2020 7* 8 - 16 mmol/L Final    eGFR if African American 02/16/2020 >60  >60 mL/min/1.73 m^2 Final    eGFR if non African American 02/16/2020 >60  >60 mL/min/1.73 m^2 Final    WBC 02/16/2020 3.30* 3.90 - 12.70 K/uL Final    RBC 02/16/2020 3.13* 4.00 - 5.40 M/uL Final    Hemoglobin 02/16/2020 10.1* 12.0 - 16.0 g/dL Final    Hematocrit 02/16/2020 32.5* 37.0 - 48.5 % Final    Mean Corpuscular Volume 02/16/2020 104* 82 - 98 fL Final    Mean Corpuscular Hemoglobin 02/16/2020 32.3* 27.0 - 31.0 pg Final    Mean Corpuscular Hemoglobin Conc 02/16/2020 31.1* 32.0 - 36.0 g/dL Final    RDW 02/16/2020 12.8  11.5 - 14.5 % Final    Platelets 02/16/2020 135* 150 - 350 K/uL Final    MPV 02/16/2020 10.6  9.2 - 12.9 fL Final    Immature Granulocytes 02/16/2020 1.2* 0.0 - 0.5 % Final    Gran # (ANC) 02/16/2020 1.8  1.8 - 7.7 K/uL Final    Immature Grans (Abs) 02/16/2020 0.04  0.00 - 0.04  K/uL Final    Lymph # 02/16/2020 1.1  1.0 - 4.8 K/uL Final    Mono # 02/16/2020 0.4  0.3 - 1.0 K/uL Final    Eos # 02/16/2020 0.0  0.0 - 0.5 K/uL Final    Baso # 02/16/2020 0.02  0.00 - 0.20 K/uL Final    nRBC 02/16/2020 0  0 /100 WBC Final    Gran% 02/16/2020 54.0  38.0 - 73.0 % Final    Lymph% 02/16/2020 32.4  18.0 - 48.0 % Final    Mono% 02/16/2020 10.6  4.0 - 15.0 % Final    Eosinophil% 02/16/2020 1.2  0.0 - 8.0 % Final    Basophil% 02/16/2020 0.6  0.0 - 1.9 % Final    Differential Method 02/16/2020 Automated   Final    Magnesium 02/16/2020 1.8  1.6 - 2.6 mg/dL Final    Phosphorus 02/16/2020 2.5* 2.7 - 4.5 mg/dL Final    aPTT 02/16/2020 32.1* 21.0 - 32.0 sec Final        Tumor markers    CEA    CEA   Date Value Ref Range Status   11/18/2019 3.5 0.0 - 5.0 ng/mL Final     Comment:     CEA Normal Range:  Non-Smokers: 0-3.0 ng/mL  Smokers:     0-5.0 ng/mL              ? IMAGING  11/21/19  CT CHEST ABDOMEN PELVIS WITH CONTRAST ([facility])    CLINICAL HISTORY:  Malignant neoplasm of rectosigmoid junctionknown rectosigmoid cancer, eval for metastatic disease;    COMPARISON:  None    FINDINGS:  Chest:    The heart is enlarged.  There is no evidence of mediastinal or hilar or axillary lymphadenopathy.  Patient has had a prior median sternotomy.  There are no lung nodules.  No pleural effusions.    Skeletal structures are intact.    Abdomen pelvis:    There appears to be a subtle hypodense lesion involving the right lobe of the liver measuring about 1.7 cm.  There may be a smaller hypodensity more anteriorly measuring 8 mm.    Pancreas is unremarkable the spleen is unremarkable.    The gallbladder is unremarkable.  There is no bile duct dilatation.    The kidneys are normal.    The aorta and inferior vena cava are unremarkable.    There are no acute bowel abnormalities.     No evidence of appendicitis.  No evidence of diverticulitis.    Bladder is normal. No abnormal masses or fluid collections in the pelvis.  No  definite pelvic lymphadenopathy.  No abdominal retroperitoneal lymph node enlargement.    There are few degenerative changes seen involving the lumbar spine as well as a few mild compression fractures of the thoracic spine which appear chronic.      Impression       No evidence of metastatic disease involving the chest.  Findings suspicious for a liver mass possibly related to a metastasis.  Further evaluation recommended with a PET scan or MRI of the liver.    There appear to be a few compression fractures of the thoracic spine which are chronic.  No definite metastatic disease involving the skeleton.       ?   Assessment/Plan:       1. Colon adenocarcinoma          Plan:     # rectal adenocarcinoma, MSI stable:  02/13/2020 hemicolectomy.  I have reviewed her pathology positive for moderately differentiated adenocarcinoma .  I discussed in detail  with her and accompanying family today T3 N2 disease with tumor deposits in mesentery, negative margins.  I discussed indication for adjuvant chemotherapy given high risk stage III disease to decrease risk of recurrence.  I also discussed importance of close follow-up of subtle hypodensity in the liver less than 2 cm biopsy x2 negative.  We will arrange for chemotherapy teaching for FOLFOX adjuvant therapy and have already reviewed schedule and potential toxicities and management.  They will have formal chemotherapy teaching with nurse practitioner and consent.    - NP teaching FOLFOX   - port placement with Dr. Smith already set  - C1D1 3/23/20 requested with labs    # chronic anticoagulation with a  mechanical aortic valve:  On chronic anticoagulation due to mechanical valve.  Recommend follow-up with anticoagulation clinic.  She understands with port placement we need to hold, asked her to please discussed with anticoagulation clinic/Dr. Smith's staff.    # mild normocytic anemia:  Folate and vitamin B12 previously checked within normal limits.  Iron deficiency noted  on prior labs on 08/12/2019 with 5% saturation and ferritin 9.  Status post IV iron repeat iron indices show improvement to 15% saturation (ferritin not repeated) and downtrending TIBC and transferrin indicating response  Repeat labs on 11/22/2019 reveal similar mild anemia with hemoglobin 11.6, MCV 96, iron indices show 15% iron saturation.  With iron saturation 15% she may still have degree of iron deficiency and possible ongoing blood loss given evidence of rectal malignancy per above; I have ordered re-dosing of IV iron for 2 doses, completed 12/04/2019.  Additionally she has mechanical aortic valve with haptoglobin less than 10 and elevated  suggesting potential hemolytic component to her anemia.  No peripheral smear however to confirm schistocytes.  Recommended follow-up with her cardiologist.    # hyperlipidemia:  Recommended follow-up with PCP.    Follow-Up:   - NP teaching FOLFOX   - port placement with Dr. Smith already set  - C1D1 3/23/20 requested with labs    I have spent 25 minutes in this office visit in the direct face-to-face communication with the patient, with greater than 50% of the time spend in the counseling and coordination of care.

## 2020-03-04 ENCOUNTER — TELEPHONE (OUTPATIENT)
Dept: HEMATOLOGY/ONCOLOGY | Facility: CLINIC | Age: 70
End: 2020-03-04

## 2020-03-05 ENCOUNTER — DOCUMENTATION ONLY (OUTPATIENT)
Dept: HEMATOLOGY/ONCOLOGY | Facility: CLINIC | Age: 70
End: 2020-03-05

## 2020-03-05 ENCOUNTER — TELEPHONE (OUTPATIENT)
Dept: SURGERY | Facility: CLINIC | Age: 70
End: 2020-03-05

## 2020-03-05 ENCOUNTER — DOCUMENTATION ONLY (OUTPATIENT)
Dept: PHARMACY | Facility: HOSPITAL | Age: 70
End: 2020-03-05

## 2020-03-05 NOTE — NURSING
Spoke with patient over the phone regarding setting up chemotherapy appts.  My role as nurse navigator and my direct contact information reviewed and she stated understanding.  Chemo education with NP, Lab, MD and chemo appts set up together . Pt is in agreement with dates times and locations.  She requested chemo start on 3/23/20.  She will have her mediport placed by Dr. Smith 3/17/20 and is in the process of getting set up for coumadin bridge prior to that procedure . Referral center has been notified of new chemo start and will process insurance auth.  Social work and pharmacy also notified of new pt start. She will see  when she comes in for teaching. I will follow insurance auth and she will notify me of any further concerns.   Oncology Navigation   Intake  Cancer Type: GI (colon)  MD Assigned: [email or web address]  Initial Nurse Navigator Contact: 03/04/20  Contact Method: Pool basket  Date Worked: 03/05/20  Appointment Date: 03/10/20  Schedule to Appointment Timeline (days): 5  Multiple appointments: Yes  Start of Treatment: 03/23/20 (pt request)     Treatment  Treatment Type(s): Chemotherapy  Chemotherapy Regimen: FOLFOX       Procedures: Port / PICC           Acuity  Treatment Tolerability: Has not started treatment yet/treatment fully completed and side effects resolved  Comorbidities in Medical History: 2   Needed: 0  Support: 0  Transportation: 0  Verbalizes the need for more education: 1  Navigation Acuity: 3     Follow Up  Follow up in about 13 days (around 3/18/2020).

## 2020-03-05 NOTE — TELEPHONE ENCOUNTER
"Spoke to pt, she needed claification on what Dr Smith needed Pre Operatively from her cardiologist - Advised she will need lovenox bridge prior to port placement and hold coumadin. She will coordinate this with her cardiologist like last time. - Pt stated, "That's what I thought, but I just wanted to make sure. - Instructed pt to call back if she has any further questions or concerns.     ----- Message from Anita Rock sent at 3/5/2020  9:53 AM CST -----  Contact: pt   Type:  Needs Medical Advice    Who Called: JOSEFA REYNOSO S   Symptoms (please be specific):  How long has patient had these symptoms:   Pharmacy name and phone #:   Would the patient rather a call back or a response via [facility]? Call   Best Call Back Number:  [phone] (home)   Additional Information: Pt is requesting a call back from the nurse in regards to the port and coumadin that the pt has to bridge please     "

## 2020-03-10 ENCOUNTER — OFFICE VISIT (OUTPATIENT)
Dept: HEMATOLOGY/ONCOLOGY | Facility: CLINIC | Age: 70
End: 2020-03-10
Payer: MEDICARE

## 2020-03-10 DIAGNOSIS — Z71.9 ENCOUNTER FOR EDUCATION: ICD-10-CM

## 2020-03-10 DIAGNOSIS — T45.1X5A CHEMOTHERAPY-INDUCED NAUSEA: Primary | ICD-10-CM

## 2020-03-10 DIAGNOSIS — C18.9 COLON ADENOCARCINOMA: ICD-10-CM

## 2020-03-10 DIAGNOSIS — R16.0 LIVER MASS: ICD-10-CM

## 2020-03-10 DIAGNOSIS — R11.0 CHEMOTHERAPY-INDUCED NAUSEA: Primary | ICD-10-CM

## 2020-03-10 PROCEDURE — 99215 PR OFFICE/OUTPT VISIT, EST, LEVL V, 40-54 MIN: ICD-10-PCS | Mod: S$GLB,,, | Performed by: NURSE PRACTITIONER

## 2020-03-10 PROCEDURE — 99999 PR PBB SHADOW E&M-EST. PATIENT-LVL I: ICD-10-PCS | Mod: PBBFAC,,, | Performed by: NURSE PRACTITIONER

## 2020-03-10 PROCEDURE — 99999 PR PBB SHADOW E&M-EST. PATIENT-LVL I: CPT | Mod: PBBFAC,,, | Performed by: NURSE PRACTITIONER

## 2020-03-10 PROCEDURE — 1159F PR MEDICATION LIST DOCUMENTED IN MEDICAL RECORD: ICD-10-PCS | Mod: S$GLB,,, | Performed by: NURSE PRACTITIONER

## 2020-03-10 PROCEDURE — 1101F PT FALLS ASSESS-DOCD LE1/YR: CPT | Mod: CPTII,S$GLB,, | Performed by: NURSE PRACTITIONER

## 2020-03-10 PROCEDURE — 1159F MED LIST DOCD IN RCRD: CPT | Mod: S$GLB,,, | Performed by: NURSE PRACTITIONER

## 2020-03-10 PROCEDURE — 1101F PR PT FALLS ASSESS DOC 0-1 FALLS W/OUT INJ PAST YR: ICD-10-PCS | Mod: CPTII,S$GLB,, | Performed by: NURSE PRACTITIONER

## 2020-03-10 PROCEDURE — 99215 OFFICE O/P EST HI 40 MIN: CPT | Mod: S$GLB,,, | Performed by: NURSE PRACTITIONER

## 2020-03-10 RX ORDER — DEXAMETHASONE 4 MG/1
TABLET ORAL
Qty: 24 TABLET | Refills: 0 | Status: SHIPPED | OUTPATIENT
Start: 2020-03-10 | End: 2020-06-17

## 2020-03-10 RX ORDER — ONDANSETRON HYDROCHLORIDE 8 MG/1
8 TABLET, FILM COATED ORAL EVERY 12 HOURS PRN
Qty: 30 TABLET | Refills: 2 | Status: SHIPPED | OUTPATIENT
Start: 2020-03-10 | End: 2020-05-31

## 2020-03-10 RX ORDER — PROCHLORPERAZINE MALEATE 5 MG
5 TABLET ORAL EVERY 6 HOURS PRN
Qty: 30 TABLET | Refills: 1 | Status: SHIPPED | OUTPATIENT
Start: 2020-03-10 | End: 2020-03-30 | Stop reason: SDUPTHER

## 2020-03-10 NOTE — PROGRESS NOTES
68 y/o female for chemotherapy teaching. Patient given the Navigation Notebook. Explained the contents of the chemotherapy binder. Discussed with patient the rationale for chemotherapy, how it works, the process of treatment, potential side effects and symptoms to report.  Chemo consent discussed signed, witnessed, and scanned into media.  Zofran and compazine prescribed and discussed.  Dexamethasone 4mg - take 2 tabs (8mg) on day 2 and day 3 of post chemotherapy.      Reviewed the specific medication and gave them a handout describing the potential side effects of 5-FU, Oxaliplatin, Leucovorin, and Neulasta.  Advanced directives previously received.       Discussed potential side effects such as:  Nausea and vomiting  Myelosuppression  Fatigue  Anorexia  Alopecia  Stomatitis  Diarrhea  Cystitis  Gastritis  Fever > 100.4  Antiemetics instructions  Skin care  Constipation  Photosensitivity  Sunscreen  Small freq meals  Increased protein  Increased calories  Vitamin support   Taste alterations  Neuropathys type 1 and type 2  Hydration  Renal toxicity  Liver toxicity  [facility] resources  Thrombocytopenia precautions  Hand and foot syndrome- symptoms and self care tips  Posterior Reversible Encephalopathy syndrome  Increased Ammonia  Cardiac toxicity  Pulmonary toxicity  Secondary malignancies later in life      Phone numbers to contact healthcare team provided to patient.  Patient verbalized understanding plan of care and how to contact healthcare team if needed.

## 2020-03-12 NOTE — PRE ADMISSION SCREENING
Pre op instructions reviewed with patient per phone:    To confirm, Your surgeon has instructed you:  Surgery is scheduled 03/17/20 at 0700.      [facility] main [facility] by 0530.  Pre admit office to call afternoon prior to surgery with final arrival time.      INSTRUCTIONS IMPORTANT!!!  ¨ No smoking after 12 midnight, the night before surgery.  ¨ No solid food after 12 midnight, but you may have clear liquids up until 3 hours prior to surgery.  This includes: grape, cranberry, and apple juice (not orange, and no coffee.)   ¨ OK to brush teeth, but no gum, candy or mints!    ¨ Take only these medicines with a small swallow of water-morning of surgery.  Acebutolol, protonix, zoloft  ____  Do not wear makeup, including mascara.  ____  No powder, lotions or creams to surgical area.  ____  Please remove all jewelry, including piercings and leave at home.  ____  No money or valuables needed. Please leave at home.  ____  Please bring identification and insurance information to hospital.  ____  If going home the same day, arrange for a ride home. You will not be able to   drive if Anesthesia was used.  ____  Children, under 12 years old, must remain in the waiting room with an adult.  They are not allowed in patient areas.  ____  Wear loose fitting clothing. Allow for dressings, bandages.  ____  Stop Aspirin, Ibuprofen, Motrin and Aleve at least 5-7 days before surgery, unless otherwise instructed by your doctor, or the nurse.   You MAY use Tylenol/acetaminophen until day of surgery.  ____  If you take diabetic medication, do not take am of surgery unless instructed by   Doctor.  ____ Stop taking any Fish Oil supplement or any Vitamins that contain Vitamin E at least 5 days prior to surgery.          Bathing Instructions-- The night before surgery and the morning prior to coming to the hospital:   -Do not shave the surgical area.   -Shower and wash your hair and body as  usual with your regular soap and shampoo.   -Rinse your hair and body completely.   -Use one packet of hibiclens to wash the surgical site (using your hand) gently for 5 minutes.  Do not scrub you skin too hard.   -Do not use hibiclens on your head, face, or genitals.   -Do not wash with regular soap after you use the hibiclens.   -Rinse your body thoroughly.   -Dry with clean, soft towel.  Do not use lotion, cream, deodorant, or powders on   the surgical site.    Use antibacterial soap in place of hibiclens if your surgery is on the head, face or genitals.         Surgical Site Infection    Prevention of surgical site infections:     -Keep incisions clean and dry.   -Do not soak/submerge incisions in water until completely healed.   -Do not apply lotions, powders, creams, or deodorants to site.   -Always make sure hands are cleaned with antibacterial soap/ alcohol-based   prior to touching the surgical site.  (This includes doctors, nurses, staff, and yourself.)    Signs and symptoms:   -Redness and pain around the area where you had surgery   -Drainage of cloudy fluid from your surgical wound   -Fever over 100.4  I have read or had read and explained to me, and understand the above information.

## 2020-03-17 ENCOUNTER — HOSPITAL ENCOUNTER (OUTPATIENT)
Facility: HOSPITAL | Age: 70
Discharge: HOME | End: 2020-03-17
Attending: COLON & RECTAL SURGERY | Admitting: COLON & RECTAL SURGERY
Payer: MEDICARE

## 2020-03-17 ENCOUNTER — ANESTHESIA (OUTPATIENT)
Dept: SURGERY | Facility: HOSPITAL | Age: 70
End: 2020-03-17
Payer: MEDICARE

## 2020-03-17 ENCOUNTER — ANESTHESIA EVENT (OUTPATIENT)
Dept: SURGERY | Facility: HOSPITAL | Age: 70
End: 2020-03-17
Payer: MEDICARE

## 2020-03-17 DIAGNOSIS — C20 RECTAL ADENOCARCINOMA: ICD-10-CM

## 2020-03-17 PROCEDURE — 36000706: Performed by: COLON & RECTAL SURGERY

## 2020-03-17 PROCEDURE — 25000003 PHARM REV CODE 250: Performed by: NURSE ANESTHETIST, CERTIFIED REGISTERED

## 2020-03-17 PROCEDURE — S0077 INJECTION, CLINDAMYCIN PHOSP: HCPCS | Performed by: COLON & RECTAL SURGERY

## 2020-03-17 PROCEDURE — C1788 PORT, INDWELLING, IMP: HCPCS | Performed by: COLON & RECTAL SURGERY

## 2020-03-17 PROCEDURE — 37000009 HC ANESTHESIA EA ADD 15 MINS: Performed by: COLON & RECTAL SURGERY

## 2020-03-17 PROCEDURE — 36561 PR INSERT TUNNELED CV CATH WITH PORT: ICD-10-PCS | Mod: 79,RT,, | Performed by: COLON & RECTAL SURGERY

## 2020-03-17 PROCEDURE — 63600175 PHARM REV CODE 636 W HCPCS: Performed by: COLON & RECTAL SURGERY

## 2020-03-17 PROCEDURE — 36561 INSERT TUNNELED CV CATH: CPT | Mod: 79,RT,, | Performed by: COLON & RECTAL SURGERY

## 2020-03-17 PROCEDURE — 25000003 PHARM REV CODE 250: Performed by: COLON & RECTAL SURGERY

## 2020-03-17 PROCEDURE — 37000008 HC ANESTHESIA 1ST 15 MINUTES: Performed by: COLON & RECTAL SURGERY

## 2020-03-17 PROCEDURE — 77001 CHG FLUOROGUIDE CNTRL VEN ACCESS,PLACE,REPLACE,REMOVE: ICD-10-PCS | Mod: 26,,, | Performed by: COLON & RECTAL SURGERY

## 2020-03-17 PROCEDURE — 36000707: Performed by: COLON & RECTAL SURGERY

## 2020-03-17 PROCEDURE — 71000033 HC RECOVERY, INTIAL HOUR: Performed by: COLON & RECTAL SURGERY

## 2020-03-17 PROCEDURE — 71000015 HC POSTOP RECOV 1ST HR: Performed by: COLON & RECTAL SURGERY

## 2020-03-17 PROCEDURE — 63600175 PHARM REV CODE 636 W HCPCS: Performed by: NURSE ANESTHETIST, CERTIFIED REGISTERED

## 2020-03-17 PROCEDURE — 77001 FLUOROGUIDE FOR VEIN DEVICE: CPT | Mod: 26,,, | Performed by: COLON & RECTAL SURGERY

## 2020-03-17 DEVICE — PORT POWER CLEAR VIEW: Type: IMPLANTABLE DEVICE | Site: NECK | Status: FUNCTIONAL

## 2020-03-17 RX ORDER — CLINDAMYCIN PHOSPHATE 900 MG/50ML
900 INJECTION, SOLUTION INTRAVENOUS
Status: COMPLETED | OUTPATIENT
Start: 2020-03-17 | End: 2020-03-17

## 2020-03-17 RX ORDER — FENTANYL CITRATE 50 UG/ML
25 INJECTION, SOLUTION INTRAMUSCULAR; INTRAVENOUS EVERY 5 MIN PRN
Status: DISCONTINUED | OUTPATIENT
Start: 2020-03-17 | End: 2020-03-17 | Stop reason: HOSPADM

## 2020-03-17 RX ORDER — PROPOFOL 10 MG/ML
VIAL (ML) INTRAVENOUS
Status: DISCONTINUED | OUTPATIENT
Start: 2020-03-17 | End: 2020-03-17

## 2020-03-17 RX ORDER — BUPIVACAINE HYDROCHLORIDE AND EPINEPHRINE 2.5; 5 MG/ML; UG/ML
INJECTION, SOLUTION EPIDURAL; INFILTRATION; INTRACAUDAL; PERINEURAL
Status: DISCONTINUED | OUTPATIENT
Start: 2020-03-17 | End: 2020-03-17 | Stop reason: HOSPADM

## 2020-03-17 RX ORDER — FENTANYL CITRATE 50 UG/ML
INJECTION, SOLUTION INTRAMUSCULAR; INTRAVENOUS
Status: DISCONTINUED | OUTPATIENT
Start: 2020-03-17 | End: 2020-03-17

## 2020-03-17 RX ORDER — MIDAZOLAM HYDROCHLORIDE 1 MG/ML
INJECTION, SOLUTION INTRAMUSCULAR; INTRAVENOUS
Status: DISCONTINUED | OUTPATIENT
Start: 2020-03-17 | End: 2020-03-17

## 2020-03-17 RX ORDER — ONDANSETRON 2 MG/ML
4 INJECTION INTRAMUSCULAR; INTRAVENOUS DAILY PRN
Status: DISCONTINUED | OUTPATIENT
Start: 2020-03-17 | End: 2020-03-17 | Stop reason: HOSPADM

## 2020-03-17 RX ORDER — SODIUM CHLORIDE 9 MG/ML
INJECTION, SOLUTION INTRAVENOUS CONTINUOUS
Status: DISCONTINUED | OUTPATIENT
Start: 2020-03-17 | End: 2020-03-17 | Stop reason: HOSPADM

## 2020-03-17 RX ORDER — KETOROLAC TROMETHAMINE 30 MG/ML
15 INJECTION, SOLUTION INTRAMUSCULAR; INTRAVENOUS EVERY 8 HOURS PRN
Status: DISCONTINUED | OUTPATIENT
Start: 2020-03-17 | End: 2020-03-17 | Stop reason: HOSPADM

## 2020-03-17 RX ORDER — ONDANSETRON 2 MG/ML
4 INJECTION INTRAMUSCULAR; INTRAVENOUS EVERY 12 HOURS PRN
Status: DISCONTINUED | OUTPATIENT
Start: 2020-03-17 | End: 2020-03-17 | Stop reason: HOSPADM

## 2020-03-17 RX ORDER — LIDOCAINE HYDROCHLORIDE 20 MG/ML
INJECTION INTRAVENOUS
Status: DISCONTINUED | OUTPATIENT
Start: 2020-03-17 | End: 2020-03-17

## 2020-03-17 RX ORDER — PHENYLEPHRINE HYDROCHLORIDE 10 MG/ML
INJECTION INTRAVENOUS
Status: DISCONTINUED | OUTPATIENT
Start: 2020-03-17 | End: 2020-03-17

## 2020-03-17 RX ORDER — GLYCOPYRROLATE 0.2 MG/ML
INJECTION INTRAMUSCULAR; INTRAVENOUS
Status: DISCONTINUED | OUTPATIENT
Start: 2020-03-17 | End: 2020-03-17

## 2020-03-17 RX ORDER — OXYCODONE AND ACETAMINOPHEN 5; 325 MG/1; MG/1
1 TABLET ORAL
Status: DISCONTINUED | OUTPATIENT
Start: 2020-03-17 | End: 2020-03-17 | Stop reason: HOSPADM

## 2020-03-17 RX ORDER — SODIUM CHLORIDE, SODIUM LACTATE, POTASSIUM CHLORIDE, CALCIUM CHLORIDE 600; 310; 30; 20 MG/100ML; MG/100ML; MG/100ML; MG/100ML
INJECTION, SOLUTION INTRAVENOUS CONTINUOUS PRN
Status: DISCONTINUED | OUTPATIENT
Start: 2020-03-17 | End: 2020-03-17

## 2020-03-17 RX ORDER — ACETAMINOPHEN 325 MG/1
325 TABLET ORAL EVERY 6 HOURS PRN
Refills: 0
Start: 2020-03-17 | End: 2020-05-31

## 2020-03-17 RX ORDER — ONDANSETRON 2 MG/ML
INJECTION INTRAMUSCULAR; INTRAVENOUS
Status: DISCONTINUED | OUTPATIENT
Start: 2020-03-17 | End: 2020-03-17

## 2020-03-17 RX ORDER — OXYCODONE HYDROCHLORIDE 5 MG/1
5 TABLET ORAL EVERY 4 HOURS PRN
Status: DISCONTINUED | OUTPATIENT
Start: 2020-03-17 | End: 2020-03-17 | Stop reason: HOSPADM

## 2020-03-17 RX ORDER — HEPARIN 100 UNIT/ML
SYRINGE INTRAVENOUS
Status: DISCONTINUED | OUTPATIENT
Start: 2020-03-17 | End: 2020-03-17 | Stop reason: HOSPADM

## 2020-03-17 RX ORDER — OXYCODONE HYDROCHLORIDE 5 MG/1
10 TABLET ORAL EVERY 4 HOURS PRN
Status: DISCONTINUED | OUTPATIENT
Start: 2020-03-17 | End: 2020-03-17 | Stop reason: HOSPADM

## 2020-03-17 RX ADMIN — FENTANYL CITRATE 25 MCG: 50 INJECTION, SOLUTION INTRAMUSCULAR; INTRAVENOUS at 08:03

## 2020-03-17 RX ADMIN — PROPOFOL 50 MG: 10 INJECTION, EMULSION INTRAVENOUS at 08:03

## 2020-03-17 RX ADMIN — Medication 100 MG: at 08:03

## 2020-03-17 RX ADMIN — MIDAZOLAM 2 MG: 1 INJECTION INTRAMUSCULAR; INTRAVENOUS at 08:03

## 2020-03-17 RX ADMIN — PROPOFOL 10 MG: 10 INJECTION, EMULSION INTRAVENOUS at 08:03

## 2020-03-17 RX ADMIN — PHENYLEPHRINE HYDROCHLORIDE 200 MCG: 10 INJECTION INTRAVENOUS at 08:03

## 2020-03-17 RX ADMIN — SODIUM CHLORIDE, SODIUM LACTATE, POTASSIUM CHLORIDE, AND CALCIUM CHLORIDE: 600; 310; 30; 20 INJECTION, SOLUTION INTRAVENOUS at 08:03

## 2020-03-17 RX ADMIN — PROPOFOL 20 MG: 10 INJECTION, EMULSION INTRAVENOUS at 09:03

## 2020-03-17 RX ADMIN — PROPOFOL 20 MG: 10 INJECTION, EMULSION INTRAVENOUS at 08:03

## 2020-03-17 RX ADMIN — CLINDAMYCIN PHOSPHATE 900 MG: 18 INJECTION, SOLUTION INTRAVENOUS at 08:03

## 2020-03-17 RX ADMIN — PHENYLEPHRINE HYDROCHLORIDE 100 MCG: 10 INJECTION INTRAVENOUS at 08:03

## 2020-03-17 RX ADMIN — PROPOFOL 30 MG: 10 INJECTION, EMULSION INTRAVENOUS at 08:03

## 2020-03-17 RX ADMIN — ROBINUL 0.2 MG: 0.2 INJECTION INTRAMUSCULAR; INTRAVENOUS at 08:03

## 2020-03-17 RX ADMIN — ONDANSETRON 4 MG: 2 INJECTION, SOLUTION INTRAMUSCULAR; INTRAVENOUS at 08:03

## 2020-03-17 NOTE — ANESTHESIA POSTPROCEDURE EVALUATION
Anesthesia Post Evaluation    Patient: Adalgisa Wright    Procedure(s) Performed: Procedure(s) (LRB):  [id number] (N/A)    Final Anesthesia Type: MAC    Patient location during evaluation: PACU  Patient participation: Yes- Able to Participate  Level of consciousness: awake and alert  Post-procedure vital signs: reviewed and stable  Pain management: adequate  Airway patency: patent  BENJAMIN mitigation strategies: Extubation while patient is awake  PONV status at discharge: No PONV  Anesthetic complications: no      Cardiovascular status: hemodynamically stable  Respiratory status: spontaneous ventilation  Hydration status: euvolemic  Follow-up not needed.          Vitals Value Taken Time   /70 3/17/2020  9:36 AM   Temp 36.4 °C (97.5 °F) 3/17/2020  9:30 AM   Pulse 88 3/17/2020  9:39 AM   Resp 10 3/17/2020  9:39 AM   SpO2 100 % 3/17/2020  9:39 AM   Vitals shown include unvalidated device data.      No case tracking events are documented in the log.      Pain/Keny Score: No data recorded

## 2020-03-17 NOTE — OP NOTE
[facility]  Surgery Department  Operative Note    SUMMARY     Date of Procedure: 3/17/2020     Procedure:   1. MediPort insertion  2.  Fluoroscopic guidance for MediPort insertion    Surgeon(s) and Role:     * Ankur Smith MD - Primary    Assisting Surgeon: None    Pre-Operative Diagnosis: Rectal adenocarcinoma [C20]    Post-Operative Diagnosis: Post-Op Diagnosis Codes:     * Rectal adenocarcinoma [C20]    Anesthesia: Monitor Anesthesia Care    Technical Procedures Used:   1. MediPort insertion  2.  Fluoroscopic guidance for MediPort insertion    Indications for Procedure:  69-year-old female with rectal adenocarcinoma who presents for MediPort insertion for neoadjuvant chemotherapy    Findings of the Procedure:  No suitable subclavian vein accessed or seen on ultrasound guidance therefore right IJ vein used for port insertion    Description of the Procedure: Patient was brought to the operating room placed supine on the table. MAC anesthesia was then induced. The chest and neck was then prepped and draped in usual sterile fashion. A preoperative surgical time-out was then performed confirming correct patient, procedure and preoperative medications given.  Small incision was made just beneath the right clavicle lateral to the curve of the clavicle.  A needle and syringe were then used to attempt to access the right subclavian vein but after 2 passes there was no access of the vein and therefore the ultrasound machine was brought onto the field and there was no suitable subclavian vein visualized the could be easily accessed and therefore the decision was made to change to an IJ approach.  A needle stick was then made in the right neck under ultrasound guidance and the right internal jugular vein was accessed where dark red blood and non pulsatile flow were found.  A guidewire was inserted through the needle until ectopy was confirmed on cardiac monitoring.  Fluoroscopy was then used to confirm  location of the wire into the right IJ vein terminating at the SVC.  Needle was removed and guidewire left in place. The vein dilator and insertion sheath were then guided over the guidewire.  The guidewire and inner portions of the insertion sheath/dilator were then removed leaving only the insertion sheath present.  A finger was placed over the opening to the insertion sheath to prevent air embolus.  Catheter for the port was then injected with heparinized saline and inserted directly into the insertion sheath.  Fluoroscopy was then used to confirm location of the catheter at the tip of the SVC and right atrium.  The catheter was held in place with forceps while the insertion sheath was peeled away until was fully removed. The distal end of the catheter remained clamped with the hemostats to prevent air embolus.  An incision 2 cm beneath the clavicle was then fashioned horizontal direction that was approximately 4 cm wide.  A subcutaneous pocket was then created with sharp and blunt dissection above the level of the muscle fascia beneath the subcutaneous fat.  This was fashioned wide enough to accommodate the port. The catheter was then attached to a tunneling device was then tunneled from the needle insertion site to the port site. Once there it was cut to length to allow no kinking after insertion into the port.  Prior to cutting, a hemostat was placed on the proximal portion of the catheter to prevent air embolus.  The remaining catheter was then attached to the port which was inserted beneath the skin in the pocket previously created and the hemostat was removed. The Santoyo needle was then used to access the port through the skin and blood was easily aspirated and was then flushed with heparinized saline. Hemostasis was ensured and the port pocket.  Deep dermal stitches of 3-0 Vicryl suture then used to close the defect where the port placement was made.  Skin was then run with a running 4-0 Monocryl suture.   Interrupted 4-0 Monocryl suture was then used to close the site at the needle insertion. Skin glue was then applied to the sites. The patient was then woken from general endotracheal anesthesia.  She was taken postanesthesia care unit in stable condition where she will undergo chest x-ray to confirm placement and rule out pneumothorax.  She tolerated procedure well.    Significant Surgical Tasks Conducted by the Assistant(s), if Applicable: N/A    Complications: No    Estimated Blood Loss (EBL): 10 mL           Implants:   Implant Name Type Inv. Item Serial No.  Lot No. LRB No. Used   PORT POWER CLEAR VIEW - [id number]  PORT POWER CLEAR VIEW  [employer] [id number] Right 1       Specimens:   Specimen (12h ago, onward)    None                  Condition: Good    Disposition: PACU - hemodynamically stable.    Attestation: I performed the procedure.

## 2020-03-17 NOTE — INTERVAL H&P NOTE
The patient has been examined and the H&P has been reviewed:    I concur with the findings and no changes have occurred since H&P was written.    Anesthesia/Surgery risks, benefits and alternative options discussed and understood by patient/family.          Active Hospital Problems    Diagnosis  POA    Rectal adenocarcinoma [C20]  Yes      Resolved Hospital Problems   No resolved problems to display.

## 2020-03-17 NOTE — PLAN OF CARE
Pt AAOX3, denies pain/discomfort. Pre-op checklist done. Side effects of anesthesia and expectations during recovery discussed. Pt verbalized understanding. Questions answered. Will cont to monitor.    Verified patient contact - adilson Whitman, [phone]  Vehicle - Jamar Payne, brown color, parked in the handicap parking    Pt's belongings in locker 1

## 2020-03-17 NOTE — PLAN OF CARE
Discharge instructions discussed with patient. Verbalized understanding. Family in car due to covid-19 precautions

## 2020-03-17 NOTE — DISCHARGE INSTRUCTIONS
Begin Lovenox tomorrow and administer every day until Coumadin levels are therapeutic.  Restart Coumadin on Thursday.  You will see your cardiologist regarding Coumadin levels.

## 2020-03-17 NOTE — TREATMENT PLAN
Patient and fiance instructed on lovenox and coumadin.  Also, typed in discharge instructions. Both verbalizes understanding and knows pt. Needs to followup with cardiologist.

## 2020-03-17 NOTE — DISCHARGE SUMMARY
[facility]  Discharge Note  Short Stay    Admit Date: 3/17/2020    Discharge Date and Time: 3/17/2020 10:20 AM     Attending Physician: Ankur Smith     Discharge Provider: Ankur Smith    Diagnoses:  Active Hospital Problems    Diagnosis  POA    Rectal adenocarcinoma [C20]  Yes      Resolved Hospital Problems   No resolved problems to display.       Discharged Condition: good    Hospital Course: Patient was admitted for an outpatient procedure and tolerated the procedure well with no complications.    Final Diagnoses: Same as principal problem.    Disposition: Home or Self Care    Follow up/Patient Instructions:    Medications:  Reconciled Home Medications:      Medication List      CHANGE how you take these medications    * acetaminophen 325 MG tablet  Commonly known as:  TYLENOL  Take 2 tablets (650 mg total) by mouth every 6 (six) hours.  What changed:  Another medication with the same name was added. Make sure you understand how and when to take each.     * acetaminophen 325 MG tablet  Commonly known as:  TYLENOL  Take 1 tablet (325 mg total) by mouth every 6 (six) hours as needed for Pain.  What changed:  You were already taking a medication with the same name, and this prescription was added. Make sure you understand how and when to take each.     LINZESS 145 mcg Cap capsule  Generic drug:  linaCLOtide  TAKE 1 CAPSULE (145 MCG TOTAL) BY MOUTH ONCE DAILY.  What changed:  See the new instructions.         * This list has 2 medication(s) that are the same as other medications prescribed for you. Read the directions carefully, and ask your doctor or other care provider to review them with you.            CONTINUE taking these medications    * acebutoloL 200 MG capsule  Commonly known as:  SECTRAL  Take 400 mg by mouth 2 (two) times daily.     * acebutoloL 400 mg capsule  Commonly known as:  SECTRAL  Take 400 mg by mouth 2 (two) times daily.     ALPRAZolam 1 MG tablet  Commonly known as:   XANAX  Take 1 tablet by mouth nightly as needed for Insomnia.     butalbital-acetaminophen-caffeine -40 mg -40 mg per tablet  Commonly known as:  FIORICET, ESGIC  TAKE ONE TABLET BY MOUTH EVERY SIX HOURS AS NEEDED     dexAMETHasone 4 MG Tab  Commonly known as:  DECADRON  Take 8 mg (2 tabs) by mouth on day 2 and day 3 following each cycle of chemo     enoxaparin 60 mg/0.6 mL Syrg  Commonly known as:  LOVENOX  Inject 0.6 mLs (60 mg total) into the skin 2 (two) times daily.     ergocalciferol 50,000 unit Cap  Commonly known as:  ERGOCALCIFEROL  Take 1 capsule by mouth every 30 days.     furosemide 40 MG tablet  Commonly known as:  LASIX  Take 40 mg by mouth Daily.     lovastatin 40 MG tablet  Commonly known as:  MEVACOR  Take 40 mg by mouth every evening.     multivitamin tablet  Commonly known as:  THERAGRAN  Take 1 tablet by mouth.     nozaseptin  nasal   Commonly known as:  NOZIN  1 each by Each Nostril route 2 (two) times daily.     ondansetron 8 MG tablet  Commonly known as:  ZOFRAN  Take 1 tablet (8 mg total) by mouth every 12 (twelve) hours as needed for Nausea.     oxyCODONE 5 MG immediate release tablet  Commonly known as:  ROXICODONE  Take 1 tablet (5 mg total) by mouth every 4 (four) hours as needed.     pantoprazole 20 MG tablet  Commonly known as:  PROTONIX  Take 1 tablet (20 mg total) by mouth once daily.     potassium 99 mg Tab  Take by mouth once.     prochlorperazine 5 MG tablet  Commonly known as:  COMPAZINE  Take 1 tablet (5 mg total) by mouth every 6 (six) hours as needed for Nausea.     sertraline 100 MG tablet  Commonly known as:  ZOLOFT  Take 1 tablet by mouth Daily.     traMADoL 50 mg tablet  Commonly known as:  ULTRAM  50 mg every 8 (eight) hours as needed.     warfarin 5 MG tablet  Commonly known as:  COUMADIN  Take 1 tablet (5 mg total) by mouth Daily. 5 mg by mouth for five days then alternate with 2.5 mg for one day.     zolpidem 10 mg Tab  Commonly known as:   AMBIEN  Take 5 mg by mouth nightly as needed.         * This list has 2 medication(s) that are the same as other medications prescribed for you. Read the directions carefully, and ask your doctor or other care provider to review them with you.              Discharge Procedure Orders   Diet general     Call MD for:  extreme fatigue     Call MD for:  persistent dizziness or light-headedness     Call MD for:  hives     Call MD for:  redness, tenderness, or signs of infection (pain, swelling, redness, odor or green/yellow discharge around incision site)     Call MD for:  difficulty breathing, headache or visual disturbances     Call MD for:  severe uncontrolled pain     Call MD for:  persistent nausea and vomiting     Call MD for:  temperature >100.4     No dressing needed     Follow-up Information     Shan Stroud MD.    Specialty:  Internal Medicine  Why:  As needed  Contact information:  [id number] [facility] [id number] [phone]                   Discharge Procedure Orders (must include Diet, Follow-up, Activity):   Discharge Procedure Orders (must include Diet, Follow-up, Activity)   Diet general     Call MD for:  extreme fatigue     Call MD for:  persistent dizziness or light-headedness     Call MD for:  hives     Call MD for:  redness, tenderness, or signs of infection (pain, swelling, redness, odor or green/yellow discharge around incision site)     Call MD for:  difficulty breathing, headache or visual disturbances     Call MD for:  severe uncontrolled pain     Call MD for:  persistent nausea and vomiting     Call MD for:  temperature >100.4     No dressing needed

## 2020-03-17 NOTE — ANESTHESIA PREPROCEDURE EVALUATION
03/17/2020  Adalgisa Wright is a 69 y.o., female.    Pre-op Assessment    I have reviewed the Patient Summary Reports.     I have reviewed the Nursing Notes.   I have reviewed the Medications.     Review of Systems  Anesthesia Hx:  No problems with previous Anesthesia    Social:  Non-Smoker    Hematology/Oncology:         -- Anemia:   Cardiovascular:   Hypertension Valvular problems/Murmurs hyperlipidemia Aortic Valve Replacement 2003   Pulmonary:  Pulmonary Normal    Renal/:  Renal/ Normal     Hepatic/GI:   GERD Rectal adenocarcinoma    Irritable bowel syndrome with constipation   Musculoskeletal:   Arthritis  DDD (degenerative disc disease), cervical   Neurological:  Neurology Normal    Endocrine:  Endocrine Normal        Physical Exam  General:  Well nourished    Airway/Jaw/Neck:  Airway Findings: Mouth Opening: Normal Tongue: Normal  General Airway Assessment: Adult  Mallampati: II  TM Distance: 4 - 6 cm  Jaw/Neck Findings:  Neck ROM: Normal ROM      Dental:  Dental Findings: In tact, Upper partial dentures   Chest/Lungs:  Chest/Lungs Findings: Clear to auscultation, Normal Respiratory Rate     Heart/Vascular:  Heart Findings: Rate: Normal  Rhythm: Regular Rhythm  Sounds: Normal        Mental Status:  Mental Status Findings:  Cooperative, Alert and Oriented         Anesthesia Plan  Type of Anesthesia, risks & benefits discussed:  Anesthesia Type:  general, MAC  Patient's Preference: MAC  Intra-op Monitoring Plan: standard ASA monitors and arterial line  Intra-op Monitoring Plan Comments:   Post Op Pain Control Plan: multimodal analgesia and per primary service following discharge from PACU  Post Op Pain Control Plan Comments:   Induction:   IV  Beta Blocker:  Patient is on a Beta-Blocker and has received one dose within the past 24 hours (No further documentation required).       Informed Consent:  Patient understands risks and agrees with Anesthesia plan.  Questions answered. Anesthesia consent signed with patient.  ASA Score: 2     Day of Surgery Review of History & Physical:  There are no significant changes.          Ready For Surgery From Anesthesia Perspective.         Patient Active Problem List   Diagnosis    Anismus    Benign neoplasm of ascending colon    Postmenopausal osteoporosis    Irritable bowel syndrome with constipation    Hypertension    Aortic valve replaced    Chronic anticoagulation    DDD (degenerative disc disease), cervical    Gastroesophageal reflux disease without esophagitis    Iron deficiency anemia due to chronic blood loss    HLD (hyperlipidemia)    Rectal adenocarcinoma    Colon adenocarcinoma       Chemistry        Component Value Date/Time     02/16/2020 0434    K 3.2 (L) 02/16/2020 0434     02/16/2020 0434    CO2 31 (H) 02/16/2020 0434    BUN 5 (L) 02/16/2020 0434    CREATININE 0.6 02/16/2020 0434    GLU 94 02/16/2020 0434        Component Value Date/Time    CALCIUM 8.8 02/16/2020 0434    ALKPHOS 133 01/27/2020 0931    AST 23 01/27/2020 0931    ALT 22 01/27/2020 0931    BILITOT 0.4 01/27/2020 0931    ESTGFRAFRICA >60 02/16/2020 0434    EGFRNONAA >60 02/16/2020 0434        Lab Results   Component Value Date    WBC 3.30 (L) 02/16/2020    HGB 10.1 (L) 02/16/2020    HCT 32.5 (L) 02/16/2020     (H) 02/16/2020     (L) 02/16/2020

## 2020-03-17 NOTE — TRANSFER OF CARE
"Anesthesia Transfer of Care Note    Patient: Adalgisa Wright    Procedure(s) Performed: Procedure(s) (LRB):  [id number] (N/A)    Patient location: PACU    Anesthesia Type: MAC    Transport from OR: Transported from OR on room air with adequate spontaneous ventilation    Post pain: adequate analgesia    Post assessment: no apparent anesthetic complications and tolerated procedure well    Post vital signs: stable    Level of consciousness: awake    Nausea/Vomiting: no nausea/vomiting    Complications: none    Transfer of care protocol was followed      Last vitals:   Visit Vitals  BP (!) 174/71   Pulse 65   Temp 36.5 °C (97.7 °F) (Temporal)   Resp 18   Ht 4' 10" (1.473 m)   Wt 53.2 kg (117 lb 4.6 oz)   SpO2 98%   Breastfeeding? No   BMI 24.51 kg/m²     "

## 2020-03-18 VITALS
SYSTOLIC BLOOD PRESSURE: 126 MMHG | DIASTOLIC BLOOD PRESSURE: 73 MMHG | BODY MASS INDEX: 24.62 KG/M2 | TEMPERATURE: 98 F | HEART RATE: 85 BPM | WEIGHT: 117.31 LBS | HEIGHT: 58 IN | RESPIRATION RATE: 10 BRPM | OXYGEN SATURATION: 95 %

## 2020-03-23 ENCOUNTER — OFFICE VISIT (OUTPATIENT)
Dept: HEMATOLOGY/ONCOLOGY | Facility: CLINIC | Age: 70
End: 2020-03-23
Payer: MEDICARE

## 2020-03-23 ENCOUNTER — TELEPHONE (OUTPATIENT)
Dept: HEMATOLOGY/ONCOLOGY | Facility: CLINIC | Age: 70
End: 2020-03-23

## 2020-03-23 ENCOUNTER — INFUSION (OUTPATIENT)
Dept: INFUSION THERAPY | Facility: HOSPITAL | Age: 70
End: 2020-03-23
Attending: INTERNAL MEDICINE
Payer: MEDICARE

## 2020-03-23 VITALS
RESPIRATION RATE: 16 BRPM | TEMPERATURE: 97 F | DIASTOLIC BLOOD PRESSURE: 73 MMHG | BODY MASS INDEX: 24.43 KG/M2 | WEIGHT: 116.38 LBS | HEIGHT: 58 IN | SYSTOLIC BLOOD PRESSURE: 135 MMHG | OXYGEN SATURATION: 100 % | HEART RATE: 66 BPM

## 2020-03-23 VITALS
SYSTOLIC BLOOD PRESSURE: 132 MMHG | OXYGEN SATURATION: 98 % | DIASTOLIC BLOOD PRESSURE: 78 MMHG | RESPIRATION RATE: 18 BRPM | HEART RATE: 68 BPM | TEMPERATURE: 98 F

## 2020-03-23 DIAGNOSIS — Z79.01 CHRONIC ANTICOAGULATION: ICD-10-CM

## 2020-03-23 DIAGNOSIS — D50.0 ANEMIA DUE TO CHRONIC BLOOD LOSS: ICD-10-CM

## 2020-03-23 DIAGNOSIS — C18.9 COLON ADENOCARCINOMA: Primary | ICD-10-CM

## 2020-03-23 DIAGNOSIS — D72.819 LEUKOPENIA, UNSPECIFIED TYPE: ICD-10-CM

## 2020-03-23 PROCEDURE — 99215 PR OFFICE/OUTPT VISIT, EST, LEVL V, 40-54 MIN: ICD-10-PCS | Mod: S$GLB,,, | Performed by: INTERNAL MEDICINE

## 2020-03-23 PROCEDURE — 1101F PR PT FALLS ASSESS DOC 0-1 FALLS W/OUT INJ PAST YR: ICD-10-PCS | Mod: CPTII,S$GLB,, | Performed by: INTERNAL MEDICINE

## 2020-03-23 PROCEDURE — 99999 PR PBB SHADOW E&M-EST. PATIENT-LVL IV: ICD-10-PCS | Mod: PBBFAC,,, | Performed by: INTERNAL MEDICINE

## 2020-03-23 PROCEDURE — 96368 THER/DIAG CONCURRENT INF: CPT

## 2020-03-23 PROCEDURE — 3075F SYST BP GE 130 - 139MM HG: CPT | Mod: CPTII,S$GLB,, | Performed by: INTERNAL MEDICINE

## 2020-03-23 PROCEDURE — 96415 CHEMO IV INFUSION ADDL HR: CPT

## 2020-03-23 PROCEDURE — 63600175 PHARM REV CODE 636 W HCPCS: Performed by: INTERNAL MEDICINE

## 2020-03-23 PROCEDURE — 99999 PR PBB SHADOW E&M-EST. PATIENT-LVL IV: CPT | Mod: PBBFAC,,, | Performed by: INTERNAL MEDICINE

## 2020-03-23 PROCEDURE — 96416 CHEMO PROLONG INFUSE W/PUMP: CPT

## 2020-03-23 PROCEDURE — 99215 OFFICE O/P EST HI 40 MIN: CPT | Mod: S$GLB,,, | Performed by: INTERNAL MEDICINE

## 2020-03-23 PROCEDURE — 3078F PR MOST RECENT DIASTOLIC BLOOD PRESSURE < 80 MM HG: ICD-10-PCS | Mod: CPTII,S$GLB,, | Performed by: INTERNAL MEDICINE

## 2020-03-23 PROCEDURE — 96411 CHEMO IV PUSH ADDL DRUG: CPT

## 2020-03-23 PROCEDURE — 1101F PT FALLS ASSESS-DOCD LE1/YR: CPT | Mod: CPTII,S$GLB,, | Performed by: INTERNAL MEDICINE

## 2020-03-23 PROCEDURE — 3075F PR MOST RECENT SYSTOLIC BLOOD PRESS GE 130-139MM HG: ICD-10-PCS | Mod: CPTII,S$GLB,, | Performed by: INTERNAL MEDICINE

## 2020-03-23 PROCEDURE — 96367 TX/PROPH/DG ADDL SEQ IV INF: CPT

## 2020-03-23 PROCEDURE — 1126F PR PAIN SEVERITY QUANTIFIED, NO PAIN PRESENT: ICD-10-PCS | Mod: S$GLB,,, | Performed by: INTERNAL MEDICINE

## 2020-03-23 PROCEDURE — 1126F AMNT PAIN NOTED NONE PRSNT: CPT | Mod: S$GLB,,, | Performed by: INTERNAL MEDICINE

## 2020-03-23 PROCEDURE — 96413 CHEMO IV INFUSION 1 HR: CPT

## 2020-03-23 PROCEDURE — 3078F DIAST BP <80 MM HG: CPT | Mod: CPTII,S$GLB,, | Performed by: INTERNAL MEDICINE

## 2020-03-23 PROCEDURE — 1159F MED LIST DOCD IN RCRD: CPT | Mod: S$GLB,,, | Performed by: INTERNAL MEDICINE

## 2020-03-23 PROCEDURE — 1159F PR MEDICATION LIST DOCUMENTED IN MEDICAL RECORD: ICD-10-PCS | Mod: S$GLB,,, | Performed by: INTERNAL MEDICINE

## 2020-03-23 RX ORDER — FLUOROURACIL 50 MG/ML
400 INJECTION, SOLUTION INTRAVENOUS
Status: COMPLETED | OUTPATIENT
Start: 2020-03-23 | End: 2020-03-23

## 2020-03-23 RX ORDER — SODIUM CHLORIDE 0.9 % (FLUSH) 0.9 %
10 SYRINGE (ML) INJECTION
Status: CANCELLED | OUTPATIENT
Start: 2020-03-25

## 2020-03-23 RX ORDER — SODIUM CHLORIDE 0.9 % (FLUSH) 0.9 %
10 SYRINGE (ML) INJECTION
Status: CANCELLED | OUTPATIENT
Start: 2020-03-23

## 2020-03-23 RX ORDER — HEPARIN 100 UNIT/ML
500 SYRINGE INTRAVENOUS
Status: CANCELLED | OUTPATIENT
Start: 2020-03-25

## 2020-03-23 RX ORDER — DIPHENHYDRAMINE HYDROCHLORIDE 50 MG/ML
50 INJECTION INTRAMUSCULAR; INTRAVENOUS ONCE AS NEEDED
Status: DISCONTINUED | OUTPATIENT
Start: 2020-03-23 | End: 2020-03-23 | Stop reason: HOSPADM

## 2020-03-23 RX ORDER — FLUOROURACIL 50 MG/ML
400 INJECTION, SOLUTION INTRAVENOUS
Status: CANCELLED | OUTPATIENT
Start: 2020-03-23

## 2020-03-23 RX ORDER — EPINEPHRINE 0.3 MG/.3ML
0.3 INJECTION SUBCUTANEOUS ONCE AS NEEDED
Status: CANCELLED | OUTPATIENT
Start: 2020-03-23

## 2020-03-23 RX ORDER — HEPARIN 100 UNIT/ML
500 SYRINGE INTRAVENOUS
Status: CANCELLED | OUTPATIENT
Start: 2020-03-23

## 2020-03-23 RX ORDER — DIPHENHYDRAMINE HYDROCHLORIDE 50 MG/ML
50 INJECTION INTRAMUSCULAR; INTRAVENOUS ONCE AS NEEDED
Status: CANCELLED | OUTPATIENT
Start: 2020-03-23

## 2020-03-23 RX ADMIN — PALONOSETRON HYDROCHLORIDE: 0.25 INJECTION, SOLUTION INTRAVENOUS at 09:03

## 2020-03-23 RX ADMIN — FLUOROURACIL 3530 MG: 50 INJECTION, SOLUTION INTRAVENOUS at 12:03

## 2020-03-23 RX ADMIN — FLUOROURACIL 590 MG: 50 INJECTION, SOLUTION INTRAVENOUS at 12:03

## 2020-03-23 RX ADMIN — LEUCOVORIN CALCIUM 590 MG: 350 INJECTION, POWDER, LYOPHILIZED, FOR SOLUTION INTRAMUSCULAR; INTRAVENOUS at 10:03

## 2020-03-23 RX ADMIN — SODIUM CHLORIDE: 9 INJECTION, SOLUTION INTRAVENOUS at 09:03

## 2020-03-23 RX ADMIN — DEXTROSE: 5 SOLUTION INTRAVENOUS at 10:03

## 2020-03-23 RX ADMIN — OXALIPLATIN 125 MG: 5 INJECTION, SOLUTION INTRAVENOUS at 10:03

## 2020-03-23 NOTE — PLAN OF CARE
Problem: Adult Inpatient Plan of Care  Goal: Plan of Care Review  Outcome: Ongoing, Progressing  Flowsheets (Taken 3/23/2020 1131)  Plan of Care Reviewed With: patient  Goal: Patient-Specific Goal (Individualization)  Outcome: Ongoing, Progressing  Flowsheets (Taken 3/23/2020 1131)  Individualized Care Needs: pt like pillow and blanket . She like ice on her port prior accessing it .  Anxieties, Fears or Concerns: Pt a little anxious today becuase her first day of treatment .     Pt .  Reported feeling great , denies any SOB or pain.

## 2020-03-23 NOTE — PATIENT INSTRUCTIONS
- chemo C1D1 today, disconnect on Wednesday  - RV televisit in 1 week  - RV in 2 weeks with labs and C2 chemo

## 2020-03-23 NOTE — PROGRESS NOTES
Subjective:      DATE OF VISIT: 3/23/2020   ?   ?   Patient ID:?Adalgisa Wright is a 69 y.o. female.?? MR#: [id number]   ?   PRIMARY PROVIDER: Dr. Quintanilla    CHIEF COMPLAINT:  Follow-up??postoperatively  ?   ONCOLOGIC DIAGNOSIS: Rectal adenocarcinoma, Stage III, pT3 pN0 pM0  ?   CURRENT TREATMENT: FOLFOX, C1D1 3/23/20    HPI    Today I have the pleasure meeting with Ms. Wright as she transfers her care.  She was previously seen by my colleague Dr. Haq last on 08/21/2019 for iron deficiency anemia.  She has a mechanical aortic valve replacement 16 years ago on therapeutic anticoagulation.  Labs notable for low haptoglobin in it is felt hemolytic process may be contributing to her anemia.  She notes having recent EKG as part of preoperative evaluation but has not had follow-up with her cardiologist recently or repeat echocardiogram.  She denies edema, shortness of breath or chest pain.    She was referred to Gastroenterology for further evaluation of iron deficiency anemia and concern for GI bleed.     11/7/19 EGD and colonoscopy revealed a nonobstructing ulcerated mass 20 cm from the anal verge. Pathology:  Moderately differentiated adenocarcinoma, MSI intact.    12/02/2019 PET-CT notable for avid sigmoid lesion SUV 15.7 as well as avid right lobe liver lesion 19 mm, SUV 4.1.  No other areas of avidity concerning for metastatic disease.    12/9/19 liver biopsy, Pathology:  Diagnosis 1.  Liver mass, biopsy:   - Not diagnostic of malignancy   - Scant atypical liver sampling, see comments     Comments: The sections show a scant fragmented sample of liver tissue with   psuedoglands in one core and mild architectural distortion. Cytologic atypiia   is not prominent. Reticulin stain shows mildly irregular pattern of staining.   No portal tracts present for evaluation. Back to back pseudoglands raises the   possibly of a well differetniated hepatocellular lesion. However, the scant   sample limits interpretation. Due to  the presence of a clinical mass.   Resampling is indicated.      02/13/2020 surgical resection of sigmoid and upper rectum  Pathology:  Adenocarcinoma, moderately differentiated, 2.5 x 2 cm, margins negative, 6 of 14 lymph nodes positive for metastatic adenocarcinoma, many tumor deposits and mesenteric tissue, pT3 pN2, MSI intact    03/23/2020 cycle 1 day 1 FOLFOX     INTERVAL EVENTS    She is doing well today and prepared for initiation of chemotherapy.  No new issues or concerns.    Review of Systems    ?   A comprehensive 14-point review of systems was reviewed with patient and was negative other than as specified above.   ?     Objective:      Physical Exam      ?   Vitals:    03/23/20 0834   BP: 135/73   Pulse: 66   Resp: 16   Temp: 97.2 °F (36.2 °C)      ?   ECOG:?0   General appearance: Generally well appearing, in no acute distress.   Head, eyes, ears, nose, and throat: moist mucous membranes.   Respiratory:  Normal work of breathing  Abdomen: nontender, nondistended.   Extremities: Warm, without edema.   Neurologic: Alert and oriented. Grossly normal strength, coordination, and gait.   Skin: No rashes, ecchymoses or petechial lesion.   Psychiatric:  Normal mood and affect.    ?   Laboratory:  ?   Lab Visit on 03/23/2020   Component Date Value Ref Range Status    WBC 03/23/2020 3.11* 3.90 - 12.70 K/uL Final    RBC 03/23/2020 3.96* 4.00 - 5.40 M/uL Final    Hemoglobin 03/23/2020 12.3  12.0 - 16.0 g/dL Final    Hematocrit 03/23/2020 39.9  37.0 - 48.5 % Final    Mean Corpuscular Volume 03/23/2020 101* 82 - 98 fL Final    Mean Corpuscular Hemoglobin 03/23/2020 31.1* 27.0 - 31.0 pg Final    Mean Corpuscular Hemoglobin Conc 03/23/2020 30.8* 32.0 - 36.0 g/dL Final    RDW 03/23/2020 12.3  11.5 - 14.5 % Final    Platelets 03/23/2020 182  150 - 350 K/uL Final    MPV 03/23/2020 9.9  9.2 - 12.9 fL Final    Immature Granulocytes 03/23/2020 0.3  0.0 - 0.5 % Final    Gran # (ANC) 03/23/2020 1.7* 1.8 - 7.7 K/uL  Final    Immature Grans (Abs) 03/23/2020 0.01  0.00 - 0.04 K/uL Final    Lymph # 03/23/2020 1.1  1.0 - 4.8 K/uL Final    Mono # 03/23/2020 0.3  0.3 - 1.0 K/uL Final    Eos # 03/23/2020 0.1  0.0 - 0.5 K/uL Final    Baso # 03/23/2020 0.02  0.00 - 0.20 K/uL Final    nRBC 03/23/2020 0  0 /100 WBC Final    Gran% 03/23/2020 54.1  38.0 - 73.0 % Final    Lymph% 03/23/2020 34.1  18.0 - 48.0 % Final    Mono% 03/23/2020 8.0  4.0 - 15.0 % Final    Eosinophil% 03/23/2020 2.9  0.0 - 8.0 % Final    Basophil% 03/23/2020 0.6  0.0 - 1.9 % Final    Differential Method 03/23/2020 Automated   Final    Sodium 03/23/2020 144  136 - 145 mmol/L Final    Potassium 03/23/2020 3.6  3.5 - 5.1 mmol/L Final    Chloride 03/23/2020 105  95 - 110 mmol/L Final    CO2 03/23/2020 27  23 - 29 mmol/L Final    Glucose 03/23/2020 99  70 - 110 mg/dL Final    BUN, Bld 03/23/2020 9  8 - 23 mg/dL Final    Creatinine 03/23/2020 0.8  0.5 - 1.4 mg/dL Final    Calcium 03/23/2020 9.8  8.7 - 10.5 mg/dL Final    Total Protein 03/23/2020 7.2  6.0 - 8.4 g/dL Final    Albumin 03/23/2020 4.4  3.5 - 5.2 g/dL Final    Total Bilirubin 03/23/2020 0.4  0.1 - 1.0 mg/dL Final    Alkaline Phosphatase 03/23/2020 96  55 - 135 U/L Final    AST 03/23/2020 20  10 - 40 U/L Final    ALT 03/23/2020 20  10 - 44 U/L Final    Anion Gap 03/23/2020 12  8 - 16 mmol/L Final    eGFR if African American 03/23/2020 >60  >60 mL/min/1.73 m^2 Final    eGFR if non African American 03/23/2020 >60  >60 mL/min/1.73 m^2 Final        Tumor markers    CEA    CEA   Date Value Ref Range Status   11/18/2019 3.5 0.0 - 5.0 ng/mL Final     Comment:     CEA Normal Range:  Non-Smokers: 0-3.0 ng/mL  Smokers:     0-5.0 ng/mL              ? IMAGING  11/21/19  CT CHEST ABDOMEN PELVIS WITH CONTRAST (XPD)    CLINICAL HISTORY:  Malignant neoplasm of rectosigmoid junctionknown rectosigmoid cancer, eval for metastatic disease;    COMPARISON:  None    FINDINGS:  Chest:    The heart is enlarged.   There is no evidence of mediastinal or hilar or axillary lymphadenopathy.  Patient has had a prior median sternotomy.  There are no lung nodules.  No pleural effusions.    Skeletal structures are intact.    Abdomen pelvis:    There appears to be a subtle hypodense lesion involving the right lobe of the liver measuring about 1.7 cm.  There may be a smaller hypodensity more anteriorly measuring 8 mm.    Pancreas is unremarkable the spleen is unremarkable.    The gallbladder is unremarkable.  There is no bile duct dilatation.    The kidneys are normal.    The aorta and inferior vena cava are unremarkable.    There are no acute bowel abnormalities.     No evidence of appendicitis.  No evidence of diverticulitis.    Bladder is normal. No abnormal masses or fluid collections in the pelvis.  No definite pelvic lymphadenopathy.  No abdominal retroperitoneal lymph node enlargement.    There are few degenerative changes seen involving the lumbar spine as well as a few mild compression fractures of the thoracic spine which appear chronic.      Impression       No evidence of metastatic disease involving the chest.  Findings suspicious for a liver mass possibly related to a metastasis.  Further evaluation recommended with a PET scan or MRI of the liver.    There appear to be a few compression fractures of the thoracic spine which are chronic.  No definite metastatic disease involving the skeleton.       ?   Assessment/Plan:       1. Colon adenocarcinoma    2. Chronic anticoagulation    3. Anemia due to chronic blood loss          Plan:     # rectal adenocarcinoma, pT3 pN2 pM0, Stage III, MSI stable:  Initiating adjuvant chemotherapy today, C1D1 3/23/20.  She had chemotherapy teaching and all questions answered, antiemetics prescribed and has Imodium stool softeners as needed at home.  She will, on Wednesday for disconnect and Neulasta on body.    # chronic anticoagulation with a  mechanical aortic valve:  On chronic  anticoagulation due to mechanical valve.  INR checked with outside cardiology and asked her to reach out to see if she would like to use [facility] instead.  She will let us know.    # leukopenia:  Mild leukopenia ANC 1.7.  Will proceed with therapy today and Neulasta on day 3.  Infectious precautions discussed and importance of calling if signs or symptoms of infection or fever.    #  mild normocytic anemia: IV iron for 2 doses, completed 12/04/2019.  Repeat labs with resolution of anemia.  Continue to monitor throughout therapy.     # hyperlipidemia:  Recommended follow-up with PCP.    Follow-Up:   - chemo C1D1 today, disconnect on Wednesday  - RV televisit in 1 week  - RV in 2 weeks with labs and C2 chemo

## 2020-03-23 NOTE — DISCHARGE INSTRUCTIONS
[facility]  [address] [address]  [address]  [phone] phone     [phone] fax  Hours of Operation: Monday- Friday 8:00am- 5:00pm  After hours phone  [phone]  Hematology / Oncology Physicians on call      Dr. Bill Carey, CATRACHITA Mcdonald NP Tyesha Taylor, NP    Please call with any concerns regarding your appointment today.FALL PREVENTION   Falls often occur due to slipping, tripping or losing your balance. Here are ways to reduce your risk of falling again.   Was there anything that caused your fall that can be fixed, removed or replaced?   Make your home safe by keeping walkways clear of objects you may trip over.   Use non-slip pads under rugs.   Do not walk in poorly lit areas.   Do not stand on chairs or wobbly ladders.   Use caution when reaching overhead or looking upward. This position can cause a loss of balance.   Be sure your shoes fit properly, have non-slip bottoms and are in good condition.   Be cautious when going up and down stairs, curbs, and when walking on uneven sidewalks.   If your balance is poor, consider using a cane or walker.   If your fall was related to alcohol use, stop or limit alcohol intake.   If your fall was related to use of sleeping medicines, talk to your doctor about this. You may need to reduce your dosage at bedtime if you awaken during the night to go to the bathroom.   To reduce the need for nighttime bathroom trips:   Avoid drinking fluids for several hours before going to bed   Empty your bladder before going to bed   Men can keep a urinal at the bedside   © 1513-2268 [facility] [facility], [address] [place] [address], [place] [address]. All rights reserved. This information is not intended as a substitute for professional medical care. Always follow your healthcare professional's instructions.  Support Groups/Classes    Support groups  "and classes are being offered at the   [facility] and [facility]    "Cooking with Cancer" (Nutrition Class):  Second Wednesday of each month   at noon at the Cancer Center.  Metastatic Support Group:  Third Tuesday of each month   at noon at the Cancer Center.  Next Steps Class/Group: Second and fourth Thursday of each month at noon at the Cancer Center.  Hope Chest (Breast Cancer Support Group): First Tuesday of each month   at 5:30pm at the [facility] location.  [employer]: [facility]: Second and third Tuesday of each month from 7:30am - 2pm.  [facility]: First and fourth Tuesday of each month from 7:30am - 2pm    If you are interested in attending or would like more information please ask our social workers or your nurse!WAYS TO HELP PREVENT INFECTION         WASH YOUR HANDS OFTEN DURING THE DAY, ESPECIALLY BEFORE YOU EAT, AFTER USING THE BATHROOM, AND AFTER TOUCHING ANIMALS     STAY AWAY FROM PEOPLE WHO HAVE ILLNESSES YOU CAN CATCH; SUCH AS COLDS, FLU, CHICKEN POX     TRY TO AVOID CROWDS     STAY AWAY FROM CHILDREN WHO RECENTLY HAVE RECEIVED LIVE VIRUS VACCINES     MAINTAIN GOOD MOUTH CARE     DO NOT SQUEEZE OR SCRATCH PIMPLES     CLEAN CUTS & SCRAPES RIGHT AWAY AND DAILY UNTIL HEALED WITH WARM WATER, SOAP & AN ANTISEPTIC     AVOID CONTACT WITH LITTER BOXES, BIRD CAGES, & FISH TANKS     AVOID STANDING WATER, IE., BIRD BATHS, FLOWER POTS/VASES, OR HUMIDIFIERS     WEAR GLOVES WHEN GARDENING OR CLEANING UP AFTER OTHERS, ESPECIALLY BABIES & SMALL CHILDREN    DO NOT EAT RAW FISH, SEAFOOD, MEAT, OR EGGS  "

## 2020-03-23 NOTE — TELEPHONE ENCOUNTER
----- Message from Tigist Balderas sent at 3/23/2020  2:51 PM CDT -----  Contact: Patient   Adalgisa would like a call back at [phone], Regards to some question that she has about her medication.    Thanks  Td

## 2020-03-25 ENCOUNTER — INFUSION (OUTPATIENT)
Dept: INFUSION THERAPY | Facility: HOSPITAL | Age: 70
End: 2020-03-25
Attending: INTERNAL MEDICINE
Payer: MEDICARE

## 2020-03-25 VITALS
HEIGHT: 58 IN | TEMPERATURE: 99 F | BODY MASS INDEX: 24.43 KG/M2 | DIASTOLIC BLOOD PRESSURE: 72 MMHG | OXYGEN SATURATION: 96 % | SYSTOLIC BLOOD PRESSURE: 132 MMHG | HEART RATE: 70 BPM | WEIGHT: 116.38 LBS | RESPIRATION RATE: 18 BRPM

## 2020-03-25 DIAGNOSIS — C18.9 COLON ADENOCARCINOMA: Primary | ICD-10-CM

## 2020-03-25 DIAGNOSIS — R30.0 DYSURIA: Primary | ICD-10-CM

## 2020-03-25 PROCEDURE — 63600175 PHARM REV CODE 636 W HCPCS: Mod: JG | Performed by: INTERNAL MEDICINE

## 2020-03-25 PROCEDURE — 25000003 PHARM REV CODE 250: Performed by: INTERNAL MEDICINE

## 2020-03-25 PROCEDURE — 96377 APPLICATON ON-BODY INJECTOR: CPT

## 2020-03-25 PROCEDURE — A4216 STERILE WATER/SALINE, 10 ML: HCPCS | Performed by: INTERNAL MEDICINE

## 2020-03-25 RX ORDER — SODIUM CHLORIDE 0.9 % (FLUSH) 0.9 %
10 SYRINGE (ML) INJECTION
Status: DISCONTINUED | OUTPATIENT
Start: 2020-03-25 | End: 2020-03-25 | Stop reason: HOSPADM

## 2020-03-25 RX ORDER — HEPARIN 100 UNIT/ML
500 SYRINGE INTRAVENOUS
Status: DISCONTINUED | OUTPATIENT
Start: 2020-03-25 | End: 2020-03-25 | Stop reason: HOSPADM

## 2020-03-25 RX ADMIN — PEGFILGRASTIM 6 MG: KIT SUBCUTANEOUS at 10:03

## 2020-03-25 RX ADMIN — HEPARIN 500 UNITS: 100 SYRINGE at 10:03

## 2020-03-25 RX ADMIN — Medication 10 ML: at 10:03

## 2020-03-25 NOTE — NURSING
1001am: Pt here for 5FU continuous pump d/c. Pump stopped and disconnected.  Existing dressing appeared clean and intact.  Right chestwall mediport  Flushed with 10ml NS and 5 ml heparin solution.  Needle D/C, site without redness, swelling, or drainage noted.  Dressing applied.  Patient tolerated well.  Patient to return to clinic in 2 weeks.

## 2020-03-25 NOTE — DISCHARGE INSTRUCTIONS
[facility]  [address] [address]  [address]  [phone] phone     [phone] fax  Hours of Operation: Monday- Friday 8:00am- 5:00pm  After hours phone  [phone]  Hematology / Oncology Physicians on call      CATRACHITA Gonzalez Dr., Dr., Dr., Dr., NP Sydney Prescott, NP Tyesha Taylor, NP    Please call with any concerns regarding your appointment today.HOME CARE AFTER CHEMOTHERAPY   Meals   Many patients feel sick and lose their appetites during treatment. Eat small meals several times a day. Choose bland foods with little taste or smell if you have problems with nausea. Be sure to cook all food thoroughly. This kills bacteria and helps you avoid intestinal infection. Soft foods are easier to swallow and digest.   Activity   Exercise keeps you strong and keeps your heart and lungs active. Talk to your doctor about an appropriate exercise program for you.   Skin Care   To prevent a skin infection, bathe or shower once a day. Use a moisturizing soap and wash with warm water. Avoid very hot or cold water. Chemotherapy can make your skin dry . Apply moisturizing lotion to help relieve dry skin. Some drugs used in high doses can cause slight burns to appear (like sunburn). Ask for a special cream to help relieve the burn and protect your skin.   Prevent Mouth Sores   During chemotherapy, many people get mouth sores. Do the following to help prevent mouth sores or to ease discomfort.   Brush your teeth with a soft-bristle toothbrush after every meal.  Don't use dental floss if your platelet count is below 50,000. Your doctor or nurse will tell you if this is the case.  Use an oral swab or special soft toothbrush if your gums bleed during regular brushing.  Use mouthwash as directed. If you can't tolerate commercial mouthwash, use salt and baking soda to clean your mouth. Mix 1 teaspoon of salt and 1  teaspoon of baking soda into a glass of water. Swish and spit.  Call your doctor or return to this facility if you develop any of the following:   Sore throat   White patches in the mouth or throat   Fever of 100.4ºF (38ºC) or higher, or as directed by your healthcare provider  © 2000-2011 [facility], [address] [place] [address]. All rights reserved. This information is not intended as a substitute for professional medical care. Always follow your healthcare professional's   FALL PREVENTION   Falls often occur due to slipping, tripping or losing your balance. Here are ways to reduce your risk of falling again.   Was there anything that caused your fall that can be fixed, removed or replaced?   Make your home safe by keeping walkways clear of objects you may trip over.   Use non-slip pads under rugs.   Do not walk in poorly lit areas.   Do not stand on chairs or wobbly ladders.   Use caution when reaching overhead or looking upward. This position can cause a loss of balance.   Be sure your shoes fit properly, have non-slip bottoms and are in good condition.   Be cautious when going up and down stairs, curbs, and when walking on uneven sidewalks.   If your balance is poor, consider using a cane or walker.   If your fall was related to alcohol use, stop or limit alcohol intake.   If your fall was related to use of sleeping medicines, talk to your doctor about this. You may need to reduce your dosage at bedtime if you awaken during the night to go to the bathroom.   To reduce the need for nighttime bathroom trips:   Avoid drinking fluids for several hours before going to bed   Empty your bladder before going to bed   Men can keep a urinal at the bedside   © 2000-2011 [facility], [address] [place] [address]. All rights reserved. This information is not intended as a substitute for professional medical care. Always follow your healthcare professional's instructions.  Support  "Groups/Classes    Support groups and classes are being offered at the   [facility] and [facility]!!    "Cooking with Cancer" (Nutrition Class):  Second Wednesday of each month   at noon at the Cancer Center.  Metastatic Support Group:  Third Tuesday of each month   at noon at the Cancer Center.  Next Steps Class/Group: Second and fourth Thursday of each month at noon at the Cancer Center.  Hope Chest (Breast Cancer Support Group): First Tuesday of each month   at 5:30pm at the [facility] location.  [employer]: [facility]: Second and third Tuesday of each month from 7:30am - 2pm.  [facility]: First and fourth Tuesday of each month from 7:30am - 2pm    If you are interested in attending or would like more information please ask our social workers or your nurse!    "

## 2020-03-26 ENCOUNTER — TELEPHONE (OUTPATIENT)
Dept: INFUSION THERAPY | Facility: HOSPITAL | Age: 70
End: 2020-03-26

## 2020-03-26 NOTE — TELEPHONE ENCOUNTER
----- Message from Danika Quintanilla MD sent at 3/26/2020  7:21 AM CDT -----  Please call Ms. Noriegagas to let her know that urine test looks very good.  no evidence of infection.

## 2020-03-30 ENCOUNTER — OFFICE VISIT (OUTPATIENT)
Dept: HEMATOLOGY/ONCOLOGY | Facility: CLINIC | Age: 70
End: 2020-03-30
Payer: MEDICARE

## 2020-03-30 DIAGNOSIS — R16.0 LIVER MASS: ICD-10-CM

## 2020-03-30 DIAGNOSIS — T45.1X5A CHEMOTHERAPY-INDUCED NAUSEA: ICD-10-CM

## 2020-03-30 DIAGNOSIS — R11.0 CHEMOTHERAPY-INDUCED NAUSEA: ICD-10-CM

## 2020-03-30 DIAGNOSIS — C18.9 COLON ADENOCARCINOMA: Primary | ICD-10-CM

## 2020-03-30 PROCEDURE — 1101F PR PT FALLS ASSESS DOC 0-1 FALLS W/OUT INJ PAST YR: ICD-10-PCS | Mod: CPTII,95,, | Performed by: INTERNAL MEDICINE

## 2020-03-30 PROCEDURE — 1101F PT FALLS ASSESS-DOCD LE1/YR: CPT | Mod: CPTII,95,, | Performed by: INTERNAL MEDICINE

## 2020-03-30 PROCEDURE — 1159F MED LIST DOCD IN RCRD: CPT | Mod: 95,,, | Performed by: INTERNAL MEDICINE

## 2020-03-30 PROCEDURE — 99214 PR OFFICE/OUTPT VISIT, EST, LEVL IV, 30-39 MIN: ICD-10-PCS | Mod: 95,,, | Performed by: INTERNAL MEDICINE

## 2020-03-30 PROCEDURE — 99214 OFFICE O/P EST MOD 30 MIN: CPT | Mod: 95,,, | Performed by: INTERNAL MEDICINE

## 2020-03-30 PROCEDURE — 1159F PR MEDICATION LIST DOCUMENTED IN MEDICAL RECORD: ICD-10-PCS | Mod: 95,,, | Performed by: INTERNAL MEDICINE

## 2020-03-30 RX ORDER — PROCHLORPERAZINE MALEATE 5 MG
5 TABLET ORAL EVERY 6 HOURS PRN
Qty: 30 TABLET | Refills: 1 | Status: SHIPPED | OUTPATIENT
Start: 2020-03-30 | End: 2020-06-22 | Stop reason: SDUPTHER

## 2020-03-30 NOTE — PROGRESS NOTES
Subjective:      DATE OF VISIT: 3/30/2020   ?   The patient location is:  home  The chief complaint leading to consultation is:  On chemotherapy follow-up after 1st cycle  Visit type: Virtual visit with synchronous audio and video  Total time spent with patient:  10  Each patient to whom he or she provides medical services by telemedicine is:  (1) informed of the relationship between the physician and patient and the respective role of any other health care provider with respect to management of the patient; and (2) notified that he or she may decline to receive medical services by telemedicine and may withdraw from such care at any time.    Notes:     ?   Patient ID:?Adalgisa Wright is a 69 y.o. female.?? MR#: [id number]   ?   PRIMARY PROVIDER: Dr. Quintanilla    CHIEF COMPLAINT:  Follow-up??postoperatively  ?   ONCOLOGIC DIAGNOSIS: Rectal adenocarcinoma, Stage III, pT3 pN0 pM0  ?   CURRENT TREATMENT: FOLFOX, C1D1 3/23/20    HPI    Ms. Wright was previously seen by my colleague Dr. Haq last on 08/21/2019 for iron deficiency anemia.  She has a mechanical aortic valve replacement 16 years ago on therapeutic anticoagulation.  Labs notable for low haptoglobin in it is felt hemolytic process may be contributing to her anemia.  She notes having recent EKG as part of preoperative evaluation but has not had follow-up with her cardiologist recently or repeat echocardiogram.  She denies edema, shortness of breath or chest pain.    She was referred to Gastroenterology for further evaluation of iron deficiency anemia and concern for GI bleed.     11/7/19 EGD and colonoscopy revealed a nonobstructing ulcerated mass 20 cm from the anal verge. Pathology:  Moderately differentiated adenocarcinoma, MSI intact.    12/02/2019 PET-CT notable for avid sigmoid lesion SUV 15.7 as well as avid right lobe liver lesion 19 mm, SUV 4.1.  No other areas of avidity concerning for metastatic disease.    12/9/19 liver biopsy,  Pathology:  Diagnosis 1.  Liver mass, biopsy:   - Not diagnostic of malignancy   - Scant atypical liver sampling, see comments     Comments: The sections show a scant fragmented sample of liver tissue with   psuedoglands in one core and mild architectural distortion. Cytologic atypiia   is not prominent. Reticulin stain shows mildly irregular pattern of staining.   No portal tracts present for evaluation. Back to back pseudoglands raises the   possibly of a well differetniated hepatocellular lesion. However, the scant   sample limits interpretation. Due to the presence of a clinical mass.   Resampling is indicated.      02/13/2020 surgical resection of sigmoid and upper rectum  Pathology:  Adenocarcinoma, moderately differentiated, 2.5 x 2 cm, margins negative, 6 of 14 lymph nodes positive for metastatic adenocarcinoma, many tumor deposits and mesenteric tissue, pT3 pN2, MSI intact    03/23/2020 cycle 1 day 1 FOLFOX     INTERVAL EVENTS    Today she is following up with me from her home via telemedicine visit.  She does note nausea without vomiting.  She has used Zofran twice daily.  She does have diminished appetite.  No mouth sores.  No diarrhea, fever, chills or infectious symptoms.    Review of Systems    ?   A comprehensive 14-point review of systems was reviewed with patient and was negative other than as specified above.   ?     Objective:      Physical Exam      ?   There were no vitals filed for this visit.     ECOG:?0   General appearance: Generally well appearing, in no acute distress.   Head, eyes, ears, nose, and throat: moist mucous membranes.   Respiratory:  Normal work of breathing   Extremities: Warm, without edema.   Neurologic: Alert and oriented. Grossly normal strength, coordination, and gait.   Skin: No rashes, ecchymoses or petechial lesion.   Psychiatric:  Normal mood and affect.    ?   Laboratory:  ?   No visits with results within 1 Day(s) from this visit.   Latest known visit with results  is:   Lab Visit on 03/25/2020   Component Date Value Ref Range Status    Specimen UA 03/25/2020 Urine, Clean Catch   Final    Color, UA 03/25/2020 Straw  Yellow, Straw, Padmini Final    Appearance, UA 03/25/2020 Clear  Clear Final    pH, UA 03/25/2020 6.0  5.0 - 8.0 Final    Specific [place], UA 03/25/2020 1.000* 1.005 - 1.030 Final    Protein, UA 03/25/2020 Negative  Negative Final    Glucose, UA 03/25/2020 Negative  Negative Final    Ketones, UA 03/25/2020 Negative  Negative Final    Bilirubin (UA) 03/25/2020 Negative  Negative Final    Occult Blood UA 03/25/2020 Negative  Negative Final    Nitrite, UA 03/25/2020 Negative  Negative Final    Leukocytes, UA 03/25/2020 Negative  Negative Final      No labs today    Tumor markers    CEA    CEA   Date Value Ref Range Status   11/18/2019 3.5 0.0 - 5.0 ng/mL Final     Comment:     CEA Normal Range:  Non-Smokers: 0-3.0 ng/mL  Smokers:     0-5.0 ng/mL              ? IMAGING  11/21/19  CT CHEST ABDOMEN PELVIS WITH CONTRAST (XPD)    CLINICAL HISTORY:  Malignant neoplasm of rectosigmoid junctionknown rectosigmoid cancer, eval for metastatic disease;    COMPARISON:  None    FINDINGS:  Chest:    The heart is enlarged.  There is no evidence of mediastinal or hilar or axillary lymphadenopathy.  Patient has had a prior median sternotomy.  There are no lung nodules.  No pleural effusions.    Skeletal structures are intact.    Abdomen pelvis:    There appears to be a subtle hypodense lesion involving the right lobe of the liver measuring about 1.7 cm.  There may be a smaller hypodensity more anteriorly measuring 8 mm.    Pancreas is unremarkable the spleen is unremarkable.    The gallbladder is unremarkable.  There is no bile duct dilatation.    The kidneys are normal.    The aorta and inferior vena cava are unremarkable.    There are no acute bowel abnormalities.     No evidence of appendicitis.  No evidence of diverticulitis.    Bladder is normal. No abnormal masses or  fluid collections in the pelvis.  No definite pelvic lymphadenopathy.  No abdominal retroperitoneal lymph node enlargement.    There are few degenerative changes seen involving the lumbar spine as well as a few mild compression fractures of the thoracic spine which appear chronic.      Impression       No evidence of metastatic disease involving the chest.  Findings suspicious for a liver mass possibly related to a metastasis.  Further evaluation recommended with a PET scan or MRI of the liver.    There appear to be a few compression fractures of the thoracic spine which are chronic.  No definite metastatic disease involving the skeleton.       ?   Assessment/Plan:       No diagnosis found.      Plan:     # rectal adenocarcinoma, pT3 pN2 pM0, Stage III, MSI stable:  Initiating adjuvant chemotherapy C1D1 3/23/20.  She has had nausea poorly controlled.  Supportive care per below.  Already scheduled for cycle 2 in 1 week.    # Nausea:  Currently using Zofran twice daily and recommended increasing to q.8 hours with addition of Compazine p.r.n..  She is has use dexamethasone for days 2 and 3.  Advise her to call if still poorly controlled nausea.    # chronic anticoagulation with a  mechanical aortic valve:  On chronic anticoagulation due to mechanical valve.  INR checked with outside cardiology and asked her to reach out to see if she would like to use [facility] instead.  She will let us know.    # leukopenia:  Mild leukopenia ANC 1.7.  Will proceed with therapy today and Neulasta on day 3.  Infectious precautions discussed and importance of calling if signs or symptoms of infection or fever.    #  mild normocytic anemia: IV iron for 2 doses, completed 12/04/2019.  Repeat labs with resolution of anemia.  Continue to monitor throughout therapy.     # hyperlipidemia:  Recommended follow-up with PCP.    Follow-Up:   - RV in 1 week with labs and C2 chemo

## 2020-04-06 ENCOUNTER — INFUSION (OUTPATIENT)
Dept: INFUSION THERAPY | Facility: HOSPITAL | Age: 70
End: 2020-04-06
Attending: INTERNAL MEDICINE
Payer: MEDICARE

## 2020-04-06 ENCOUNTER — OFFICE VISIT (OUTPATIENT)
Dept: HEMATOLOGY/ONCOLOGY | Facility: CLINIC | Age: 70
End: 2020-04-06
Payer: MEDICARE

## 2020-04-06 VITALS
RESPIRATION RATE: 18 BRPM | HEART RATE: 79 BPM | SYSTOLIC BLOOD PRESSURE: 136 MMHG | TEMPERATURE: 98 F | DIASTOLIC BLOOD PRESSURE: 80 MMHG | OXYGEN SATURATION: 99 %

## 2020-04-06 VITALS
DIASTOLIC BLOOD PRESSURE: 81 MMHG | HEIGHT: 58 IN | TEMPERATURE: 97 F | BODY MASS INDEX: 24.54 KG/M2 | OXYGEN SATURATION: 98 % | WEIGHT: 116.88 LBS | RESPIRATION RATE: 16 BRPM | SYSTOLIC BLOOD PRESSURE: 142 MMHG | HEART RATE: 81 BPM

## 2020-04-06 DIAGNOSIS — Z79.01 CHRONIC ANTICOAGULATION: ICD-10-CM

## 2020-04-06 DIAGNOSIS — C18.9 COLON ADENOCARCINOMA: Primary | ICD-10-CM

## 2020-04-06 DIAGNOSIS — D63.0 ANEMIA IN NEOPLASTIC DISEASE: ICD-10-CM

## 2020-04-06 DIAGNOSIS — E87.6 HYPOKALEMIA: ICD-10-CM

## 2020-04-06 DIAGNOSIS — G47.00 INSOMNIA, UNSPECIFIED TYPE: ICD-10-CM

## 2020-04-06 DIAGNOSIS — D69.6 THROMBOCYTOPENIA: ICD-10-CM

## 2020-04-06 DIAGNOSIS — R04.0 EPISTAXIS: ICD-10-CM

## 2020-04-06 PROCEDURE — 96367 TX/PROPH/DG ADDL SEQ IV INF: CPT

## 2020-04-06 PROCEDURE — 3074F PR MOST RECENT SYSTOLIC BLOOD PRESSURE < 130 MM HG: ICD-10-PCS | Mod: CPTII,95,, | Performed by: INTERNAL MEDICINE

## 2020-04-06 PROCEDURE — 1126F AMNT PAIN NOTED NONE PRSNT: CPT | Mod: 95,,, | Performed by: INTERNAL MEDICINE

## 2020-04-06 PROCEDURE — 3079F PR MOST RECENT DIASTOLIC BLOOD PRESSURE 80-89 MM HG: ICD-10-PCS | Mod: CPTII,95,, | Performed by: INTERNAL MEDICINE

## 2020-04-06 PROCEDURE — 3074F SYST BP LT 130 MM HG: CPT | Mod: CPTII,95,, | Performed by: INTERNAL MEDICINE

## 2020-04-06 PROCEDURE — 1159F PR MEDICATION LIST DOCUMENTED IN MEDICAL RECORD: ICD-10-PCS | Mod: 95,,, | Performed by: INTERNAL MEDICINE

## 2020-04-06 PROCEDURE — 3079F DIAST BP 80-89 MM HG: CPT | Mod: CPTII,95,, | Performed by: INTERNAL MEDICINE

## 2020-04-06 PROCEDURE — 96413 CHEMO IV INFUSION 1 HR: CPT

## 2020-04-06 PROCEDURE — 96411 CHEMO IV PUSH ADDL DRUG: CPT

## 2020-04-06 PROCEDURE — 63600175 PHARM REV CODE 636 W HCPCS: Performed by: INTERNAL MEDICINE

## 2020-04-06 PROCEDURE — 1101F PR PT FALLS ASSESS DOC 0-1 FALLS W/OUT INJ PAST YR: ICD-10-PCS | Mod: CPTII,95,, | Performed by: INTERNAL MEDICINE

## 2020-04-06 PROCEDURE — 25000003 PHARM REV CODE 250: Performed by: INTERNAL MEDICINE

## 2020-04-06 PROCEDURE — 1126F PR PAIN SEVERITY QUANTIFIED, NO PAIN PRESENT: ICD-10-PCS | Mod: 95,,, | Performed by: INTERNAL MEDICINE

## 2020-04-06 PROCEDURE — 1101F PT FALLS ASSESS-DOCD LE1/YR: CPT | Mod: CPTII,95,, | Performed by: INTERNAL MEDICINE

## 2020-04-06 PROCEDURE — 96415 CHEMO IV INFUSION ADDL HR: CPT

## 2020-04-06 PROCEDURE — 1159F MED LIST DOCD IN RCRD: CPT | Mod: 95,,, | Performed by: INTERNAL MEDICINE

## 2020-04-06 PROCEDURE — 99215 PR OFFICE/OUTPT VISIT, EST, LEVL V, 40-54 MIN: ICD-10-PCS | Mod: 95,,, | Performed by: INTERNAL MEDICINE

## 2020-04-06 PROCEDURE — 96416 CHEMO PROLONG INFUSE W/PUMP: CPT

## 2020-04-06 PROCEDURE — 96368 THER/DIAG CONCURRENT INF: CPT

## 2020-04-06 PROCEDURE — 99215 OFFICE O/P EST HI 40 MIN: CPT | Mod: 95,,, | Performed by: INTERNAL MEDICINE

## 2020-04-06 RX ORDER — POTASSIUM CHLORIDE 750 MG/1
20 TABLET, EXTENDED RELEASE ORAL
Status: CANCELLED
Start: 2020-04-06

## 2020-04-06 RX ORDER — SODIUM CHLORIDE 0.9 % (FLUSH) 0.9 %
10 SYRINGE (ML) INJECTION
Status: CANCELLED | OUTPATIENT
Start: 2020-04-06

## 2020-04-06 RX ORDER — DIPHENHYDRAMINE HYDROCHLORIDE 50 MG/ML
50 INJECTION INTRAMUSCULAR; INTRAVENOUS ONCE AS NEEDED
Status: CANCELLED | OUTPATIENT
Start: 2020-04-06

## 2020-04-06 RX ORDER — SODIUM CHLORIDE 0.9 % (FLUSH) 0.9 %
10 SYRINGE (ML) INJECTION
Status: CANCELLED | OUTPATIENT
Start: 2020-04-08

## 2020-04-06 RX ORDER — EPINEPHRINE 0.3 MG/.3ML
0.3 INJECTION SUBCUTANEOUS ONCE AS NEEDED
Status: CANCELLED | OUTPATIENT
Start: 2020-04-06

## 2020-04-06 RX ORDER — FLUOROURACIL 50 MG/ML
400 INJECTION, SOLUTION INTRAVENOUS
Status: COMPLETED | OUTPATIENT
Start: 2020-04-06 | End: 2020-04-06

## 2020-04-06 RX ORDER — ALPRAZOLAM 1 MG/1
1 TABLET ORAL 2 TIMES DAILY PRN
Status: ON HOLD | COMMUNITY
Start: 2020-03-30 | End: 2023-03-18 | Stop reason: HOSPADM

## 2020-04-06 RX ORDER — POTASSIUM CHLORIDE 20 MEQ/1
20 TABLET, EXTENDED RELEASE ORAL
Status: COMPLETED | OUTPATIENT
Start: 2020-04-06 | End: 2020-04-06

## 2020-04-06 RX ORDER — FLUOROURACIL 50 MG/ML
400 INJECTION, SOLUTION INTRAVENOUS
Status: DISCONTINUED | OUTPATIENT
Start: 2020-04-06 | End: 2020-04-06

## 2020-04-06 RX ORDER — FLUOROURACIL 50 MG/ML
400 INJECTION, SOLUTION INTRAVENOUS
Status: CANCELLED | OUTPATIENT
Start: 2020-04-06

## 2020-04-06 RX ORDER — HEPARIN 100 UNIT/ML
500 SYRINGE INTRAVENOUS
Status: CANCELLED | OUTPATIENT
Start: 2020-04-08

## 2020-04-06 RX ORDER — HEPARIN 100 UNIT/ML
500 SYRINGE INTRAVENOUS
Status: CANCELLED | OUTPATIENT
Start: 2020-04-06

## 2020-04-06 RX ORDER — EPINEPHRINE 1 MG/ML
0.3 INJECTION, SOLUTION INTRACARDIAC; INTRAMUSCULAR; INTRAVENOUS; SUBCUTANEOUS ONCE AS NEEDED
Status: DISCONTINUED | OUTPATIENT
Start: 2020-04-06 | End: 2020-04-06 | Stop reason: HOSPADM

## 2020-04-06 RX ORDER — DIPHENHYDRAMINE HYDROCHLORIDE 50 MG/ML
50 INJECTION INTRAMUSCULAR; INTRAVENOUS ONCE AS NEEDED
Status: DISCONTINUED | OUTPATIENT
Start: 2020-04-06 | End: 2020-04-06 | Stop reason: HOSPADM

## 2020-04-06 RX ORDER — POTASSIUM CHLORIDE 20 MEQ/1
20 TABLET, EXTENDED RELEASE ORAL DAILY
Qty: 6 TABLET | Refills: 0 | Status: SHIPPED | OUTPATIENT
Start: 2020-04-06 | End: 2020-04-12

## 2020-04-06 RX ADMIN — FLUOROURACIL 3530 MG: 50 INJECTION, SOLUTION INTRAVENOUS at 01:04

## 2020-04-06 RX ADMIN — FLUOROURACIL 590 MG: 50 INJECTION, SOLUTION INTRAVENOUS at 01:04

## 2020-04-06 RX ADMIN — OXALIPLATIN 125 MG: 5 INJECTION, SOLUTION, CONCENTRATE INTRAVENOUS at 11:04

## 2020-04-06 RX ADMIN — POTASSIUM CHLORIDE 20 MEQ: 1500 TABLET, EXTENDED RELEASE ORAL at 10:04

## 2020-04-06 RX ADMIN — PALONOSETRON HYDROCHLORIDE: 0.25 INJECTION, SOLUTION INTRAVENOUS at 10:04

## 2020-04-06 RX ADMIN — LEUCOVORIN CALCIUM 600 MG: 500 INJECTION, POWDER, LYOPHILIZED, FOR SOLUTION INTRAMUSCULAR; INTRAVENOUS at 11:04

## 2020-04-06 RX ADMIN — SODIUM CHLORIDE: 9 INJECTION, SOLUTION INTRAVENOUS at 10:04

## 2020-04-06 NOTE — DISCHARGE INSTRUCTIONS
[facility]  [address] [facility]  [address] [address]  [phone] phone     [phone] fax  Hours of Operation: Monday- Friday 8:00am- 5:00pm  After hours phone  [phone]  Hematology / Oncology Physicians on call      Dr. Bill Carey, CATRACHITA Mcdonald NP Tyesha Taylor, NP    Please call with any concerns regarding your appointment today.        FALL PREVENTION   Falls often occur due to slipping, tripping or losing your balance. Here are ways to reduce your risk of falling again.   Was there anything that caused your fall that can be fixed, removed or replaced?   Make your home safe by keeping walkways clear of objects you may trip over.   Use non-slip pads under rugs.   Do not walk in poorly lit areas.   Do not stand on chairs or wobbly ladders.   Use caution when reaching overhead or looking upward. This position can cause a loss of balance.   Be sure your shoes fit properly, have non-slip bottoms and are in good condition.   Be cautious when going up and down stairs, curbs, and when walking on uneven sidewalks.   If your balance is poor, consider using a cane or walker.   If your fall was related to alcohol use, stop or limit alcohol intake.   If your fall was related to use of sleeping medicines, talk to your doctor about this. You may need to reduce your dosage at bedtime if you awaken during the night to go to the bathroom.   To reduce the need for nighttime bathroom trips:   Avoid drinking fluids for several hours before going to bed   Empty your bladder before going to bed   Men can keep a urinal at the bedside   © 6477-6231 [facility] [facility], [address] [place] [address], [place] [address]. All rights reserved. This information is not intended as a substitute for professional medical care. Always follow your healthcare professional's instructions.        Support  "Groups/Classes    Support groups and classes are being offered at the   [facility] and [facility]    "Cooking with Cancer" (Nutrition Class):  Second Wednesday of each month   at noon at the Cancer Center.  Metastatic Support Group:  Third Tuesday of each month   at noon at the [facility].  Next Steps Class/Group: Second and fourth Thursday of each month at noon at the [facility].  [facility] (Breast Cancer Support Group): First Tuesday of each month   at 5:30pm at the [facility].    If you are interested in attending or would like more information please ask our social workers or your nurse!            HOME CARE AFTER CHEMOTHERAPY   Meals   Many patients feel sick and lose their appetites during treatment. Eat small meals several times a day. Choose bland foods with little taste or smell if you have problems with nausea. Be sure to cook all food thoroughly. This kills bacteria and helps you avoid intestinal infection. Soft foods are easier to swallow and digest.   Activity   Exercise keeps you strong and keeps your heart and lungs active. Talk to your doctor about an appropriate exercise program for you.   Skin Care   To prevent a skin infection, bathe or shower once a day. Use a moisturizing soap and wash with warm water. Avoid very hot or cold water. Chemotherapy can make your skin dry . Apply moisturizing lotion to help relieve dry skin. Some drugs used in high doses can cause slight burns to appear (like sunburn). Ask for a special cream to help relieve the burn and protect your skin.   Prevent Mouth Sores   During chemotherapy, many people get mouth sores. Do the following to help prevent mouth sores or to ease discomfort.   Brush your teeth with a soft-bristle toothbrush after every meal.  Don't use dental floss if your platelet count is below 50,000. Your doctor or nurse will tell you if this is the case.  Use an oral swab or special soft toothbrush if your gums bleed during regular " brushing.  Use mouthwash as directed. If you can't tolerate commercial mouthwash, use salt and baking soda to clean your mouth. Mix 1 teaspoon of salt and 1 teaspoon of baking soda into a glass of water. Swish and spit.  Call your doctor or return to this facility if you develop any of the following:   Sore throat   White patches in the mouth or throat   Fever of 100.4ºF (38ºC) or higher, or as directed by your healthcare provider  © 4813-8380 [employer], [address] [place] [address]. All rights reserved. This information is not intended as a substitute for professional medical care. Always follow your healthcare professional's instructions.          WAYS TO HELP PREVENT INFECTION         WASH YOUR HANDS OFTEN DURING THE DAY, ESPECIALLY BEFORE YOU EAT, AFTER USING THE BATHROOM, AND AFTER TOUCHING ANIMALS     STAY AWAY FROM PEOPLE WHO HAVE ILLNESSES YOU CAN CATCH; SUCH AS COLDS, FLU, CHICKEN POX     TRY TO AVOID CROWDS     STAY AWAY FROM CHILDREN WHO RECENTLY HAVE RECEIVED LIVE VIRUS VACCINES     MAINTAIN GOOD MOUTH CARE     DO NOT SQUEEZE OR SCRATCH PIMPLES     CLEAN CUTS & SCRAPES RIGHT AWAY AND DAILY UNTIL HEALED WITH WARM WATER, SOAP & AN ANTISEPTIC     AVOID CONTACT WITH LITTER BOXES, BIRD CAGES, & FISH TANKS     AVOID STANDING WATER, IE., BIRD BATHS, FLOWER POTS/VASES, OR HUMIDIFIERS     WEAR GLOVES WHEN GARDENING OR CLEANING UP AFTER OTHERS, ESPECIALLY BABIES & SMALL CHILDREN    DO NOT EAT RAW FISH, SEAFOOD, MEAT, OR EGGS

## 2020-04-06 NOTE — PATIENT INSTRUCTIONS
- chemo today pending CMP  - RV in 2 weeks with chemo and labs prior  - 40K potassium in infusion if possible

## 2020-04-06 NOTE — PLAN OF CARE
"  Problem: Adult Inpatient Plan of Care  Goal: Plan of Care Review  Outcome: Ongoing, Progressing  Flowsheets (Taken 4/6/2020 1044)  Plan of Care Reviewed With: patient  Goal: Patient-Specific Goal (Individualization)  Outcome: Ongoing, Progressing  Flowsheets (Taken 4/6/2020 1044)  Individualized Care Needs: pt like pillow and blanket .  Anxieties, Fears or Concerns: none  Patient-Specific Goals (Include Timeframe): patient will tolerate infusion today     Patient states, "I feel good today." Patient stable. To be administered FOLFOX infusion per MD order. Kdur 20 meq administered po per MD order. Patient to RV on 04/08/2020 for pump dc.   "

## 2020-04-06 NOTE — PROGRESS NOTES
Subjective:      DATE OF VISIT: 4/6/2020   ?   The patient location is:  clinic  The chief complaint leading to consultation is:  On chemotherapy follow-up  Visit type: Virtual visit with synchronous audio and video  Total time spent with patient:  15  Each patient to whom he or she provides medical services by telemedicine is:  (1) informed of the relationship between the physician and patient and the respective role of any other health care provider with respect to management of the patient; and (2) notified that he or she may decline to receive medical services by telemedicine and may withdraw from such care at any time.    Notes:     ?   Patient ID:?Adalgisa Wright is a 69 y.o. female.?? MR#: [id number]   ?   PRIMARY PROVIDER: Dr. Quintanilla    CHIEF COMPLAINT:  Follow-up??postoperatively  ?   ONCOLOGIC DIAGNOSIS: Rectal adenocarcinoma, Stage III, pT3 pN0 pM0  ?   CURRENT TREATMENT: FOLFOX, C1D1 3/23/20    HPI    Ms. Wright was previously seen by my colleague Dr. Haq last on 08/21/2019 for iron deficiency anemia.  She has a mechanical aortic valve replacement 16 years ago on therapeutic anticoagulation.  Labs notable for low haptoglobin in it is felt hemolytic process may be contributing to her anemia.  She notes having recent EKG as part of preoperative evaluation but has not had follow-up with her cardiologist recently or repeat echocardiogram.  She denies edema, shortness of breath or chest pain.    She was referred to Gastroenterology for further evaluation of iron deficiency anemia and concern for GI bleed.     11/7/19 EGD and colonoscopy revealed a nonobstructing ulcerated mass 20 cm from the anal verge. Pathology:  Moderately differentiated adenocarcinoma, MSI intact.    12/02/2019 PET-CT notable for avid sigmoid lesion SUV 15.7 as well as avid right lobe liver lesion 19 mm, SUV 4.1.  No other areas of avidity concerning for metastatic disease.    12/9/19 liver biopsy, Pathology:  Diagnosis 1.  Liver  mass, biopsy:   - Not diagnostic of malignancy   - Scant atypical liver sampling, see comments     Comments: The sections show a scant fragmented sample of liver tissue with   psuedoglands in one core and mild architectural distortion. Cytologic atypiia   is not prominent. Reticulin stain shows mildly irregular pattern of staining.   No portal tracts present for evaluation. Back to back pseudoglands raises the   possibly of a well differetniated hepatocellular lesion. However, the scant   sample limits interpretation. Due to the presence of a clinical mass.   Resampling is indicated.      02/13/2020 surgical resection of sigmoid and upper rectum  Pathology:  Adenocarcinoma, moderately differentiated, 2.5 x 2 cm, margins negative, 6 of 14 lymph nodes positive for metastatic adenocarcinoma, many tumor deposits and mesenteric tissue, pT3 pN2, MSI intact    03/23/2020 cycle 1 day 1 FOLFOX     INTERVAL EVENTS    Today she is following up with me from clinic via telemedicine visit.  She endorses a couple days of nausea without vomiting.  She has had a couple episodes of nose bleeds which dropped after holding pressure.  No other evidence of GI or other bleeding.  She has soreness primarily in right bicep particularly with use for which he is using BenGay.  She does endorse insomnia.  No fevers or chills or other infectious symptoms.    Review of Systems    ?   A comprehensive 14-point review of systems was reviewed with patient and was negative other than as specified above.   ?     Objective:      Physical Exam      ?   Vitals:    04/06/20 0812   BP: (!) 142/81   Pulse: 81   Resp: 16   Temp: 97.2 °F (36.2 °C)        ECOG:?0   General appearance: Generally well appearing, in no acute distress, wearing mask.   Head, eyes, ears, nose, and throat: moist mucous membranes.   Respiratory:  Normal work of breathing   Extremities: Warm, without edema.   Neurologic: Alert and oriented. Grossly normal strength, coordination, and  gait.   Skin: No rashes, ecchymoses or petechial lesion.   Psychiatric:  Normal mood and affect.    ?   Laboratory:  ?   Lab Visit on 04/06/2020   Component Date Value Ref Range Status    WBC 04/06/2020 10.20  3.90 - 12.70 K/uL Final    RBC 04/06/2020 3.53* 4.00 - 5.40 M/uL Final    Hemoglobin 04/06/2020 11.4* 12.0 - 16.0 g/dL Final    Hematocrit 04/06/2020 35.4* 37.0 - 48.5 % Final    Mean Corpuscular Volume 04/06/2020 100* 82 - 98 fL Final    Mean Corpuscular Hemoglobin 04/06/2020 32.3* 27.0 - 31.0 pg Final    Mean Corpuscular Hemoglobin Conc 04/06/2020 32.2  32.0 - 36.0 g/dL Final    RDW 04/06/2020 12.8  11.5 - 14.5 % Final    Platelets 04/06/2020 116* 150 - 350 K/uL Final    MPV 04/06/2020 10.9  9.2 - 12.9 fL Final    Immature Granulocytes 04/06/2020 CANCELED  0.0 - 0.5 % Final    Immature Grans (Abs) 04/06/2020 CANCELED  0.00 - 0.04 K/uL Final    Lymph # 04/06/2020 CANCELED  1.0 - 4.8 K/uL Final    Mono # 04/06/2020 CANCELED  0.3 - 1.0 K/uL Final    Eos # 04/06/2020 CANCELED  0.0 - 0.5 K/uL Final    Baso # 04/06/2020 CANCELED  0.00 - 0.20 K/uL Final    nRBC 04/06/2020 1* 0 /100 WBC Final    Gran% 04/06/2020 77.0* 38.0 - 73.0 % Final    Lymph% 04/06/2020 16.0* 18.0 - 48.0 % Final    Mono% 04/06/2020 4.0  4.0 - 15.0 % Final    Eosinophil% 04/06/2020 1.0  0.0 - 8.0 % Final    Basophil% 04/06/2020 0.0  0.0 - 1.9 % Final    Metamyelocytes 04/06/2020 2.0  % Final    Differential Method 04/06/2020 Manual   Final    Sodium 04/06/2020 142  136 - 145 mmol/L Final    Potassium 04/06/2020 3.2* 3.5 - 5.1 mmol/L Final    Chloride 04/06/2020 100  95 - 110 mmol/L Final    CO2 04/06/2020 31* 23 - 29 mmol/L Final    Glucose 04/06/2020 109  70 - 110 mg/dL Final    BUN, Bld 04/06/2020 13  8 - 23 mg/dL Final    Creatinine 04/06/2020 0.7  0.5 - 1.4 mg/dL Final    Calcium 04/06/2020 8.5* 8.7 - 10.5 mg/dL Final    Total Protein 04/06/2020 6.6  6.0 - 8.4 g/dL Final    Albumin 04/06/2020 3.8  3.5 - 5.2  g/dL Final    Total Bilirubin 04/06/2020 0.2  0.1 - 1.0 mg/dL Final    Alkaline Phosphatase 04/06/2020 136* 55 - 135 U/L Final    AST 04/06/2020 20  10 - 40 U/L Final    ALT 04/06/2020 18  10 - 44 U/L Final    Anion Gap 04/06/2020 11  8 - 16 mmol/L Final    eGFR if African American 04/06/2020 >60  >60 mL/min/1.73 m^2 Final    eGFR if non African American 04/06/2020 >60  >60 mL/min/1.73 m^2 Final      No labs today    Tumor markers    CEA    CEA   Date Value Ref Range Status   11/18/2019 3.5 0.0 - 5.0 ng/mL Final     Comment:     CEA Normal Range:  Non-Smokers: 0-3.0 ng/mL  Smokers:     0-5.0 ng/mL              ? IMAGING  11/21/19  CT CHEST ABDOMEN PELVIS WITH CONTRAST (XPD)    CLINICAL HISTORY:  Malignant neoplasm of rectosigmoid junctionknown rectosigmoid cancer, eval for metastatic disease;    COMPARISON:  None    FINDINGS:  Chest:    The heart is enlarged.  There is no evidence of mediastinal or hilar or axillary lymphadenopathy.  Patient has had a prior median sternotomy.  There are no lung nodules.  No pleural effusions.    Skeletal structures are intact.    Abdomen pelvis:    There appears to be a subtle hypodense lesion involving the right lobe of the liver measuring about 1.7 cm.  There may be a smaller hypodensity more anteriorly measuring 8 mm.    Pancreas is unremarkable the spleen is unremarkable.    The gallbladder is unremarkable.  There is no bile duct dilatation.    The kidneys are normal.    The aorta and inferior vena cava are unremarkable.    There are no acute bowel abnormalities.     No evidence of appendicitis.  No evidence of diverticulitis.    Bladder is normal. No abnormal masses or fluid collections in the pelvis.  No definite pelvic lymphadenopathy.  No abdominal retroperitoneal lymph node enlargement.    There are few degenerative changes seen involving the lumbar spine as well as a few mild compression fractures of the thoracic spine which appear chronic.      Impression       No  evidence of metastatic disease involving the chest.  Findings suspicious for a liver mass possibly related to a metastasis.  Further evaluation recommended with a PET scan or MRI of the liver.    There appear to be a few compression fractures of the thoracic spine which are chronic.  No definite metastatic disease involving the skeleton.       ?   Assessment/Plan:       1. Colon adenocarcinoma    2. Anemia in neoplastic disease    3. Thrombocytopenia    4. Epistaxis    5. Chronic anticoagulation    6. Insomnia, unspecified type    7. Hypokalemia          Plan:     # rectal adenocarcinoma, pT3 pN2 pM0, Stage III, MSI stable:  Initiating adjuvant chemotherapy C1D1 3/23/20.  She has tolerated well her 1st cycle.  Labs reviewed okay to proceed with cycle 2 today.  Mild hypokalemia and will give 20 mEq potassium in treatment area today and sent home with 20 mEq for the next 2 days.  No notable diarrhea.  Will continue to monitor.    # Nausea:  Currently using Zofran twice daily and recommended increasing to q.8 hours with addition of Compazine p.r.n..  She is has used dexamethasone for days 2 and 3 can continue with this cycle.  Advise her to call if still poorly controlled nausea.    # chronic anticoagulation with a  mechanical aortic valve:  On chronic anticoagulation due to mechanical valve.  INR checked with outside cardiology and asked her to reach out to see if she would like to use [facility] clinic instead.  She will let us know.    # epistaxis:  Mild and ceasing with holding pressure.  Likely related to ongoing anticoagulation and mild thrombocytopenia.  Advised her to hold pressure in call if any more notable bleeding or bleeding elsewhere.    #  mild normocytic anemia: IV iron for 2 doses, completed 12/04/2019.  Repeat labs with resolution of anemia and now mild worsening likely related to antineoplastic therapy.  Continue to monitor throughout therapy.     # insomnia:  Recommended trial of  melatonin over-the-counter and good sleep hygiene.  Dexamethasone on days 2 and 3 May be contributing and advised taking early in the morning so as not to disturb sleep.    Follow-Up:   - RV in 1 week with labs and C2 chemo

## 2020-04-07 ENCOUNTER — TELEPHONE (OUTPATIENT)
Dept: RADIOLOGY | Facility: HOSPITAL | Age: 70
End: 2020-04-07

## 2020-04-08 ENCOUNTER — INFUSION (OUTPATIENT)
Dept: INFUSION THERAPY | Facility: HOSPITAL | Age: 70
End: 2020-04-08
Attending: INTERNAL MEDICINE
Payer: MEDICARE

## 2020-04-08 VITALS
DIASTOLIC BLOOD PRESSURE: 77 MMHG | HEART RATE: 78 BPM | OXYGEN SATURATION: 97 % | SYSTOLIC BLOOD PRESSURE: 126 MMHG | RESPIRATION RATE: 18 BRPM | TEMPERATURE: 98 F

## 2020-04-08 DIAGNOSIS — C18.9 COLON ADENOCARCINOMA: Primary | ICD-10-CM

## 2020-04-08 DIAGNOSIS — R04.0 EPISTAXIS: Primary | ICD-10-CM

## 2020-04-08 PROCEDURE — 25000003 PHARM REV CODE 250: Performed by: INTERNAL MEDICINE

## 2020-04-08 PROCEDURE — 96377 APPLICATON ON-BODY INJECTOR: CPT

## 2020-04-08 PROCEDURE — 63600175 PHARM REV CODE 636 W HCPCS: Mod: JG | Performed by: INTERNAL MEDICINE

## 2020-04-08 PROCEDURE — A4216 STERILE WATER/SALINE, 10 ML: HCPCS | Performed by: INTERNAL MEDICINE

## 2020-04-08 RX ORDER — HEPARIN 100 UNIT/ML
500 SYRINGE INTRAVENOUS
Status: DISCONTINUED | OUTPATIENT
Start: 2020-04-08 | End: 2020-04-08 | Stop reason: HOSPADM

## 2020-04-08 RX ORDER — SODIUM CHLORIDE 0.9 % (FLUSH) 0.9 %
10 SYRINGE (ML) INJECTION
Status: DISCONTINUED | OUTPATIENT
Start: 2020-04-08 | End: 2020-04-08 | Stop reason: HOSPADM

## 2020-04-08 RX ADMIN — PEGFILGRASTIM 6 MG: KIT SUBCUTANEOUS at 11:04

## 2020-04-08 RX ADMIN — Medication 10 ML: at 11:04

## 2020-04-08 RX ADMIN — HEPARIN 500 UNITS: 100 SYRINGE at 11:04

## 2020-04-08 NOTE — NURSING
.Pt here for 5FU continuous pump d/c. Pump stopped and disconnected.  Existing dressing appeared clean and intact.  __R__ chestwall mediport  Flushed with 10ml NS and 5 ml heparin solution.  Needle D/C, site without redness, swelling, or drainage noted.  Dressing applied.  Patient tolerated well.  Patient to return to clinic in 2 weeks.

## 2020-04-14 DIAGNOSIS — Z03.818 ENCOUNTER FOR OBSERVATION FOR SUSPECTED EXPOSURE TO OTHER BIOLOGICAL AGENTS RULED OUT: Primary | ICD-10-CM

## 2020-04-14 DIAGNOSIS — C20 RECTAL ADENOCARCINOMA: ICD-10-CM

## 2020-04-16 ENCOUNTER — LAB VISIT (OUTPATIENT)
Dept: LAB | Facility: HOSPITAL | Age: 70
End: 2020-04-16
Attending: INTERNAL MEDICINE
Payer: MEDICARE

## 2020-04-16 DIAGNOSIS — Z03.818 ENCOUNTER FOR OBSERVATION FOR SUSPECTED EXPOSURE TO OTHER BIOLOGICAL AGENTS RULED OUT: ICD-10-CM

## 2020-04-16 DIAGNOSIS — C20 RECTAL ADENOCARCINOMA: ICD-10-CM

## 2020-04-17 ENCOUNTER — TELEPHONE (OUTPATIENT)
Dept: SURGERY | Facility: CLINIC | Age: 70
End: 2020-04-17

## 2020-04-17 NOTE — TELEPHONE ENCOUNTER
"Pt states on the 8th she noticed what looked like a "Small pimple with pus at the top, by the surgical site. So I started cleaning the site real good then put a band aid over it. This morning it had gotten so big I finally squeezed it and all the pus came out. It looks a lot better now."  Advised pt to continue keeping the site clean but do not cover it with a band aid as that will cause the area to fester. Advised that is she sees the site is getting worse,  and does not continue looking and getting better, to let us know. - Pt verbalized understanding     ----- Message from Iveth Bassett LPN sent at 4/17/2020 11:03 AM CDT -----  Patient is complaining of an infection at surgical site.    Iveth Painter   ----- Message -----  From: Ana David  Sent: 4/17/2020   9:51 AM CDT  To: Dwight BERMUDE[facility] Staff    ..Type:  Needs Medical Advice    Who Called: pt   Symptoms (please be specific): staph  How long has patient had these symptoms:  2 weeks   Pharmacy name and phone #:  .  [facility]/pharmacy #[id number] - [place], [place] - [address]  [address]  [place] [address]  Phone: [phone] Fax: [phone]        Would the patient rather a call back or a response via [facility]? Call back   Best Call Back Number: [phone]  Additional Information: Pt is requesting a call form nurse to discuss she got a staph infection after treatment       "

## 2020-04-20 ENCOUNTER — LAB VISIT (OUTPATIENT)
Dept: LAB | Facility: HOSPITAL | Age: 70
End: 2020-04-20
Attending: INTERNAL MEDICINE
Payer: MEDICARE

## 2020-04-20 ENCOUNTER — OFFICE VISIT (OUTPATIENT)
Dept: HEMATOLOGY/ONCOLOGY | Facility: CLINIC | Age: 70
End: 2020-04-20
Payer: MEDICARE

## 2020-04-20 VITALS
RESPIRATION RATE: 16 BRPM | TEMPERATURE: 98 F | OXYGEN SATURATION: 97 % | HEIGHT: 58 IN | HEART RATE: 69 BPM | BODY MASS INDEX: 24.85 KG/M2 | WEIGHT: 118.38 LBS | DIASTOLIC BLOOD PRESSURE: 75 MMHG | SYSTOLIC BLOOD PRESSURE: 118 MMHG

## 2020-04-20 DIAGNOSIS — L03.311 CELLULITIS OF ABDOMINAL WALL: Primary | ICD-10-CM

## 2020-04-20 DIAGNOSIS — D63.0 ANEMIA IN NEOPLASTIC DISEASE: ICD-10-CM

## 2020-04-20 DIAGNOSIS — C20 RECTAL ADENOCARCINOMA: ICD-10-CM

## 2020-04-20 DIAGNOSIS — C18.9 COLON ADENOCARCINOMA: ICD-10-CM

## 2020-04-20 DIAGNOSIS — R04.0 EPISTAXIS: ICD-10-CM

## 2020-04-20 DIAGNOSIS — D69.6 THROMBOCYTOPENIA: ICD-10-CM

## 2020-04-20 DIAGNOSIS — Z79.01 CHRONIC ANTICOAGULATION: ICD-10-CM

## 2020-04-20 LAB
ALBUMIN SERPL BCP-MCNC: 4.1 G/DL (ref 3.5–5.2)
ALP SERPL-CCNC: 157 U/L (ref 55–135)
ALT SERPL W/O P-5'-P-CCNC: 25 U/L (ref 10–44)
ANION GAP SERPL CALC-SCNC: 11 MMOL/L (ref 8–16)
AST SERPL-CCNC: 26 U/L (ref 10–40)
BASOPHILS # BLD AUTO: ABNORMAL K/UL (ref 0–0.2)
BASOPHILS NFR BLD: 0 % (ref 0–1.9)
BILIRUB SERPL-MCNC: 0.2 MG/DL (ref 0.1–1)
BUN SERPL-MCNC: 11 MG/DL (ref 8–23)
CALCIUM SERPL-MCNC: 9.6 MG/DL (ref 8.7–10.5)
CHLORIDE SERPL-SCNC: 101 MMOL/L (ref 95–110)
CO2 SERPL-SCNC: 29 MMOL/L (ref 23–29)
CREAT SERPL-MCNC: 0.7 MG/DL (ref 0.5–1.4)
DIFFERENTIAL METHOD: ABNORMAL
EOSINOPHIL # BLD AUTO: ABNORMAL K/UL (ref 0–0.5)
EOSINOPHIL NFR BLD: 0 % (ref 0–8)
ERYTHROCYTE [DISTWIDTH] IN BLOOD BY AUTOMATED COUNT: 14.1 % (ref 11.5–14.5)
EST. GFR  (AFRICAN AMERICAN): >60 ML/MIN/1.73 M^2
EST. GFR  (NON AFRICAN AMERICAN): >60 ML/MIN/1.73 M^2
GLUCOSE SERPL-MCNC: 96 MG/DL (ref 70–110)
HCT VFR BLD AUTO: 38.3 % (ref 37–48.5)
HGB BLD-MCNC: 11.9 G/DL (ref 12–16)
IMM GRANULOCYTES # BLD AUTO: ABNORMAL K/UL (ref 0–0.04)
IMM GRANULOCYTES NFR BLD AUTO: ABNORMAL % (ref 0–0.5)
LYMPHOCYTES # BLD AUTO: ABNORMAL K/UL (ref 1–4.8)
LYMPHOCYTES NFR BLD: 13 % (ref 18–48)
MCH RBC QN AUTO: 31.9 PG (ref 27–31)
MCHC RBC AUTO-ENTMCNC: 31.1 G/DL (ref 32–36)
MCV RBC AUTO: 103 FL (ref 82–98)
MONOCYTES # BLD AUTO: ABNORMAL K/UL (ref 0.3–1)
MONOCYTES NFR BLD: 6 % (ref 4–15)
MYELOCYTES NFR BLD MANUAL: 4 %
NEUTROPHILS NFR BLD: 72 % (ref 38–73)
NEUTS BAND NFR BLD MANUAL: 5 %
NRBC BLD-RTO: 1 /100 WBC
PLATELET # BLD AUTO: 140 K/UL (ref 150–350)
PMV BLD AUTO: 10.8 FL (ref 9.2–12.9)
POTASSIUM SERPL-SCNC: 3.6 MMOL/L (ref 3.5–5.1)
PROT SERPL-MCNC: 7.2 G/DL (ref 6–8.4)
RBC # BLD AUTO: 3.73 M/UL (ref 4–5.4)
SODIUM SERPL-SCNC: 141 MMOL/L (ref 136–145)
WBC # BLD AUTO: 13.23 K/UL (ref 3.9–12.7)

## 2020-04-20 PROCEDURE — 99215 OFFICE O/P EST HI 40 MIN: CPT | Mod: S$GLB,,, | Performed by: INTERNAL MEDICINE

## 2020-04-20 PROCEDURE — 1126F PR PAIN SEVERITY QUANTIFIED, NO PAIN PRESENT: ICD-10-PCS | Mod: S$GLB,,, | Performed by: INTERNAL MEDICINE

## 2020-04-20 PROCEDURE — 99215 PR OFFICE/OUTPT VISIT, EST, LEVL V, 40-54 MIN: ICD-10-PCS | Mod: S$GLB,,, | Performed by: INTERNAL MEDICINE

## 2020-04-20 PROCEDURE — 3074F PR MOST RECENT SYSTOLIC BLOOD PRESSURE < 130 MM HG: ICD-10-PCS | Mod: CPTII,S$GLB,, | Performed by: INTERNAL MEDICINE

## 2020-04-20 PROCEDURE — 1101F PT FALLS ASSESS-DOCD LE1/YR: CPT | Mod: CPTII,S$GLB,, | Performed by: INTERNAL MEDICINE

## 2020-04-20 PROCEDURE — 1101F PR PT FALLS ASSESS DOC 0-1 FALLS W/OUT INJ PAST YR: ICD-10-PCS | Mod: CPTII,S$GLB,, | Performed by: INTERNAL MEDICINE

## 2020-04-20 PROCEDURE — 99999 PR PBB SHADOW E&M-EST. PATIENT-LVL V: ICD-10-PCS | Mod: PBBFAC,,, | Performed by: INTERNAL MEDICINE

## 2020-04-20 PROCEDURE — 3078F PR MOST RECENT DIASTOLIC BLOOD PRESSURE < 80 MM HG: ICD-10-PCS | Mod: CPTII,S$GLB,, | Performed by: INTERNAL MEDICINE

## 2020-04-20 PROCEDURE — 99999 PR PBB SHADOW E&M-EST. PATIENT-LVL V: CPT | Mod: PBBFAC,,, | Performed by: INTERNAL MEDICINE

## 2020-04-20 PROCEDURE — 1159F PR MEDICATION LIST DOCUMENTED IN MEDICAL RECORD: ICD-10-PCS | Mod: S$GLB,,, | Performed by: INTERNAL MEDICINE

## 2020-04-20 PROCEDURE — 3078F DIAST BP <80 MM HG: CPT | Mod: CPTII,S$GLB,, | Performed by: INTERNAL MEDICINE

## 2020-04-20 PROCEDURE — 1126F AMNT PAIN NOTED NONE PRSNT: CPT | Mod: S$GLB,,, | Performed by: INTERNAL MEDICINE

## 2020-04-20 PROCEDURE — 36415 COLL VENOUS BLD VENIPUNCTURE: CPT

## 2020-04-20 PROCEDURE — 85027 COMPLETE CBC AUTOMATED: CPT

## 2020-04-20 PROCEDURE — 80053 COMPREHEN METABOLIC PANEL: CPT

## 2020-04-20 PROCEDURE — 1159F MED LIST DOCD IN RCRD: CPT | Mod: S$GLB,,, | Performed by: INTERNAL MEDICINE

## 2020-04-20 PROCEDURE — 85007 BL SMEAR W/DIFF WBC COUNT: CPT

## 2020-04-20 PROCEDURE — 3074F SYST BP LT 130 MM HG: CPT | Mod: CPTII,S$GLB,, | Performed by: INTERNAL MEDICINE

## 2020-04-20 RX ORDER — HEPARIN 100 UNIT/ML
500 SYRINGE INTRAVENOUS
Status: CANCELLED | OUTPATIENT
Start: 2020-04-28

## 2020-04-20 RX ORDER — FLUOROURACIL 50 MG/ML
400 INJECTION, SOLUTION INTRAVENOUS
Status: CANCELLED | OUTPATIENT
Start: 2020-04-28

## 2020-04-20 RX ORDER — SODIUM CHLORIDE 0.9 % (FLUSH) 0.9 %
10 SYRINGE (ML) INJECTION
Status: CANCELLED | OUTPATIENT
Start: 2020-04-28

## 2020-04-20 RX ORDER — SODIUM CHLORIDE 0.9 % (FLUSH) 0.9 %
10 SYRINGE (ML) INJECTION
Status: CANCELLED | OUTPATIENT
Start: 2020-04-30

## 2020-04-20 RX ORDER — CEPHALEXIN 500 MG/1
500 CAPSULE ORAL 4 TIMES DAILY
Qty: 40 CAPSULE | Refills: 0 | Status: SHIPPED | OUTPATIENT
Start: 2020-04-20 | End: 2020-04-30

## 2020-04-20 RX ORDER — EPINEPHRINE 0.3 MG/.3ML
0.3 INJECTION SUBCUTANEOUS ONCE AS NEEDED
Status: CANCELLED | OUTPATIENT
Start: 2020-04-28

## 2020-04-20 RX ORDER — DIPHENHYDRAMINE HYDROCHLORIDE 50 MG/ML
50 INJECTION INTRAMUSCULAR; INTRAVENOUS ONCE AS NEEDED
Status: CANCELLED | OUTPATIENT
Start: 2020-04-28

## 2020-04-20 RX ORDER — HEPARIN 100 UNIT/ML
500 SYRINGE INTRAVENOUS
Status: CANCELLED | OUTPATIENT
Start: 2020-04-30

## 2020-04-20 NOTE — PROGRESS NOTES
Subjective:      DATE OF VISIT: 4/20/2020   ?    ?   Patient ID:?Adalgisa Wright is a 69 y.o. female.?? MR#: [id number]   ?   PRIMARY PROVIDER: Dr. Quintanilla    CHIEF COMPLAINT:  Follow-up  ?   ONCOLOGIC DIAGNOSIS: Rectal adenocarcinoma, Stage III, pT3 pN0 pM0  ?   CURRENT TREATMENT: FOLFOX, C1D1 3/23/20    HPI    Ms. Wright was previously seen by my colleague Dr. Haq last on 08/21/2019 for iron deficiency anemia.  She has a mechanical aortic valve replacement 16 years ago on therapeutic anticoagulation.  Labs notable for low haptoglobin in it is felt hemolytic process may be contributing to her anemia.  She notes having recent EKG as part of preoperative evaluation but has not had follow-up with her cardiologist recently or repeat echocardiogram.  She denies edema, shortness of breath or chest pain.    She was referred to Gastroenterology for further evaluation of iron deficiency anemia and concern for GI bleed.     11/7/19 EGD and colonoscopy revealed a nonobstructing ulcerated mass 20 cm from the anal verge. Pathology:  Moderately differentiated adenocarcinoma, MSI intact.    12/02/2019 PET-CT notable for avid sigmoid lesion SUV 15.7 as well as avid right lobe liver lesion 19 mm, SUV 4.1.  No other areas of avidity concerning for metastatic disease.    12/9/19 liver biopsy, Pathology:  Diagnosis 1.  Liver mass, biopsy:   - Not diagnostic of malignancy   - Scant atypical liver sampling, see comments     Comments: The sections show a scant fragmented sample of liver tissue with   psuedoglands in one core and mild architectural distortion. Cytologic atypiia   is not prominent. Reticulin stain shows mildly irregular pattern of staining.   No portal tracts present for evaluation. Back to back pseudoglands raises the   possibly of a well differetniated hepatocellular lesion. However, the scant   sample limits interpretation. Due to the presence of a clinical mass.   Resampling is indicated.      02/13/2020 surgical  resection of sigmoid and upper rectum  Pathology:  Adenocarcinoma, moderately differentiated, 2.5 x 2 cm, margins negative, 6 of 14 lymph nodes positive for metastatic adenocarcinoma, many tumor deposits and mesenteric tissue, pT3 pN2, MSI intact    03/23/2020 cycle 1 day 1 FOLFOX     INTERVAL EVENTS    Over the last week she developed boil on lower right abdomen for which she is using topical bacitracin ointment.  Area is warm with associated tenderness.  It has been draining.  No fever or chills.  She is otherwise feeling well with good energy and has been very active at home.  No notable side effects from treatment.  Prior trace nosebleeds have resolved.    Review of Systems    ?   A comprehensive 14-point review of systems was reviewed with patient and was negative other than as specified above.   ?     Objective:      Physical Exam      ?   Vitals:    04/20/20 0907   BP: 118/75   Pulse: 69   Resp: 16   Temp: 97.7 °F (36.5 °C)        ECOG:?0   General appearance: Generally well appearing, in no acute distress, wearing mask.   Head, eyes, ears, nose, and throat: moist mucous membranes.   Respiratory:  Normal work of breathing   Extremities: Warm, without edema.   Neurologic: Alert and oriented. Grossly normal strength, coordination, and gait.   Skin:  2 cm round open skin lesion with warmth no exudate, mild tenderness to palpation.  Psychiatric:  Normal mood and affect.    ?   Laboratory:  ?   Lab Visit on 04/20/2020   Component Date Value Ref Range Status    WBC 04/20/2020 13.23* 3.90 - 12.70 K/uL Final    RBC 04/20/2020 3.73* 4.00 - 5.40 M/uL Final    Hemoglobin 04/20/2020 11.9* 12.0 - 16.0 g/dL Final    Hematocrit 04/20/2020 38.3  37.0 - 48.5 % Final    Mean Corpuscular Volume 04/20/2020 103* 82 - 98 fL Final    Mean Corpuscular Hemoglobin 04/20/2020 31.9* 27.0 - 31.0 pg Final    Mean Corpuscular Hemoglobin Conc 04/20/2020 31.1* 32.0 - 36.0 g/dL Final    RDW 04/20/2020 14.1  11.5 - 14.5 % Final     Platelets 04/20/2020 140* 150 - 350 K/uL Final    MPV 04/20/2020 10.8  9.2 - 12.9 fL Final      CMP  Sodium   Date Value Ref Range Status   04/16/2020 140 136 - 145 mmol/L Final     Potassium   Date Value Ref Range Status   04/16/2020 3.7 3.5 - 5.1 mmol/L Final     Chloride   Date Value Ref Range Status   04/16/2020 102 95 - 110 mmol/L Final     CO2   Date Value Ref Range Status   04/16/2020 26 23 - 29 mmol/L Final     Glucose   Date Value Ref Range Status   04/16/2020 104 70 - 110 mg/dL Final     BUN, Bld   Date Value Ref Range Status   04/16/2020 17 8 - 23 mg/dL Final     Creatinine   Date Value Ref Range Status   04/16/2020 0.7 0.5 - 1.4 mg/dL Final     Calcium   Date Value Ref Range Status   04/16/2020 9.4 8.7 - 10.5 mg/dL Final     Total Protein   Date Value Ref Range Status   04/16/2020 6.7 6.0 - 8.4 g/dL Final     Albumin   Date Value Ref Range Status   04/16/2020 4.2 3.5 - 5.2 g/dL Final     Total Bilirubin   Date Value Ref Range Status   04/16/2020 0.3 0.1 - 1.0 mg/dL Final     Comment:     For infants and newborns, interpretation of results should be based  on gestational age, weight and in agreement with clinical  observations.  Premature Infant recommended reference ranges:  Up to 24 hours.............<8.0 mg/dL  Up to 48 hours............<12.0 mg/dL  3-5 days..................<15.0 mg/dL  6-29 days.................<15.0 mg/dL       Alkaline Phosphatase   Date Value Ref Range Status   04/16/2020 155 (H) 55 - 135 U/L Final     AST   Date Value Ref Range Status   04/16/2020 17 10 - 40 U/L Final     ALT   Date Value Ref Range Status   04/16/2020 19 10 - 44 U/L Final     Anion Gap   Date Value Ref Range Status   04/16/2020 12 8 - 16 mmol/L Final     eGFR if    Date Value Ref Range Status   04/16/2020 >60 >60 mL/min/1.73 m^2 Final     eGFR if non    Date Value Ref Range Status   04/16/2020 >60 >60 mL/min/1.73 m^2 Final     Comment:     Calculation used to obtain the estimated  glomerular filtration  rate (eGFR) is the CKD-EPI equation.            Tumor markers    CEA    CEA   Date Value Ref Range Status   11/18/2019 3.5 0.0 - 5.0 ng/mL Final     Comment:     CEA Normal Range:  Non-Smokers: 0-3.0 ng/mL  Smokers:     0-5.0 ng/mL              ? IMAGING  11/21/19  CT CHEST ABDOMEN PELVIS WITH CONTRAST (XPD)    CLINICAL HISTORY:  Malignant neoplasm of rectosigmoid junctionknown rectosigmoid cancer, eval for metastatic disease;    COMPARISON:  None    FINDINGS:  Chest:    The heart is enlarged.  There is no evidence of mediastinal or hilar or axillary lymphadenopathy.  Patient has had a prior median sternotomy.  There are no lung nodules.  No pleural effusions.    Skeletal structures are intact.    Abdomen pelvis:    There appears to be a subtle hypodense lesion involving the right lobe of the liver measuring about 1.7 cm.  There may be a smaller hypodensity more anteriorly measuring 8 mm.    Pancreas is unremarkable the spleen is unremarkable.    The gallbladder is unremarkable.  There is no bile duct dilatation.    The kidneys are normal.    The aorta and inferior vena cava are unremarkable.    There are no acute bowel abnormalities.     No evidence of appendicitis.  No evidence of diverticulitis.    Bladder is normal. No abnormal masses or fluid collections in the pelvis.  No definite pelvic lymphadenopathy.  No abdominal retroperitoneal lymph node enlargement.    There are few degenerative changes seen involving the lumbar spine as well as a few mild compression fractures of the thoracic spine which appear chronic.      Impression       No evidence of metastatic disease involving the chest.  Findings suspicious for a liver mass possibly related to a metastasis.  Further evaluation recommended with a PET scan or MRI of the liver.    There appear to be a few compression fractures of the thoracic spine which are chronic.  No definite metastatic disease involving the skeleton.       ?    Assessment/Plan:       1. Cellulitis of abdominal wall    2. Rectal adenocarcinoma    3. Epistaxis    4. Chronic anticoagulation    5. Anemia in neoplastic disease    6. Thrombocytopenia          Plan:     # rectal adenocarcinoma, pT3 pN2 pM0, Stage III, MSI stable:  Initiating adjuvant chemotherapy C1D1 3/23/20.  She has tolerated therapy well with mild stable anemia and thrombocytopenia.  We will need to hold today due to skin infection, see below.  Will have her return in 1 week to re-evaluate.    # Cellulitis of abdominal wall:  No active draining/pr a Linzess or fever.  Recommended oral antibiotic treatment with close monitoring.  She does have possible penicillin allergy as a baby with rash.  Prescribed Keflex and will have her return in 1 week.  Discussed importance of    # chronic anticoagulation with a  mechanical aortic valve:  On chronic anticoagulation due to mechanical valve.  INR checked with outside cardiology and asked her to reach out to see if she would like to use [facility] clinic instead.  She will let us know.    # epistaxis:  Resolved, continue to monitor.  #  mild normocytic anemia: IV iron for 2 doses, completed 12/04/2019.  Repeat labs with resolution of anemia and now mild worsening likely related to antineoplastic therapy.  Continue to monitor throughout therapy.     # insomnia:  Recommended trial of melatonin over-the-counter and good sleep hygiene.  Dexamethasone on days 2 and 3 May be contributing and advised taking early in the morning so as not to disturb sleep.    Follow-Up:   Holding treatment today  Revisit in 1 week for re-evaluation possible chemotherapy and labs.

## 2020-04-24 DIAGNOSIS — Z03.818 ENCOUNTER FOR OBSERVATION FOR SUSPECTED EXPOSURE TO OTHER BIOLOGICAL AGENTS RULED OUT: Primary | ICD-10-CM

## 2020-04-27 ENCOUNTER — LAB VISIT (OUTPATIENT)
Dept: OTOLARYNGOLOGY | Facility: CLINIC | Age: 70
End: 2020-04-27
Payer: MEDICARE

## 2020-04-27 DIAGNOSIS — Z03.818 ENCOUNTER FOR OBSERVATION FOR SUSPECTED EXPOSURE TO OTHER BIOLOGICAL AGENTS RULED OUT: ICD-10-CM

## 2020-04-27 PROCEDURE — U0002 COVID-19 LAB TEST NON-CDC: HCPCS

## 2020-04-28 ENCOUNTER — INFUSION (OUTPATIENT)
Dept: INFUSION THERAPY | Facility: HOSPITAL | Age: 70
End: 2020-04-28
Attending: INTERNAL MEDICINE
Payer: MEDICARE

## 2020-04-28 ENCOUNTER — OFFICE VISIT (OUTPATIENT)
Dept: HEMATOLOGY/ONCOLOGY | Facility: CLINIC | Age: 70
End: 2020-04-28
Payer: MEDICARE

## 2020-04-28 VITALS
DIASTOLIC BLOOD PRESSURE: 72 MMHG | OXYGEN SATURATION: 98 % | SYSTOLIC BLOOD PRESSURE: 126 MMHG | WEIGHT: 118.19 LBS | HEART RATE: 72 BPM | TEMPERATURE: 98 F | RESPIRATION RATE: 16 BRPM | BODY MASS INDEX: 24.81 KG/M2 | HEIGHT: 58 IN

## 2020-04-28 VITALS
HEART RATE: 70 BPM | TEMPERATURE: 98 F | SYSTOLIC BLOOD PRESSURE: 137 MMHG | WEIGHT: 118.19 LBS | DIASTOLIC BLOOD PRESSURE: 71 MMHG | BODY MASS INDEX: 24.7 KG/M2 | OXYGEN SATURATION: 97 %

## 2020-04-28 DIAGNOSIS — L03.311 CELLULITIS OF ABDOMINAL WALL: ICD-10-CM

## 2020-04-28 DIAGNOSIS — C18.9 COLON ADENOCARCINOMA: ICD-10-CM

## 2020-04-28 DIAGNOSIS — D63.0 ANEMIA IN NEOPLASTIC DISEASE: ICD-10-CM

## 2020-04-28 DIAGNOSIS — Z79.01 CHRONIC ANTICOAGULATION: ICD-10-CM

## 2020-04-28 DIAGNOSIS — Z03.818 ENCOUNTER FOR OBSERVATION FOR SUSPECTED EXPOSURE TO OTHER BIOLOGICAL AGENTS RULED OUT: Primary | ICD-10-CM

## 2020-04-28 DIAGNOSIS — C18.9 COLON ADENOCARCINOMA: Primary | ICD-10-CM

## 2020-04-28 LAB — SARS-COV-2 RNA RESP QL NAA+PROBE: NOT DETECTED

## 2020-04-28 PROCEDURE — 99215 PR OFFICE/OUTPT VISIT, EST, LEVL V, 40-54 MIN: ICD-10-PCS | Mod: S$GLB,,, | Performed by: INTERNAL MEDICINE

## 2020-04-28 PROCEDURE — 96368 THER/DIAG CONCURRENT INF: CPT

## 2020-04-28 PROCEDURE — 96411 CHEMO IV PUSH ADDL DRUG: CPT

## 2020-04-28 PROCEDURE — 99999 PR PBB SHADOW E&M-EST. PATIENT-LVL III: CPT | Mod: PBBFAC,,, | Performed by: INTERNAL MEDICINE

## 2020-04-28 PROCEDURE — 1126F AMNT PAIN NOTED NONE PRSNT: CPT | Mod: S$GLB,,, | Performed by: INTERNAL MEDICINE

## 2020-04-28 PROCEDURE — 96413 CHEMO IV INFUSION 1 HR: CPT

## 2020-04-28 PROCEDURE — 1101F PR PT FALLS ASSESS DOC 0-1 FALLS W/OUT INJ PAST YR: ICD-10-PCS | Mod: CPTII,S$GLB,, | Performed by: INTERNAL MEDICINE

## 2020-04-28 PROCEDURE — 25000003 PHARM REV CODE 250: Performed by: INTERNAL MEDICINE

## 2020-04-28 PROCEDURE — 99215 OFFICE O/P EST HI 40 MIN: CPT | Mod: S$GLB,,, | Performed by: INTERNAL MEDICINE

## 2020-04-28 PROCEDURE — 3075F PR MOST RECENT SYSTOLIC BLOOD PRESS GE 130-139MM HG: ICD-10-PCS | Mod: CPTII,S$GLB,, | Performed by: INTERNAL MEDICINE

## 2020-04-28 PROCEDURE — 63600175 PHARM REV CODE 636 W HCPCS: Performed by: INTERNAL MEDICINE

## 2020-04-28 PROCEDURE — 1101F PT FALLS ASSESS-DOCD LE1/YR: CPT | Mod: CPTII,S$GLB,, | Performed by: INTERNAL MEDICINE

## 2020-04-28 PROCEDURE — 96415 CHEMO IV INFUSION ADDL HR: CPT

## 2020-04-28 PROCEDURE — 3078F DIAST BP <80 MM HG: CPT | Mod: CPTII,S$GLB,, | Performed by: INTERNAL MEDICINE

## 2020-04-28 PROCEDURE — A4216 STERILE WATER/SALINE, 10 ML: HCPCS | Performed by: INTERNAL MEDICINE

## 2020-04-28 PROCEDURE — 1159F MED LIST DOCD IN RCRD: CPT | Mod: S$GLB,,, | Performed by: INTERNAL MEDICINE

## 2020-04-28 PROCEDURE — 3078F PR MOST RECENT DIASTOLIC BLOOD PRESSURE < 80 MM HG: ICD-10-PCS | Mod: CPTII,S$GLB,, | Performed by: INTERNAL MEDICINE

## 2020-04-28 PROCEDURE — 99999 PR PBB SHADOW E&M-EST. PATIENT-LVL III: ICD-10-PCS | Mod: PBBFAC,,, | Performed by: INTERNAL MEDICINE

## 2020-04-28 PROCEDURE — 3075F SYST BP GE 130 - 139MM HG: CPT | Mod: CPTII,S$GLB,, | Performed by: INTERNAL MEDICINE

## 2020-04-28 PROCEDURE — 96416 CHEMO PROLONG INFUSE W/PUMP: CPT

## 2020-04-28 PROCEDURE — 1159F PR MEDICATION LIST DOCUMENTED IN MEDICAL RECORD: ICD-10-PCS | Mod: S$GLB,,, | Performed by: INTERNAL MEDICINE

## 2020-04-28 PROCEDURE — 1126F PR PAIN SEVERITY QUANTIFIED, NO PAIN PRESENT: ICD-10-PCS | Mod: S$GLB,,, | Performed by: INTERNAL MEDICINE

## 2020-04-28 PROCEDURE — 96367 TX/PROPH/DG ADDL SEQ IV INF: CPT

## 2020-04-28 RX ORDER — SODIUM CHLORIDE 0.9 % (FLUSH) 0.9 %
10 SYRINGE (ML) INJECTION
Status: DISCONTINUED | OUTPATIENT
Start: 2020-04-28 | End: 2020-04-28 | Stop reason: HOSPADM

## 2020-04-28 RX ORDER — FLUOROURACIL 50 MG/ML
400 INJECTION, SOLUTION INTRAVENOUS
Status: COMPLETED | OUTPATIENT
Start: 2020-04-28 | End: 2020-04-28

## 2020-04-28 RX ADMIN — DEXTROSE MONOHYDRATE: 50 INJECTION, SOLUTION INTRAVENOUS at 11:04

## 2020-04-28 RX ADMIN — FLUOROURACIL 590 MG: 50 INJECTION, SOLUTION INTRAVENOUS at 01:04

## 2020-04-28 RX ADMIN — LEUCOVORIN CALCIUM 590 MG: 500 INJECTION, POWDER, LYOPHILIZED, FOR SOLUTION INTRAMUSCULAR; INTRAVENOUS at 11:04

## 2020-04-28 RX ADMIN — OXALIPLATIN 125 MG: 5 INJECTION, SOLUTION INTRAVENOUS at 11:04

## 2020-04-28 RX ADMIN — PALONOSETRON HYDROCHLORIDE: 0.25 INJECTION, SOLUTION INTRAVENOUS at 11:04

## 2020-04-28 RX ADMIN — SODIUM CHLORIDE 3530 MG: 9 INJECTION, SOLUTION INTRAVENOUS at 02:04

## 2020-04-28 RX ADMIN — SODIUM CHLORIDE, PRESERVATIVE FREE 10 ML: 5 INJECTION INTRAVENOUS at 10:04

## 2020-04-28 RX ADMIN — SODIUM CHLORIDE: 9 INJECTION, SOLUTION INTRAVENOUS at 10:04

## 2020-04-28 NOTE — PROGRESS NOTES
Subjective:      DATE OF VISIT: 4/28/2020   ?    ?   Patient ID:?Adalgisa Wright is a 69 y.o. female.?? MR#: [id number]   ?   PRIMARY PROVIDER: Dr. Quintanilla    CHIEF COMPLAINT:  Follow-up  ?   ONCOLOGIC DIAGNOSIS: Rectal adenocarcinoma, Stage III, pT3 pN0 pM0  ?   CURRENT TREATMENT: FOLFOX, C1D1 3/23/20    HPI    Ms. Wright was previously seen by my colleague Dr. Haq last on 08/21/2019 for iron deficiency anemia.  She has a mechanical aortic valve replacement 16 years ago on therapeutic anticoagulation.  Labs notable for low haptoglobin in it is felt hemolytic process may be contributing to her anemia.  She notes having recent EKG as part of preoperative evaluation but has not had follow-up with her cardiologist recently or repeat echocardiogram.  She denies edema, shortness of breath or chest pain.    She was referred to Gastroenterology for further evaluation of iron deficiency anemia and concern for GI bleed.     11/7/19 EGD and colonoscopy revealed a nonobstructing ulcerated mass 20 cm from the anal verge. Pathology:  Moderately differentiated adenocarcinoma, MSI intact.    12/02/2019 PET-CT notable for avid sigmoid lesion SUV 15.7 as well as avid right lobe liver lesion 19 mm, SUV 4.1.  No other areas of avidity concerning for metastatic disease.    12/9/19 liver biopsy, Pathology:  Diagnosis 1.  Liver mass, biopsy:   - Not diagnostic of malignancy   - Scant atypical liver sampling, see comments     Comments: The sections show a scant fragmented sample of liver tissue with   psuedoglands in one core and mild architectural distortion. Cytologic atypiia   is not prominent. Reticulin stain shows mildly irregular pattern of staining.   No portal tracts present for evaluation. Back to back pseudoglands raises the   possibly of a well differetniated hepatocellular lesion. However, the scant   sample limits interpretation. Due to the presence of a clinical mass.   Resampling is indicated.      02/13/2020 surgical  resection of sigmoid and upper rectum  Pathology:  Adenocarcinoma, moderately differentiated, 2.5 x 2 cm, margins negative, 6 of 14 lymph nodes positive for metastatic adenocarcinoma, many tumor deposits and mesenteric tissue, pT3 pN2, MSI intact    03/23/2020 cycle 1 day 1 FOLFOX     INTERVAL EVENTS    Treatment held last week due to cellulitis on abdomen which is improved on Keflex.  No fevers or chills.    Review of Systems    ?   A comprehensive 14-point review of systems was reviewed with patient and was negative other than as specified above.   ?     Objective:      Physical Exam      ?   Vitals:    04/28/20 0857   BP: 137/71   Pulse: 70   Temp: 97.7 °F (36.5 °C)        ECOG:?0   General appearance: Generally well appearing, in no acute distress, wearing mask.   Head, eyes, ears, nose, and throat: moist mucous membranes.   Respiratory:  Normal work of breathing   Extremities: Warm, without edema.   Neurologic: Alert and oriented. Grossly normal strength, coordination, and gait.   Skin:  Abdominal cellulitis with diminished erythema, no warmth or exudate or tenderness palpation  Psychiatric:  Normal mood and affect.    ?   Laboratory:  ?   Lab Visit on 04/28/2020   Component Date Value Ref Range Status    WBC 04/28/2020 4.38  3.90 - 12.70 K/uL Final    RBC 04/28/2020 3.66* 4.00 - 5.40 M/uL Final    Hemoglobin 04/28/2020 11.4* 12.0 - 16.0 g/dL Final    Hematocrit 04/28/2020 36.6* 37.0 - 48.5 % Final    Mean Corpuscular Volume 04/28/2020 100* 82 - 98 fL Final    Mean Corpuscular Hemoglobin 04/28/2020 31.1* 27.0 - 31.0 pg Final    Mean Corpuscular Hemoglobin Conc 04/28/2020 31.1* 32.0 - 36.0 g/dL Final    RDW 04/28/2020 14.6* 11.5 - 14.5 % Final    Platelets 04/28/2020 169  150 - 350 K/uL Final    MPV 04/28/2020 10.5  9.2 - 12.9 fL Final    Immature Granulocytes 04/28/2020 2.1* 0.0 - 0.5 % Final    Gran # (ANC) 04/28/2020 3.0  1.8 - 7.7 K/uL Final    Immature Grans (Abs) 04/28/2020 0.09* 0.00 - 0.04  K/uL Final    Lymph # 04/28/2020 0.8* 1.0 - 4.8 K/uL Final    Mono # 04/28/2020 0.5  0.3 - 1.0 K/uL Final    Eos # 04/28/2020 0.0  0.0 - 0.5 K/uL Final    Baso # 04/28/2020 0.06  0.00 - 0.20 K/uL Final    nRBC 04/28/2020 0  0 /100 WBC Final    Gran% 04/28/2020 67.9  38.0 - 73.0 % Final    Lymph% 04/28/2020 17.8* 18.0 - 48.0 % Final    Mono% 04/28/2020 10.3  4.0 - 15.0 % Final    Eosinophil% 04/28/2020 0.5  0.0 - 8.0 % Final    Basophil% 04/28/2020 1.4  0.0 - 1.9 % Final    Differential Method 04/28/2020 Automated   Final    Sodium 04/28/2020 143  136 - 145 mmol/L Final    Potassium 04/28/2020 3.4* 3.5 - 5.1 mmol/L Final    Chloride 04/28/2020 104  95 - 110 mmol/L Final    CO2 04/28/2020 28  23 - 29 mmol/L Final    Glucose 04/28/2020 81  70 - 110 mg/dL Final    BUN, Bld 04/28/2020 11  8 - 23 mg/dL Final    Creatinine 04/28/2020 0.7  0.5 - 1.4 mg/dL Final    Calcium 04/28/2020 9.4  8.7 - 10.5 mg/dL Final    Total Protein 04/28/2020 6.9  6.0 - 8.4 g/dL Final    Albumin 04/28/2020 3.8  3.5 - 5.2 g/dL Final    Total Bilirubin 04/28/2020 0.4  0.1 - 1.0 mg/dL Final    Alkaline Phosphatase 04/28/2020 110  55 - 135 U/L Final    AST 04/28/2020 21  10 - 40 U/L Final    ALT 04/28/2020 19  10 - 44 U/L Final    Anion Gap 04/28/2020 11  8 - 16 mmol/L Final    eGFR if African American 04/28/2020 >60  >60 mL/min/1.73 m^2 Final    eGFR if non African American 04/28/2020 >60  >60 mL/min/1.73 m^2 Final      CMP  Sodium   Date Value Ref Range Status   04/28/2020 143 136 - 145 mmol/L Final     Potassium   Date Value Ref Range Status   04/28/2020 3.4 (L) 3.5 - 5.1 mmol/L Final     Chloride   Date Value Ref Range Status   04/28/2020 104 95 - 110 mmol/L Final     CO2   Date Value Ref Range Status   04/28/2020 28 23 - 29 mmol/L Final     Glucose   Date Value Ref Range Status   04/28/2020 81 70 - 110 mg/dL Final     BUN, Bld   Date Value Ref Range Status   04/28/2020 11 8 - 23 mg/dL Final     Creatinine   Date  Value Ref Range Status   04/28/2020 0.7 0.5 - 1.4 mg/dL Final     Calcium   Date Value Ref Range Status   04/28/2020 9.4 8.7 - 10.5 mg/dL Final     Total Protein   Date Value Ref Range Status   04/28/2020 6.9 6.0 - 8.4 g/dL Final     Albumin   Date Value Ref Range Status   04/28/2020 3.8 3.5 - 5.2 g/dL Final     Total Bilirubin   Date Value Ref Range Status   04/28/2020 0.4 0.1 - 1.0 mg/dL Final     Comment:     For infants and newborns, interpretation of results should be based  on gestational age, weight and in agreement with clinical  observations.  Premature Infant recommended reference ranges:  Up to 24 hours.............<8.0 mg/dL  Up to 48 hours............<12.0 mg/dL  3-5 days..................<15.0 mg/dL  6-29 days.................<15.0 mg/dL       Alkaline Phosphatase   Date Value Ref Range Status   04/28/2020 110 55 - 135 U/L Final     AST   Date Value Ref Range Status   04/28/2020 21 10 - 40 U/L Final     ALT   Date Value Ref Range Status   04/28/2020 19 10 - 44 U/L Final     Anion Gap   Date Value Ref Range Status   04/28/2020 11 8 - 16 mmol/L Final     eGFR if    Date Value Ref Range Status   04/28/2020 >60 >60 mL/min/1.73 m^2 Final     eGFR if non    Date Value Ref Range Status   04/28/2020 >60 >60 mL/min/1.73 m^2 Final     Comment:     Calculation used to obtain the estimated glomerular filtration  rate (eGFR) is the CKD-EPI equation.            Tumor markers    CEA    CEA   Date Value Ref Range Status   11/18/2019 3.5 0.0 - 5.0 ng/mL Final     Comment:     CEA Normal Range:  Non-Smokers: 0-3.0 ng/mL  Smokers:     0-5.0 ng/mL              ? IMAGING  11/21/19  CT CHEST ABDOMEN PELVIS WITH CONTRAST (XPD)    CLINICAL HISTORY:  Malignant neoplasm of rectosigmoid junctionknown rectosigmoid cancer, eval for metastatic disease;    COMPARISON:  None    FINDINGS:  Chest:    The heart is enlarged.  There is no evidence of mediastinal or hilar or axillary lymphadenopathy.   Patient has had a prior median sternotomy.  There are no lung nodules.  No pleural effusions.    Skeletal structures are intact.    Abdomen pelvis:    There appears to be a subtle hypodense lesion involving the right lobe of the liver measuring about 1.7 cm.  There may be a smaller hypodensity more anteriorly measuring 8 mm.    Pancreas is unremarkable the spleen is unremarkable.    The gallbladder is unremarkable.  There is no bile duct dilatation.    The kidneys are normal.    The aorta and inferior vena cava are unremarkable.    There are no acute bowel abnormalities.     No evidence of appendicitis.  No evidence of diverticulitis.    Bladder is normal. No abnormal masses or fluid collections in the pelvis.  No definite pelvic lymphadenopathy.  No abdominal retroperitoneal lymph node enlargement.    There are few degenerative changes seen involving the lumbar spine as well as a few mild compression fractures of the thoracic spine which appear chronic.      Impression       No evidence of metastatic disease involving the chest.  Findings suspicious for a liver mass possibly related to a metastasis.  Further evaluation recommended with a PET scan or MRI of the liver.    There appear to be a few compression fractures of the thoracic spine which are chronic.  No definite metastatic disease involving the skeleton.       ?   Assessment/Plan:       1. Colon adenocarcinoma    2. Cellulitis of abdominal wall    3. Chronic anticoagulation    4. Anemia in neoplastic disease          Plan:     # rectal adenocarcinoma, pT3 pN2 pM0, Stage III, MSI stable:  Initiating adjuvant chemotherapy C1D1 3/23/20.  She has tolerated therapy well with mild stable anemia and thrombocytopenia.  Improvement of cellulitis and clinically well.  Labs reviewed and notable for resolution leukocytosis and okay to proceed with treatment today with close follow-up of cellulitis.    # Cellulitis of abdominal wall:  Significant improvement after week  of Keflex, recommended continuation complete course.  No fevers or other concerning signs of infection at this time will proceed with chemotherapy with close monitoring.  Expressed importance of calling area of cellulitis worsens or develops fever.    # chronic anticoagulation with a  mechanical aortic valve:  On chronic anticoagulation due to mechanical valve.      # epistaxis:  Resolved, continue to monitor.     #  mild normocytic anemia: IV iron for 2 doses, completed 12/04/2019.  Repeat labs with resolution of anemia and now mild worsening likely related to antineoplastic therapy.  Continue to monitor throughout therapy.     # insomnia:  Recommended trial of melatonin over-the-counter and good sleep hygiene.  Dexamethasone on days 2 and 3 May be contributing and advised taking early in the morning so as not to disturb sleep.    Follow-Up:   - FOLFOX today (delayed 1 week b/c celllulitis improved)  - RV in 2 weeks with labs and chemo

## 2020-04-28 NOTE — DISCHARGE INSTRUCTIONS
"[facility]  [address] [address]  or  [address]  [phone] phone     [phone] fax  Hours of Operation: Monday- Friday 8:00am- 5:00pm  After hours phone  [phone]  Hematology / Oncology Physicians on call      Dr. Bill Carey, CATRACHITA Mcdonald NP Tyesha Taylor, NP    Please call with any concerns regarding your appointment today.  Support Groups/Classes    Support groups and classes are being offered at the   [facility] and [facility]!!    "Cooking with Cancer" (Nutrition Class):  Second Wednesday of each month   at noon at the [facility].  Metastatic Support Group:  Third Tuesday of each month   at noon at the [facility].  Next Steps Class/Group: Second and fourth Thursday of each month at noon at the [facility].  [facility] (Breast Cancer Support Group): First Tuesday of each month   at 5:30pm at the [facility] location.  [employer]: [facility]: Second and third Tuesday of each month from 7:30am - 2pm.  [facility]: First and fourth Tuesday of each month from 7:30am - 2pm    If you are interested in attending or would like more information please ask our social workers or your nurse!  WAYS TO HELP PREVENT INFECTION         WASH YOUR HANDS OFTEN DURING THE DAY, ESPECIALLY BEFORE YOU EAT, AFTER USING THE BATHROOM, AND AFTER TOUCHING ANIMALS     STAY AWAY FROM PEOPLE WHO HAVE ILLNESSES YOU CAN CATCH; SUCH AS COLDS, FLU, CHICKEN POX     TRY TO AVOID CROWDS     STAY AWAY FROM CHILDREN WHO RECENTLY HAVE RECEIVED LIVE VIRUS VACCINES     MAINTAIN GOOD MOUTH CARE     DO NOT SQUEEZE OR SCRATCH PIMPLES     CLEAN CUTS & SCRAPES RIGHT AWAY AND DAILY UNTIL HEALED WITH WARM WATER, SOAP & AN ANTISEPTIC     AVOID CONTACT WITH LITTER BOXES, BIRD CAGES, & FISH TANKS     AVOID STANDING WATER, IE., BIRD BATHS, FLOWER POTS/VASES, OR HUMIDIFIERS     WEAR " GLOVES WHEN GARDENING OR CLEANING UP AFTER OTHERS, ESPECIALLY BABIES & SMALL CHILDREN     DO NOT EAT RAW FISH, SEAFOOD, MEAT, OR EGGS  FALL PREVENTION   Falls often occur due to slipping, tripping or losing your balance. Here are ways to reduce your risk of falling again.   Was there anything that caused your fall that can be fixed, removed or replaced?   Make your home safe by keeping walkways clear of objects you may trip over.   Use non-slip pads under rugs.   Do not walk in poorly lit areas.   Do not stand on chairs or wobbly ladders.   Use caution when reaching overhead or looking upward. This position can cause a loss of balance.   Be sure your shoes fit properly, have non-slip bottoms and are in good condition.   Be cautious when going up and down stairs, curbs, and when walking on uneven sidewalks.   If your balance is poor, consider using a cane or walker.   If your fall was related to alcohol use, stop or limit alcohol intake.   If your fall was related to use of sleeping medicines, talk to your doctor about this. You may need to reduce your dosage at bedtime if you awaken during the night to go to the bathroom.   To reduce the need for nighttime bathroom trips:   Avoid drinking fluids for several hours before going to bed   Empty your bladder before going to bed   Men can keep a urinal at the bedside   © 8786-4061 [facility], [address] [place] [address], [place] [address]. All rights reserved. This information is not intended as a substitute for professional medical care. Always follow your healthcare professional's instructions.

## 2020-04-28 NOTE — PLAN OF CARE
Problem: Anemia (Chemotherapy Effects)  Goal: Anemia Symptom Improvement  Outcome: Ongoing, Progressing     Problem: Urinary Bleeding Risk or Actual (Chemotherapy Effects)  Goal: Absence of Hematuria  Outcome: Ongoing, Progressing     Problem: Nausea and Vomiting (Chemotherapy Effects)  Goal: Fluid and Electrolyte Balance  Outcome: Ongoing, Progressing     Problem: Neutropenia (Chemotherapy Effects)  Goal: Absence of Infection  Outcome: Ongoing, Progressing     Problem: Oral Mucositis (Chemotherapy Effects)  Goal: Improved Oral Mucous Membrane Integrity  Outcome: Ongoing, Progressing     Problem: Thrombocytopenia Bleeding Risk (Chemotherapy Effects)  Goal: Absence of Bleeding  Outcome: Ongoing, Progressing     Problem: Adult Inpatient Plan of Care  Goal: Plan of Care Review  Outcome: Ongoing, Progressing  Goal: Patient-Specific Goal (Individualization)  Outcome: Ongoing, Progressing  Flowsheets (Taken 4/28/2020 1602)  Individualized Care Needs: feet elevated, warm blanket, plillow, and snack provided  Anxieties, Fears or Concerns: none  Patient-Specific Goals (Include Timeframe): To tolerate infusion today    Patient reports, feeling weak and tired today.

## 2020-04-30 ENCOUNTER — INFUSION (OUTPATIENT)
Dept: INFUSION THERAPY | Facility: HOSPITAL | Age: 70
End: 2020-04-30
Attending: INTERNAL MEDICINE
Payer: MEDICARE

## 2020-04-30 VITALS
RESPIRATION RATE: 16 BRPM | OXYGEN SATURATION: 98 % | DIASTOLIC BLOOD PRESSURE: 76 MMHG | SYSTOLIC BLOOD PRESSURE: 131 MMHG | TEMPERATURE: 98 F | HEART RATE: 71 BPM

## 2020-04-30 DIAGNOSIS — C18.9 COLON ADENOCARCINOMA: Primary | ICD-10-CM

## 2020-04-30 PROCEDURE — 96377 APPLICATON ON-BODY INJECTOR: CPT

## 2020-04-30 PROCEDURE — 25000003 PHARM REV CODE 250: Performed by: INTERNAL MEDICINE

## 2020-04-30 PROCEDURE — 63600175 PHARM REV CODE 636 W HCPCS: Mod: JG | Performed by: INTERNAL MEDICINE

## 2020-04-30 PROCEDURE — A4216 STERILE WATER/SALINE, 10 ML: HCPCS | Performed by: INTERNAL MEDICINE

## 2020-04-30 RX ORDER — HEPARIN 100 UNIT/ML
500 SYRINGE INTRAVENOUS
Status: DISCONTINUED | OUTPATIENT
Start: 2020-04-30 | End: 2020-04-30 | Stop reason: HOSPADM

## 2020-04-30 RX ORDER — SODIUM CHLORIDE 0.9 % (FLUSH) 0.9 %
10 SYRINGE (ML) INJECTION
Status: DISCONTINUED | OUTPATIENT
Start: 2020-04-30 | End: 2020-04-30 | Stop reason: HOSPADM

## 2020-04-30 RX ADMIN — Medication 10 ML: at 11:04

## 2020-04-30 RX ADMIN — PEGFILGRASTIM 6 MG: KIT SUBCUTANEOUS at 11:04

## 2020-04-30 RX ADMIN — HEPARIN 500 UNITS: 100 SYRINGE at 11:04

## 2020-04-30 NOTE — DISCHARGE INSTRUCTIONS
[facility]  [address] [address]  [address]  [phone] phone     [phone] fax  Hours of Operation: Monday- Friday 8:00am- 5:00pm  After hours phone  [phone]  Hematology / Oncology Physicians on call      CATRACHITA Gonzalez Dr., Dr., Dr., Dr., NP Sydney Prescott, NP Tyesha Taylor, NP    Please call with any concerns regarding your appointment today.    HOME CARE AFTER CHEMOTHERAPY   Meals   Many patients feel sick and lose their appetites during treatment. Eat small meals several times a day. Choose bland foods with little taste or smell if you have problems with nausea. Be sure to cook all food thoroughly. This kills bacteria and helps you avoid intestinal infection. Soft foods are easier to swallow and digest.   Activity   Exercise keeps you strong and keeps your heart and lungs active. Talk to your doctor about an appropriate exercise program for you.   Skin Care   To prevent a skin infection, bathe or shower once a day. Use a moisturizing soap and wash with warm water. Avoid very hot or cold water. Chemotherapy can make your skin dry . Apply moisturizing lotion to help relieve dry skin. Some drugs used in high doses can cause slight burns to appear (like sunburn). Ask for a special cream to help relieve the burn and protect your skin.   Prevent Mouth Sores   During chemotherapy, many people get mouth sores. Do the following to help prevent mouth sores or to ease discomfort.   Brush your teeth with a soft-bristle toothbrush after every meal.  Don't use dental floss if your platelet count is below 50,000. Your doctor or nurse will tell you if this is the case.  Use an oral swab or special soft toothbrush if your gums bleed during regular brushing.  Use mouthwash as directed. If you can't tolerate commercial mouthwash, use salt and baking soda to clean your mouth. Mix 1 teaspoon of salt and 1  teaspoon of baking soda into a glass of water. Swish and spit.  Call your doctor or return to this facility if you develop any of the following:   Sore throat   White patches in the mouth or throat   Fever of 100.4ºF (38ºC) or higher, or as directed by your healthcare provider  © 2000-2011 [facility], [address] [place] [address]. All rights reserved. This information is not intended as a substitute for professional medical care. Always follow your healthcare professional's   FALL PREVENTION   Falls often occur due to slipping, tripping or losing your balance. Here are ways to reduce your risk of falling again.   Was there anything that caused your fall that can be fixed, removed or replaced?   Make your home safe by keeping walkways clear of objects you may trip over.   Use non-slip pads under rugs.   Do not walk in poorly lit areas.   Do not stand on chairs or wobbly ladders.   Use caution when reaching overhead or looking upward. This position can cause a loss of balance.   Be sure your shoes fit properly, have non-slip bottoms and are in good condition.   Be cautious when going up and down stairs, curbs, and when walking on uneven sidewalks.   If your balance is poor, consider using a cane or walker.   If your fall was related to alcohol use, stop or limit alcohol intake.   If your fall was related to use of sleeping medicines, talk to your doctor about this. You may need to reduce your dosage at bedtime if you awaken during the night to go to the bathroom.   To reduce the need for nighttime bathroom trips:   Avoid drinking fluids for several hours before going to bed   Empty your bladder before going to bed   Men can keep a urinal at the bedside   © 2000-2011 [facility], [address] [place] [address]. All rights reserved. This information is not intended as a substitute for professional medical care. Always follow your healthcare professional's instructions.  WAYS TO HELP  PREVENT INFECTION         WASH YOUR HANDS OFTEN DURING THE DAY, ESPECIALLY BEFORE YOU EAT, AFTER USING THE BATHROOM, AND AFTER TOUCHING ANIMALS     STAY AWAY FROM PEOPLE WHO HAVE ILLNESSES YOU CAN CATCH; SUCH AS COLDS, FLU, CHICKEN POX     TRY TO AVOID CROWDS     STAY AWAY FROM CHILDREN WHO RECENTLY HAVE RECEIVED LIVE VIRUS VACCINES     MAINTAIN GOOD MOUTH CARE     DO NOT SQUEEZE OR SCRATCH PIMPLES     CLEAN CUTS & SCRAPES RIGHT AWAY AND DAILY UNTIL HEALED WITH WARM WATER, SOAP & AN ANTISEPTIC     AVOID CONTACT WITH LITTER BOXES, BIRD CAGES, & FISH TANKS     AVOID STANDING WATER, IE., BIRD BATHS, FLOWER POTS/VASES, OR HUMIDIFIERS     WEAR GLOVES WHEN GARDENING OR CLEANING UP AFTER OTHERS, ESPECIALLY BABIES & SMALL CHILDREN     DO NOT EAT RAW FISH, SEAFOOD, MEAT, OR EGGS

## 2020-05-06 ENCOUNTER — LAB VISIT (OUTPATIENT)
Dept: OTOLARYNGOLOGY | Facility: CLINIC | Age: 70
End: 2020-05-06
Payer: MEDICARE

## 2020-05-06 DIAGNOSIS — Z03.818 ENCOUNTER FOR OBSERVATION FOR SUSPECTED EXPOSURE TO OTHER BIOLOGICAL AGENTS RULED OUT: ICD-10-CM

## 2020-05-06 LAB — SARS-COV-2 RNA RESP QL NAA+PROBE: NOT DETECTED

## 2020-05-06 PROCEDURE — U0002 COVID-19 LAB TEST NON-CDC: HCPCS

## 2020-05-09 NOTE — ASSESSMENT & PLAN NOTE
Anemia is multifactorial, including chronic inflammation, chemotherapy and iron deficiency.  Recently transfused with 2 course of IV iron therapy.  Continue to monitor no evidence of abnormal bleed.

## 2020-05-09 NOTE — ASSESSMENT & PLAN NOTE
Rectal adenocarcinoma, MSI stable on adjuvant chemotherapy with FOLFOX.  Reviewed labs, no concerning cytopenia.  Plan to proceed with treatment today.  Recent cellulitis resolved with antibiotics.  No clinical evidence of infection.    Ordered repeat PET-CT scan for restaging.  Previous PET-CT done in December of 2019 was again reviewed with patient.  Plan to see her back in 2 weeks with repeat labs.  Follow-up with primary oncologist

## 2020-05-09 NOTE — PROGRESS NOTES
Subjective:      DATE OF VISIT: 5/11/2020   ?    ?   Patient ID:?Adalgisa Wright is a 69 y.o. female.?? MR#: [id number]   ?   PRIMARY PROVIDER: Dr. Quintanilla    CHIEF COMPLAINT:  Follow-up  ?   ONCOLOGIC DIAGNOSIS: Rectal adenocarcinoma, Stage III, pT3 pN0 pM0  ?   CURRENT TREATMENT: FOLFOX, C1D1 3/23/20    HPI    Ms. Wright was previously seen by my colleague Dr. Haq last on 08/21/2019 for iron deficiency anemia.  She has a mechanical aortic valve replacement 16 years ago on therapeutic anticoagulation.  Labs notable for low haptoglobin in it is felt hemolytic process may be contributing to her anemia.  She notes having recent EKG as part of preoperative evaluation but has not had follow-up with her cardiologist recently or repeat echocardiogram.  She denies edema, shortness of breath or chest pain.    She was referred to Gastroenterology for further evaluation of iron deficiency anemia and concern for GI bleed.     11/7/19 EGD and colonoscopy revealed a nonobstructing ulcerated mass 20 cm from the anal verge. Pathology:  Moderately differentiated adenocarcinoma, MSI intact.    12/02/2019 PET-CT notable for avid sigmoid lesion SUV 15.7 as well as avid right lobe liver lesion 19 mm, SUV 4.1.  No other areas of avidity concerning for metastatic disease.    12/9/19 liver biopsy, Pathology:  Diagnosis 1.  Liver mass, biopsy:   - Not diagnostic of malignancy   - Scant atypical liver sampling, see comments     Comments: The sections show a scant fragmented sample of liver tissue with   psuedoglands in one core and mild architectural distortion. Cytologic atypiia   is not prominent. Reticulin stain shows mildly irregular pattern of staining.   No portal tracts present for evaluation. Back to back pseudoglands raises the   possibly of a well differetniated hepatocellular lesion. However, the scant   sample limits interpretation. Due to the presence of a clinical mass.   Resampling is indicated.      02/13/2020 surgical  resection of sigmoid and upper rectum  Pathology:  Adenocarcinoma, moderately differentiated, 2.5 x 2 cm, margins negative, 6 of 14 lymph nodes positive for metastatic adenocarcinoma, many tumor deposits and mesenteric tissue, pT3 pN2, MSI intact    03/23/2020 cycle 1 day 1 FOLFOX     INTERVAL EVENTS  69-year-old female with stage III rectal adenocarcinoma on adjuvant chemotherapy with FOLFOX.  Presents for cycle 4 of treatment.  Denies mucositis, fever, chills, nausea or vomiting, abdominal pain.  Had recent cellulitis that was treated with Keflex.    Review of Systems    ?   A comprehensive 14-point review of systems was reviewed with patient and was negative other than as specified above.   ?     Objective:      Physical Exam      ?   Vitals:    05/11/20 0741   BP: 126/74   Pulse: 67   Temp: 97.1 °F (36.2 °C)        ECOG:?0   General appearance: Generally well appearing, in no acute distress, wearing mask.   Head, eyes, ears, nose, and throat: moist mucous membranes.   Respiratory:  Normal work of breathing   Extremities: Warm, without edema.   Neurologic: Alert and oriented. Grossly normal strength, coordination, and gait.   Skin:  Abdominal cellulitis with diminished erythema, no warmth or exudate or tenderness palpation  Psychiatric:  Normal mood and affect.    ?   Laboratory:  ?   Lab Visit on 05/11/2020   Component Date Value Ref Range Status    WBC 05/11/2020 10.37  3.90 - 12.70 K/uL Final    RBC 05/11/2020 3.94* 4.00 - 5.40 M/uL Final    Hemoglobin 05/11/2020 12.5  12.0 - 16.0 g/dL Final    Hematocrit 05/11/2020 41.1  37.0 - 48.5 % Final    Mean Corpuscular Volume 05/11/2020 104* 82 - 98 fL Final    Mean Corpuscular Hemoglobin 05/11/2020 31.7* 27.0 - 31.0 pg Final    Mean Corpuscular Hemoglobin Conc 05/11/2020 30.4* 32.0 - 36.0 g/dL Final    RDW 05/11/2020 16.0* 11.5 - 14.5 % Final    Platelets 05/11/2020 115* 150 - 350 K/uL Final    MPV 05/11/2020 10.9  9.2 - 12.9 fL Final    Sodium 05/11/2020  144  136 - 145 mmol/L Final    Potassium 05/11/2020 3.7  3.5 - 5.1 mmol/L Final    Chloride 05/11/2020 104  95 - 110 mmol/L Final    CO2 05/11/2020 29  23 - 29 mmol/L Final    Glucose 05/11/2020 91  70 - 110 mg/dL Final    BUN, Bld 05/11/2020 9  8 - 23 mg/dL Final    Creatinine 05/11/2020 0.8  0.5 - 1.4 mg/dL Final    Calcium 05/11/2020 10.3  8.7 - 10.5 mg/dL Final    Total Protein 05/11/2020 7.6  6.0 - 8.4 g/dL Final    Albumin 05/11/2020 4.3  3.5 - 5.2 g/dL Final    Total Bilirubin 05/11/2020 0.2  0.1 - 1.0 mg/dL Final    Alkaline Phosphatase 05/11/2020 234* 55 - 135 U/L Final    AST 05/11/2020 43* 10 - 40 U/L Final    ALT 05/11/2020 45* 10 - 44 U/L Final    Anion Gap 05/11/2020 11  8 - 16 mmol/L Final    eGFR if African American 05/11/2020 >60  >60 mL/min/1.73 m^2 Final    eGFR if non African American 05/11/2020 >60  >60 mL/min/1.73 m^2 Final      CMP  Sodium   Date Value Ref Range Status   05/11/2020 144 136 - 145 mmol/L Final     Potassium   Date Value Ref Range Status   05/11/2020 3.7 3.5 - 5.1 mmol/L Final     Chloride   Date Value Ref Range Status   05/11/2020 104 95 - 110 mmol/L Final     CO2   Date Value Ref Range Status   05/11/2020 29 23 - 29 mmol/L Final     Glucose   Date Value Ref Range Status   05/11/2020 91 70 - 110 mg/dL Final     BUN, Bld   Date Value Ref Range Status   05/11/2020 9 8 - 23 mg/dL Final     Creatinine   Date Value Ref Range Status   05/11/2020 0.8 0.5 - 1.4 mg/dL Final     Calcium   Date Value Ref Range Status   05/11/2020 10.3 8.7 - 10.5 mg/dL Final     Total Protein   Date Value Ref Range Status   05/11/2020 7.6 6.0 - 8.4 g/dL Final     Albumin   Date Value Ref Range Status   05/11/2020 4.3 3.5 - 5.2 g/dL Final     Total Bilirubin   Date Value Ref Range Status   05/11/2020 0.2 0.1 - 1.0 mg/dL Final     Comment:     For infants and newborns, interpretation of results should be based  on gestational age, weight and in agreement with clinical  observations.  Premature  Infant recommended reference ranges:  Up to 24 hours.............<8.0 mg/dL  Up to 48 hours............<12.0 mg/dL  3-5 days..................<15.0 mg/dL  6-29 days.................<15.0 mg/dL       Alkaline Phosphatase   Date Value Ref Range Status   05/11/2020 234 (H) 55 - 135 U/L Final     AST   Date Value Ref Range Status   05/11/2020 43 (H) 10 - 40 U/L Final     ALT   Date Value Ref Range Status   05/11/2020 45 (H) 10 - 44 U/L Final     Anion Gap   Date Value Ref Range Status   05/11/2020 11 8 - 16 mmol/L Final     eGFR if    Date Value Ref Range Status   05/11/2020 >60 >60 mL/min/1.73 m^2 Final     eGFR if non    Date Value Ref Range Status   05/11/2020 >60 >60 mL/min/1.73 m^2 Final     Comment:     Calculation used to obtain the estimated glomerular filtration  rate (eGFR) is the CKD-EPI equation.            Tumor markers    CEA    CEA   Date Value Ref Range Status   11/18/2019 3.5 0.0 - 5.0 ng/mL Final     Comment:     CEA Normal Range:  Non-Smokers: 0-3.0 ng/mL  Smokers:     0-5.0 ng/mL              ? IMAGING  11/21/19  CT CHEST ABDOMEN PELVIS WITH CONTRAST (XPD)    CLINICAL HISTORY:  Malignant neoplasm of rectosigmoid junctionknown rectosigmoid cancer, eval for metastatic disease;    COMPARISON:  None    FINDINGS:  Chest:    The heart is enlarged.  There is no evidence of mediastinal or hilar or axillary lymphadenopathy.  Patient has had a prior median sternotomy.  There are no lung nodules.  No pleural effusions.    Skeletal structures are intact.    Abdomen pelvis:    There appears to be a subtle hypodense lesion involving the right lobe of the liver measuring about 1.7 cm.  There may be a smaller hypodensity more anteriorly measuring 8 mm.    Pancreas is unremarkable the spleen is unremarkable.    The gallbladder is unremarkable.  There is no bile duct dilatation.    The kidneys are normal.    The aorta and inferior vena cava are unremarkable.    There are no acute bowel  abnormalities.     No evidence of appendicitis.  No evidence of diverticulitis.    Bladder is normal. No abnormal masses or fluid collections in the pelvis.  No definite pelvic lymphadenopathy.  No abdominal retroperitoneal lymph node enlargement.    There are few degenerative changes seen involving the lumbar spine as well as a few mild compression fractures of the thoracic spine which appear chronic.      Impression       No evidence of metastatic disease involving the chest.  Findings suspicious for a liver mass possibly related to a metastasis.  Further evaluation recommended with a PET scan or MRI of the liver.    There appear to be a few compression fractures of the thoracic spine which are chronic.  No definite metastatic disease involving the skeleton.       ?   Assessment/Plan:       1. Colon adenocarcinoma    2. Iron deficiency anemia due to chronic blood loss    3. Chronic anticoagulation    4. Abnormal findings on diagnostic imaging of other parts of digestive tract           Plan:     Colon adenocarcinoma  Rectal adenocarcinoma, MSI stable on adjuvant chemotherapy with FOLFOX.  Reviewed labs, no concerning cytopenia.  Plan to proceed with treatment today.  Recent cellulitis resolved with antibiotics.  No clinical evidence of infection.    Ordered repeat PET-CT scan for restaging.  Previous PET-CT done in December of 2019 was again reviewed with patient.  Plan to see her back in 2 weeks with repeat labs.  Follow-up with primary oncologist    Iron deficiency anemia due to chronic blood loss  Anemia is multifactorial, including chronic inflammation, chemotherapy and iron deficiency.  Recently transfused with 2 course of IV iron therapy.  Continue to monitor no evidence of abnormal bleed.    Chronic anticoagulation  Chronic anticoagulation due to mechanical valve.  Follows with Cardiology service.      Justin Brown MD

## 2020-05-11 ENCOUNTER — OFFICE VISIT (OUTPATIENT)
Dept: HEMATOLOGY/ONCOLOGY | Facility: CLINIC | Age: 70
End: 2020-05-11
Payer: MEDICARE

## 2020-05-11 ENCOUNTER — INFUSION (OUTPATIENT)
Dept: INFUSION THERAPY | Facility: HOSPITAL | Age: 70
End: 2020-05-11
Attending: INTERNAL MEDICINE
Payer: MEDICARE

## 2020-05-11 VITALS
SYSTOLIC BLOOD PRESSURE: 135 MMHG | DIASTOLIC BLOOD PRESSURE: 72 MMHG | HEART RATE: 67 BPM | RESPIRATION RATE: 18 BRPM | HEIGHT: 58 IN | OXYGEN SATURATION: 98 % | WEIGHT: 119.25 LBS | BODY MASS INDEX: 25.03 KG/M2 | TEMPERATURE: 97 F

## 2020-05-11 VITALS
OXYGEN SATURATION: 95 % | DIASTOLIC BLOOD PRESSURE: 74 MMHG | HEIGHT: 58 IN | SYSTOLIC BLOOD PRESSURE: 126 MMHG | BODY MASS INDEX: 25.03 KG/M2 | WEIGHT: 119.25 LBS | TEMPERATURE: 97 F | HEART RATE: 67 BPM

## 2020-05-11 DIAGNOSIS — R93.3 ABNORMAL FINDINGS ON DIAGNOSTIC IMAGING OF OTHER PARTS OF DIGESTIVE TRACT: ICD-10-CM

## 2020-05-11 DIAGNOSIS — Z79.01 CHRONIC ANTICOAGULATION: ICD-10-CM

## 2020-05-11 DIAGNOSIS — Z03.818 ENCNTR FOR OBS FOR SUSP EXPSR TO OTH BIOLG AGENTS RULED OUT: ICD-10-CM

## 2020-05-11 DIAGNOSIS — C18.9 COLON ADENOCARCINOMA: Primary | ICD-10-CM

## 2020-05-11 DIAGNOSIS — C18.9 COLON ADENOCARCINOMA: ICD-10-CM

## 2020-05-11 DIAGNOSIS — D50.0 IRON DEFICIENCY ANEMIA DUE TO CHRONIC BLOOD LOSS: ICD-10-CM

## 2020-05-11 PROCEDURE — 3078F PR MOST RECENT DIASTOLIC BLOOD PRESSURE < 80 MM HG: ICD-10-PCS | Mod: CPTII,S$GLB,, | Performed by: INTERNAL MEDICINE

## 2020-05-11 PROCEDURE — 3078F DIAST BP <80 MM HG: CPT | Mod: CPTII,S$GLB,, | Performed by: INTERNAL MEDICINE

## 2020-05-11 PROCEDURE — 99215 PR OFFICE/OUTPT VISIT, EST, LEVL V, 40-54 MIN: ICD-10-PCS | Mod: S$GLB,,, | Performed by: INTERNAL MEDICINE

## 2020-05-11 PROCEDURE — 1101F PR PT FALLS ASSESS DOC 0-1 FALLS W/OUT INJ PAST YR: ICD-10-PCS | Mod: CPTII,S$GLB,, | Performed by: INTERNAL MEDICINE

## 2020-05-11 PROCEDURE — 96415 CHEMO IV INFUSION ADDL HR: CPT

## 2020-05-11 PROCEDURE — 99999 PR PBB SHADOW E&M-EST. PATIENT-LVL IV: ICD-10-PCS | Mod: PBBFAC,,, | Performed by: INTERNAL MEDICINE

## 2020-05-11 PROCEDURE — 96416 CHEMO PROLONG INFUSE W/PUMP: CPT

## 2020-05-11 PROCEDURE — 3074F PR MOST RECENT SYSTOLIC BLOOD PRESSURE < 130 MM HG: ICD-10-PCS | Mod: CPTII,S$GLB,, | Performed by: INTERNAL MEDICINE

## 2020-05-11 PROCEDURE — 63600175 PHARM REV CODE 636 W HCPCS: Performed by: INTERNAL MEDICINE

## 2020-05-11 PROCEDURE — 96413 CHEMO IV INFUSION 1 HR: CPT

## 2020-05-11 PROCEDURE — 1159F PR MEDICATION LIST DOCUMENTED IN MEDICAL RECORD: ICD-10-PCS | Mod: S$GLB,,, | Performed by: INTERNAL MEDICINE

## 2020-05-11 PROCEDURE — 3074F SYST BP LT 130 MM HG: CPT | Mod: CPTII,S$GLB,, | Performed by: INTERNAL MEDICINE

## 2020-05-11 PROCEDURE — 96367 TX/PROPH/DG ADDL SEQ IV INF: CPT

## 2020-05-11 PROCEDURE — 99215 OFFICE O/P EST HI 40 MIN: CPT | Mod: S$GLB,,, | Performed by: INTERNAL MEDICINE

## 2020-05-11 PROCEDURE — 1126F AMNT PAIN NOTED NONE PRSNT: CPT | Mod: S$GLB,,, | Performed by: INTERNAL MEDICINE

## 2020-05-11 PROCEDURE — 96368 THER/DIAG CONCURRENT INF: CPT

## 2020-05-11 PROCEDURE — 1159F MED LIST DOCD IN RCRD: CPT | Mod: S$GLB,,, | Performed by: INTERNAL MEDICINE

## 2020-05-11 PROCEDURE — 99999 PR PBB SHADOW E&M-EST. PATIENT-LVL IV: CPT | Mod: PBBFAC,,, | Performed by: INTERNAL MEDICINE

## 2020-05-11 PROCEDURE — 96411 CHEMO IV PUSH ADDL DRUG: CPT

## 2020-05-11 PROCEDURE — 1101F PT FALLS ASSESS-DOCD LE1/YR: CPT | Mod: CPTII,S$GLB,, | Performed by: INTERNAL MEDICINE

## 2020-05-11 PROCEDURE — 1126F PR PAIN SEVERITY QUANTIFIED, NO PAIN PRESENT: ICD-10-PCS | Mod: S$GLB,,, | Performed by: INTERNAL MEDICINE

## 2020-05-11 PROCEDURE — 25000003 PHARM REV CODE 250: Performed by: INTERNAL MEDICINE

## 2020-05-11 RX ORDER — SODIUM CHLORIDE 0.9 % (FLUSH) 0.9 %
10 SYRINGE (ML) INJECTION
Status: CANCELLED | OUTPATIENT
Start: 2020-05-13

## 2020-05-11 RX ORDER — DIPHENHYDRAMINE HYDROCHLORIDE 50 MG/ML
50 INJECTION INTRAMUSCULAR; INTRAVENOUS ONCE AS NEEDED
Status: CANCELLED | OUTPATIENT
Start: 2020-05-11

## 2020-05-11 RX ORDER — HEPARIN 100 UNIT/ML
500 SYRINGE INTRAVENOUS
Status: CANCELLED | OUTPATIENT
Start: 2020-05-13

## 2020-05-11 RX ORDER — SODIUM CHLORIDE 0.9 % (FLUSH) 0.9 %
10 SYRINGE (ML) INJECTION
Status: CANCELLED | OUTPATIENT
Start: 2020-05-11

## 2020-05-11 RX ORDER — EPINEPHRINE 0.3 MG/.3ML
0.3 INJECTION SUBCUTANEOUS ONCE AS NEEDED
Status: CANCELLED | OUTPATIENT
Start: 2020-05-11

## 2020-05-11 RX ORDER — FLUOROURACIL 50 MG/ML
400 INJECTION, SOLUTION INTRAVENOUS
Status: COMPLETED | OUTPATIENT
Start: 2020-05-11 | End: 2020-05-11

## 2020-05-11 RX ORDER — HEPARIN 100 UNIT/ML
500 SYRINGE INTRAVENOUS
Status: CANCELLED | OUTPATIENT
Start: 2020-05-11

## 2020-05-11 RX ORDER — FLUOROURACIL 50 MG/ML
400 INJECTION, SOLUTION INTRAVENOUS
Status: CANCELLED | OUTPATIENT
Start: 2020-05-11

## 2020-05-11 RX ADMIN — FLUOROURACIL 590 MG: 50 INJECTION, SOLUTION INTRAVENOUS at 11:05

## 2020-05-11 RX ADMIN — OXALIPLATIN 125 MG: 5 INJECTION, SOLUTION INTRAVENOUS at 09:05

## 2020-05-11 RX ADMIN — PALONOSETRON HYDROCHLORIDE: 0.25 INJECTION, SOLUTION INTRAVENOUS at 08:05

## 2020-05-11 RX ADMIN — DEXTROSE MONOHYDRATE: 50 INJECTION, SOLUTION INTRAVENOUS at 08:05

## 2020-05-11 RX ADMIN — LEUCOVORIN CALCIUM 600 MG: 500 INJECTION, POWDER, LYOPHILIZED, FOR SOLUTION INTRAMUSCULAR; INTRAVENOUS at 09:05

## 2020-05-11 RX ADMIN — SODIUM CHLORIDE 3530 MG: 9 INJECTION, SOLUTION INTRAVENOUS at 11:05

## 2020-05-11 NOTE — DISCHARGE INSTRUCTIONS
[facility]  [address] [address]  [address]  [phone] phone     [phone] fax  Hours of Operation: Monday- Friday 8:00am- 5:00pm  After hours phone  [phone]  Hematology / Oncology Physicians on call      CATRACHITA Gonzalez Dr., Dr., Dr., Dr., NP Sydney Prescott, NP Tyesha Taylor, NP    Please call with any concerns regarding your appointment today.

## 2020-05-11 NOTE — NURSING
Pt tolerated FOLFOX well. No adverse reaction noted. Pt education reinforced on chemo regimen, side effects, what to expect, and when to call . Pt verbalized understanding. I reviewed pt calendar w/ pt and understanding verbalized.   Right chest PAC remains accessed with 5FU pump connected infusing at a continuous rate of 2.2 ml/hr.  Dressing clean, dry, and intact.

## 2020-05-11 NOTE — PLAN OF CARE
Pt states she feels well today, on and off fatigue and weakness in her legs.    Infection precautions reviewed.  Pt states full understanding to call with any sign of infection, including temp >100.4.

## 2020-05-13 ENCOUNTER — INFUSION (OUTPATIENT)
Dept: INFUSION THERAPY | Facility: HOSPITAL | Age: 70
End: 2020-05-13
Attending: INTERNAL MEDICINE
Payer: MEDICARE

## 2020-05-13 VITALS
OXYGEN SATURATION: 95 % | HEART RATE: 67 BPM | TEMPERATURE: 99 F | DIASTOLIC BLOOD PRESSURE: 77 MMHG | SYSTOLIC BLOOD PRESSURE: 125 MMHG | RESPIRATION RATE: 16 BRPM

## 2020-05-13 DIAGNOSIS — C18.9 COLON ADENOCARCINOMA: Primary | ICD-10-CM

## 2020-05-13 PROCEDURE — A4216 STERILE WATER/SALINE, 10 ML: HCPCS | Performed by: INTERNAL MEDICINE

## 2020-05-13 PROCEDURE — 96377 APPLICATON ON-BODY INJECTOR: CPT

## 2020-05-13 PROCEDURE — 25000003 PHARM REV CODE 250: Performed by: INTERNAL MEDICINE

## 2020-05-13 PROCEDURE — 63600175 PHARM REV CODE 636 W HCPCS: Mod: JG | Performed by: INTERNAL MEDICINE

## 2020-05-13 RX ORDER — HEPARIN 100 UNIT/ML
500 SYRINGE INTRAVENOUS
Status: DISCONTINUED | OUTPATIENT
Start: 2020-05-13 | End: 2020-05-13 | Stop reason: HOSPADM

## 2020-05-13 RX ORDER — SODIUM CHLORIDE 0.9 % (FLUSH) 0.9 %
10 SYRINGE (ML) INJECTION
Status: DISCONTINUED | OUTPATIENT
Start: 2020-05-13 | End: 2020-05-13 | Stop reason: HOSPADM

## 2020-05-13 RX ADMIN — PEGFILGRASTIM 6 MG: KIT SUBCUTANEOUS at 10:05

## 2020-05-13 RX ADMIN — Medication 10 ML: at 09:05

## 2020-05-13 RX ADMIN — HEPARIN 500 UNITS: 100 SYRINGE at 09:05

## 2020-05-13 NOTE — DISCHARGE INSTRUCTIONS
[facility]  [address] [address]  [address]  [phone] phone     [phone] fax  Hours of Operation: Monday- Friday 8:00am- 5:00pm  After hours phone  [phone]  Hematology / Oncology Physicians on call      CATRACHITA Gonzalez Dr., Dr., Dr., Dr., NP Sydney Prescott, NP Tyesha Taylor, NP    Please call with any concerns regarding your appointment today.    HOME CARE AFTER CHEMOTHERAPY   Meals   Many patients feel sick and lose their appetites during treatment. Eat small meals several times a day. Choose bland foods with little taste or smell if you have problems with nausea. Be sure to cook all food thoroughly. This kills bacteria and helps you avoid intestinal infection. Soft foods are easier to swallow and digest.   Activity   Exercise keeps you strong and keeps your heart and lungs active. Talk to your doctor about an appropriate exercise program for you.   Skin Care   To prevent a skin infection, bathe or shower once a day. Use a moisturizing soap and wash with warm water. Avoid very hot or cold water. Chemotherapy can make your skin dry . Apply moisturizing lotion to help relieve dry skin. Some drugs used in high doses can cause slight burns to appear (like sunburn). Ask for a special cream to help relieve the burn and protect your skin.   Prevent Mouth Sores   During chemotherapy, many people get mouth sores. Do the following to help prevent mouth sores or to ease discomfort.   Brush your teeth with a soft-bristle toothbrush after every meal.  Don't use dental floss if your platelet count is below 50,000. Your doctor or nurse will tell you if this is the case.  Use an oral swab or special soft toothbrush if your gums bleed during regular brushing.  Use mouthwash as directed. If you can't tolerate commercial mouthwash, use salt and baking soda to clean your mouth. Mix 1 teaspoon of salt and 1  teaspoon of baking soda into a glass of water. Swish and spit.  Call your doctor or return to this facility if you develop any of the following:   Sore throat   White patches in the mouth or throat   Fever of 100.4ºF (38ºC) or higher, or as directed by your healthcare provider  © 6415-7716 [employer] [address], [place] [address]. All rights reserved. This information is not intended as a substitute for professional medical care. Always follow your healthcare professional's

## 2020-05-18 ENCOUNTER — TELEPHONE (OUTPATIENT)
Dept: RADIOLOGY | Facility: HOSPITAL | Age: 70
End: 2020-05-18

## 2020-05-20 ENCOUNTER — HOSPITAL ENCOUNTER (OUTPATIENT)
Dept: RADIOLOGY | Facility: HOSPITAL | Age: 70
Discharge: HOME | End: 2020-05-20
Attending: INTERNAL MEDICINE
Payer: MEDICARE

## 2020-05-20 DIAGNOSIS — R93.3 ABNORMAL FINDINGS ON DIAGNOSTIC IMAGING OF OTHER PARTS OF DIGESTIVE TRACT: ICD-10-CM

## 2020-05-20 DIAGNOSIS — C18.9 COLON ADENOCARCINOMA: ICD-10-CM

## 2020-05-20 PROCEDURE — 78815 PET IMAGE W/CT SKULL-THIGH: CPT | Mod: 26,PS,, | Performed by: RADIOLOGY

## 2020-05-20 PROCEDURE — 78815 NM PET CT ROUTINE: ICD-10-PCS | Mod: 26,PS,, | Performed by: RADIOLOGY

## 2020-05-20 PROCEDURE — 78815 PET IMAGE W/CT SKULL-THIGH: CPT | Mod: TC,PS

## 2020-05-20 PROCEDURE — A9552 F18 FDG: HCPCS

## 2020-05-25 ENCOUNTER — OFFICE VISIT (OUTPATIENT)
Dept: HEMATOLOGY/ONCOLOGY | Facility: CLINIC | Age: 70
End: 2020-05-25
Payer: MEDICARE

## 2020-05-25 ENCOUNTER — INFUSION (OUTPATIENT)
Dept: INFUSION THERAPY | Facility: HOSPITAL | Age: 70
End: 2020-05-25
Attending: INTERNAL MEDICINE
Payer: MEDICARE

## 2020-05-25 VITALS
OXYGEN SATURATION: 99 % | RESPIRATION RATE: 16 BRPM | BODY MASS INDEX: 24.99 KG/M2 | HEART RATE: 71 BPM | HEIGHT: 58 IN | DIASTOLIC BLOOD PRESSURE: 76 MMHG | WEIGHT: 119.06 LBS | SYSTOLIC BLOOD PRESSURE: 132 MMHG | TEMPERATURE: 97 F

## 2020-05-25 VITALS
TEMPERATURE: 98 F | DIASTOLIC BLOOD PRESSURE: 88 MMHG | SYSTOLIC BLOOD PRESSURE: 155 MMHG | HEART RATE: 72 BPM | OXYGEN SATURATION: 98 % | RESPIRATION RATE: 20 BRPM

## 2020-05-25 DIAGNOSIS — T45.1X5A NEUROPATHY DUE TO CHEMOTHERAPEUTIC DRUG: ICD-10-CM

## 2020-05-25 DIAGNOSIS — D69.6 THROMBOCYTOPENIA: ICD-10-CM

## 2020-05-25 DIAGNOSIS — L03.90 CELLULITIS, UNSPECIFIED CELLULITIS SITE: ICD-10-CM

## 2020-05-25 DIAGNOSIS — R19.7 DIARRHEA, UNSPECIFIED TYPE: ICD-10-CM

## 2020-05-25 DIAGNOSIS — Z03.818 ENCOUNTER FOR OBSERVATION FOR SUSPECTED EXPOSURE TO OTHER BIOLOGICAL AGENTS RULED OUT: ICD-10-CM

## 2020-05-25 DIAGNOSIS — D50.0 IRON DEFICIENCY ANEMIA DUE TO CHRONIC BLOOD LOSS: ICD-10-CM

## 2020-05-25 DIAGNOSIS — C18.9 COLON ADENOCARCINOMA: Primary | ICD-10-CM

## 2020-05-25 DIAGNOSIS — R79.1 SUPRATHERAPEUTIC INR: ICD-10-CM

## 2020-05-25 DIAGNOSIS — E87.6 HYPOKALEMIA: ICD-10-CM

## 2020-05-25 DIAGNOSIS — G62.0 NEUROPATHY DUE TO CHEMOTHERAPEUTIC DRUG: ICD-10-CM

## 2020-05-25 PROCEDURE — 63600175 PHARM REV CODE 636 W HCPCS: Performed by: INTERNAL MEDICINE

## 2020-05-25 PROCEDURE — 96411 CHEMO IV PUSH ADDL DRUG: CPT

## 2020-05-25 PROCEDURE — 96416 CHEMO PROLONG INFUSE W/PUMP: CPT

## 2020-05-25 PROCEDURE — 99215 OFFICE O/P EST HI 40 MIN: CPT | Mod: S$GLB,,, | Performed by: INTERNAL MEDICINE

## 2020-05-25 PROCEDURE — 25000003 PHARM REV CODE 250: Performed by: INTERNAL MEDICINE

## 2020-05-25 PROCEDURE — 99215 PR OFFICE/OUTPT VISIT, EST, LEVL V, 40-54 MIN: ICD-10-PCS | Mod: S$GLB,,, | Performed by: INTERNAL MEDICINE

## 2020-05-25 PROCEDURE — 96413 CHEMO IV INFUSION 1 HR: CPT

## 2020-05-25 PROCEDURE — 99999 PR PBB SHADOW E&M-EST. PATIENT-LVL IV: CPT | Mod: PBBFAC,,, | Performed by: INTERNAL MEDICINE

## 2020-05-25 PROCEDURE — 3075F SYST BP GE 130 - 139MM HG: CPT | Mod: CPTII,S$GLB,, | Performed by: INTERNAL MEDICINE

## 2020-05-25 PROCEDURE — 96415 CHEMO IV INFUSION ADDL HR: CPT

## 2020-05-25 PROCEDURE — 96367 TX/PROPH/DG ADDL SEQ IV INF: CPT

## 2020-05-25 PROCEDURE — 96368 THER/DIAG CONCURRENT INF: CPT

## 2020-05-25 PROCEDURE — 1159F MED LIST DOCD IN RCRD: CPT | Mod: S$GLB,,, | Performed by: INTERNAL MEDICINE

## 2020-05-25 PROCEDURE — 1126F AMNT PAIN NOTED NONE PRSNT: CPT | Mod: S$GLB,,, | Performed by: INTERNAL MEDICINE

## 2020-05-25 PROCEDURE — 99999 PR PBB SHADOW E&M-EST. PATIENT-LVL IV: ICD-10-PCS | Mod: PBBFAC,,, | Performed by: INTERNAL MEDICINE

## 2020-05-25 PROCEDURE — 3078F PR MOST RECENT DIASTOLIC BLOOD PRESSURE < 80 MM HG: ICD-10-PCS | Mod: CPTII,S$GLB,, | Performed by: INTERNAL MEDICINE

## 2020-05-25 PROCEDURE — 1126F PR PAIN SEVERITY QUANTIFIED, NO PAIN PRESENT: ICD-10-PCS | Mod: S$GLB,,, | Performed by: INTERNAL MEDICINE

## 2020-05-25 PROCEDURE — 1159F PR MEDICATION LIST DOCUMENTED IN MEDICAL RECORD: ICD-10-PCS | Mod: S$GLB,,, | Performed by: INTERNAL MEDICINE

## 2020-05-25 PROCEDURE — 1101F PT FALLS ASSESS-DOCD LE1/YR: CPT | Mod: CPTII,S$GLB,, | Performed by: INTERNAL MEDICINE

## 2020-05-25 PROCEDURE — 36593 DECLOT VASCULAR DEVICE: CPT

## 2020-05-25 PROCEDURE — 3075F PR MOST RECENT SYSTOLIC BLOOD PRESS GE 130-139MM HG: ICD-10-PCS | Mod: CPTII,S$GLB,, | Performed by: INTERNAL MEDICINE

## 2020-05-25 PROCEDURE — 3078F DIAST BP <80 MM HG: CPT | Mod: CPTII,S$GLB,, | Performed by: INTERNAL MEDICINE

## 2020-05-25 PROCEDURE — 1101F PR PT FALLS ASSESS DOC 0-1 FALLS W/OUT INJ PAST YR: ICD-10-PCS | Mod: CPTII,S$GLB,, | Performed by: INTERNAL MEDICINE

## 2020-05-25 RX ORDER — SODIUM CHLORIDE 0.9 % (FLUSH) 0.9 %
10 SYRINGE (ML) INJECTION
Status: CANCELLED | OUTPATIENT
Start: 2020-05-27

## 2020-05-25 RX ORDER — SODIUM CHLORIDE 0.9 % (FLUSH) 0.9 %
10 SYRINGE (ML) INJECTION
Status: CANCELLED | OUTPATIENT
Start: 2020-05-25

## 2020-05-25 RX ORDER — HEPARIN 100 UNIT/ML
500 SYRINGE INTRAVENOUS
Status: CANCELLED | OUTPATIENT
Start: 2020-05-27

## 2020-05-25 RX ORDER — FLUOROURACIL 50 MG/ML
400 INJECTION, SOLUTION INTRAVENOUS
Status: CANCELLED | OUTPATIENT
Start: 2020-05-25

## 2020-05-25 RX ORDER — HEPARIN 100 UNIT/ML
500 SYRINGE INTRAVENOUS
Status: CANCELLED | OUTPATIENT
Start: 2020-05-25

## 2020-05-25 RX ORDER — DIPHENHYDRAMINE HYDROCHLORIDE 50 MG/ML
50 INJECTION INTRAMUSCULAR; INTRAVENOUS ONCE AS NEEDED
Status: CANCELLED | OUTPATIENT
Start: 2020-05-25

## 2020-05-25 RX ORDER — POTASSIUM CHLORIDE 20 MEQ/1
40 TABLET, EXTENDED RELEASE ORAL DAILY
Qty: 40 TABLET | Refills: 11 | Status: SHIPPED | OUTPATIENT
Start: 2020-05-25 | End: 2020-07-27 | Stop reason: SDUPTHER

## 2020-05-25 RX ORDER — FLUOROURACIL 50 MG/ML
400 INJECTION, SOLUTION INTRAVENOUS
Status: COMPLETED | OUTPATIENT
Start: 2020-05-25 | End: 2020-05-25

## 2020-05-25 RX ORDER — DIPHENOXYLATE HYDROCHLORIDE AND ATROPINE SULFATE 2.5; .025 MG/1; MG/1
1 TABLET ORAL 4 TIMES DAILY PRN
Qty: 30 TABLET | Refills: 1 | Status: SHIPPED | OUTPATIENT
Start: 2020-05-25 | End: 2021-02-26 | Stop reason: ALTCHOICE

## 2020-05-25 RX ORDER — EPINEPHRINE 0.3 MG/.3ML
0.3 INJECTION SUBCUTANEOUS ONCE AS NEEDED
Status: CANCELLED | OUTPATIENT
Start: 2020-05-25

## 2020-05-25 RX ADMIN — LEUCOVORIN CALCIUM 590 MG: 500 INJECTION, POWDER, LYOPHILIZED, FOR SOLUTION INTRAMUSCULAR; INTRAVENOUS at 10:05

## 2020-05-25 RX ADMIN — DEXTROSE MONOHYDRATE: 50 INJECTION, SOLUTION INTRAVENOUS at 10:05

## 2020-05-25 RX ADMIN — ALTEPLASE 2 MG: 2.2 INJECTION, POWDER, LYOPHILIZED, FOR SOLUTION INTRAVENOUS at 09:05

## 2020-05-25 RX ADMIN — FLUOROURACIL 590 MG: 50 INJECTION, SOLUTION INTRAVENOUS at 12:05

## 2020-05-25 RX ADMIN — OXALIPLATIN 125 MG: 5 INJECTION, SOLUTION, CONCENTRATE INTRAVENOUS at 10:05

## 2020-05-25 RX ADMIN — SODIUM CHLORIDE: 9 INJECTION, SOLUTION INTRAVENOUS at 09:05

## 2020-05-25 RX ADMIN — PALONOSETRON HYDROCHLORIDE: 0.25 INJECTION, SOLUTION INTRAVENOUS at 09:05

## 2020-05-25 RX ADMIN — FLUOROURACIL 3530 MG: 50 INJECTION, SOLUTION INTRAVENOUS at 12:05

## 2020-05-25 NOTE — PROGRESS NOTES
Subjective:      DATE OF VISIT: 5/25/2020   ?    ?   Patient ID:?Adalgisa Wright is a 69 y.o. female.?? MR#: [id number]   ?   PRIMARY PROVIDER: Dr. Quintanilla    CHIEF COMPLAINT:  Follow-up  ?   ONCOLOGIC DIAGNOSIS: Rectal adenocarcinoma, Stage III, pT3 pN0 pM0  ?   CURRENT TREATMENT: FOLFOX, C1D1 3/23/20    HPI    Ms. Wright was previously seen by my colleague Dr. Haq last on 08/21/2019 for iron deficiency anemia.  She has a mechanical aortic valve replacement 16 years ago on therapeutic anticoagulation.  Labs notable for low haptoglobin in it is felt hemolytic process may be contributing to her anemia.  She notes having recent EKG as part of preoperative evaluation but has not had follow-up with her cardiologist recently or repeat echocardiogram.  She denies edema, shortness of breath or chest pain.    She was referred to Gastroenterology for further evaluation of iron deficiency anemia and concern for GI bleed.     11/7/19 EGD and colonoscopy revealed a nonobstructing ulcerated mass 20 cm from the anal verge. Pathology:  Moderately differentiated adenocarcinoma, MSI intact.    12/02/2019 PET-CT notable for avid sigmoid lesion SUV 15.7 as well as avid right lobe liver lesion 19 mm, SUV 4.1.  No other areas of avidity concerning for metastatic disease.    12/9/19 liver biopsy, Pathology:  Diagnosis 1.  Liver mass, biopsy:   - Not diagnostic of malignancy   - Scant atypical liver sampling, see comments     Comments: The sections show a scant fragmented sample of liver tissue with   psuedoglands in one core and mild architectural distortion. Cytologic atypiia   is not prominent. Reticulin stain shows mildly irregular pattern of staining.   No portal tracts present for evaluation. Back to back pseudoglands raises the   possibly of a well differetniated hepatocellular lesion. However, the scant   sample limits interpretation. Due to the presence of a clinical mass.   Resampling is indicated.      02/13/2020 surgical  resection of sigmoid and upper rectum  Pathology:  Adenocarcinoma, moderately differentiated, 2.5 x 2 cm, margins negative, 6 of 14 lymph nodes positive for metastatic adenocarcinoma, many tumor deposits and mesenteric tissue, pT3 pN2, MSI intact    03/23/2020 cycle 1 day 1 FOLFOX     05/20/2020 restaging PET showed interval resolution of liver lesion and no evidence of recurrence/metastatic disease    INTERVAL EVENTS    She notes weakness in bilateral legs and some fatigue.  Some neuropathy in 3 toes on left foot and sensitivity to cold for longer duration throughout cycles.  Area of cellulitis on abdomen continues to improve and she uses topical bacitracin.    Review of Systems    ?   A comprehensive 14-point review of systems was reviewed with patient and was negative other than as specified above.   ?     Objective:      Physical Exam      ?   Vitals:    05/25/20 0805   BP: 132/76   Pulse: 71   Resp: 16   Temp: 96.9 °F (36.1 °C)        ECOG:?0   General appearance: Generally well appearing, in no acute distress, wearing mask.   Head, eyes, ears, nose, and throat: moist mucous membranes.   Respiratory:  Clear to auscultation bilaterally  Cardiovascular:  Regular rate and rhythm, no murmurs rubs or gallops  Extremities: Warm, without edema.   Neurologic: Alert and oriented. Grossly normal strength, coordination, and gait.   Skin:  Abdominal cellulitis with only minimal erythema, no warmth or exudate.  No other rash, petechiae or ecchymosis.  Psychiatric:  Normal mood and affect.    ?   Laboratory:  ?   Lab Visit on 05/25/2020   Component Date Value Ref Range Status    WBC 05/25/2020 9.74  3.90 - 12.70 K/uL Final    RBC 05/25/2020 3.64* 4.00 - 5.40 M/uL Final    Hemoglobin 05/25/2020 11.7* 12.0 - 16.0 g/dL Final    Hematocrit 05/25/2020 37.4  37.0 - 48.5 % Final    Mean Corpuscular Volume 05/25/2020 103* 82 - 98 fL Final    Mean Corpuscular Hemoglobin 05/25/2020 32.1* 27.0 - 31.0 pg Final    Mean Corpuscular  Hemoglobin Conc 05/25/2020 31.3* 32.0 - 36.0 g/dL Final    RDW 05/25/2020 16.2* 11.5 - 14.5 % Final    Platelets 05/25/2020 103* 150 - 350 K/uL Final    MPV 05/25/2020 11.6  9.2 - 12.9 fL Final    Immature Granulocytes 05/25/2020 CANCELED  0.0 - 0.5 % Final    Immature Grans (Abs) 05/25/2020 CANCELED  0.00 - 0.04 K/uL Final    Lymph # 05/25/2020 CANCELED  1.0 - 4.8 K/uL Final    Mono # 05/25/2020 CANCELED  0.3 - 1.0 K/uL Final    Eos # 05/25/2020 CANCELED  0.0 - 0.5 K/uL Final    Baso # 05/25/2020 CANCELED  0.00 - 0.20 K/uL Final    nRBC 05/25/2020 0  0 /100 WBC Final    Gran% 05/25/2020 75.0* 38.0 - 73.0 % Final    Lymph% 05/25/2020 8.0* 18.0 - 48.0 % Final    Mono% 05/25/2020 4.0  4.0 - 15.0 % Final    Eosinophil% 05/25/2020 2.0  0.0 - 8.0 % Final    Basophil% 05/25/2020 2.0* 0.0 - 1.9 % Final    Bands 05/25/2020 7.0  % Final    Myelocytes 05/25/2020 2.0  % Final    Differential Method 05/25/2020 Manual   Final    Sodium 05/25/2020 143  136 - 145 mmol/L Final    Potassium 05/25/2020 2.9* 3.5 - 5.1 mmol/L Final    Chloride 05/25/2020 102  95 - 110 mmol/L Final    CO2 05/25/2020 32* 23 - 29 mmol/L Final    Glucose 05/25/2020 111* 70 - 110 mg/dL Final    BUN, Bld 05/25/2020 8  8 - 23 mg/dL Final    Creatinine 05/25/2020 0.7  0.5 - 1.4 mg/dL Final    Calcium 05/25/2020 9.4  8.7 - 10.5 mg/dL Final    Total Protein 05/25/2020 6.5  6.0 - 8.4 g/dL Final    Albumin 05/25/2020 3.7  3.5 - 5.2 g/dL Final    Total Bilirubin 05/25/2020 0.2  0.1 - 1.0 mg/dL Final    Alkaline Phosphatase 05/25/2020 234* 55 - 135 U/L Final    AST 05/25/2020 30  10 - 40 U/L Final    ALT 05/25/2020 38  10 - 44 U/L Final    Anion Gap 05/25/2020 9  8 - 16 mmol/L Final    eGFR if African American 05/25/2020 >60  >60 mL/min/1.73 m^2 Final    eGFR if non African American 05/25/2020 >60  >60 mL/min/1.73 m^2 Final       Tumor markers    CEA    CEA   Date Value Ref Range Status   11/18/2019 3.5 0.0 - 5.0 ng/mL Final      Comment:     CEA Normal Range:  Non-Smokers: 0-3.0 ng/mL  Smokers:     0-5.0 ng/mL              ? IMAGING    05/20/2020  NM PET CT ROUTINE    CLINICAL HISTORY:  colon cancer;  Abnormal findings on diagnostic imaging of other parts of digestive tract    TECHNIQUE:  Segmented attenuation corrected 3-D PET imaging was obtained from the skull base through the mid thighs utilizing 12.49 mCi F-18-FDG.  Noncontrast CT imaging was performed for attenuation correction, diagnosis, and anatomical fusion with PET.    COMPARISON:  12/02/2019.    FINDINGS:  Head/neck: There is normal physiologic FDG uptake noted within the visualized brain parenchyma. No FDG avid lymphadenopathy within the neck.    Chest: No FDG avid pulmonary nodules/masses. No FDG avid mediastinal, hilar or axillary lymph nodes.Right IJ MediPort catheter now seen in place.    Abdomen/Pelvis: Normal physiologic FDG uptake noted within the liver, spleen, urinary tract, and bowel.Interval low anterior resection with prior hypermetabolic rectosigmoid colon mass no longer identified.  Interval resolution of hypermetabolic mass in the posterior right lobe of the liver, and correlative subtle hypoattenuating lesion no longer visible on noncontrast CT images.  No new liver lesions.  Adrenals unremarkable.  No FDG avid retroperitoneal lymph nodes.    Skeletal: Intense diffusely increased FDG uptake throughout the visualized axial and appendicular skeleton consistent with chemotherapy related marrow hyperplasia.  No discrete intraosseous lesions.  Chronic compression deformity of several thoracic vertebrae.      Impression       1. Interval colorectal surgery with resection of prior hypermetabolic rectosigmoid mass.  2. Interval resolution of hypermetabolic liver mass.  3. No detrimental change           ?   Assessment/Plan:       1. Colon adenocarcinoma    2. Iron deficiency anemia due to chronic blood loss    3. Diarrhea, unspecified type    4. Neuropathy due to  chemotherapeutic drug    5. Cellulitis, unspecified cellulitis site    6. Thrombocytopenia    7. Hypokalemia          Plan:     # rectal adenocarcinoma, pT3 pN2 pM0, Stage III, MSI stable:  Initiating adjuvant chemotherapy C1D1 3/23/20.  She has tolerated therapy well and presents for cycle 5.  Restaging PET-CT after 4 cycles shows interval resolution of mildly avid right hepatic lobe lesion previously nondiagnostic on multiple biopsies; I discussed that interval improvement while on chemotherapy does raise concern for potential malignant involvement.  Will plan on continued adjuvant chemotherapy and I will discuss with colorectal surgery.  Labs reviewed and notable for borderline thrombocytopenia platelets 103 K.  Will proceed with treatment today but will need to closely monitor in 2 weeks may require dose reduction or holding therapy.    # Cellulitis of abdominal wall:  completed Keflex course and continued improvement.  Continue to monitor.  Topical antibacterial use.  Continue monitoring    # chronic anticoagulation with a  mechanical aortic valve:  On chronic anticoagulation due to mechanical valve.      #  mild normocytic anemia: IV iron for 2 doses, completed 12/04/2019.  Repeat labs with resolution of anemia and now mild worsening likely related to antineoplastic therapy.  Continue to monitor throughout therapy.    # Hypokalemia:  Advised ongoing supplementation with 40 mEq daily.  Refilled prescription    Follow-Up:   - FOLFOX today  - RV in 2 weeks with labs and chemo

## 2020-05-25 NOTE — Clinical Note
Wanted to share restaging PET with you which shows resolution of right liver lesion making me more concerned of metastatic involvement.  Please let me know your thoughts after a which chance to review

## 2020-05-25 NOTE — DISCHARGE INSTRUCTIONS
[facility]  [id number] [facility]  [address]  [phone] phone     [phone] fax  Hours of Operation: Monday- Friday 8:00am- 5:00pm  After hours phone  [phone]  Hematology / Oncology Physicians on call      CATRACHITA Gonzalez Dr., Dr., Dr., Dr., NP Sydney Prescott, NP Tyesha Taylor, NP    Please call with any concerns regarding your appointment today.FALL PREVENTION   Falls often occur due to slipping, tripping or losing your balance. Here are ways to reduce your risk of falling again.   Was there anything that caused your fall that can be fixed, removed or replaced?   Make your home safe by keeping walkways clear of objects you may trip over.   Use non-slip pads under rugs.   Do not walk in poorly lit areas.   Do not stand on chairs or wobbly ladders.   Use caution when reaching overhead or looking upward. This position can cause a loss of balance.   Be sure your shoes fit properly, have non-slip bottoms and are in good condition.   Be cautious when going up and down stairs, curbs, and when walking on uneven sidewalks.   If your balance is poor, consider using a cane or walker.   If your fall was related to alcohol use, stop or limit alcohol intake.   If your fall was related to use of sleeping medicines, talk to your doctor about this. You may need to reduce your dosage at bedtime if you awaken during the night to go to the bathroom.   To reduce the need for nighttime bathroom trips:   Avoid drinking fluids for several hours before going to bed   Empty your bladder before going to bed   Men can keep a urinal at the bedside   © 9430-8208 [facility], [address] [place] [address], [place] [address]. All rights reserved. This information is not intended as a substitute for professional medical care. Always follow your healthcare professional's instructions.  WAYS TO HELP PREVENT  "INFECTION         WASH YOUR HANDS OFTEN DURING THE DAY, ESPECIALLY BEFORE YOU EAT, AFTER USING THE BATHROOM, AND AFTER TOUCHING ANIMALS     STAY AWAY FROM PEOPLE WHO HAVE ILLNESSES YOU CAN CATCH; SUCH AS COLDS, FLU, CHICKEN POX     TRY TO AVOID CROWDS     STAY AWAY FROM CHILDREN WHO RECENTLY HAVE RECEIVED LIVE VIRUS VACCINES     MAINTAIN GOOD MOUTH CARE     DO NOT SQUEEZE OR SCRATCH PIMPLES     CLEAN CUTS & SCRAPES RIGHT AWAY AND DAILY UNTIL HEALED WITH WARM WATER, SOAP & AN ANTISEPTIC     AVOID CONTACT WITH LITTER BOXES, BIRD CAGES, & FISH TANKS     AVOID STANDING WATER, IE., BIRD BATHS, FLOWER POTS/VASES, OR HUMIDIFIERS     WEAR GLOVES WHEN GARDENING OR CLEANING UP AFTER OTHERS, ESPECIALLY BABIES & SMALL CHILDREN    DO NOT EAT RAW FISH, SEAFOOD, MEAT, OR EGGSSupport Groups/Classes    Support groups and classes are being offered at the   [facility] and [facility]!!    "Cooking with Cancer" (Nutrition Class):  Second Wednesday of each month   at noon at the [facility].  Metastatic Support Group:  Third Tuesday of each month   at noon at the [facility].  Next Steps Class/Group: Second and fourth Thursday of each month at noon at the [facility].  [facility] (Breast Cancer Support Group): First Tuesday of each month   at 5:30pm at the [facility] location.  [employer] [employer]: [facility]: Second and third Tuesday of each month from 7:30am - 2pm.  [facility]: First and fourth Tuesday of each month from 7:30am - 2pm     If you are interested in attending or would like more information please ask our social workers or your nurse!  "

## 2020-05-25 NOTE — PLAN OF CARE
Problem: Neutropenia (Chemotherapy Effects)  Goal: Absence of Infection  Outcome: Ongoing, Progressing   Pt reported had some diarrhea for a past 3 days. She denies any pain or discomfort .

## 2020-05-27 ENCOUNTER — TELEPHONE (OUTPATIENT)
Dept: OTOLARYNGOLOGY | Facility: CLINIC | Age: 70
End: 2020-05-27

## 2020-05-27 ENCOUNTER — HOSPITAL ENCOUNTER (EMERGENCY)
Facility: HOSPITAL | Age: 70
Discharge: HOME | End: 2020-05-27
Attending: EMERGENCY MEDICINE
Payer: MEDICARE

## 2020-05-27 ENCOUNTER — INFUSION (OUTPATIENT)
Dept: INFUSION THERAPY | Facility: HOSPITAL | Age: 70
End: 2020-05-27
Attending: INTERNAL MEDICINE
Payer: MEDICARE

## 2020-05-27 ENCOUNTER — ANTI-COAG VISIT (OUTPATIENT)
Dept: CARDIOLOGY | Facility: CLINIC | Age: 70
End: 2020-05-27
Payer: MEDICARE

## 2020-05-27 VITALS
TEMPERATURE: 99 F | HEART RATE: 77 BPM | DIASTOLIC BLOOD PRESSURE: 75 MMHG | OXYGEN SATURATION: 97 % | SYSTOLIC BLOOD PRESSURE: 121 MMHG | RESPIRATION RATE: 16 BRPM

## 2020-05-27 VITALS
SYSTOLIC BLOOD PRESSURE: 111 MMHG | HEIGHT: 58 IN | HEART RATE: 75 BPM | BODY MASS INDEX: 24.14 KG/M2 | WEIGHT: 115 LBS | TEMPERATURE: 100 F | RESPIRATION RATE: 16 BRPM | OXYGEN SATURATION: 95 % | DIASTOLIC BLOOD PRESSURE: 68 MMHG

## 2020-05-27 DIAGNOSIS — C18.9 COLON ADENOCARCINOMA: Primary | ICD-10-CM

## 2020-05-27 DIAGNOSIS — R04.0 ACUTE ANTERIOR EPISTAXIS: Primary | ICD-10-CM

## 2020-05-27 DIAGNOSIS — R79.1 SUPRATHERAPEUTIC INR: ICD-10-CM

## 2020-05-27 DIAGNOSIS — Z95.2 AORTIC VALVE REPLACED: ICD-10-CM

## 2020-05-27 DIAGNOSIS — Z79.01 LONG TERM (CURRENT) USE OF ANTICOAGULANTS: Primary | ICD-10-CM

## 2020-05-27 LAB
ALBUMIN SERPL BCP-MCNC: 3.6 G/DL (ref 3.5–5.2)
ALP SERPL-CCNC: 188 U/L (ref 55–135)
ALT SERPL W/O P-5'-P-CCNC: 37 U/L (ref 10–44)
ANION GAP SERPL CALC-SCNC: 10 MMOL/L (ref 8–16)
APTT BLDCRRT: 53.8 SEC (ref 21–32)
AST SERPL-CCNC: 39 U/L (ref 10–40)
BASOPHILS # BLD AUTO: 0.07 K/UL (ref 0–0.2)
BASOPHILS NFR BLD: 0.6 % (ref 0–1.9)
BILIRUB SERPL-MCNC: 0.3 MG/DL (ref 0.1–1)
BUN SERPL-MCNC: 15 MG/DL (ref 8–23)
CALCIUM SERPL-MCNC: 9.1 MG/DL (ref 8.7–10.5)
CHLORIDE SERPL-SCNC: 104 MMOL/L (ref 95–110)
CO2 SERPL-SCNC: 31 MMOL/L (ref 23–29)
CREAT SERPL-MCNC: 0.7 MG/DL (ref 0.5–1.4)
DIFFERENTIAL METHOD: ABNORMAL
EOSINOPHIL # BLD AUTO: 0 K/UL (ref 0–0.5)
EOSINOPHIL NFR BLD: 0.3 % (ref 0–8)
ERYTHROCYTE [DISTWIDTH] IN BLOOD BY AUTOMATED COUNT: 16.4 % (ref 11.5–14.5)
EST. GFR  (AFRICAN AMERICAN): >60 ML/MIN/1.73 M^2
EST. GFR  (NON AFRICAN AMERICAN): >60 ML/MIN/1.73 M^2
GLUCOSE SERPL-MCNC: 95 MG/DL (ref 70–110)
HCT VFR BLD AUTO: 34.4 % (ref 37–48.5)
HGB BLD-MCNC: 11.1 G/DL (ref 12–16)
IMM GRANULOCYTES # BLD AUTO: 0.31 K/UL (ref 0–0.04)
IMM GRANULOCYTES NFR BLD AUTO: 2.7 % (ref 0–0.5)
INR PPP: 8 (ref 0.8–1.2)
LYMPHOCYTES # BLD AUTO: 0.8 K/UL (ref 1–4.8)
LYMPHOCYTES NFR BLD: 7.2 % (ref 18–48)
MCH RBC QN AUTO: 32.6 PG (ref 27–31)
MCHC RBC AUTO-ENTMCNC: 32.3 G/DL (ref 32–36)
MCV RBC AUTO: 101 FL (ref 82–98)
MONOCYTES # BLD AUTO: 0.2 K/UL (ref 0.3–1)
MONOCYTES NFR BLD: 2 % (ref 4–15)
NEUTROPHILS # BLD AUTO: 10.1 K/UL (ref 1.8–7.7)
NEUTROPHILS NFR BLD: 87.2 % (ref 38–73)
NRBC BLD-RTO: 0 /100 WBC
PLATELET # BLD AUTO: 108 K/UL (ref 150–350)
PMV BLD AUTO: 10.9 FL (ref 9.2–12.9)
POTASSIUM SERPL-SCNC: 3.4 MMOL/L (ref 3.5–5.1)
PROT SERPL-MCNC: 6.3 G/DL (ref 6–8.4)
PROTHROMBIN TIME: 81.9 SEC (ref 9–12.5)
RBC # BLD AUTO: 3.4 M/UL (ref 4–5.4)
SODIUM SERPL-SCNC: 145 MMOL/L (ref 136–145)
WBC # BLD AUTO: 11.58 K/UL (ref 3.9–12.7)

## 2020-05-27 PROCEDURE — 93793 PR ANTICOAGULANT MGMT FOR PT TAKING WARFARIN: ICD-10-PCS | Mod: S$GLB,,,

## 2020-05-27 PROCEDURE — 93793 ANTICOAG MGMT PT WARFARIN: CPT | Mod: S$GLB,,,

## 2020-05-27 PROCEDURE — 25000003 PHARM REV CODE 250: Performed by: INTERNAL MEDICINE

## 2020-05-27 PROCEDURE — A4216 STERILE WATER/SALINE, 10 ML: HCPCS | Performed by: INTERNAL MEDICINE

## 2020-05-27 PROCEDURE — 96360 HYDRATION IV INFUSION INIT: CPT

## 2020-05-27 PROCEDURE — 25000003 PHARM REV CODE 250: Mod: ER | Performed by: EMERGENCY MEDICINE

## 2020-05-27 PROCEDURE — 80053 COMPREHEN METABOLIC PANEL: CPT | Mod: ER

## 2020-05-27 PROCEDURE — 85610 PROTHROMBIN TIME: CPT | Mod: ER

## 2020-05-27 PROCEDURE — 85025 COMPLETE CBC W/AUTO DIFF WBC: CPT | Mod: ER

## 2020-05-27 PROCEDURE — 63600175 PHARM REV CODE 636 W HCPCS: Mod: ER | Performed by: EMERGENCY MEDICINE

## 2020-05-27 PROCEDURE — 63600175 PHARM REV CODE 636 W HCPCS: Performed by: INTERNAL MEDICINE

## 2020-05-27 PROCEDURE — 85730 THROMBOPLASTIN TIME PARTIAL: CPT | Mod: ER

## 2020-05-27 PROCEDURE — 30901 CONTROL OF NOSEBLEED: CPT | Mod: RT,ER

## 2020-05-27 PROCEDURE — 96372 THER/PROPH/DIAG INJ SC/IM: CPT | Mod: ER

## 2020-05-27 PROCEDURE — 99283 EMERGENCY DEPT VISIT LOW MDM: CPT | Mod: 25,ER

## 2020-05-27 PROCEDURE — 96377 APPLICATON ON-BODY INJECTOR: CPT

## 2020-05-27 RX ORDER — HEPARIN 100 UNIT/ML
500 SYRINGE INTRAVENOUS
Status: DISCONTINUED | OUTPATIENT
Start: 2020-05-27 | End: 2020-05-27 | Stop reason: HOSPADM

## 2020-05-27 RX ORDER — SODIUM CHLORIDE 9 MG/ML
INJECTION, SOLUTION INTRAVENOUS
Status: COMPLETED | OUTPATIENT
Start: 2020-05-27 | End: 2020-05-27

## 2020-05-27 RX ORDER — TRANEXAMIC ACID 100 MG/ML
1000 INJECTION, SOLUTION INTRAVENOUS ONCE
Status: DISCONTINUED | OUTPATIENT
Start: 2020-05-27 | End: 2020-05-27

## 2020-05-27 RX ORDER — PHYTONADIONE 5 MG/1
5 TABLET ORAL
Status: DISCONTINUED | OUTPATIENT
Start: 2020-05-27 | End: 2020-05-27 | Stop reason: RX

## 2020-05-27 RX ORDER — HYDROCODONE BITARTRATE AND ACETAMINOPHEN 5; 325 MG/1; MG/1
1 TABLET ORAL
Status: COMPLETED | OUTPATIENT
Start: 2020-05-27 | End: 2020-05-27

## 2020-05-27 RX ORDER — CEFDINIR 300 MG/1
300 CAPSULE ORAL 2 TIMES DAILY
Qty: 10 CAPSULE | Refills: 0 | Status: SHIPPED | OUTPATIENT
Start: 2020-05-27 | End: 2020-06-01

## 2020-05-27 RX ORDER — PHYTONADIONE 10 MG/ML
2.5 INJECTION, EMULSION INTRAMUSCULAR; INTRAVENOUS; SUBCUTANEOUS ONCE
Status: COMPLETED | OUTPATIENT
Start: 2020-05-27 | End: 2020-05-27

## 2020-05-27 RX ORDER — SODIUM CHLORIDE 0.9 % (FLUSH) 0.9 %
10 SYRINGE (ML) INJECTION
Status: DISCONTINUED | OUTPATIENT
Start: 2020-05-27 | End: 2020-05-27 | Stop reason: HOSPADM

## 2020-05-27 RX ADMIN — SODIUM CHLORIDE: 0.9 INJECTION, SOLUTION INTRAVENOUS at 03:05

## 2020-05-27 RX ADMIN — PHYTONADIONE 2.5 MG: 10 INJECTION, EMULSION INTRAMUSCULAR; INTRAVENOUS; SUBCUTANEOUS at 12:05

## 2020-05-27 RX ADMIN — HEPARIN 500 UNITS: 100 SYRINGE at 04:05

## 2020-05-27 RX ADMIN — PEGFILGRASTIM 6 MG: KIT SUBCUTANEOUS at 03:05

## 2020-05-27 RX ADMIN — SODIUM CHLORIDE, PRESERVATIVE FREE 10 ML: 5 INJECTION INTRAVENOUS at 02:05

## 2020-05-27 RX ADMIN — Medication 2 SPRAY: at 10:05

## 2020-05-27 RX ADMIN — HYDROCODONE BITARTRATE AND ACETAMINOPHEN 1 TABLET: 5; 325 TABLET ORAL at 12:05

## 2020-05-27 NOTE — DISCHARGE INSTRUCTIONS
Call you hematologist today for an appointment today or tomorrow to discuss your coumadin, and chemotherapy pump. See the ENT doctor in order to remove the nasal packing over the next 2-3 days. Do not take your Warfarin until your hematologist or your pharmacists okays you to restart it as your INR level was 8, and needs to come down to ensure there is no repeat bleeding.

## 2020-05-27 NOTE — TELEPHONE ENCOUNTER
Attempted to call, no answer, straight to VM     ----- Message from Maria Fernanda West sent at 5/27/2020  2:12 PM CDT -----  Contact: Pt  Pt is requesting call back in regards to questions about getting appt for excessive nose bleeding. Pt currently have nose  in nose          Pls call back at [phone]

## 2020-05-27 NOTE — ED NOTES
Assisted md with insertion of rhino rocket to right nare. Pt tolerated well. Taped secure per md .

## 2020-05-27 NOTE — PROGRESS NOTES
68 y/o female on OAC for mechanical AVR (>10 years ago). She recently presented to ED for epistaxis with supratherapeutic INR. Of note, she received 2.5mg vitamin K by subcutaneous injection on 5/27.    Other PMH includes CHF, HTN, HLD, IBS, DDD, and adenocarcinoma of colon with (also w/hepatic mass; on chemo and planning possible surgery).       Plan to hold warfarin and patient will be seen in clinic 5/28.

## 2020-05-27 NOTE — ED PROVIDER NOTES
Encounter Date: 5/27/2020       History     Chief Complaint   Patient presents with    Epistaxis     Since 830am, usually stops after 10-15min; chemo last on Monday for colorectal ca and postop Feb 13.     68 y/o F with PMH of aortic valve replacement on coumadin, colon cancer currently on chemotherapy presents to the ED with c/o moderate, constant epistaxis over the past 2 hours. Patient says she typically has resolution of these symptoms after 30 minutes, but today it continued on despite pressure. Denies any trauma or medication issues.         Review of patient's allergies indicates:   Allergen Reactions    Penicillins Rash     Past Medical History:   Diagnosis Date    Anticoagulant long-term use     Aortic valve defect     Benign neoplasm of ascending colon 4/6/2017    Cancer     CHF (congestive heart failure)     DDD (degenerative disc disease), cervical 7/20/2018    GERD (gastroesophageal reflux disease)     Hemorrhoids     HLD (hyperlipidemia)     Hypertension     Insomnia 12/1/2014    Irritable bowel syndrome with constipation 4/9/2018    Postmenopausal osteoporosis 7/20/2018     Past Surgical History:   Procedure Laterality Date    AORTIC VALVE REPLACEMENT  2003    COLONOSCOPY Left 4/6/2017    Procedure: COLONOSCOPY;  Surgeon: Sander Ulloa MD;  Location: [facility] ENDO;  Service: Endoscopy;  Laterality: Left;    COLONOSCOPY N/A 11/7/2019    Procedure: COLONOSCOPY;  Surgeon: Brenda Ochoa MD;  Location: [facility] ENDO;  Service: Endoscopy;  Laterality: N/A;    ESOPHAGOGASTRODUODENOSCOPY N/A 11/7/2019    Procedure: ESOPHAGOGASTRODUODENOSCOPY (EGD) needs PT/INR;  Surgeon: Brenda Ochoa MD;  Location: [facility] ENDO;  Service: Endoscopy;  Laterality: N/A;    FLEXIBLE SIGMOIDOSCOPY N/A 2/13/2020    Procedure: SIGMOIDOSCOPY, FLEXIBLE;  Surgeon: Ankur Smith MD;  Location: [facility] OR;  Service: General;  Laterality: N/A;    INJECTION OF ANESTHETIC AGENT INTO TISSUE PLANE DEFINED BY TRANSVERSUS  ABDOMINIS MUSCLE N/A 2/13/2020    Procedure: BLOCK, TRANSVERSUS ABDOMINIS PLANE;  Surgeon: Ankur Smith MD;  Location: [facility] OR;  Service: General;  Laterality: N/A;    INSERTION OF TUNNELED CENTRAL VENOUS CATHETER (CVC) WITH SUBCUTANEOUS PORT N/A 3/17/2020    Procedure: [id number];  Surgeon: Ankur Smith MD;  Location: [facility] OR;  Service: General;  Laterality: N/A;    ROBOT-ASSISTED LOW ANTERIOR RESECTION OF COLON N/A 2/13/2020    Procedure: XI ROBOTIC RESECTION, COLON, LOW ANTERIOR;  Surgeon: Ankur Smith MD;  Location: [facility] OR;  Service: General;  Laterality: N/A;     Family History   Problem Relation Age of Onset    Diabetes Mother     Heart disease Mother     Hypertension Mother     Heart disease Father     Hypertension Father     Hypertension Sister     Hypertension Son     Heart disease Son     Colon cancer Neg Hx      Social History     Tobacco Use    Smoking status: Never Smoker    Smokeless tobacco: Never Used   Substance Use Topics    Alcohol use: Not Currently    Drug use: No     Review of Systems   Constitutional: Negative for diaphoresis and fever.   HENT: Positive for nosebleeds. Negative for dental problem and sore throat.    Eyes: Negative for visual disturbance.   Respiratory: Negative for cough and shortness of breath.    Cardiovascular: Negative for chest pain and palpitations.   Gastrointestinal: Negative for abdominal pain, diarrhea, nausea and vomiting.   Genitourinary: Negative for dysuria and flank pain.   Musculoskeletal: Negative for back pain and neck pain.   Skin: Negative for rash and wound.   Neurological: Negative for weakness, numbness and headaches.   Psychiatric/Behavioral: Negative for agitation and confusion.       Physical Exam     Initial Vitals   BP Pulse Resp Temp SpO2   05/27/20 1033 05/27/20 1033 05/27/20 1033 05/27/20 1035 05/27/20 1033   129/72 78 16 97.5 °F (36.4 °C) 95 %      MAP       --                Physical  Exam    Constitutional: She appears well-developed and well-nourished.   HENT:   Head: Atraumatic.   Right sided anterior epistaxis.    Eyes: EOM are normal. Pupils are equal, round, and reactive to light.   Neck: Normal range of motion. Neck supple.   Cardiovascular: Normal rate and regular rhythm.   Pulmonary/Chest: Breath sounds normal. No respiratory distress.   Mediport in chest wall.    Abdominal: She exhibits no distension. There is no tenderness.   Neurological: She is alert and oriented to person, place, and time.   Skin: Skin is warm and dry. No pallor.   Psychiatric: She has a normal mood and affect.         ED Course   Epistaxis Mgmt  Date/Time: 5/27/2020 11:04 AM  Performed by: Keaton Tejada MD  Authorized by: Keaton Tejada MD   Consent Done: Emergent Situation  Patient sedated: no  Treatment site: right anterior  Repair method: phenylephrine and Rhino Rocket  Post-procedure assessment: bleeding stopped  Treatment complexity: complex  Patient tolerance: Patient tolerated the procedure well with no immediate complications        Labs Reviewed   APTT - Abnormal; Notable for the following components:       Result Value    aPTT 53.8 (*)     All other components within normal limits   PROTIME-INR - Abnormal; Notable for the following components:    Prothrombin Time 81.9 (*)     INR 8.0 (*)     All other components within normal limits    Narrative:       PT/INR critical result(s) called and verbal readback obtained from   Lara Garcia RN. by [facility] 05/27/2020 11:54    PT/INR critical result(s) called and verbal readback obtained from   Lara Garcia RN. by [facility] 05/27/2020 11:53   CBC W/ AUTO DIFFERENTIAL - Abnormal; Notable for the following components:    RBC 3.40 (*)     Hemoglobin 11.1 (*)     Hematocrit 34.4 (*)     Mean Corpuscular Volume 101 (*)     Mean Corpuscular Hemoglobin 32.6 (*)     RDW 16.4 (*)     Platelets 108 (*)     Immature Granulocytes 2.7 (*)     Gran # (ANC) 10.1 (*)     Immature Grans  (Abs) 0.31 (*)     Lymph # 0.8 (*)     Mono # 0.2 (*)     Gran% 87.2 (*)     Lymph% 7.2 (*)     Mono% 2.0 (*)     All other components within normal limits   COMPREHENSIVE METABOLIC PANEL - Abnormal; Notable for the following components:    Potassium 3.4 (*)     CO2 31 (*)     Alkaline Phosphatase 188 (*)     All other components within normal limits          Imaging Results    None                            ED Course as of May 27 1447   Wed May 27, 2020   1128 Patient coughed, and nose began bleeding again.     [BA]   1156 INR(!!): 8.0 [BA]   1156 Will give oral Vitamin K      [BA]   1234 Bleeding started again, will place packing.     [BA]   1244 Oral vitamin K not available at this facility per pharmacy. Will give 2.5 mg SQ. Giving ENT, Heme/Onc follow up.     [BA]   1304 1:04 PM Reassessment: I reassessed the pt.  The pt is resting comfortably and is NAD.  Pt states their sx have improved. I Discussed test results, shared treatment plan, specific conditions for return, and the need for f/u. I  Answered their questions at this time.  Pt understands and agrees to the plan.  The pt has remained hemodynamically stable through ED course and is stable for discharge.         [BA]   1446 I send Dr. Queen with ENT a message through secure chat and he is okay with the patient following up in 2-3 days.     [BA]      ED Course User Index  [BA] Keaton Tejada MD                Clinical Impression:       ICD-10-CM ICD-9-CM   1. Acute anterior epistaxis R04.0 784.7   2. Supratherapeutic INR R79.1 790.92         Disposition:   Disposition: Discharged  Condition: Stable     ED Disposition Condition    Discharge Stable        ED Prescriptions     Medication Sig Dispense Start Date End Date Auth. Provider    cefdinir (OMNICEF) 300 MG capsule Take 1 capsule (300 mg total) by mouth 2 (two) times daily. for 5 days 10 capsule 5/27/2020 6/1/2020 Keaton Tejada MD        Follow-up Information     Follow up With Specialties Details Why  Contact Info    Danika Quintanilla MD Hematology and Oncology Call today For re-evaluation and further treatment of your warfarin medication. [id number] [address] [facility]  [place] [id number]  [phone]      Rubén Queen MD Otolaryngology Schedule an appointment as soon as possible for a visit in 2 days For removal and possible further management of your nasal packing [id number] [address] [facility]  [place] [id number]  [phone]                                       Keaton Tejada MD  05/27/20 1303       Keaton Tejada MD  05/27/20 1304       Keaton Tejada MD  05/27/20 1305       Keaton Tejada MD  05/27/20 1441

## 2020-05-27 NOTE — ED NOTES
Patient is AAOx4, VSS, moves all extremities, chest rise and fall symmetrical, respirations equal and unlabored. Patient presents to ED today complaining of nose bleed x2 hours, pt is on coumadin. Patient's family member states that when this happen, it usually resolved within 10-15 minutes, however this episode has not. Patient states that she has had sx for colorectal ca and is on chemotherapy with last dose being Monday. Patient reports no other issues. Patient has port. Patient in wheelchair at this time.

## 2020-05-27 NOTE — NURSING
Pt here 5fu Pump d/c and BP is 82/53.  Pt states she feels weak and hard to walk.  She just left [facility] ER for severe nosebleed.  Before ER discharged pt, she states they gave her a hydrocodone pill.  Notified Dr. Quintanilla.  Orders for pt to receive 1L NS over one hour and she will be setting up referral for  Coumadin clinic.  Will continue to monitor pt.

## 2020-05-27 NOTE — TELEPHONE ENCOUNTER
Spoke with the patient and scheduled her an appt with Jacy Hassan for Tuesday 06/02/2020      ----- Message from Joellen Michel sent at 5/27/2020  3:47 PM CDT -----  Contact: Pt  Patient is returning call, please call back at [phone].

## 2020-05-28 ENCOUNTER — TELEPHONE (OUTPATIENT)
Dept: CARDIOLOGY | Facility: CLINIC | Age: 70
End: 2020-05-28

## 2020-05-28 ENCOUNTER — ANTI-COAG VISIT (OUTPATIENT)
Dept: CARDIOLOGY | Facility: CLINIC | Age: 70
End: 2020-05-28
Payer: MEDICARE

## 2020-05-28 DIAGNOSIS — Z79.01 LONG TERM (CURRENT) USE OF ANTICOAGULANTS: Primary | ICD-10-CM

## 2020-05-28 DIAGNOSIS — Z95.2 AORTIC VALVE REPLACED: ICD-10-CM

## 2020-05-28 LAB — INR PPP: 2 (ref 2–3)

## 2020-05-28 PROCEDURE — 85610 POCT INR: ICD-10-PCS | Mod: QW,S$GLB,, | Performed by: INTERNAL MEDICINE

## 2020-05-28 PROCEDURE — 93793 ANTICOAG MGMT PT WARFARIN: CPT | Mod: S$GLB,,,

## 2020-05-28 PROCEDURE — 85610 PROTHROMBIN TIME: CPT | Mod: QW,S$GLB,, | Performed by: INTERNAL MEDICINE

## 2020-05-28 PROCEDURE — 93793 PR ANTICOAGULANT MGMT FOR PT TAKING WARFARIN: ICD-10-PCS | Mod: S$GLB,,,

## 2020-05-28 NOTE — PROGRESS NOTES
LPN notes reviewed. Will resume usual dose without boost since recent bleed. Suspect elevated INR could be in part due to recent diarrhea - now resolved per patient.  INR may be below goal on repeat INR after vitamin K administration. See calendar for plan.

## 2020-05-28 NOTE — TELEPHONE ENCOUNTER
----- Message from Mireya Hough sent at 5/28/2020 12:05 PM CDT -----  Contact: pt  Please call pt.  Believes she missed a call.

## 2020-05-28 NOTE — PROGRESS NOTES
Re-enrollment post hospital discharge.  Patient recently presented to ED for epistaxis with supratherapeutic INR, she received 2.5 mg vitamin K by subcutaneous injection on 5/27/2020.  Patient held warfarin dose yesterday as instructed. INR is therapeutic at 2.0 today.  Reports taking warfarin 2.5 mg 2 days, then 5 mg 1 day, alternating same scheduled.  Patient eats dark leafy greens 1 x per week.  Reports taking Live MVI.  Right nostril - packed.  Will be seeing ENT on Monday, 6/01/2020.  Will re-introduce Coumadin education.  Send to PharmD for review/dosing instructions.        Taking Coumadin   Coumadin (warfarin) helps keep your blood from clotting. But it also increases your risk for bleeding. Because of this, it must be taken exactly as directed. You also need to protect yourself from injury.     Follow These Tips   Take Coumadin at the same time each day. If you miss a dose, take it as soon as you remember (if less then 4 hours); otherwise, if it's almost time for your next dose, skip the missed dose. Do not take a double dose.   Go for your blood (protime/INR) tests as often as directed. Note that diet and   medication can affect your protime/INR level.             REMEMBER:   When value decreases from therapeutic range, the blood                                           gets thicker and when value increases, the blood gets thinner.                                       Dont take any other medications without checking with your healthcare provider first. This includes aspirin, vitamins, and herbal and other dietary supplements.   Tell all healthcare providers that you take Coumadin. Its also a good idea to carry a medical ID card or wear a medical-alert bracelet.   Use a soft toothbrush and an electric razor.   Dont go barefoot. And dont trim corns or calluses yourself.    When to Call Your Healthcare Provider   Call your healthcare provider right away before you take your next dose of Coumadin if you  have any of these problems:   Bleeding that doesnt stop in 10 minutes   A heavier-than-normal period or bleeding between periods   Coughing or throwing up blood   Diarrhea or bleeding hemorrhoids   Dark urine or black stools   Red or black-and-blue marks on the skin that get larger   A fever or an illness that gets worse   Dizziness or fatigue   Chest pain or trouble breathing   A serious fall or a blow to the head    Keep Your Diet Steady   Keep your diet pretty much the same each day. Thats because many foods contain vitamin K. Vitamin K helps your blood clot. So eating foods that contain vitamin K can affect the way Coumadin works. You dont need to avoid foods that have vitamin K. But you do need to keep the amount of them you eat steady (about the same day to day). If you change your diet for any reason, such as due to illness or to lose weight, be sure to tell your doctor.     Examples of foods high in vitamin K are brussels sprouts, avocado, broccoli, cabbage, coleslaw, mustard greens, wilma greens, turnip greens, kale, spinach, lakeisha lettuce, and some other leafy green vegetables. Foods that are mixed with mayonnaise, such as tuna, chicken, and potato salads.  Oils, such as soybean, canola, and Vegetable oils, are also high in vitamin K.       Other food products can affect the way Coumadin works in your body:   Food products that may affect blood clotting include cranberries and cranberry juice, grapefruit juice, fish oil supplements, garlic, latrell, licorice, and turmeric.   Herbs used in herbal teas or supplements can also affect blood clotting. Keep the amount of herbal teas and supplements you use steady.   Alcohol can increase the effect of Coumadin in your body.

## 2020-05-28 NOTE — TELEPHONE ENCOUNTER
Patient contacted: reconfirmed appointment schedule for today at 1:45p ([facility]).

## 2020-05-31 ENCOUNTER — NURSE TRIAGE (OUTPATIENT)
Dept: ADMINISTRATIVE | Facility: CLINIC | Age: 70
End: 2020-05-31

## 2020-05-31 ENCOUNTER — HOSPITAL ENCOUNTER (EMERGENCY)
Facility: HOSPITAL | Age: 70
Discharge: HOME | End: 2020-05-31
Attending: EMERGENCY MEDICINE
Payer: MEDICARE

## 2020-05-31 VITALS
SYSTOLIC BLOOD PRESSURE: 122 MMHG | RESPIRATION RATE: 19 BRPM | WEIGHT: 118.38 LBS | HEIGHT: 58 IN | DIASTOLIC BLOOD PRESSURE: 73 MMHG | TEMPERATURE: 99 F | OXYGEN SATURATION: 98 % | BODY MASS INDEX: 24.85 KG/M2 | HEART RATE: 77 BPM

## 2020-05-31 DIAGNOSIS — R04.0 EPISTAXIS: Primary | ICD-10-CM

## 2020-05-31 PROCEDURE — 63600175 PHARM REV CODE 636 W HCPCS: Mod: ER | Performed by: EMERGENCY MEDICINE

## 2020-05-31 PROCEDURE — 99283 EMERGENCY DEPT VISIT LOW MDM: CPT | Mod: ER

## 2020-05-31 RX ORDER — LEVOFLOXACIN 750 MG/1
750 TABLET ORAL
Status: COMPLETED | OUTPATIENT
Start: 2020-05-31 | End: 2020-05-31

## 2020-05-31 RX ADMIN — LEVOFLOXACIN 750 MG: 750 TABLET, FILM COATED ORAL at 12:05

## 2020-05-31 NOTE — TELEPHONE ENCOUNTER
Patient has fever of 100, chemo patient, had nosebleed on Wednesday, has had packing in her nose since then  secondary to inability to get an appointment, face swollen and painful,patient sounds very sick,  spouse states they were told to go to ED if fever of 100.4 per her chemo protocol, patient and spouse instructed to go to ED now, verb understanding,    Reason for Disposition   Fever in a cancer patient who is currently (or recently) receiving chemotherapy or radiation therapy, or cancer patient who has metastatic or end-stage cancer and is receiving palliative care   Patient sounds very sick or weak to the triager  (Exception: mild weakness and hasn't taken fever medicine)    Additional Information   Negative: Shock suspected (e.g., cold/pale/clammy skin, too weak to stand, low BP, rapid pulse)   Negative: Difficult to awaken or acting confused (e.g., disoriented, slurred speech)   Negative: [1] Difficulty breathing AND [2] bluish lips, tongue or face   Negative: New onset rash with multiple purple (or blood-colored) spots or dots   Negative: Sounds like a life-threatening emergency to the triager   Negative: Shock suspected (e.g., cold/pale/clammy skin, too weak to stand, low BP, rapid pulse)   Negative: Difficult to awaken or acting confused (e.g., disoriented, slurred speech)   Negative: Bluish (or gray) lips or face now   Negative: New onset rash with many purple (or blood-colored) spots or dots   Negative: Sounds like a life-threatening emergency to the triager   Negative: Other symptom is present, see that guideline  (e.g., symptoms of cough, runny nose, sore throat, earache, abdominal pain, diarrhea, vomiting)   Negative: Fever > 104 F (40 C)   Negative: [1] Neutropenia known or suspected (e.g., recent cancer chemotherapy) AND [2] fever > 100.4 F (38.0 C)   Negative: [1] Neutropenia known or suspected (e.g., recent cancer chemotherapy) AND [2] signs or symptoms of suspected infection are  present   Negative: [1] Headache AND [2] stiff neck (can't touch chin to chest)   Negative: Difficulty breathing   Negative: [1] Drinking very little AND [2] dehydration suspected (e.g., no urine > 12 hours, very dry mouth, very lightheaded)    Protocols used: FEVER-A-AH, CANCER - FEVER-A-AH

## 2020-05-31 NOTE — ED PROVIDER NOTES
Encounter Date: 5/31/2020       History     Chief Complaint   Patient presents with    Needs packing removed     pt had nasal packing due to nose bleed placed Wednesday, unable to get ent appt until tomorrow; now low grade temps      The history is provided by the patient.   Epistaxis    This is a recurrent problem. The current episode started several days ago. Episode frequency: resolved- pt has packing in place. The problem has been resolved. The problem is associated with anticoagulants. The bleeding has been from the right nare. She has tried a nasal tampon for the symptoms. The treatment provided significant relief.   Pt concerned about packing, has appt tomorrow with ENT. Pt is taking Coumadin.     Review of patient's allergies indicates:   Allergen Reactions    Penicillins Rash     Past Medical History:   Diagnosis Date    Anticoagulant long-term use     Aortic valve defect     Benign neoplasm of ascending colon 4/6/2017    Cancer     CHF (congestive heart failure)     DDD (degenerative disc disease), cervical 7/20/2018    GERD (gastroesophageal reflux disease)     Hemorrhoids     HLD (hyperlipidemia)     Hypertension     Insomnia 12/1/2014    Irritable bowel syndrome with constipation 4/9/2018    Postmenopausal osteoporosis 7/20/2018     Past Surgical History:   Procedure Laterality Date    AORTIC VALVE REPLACEMENT  2003    COLONOSCOPY Left 4/6/2017    Procedure: COLONOSCOPY;  Surgeon: Sander Ulloa MD;  Location: [facility];  Service: Endoscopy;  Laterality: Left;    COLONOSCOPY N/A 11/7/2019    Procedure: COLONOSCOPY;  Surgeon: Brenda Ochoa MD;  Location: [facility];  Service: Endoscopy;  Laterality: N/A;    ESOPHAGOGASTRODUODENOSCOPY N/A 11/7/2019    Procedure: ESOPHAGOGASTRODUODENOSCOPY (EGD) needs PT/INR;  Surgeon: Brenda Ochoa MD;  Location: [facility];  Service: Endoscopy;  Laterality: N/A;    FLEXIBLE SIGMOIDOSCOPY N/A 2/13/2020    Procedure: SIGMOIDOSCOPY, FLEXIBLE;   Surgeon: Ankur Smith MD;  Location: [facility] OR;  Service: General;  Laterality: N/A;    INJECTION OF ANESTHETIC AGENT INTO TISSUE PLANE DEFINED BY TRANSVERSUS ABDOMINIS MUSCLE N/A 2/13/2020    Procedure: BLOCK, TRANSVERSUS ABDOMINIS PLANE;  Surgeon: Ankur Smith MD;  Location: [facility] OR;  Service: General;  Laterality: N/A;    INSERTION OF TUNNELED CENTRAL VENOUS CATHETER (CVC) WITH SUBCUTANEOUS PORT N/A 3/17/2020    Procedure: [id number];  Surgeon: Ankur Smith MD;  Location: [facility] OR;  Service: General;  Laterality: N/A;    ROBOT-ASSISTED LOW ANTERIOR RESECTION OF COLON N/A 2/13/2020    Procedure: XI ROBOTIC RESECTION, COLON, LOW ANTERIOR;  Surgeon: Ankur Smith MD;  Location: [facility] OR;  Service: General;  Laterality: N/A;     Family History   Problem Relation Age of Onset    Diabetes Mother     Heart disease Mother     Hypertension Mother     Heart disease Father     Hypertension Father     Hypertension Sister     Hypertension Son     Heart disease Son     Colon cancer Neg Hx      Social History     Tobacco Use    Smoking status: Never Smoker    Smokeless tobacco: Never Used   Substance Use Topics    Alcohol use: Not Currently    Drug use: No     Review of Systems   Constitutional: Positive for fever.   HENT: Positive for nosebleeds.    All other systems reviewed and are negative.      Physical Exam     Initial Vitals [05/31/20 1151]   BP Pulse Resp Temp SpO2   127/72 80 16 99.3 °F (37.4 °C) 95 %      MAP       --         Physical Exam    Nursing note and vitals reviewed.  Constitutional: She appears well-developed and well-nourished. No distress.   HENT:   Head: Normocephalic and atraumatic.   Mouth/Throat: Oropharynx is clear and moist.   Nasal Tampon right nare   Eyes: Conjunctivae and EOM are normal. Pupils are equal, round, and reactive to light.   Neck: Normal range of motion. Neck supple.   Cardiovascular: Normal rate, regular rhythm and normal heart sounds.    Pulmonary/Chest: Breath sounds normal. No respiratory distress.   Abdominal: Soft. Bowel sounds are normal. She exhibits no distension. There is no tenderness.   Musculoskeletal: Normal range of motion.   Neurological: She is alert and oriented to person, place, and time. She has normal strength.   Skin: Skin is warm and dry.   Psychiatric: She has a normal mood and affect. Thought content normal.         ED Course   Procedures  Labs Reviewed - No data to display       Imaging Results    None     12:13 PM - Counseling: Spoke with the patient and discussed todays findings, in addition to providing specific details for the plan of care and counseling regarding the diagnosis and prognosis. Questions are answered at this time.  Will give a dose of Levaquin 750 mg in ED, pt has appt with ENT in am tomorrow, will remove packing at ENT clinic as pt on Coumadin will likely need intervention during packing removal if bleeding persists. Pt appears well and in NAD, does not appear septic VSS. Pt agrees c plan.                                     Clinical Impression:       ICD-10-CM ICD-9-CM   1. Epistaxis R04.0 784.7         Disposition:   Disposition: Discharged  Condition: Stable     ED Disposition Condition    Discharge Stable        ED Prescriptions     None        Follow-up Information     Follow up With Specialties Details Why Contact Info    ENT   for packing removal                                      Mohsen Mcintyre MD  05/31/20 [id number]

## 2020-05-31 NOTE — TELEPHONE ENCOUNTER
duplicate    Reason for Disposition   Caller has already spoken with another triager and has no further questions.    Additional Information   Negative: Caller is angry or rude (e.g., hangs up, verbally abusive, yelling)   Negative: Caller hangs up    Protocols used: NO CONTACT OR DUPLICATE CONTACT CALL-A-AH

## 2020-05-31 NOTE — ED NOTES
Pt is AAOx4, moves all extremities, chest rise and fall symmetrical, respirations equal and unlabored. Pt presents to ED today stating she needs her rhinorocket removed. Patient had it placed after several unsuccessful attempts to stop the epistaxis episode in the emergency room on Wednesday. Patient states she has been running a temperature which started this morning and was 100. Patients significant other whom helped her checked in stated he had called [facility] in [place] and was instructed to go to ER to have packing removed. Pt has appointment with ENT tomorrow Monday 6/1.

## 2020-06-01 ENCOUNTER — OFFICE VISIT (OUTPATIENT)
Dept: SURGERY | Facility: CLINIC | Age: 70
End: 2020-06-01
Payer: MEDICARE

## 2020-06-01 ENCOUNTER — OFFICE VISIT (OUTPATIENT)
Dept: OTOLARYNGOLOGY | Facility: CLINIC | Age: 70
End: 2020-06-01
Payer: MEDICARE

## 2020-06-01 VITALS
HEART RATE: 78 BPM | WEIGHT: 117.06 LBS | DIASTOLIC BLOOD PRESSURE: 72 MMHG | SYSTOLIC BLOOD PRESSURE: 123 MMHG | BODY MASS INDEX: 24.47 KG/M2

## 2020-06-01 VITALS
WEIGHT: 117.5 LBS | HEART RATE: 77 BPM | TEMPERATURE: 99 F | DIASTOLIC BLOOD PRESSURE: 67 MMHG | BODY MASS INDEX: 24.56 KG/M2 | SYSTOLIC BLOOD PRESSURE: 108 MMHG

## 2020-06-01 DIAGNOSIS — R04.0 EPISTAXIS: Primary | ICD-10-CM

## 2020-06-01 DIAGNOSIS — C19 ADENOCARCINOMA OF RECTOSIGMOID JUNCTION: ICD-10-CM

## 2020-06-01 DIAGNOSIS — K76.9 LIVER LESION: ICD-10-CM

## 2020-06-01 DIAGNOSIS — C20 RECTAL ADENOCARCINOMA: Primary | ICD-10-CM

## 2020-06-01 PROCEDURE — 1101F PR PT FALLS ASSESS DOC 0-1 FALLS W/OUT INJ PAST YR: ICD-10-PCS | Mod: CPTII,S$GLB,, | Performed by: COLON & RECTAL SURGERY

## 2020-06-01 PROCEDURE — 3078F PR MOST RECENT DIASTOLIC BLOOD PRESSURE < 80 MM HG: ICD-10-PCS | Mod: CPTII,S$GLB,, | Performed by: COLON & RECTAL SURGERY

## 2020-06-01 PROCEDURE — 1126F PR PAIN SEVERITY QUANTIFIED, NO PAIN PRESENT: ICD-10-PCS | Mod: S$GLB,,, | Performed by: COLON & RECTAL SURGERY

## 2020-06-01 PROCEDURE — 99214 PR OFFICE/OUTPT VISIT, EST, LEVL IV, 30-39 MIN: ICD-10-PCS | Mod: S$GLB,,, | Performed by: COLON & RECTAL SURGERY

## 2020-06-01 PROCEDURE — 1126F AMNT PAIN NOTED NONE PRSNT: CPT | Mod: S$GLB,,, | Performed by: COLON & RECTAL SURGERY

## 2020-06-01 PROCEDURE — 3074F SYST BP LT 130 MM HG: CPT | Mod: CPTII,S$GLB,, | Performed by: PHYSICIAN ASSISTANT

## 2020-06-01 PROCEDURE — 99999 PR PBB SHADOW E&M-EST. PATIENT-LVL III: ICD-10-PCS | Mod: PBBFAC,,, | Performed by: COLON & RECTAL SURGERY

## 2020-06-01 PROCEDURE — 1126F AMNT PAIN NOTED NONE PRSNT: CPT | Mod: S$GLB,,, | Performed by: PHYSICIAN ASSISTANT

## 2020-06-01 PROCEDURE — 1101F PR PT FALLS ASSESS DOC 0-1 FALLS W/OUT INJ PAST YR: ICD-10-PCS | Mod: CPTII,S$GLB,, | Performed by: PHYSICIAN ASSISTANT

## 2020-06-01 PROCEDURE — 3074F PR MOST RECENT SYSTOLIC BLOOD PRESSURE < 130 MM HG: ICD-10-PCS | Mod: CPTII,S$GLB,, | Performed by: PHYSICIAN ASSISTANT

## 2020-06-01 PROCEDURE — 3074F PR MOST RECENT SYSTOLIC BLOOD PRESSURE < 130 MM HG: ICD-10-PCS | Mod: CPTII,S$GLB,, | Performed by: COLON & RECTAL SURGERY

## 2020-06-01 PROCEDURE — 99213 OFFICE O/P EST LOW 20 MIN: CPT | Mod: S$GLB,,, | Performed by: PHYSICIAN ASSISTANT

## 2020-06-01 PROCEDURE — 99999 PR PBB SHADOW E&M-EST. PATIENT-LVL III: CPT | Mod: PBBFAC,,, | Performed by: COLON & RECTAL SURGERY

## 2020-06-01 PROCEDURE — 1159F PR MEDICATION LIST DOCUMENTED IN MEDICAL RECORD: ICD-10-PCS | Mod: S$GLB,,, | Performed by: PHYSICIAN ASSISTANT

## 2020-06-01 PROCEDURE — 1159F MED LIST DOCD IN RCRD: CPT | Mod: S$GLB,,, | Performed by: PHYSICIAN ASSISTANT

## 2020-06-01 PROCEDURE — 1126F PR PAIN SEVERITY QUANTIFIED, NO PAIN PRESENT: ICD-10-PCS | Mod: S$GLB,,, | Performed by: PHYSICIAN ASSISTANT

## 2020-06-01 PROCEDURE — 3078F DIAST BP <80 MM HG: CPT | Mod: CPTII,S$GLB,, | Performed by: COLON & RECTAL SURGERY

## 2020-06-01 PROCEDURE — 1159F MED LIST DOCD IN RCRD: CPT | Mod: S$GLB,,, | Performed by: COLON & RECTAL SURGERY

## 2020-06-01 PROCEDURE — 3074F SYST BP LT 130 MM HG: CPT | Mod: CPTII,S$GLB,, | Performed by: COLON & RECTAL SURGERY

## 2020-06-01 PROCEDURE — 1101F PT FALLS ASSESS-DOCD LE1/YR: CPT | Mod: CPTII,S$GLB,, | Performed by: COLON & RECTAL SURGERY

## 2020-06-01 PROCEDURE — 1101F PT FALLS ASSESS-DOCD LE1/YR: CPT | Mod: CPTII,S$GLB,, | Performed by: PHYSICIAN ASSISTANT

## 2020-06-01 PROCEDURE — 99999 PR PBB SHADOW E&M-EST. PATIENT-LVL II: CPT | Mod: PBBFAC,,, | Performed by: PHYSICIAN ASSISTANT

## 2020-06-01 PROCEDURE — 3078F PR MOST RECENT DIASTOLIC BLOOD PRESSURE < 80 MM HG: ICD-10-PCS | Mod: CPTII,S$GLB,, | Performed by: PHYSICIAN ASSISTANT

## 2020-06-01 PROCEDURE — 99999 PR PBB SHADOW E&M-EST. PATIENT-LVL II: ICD-10-PCS | Mod: PBBFAC,,, | Performed by: PHYSICIAN ASSISTANT

## 2020-06-01 PROCEDURE — 99213 PR OFFICE/OUTPT VISIT, EST, LEVL III, 20-29 MIN: ICD-10-PCS | Mod: S$GLB,,, | Performed by: PHYSICIAN ASSISTANT

## 2020-06-01 PROCEDURE — 99214 OFFICE O/P EST MOD 30 MIN: CPT | Mod: S$GLB,,, | Performed by: COLON & RECTAL SURGERY

## 2020-06-01 PROCEDURE — 1159F PR MEDICATION LIST DOCUMENTED IN MEDICAL RECORD: ICD-10-PCS | Mod: S$GLB,,, | Performed by: COLON & RECTAL SURGERY

## 2020-06-01 PROCEDURE — 3078F DIAST BP <80 MM HG: CPT | Mod: CPTII,S$GLB,, | Performed by: PHYSICIAN ASSISTANT

## 2020-06-01 RX ORDER — MUPIROCIN 20 MG/G
OINTMENT TOPICAL 2 TIMES DAILY
Qty: 15 G | Refills: 3 | Status: SHIPPED | OUTPATIENT
Start: 2020-06-01 | End: 2020-06-11

## 2020-06-01 NOTE — PROGRESS NOTES
Subjective:   Patient ID: Adalgisa Wright is a 69 y.o. female.    Chief Complaint: Epistaxis (1 week )    Ms. Wright is a very pleasant 68 yo female here to see me today with complaints of epistaxis. She had a nasal tampon placed 5/27/20 in the ER. She is on coumadin but was held due to epistaxis. She returns to coumadin clinic tomorrow. She was started on omnicef due to prolonged FB in nose. She is on chemo for colorectal carcinoma.    Review of patient's allergies indicates:   Allergen Reactions    Penicillins Rash           Review of Systems   HENT: Positive for nosebleeds.          Objective:   /72   Pulse 78   Wt 53.1 kg (117 lb 1 oz)   BMI 24.47 kg/m²     Physical Exam     HENT: nasal tampon in right nare. Removed without difficulty. No bleeding once removed. Irritation at nare opening.    Assessment:     1. Epistaxis        Plan:     Epistaxis    Other orders  -     mupirocin (BACTROBAN) 2 % ointment; by Nasal route 2 (two) times daily. Apply to nose twice daily for 10 days  Dispense: 15 g; Refill: 3      Epistaxis: She  was given a handout detailing different strategies to help increase nasal moisture, including the use of saline spray throughout the day and a humidifier at night. In addition, I gave the patient a prescription for Bactroban to be used twice daily for two weeks, and she may use Afrin as needed for active bleeding.

## 2020-06-01 NOTE — PROGRESS NOTES
History & Physical    SUBJECTIVE:     CC: rectosigmoid cancer  Ref MD: Brenda Ochoa MD    History of Present Illness:  Patient is a 69 y.o. 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  Patient was undergoing a colonoscopy on November 7, 2019 where a rectosigmoid mass was found and biopsied which showed moderately differentiated adenocarcinoma.  Patient had no other intramucosal lesions at that time.  She is undergoing colonoscopy for iron deficiency anemia.  She had previously been found the month before to have iron deficiency anemia as well as blood when wiping after a bowel movement.  She is on chronic anticoagulation therapy of Coumadin for mechanical heart valve which was done in 2003.  She denies any fever, chills, nausea, vomiting, diarrhea, melena, weight loss or any other signs or symptoms.  She has no personal family history of colorectal cancer or IBD.  Her last colonoscopy prior to this was in 2017 were no lesion was found in this area. Her only significant past surgical history is this mechanical valve replacement.  No episodes of fecal incontinence per her report and some chronic constipation noted. She is referred to Colorectal surgery for evaluation.  I have personally spoken with her referring provider and reviewed her previous records.    2/13/2020: Robotic LAR with CR anastomosis    Interval history:  Since last clinic visit, the patient received adjuvant chemotherapy.  She subsequently had a repeat PET scan which showed resolution of FDG activity in the liver.  She continues to tolerate a regular diet and have normal bowel function.  However, she has developed a recent nose bleed requiring nasal packing on the right nare as well as a small amount of melena over the past few days.  She states prior to this, she was having normal bowel function.  She denies any current fever, chills, nausea, vomiting.      Review of patient's allergies indicates:   Allergen Reactions     Penicillins Rash       Current Outpatient Medications   Medication Sig Dispense Refill    acebutolol (SECTRAL) 400 mg capsule Take 400 mg by mouth 2 (two) times daily.      acetaminophen (TYLENOL) 325 MG tablet Take 2 tablets (650 mg total) by mouth every 6 (six) hours.  0    ALPRAZolam (XANAX) 1 MG tablet Take 1 mg by mouth 2 (two) times daily as needed.      butalbital-acetaminophen-caffeine -40 mg (FIORICET, ESGIC) -40 mg per tablet TAKE ONE TABLET BY MOUTH EVERY SIX HOURS AS NEEDED      cefdinir (OMNICEF) 300 MG capsule Take 1 capsule (300 mg total) by mouth 2 (two) times daily. for 5 days 10 capsule 0    dexAMETHasone (DECADRON) 4 MG Tab Take 8 mg (2 tabs) by mouth on day 2 and day 3 following each cycle of chemo 24 tablet 0    diphenoxylate-atropine 2.5-0.025 mg (LOMOTIL) 2.5-0.025 mg per tablet Take 1 tablet by mouth 4 (four) times daily as needed for Diarrhea. 30 tablet 1    ergocalciferol (ERGOCALCIFEROL) 50,000 unit Cap Take 1 capsule by mouth every 30 days.      furosemide (LASIX) 40 MG tablet Take 40 mg by mouth Daily.      LINZESS 145 mcg Cap capsule TAKE 1 CAPSULE (145 MCG TOTAL) BY MOUTH ONCE DAILY. (Patient taking differently: daily as needed. ) 30 capsule 5    lovastatin (MEVACOR) 40 MG tablet Take 40 mg by mouth every evening.      multivitamin (THERAGRAN) tablet Take 1 tablet by mouth.      nozaseptin (NOZIN) nasal  1 each by Each Nostril route 2 (two) times daily. 1 applicator 0    oxyCODONE (ROXICODONE) 5 MG immediate release tablet Take 1 tablet (5 mg total) by mouth every 4 (four) hours as needed. 20 tablet 0    pantoprazole (PROTONIX) 20 MG tablet Take 1 tablet (20 mg total) by mouth once daily. 30 tablet 11    potassium 99 mg Tab Take by mouth once.      potassium chloride SA (K-DUR,KLOR-CON) 20 MEQ tablet Take 2 tablets (40 mEq total) by mouth once daily. 40 tablet 11    prochlorperazine (COMPAZINE) 5 MG tablet Take 1 tablet (5 mg total) by mouth every  6 (six) hours as needed for Nausea. 30 tablet 1    sertraline (ZOLOFT) 100 MG tablet Take 1 tablet by mouth Daily.      traMADol (ULTRAM) 50 mg tablet 50 mg every 8 (eight) hours as needed.       warfarin (COUMADIN) 5 MG tablet Take 1 tablet (5 mg total) by mouth Daily. 5 mg by mouth for five days then alternate with 2.5 mg for one day.      zolpidem (AMBIEN) 10 mg Tab Take 5 mg by mouth nightly as needed.        Current Facility-Administered Medications   Medication Dose Route Frequency Provider Last Rate Last Dose    lidocaine (PF) 10 mg/ml (1%) injection 10 mg  1 mL Intradermal Once Ankur Smith MD           Past Medical History:   Diagnosis Date    Anticoagulant long-term use     Aortic valve defect     Benign neoplasm of ascending colon 4/6/2017    Cancer     CHF (congestive heart failure)     DDD (degenerative disc disease), cervical 7/20/2018    GERD (gastroesophageal reflux disease)     Hemorrhoids     HLD (hyperlipidemia)     Hypertension     Insomnia 12/1/2014    Irritable bowel syndrome with constipation 4/9/2018    Postmenopausal osteoporosis 7/20/2018     Past Surgical History:   Procedure Laterality Date    AORTIC VALVE REPLACEMENT  2003    COLONOSCOPY Left 4/6/2017    Procedure: COLONOSCOPY;  Surgeon: Sander Ulloa MD;  Location: [facility] ENDO;  Service: Endoscopy;  Laterality: Left;    COLONOSCOPY N/A 11/7/2019    Procedure: COLONOSCOPY;  Surgeon: Brenda Ochoa MD;  Location[facility];  Service: Endoscopy;  Laterality: N/A;    ESOPHAGOGASTRODUODENOSCOPY N/A 11/7/2019    Procedure: ESOPHAGOGASTRODUODENOSCOPY (EGD) needs PT/INR;  Surgeon: Brenda Ochoa MD;  Location: [facility] ENDO;  Service: Endoscopy;  Laterality: N/A;    FLEXIBLE SIGMOIDOSCOPY N/A 2/13/2020    Procedure: SIGMOIDOSCOPY, FLEXIBLE;  Surgeon: Ankur Smith MD;  Location: [facility] OR;  Service: General;  Laterality: N/A;    INJECTION OF ANESTHETIC AGENT INTO TISSUE PLANE DEFINED BY TRANSVERSUS ABDOMINIS  MUSCLE N/A 2/13/2020    Procedure: BLOCK, TRANSVERSUS ABDOMINIS PLANE;  Surgeon: Ankur Smith MD;  Location: [facility] OR;  Service: General;  Laterality: N/A;    INSERTION OF TUNNELED CENTRAL VENOUS CATHETER (CVC) WITH SUBCUTANEOUS PORT N/A 3/17/2020    Procedure: [id number]-[id number];  Surgeon: Ankur Smith MD;  Location: [facility] OR;  Service: General;  Laterality: N/A;    ROBOT-ASSISTED LOW ANTERIOR RESECTION OF COLON N/A 2/13/2020    Procedure: XI ROBOTIC RESECTION, COLON, LOW ANTERIOR;  Surgeon: Ankur Smith MD;  Location: [facility] OR;  Service: General;  Laterality: N/A;     Family History   Problem Relation Age of Onset    Diabetes Mother     Heart disease Mother     Hypertension Mother     Heart disease Father     Hypertension Father     Hypertension Sister     Hypertension Son     Heart disease Son     Colon cancer Neg Hx      Social History     Tobacco Use    Smoking status: Never Smoker    Smokeless tobacco: Never Used   Substance Use Topics    Alcohol use: Not Currently    Drug use: No        Review of Systems:  Review of Systems   Constitutional: Negative for activity change, appetite change, chills, fatigue, fever and unexpected weight change.   HENT: Positive for nosebleeds. Negative for congestion, ear pain, sore throat and trouble swallowing.    Eyes: Negative for pain, redness and itching.   Respiratory: Negative for cough, shortness of breath and wheezing.    Cardiovascular: Negative for chest pain, palpitations and leg swelling.   Gastrointestinal: Negative for abdominal distention, abdominal pain, anal bleeding, blood in stool, constipation, diarrhea, nausea, rectal pain and vomiting.   Endocrine: Negative for cold intolerance, heat intolerance and polyuria.   Genitourinary: Negative for dysuria, flank pain, frequency and hematuria.   Musculoskeletal: Negative for gait problem, joint swelling and neck pain.   Skin: Negative for color change, rash and wound.    Allergic/Immunologic: Negative for environmental allergies and immunocompromised state.   Neurological: Negative for dizziness, speech difficulty, weakness and numbness.   Psychiatric/Behavioral: Negative for agitation, confusion and hallucinations.       OBJECTIVE:     Vital Signs (Most Recent)  Temp: 98.7 °F (37.1 °C) (06/01/20 1004)  Pulse: 77 (06/01/20 1004)  BP: 108/67 (06/01/20 1004)     53.3 kg (117 lb 8.1 oz)     Physical Exam:  Physical Exam   Constitutional: She is oriented to person, place, and time. She appears well-developed.   HENT:   Head: Normocephalic and atraumatic.   Eyes: Conjunctivae and EOM are normal.   Neck: Normal range of motion. No thyromegaly present.   Cardiovascular: Normal rate and regular rhythm.   Pulmonary/Chest: Effort normal. No respiratory distress.   Abdominal: Soft. She exhibits no distension and no mass. There is no tenderness.   Incisions well-healed; no erythema or drainage   Musculoskeletal: Normal range of motion. She exhibits no edema or tenderness.   Neurological: She is alert and oriented to person, place, and time.   Skin: Skin is warm and dry. Capillary refill takes less than 2 seconds. No rash noted.   Psychiatric: She has a normal mood and affect.     Laboratory  Lab Results   Component Value Date    WBC 11.58 05/27/2020    HGB 11.1 (L) 05/27/2020    HCT 34.4 (L) 05/27/2020     (L) 05/27/2020    ALT 37 05/27/2020    AST 39 05/27/2020     05/27/2020    K 3.4 (L) 05/27/2020     05/27/2020    CREATININE 0.7 05/27/2020    BUN 15 05/27/2020    CO2 31 (H) 05/27/2020    INR 2.0 05/28/2020     CEA: 4.4 (May 2020) <-- 3.5 (Nov 2019)    Diagnostic Results:  Reviewed colonoscopy report images well as pathology showing rectosigmoid mass consistent with adenocarcinoma    MRI Liver:  FINDINGS:  There is a rounded lesion in the posterior right hepatic lobe (segment 7) which correlates with prior CT findings and is essentially unchanged in size measuring to 18  mm.  This lesion is isointense on precontrast T1 and T2 sequences and appears isointense to liver parenchyma on postcontrast images.  No definite associated internal enhancement or washout demonstrated.  No definite capsular enhancement.  This lesion does appear to demonstrate some mild intrinsic T1 signal dropout on out of phase images, suggesting high intracellular lipid content and may potentially represent focal fatty infiltration.  No associated restricted diffusion.  Few scattered subcentimeter nonenhancing cyst are present in the liver.    No discrete biliary, splenic, pancreatic, adrenal, or renal abnormality demonstrated.  No upper abdominal adenopathy demonstrated.      Impression       1. Rounded 18 mm lesion in the posterior right hepatic lobe which correlates with prior CT findings.  Lesion remains indeterminate.  While the lack of enhancement and suspected high intracellular lipid content suggests benign etiology, malignancy can not be entirely excluded.  Consider close interval follow-up.     PET May 2020:  FINDINGS:  Head/neck: There is normal physiologic FDG uptake noted within the visualized brain parenchyma. No FDG avid lymphadenopathy within the neck.    Chest: No FDG avid pulmonary nodules/masses. No FDG avid mediastinal, hilar or axillary lymph nodes.Right IJ MediPort catheter now seen in place.    Abdomen/Pelvis: Normal physiologic FDG uptake noted within the liver, spleen, urinary tract, and bowel.Interval low anterior resection with prior hypermetabolic rectosigmoid colon mass no longer identified.  Interval resolution of hypermetabolic mass in the posterior right lobe of the liver, and correlative subtle hypoattenuating lesion no longer visible on noncontrast CT images.  No new liver lesions.  Adrenals unremarkable.  No FDG avid retroperitoneal lymph nodes.    Skeletal: Intense diffusely increased FDG uptake throughout the visualized axial and appendicular skeleton consistent with  chemotherapy related marrow hyperplasia.  No discrete intraosseous lesions.  Chronic compression deformity of several thoracic vertebrae.      Impression       1. Interval colorectal surgery with resection of prior hypermetabolic rectosigmoid mass.  2. Interval resolution of hypermetabolic liver mass.  3. No detrimental change           PATHOLOGY from colonoscopy:   Rectal mass, biopsy:  - Moderately differentiated invasive adenocarcinoma with focal mucosal ulceration arising in a tubular  adenoma, see comment  Comment: Adenocarcinoma measures at least 0.6 cm and occupies approximately 70% of tissue submitted. No  definitive perineural or lymphovascular invasion is identified. This case was seen in consultation with Dr. Amaya  who agrees with the above diagnosis. Please see results of the MMR panel below:  Immunohistochemical stains for MLH1, MSH2, MSH6 & PMS2 reveal distinct nuclear staining for all markers.  These findings indicate an INTACT mismatch repair (MMR) gene function without evidence of significant  microsatellite instability (MSI). Although these findings make the diagnosis of hereditary non-polyposis colon cancer  (HNPCC) or Urbano Syndrome very unlikely in this patient, approximately 5% of colorectal carcinomas with defective  MMR genes and high MSI may show this apparent normal pattern of staining. Correlation with the clinical findings  & family history is encouraged.    PATH IR biopsies:  1. Liver mass, biopsy:  - Not diagnostic of malignancy  - Scant atypical liver sampling, see comments  Comments: The sections show a scant fragmented sample of liver tissue with psuedoglands in one core  and mild architectural distortion. Cytologic atypiia is not prominent. Reticulin stain shows mildly irregular  pattern of staining. No portal tracts present for evaluation. Back to back pseudoglands raises the possibly of  a well differetniated hepatocellular lesion. However, the scant sample limits  interpretation. Due to the  presence of a clinical mass. Resampling is indicated.    LIVER MASS, BIOPSY:  Negative for malignancy  Benign liver parenchyma  Macrosteatosis - 5%  Glycogenated nuclei  Negative for steatohepatitis  Minimal focal sinusoidal dilatation  Focal bile duct dilatation  Negative for fibrosis  Patient's history of rectal carcinoma is noticed. There is no evidence of metastatic malignancy in this biopsy.  If clinically suspicious, sampling issues should be considered.  Special stains trichrome, reticulin, beta-catenin and glutamine synthatase have also been performed and  support the above diagnosis.    PATHOLOGY from surgery Feb 2020:  1. SIGMOID AND UPPER RECTUM, RESECTION:  Adenocarcinoma, moderately differentiated  Margins negative  Six of fourteen lymph nodes are positive for metastatic adenocarcinoma (6/14)  Many tumor deposits in mesenteric tissue  2. ANASTOMOTIC DONUT:  Negative for malignancy  SYNOPTIC REPORT  Procedure - Low-anterior resection  Tumor Site - Rectum  Tumor Location - Circumferential  Tumor Size - 2.5 x 2cm  Macroscopic Tumor Perforation - Not Identified  Macroscopic Intactness Of Mesorectum - Not applicable  Histologic Type - Adenocarcinoma  Histologic Grade - Moderately differentiated  Microscopic Tumor Extension - Tumor invades subserosal adipose tissue  Margins - Proximal, distal and mesenteric margins are negative (grossly mesentric margin is 4.0cm from the  closest tumor)  Treatment Effect - Not applicable  Lymphovascular Invasion - Present  Perineural Invasion - Not Identified  Tumor Deposits - Present, multiple  Pathologic Staging - p T3 N2 Mx    ASSESSMENT/PLAN:     69-year-old female with adenocarcinoma of the rectosigmoid junction with liver lesion with negative biopsy x2 and negative MRI liver for definitive metastatic disease who is now s/p robotic LAR on 2/13/2020 with final path showing Stage III (T3N2) rectal adenocarcinoma who has now received adjuvant  chemotherapy    - agree with presentation at multidisciplinary tumor board in the near future to discuss resolution of FDG avid lesion in liver as well as possible need for continuation of chemotherapy  - CEA level continues to be normal.  PET scan without any additional evidence of metastatic disease  - needs routine surveillance from a colorectal surgery standpoint with physical exam, CEA level and repeat colonoscopy in 1 year  - RTC 3 months    Ankur Smith MD  Colon and Rectal Surgery  [facility]

## 2020-06-02 ENCOUNTER — ANTI-COAG VISIT (OUTPATIENT)
Dept: CARDIOLOGY | Facility: CLINIC | Age: 70
End: 2020-06-02
Payer: MEDICARE

## 2020-06-02 DIAGNOSIS — Z95.2 AORTIC VALVE REPLACED: ICD-10-CM

## 2020-06-02 DIAGNOSIS — Z79.01 LONG TERM (CURRENT) USE OF ANTICOAGULANTS: Primary | ICD-10-CM

## 2020-06-02 LAB — INR PPP: 3.7 (ref 2–3)

## 2020-06-02 PROCEDURE — 85610 POCT INR: ICD-10-PCS | Mod: QW,S$GLB,, | Performed by: NUCLEAR MEDICINE

## 2020-06-02 PROCEDURE — 85610 PROTHROMBIN TIME: CPT | Mod: QW,S$GLB,, | Performed by: NUCLEAR MEDICINE

## 2020-06-02 PROCEDURE — 93793 ANTICOAG MGMT PT WARFARIN: CPT | Mod: S$GLB,,,

## 2020-06-02 PROCEDURE — 93793 PR ANTICOAGULANT MGMT FOR PT TAKING WARFARIN: ICD-10-PCS | Mod: S$GLB,,,

## 2020-06-02 NOTE — PROGRESS NOTES
Patient's INR is supratherapeutic at 3.7.  Previous instructions reported followed.  Recent ER visit on Sunday, 5/31/2020 - given 1 dose of levoFLOXacin tablet 750 mg.  Nosebleed - resolved.  No other bleeding issues reported.  Send to PharmD for review/dosing instructions.

## 2020-06-03 ENCOUNTER — OFFICE VISIT (OUTPATIENT)
Dept: HEMATOLOGY/ONCOLOGY | Facility: CLINIC | Age: 70
End: 2020-06-03
Payer: MEDICARE

## 2020-06-03 ENCOUNTER — LAB VISIT (OUTPATIENT)
Dept: LAB | Facility: HOSPITAL | Age: 70
End: 2020-06-03
Attending: INTERNAL MEDICINE
Payer: MEDICARE

## 2020-06-03 VITALS
HEART RATE: 73 BPM | WEIGHT: 117.75 LBS | BODY MASS INDEX: 24.72 KG/M2 | TEMPERATURE: 97 F | DIASTOLIC BLOOD PRESSURE: 68 MMHG | OXYGEN SATURATION: 96 % | SYSTOLIC BLOOD PRESSURE: 105 MMHG | HEIGHT: 58 IN

## 2020-06-03 DIAGNOSIS — C18.9 COLON ADENOCARCINOMA: ICD-10-CM

## 2020-06-03 DIAGNOSIS — R71.0 PRECIPITOUS DROP IN HEMATOCRIT: ICD-10-CM

## 2020-06-03 DIAGNOSIS — C20 RECTAL ADENOCARCINOMA: ICD-10-CM

## 2020-06-03 DIAGNOSIS — D69.59 OTHER SECONDARY THROMBOCYTOPENIA: ICD-10-CM

## 2020-06-03 DIAGNOSIS — R79.1 SUPRATHERAPEUTIC INR: ICD-10-CM

## 2020-06-03 DIAGNOSIS — R04.0 EPISTAXIS: ICD-10-CM

## 2020-06-03 DIAGNOSIS — D50.0 ANEMIA DUE TO CHRONIC BLOOD LOSS: ICD-10-CM

## 2020-06-03 DIAGNOSIS — R04.0 EPISTAXIS: Primary | ICD-10-CM

## 2020-06-03 LAB
ALBUMIN SERPL BCP-MCNC: 3.5 G/DL (ref 3.5–5.2)
ALP SERPL-CCNC: 201 U/L (ref 55–135)
ALT SERPL W/O P-5'-P-CCNC: 20 U/L (ref 10–44)
ANION GAP SERPL CALC-SCNC: 12 MMOL/L (ref 8–16)
ANISOCYTOSIS BLD QL SMEAR: SLIGHT
AST SERPL-CCNC: 18 U/L (ref 10–40)
BASOPHILS # BLD AUTO: ABNORMAL K/UL (ref 0–0.2)
BASOPHILS NFR BLD: 0 % (ref 0–1.9)
BILIRUB SERPL-MCNC: 0.3 MG/DL (ref 0.1–1)
BUN SERPL-MCNC: 8 MG/DL (ref 8–23)
CALCIUM SERPL-MCNC: 9.1 MG/DL (ref 8.7–10.5)
CHLORIDE SERPL-SCNC: 104 MMOL/L (ref 95–110)
CO2 SERPL-SCNC: 26 MMOL/L (ref 23–29)
CREAT SERPL-MCNC: 0.8 MG/DL (ref 0.5–1.4)
DACRYOCYTES BLD QL SMEAR: ABNORMAL
DIFFERENTIAL METHOD: ABNORMAL
EOSINOPHIL # BLD AUTO: ABNORMAL K/UL (ref 0–0.5)
EOSINOPHIL NFR BLD: 1 % (ref 0–8)
ERYTHROCYTE [DISTWIDTH] IN BLOOD BY AUTOMATED COUNT: 16.7 % (ref 11.5–14.5)
EST. GFR  (AFRICAN AMERICAN): >60 ML/MIN/1.73 M^2
EST. GFR  (NON AFRICAN AMERICAN): >60 ML/MIN/1.73 M^2
GLUCOSE SERPL-MCNC: 107 MG/DL (ref 70–110)
HCT VFR BLD AUTO: 28.9 % (ref 37–48.5)
HGB BLD-MCNC: 8.8 G/DL (ref 12–16)
IMM GRANULOCYTES # BLD AUTO: ABNORMAL K/UL (ref 0–0.04)
IMM GRANULOCYTES NFR BLD AUTO: ABNORMAL % (ref 0–0.5)
LYMPHOCYTES # BLD AUTO: ABNORMAL K/UL (ref 1–4.8)
LYMPHOCYTES NFR BLD: 15 % (ref 18–48)
MCH RBC QN AUTO: 32.5 PG (ref 27–31)
MCHC RBC AUTO-ENTMCNC: 30.4 G/DL (ref 32–36)
MCV RBC AUTO: 107 FL (ref 82–98)
MONOCYTES # BLD AUTO: ABNORMAL K/UL (ref 0.3–1)
MONOCYTES NFR BLD: 5 % (ref 4–15)
NEUTROPHILS NFR BLD: 73 % (ref 38–73)
NEUTS BAND NFR BLD MANUAL: 6 %
NRBC BLD-RTO: 0 /100 WBC
PLATELET # BLD AUTO: 91 K/UL (ref 150–350)
PMV BLD AUTO: 11.2 FL (ref 9.2–12.9)
POIKILOCYTOSIS BLD QL SMEAR: SLIGHT
POLYCHROMASIA BLD QL SMEAR: ABNORMAL
POTASSIUM SERPL-SCNC: 4.3 MMOL/L (ref 3.5–5.1)
PROT SERPL-MCNC: 6.4 G/DL (ref 6–8.4)
RBC # BLD AUTO: 2.71 M/UL (ref 4–5.4)
SODIUM SERPL-SCNC: 142 MMOL/L (ref 136–145)
STOMATOCYTES BLD QL SMEAR: PRESENT
WBC # BLD AUTO: 8.93 K/UL (ref 3.9–12.7)

## 2020-06-03 PROCEDURE — 3074F PR MOST RECENT SYSTOLIC BLOOD PRESSURE < 130 MM HG: ICD-10-PCS | Mod: CPTII,S$GLB,, | Performed by: NURSE PRACTITIONER

## 2020-06-03 PROCEDURE — 1101F PR PT FALLS ASSESS DOC 0-1 FALLS W/OUT INJ PAST YR: ICD-10-PCS | Mod: CPTII,S$GLB,, | Performed by: NURSE PRACTITIONER

## 2020-06-03 PROCEDURE — 85027 COMPLETE CBC AUTOMATED: CPT

## 2020-06-03 PROCEDURE — 99214 PR OFFICE/OUTPT VISIT, EST, LEVL IV, 30-39 MIN: ICD-10-PCS | Mod: S$GLB,,, | Performed by: NURSE PRACTITIONER

## 2020-06-03 PROCEDURE — 1101F PT FALLS ASSESS-DOCD LE1/YR: CPT | Mod: CPTII,S$GLB,, | Performed by: NURSE PRACTITIONER

## 2020-06-03 PROCEDURE — 99999 PR PBB SHADOW E&M-EST. PATIENT-LVL III: CPT | Mod: PBBFAC,,, | Performed by: NURSE PRACTITIONER

## 2020-06-03 PROCEDURE — 82728 ASSAY OF FERRITIN: CPT

## 2020-06-03 PROCEDURE — 1126F AMNT PAIN NOTED NONE PRSNT: CPT | Mod: S$GLB,,, | Performed by: NURSE PRACTITIONER

## 2020-06-03 PROCEDURE — 99999 PR PBB SHADOW E&M-EST. PATIENT-LVL III: ICD-10-PCS | Mod: PBBFAC,,, | Performed by: NURSE PRACTITIONER

## 2020-06-03 PROCEDURE — 85007 BL SMEAR W/DIFF WBC COUNT: CPT

## 2020-06-03 PROCEDURE — 1159F PR MEDICATION LIST DOCUMENTED IN MEDICAL RECORD: ICD-10-PCS | Mod: S$GLB,,, | Performed by: NURSE PRACTITIONER

## 2020-06-03 PROCEDURE — 3078F DIAST BP <80 MM HG: CPT | Mod: CPTII,S$GLB,, | Performed by: NURSE PRACTITIONER

## 2020-06-03 PROCEDURE — 80053 COMPREHEN METABOLIC PANEL: CPT

## 2020-06-03 PROCEDURE — 3078F PR MOST RECENT DIASTOLIC BLOOD PRESSURE < 80 MM HG: ICD-10-PCS | Mod: CPTII,S$GLB,, | Performed by: NURSE PRACTITIONER

## 2020-06-03 PROCEDURE — 99214 OFFICE O/P EST MOD 30 MIN: CPT | Mod: S$GLB,,, | Performed by: NURSE PRACTITIONER

## 2020-06-03 PROCEDURE — 1126F PR PAIN SEVERITY QUANTIFIED, NO PAIN PRESENT: ICD-10-PCS | Mod: S$GLB,,, | Performed by: NURSE PRACTITIONER

## 2020-06-03 PROCEDURE — 36415 COLL VENOUS BLD VENIPUNCTURE: CPT

## 2020-06-03 PROCEDURE — 1159F MED LIST DOCD IN RCRD: CPT | Mod: S$GLB,,, | Performed by: NURSE PRACTITIONER

## 2020-06-03 PROCEDURE — 3074F SYST BP LT 130 MM HG: CPT | Mod: CPTII,S$GLB,, | Performed by: NURSE PRACTITIONER

## 2020-06-03 PROCEDURE — 83540 ASSAY OF IRON: CPT

## 2020-06-03 NOTE — PROGRESS NOTES
Subjective:      Patient ID: Adalgisa Wright is a 69 y.o. female.    Chief Complaint: Fatigue    HPI:  Patient is a 69 year old female with rectal adenocarcinoma Stage III, pT3 pN0 pM0 who presents today for follow up with labs.  Is receiving FOLFOX every 2 weeks.  Last received on 5/27/2020.  She presented to the ED on 5/27/2020 due to severe epistaxis.  Was found to have an INR of 8.0.  Nasal tampon was placed in the ED and patient followed up with ENT on 6/1/2020 to have nasal packing removed.  She has been followed in the coumadin clinic for dose adjustments and last INR done yesterday was 3.7.  She states that she has had no episodes of epistaxis or other unusual bleeding since.  She was told to hold dose of coumadin yesterday and to reduce subsequent doses.  She is to have INR rechecked on 6/8/2020.      She also states that she had a couple episodes of diarrhea the weekend after chemo 5/30-5/31 that has since resolved.      Labs today show hemoglobin has dramatically reduced since last visit 8.8 (11.1).  Platelets are stable at 91 K.       Social History     Socioeconomic History    Marital status: Single     Spouse name: Not on file    Number of children: Not on file    Years of education: Not on file    Highest education level: Not on file   Occupational History    Not on file   Social Needs    Financial resource strain: Not on file    Food insecurity:     Worry: Not on file     Inability: Not on file    Transportation needs:     Medical: Not on file     Non-medical: Not on file   Tobacco Use    Smoking status: Never Smoker    Smokeless tobacco: Never Used   Substance and Sexual Activity    Alcohol use: Not Currently    Drug use: No    Sexual activity: Yes     Partners: Male   Lifestyle    Physical activity:     Days per week: Not on file     Minutes per session: Not on file    Stress: Not on file   Relationships    Social connections:     Talks on phone: Not on file     Gets together: Not  on file     Attends Synagogue service: Not on file     Active member of club or organization: Not on file     Attends meetings of clubs or organizations: Not on file     Relationship status: Not on file   Other Topics Concern    Not on file   Social History Narrative    Not on file       Family History   Problem Relation Age of Onset    Diabetes Mother     Heart disease Mother     Hypertension Mother     Heart disease Father     Hypertension Father     Hypertension Sister     Hypertension Son     Heart disease Son     Colon cancer Neg Hx        Past Surgical History:   Procedure Laterality Date    AORTIC VALVE REPLACEMENT  2003    COLONOSCOPY Left 4/6/2017    Procedure: COLONOSCOPY;  Surgeon: Sander Ulloa MD;  Location: [facility];  Service: Endoscopy;  Laterality: Left;    COLONOSCOPY N/A 11/7/2019    Procedure: COLONOSCOPY;  Surgeon: Brenda Ochoa MD;  Location: [facility] END[facility];  Service: Endoscopy;  Laterality: N/A;    ESOPHAGOGASTRODUODENOSCOPY N/A 11/7/2019    Procedure: ESOPHAGOGASTRODUODENOSCOPY (EGD) needs PT/INR;  Surgeon: Brenda Ochoa MD;  Location: [facility];  Service: Endoscopy;  Laterality: N/A;    FLEXIBLE SIGMOIDOSCOPY N/A 2/13/2020    Procedure: SIGMOIDOSCOPY, FLEXIBLE;  Surgeon: Ankur Smith MD;  Location: [facility];  Service: General;  Laterality: N/A;    INJECTION OF ANESTHETIC AGENT INTO TISSUE PLANE DEFINED BY TRANSVERSUS ABDOMINIS MUSCLE N/A 2/13/2020    Procedure: BLOCK, TRANSVERSUS ABDOMINIS PLANE;  Surgeon: Ankur Smith MD;  Location: [facility];  Service: General;  Laterality: N/A;    INSERTION OF TUNNELED CENTRAL VENOUS CATHETER (CVC) WITH SUBCUTANEOUS PORT N/A 3/17/2020    Procedure: [id number];  Surgeon: Ankur Smith MD;  Location: [facility] OR;  Service: General;  Laterality: N/A;    ROBOT-ASSISTED LOW ANTERIOR RESECTION OF COLON N/A 2/13/2020    Procedure: XI ROBOTIC RESECTION, COLON, LOW ANTERIOR;  Surgeon: Ankur Smith MD;  Location: [facility]  OR;  Service: General;  Laterality: N/A;       Past Medical History:   Diagnosis Date    Anticoagulant long-term use     Aortic valve defect     Benign neoplasm of ascending colon 4/6/2017    Cancer     CHF (congestive heart failure)     DDD (degenerative disc disease), cervical 7/20/2018    GERD (gastroesophageal reflux disease)     Hemorrhoids     HLD (hyperlipidemia)     Hypertension     Insomnia 12/1/2014    Irritable bowel syndrome with constipation 4/9/2018    Postmenopausal osteoporosis 7/20/2018       Review of Systems   Constitutional: Positive for fatigue. Negative for fever.   HENT: Negative.         Resolved epistaxis   Respiratory: Positive for shortness of breath (on exertion).    Gastrointestinal: Negative.         Resolved diarrhea 3 days post last chemo   Endocrine: Negative.    Genitourinary: Negative.    Musculoskeletal: Negative.    Allergic/Immunologic: Negative.    Neurological: Negative.    Hematological: Negative.    Psychiatric/Behavioral: Negative.           Medication List with Changes/Refills   Current Medications    ACEBUTOLOL (SECTRAL) 400 MG CAPSULE    Take 400 mg by mouth 2 (two) times daily.    ACETAMINOPHEN (TYLENOL) 325 MG TABLET    Take 2 tablets (650 mg total) by mouth every 6 (six) hours.    ALPRAZOLAM (XANAX) 1 MG TABLET    Take 1 mg by mouth 2 (two) times daily as needed.    BUTALBITAL-ACETAMINOPHEN-CAFFEINE -40 MG (FIORICET, ESGIC) -40 MG PER TABLET    TAKE ONE TABLET BY MOUTH EVERY SIX HOURS AS NEEDED    DEXAMETHASONE (DECADRON) 4 MG TAB    Take 8 mg (2 tabs) by mouth on day 2 and day 3 following each cycle of chemo    DIPHENOXYLATE-ATROPINE 2.5-0.025 MG (LOMOTIL) 2.5-0.025 MG PER TABLET    Take 1 tablet by mouth 4 (four) times daily as needed for Diarrhea.    ERGOCALCIFEROL (ERGOCALCIFEROL) 50,000 UNIT CAP    Take 1 capsule by mouth every 30 days.    FUROSEMIDE (LASIX) 40 MG TABLET    Take 40 mg by mouth Daily.    LINZESS 145 MCG CAP CAPSULE    TAKE  1 CAPSULE (145 MCG TOTAL) BY MOUTH ONCE DAILY.    LOVASTATIN (MEVACOR) 40 MG TABLET    Take 40 mg by mouth every evening.    MULTIVITAMIN (THERAGRAN) TABLET    Take 1 tablet by mouth.    MUPIROCIN (BACTROBAN) 2 % OINTMENT    by Nasal route 2 (two) times daily. Apply to nose twice daily for 10 days    NOZASEPTIN (NOZIN) NASAL     1 each by Each Nostril route 2 (two) times daily.    OXYCODONE (ROXICODONE) 5 MG IMMEDIATE RELEASE TABLET    Take 1 tablet (5 mg total) by mouth every 4 (four) hours as needed.    PANTOPRAZOLE (PROTONIX) 20 MG TABLET    Take 1 tablet (20 mg total) by mouth once daily.    POTASSIUM 99 MG TAB    Take by mouth once.    POTASSIUM CHLORIDE SA (K-DUR,KLOR-CON) 20 MEQ TABLET    Take 2 tablets (40 mEq total) by mouth once daily.    PROCHLORPERAZINE (COMPAZINE) 5 MG TABLET    Take 1 tablet (5 mg total) by mouth every 6 (six) hours as needed for Nausea.    SERTRALINE (ZOLOFT) 100 MG TABLET    Take 1 tablet by mouth Daily.    TRAMADOL (ULTRAM) 50 MG TABLET    50 mg every 8 (eight) hours as needed.     WARFARIN (COUMADIN) 5 MG TABLET    Take 1 tablet (5 mg total) by mouth Daily. 5 mg by mouth for five days then alternate with 2.5 mg for one day.    ZOLPIDEM (AMBIEN) 10 MG TAB    Take 5 mg by mouth nightly as needed.         Objective:     Vitals:    06/03/20 1327   BP: 105/68   Pulse: 73   Temp: 96.9 °F (36.1 °C)       Physical Exam   Constitutional: She is oriented to person, place, and time. Vital signs are normal. She appears well-developed. No distress.   HENT:   Head: Normocephalic and atraumatic.   Cardiovascular: Normal rate and regular rhythm.   Pulmonary/Chest: Effort normal. No accessory muscle usage. No apnea, no tachypnea and no bradypnea. No respiratory distress.   Abdominal: Normal appearance. She exhibits no distension.   Musculoskeletal: Normal range of motion. She exhibits no edema.   Neurological: She is alert and oriented to person, place, and time.   Skin: Skin is warm, dry  and intact. No bruising and no lesion noted. She is not diaphoretic. There is pallor.   Psychiatric: She has a normal mood and affect. Her speech is normal and behavior is normal. Judgment and thought content normal. She is not actively hallucinating. Cognition and memory are normal. She is attentive.   Vitals reviewed.      Assessment:     Problem List Items Addressed This Visit        Hematology    Supratherapeutic INR       Oncology    Rectal adenocarcinoma      Other Visit Diagnoses     Epistaxis    -  Primary    Relevant Orders    Ferritin    Iron and TIBC    Other secondary thrombocytopenia         Relevant Orders    Ferritin    Precipitous drop in hematocrit         Relevant Orders    Iron and TIBC    Anemia due to chronic blood loss            Lab Results   Component Value Date    WBC 8.93 06/03/2020    HGB 8.8 (L) 06/03/2020    HCT 28.9 (L) 06/03/2020     (H) 06/03/2020    PLT 91 (L) 06/03/2020       BMP  Lab Results   Component Value Date     06/03/2020    K 4.3 06/03/2020     06/03/2020    CO2 26 06/03/2020    BUN 8 06/03/2020    CREATININE 0.8 06/03/2020    CALCIUM 9.1 06/03/2020    ANIONGAP 12 06/03/2020    ESTGFRAFRICA >60 06/03/2020    EGFRNONAA >60 06/03/2020     Lab Results   Component Value Date    ALT 20 06/03/2020    AST 18 06/03/2020    ALKPHOS 201 (H) 06/03/2020    BILITOT 0.3 06/03/2020         Plan:   Epistaxis  -     Ferritin; Future; Expected date: 06/03/2020  -     Iron and TIBC; Future; Expected date: 06/03/2020    Other secondary thrombocytopenia   -     Ferritin; Future; Expected date: 06/03/2020    Precipitous drop in hematocrit   -     Iron and TIBC; Future; Expected date: 06/03/2020    Supratherapeutic INR    Rectal adenocarcinoma    Anemia due to chronic blood loss    Add iron studies on to labs drawn today.  Will notify her by tomorrow if she needs IV iron once labs result.  She is schedule to see Dr. Quintanilla with labs and next dose FOLFOX on 6/8/2020.  Will try  to coordinate IV iron administration on day her pump will be DC'd (which will be next Wednesday) if IV iron is needed.      Collaborating Provider:  Dr. Jakob Corcoran    Thank You,  JENNIFER Ruiz-C

## 2020-06-04 LAB
FERRITIN SERPL-MCNC: 2300 NG/ML (ref 20–300)
IRON SERPL-MCNC: 81 UG/DL (ref 30–160)
SATURATED IRON: 22 % (ref 20–50)
TOTAL IRON BINDING CAPACITY: 373 UG/DL (ref 250–450)
TRANSFERRIN SERPL-MCNC: 252 MG/DL (ref 200–375)

## 2020-06-05 ENCOUNTER — TUMOR BOARD CONFERENCE (OUTPATIENT)
Dept: HEMATOLOGY/ONCOLOGY | Facility: CLINIC | Age: 70
End: 2020-06-05

## 2020-06-05 NOTE — PROGRESS NOTES
Tumor Board Documentation      Adalgisa Wright was presented by Danika Quintanilla MD at our Tumor Board on 6/5/2020, which included representatives from Medical Oncology, Radiation Oncology, Surgical Oncology, Pathology, Navigation, Gastrointestinal.    Adalgisa currently presents as a current patient with Rectal cancer, with history of the following treatments: Adjuvant Chemotherapy, Surgical Intervention(s)(surgical resection of sigmoid and upper rectum followed by FOLFOX).    Additionally, we reviewed previous medical and familial history, history of present illness, and recent lab results along with all available histopathologic and imaging studies. The tumor board considered available treatment options and made the following recommendations:  Surgery, Imaging, Chemotherapy   Presented initially for lesion in the liver, two biopsies were non-diagnostic. After 3 mo of FOLFOX imaging showed good response, question was whether or not to repeat MRI of liver to re-assess that lesion now vs. at completion of three months. Per surgery, will resect this regardless of what repeat MRI shows. Therefore wait to re-image with MRI until completion of chemotherapy.     Discussed role of SBRT, but ultimately if we are trying to treat the oligometastatic disease appropriately we would operate.       The following procedures/referrals were also placed: No orders of the defined types were placed in this encounter.      Clinical Trial Status: Not discussed     National site-specific guidelines were discussed with respect to the case.    Tumor board is a meeting of clinicians from various specialty areas who evaluate and discuss patients for whom a multidisciplinary approach is being considered. Final determinations in the plan of care are those of the provider(s). The responsibility for follow up of recommendations given during tumor board is that of the provider.     Danika Quintanilla MD

## 2020-06-08 ENCOUNTER — OFFICE VISIT (OUTPATIENT)
Dept: HEMATOLOGY/ONCOLOGY | Facility: CLINIC | Age: 70
End: 2020-06-08
Payer: MEDICARE

## 2020-06-08 ENCOUNTER — INFUSION (OUTPATIENT)
Dept: INFUSION THERAPY | Facility: HOSPITAL | Age: 70
End: 2020-06-08
Attending: INTERNAL MEDICINE
Payer: MEDICARE

## 2020-06-08 ENCOUNTER — ANTI-COAG VISIT (OUTPATIENT)
Dept: CARDIOLOGY | Facility: CLINIC | Age: 70
End: 2020-06-08
Payer: MEDICARE

## 2020-06-08 VITALS
WEIGHT: 118.38 LBS | HEART RATE: 72 BPM | DIASTOLIC BLOOD PRESSURE: 80 MMHG | OXYGEN SATURATION: 93 % | SYSTOLIC BLOOD PRESSURE: 129 MMHG | TEMPERATURE: 99 F | HEIGHT: 58 IN | BODY MASS INDEX: 24.85 KG/M2 | RESPIRATION RATE: 18 BRPM

## 2020-06-08 VITALS
DIASTOLIC BLOOD PRESSURE: 80 MMHG | SYSTOLIC BLOOD PRESSURE: 132 MMHG | TEMPERATURE: 98 F | OXYGEN SATURATION: 95 % | RESPIRATION RATE: 18 BRPM | HEART RATE: 75 BPM

## 2020-06-08 DIAGNOSIS — C18.9 COLON ADENOCARCINOMA: Primary | ICD-10-CM

## 2020-06-08 DIAGNOSIS — Z95.2 AORTIC VALVE REPLACED: ICD-10-CM

## 2020-06-08 DIAGNOSIS — R79.1 SUPRATHERAPEUTIC INR: ICD-10-CM

## 2020-06-08 DIAGNOSIS — D50.0 IRON DEFICIENCY ANEMIA DUE TO CHRONIC BLOOD LOSS: ICD-10-CM

## 2020-06-08 DIAGNOSIS — Z79.01 LONG TERM (CURRENT) USE OF ANTICOAGULANTS: Primary | ICD-10-CM

## 2020-06-08 DIAGNOSIS — R04.0 EPISTAXIS: ICD-10-CM

## 2020-06-08 LAB — INR PPP: >8 (ref 2–3)

## 2020-06-08 PROCEDURE — 1101F PR PT FALLS ASSESS DOC 0-1 FALLS W/OUT INJ PAST YR: ICD-10-PCS | Mod: CPTII,S$GLB,, | Performed by: INTERNAL MEDICINE

## 2020-06-08 PROCEDURE — 25000003 PHARM REV CODE 250: Performed by: INTERNAL MEDICINE

## 2020-06-08 PROCEDURE — 99215 PR OFFICE/OUTPT VISIT, EST, LEVL V, 40-54 MIN: ICD-10-PCS | Mod: S$GLB,,, | Performed by: INTERNAL MEDICINE

## 2020-06-08 PROCEDURE — 63600175 PHARM REV CODE 636 W HCPCS: Performed by: INTERNAL MEDICINE

## 2020-06-08 PROCEDURE — 96416 CHEMO PROLONG INFUSE W/PUMP: CPT

## 2020-06-08 PROCEDURE — 1101F PT FALLS ASSESS-DOCD LE1/YR: CPT | Mod: CPTII,S$GLB,, | Performed by: INTERNAL MEDICINE

## 2020-06-08 PROCEDURE — 1159F MED LIST DOCD IN RCRD: CPT | Mod: S$GLB,,, | Performed by: INTERNAL MEDICINE

## 2020-06-08 PROCEDURE — 99215 OFFICE O/P EST HI 40 MIN: CPT | Mod: S$GLB,,, | Performed by: INTERNAL MEDICINE

## 2020-06-08 PROCEDURE — 1159F PR MEDICATION LIST DOCUMENTED IN MEDICAL RECORD: ICD-10-PCS | Mod: S$GLB,,, | Performed by: INTERNAL MEDICINE

## 2020-06-08 PROCEDURE — 85610 POCT INR: ICD-10-PCS | Mod: QW,S$GLB,, | Performed by: INTERNAL MEDICINE

## 2020-06-08 PROCEDURE — 96413 CHEMO IV INFUSION 1 HR: CPT

## 2020-06-08 PROCEDURE — 1126F AMNT PAIN NOTED NONE PRSNT: CPT | Mod: S$GLB,,, | Performed by: INTERNAL MEDICINE

## 2020-06-08 PROCEDURE — 3079F DIAST BP 80-89 MM HG: CPT | Mod: CPTII,S$GLB,, | Performed by: INTERNAL MEDICINE

## 2020-06-08 PROCEDURE — 3079F PR MOST RECENT DIASTOLIC BLOOD PRESSURE 80-89 MM HG: ICD-10-PCS | Mod: CPTII,S$GLB,, | Performed by: INTERNAL MEDICINE

## 2020-06-08 PROCEDURE — 96368 THER/DIAG CONCURRENT INF: CPT

## 2020-06-08 PROCEDURE — 96411 CHEMO IV PUSH ADDL DRUG: CPT

## 2020-06-08 PROCEDURE — 1126F PR PAIN SEVERITY QUANTIFIED, NO PAIN PRESENT: ICD-10-PCS | Mod: S$GLB,,, | Performed by: INTERNAL MEDICINE

## 2020-06-08 PROCEDURE — 99999 PR PBB SHADOW E&M-EST. PATIENT-LVL III: CPT | Mod: PBBFAC,,, | Performed by: INTERNAL MEDICINE

## 2020-06-08 PROCEDURE — 3074F SYST BP LT 130 MM HG: CPT | Mod: CPTII,S$GLB,, | Performed by: INTERNAL MEDICINE

## 2020-06-08 PROCEDURE — 96367 TX/PROPH/DG ADDL SEQ IV INF: CPT

## 2020-06-08 PROCEDURE — 96415 CHEMO IV INFUSION ADDL HR: CPT

## 2020-06-08 PROCEDURE — 3074F PR MOST RECENT SYSTOLIC BLOOD PRESSURE < 130 MM HG: ICD-10-PCS | Mod: CPTII,S$GLB,, | Performed by: INTERNAL MEDICINE

## 2020-06-08 PROCEDURE — 99999 PR PBB SHADOW E&M-EST. PATIENT-LVL III: ICD-10-PCS | Mod: PBBFAC,,, | Performed by: INTERNAL MEDICINE

## 2020-06-08 PROCEDURE — 85610 PROTHROMBIN TIME: CPT | Mod: QW,S$GLB,, | Performed by: INTERNAL MEDICINE

## 2020-06-08 RX ORDER — SODIUM CHLORIDE 0.9 % (FLUSH) 0.9 %
10 SYRINGE (ML) INJECTION
Status: CANCELLED | OUTPATIENT
Start: 2020-06-10

## 2020-06-08 RX ORDER — SODIUM CHLORIDE 0.9 % (FLUSH) 0.9 %
10 SYRINGE (ML) INJECTION
Status: CANCELLED | OUTPATIENT
Start: 2020-06-08

## 2020-06-08 RX ORDER — EPINEPHRINE 0.3 MG/.3ML
0.3 INJECTION SUBCUTANEOUS ONCE AS NEEDED
Status: CANCELLED | OUTPATIENT
Start: 2020-06-08

## 2020-06-08 RX ORDER — FLUOROURACIL 50 MG/ML
400 INJECTION, SOLUTION INTRAVENOUS
Status: CANCELLED | OUTPATIENT
Start: 2020-06-08

## 2020-06-08 RX ORDER — HEPARIN 100 UNIT/ML
500 SYRINGE INTRAVENOUS
Status: CANCELLED | OUTPATIENT
Start: 2020-06-10

## 2020-06-08 RX ORDER — DIPHENHYDRAMINE HYDROCHLORIDE 50 MG/ML
50 INJECTION INTRAMUSCULAR; INTRAVENOUS ONCE AS NEEDED
Status: CANCELLED | OUTPATIENT
Start: 2020-06-10

## 2020-06-08 RX ORDER — METHYLPREDNISOLONE SOD SUCC 125 MG
125 VIAL (EA) INJECTION ONCE AS NEEDED
Status: CANCELLED | OUTPATIENT
Start: 2020-06-10

## 2020-06-08 RX ORDER — EPINEPHRINE 0.3 MG/.3ML
0.3 INJECTION SUBCUTANEOUS ONCE AS NEEDED
Status: CANCELLED | OUTPATIENT
Start: 2020-06-10

## 2020-06-08 RX ORDER — HEPARIN 100 UNIT/ML
500 SYRINGE INTRAVENOUS
Status: CANCELLED | OUTPATIENT
Start: 2020-06-08

## 2020-06-08 RX ORDER — DIPHENHYDRAMINE HYDROCHLORIDE 50 MG/ML
50 INJECTION INTRAMUSCULAR; INTRAVENOUS ONCE AS NEEDED
Status: CANCELLED | OUTPATIENT
Start: 2020-06-08

## 2020-06-08 RX ORDER — FLUOROURACIL 50 MG/ML
400 INJECTION, SOLUTION INTRAVENOUS
Status: COMPLETED | OUTPATIENT
Start: 2020-06-08 | End: 2020-06-08

## 2020-06-08 RX ADMIN — DEXTROSE: 5 SOLUTION INTRAVENOUS at 10:06

## 2020-06-08 RX ADMIN — OXALIPLATIN 125 MG: 5 INJECTION, SOLUTION, CONCENTRATE INTRAVENOUS at 10:06

## 2020-06-08 RX ADMIN — FLUOROURACIL 590 MG: 50 INJECTION, SOLUTION INTRAVENOUS at 12:06

## 2020-06-08 RX ADMIN — DEXTROSE MONOHYDRATE 590 MG: 50 INJECTION, SOLUTION INTRAVENOUS at 10:06

## 2020-06-08 RX ADMIN — SODIUM CHLORIDE: 9 INJECTION, SOLUTION INTRAVENOUS at 09:06

## 2020-06-08 RX ADMIN — PALONOSETRON: 0.25 INJECTION, SOLUTION INTRAVENOUS at 09:06

## 2020-06-08 RX ADMIN — FLUOROURACIL 3530 MG: 50 INJECTION, SOLUTION INTRAVENOUS at 01:06

## 2020-06-08 NOTE — PROGRESS NOTES
Patient's INR is supratherapeutic at >8.0.  Previous instructions were followed.  No medication changes reported.  No signs of any bleeding episodes noted. Review notes on 5/27/2020 - ED for epistaxis.  Send to PharmD for review/instructions.

## 2020-06-08 NOTE — PLAN OF CARE
Problem: Anemia (Chemotherapy Effects)  Goal: Anemia Symptom Improvement  Outcome: Ongoing, Progressing   Pt reported feeling weak and tired today . She denies and pain or discomfort .

## 2020-06-08 NOTE — DISCHARGE INSTRUCTIONS
[facility]  [address] [facility]  [address]  [phone] phone     [phone] fax  Hours of Operation: Monday- Friday 8:00am- 5:00pm  After hours phone  [phone]  Hematology / Oncology Physicians on call      CATRACHITA Gonzalez Dr., Dr., Dr., Dr., NP Sydney Prescott, NP Tyesha Taylor, NP    Please call with any concerns regarding your appointment today.FALL PREVENTION   Falls often occur due to slipping, tripping or losing your balance. Here are ways to reduce your risk of falling again.   Was there anything that caused your fall that can be fixed, removed or replaced?   Make your home safe by keeping walkways clear of objects you may trip over.   Use non-slip pads under rugs.   Do not walk in poorly lit areas.   Do not stand on chairs or wobbly ladders.   Use caution when reaching overhead or looking upward. This position can cause a loss of balance.   Be sure your shoes fit properly, have non-slip bottoms and are in good condition.   Be cautious when going up and down stairs, curbs, and when walking on uneven sidewalks.   If your balance is poor, consider using a cane or walker.   If your fall was related to alcohol use, stop or limit alcohol intake.   If your fall was related to use of sleeping medicines, talk to your doctor about this. You may need to reduce your dosage at bedtime if you awaken during the night to go to the bathroom.   To reduce the need for nighttime bathroom trips:   Avoid drinking fluids for several hours before going to bed   Empty your bladder before going to bed   Men can keep a urinal at the bedside   © 9070-7199 [facility], [address] [place] [address], [place] [address]. All rights reserved. This information is not intended as a substitute for professional medical care. Always follow your healthcare professional's instructions.  WAYS TO HELP PREVENT  "INFECTION         WASH YOUR HANDS OFTEN DURING THE DAY, ESPECIALLY BEFORE YOU EAT, AFTER USING THE BATHROOM, AND AFTER TOUCHING ANIMALS     STAY AWAY FROM PEOPLE WHO HAVE ILLNESSES YOU CAN CATCH; SUCH AS COLDS, FLU, CHICKEN POX     TRY TO AVOID CROWDS     STAY AWAY FROM CHILDREN WHO RECENTLY HAVE RECEIVED LIVE VIRUS VACCINES     MAINTAIN GOOD MOUTH CARE     DO NOT SQUEEZE OR SCRATCH PIMPLES     CLEAN CUTS & SCRAPES RIGHT AWAY AND DAILY UNTIL HEALED WITH WARM WATER, SOAP & AN ANTISEPTIC     AVOID CONTACT WITH LITTER BOXES, BIRD CAGES, & FISH TANKS     AVOID STANDING WATER, IE., BIRD BATHS, FLOWER POTS/VASES, OR HUMIDIFIERS     WEAR GLOVES WHEN GARDENING OR CLEANING UP AFTER OTHERS, ESPECIALLY BABIES & SMALL CHILDREN    DO NOT EAT RAW FISH, SEAFOOD, MEAT, OR EGGSSupport Groups/Classes    Support groups and classes are being offered at the   [facility] and [facility]!!    "Cooking with Cancer" (Nutrition Class):  Second Wednesday of each month   at noon at the [facility].  Metastatic Support Group:  Third Tuesday of each month   at noon at the [facility].  Next Steps Class/Group: Second and fourth Thursday of each month at noon at the [facility].  [facility] (Breast Cancer Support Group): First Tuesday of each month   at 5:30pm at the [facility] location.  [facility]: [facility]: Second and third Tuesday of each month from 7:30am - 2pm.  [facility]: First and fourth Tuesday of each month from 7:30am - 2pm     If you are interested in attending or would like more information please ask our social workers or your nurse!  "

## 2020-06-08 NOTE — PROGRESS NOTES
LPN notes reviewed. INRs erratic recently and patient denies any significant changes to meds or lifestyle to account. Suspicious for some unknown pathology or change to known issues -- liver?. Will hold x2 doses and advise high vit K foods today. Will repeat INR in two days.

## 2020-06-08 NOTE — PROGRESS NOTES
Subjective:      DATE OF VISIT: 6/8/2020   ?    ?   Patient ID:?Adalgisa Wright is a 69 y.o. female.?? MR#: [id number]   ?   PRIMARY PROVIDER: Dr. Quintanilla    CHIEF COMPLAINT:  Follow-up  ?   ONCOLOGIC DIAGNOSIS: Rectosigmoid adenocarcinoma, Stage III, pT3 pN0 pM0  ?   CURRENT TREATMENT: FOLFOX, C1D1 3/23/20    HPI    Ms. Wright was previously seen by my colleague Dr. Haq last on 08/21/2019 for iron deficiency anemia.  She has a mechanical aortic valve replacement 16 years ago on therapeutic anticoagulation.  Labs notable for low haptoglobin in it is felt hemolytic process may be contributing to her anemia.  She notes having recent EKG as part of preoperative evaluation but has not had follow-up with her cardiologist recently or repeat echocardiogram.  She denies edema, shortness of breath or chest pain.    She was referred to Gastroenterology for further evaluation of iron deficiency anemia and concern for GI bleed.     11/7/19 EGD and colonoscopy revealed a nonobstructing ulcerated mass 20 cm from the anal verge. Pathology:  Moderately differentiated adenocarcinoma, MSI intact.    12/02/2019 PET-CT notable for avid sigmoid lesion SUV 15.7 as well as avid right lobe liver lesion 19 mm, SUV 4.1.  No other areas of avidity concerning for metastatic disease.    12/9/19 liver biopsy, Pathology:  Diagnosis 1.  Liver mass, biopsy:   - Not diagnostic of malignancy   - Scant atypical liver sampling, see comments     Comments: The sections show a scant fragmented sample of liver tissue with   psuedoglands in one core and mild architectural distortion. Cytologic atypiia   is not prominent. Reticulin stain shows mildly irregular pattern of staining.   No portal tracts present for evaluation. Back to back pseudoglands raises the   possibly of a well differetniated hepatocellular lesion. However, the scant   sample limits interpretation. Due to the presence of a clinical mass.   Resampling is indicated.      02/13/2020  surgical resection of sigmoid and upper rectum  Pathology:  Adenocarcinoma, moderately differentiated, 2.5 x 2 cm, margins negative, 6 of 14 lymph nodes positive for metastatic adenocarcinoma, many tumor deposits and mesenteric tissue, pT3 pN2, MSI intact    03/23/2020 cycle 1 day 1 FOLFOX     05/20/2020 restaging PET showed interval resolution of liver lesion and no evidence of recurrence/metastatic disease    INTERVAL EVENTS    Since last visit with supratherapeutic INR she has been following closely with Coumadin clinic.  She saw ENT and has not had recurrent epistaxis or other evidence of bleeding. She does note fatigue.  No fevers or chills or other infectious symptoms.    Review of Systems    ?   A comprehensive 14-point review of systems was reviewed with patient and was negative other than as specified above.   ?     Objective:      Physical Exam      ?   Vitals:    06/08/20 0752   BP: 129/80   Pulse: 72   Resp: 18   Temp: 99 °F (37.2 °C)        ECOG:?0   General appearance: Generally well appearing, in no acute distress, wearing mask.   Head, eyes, ears, nose, and throat: moist mucous membranes.   Respiratory:  Clear to auscultation bilaterally  Cardiovascular:  Regular rate and rhythm, no murmurs rubs or gallops  Extremities: Warm, without edema.   Neurologic: Alert and oriented. Grossly normal strength, coordination, and gait.   Skin:  Abdominal cellulitis with only minimal erythema, no warmth or exudate.  No other rash, petechiae or ecchymosis.  Psychiatric:  Normal mood and affect.    ?   Laboratory:  ?   Anti-coag visit on 06/08/2020   Component Date Value Ref Range Status    INR 06/08/2020 >8.0  2.0 - 3.0 Final   Lab Visit on 06/08/2020   Component Date Value Ref Range Status    WBC 06/08/2020 7.58  3.90 - 12.70 K/uL Final    RBC 06/08/2020 2.98* 4.00 - 5.40 M/uL Final    Hemoglobin 06/08/2020 9.7* 12.0 - 16.0 g/dL Final    Hematocrit 06/08/2020 32.3* 37.0 - 48.5 % Final    Mean Corpuscular  Volume 06/08/2020 108* 82 - 98 fL Final    Mean Corpuscular Hemoglobin 06/08/2020 32.6* 27.0 - 31.0 pg Final    Mean Corpuscular Hemoglobin Conc 06/08/2020 30.0* 32.0 - 36.0 g/dL Final    RDW 06/08/2020 18.4* 11.5 - 14.5 % Final    Platelets 06/08/2020 136* 150 - 350 K/uL Final    MPV 06/08/2020 11.6  9.2 - 12.9 fL Final    Immature Granulocytes 06/08/2020 CANCELED  0.0 - 0.5 % Final    Immature Grans (Abs) 06/08/2020 CANCELED  0.00 - 0.04 K/uL Final    nRBC 06/08/2020 1* 0 /100 WBC Final    Gran% 06/08/2020 72.0  38.0 - 73.0 % Final    Lymph% 06/08/2020 15.0* 18.0 - 48.0 % Final    Mono% 06/08/2020 8.0  4.0 - 15.0 % Final    Eosinophil% 06/08/2020 1.0  0.0 - 8.0 % Final    Basophil% 06/08/2020 1.0  0.0 - 1.9 % Final    Bands 06/08/2020 1.0  % Final    Metamyelocytes 06/08/2020 2.0  % Final    Platelet Estimate 06/08/2020 Decreased*  Final    Aniso 06/08/2020 Moderate   Final    Poik 06/08/2020 Slight   Final    Poly 06/08/2020 Occasional   Final    Tear Drop Cells 06/08/2020 Occasional   Final    Differential Method 06/08/2020 Manual   Final    Sodium 06/08/2020 140  136 - 145 mmol/L Final    Potassium 06/08/2020 4.3  3.5 - 5.1 mmol/L Final    Chloride 06/08/2020 104  95 - 110 mmol/L Final    CO2 06/08/2020 26  23 - 29 mmol/L Final    Glucose 06/08/2020 113* 70 - 110 mg/dL Final    BUN, Bld 06/08/2020 12  8 - 23 mg/dL Final    Creatinine 06/08/2020 0.8  0.5 - 1.4 mg/dL Final    Calcium 06/08/2020 9.0  8.7 - 10.5 mg/dL Final    Total Protein 06/08/2020 6.4  6.0 - 8.4 g/dL Final    Albumin 06/08/2020 3.6  3.5 - 5.2 g/dL Final    Total Bilirubin 06/08/2020 0.2  0.1 - 1.0 mg/dL Final    Alkaline Phosphatase 06/08/2020 214* 55 - 135 U/L Final    AST 06/08/2020 26  10 - 40 U/L Final    ALT 06/08/2020 19  10 - 44 U/L Final    Anion Gap 06/08/2020 10  8 - 16 mmol/L Final    eGFR if African American 06/08/2020 >60  >60 mL/min/1.73 m^2 Final    eGFR if non African American 06/08/2020 >60   >60 mL/min/1.73 m^2 Final       Tumor markers    CEA    CEA   Date Value Ref Range Status   05/25/2020 4.4 0.0 - 5.0 ng/mL Final     Comment:     CEA Normal Range:  Non-Smokers: 0-3.0 ng/mL  Smokers:     0-5.0 ng/mL     11/18/2019 3.5 0.0 - 5.0 ng/mL Final     Comment:     CEA Normal Range:  Non-Smokers: 0-3.0 ng/mL  Smokers:     0-5.0 ng/mL              ? IMAGING    05/20/2020  NM PET CT ROUTINE    CLINICAL HISTORY:  colon cancer;  Abnormal findings on diagnostic imaging of other parts of digestive tract    TECHNIQUE:  Segmented attenuation corrected 3-D PET imaging was obtained from the skull base through the mid thighs utilizing 12.49 mCi F-18-FDG.  Noncontrast CT imaging was performed for attenuation correction, diagnosis, and anatomical fusion with PET.    COMPARISON:  12/02/2019.    FINDINGS:  Head/neck: There is normal physiologic FDG uptake noted within the visualized brain parenchyma. No FDG avid lymphadenopathy within the neck.    Chest: No FDG avid pulmonary nodules/masses. No FDG avid mediastinal, hilar or axillary lymph nodes.Right IJ MediPort catheter now seen in place.    Abdomen/Pelvis: Normal physiologic FDG uptake noted within the liver, spleen, urinary tract, and bowel.Interval low anterior resection with prior hypermetabolic rectosigmoid colon mass no longer identified.  Interval resolution of hypermetabolic mass in the posterior right lobe of the liver, and correlative subtle hypoattenuating lesion no longer visible on noncontrast CT images.  No new liver lesions.  Adrenals unremarkable.  No FDG avid retroperitoneal lymph nodes.    Skeletal: Intense diffusely increased FDG uptake throughout the visualized axial and appendicular skeleton consistent with chemotherapy related marrow hyperplasia.  No discrete intraosseous lesions.  Chronic compression deformity of several thoracic vertebrae.      Impression       1. Interval colorectal surgery with resection of prior hypermetabolic rectosigmoid  mass.  2. Interval resolution of hypermetabolic liver mass.  3. No detrimental change           ?   Assessment/Plan:       1. Colon adenocarcinoma    2. Epistaxis    3. Supratherapeutic INR    4. Iron deficiency anemia due to chronic blood loss          Plan:     # rectal adenocarcinoma, pT3 pN2 pM0, Stage III, MSI stable:  Initiating adjuvant chemotherapy C1D1 3/23/20.  Restaging PET-CT after 4 cycles shows interval resolution of mildly avid right hepatic lobe lesion previously nondiagnostic on multiple biopsies; I discussed that interval improvement while on chemotherapy does raise concern for potential malignant involvement.  Case discussed at multidisciplinary tumor board with review of imaging and recommendation to continue adjuvant chemotherapy with plan for follow-up with surgery for consideration of resection of likely oligo metastatic liver disease.  Labs reviewed and improvement in cytopenias and will continue on next cycle today.    # Supratherapeutic INR:  Continues toe supratherapeutic INR unclear if related to outside antibiotic use.  No current evidence of bleeding.  Recommend close follow-up with Coumadin clinic.    # chronic anticoagulation with a  mechanical aortic valve:  On chronic anticoagulation due to mechanical valve.  Supratherapeutic INR, management per above.    #  mild normocytic anemia: IV iron for 2 doses, completed 12/04/2019.  Repeat labs with resolution of anemia and now mild worsening likely related to antineoplastic therapy and recent epistaxis.  Continue to monitor throughout therapy.    # Hypokalemia:  Within normal limits on labs today.  Continued supplementation with 40 mEq daily.     # chronic anticoagulation with a  mechanical aortic valve:  On chronic anticoagulation due to mechanical valve.  Supratherapeutic INR, management per above.    #  mild normocytic anemia: IV iron for 2 doses, completed 12/04/2019.  Repeat labs with resolution of anemia and now mild worsening likely  related to antineoplastic therapy.  Continue to monitor throughout therapy.    # Hypokalemia:  Advised ongoing supplementation with 40 mEq daily.  Refilled prescription    Follow-Up:   - FOLFOX today  - RV in 2 weeks with labs and chemo

## 2020-06-10 ENCOUNTER — ANTI-COAG VISIT (OUTPATIENT)
Dept: CARDIOLOGY | Facility: CLINIC | Age: 70
End: 2020-06-10
Payer: MEDICARE

## 2020-06-10 ENCOUNTER — INFUSION (OUTPATIENT)
Dept: INFUSION THERAPY | Facility: HOSPITAL | Age: 70
End: 2020-06-10
Attending: OPHTHALMOLOGY
Payer: MEDICARE

## 2020-06-10 VITALS
OXYGEN SATURATION: 97 % | WEIGHT: 118.38 LBS | DIASTOLIC BLOOD PRESSURE: 87 MMHG | HEART RATE: 78 BPM | SYSTOLIC BLOOD PRESSURE: 137 MMHG | TEMPERATURE: 98 F | HEIGHT: 58 IN | RESPIRATION RATE: 16 BRPM | BODY MASS INDEX: 24.85 KG/M2

## 2020-06-10 DIAGNOSIS — Z95.2 AORTIC VALVE REPLACED: ICD-10-CM

## 2020-06-10 DIAGNOSIS — C18.9 COLON ADENOCARCINOMA: Primary | ICD-10-CM

## 2020-06-10 DIAGNOSIS — Z79.01 LONG TERM (CURRENT) USE OF ANTICOAGULANTS: Primary | ICD-10-CM

## 2020-06-10 LAB — INR PPP: 4.3 (ref 2–3)

## 2020-06-10 PROCEDURE — 85610 PROTHROMBIN TIME: CPT | Mod: QW,S$GLB,, | Performed by: INTERNAL MEDICINE

## 2020-06-10 PROCEDURE — 96523 IRRIG DRUG DELIVERY DEVICE: CPT

## 2020-06-10 PROCEDURE — 85610 POCT INR: ICD-10-PCS | Mod: QW,S$GLB,, | Performed by: INTERNAL MEDICINE

## 2020-06-10 PROCEDURE — 93793 ANTICOAG MGMT PT WARFARIN: CPT | Mod: S$GLB,,,

## 2020-06-10 PROCEDURE — 63600175 PHARM REV CODE 636 W HCPCS: Performed by: INTERNAL MEDICINE

## 2020-06-10 PROCEDURE — 93793 PR ANTICOAGULANT MGMT FOR PT TAKING WARFARIN: ICD-10-PCS | Mod: S$GLB,,,

## 2020-06-10 PROCEDURE — 96377 APPLICATON ON-BODY INJECTOR: CPT

## 2020-06-10 RX ORDER — HEPARIN 100 UNIT/ML
500 SYRINGE INTRAVENOUS
Status: DISCONTINUED | OUTPATIENT
Start: 2020-06-10 | End: 2020-06-10 | Stop reason: HOSPADM

## 2020-06-10 RX ADMIN — PEGFILGRASTIM 6 MG: KIT SUBCUTANEOUS at 11:06

## 2020-06-10 RX ADMIN — HEPARIN SODIUM (PORCINE) LOCK FLUSH IV SOLN 100 UNIT/ML 500 UNITS: 100 SOLUTION at 11:06

## 2020-06-10 NOTE — PROGRESS NOTES
Patient's INR 4.3.  Remains supratherapeutic.  Previous instructions were followed.  No bleeding episodes reported.  Send to PharmD for review/dosing.

## 2020-06-10 NOTE — PROGRESS NOTES
LPN notes reviewed. INR much improved but still above goal. Will hold x2 more doses and significantly decrease weekly dose plan. See calendar.

## 2020-06-10 NOTE — NURSING
6/10/20 at 11:20:    Patient here for dc pump, obi, and Feraheme #1. Iron studies from 6/03/20 within normal range and ferritin close to ten times the normal range. Dr. Quintanilla notified and decided not to give Feraheme today.     5FU pump stopped and disconnected.  Existing dressing appeared clean and intact.  Right chestwall mediport  flushed with 10ml NS and 5 ml heparin solution. Needle D/C, site without redness, swelling, or drainage noted.  Dressing applied. Patient tolerated well. Patient to return to clinic in 2 weeks.      Neulasta OBI in place on left upper arm, verbal and written instructions given on when it will be ok to remove OBI, pt states understanding.

## 2020-06-15 ENCOUNTER — ANTI-COAG VISIT (OUTPATIENT)
Dept: CARDIOLOGY | Facility: CLINIC | Age: 70
End: 2020-06-15
Payer: MEDICARE

## 2020-06-15 DIAGNOSIS — Z95.2 AORTIC VALVE REPLACED: ICD-10-CM

## 2020-06-15 DIAGNOSIS — Z79.01 LONG TERM (CURRENT) USE OF ANTICOAGULANTS: Primary | ICD-10-CM

## 2020-06-15 LAB — INR PPP: 1.3 (ref 2–3)

## 2020-06-15 PROCEDURE — 93793 ANTICOAG MGMT PT WARFARIN: CPT | Mod: S$GLB,,,

## 2020-06-15 PROCEDURE — 93793 PR ANTICOAGULANT MGMT FOR PT TAKING WARFARIN: ICD-10-PCS | Mod: S$GLB,,,

## 2020-06-15 PROCEDURE — 85610 POCT INR: ICD-10-PCS | Mod: QW,S$GLB,, | Performed by: INTERNAL MEDICINE

## 2020-06-15 PROCEDURE — 85610 PROTHROMBIN TIME: CPT | Mod: QW,S$GLB,, | Performed by: INTERNAL MEDICINE

## 2020-06-15 NOTE — PROGRESS NOTES
Patient's INR is subtherapeutic at 1.3.  Previous instructions were followed - dose held x 4 days last week.  No signs or symptoms reported.  Instructions given:  Will boost today's (Monday) warfarin dose to 5 mg - only, then resume 2.5 mg daily.  Will monitor closely.  Recheck on Wednesday, 6/17/2020.  Dose calendar given and reviewed with patient.  Patient verbalized understanding.

## 2020-06-17 ENCOUNTER — ANTI-COAG VISIT (OUTPATIENT)
Dept: CARDIOLOGY | Facility: CLINIC | Age: 70
End: 2020-06-17
Payer: MEDICARE

## 2020-06-17 DIAGNOSIS — T45.1X5A CHEMOTHERAPY-INDUCED NAUSEA: ICD-10-CM

## 2020-06-17 DIAGNOSIS — R11.0 CHEMOTHERAPY-INDUCED NAUSEA: ICD-10-CM

## 2020-06-17 DIAGNOSIS — Z95.2 AORTIC VALVE REPLACED: ICD-10-CM

## 2020-06-17 DIAGNOSIS — Z79.01 LONG TERM (CURRENT) USE OF ANTICOAGULANTS: Primary | ICD-10-CM

## 2020-06-17 LAB — INR PPP: 2 (ref 2–3)

## 2020-06-17 PROCEDURE — 93793 ANTICOAG MGMT PT WARFARIN: CPT | Mod: S$GLB,,,

## 2020-06-17 PROCEDURE — 85610 PROTHROMBIN TIME: CPT | Mod: QW,S$GLB,, | Performed by: INTERNAL MEDICINE

## 2020-06-17 PROCEDURE — 93793 PR ANTICOAGULANT MGMT FOR PT TAKING WARFARIN: ICD-10-PCS | Mod: S$GLB,,,

## 2020-06-17 PROCEDURE — 85610 POCT INR: ICD-10-PCS | Mod: QW,S$GLB,, | Performed by: INTERNAL MEDICINE

## 2020-06-17 RX ORDER — DEXAMETHASONE 4 MG/1
TABLET ORAL
Qty: 24 TABLET | Refills: 0 | Status: SHIPPED | OUTPATIENT
Start: 2020-06-17 | End: 2020-06-22 | Stop reason: SDUPTHER

## 2020-06-17 NOTE — PROGRESS NOTES
Patient's INR is therapeutic at 2.0.  Previous instructions reported followed.  No other changes noted.  Instructions given:  Maintain current dose of warfarin 2.5 mg every evening.  Recheck on Friday, 6/19/2020.  Patient verbalized understanding.

## 2020-06-18 DIAGNOSIS — C18.9 COLON ADENOCARCINOMA: Primary | ICD-10-CM

## 2020-06-18 RX ORDER — ONDANSETRON HYDROCHLORIDE 8 MG/1
8 TABLET, FILM COATED ORAL EVERY 12 HOURS PRN
Qty: 30 TABLET | Refills: 2 | Status: SHIPPED | OUTPATIENT
Start: 2020-06-18 | End: 2021-01-25

## 2020-06-18 NOTE — TELEPHONE ENCOUNTER
----- Message from Oli Ziegler sent at 6/18/2020  2:37 PM CDT -----  Regarding: refill  Contact: Pt  Type:  RX Refill Request    Who Called: Adalgisa Wright  Refill or New Rx:Refill  RX Name and Strength:Ondansetron 8 mg  How is the patient currently taking it? (ex. 1XDay):as needed  Is this a 30 day or 90 day RX:  Preferred Pharmacy with phone number:  [facility]/pharmacy #[id number] - [place], [place] - [address] [address] [address]  Phone: [phone] Fax: [phone]  Local or Mail Order:Local  Ordering Provider:  Would the patient rather a call back or a response via [facility]? Call Back  Best Call Back Number:[phone] (home) [phone] (work)  Additional Information: Pt has one to two tablets left.

## 2020-06-18 NOTE — TELEPHONE ENCOUNTER
----- Message from Tova Kendrick sent at 6/18/2020  9:59 AM CDT -----  Contact: self  Type:  RX Refill Request    Who Called: Adalgisa  Refill or New Rx:new  RX Name and Strength:ondansetrone  How is the patient currently taking it? (ex. 1XDay):  Is this a 30 day or 90 day RX:  Preferred Pharmacy with phone number:  [facility]/pharmacy #[id number] - [place], [place] - [address] [address]  [place] [address]  Phone: [phone] Fax: [phone]      Local or Mail Order:local  Ordering Provider:Dwight  Would the patient rather a call back or a response via [facility]? call  Best Call Back Number:[phone]  Additional Information:

## 2020-06-19 ENCOUNTER — LAB VISIT (OUTPATIENT)
Dept: LAB | Facility: HOSPITAL | Age: 70
End: 2020-06-19
Attending: INTERNAL MEDICINE
Payer: MEDICARE

## 2020-06-19 ENCOUNTER — TELEPHONE (OUTPATIENT)
Dept: CARDIOLOGY | Facility: CLINIC | Age: 70
End: 2020-06-19

## 2020-06-19 ENCOUNTER — ANTI-COAG VISIT (OUTPATIENT)
Dept: CARDIOLOGY | Facility: CLINIC | Age: 70
End: 2020-06-19
Payer: MEDICARE

## 2020-06-19 DIAGNOSIS — C18.9 COLON ADENOCARCINOMA: ICD-10-CM

## 2020-06-19 DIAGNOSIS — Z95.2 AORTIC VALVE REPLACED: ICD-10-CM

## 2020-06-19 DIAGNOSIS — Z79.01 LONG TERM (CURRENT) USE OF ANTICOAGULANTS: ICD-10-CM

## 2020-06-19 DIAGNOSIS — Z79.01 LONG TERM (CURRENT) USE OF ANTICOAGULANTS: Primary | ICD-10-CM

## 2020-06-19 LAB
ALBUMIN SERPL BCP-MCNC: 4.1 G/DL (ref 3.5–5.2)
ALP SERPL-CCNC: 229 U/L (ref 55–135)
ALT SERPL W/O P-5'-P-CCNC: 24 U/L (ref 10–44)
ANION GAP SERPL CALC-SCNC: 10 MMOL/L (ref 8–16)
ANISOCYTOSIS BLD QL SMEAR: SLIGHT
AST SERPL-CCNC: 30 U/L (ref 10–40)
BASOPHILS # BLD AUTO: 0.05 K/UL (ref 0–0.2)
BASOPHILS NFR BLD: 0.5 % (ref 0–1.9)
BILIRUB SERPL-MCNC: 0.3 MG/DL (ref 0.1–1)
BUN SERPL-MCNC: 16 MG/DL (ref 8–23)
CALCIUM SERPL-MCNC: 9.1 MG/DL (ref 8.7–10.5)
CHLORIDE SERPL-SCNC: 103 MMOL/L (ref 95–110)
CO2 SERPL-SCNC: 25 MMOL/L (ref 23–29)
CREAT SERPL-MCNC: 1.3 MG/DL (ref 0.5–1.4)
DACRYOCYTES BLD QL SMEAR: ABNORMAL
DIFFERENTIAL METHOD: ABNORMAL
EOSINOPHIL # BLD AUTO: 0.1 K/UL (ref 0–0.5)
EOSINOPHIL NFR BLD: 0.7 % (ref 0–8)
ERYTHROCYTE [DISTWIDTH] IN BLOOD BY AUTOMATED COUNT: 17.6 % (ref 11.5–14.5)
EST. GFR  (AFRICAN AMERICAN): 49 ML/MIN/1.73 M^2
EST. GFR  (NON AFRICAN AMERICAN): 42 ML/MIN/1.73 M^2
GIANT PLATELETS BLD QL SMEAR: PRESENT
GLUCOSE SERPL-MCNC: 114 MG/DL (ref 70–110)
HCT VFR BLD AUTO: 35.4 % (ref 37–48.5)
HGB BLD-MCNC: 11.2 G/DL (ref 12–16)
IMM GRANULOCYTES # BLD AUTO: 0.43 K/UL (ref 0–0.04)
IMM GRANULOCYTES NFR BLD AUTO: 4 % (ref 0–0.5)
INR PPP: 2.7 (ref 0.8–1.2)
INR PPP: 2.7 (ref 0.8–1.2)
LYMPHOCYTES # BLD AUTO: 1 K/UL (ref 1–4.8)
LYMPHOCYTES NFR BLD: 9.3 % (ref 18–48)
MCH RBC QN AUTO: 33.1 PG (ref 27–31)
MCHC RBC AUTO-ENTMCNC: 31.6 G/DL (ref 32–36)
MCV RBC AUTO: 105 FL (ref 82–98)
MONOCYTES # BLD AUTO: 1.3 K/UL (ref 0.3–1)
MONOCYTES NFR BLD: 11.9 % (ref 4–15)
NEUTROPHILS # BLD AUTO: 8.5 K/UL (ref 1.8–7.7)
NEUTROPHILS NFR BLD: 77.6 % (ref 38–73)
NRBC BLD-RTO: 0 /100 WBC
PLATELET # BLD AUTO: 123 K/UL (ref 150–350)
PLATELET BLD QL SMEAR: ABNORMAL
PMV BLD AUTO: 11.2 FL (ref 9.2–12.9)
POIKILOCYTOSIS BLD QL SMEAR: SLIGHT
POLYCHROMASIA BLD QL SMEAR: ABNORMAL
POTASSIUM SERPL-SCNC: 4.6 MMOL/L (ref 3.5–5.1)
PROT SERPL-MCNC: 7 G/DL (ref 6–8.4)
PROTHROMBIN TIME: 28.2 SEC (ref 9–12.5)
PROTHROMBIN TIME: 28.2 SEC (ref 9–12.5)
RBC # BLD AUTO: 3.38 M/UL (ref 4–5.4)
SODIUM SERPL-SCNC: 138 MMOL/L (ref 136–145)
WBC # BLD AUTO: 10.88 K/UL (ref 3.9–12.7)

## 2020-06-19 PROCEDURE — 36415 COLL VENOUS BLD VENIPUNCTURE: CPT

## 2020-06-19 PROCEDURE — 85610 PROTHROMBIN TIME: CPT

## 2020-06-19 PROCEDURE — 80053 COMPREHEN METABOLIC PANEL: CPT

## 2020-06-19 PROCEDURE — 93793 PR ANTICOAGULANT MGMT FOR PT TAKING WARFARIN: ICD-10-PCS | Mod: S$GLB,,,

## 2020-06-19 PROCEDURE — 85025 COMPLETE CBC W/AUTO DIFF WBC: CPT

## 2020-06-19 PROCEDURE — 93793 ANTICOAG MGMT PT WARFARIN: CPT | Mod: S$GLB,,,

## 2020-06-19 RX ORDER — WARFARIN 2.5 MG/1
TABLET ORAL
Qty: 30 TABLET | Refills: 5 | Status: SHIPPED | OUTPATIENT
Start: 2020-06-19 | End: 2020-12-16

## 2020-06-19 NOTE — PROGRESS NOTES
INR now at goal but trending up quickly. Medications, chart, and patient findings reviewed. See calendar for adjustments to dose and follow up plan.

## 2020-06-22 ENCOUNTER — INFUSION (OUTPATIENT)
Dept: INFUSION THERAPY | Facility: HOSPITAL | Age: 70
End: 2020-06-22
Attending: INTERNAL MEDICINE
Payer: MEDICARE

## 2020-06-22 ENCOUNTER — OFFICE VISIT (OUTPATIENT)
Dept: HEMATOLOGY/ONCOLOGY | Facility: CLINIC | Age: 70
End: 2020-06-22
Payer: MEDICARE

## 2020-06-22 VITALS
OXYGEN SATURATION: 96 % | DIASTOLIC BLOOD PRESSURE: 72 MMHG | HEART RATE: 74 BPM | BODY MASS INDEX: 23.72 KG/M2 | WEIGHT: 113 LBS | RESPIRATION RATE: 18 BRPM | TEMPERATURE: 98 F | SYSTOLIC BLOOD PRESSURE: 108 MMHG | HEIGHT: 58 IN

## 2020-06-22 VITALS
DIASTOLIC BLOOD PRESSURE: 77 MMHG | HEART RATE: 87 BPM | BODY MASS INDEX: 23.93 KG/M2 | TEMPERATURE: 98 F | WEIGHT: 114 LBS | SYSTOLIC BLOOD PRESSURE: 125 MMHG | HEIGHT: 58 IN | OXYGEN SATURATION: 96 % | RESPIRATION RATE: 18 BRPM

## 2020-06-22 DIAGNOSIS — C18.9 COLON ADENOCARCINOMA: Primary | ICD-10-CM

## 2020-06-22 DIAGNOSIS — C20 RECTAL ADENOCARCINOMA: ICD-10-CM

## 2020-06-22 DIAGNOSIS — K21.9 GASTROESOPHAGEAL REFLUX DISEASE WITHOUT ESOPHAGITIS: ICD-10-CM

## 2020-06-22 DIAGNOSIS — M81.0 POSTMENOPAUSAL OSTEOPOROSIS: ICD-10-CM

## 2020-06-22 DIAGNOSIS — Z79.01 CHRONIC ANTICOAGULATION: ICD-10-CM

## 2020-06-22 DIAGNOSIS — R11.0 CHEMOTHERAPY-INDUCED NAUSEA: ICD-10-CM

## 2020-06-22 DIAGNOSIS — T45.1X5A CHEMOTHERAPY-INDUCED NAUSEA: ICD-10-CM

## 2020-06-22 DIAGNOSIS — D50.0 IRON DEFICIENCY ANEMIA DUE TO CHRONIC BLOOD LOSS: ICD-10-CM

## 2020-06-22 PROCEDURE — 99999 PR PBB SHADOW E&M-EST. PATIENT-LVL V: CPT | Mod: PBBFAC,,, | Performed by: NURSE PRACTITIONER

## 2020-06-22 PROCEDURE — 1159F PR MEDICATION LIST DOCUMENTED IN MEDICAL RECORD: ICD-10-PCS | Mod: S$GLB,,, | Performed by: NURSE PRACTITIONER

## 2020-06-22 PROCEDURE — 96415 CHEMO IV INFUSION ADDL HR: CPT

## 2020-06-22 PROCEDURE — 99215 OFFICE O/P EST HI 40 MIN: CPT | Mod: S$GLB,,, | Performed by: NURSE PRACTITIONER

## 2020-06-22 PROCEDURE — 1101F PT FALLS ASSESS-DOCD LE1/YR: CPT | Mod: CPTII,S$GLB,, | Performed by: NURSE PRACTITIONER

## 2020-06-22 PROCEDURE — 1126F AMNT PAIN NOTED NONE PRSNT: CPT | Mod: S$GLB,,, | Performed by: NURSE PRACTITIONER

## 2020-06-22 PROCEDURE — 63600175 PHARM REV CODE 636 W HCPCS: Performed by: INTERNAL MEDICINE

## 2020-06-22 PROCEDURE — 96411 CHEMO IV PUSH ADDL DRUG: CPT

## 2020-06-22 PROCEDURE — 99999 PR PBB SHADOW E&M-EST. PATIENT-LVL V: ICD-10-PCS | Mod: PBBFAC,,, | Performed by: NURSE PRACTITIONER

## 2020-06-22 PROCEDURE — 1159F MED LIST DOCD IN RCRD: CPT | Mod: S$GLB,,, | Performed by: NURSE PRACTITIONER

## 2020-06-22 PROCEDURE — 3078F DIAST BP <80 MM HG: CPT | Mod: CPTII,S$GLB,, | Performed by: NURSE PRACTITIONER

## 2020-06-22 PROCEDURE — 99215 PR OFFICE/OUTPT VISIT, EST, LEVL V, 40-54 MIN: ICD-10-PCS | Mod: S$GLB,,, | Performed by: NURSE PRACTITIONER

## 2020-06-22 PROCEDURE — 96416 CHEMO PROLONG INFUSE W/PUMP: CPT

## 2020-06-22 PROCEDURE — 3078F PR MOST RECENT DIASTOLIC BLOOD PRESSURE < 80 MM HG: ICD-10-PCS | Mod: CPTII,S$GLB,, | Performed by: NURSE PRACTITIONER

## 2020-06-22 PROCEDURE — 3074F SYST BP LT 130 MM HG: CPT | Mod: CPTII,S$GLB,, | Performed by: NURSE PRACTITIONER

## 2020-06-22 PROCEDURE — 96368 THER/DIAG CONCURRENT INF: CPT

## 2020-06-22 PROCEDURE — 96413 CHEMO IV INFUSION 1 HR: CPT

## 2020-06-22 PROCEDURE — 1101F PR PT FALLS ASSESS DOC 0-1 FALLS W/OUT INJ PAST YR: ICD-10-PCS | Mod: CPTII,S$GLB,, | Performed by: NURSE PRACTITIONER

## 2020-06-22 PROCEDURE — 3074F PR MOST RECENT SYSTOLIC BLOOD PRESSURE < 130 MM HG: ICD-10-PCS | Mod: CPTII,S$GLB,, | Performed by: NURSE PRACTITIONER

## 2020-06-22 PROCEDURE — 25000003 PHARM REV CODE 250: Performed by: INTERNAL MEDICINE

## 2020-06-22 PROCEDURE — 1126F PR PAIN SEVERITY QUANTIFIED, NO PAIN PRESENT: ICD-10-PCS | Mod: S$GLB,,, | Performed by: NURSE PRACTITIONER

## 2020-06-22 PROCEDURE — 96367 TX/PROPH/DG ADDL SEQ IV INF: CPT

## 2020-06-22 RX ORDER — PROCHLORPERAZINE MALEATE 5 MG
5 TABLET ORAL EVERY 6 HOURS PRN
Qty: 30 TABLET | Refills: 1 | Status: SHIPPED | OUTPATIENT
Start: 2020-06-22 | End: 2021-01-25

## 2020-06-22 RX ORDER — FLUOROURACIL 50 MG/ML
400 INJECTION, SOLUTION INTRAVENOUS
Status: CANCELLED | OUTPATIENT
Start: 2020-06-22

## 2020-06-22 RX ORDER — HEPARIN 100 UNIT/ML
500 SYRINGE INTRAVENOUS
Status: CANCELLED | OUTPATIENT
Start: 2020-06-24

## 2020-06-22 RX ORDER — SODIUM CHLORIDE 0.9 % (FLUSH) 0.9 %
10 SYRINGE (ML) INJECTION
Status: CANCELLED | OUTPATIENT
Start: 2020-06-24

## 2020-06-22 RX ORDER — DEXAMETHASONE 4 MG/1
TABLET ORAL
Qty: 24 TABLET | Refills: 0 | Status: SHIPPED | OUTPATIENT
Start: 2020-06-22 | End: 2021-01-25

## 2020-06-22 RX ORDER — DIPHENHYDRAMINE HYDROCHLORIDE 50 MG/ML
50 INJECTION INTRAMUSCULAR; INTRAVENOUS ONCE AS NEEDED
Status: CANCELLED | OUTPATIENT
Start: 2020-06-22

## 2020-06-22 RX ORDER — HEPARIN 100 UNIT/ML
500 SYRINGE INTRAVENOUS
Status: CANCELLED | OUTPATIENT
Start: 2020-06-22

## 2020-06-22 RX ORDER — SODIUM CHLORIDE 0.9 % (FLUSH) 0.9 %
10 SYRINGE (ML) INJECTION
Status: DISCONTINUED | OUTPATIENT
Start: 2020-06-22 | End: 2020-06-22

## 2020-06-22 RX ORDER — FLUOROURACIL 50 MG/ML
400 INJECTION, SOLUTION INTRAVENOUS
Status: DISCONTINUED | OUTPATIENT
Start: 2020-06-22 | End: 2020-06-22

## 2020-06-22 RX ORDER — HEPARIN 100 UNIT/ML
500 SYRINGE INTRAVENOUS
Status: DISCONTINUED | OUTPATIENT
Start: 2020-06-22 | End: 2020-06-22

## 2020-06-22 RX ORDER — EPINEPHRINE 0.3 MG/.3ML
0.3 INJECTION SUBCUTANEOUS ONCE AS NEEDED
Status: CANCELLED | OUTPATIENT
Start: 2020-06-22

## 2020-06-22 RX ORDER — SODIUM CHLORIDE 0.9 % (FLUSH) 0.9 %
10 SYRINGE (ML) INJECTION
Status: CANCELLED | OUTPATIENT
Start: 2020-06-22

## 2020-06-22 RX ORDER — DIPHENHYDRAMINE HYDROCHLORIDE 50 MG/ML
50 INJECTION INTRAMUSCULAR; INTRAVENOUS ONCE AS NEEDED
Status: DISCONTINUED | OUTPATIENT
Start: 2020-06-22 | End: 2020-06-22

## 2020-06-22 RX ORDER — FLUOROURACIL 50 MG/ML
400 INJECTION, SOLUTION INTRAVENOUS
Status: COMPLETED | OUTPATIENT
Start: 2020-06-22 | End: 2020-06-22

## 2020-06-22 RX ORDER — EPINEPHRINE 0.3 MG/.3ML
0.3 INJECTION SUBCUTANEOUS ONCE AS NEEDED
Status: DISCONTINUED | OUTPATIENT
Start: 2020-06-22 | End: 2020-06-22

## 2020-06-22 RX ADMIN — DEXTROSE MONOHYDRATE: 50 INJECTION, SOLUTION INTRAVENOUS at 09:06

## 2020-06-22 RX ADMIN — PALONOSETRON: 0.25 INJECTION, SOLUTION INTRAVENOUS at 09:06

## 2020-06-22 RX ADMIN — FLUOROURACIL 3480 MG: 50 INJECTION, SOLUTION INTRAVENOUS at 12:06

## 2020-06-22 RX ADMIN — OXALIPLATIN 123 MG: 5 INJECTION, SOLUTION, CONCENTRATE INTRAVENOUS at 09:06

## 2020-06-22 RX ADMIN — FLUOROURACIL 580 MG: 50 INJECTION, SOLUTION INTRAVENOUS at 12:06

## 2020-06-22 RX ADMIN — DEXTROSE MONOHYDRATE 580 MG: 50 INJECTION, SOLUTION INTRAVENOUS at 09:06

## 2020-06-22 NOTE — PROGRESS NOTES
Subjective:       Patient ID: Adalgisa Wright is a 69 y.o. female.    Chief Complaint: Follow-up    69 year old female, patient of Dr. Quintanilla presents for evaluation prior to chemotherapy.    She was seen previously by Dr. Haq for anemia  She has a mechanical aortic valve replacement 16 years ago on therapeutic anticoagulation.  Labs notable for low haptoglobin in it is felt hemolytic process may be contributing to her anemia.  She notes having recent EKG as part of preoperative evaluation but has not had follow-up with her cardiologist recently or repeat echocardiogram.  She denies edema, shortness of breath or chest pain.     She was referred to Gastroenterology for further evaluation of iron deficiency anemia and concern for GI bleed.      11/7/19 EGD and colonoscopy revealed a nonobstructing ulcerated mass 20 cm from the anal verge. Pathology:  Moderately differentiated adenocarcinoma, MSI intact.     12/02/2019 PET-CT notable for avid sigmoid lesion SUV 15.7 as well as avid right lobe liver lesion 19 mm, SUV 4.1.  No other areas of avidity concerning for metastatic disease.     12/9/19 liver biopsy, Pathology:  Diagnosis 1.  Liver mass, biopsy:   - Not diagnostic of malignancy   - Scant atypical liver sampling     02/13/2020 surgical resection of sigmoid and upper rectum  Pathology:  Adenocarcinoma, moderately differentiated, 2.5 x 2 cm, margins negative, 6 of 14 lymph nodes positive for metastatic adenocarcinoma, many tumor deposits and mesenteric tissue, pT3 pN2, MSI intact     03/23/2020 cycle 1 day 1 FOLFOX      05/20/2020 restaging PET showed interval resolution of liver lesion and no evidence of recurrence/metastatic disease      Today's visit:  Patient presents for evaluation for cycle 7 FOLFOX, patient reports some difficulty with nausea following cycle 6, unclear on use of nausea medications.     Review of Systems   Constitutional: Positive for fatigue. Negative for activity change, appetite  change, chills, diaphoresis, fever and unexpected weight change.   HENT: Negative for congestion, hearing loss, nosebleeds, postnasal drip, sore throat and trouble swallowing.    Eyes: Negative for discharge and visual disturbance.   Respiratory: Negative for cough, chest tightness and shortness of breath.    Cardiovascular: Negative for chest pain, palpitations and leg swelling.   Gastrointestinal: Positive for abdominal distention. Negative for abdominal pain, blood in stool, constipation, diarrhea, nausea and vomiting.   Endocrine: Negative for cold intolerance and heat intolerance.   Genitourinary: Negative for difficulty urinating, dyspareunia and hematuria.   Musculoskeletal: Positive for back pain. Negative for arthralgias, gait problem and myalgias.   Skin: Negative.    Neurological: Negative for dizziness, weakness, light-headedness and headaches.   Hematological: Negative for adenopathy. Does not bruise/bleed easily.   Psychiatric/Behavioral: Negative for agitation, behavioral problems and confusion. The patient is nervous/anxious.        Medication List with Changes/Refills   Current Medications    ACEBUTOLOL (SECTRAL) 400 MG CAPSULE    Take 400 mg by mouth 2 (two) times daily.    ACETAMINOPHEN (TYLENOL) 325 MG TABLET    Take 2 tablets (650 mg total) by mouth every 6 (six) hours.    ALPRAZOLAM (XANAX) 1 MG TABLET    Take 1 mg by mouth 2 (two) times daily as needed.    BUTALBITAL-ACETAMINOPHEN-CAFFEINE -40 MG (FIORICET, ESGIC) -40 MG PER TABLET    TAKE ONE TABLET BY MOUTH EVERY SIX HOURS AS NEEDED    DIPHENOXYLATE-ATROPINE 2.5-0.025 MG (LOMOTIL) 2.5-0.025 MG PER TABLET    Take 1 tablet by mouth 4 (four) times daily as needed for Diarrhea.    ERGOCALCIFEROL (ERGOCALCIFEROL) 50,000 UNIT CAP    Take 1 capsule by mouth every 30 days.    FUROSEMIDE (LASIX) 40 MG TABLET    Take 40 mg by mouth Daily.    LINZESS 145 MCG CAP CAPSULE    TAKE 1 CAPSULE (145 MCG TOTAL) BY MOUTH ONCE DAILY.    LOVASTATIN  (MEVACOR) 40 MG TABLET    Take 40 mg by mouth every evening.    MULTIVITAMIN (THERAGRAN) TABLET    Take 1 tablet by mouth.    NOZASEPTIN (NOZIN) NASAL     1 each by Each Nostril route 2 (two) times daily.    ONDANSETRON (ZOFRAN) 8 MG TABLET    Take 1 tablet (8 mg total) by mouth every 12 (twelve) hours as needed for Nausea.    OXYCODONE (ROXICODONE) 5 MG IMMEDIATE RELEASE TABLET    Take 1 tablet (5 mg total) by mouth every 4 (four) hours as needed.    PANTOPRAZOLE (PROTONIX) 20 MG TABLET    Take 1 tablet (20 mg total) by mouth once daily.    POTASSIUM CHLORIDE SA (K-DUR,KLOR-CON) 20 MEQ TABLET    Take 2 tablets (40 mEq total) by mouth once daily.    SERTRALINE (ZOLOFT) 100 MG TABLET    Take 1 tablet by mouth Daily.    TRAMADOL (ULTRAM) 50 MG TABLET    50 mg every 8 (eight) hours as needed.     WARFARIN (COUMADIN) 2.5 MG TABLET    1.25mg PO Tues/Thurs/Sat and 2.5mg PO all other days -- OR AS DIRECTED BY COUMADIN CLINIC    ZOLPIDEM (AMBIEN) 10 MG TAB    Take 5 mg by mouth nightly as needed.    Changed and/or Refilled Medications    Modified Medication Previous Medication    DEXAMETHASONE (DECADRON) 4 MG TAB dexAMETHasone (DECADRON) 4 MG Tab       TAKE 2 TABLETS BY MOUTH ON DAY 2 AND 3 FOLLOWING EACH CYCLE OF CHEMO    TAKE 2 TABLETS BY MOUTH ON DAY 2 AND 3 FOLLOWING EACH CYCLE OF CHEMO    PROCHLORPERAZINE (COMPAZINE) 5 MG TABLET prochlorperazine (COMPAZINE) 5 MG tablet       Take 1 tablet (5 mg total) by mouth every 6 (six) hours as needed for Nausea.    Take 1 tablet (5 mg total) by mouth every 6 (six) hours as needed for Nausea.     Objective:     Vitals:    06/22/20 0748   BP: 125/77   Pulse: 87   Resp: 18   Temp: 97.7 °F (36.5 °C)     Physical Exam  Vitals signs reviewed.   Constitutional:       General: She is not in acute distress.     Appearance: She is well-developed.   HENT:      Head: Normocephalic and atraumatic.      Right Ear: Hearing and external ear normal.      Left Ear: Hearing and external  ear normal.      Nose: No rhinorrhea.      Right Sinus: No maxillary sinus tenderness or frontal sinus tenderness.      Left Sinus: No maxillary sinus tenderness or frontal sinus tenderness.      Mouth/Throat:      Mouth: No oral lesions.      Pharynx: Uvula midline.   Eyes:      General:         Right eye: No discharge.         Left eye: No discharge.      Conjunctiva/sclera: Conjunctivae normal.      Pupils: Pupils are equal, round, and reactive to light.   Neck:      Musculoskeletal: Normal range of motion.      Thyroid: No thyromegaly.      Vascular: No carotid bruit.      Trachea: No tracheal deviation.   Cardiovascular:      Rate and Rhythm: Normal rate and regular rhythm.      Pulses:           Dorsalis pedis pulses are 2+ on the right side and 2+ on the left side.      Heart sounds: Normal heart sounds, S1 normal and S2 normal. No murmur.   Pulmonary:      Effort: Pulmonary effort is normal. No respiratory distress.      Breath sounds: Examination of the right-lower field reveals decreased breath sounds. Examination of the left-lower field reveals decreased breath sounds. Decreased breath sounds present.   Abdominal:      General: Bowel sounds are normal. There is distension.      Palpations: Abdomen is soft. There is no mass.      Tenderness: There is no abdominal tenderness.   Musculoskeletal: Normal range of motion.   Lymphadenopathy:      Cervical: No cervical adenopathy.      Upper Body:      Right upper body: No supraclavicular adenopathy.      Left upper body: No supraclavicular adenopathy.   Skin:     General: Skin is warm and dry.      Capillary Refill: Capillary refill takes less than 2 seconds.      Findings: No rash.   Neurological:      Mental Status: She is alert and oriented to person, place, and time.      Sensory: No sensory deficit.      Coordination: Coordination normal.      Gait: Gait normal.   Psychiatric:         Mood and Affect: Mood is anxious. Mood is not depressed.         Speech:  "Speech normal.         Behavior: Behavior normal.         Thought Content: Thought content normal.         Judgment: Judgment normal.         Assessment:       Problem List Items Addressed This Visit        Hematology    Chronic anticoagulation       Oncology    Iron deficiency anemia due to chronic blood loss    Rectal adenocarcinoma    Colon adenocarcinoma - Primary       GI    Gastroesophageal reflux disease without esophagitis       Orthopedic    Postmenopausal osteoporosis      Other Visit Diagnoses     Chemotherapy-induced nausea        Relevant Medications    prochlorperazine (COMPAZINE) 5 MG tablet    dexAMETHasone (DECADRON) 4 MG Tab          Plan:         Nausea:  --Extensive conversation with patient reeducating her on use of nausea medications, following are the instruction given to patient in a manner she could understand and relay to her family:      Take the Dexamethasone 2 tablets daily on days 2 and 3       Prochlorperazine (Compazine) you can take 2 tablets if needed, start with 1 because it can make you sleepy but take 2 if needed.      Ondansetron (Zofran) take every 8 hours on the "not so great" days    Colon ADenocarcinoma:  --Reviewed lab results with patient  --Ok to proceed with FOLFOX today      Follow-Up:  Return to clinic in 2 weeks with labs and possible FOLFOX, patient aware to call clinic with any questions and return to clinic earlier if needed.         "

## 2020-06-22 NOTE — DISCHARGE INSTRUCTIONS
.[facility]  [address] [facility]  [address] [address]  [phone] phone     [phone] fax  Hours of Operation: Monday- Friday 8:00am- 5:00pm  After hours phone  [phone]  Hematology / Oncology Physicians on call      Dr. Bill Carey, CATRACHITA Mcdonald NP Tyesha Taylor, NP    Please call with any concerns regarding your appointment today.  .FALL PREVENTION   Falls often occur due to slipping, tripping or losing your balance. Here are ways to reduce your risk of falling again.   Was there anything that caused your fall that can be fixed, removed or replaced?   Make your home safe by keeping walkways clear of objects you may trip over.   Use non-slip pads under rugs.   Do not walk in poorly lit areas.   Do not stand on chairs or wobbly ladders.   Use caution when reaching overhead or looking upward. This position can cause a loss of balance.   Be sure your shoes fit properly, have non-slip bottoms and are in good condition.   Be cautious when going up and down stairs, curbs, and when walking on uneven sidewalks.   If your balance is poor, consider using a cane or walker.   If your fall was related to alcohol use, stop or limit alcohol intake.   If your fall was related to use of sleeping medicines, talk to your doctor about this. You may need to reduce your dosage at bedtime if you awaken during the night to go to the bathroom.   To reduce the need for nighttime bathroom trips:   Avoid drinking fluids for several hours before going to bed   Empty your bladder before going to bed   Men can keep a urinal at the bedside   © 9694-7721 [facility], [address] [place] [address], [place] [address]. All rights reserved. This information is not intended as a substitute for professional medical care. Always follow your healthcare professional's instructions.    .WAYS TO HELP PREVENT  INFECTION         WASH YOUR HANDS OFTEN DURING THE DAY, ESPECIALLY BEFORE YOU EAT, AFTER USING THE BATHROOM, AND AFTER TOUCHING ANIMALS     STAY AWAY FROM PEOPLE WHO HAVE ILLNESSES YOU CAN CATCH; SUCH AS COLDS, FLU, CHICKEN POX     TRY TO AVOID CROWDS     STAY AWAY FROM CHILDREN WHO RECENTLY HAVE RECEIVED LIVE VIRUS VACCINES     MAINTAIN GOOD MOUTH CARE     DO NOT SQUEEZE OR SCRATCH PIMPLES     CLEAN CUTS & SCRAPES RIGHT AWAY AND DAILY UNTIL HEALED WITH WARM WATER, SOAP & AN ANTISEPTIC     AVOID CONTACT WITH LITTER BOXES, BIRD CAGES, & FISH TANKS     AVOID STANDING WATER, IE., BIRD BATHS, FLOWER POTS/VASES, OR HUMIDIFIERS     WEAR GLOVES WHEN GARDENING OR CLEANING UP AFTER OTHERS, ESPECIALLY BABIES & SMALL CHILDREN     DO NOT EAT RAW FISH, SEAFOOD, MEAT, OR EGGS  .HOME CARE AFTER CHEMOTHERAPY   Meals   Many patients feel sick and lose their appetites during treatment. Eat small meals several times a day. Choose bland foods with little taste or smell if you have problems with nausea. Be sure to cook all food thoroughly. This kills bacteria and helps you avoid intestinal infection. Soft foods are easier to swallow and digest.   Activity   Exercise keeps you strong and keeps your heart and lungs active. Talk to your doctor about an appropriate exercise program for you.   Skin Care   To prevent a skin infection, bathe or shower once a day. Use a moisturizing soap and wash with warm water. Avoid very hot or cold water. Chemotherapy can make your skin dry . Apply moisturizing lotion to help relieve dry skin. Some drugs used in high doses can cause slight burns to appear (like sunburn). Ask for a special cream to help relieve the burn and protect your skin.   Prevent Mouth Sores   During chemotherapy, many people get mouth sores. Do the following to help prevent mouth sores or to ease discomfort.   Brush your teeth with a soft-bristle toothbrush after every meal.  Don't use dental floss if your platelet count  "is below 50,000. Your doctor or nurse will tell you if this is the case.  Use an oral swab or special soft toothbrush if your gums bleed during regular brushing.  Use mouthwash as directed. If you can't tolerate commercial mouthwash, use salt and baking soda to clean your mouth. Mix 1 teaspoon of salt and 1 teaspoon of baking soda into a glass of water. Swish and spit.  Call your doctor or return to this facility if you develop any of the following:   Sore throat   White patches in the mouth or throat   Fever of 100.4ºF (38ºC) or higher, or as directed by your healthcare provider  © 8158-0508 [facility], [address] [place] [address], [place] [id number]. All rights reserved. This information is not intended as a substitute for professional medical care. Always follow your healthcare professional's     .Support Groups/Classes    Support groups and classes are being offered at the   [facility] and [facility]!!    "Cooking with Cancer" (Nutrition Class):  Second Wednesday of each month   at noon at the [facility].  Metastatic Support Group:  Third Tuesday of each month   at noon at the [facility].  Next Steps Class/Group: Second and fourth Thursday of each month at noon at the [facility].  [facility] (Breast Cancer Support Group): First Tuesday of each month   at 5:30pm at the [facility] location.  [facility]: [facility]: Second and third Tuesday of each month from 7:30am - 2pm.  [facility]: First and fourth Tuesday of each month from 7:30am - 2pm    If you are interested in attending or would like more information please ask our social workers or your nurse!    "

## 2020-06-22 NOTE — PATIENT INSTRUCTIONS
"Take the Dexamethasone 2 tablets daily on days 2 and 3     Prochlorperazine (Compazine) you can take 2 tablets if needed, start with 1 because it can make you sleepy but take 2 if needed.    Ondansetron (Zofran) take every 8 hours on the "not so great" days  "

## 2020-06-24 ENCOUNTER — INFUSION (OUTPATIENT)
Dept: INFUSION THERAPY | Facility: HOSPITAL | Age: 70
End: 2020-06-24
Attending: INTERNAL MEDICINE
Payer: MEDICARE

## 2020-06-24 ENCOUNTER — ANTI-COAG VISIT (OUTPATIENT)
Dept: CARDIOLOGY | Facility: CLINIC | Age: 70
End: 2020-06-24
Payer: MEDICARE

## 2020-06-24 VITALS
OXYGEN SATURATION: 96 % | HEART RATE: 83 BPM | RESPIRATION RATE: 18 BRPM | SYSTOLIC BLOOD PRESSURE: 116 MMHG | DIASTOLIC BLOOD PRESSURE: 68 MMHG | TEMPERATURE: 98 F

## 2020-06-24 DIAGNOSIS — C18.9 COLON ADENOCARCINOMA: Primary | ICD-10-CM

## 2020-06-24 DIAGNOSIS — Z95.2 AORTIC VALVE REPLACED: ICD-10-CM

## 2020-06-24 DIAGNOSIS — Z79.01 LONG TERM (CURRENT) USE OF ANTICOAGULANTS: Primary | ICD-10-CM

## 2020-06-24 LAB — INR PPP: 5.4 (ref 2–3)

## 2020-06-24 PROCEDURE — 85610 POCT INR: ICD-10-PCS | Mod: QW,S$GLB,, | Performed by: INTERNAL MEDICINE

## 2020-06-24 PROCEDURE — 93793 ANTICOAG MGMT PT WARFARIN: CPT | Mod: S$GLB,,,

## 2020-06-24 PROCEDURE — 96377 APPLICATON ON-BODY INJECTOR: CPT

## 2020-06-24 PROCEDURE — A4216 STERILE WATER/SALINE, 10 ML: HCPCS | Performed by: INTERNAL MEDICINE

## 2020-06-24 PROCEDURE — 85610 PROTHROMBIN TIME: CPT | Mod: QW,S$GLB,, | Performed by: INTERNAL MEDICINE

## 2020-06-24 PROCEDURE — 25000003 PHARM REV CODE 250: Performed by: INTERNAL MEDICINE

## 2020-06-24 PROCEDURE — 63600175 PHARM REV CODE 636 W HCPCS: Mod: JG | Performed by: INTERNAL MEDICINE

## 2020-06-24 PROCEDURE — 93793 PR ANTICOAGULANT MGMT FOR PT TAKING WARFARIN: ICD-10-PCS | Mod: S$GLB,,,

## 2020-06-24 RX ORDER — HEPARIN 100 UNIT/ML
500 SYRINGE INTRAVENOUS
Status: DISCONTINUED | OUTPATIENT
Start: 2020-06-24 | End: 2020-06-24 | Stop reason: HOSPADM

## 2020-06-24 RX ORDER — SODIUM CHLORIDE 0.9 % (FLUSH) 0.9 %
10 SYRINGE (ML) INJECTION
Status: DISCONTINUED | OUTPATIENT
Start: 2020-06-24 | End: 2020-06-24 | Stop reason: HOSPADM

## 2020-06-24 RX ADMIN — HEPARIN SODIUM (PORCINE) LOCK FLUSH IV SOLN 100 UNIT/ML 500 UNITS: 100 SOLUTION at 09:06

## 2020-06-24 RX ADMIN — PEGFILGRASTIM 6 MG: KIT SUBCUTANEOUS at 09:06

## 2020-06-24 RX ADMIN — Medication 10 ML: at 09:06

## 2020-06-24 NOTE — NURSING
Pt came in for pump D/C. Pt tolerated home 5FU well and had no complaints. PAC flushed w/ NS and heparin and deaccessed. Pt education reinforced and explained what to expect in the next couple of days. Pt verbalized understanding.Neulasta OBI in place on left upper arm, verbal and written instructions given on when it will be ok to remove OBI, pt states understanding.

## 2020-06-24 NOTE — PROGRESS NOTES
Patient's INR is supra-therapeutic at 5.4. Patient reports light nose bleed on 6/19/20. Patient reports no changes in diet/medications. Send to PharmD for dosing/instructions.

## 2020-06-24 NOTE — PROGRESS NOTES
LPN notes reviewed. INR not at goal. Medications, chart, and patient findings reviewed. See calendar for adjustments to dose and follow up plan.

## 2020-06-24 NOTE — PROGRESS NOTES
Patient instructed to hold warfarin 6/24/20 and 6/25/20 and take warfarin 1.25 mg all other days.  Advised patient to go to ED if any signs/symptoms of bleeding.  Patient reports no bleeding issues. Recheck on 6/29/20. Patient verbalizes understanding.

## 2020-06-29 ENCOUNTER — ANTI-COAG VISIT (OUTPATIENT)
Dept: CARDIOLOGY | Facility: CLINIC | Age: 70
End: 2020-06-29
Payer: MEDICARE

## 2020-06-29 DIAGNOSIS — Z95.2 AORTIC VALVE REPLACED: ICD-10-CM

## 2020-06-29 DIAGNOSIS — Z79.01 LONG TERM (CURRENT) USE OF ANTICOAGULANTS: Primary | ICD-10-CM

## 2020-06-29 LAB — INR PPP: 1.3 (ref 2–3)

## 2020-06-29 PROCEDURE — 93793 ANTICOAG MGMT PT WARFARIN: CPT | Mod: S$GLB,,,

## 2020-06-29 PROCEDURE — 93793 PR ANTICOAGULANT MGMT FOR PT TAKING WARFARIN: ICD-10-PCS | Mod: S$GLB,,,

## 2020-06-29 PROCEDURE — 85610 POCT INR: ICD-10-PCS | Mod: QW,S$GLB,, | Performed by: INTERNAL MEDICINE

## 2020-06-29 PROCEDURE — 85610 PROTHROMBIN TIME: CPT | Mod: QW,S$GLB,, | Performed by: INTERNAL MEDICINE

## 2020-06-29 NOTE — PROGRESS NOTES
Patient's INR is subtherapeutic at 1.3.  Previous instructions reported followed.  Patient held warfarin x 2 days as instructed from a supratherapeutic INR on last visit.  No other changes reported.  Instructions given:  Warfarin 2.5 mg on Monday and 1.25 mg on all other days per week.  Will continue to monitor closely.  Recheck in 1 week.  Dose calendar given and reviewed with patient.  Patient verbalized understanding.

## 2020-07-06 ENCOUNTER — INFUSION (OUTPATIENT)
Dept: INFUSION THERAPY | Facility: HOSPITAL | Age: 70
End: 2020-07-06
Attending: INTERNAL MEDICINE
Payer: MEDICARE

## 2020-07-06 ENCOUNTER — ANTI-COAG VISIT (OUTPATIENT)
Dept: CARDIOLOGY | Facility: CLINIC | Age: 70
End: 2020-07-06
Payer: MEDICARE

## 2020-07-06 ENCOUNTER — OFFICE VISIT (OUTPATIENT)
Dept: HEMATOLOGY/ONCOLOGY | Facility: CLINIC | Age: 70
End: 2020-07-06
Payer: MEDICARE

## 2020-07-06 VITALS
WEIGHT: 113.13 LBS | TEMPERATURE: 99 F | HEIGHT: 58 IN | TEMPERATURE: 99 F | HEIGHT: 58 IN | BODY MASS INDEX: 23.72 KG/M2 | DIASTOLIC BLOOD PRESSURE: 67 MMHG | WEIGHT: 113 LBS | SYSTOLIC BLOOD PRESSURE: 99 MMHG | RESPIRATION RATE: 18 BRPM | HEART RATE: 78 BPM | DIASTOLIC BLOOD PRESSURE: 65 MMHG | OXYGEN SATURATION: 98 % | HEART RATE: 74 BPM | BODY MASS INDEX: 23.74 KG/M2 | OXYGEN SATURATION: 95 % | RESPIRATION RATE: 16 BRPM | SYSTOLIC BLOOD PRESSURE: 114 MMHG

## 2020-07-06 DIAGNOSIS — C18.9 COLON ADENOCARCINOMA: Primary | ICD-10-CM

## 2020-07-06 DIAGNOSIS — R11.0 CHEMOTHERAPY-INDUCED NAUSEA: ICD-10-CM

## 2020-07-06 DIAGNOSIS — Z79.01 LONG TERM (CURRENT) USE OF ANTICOAGULANTS: ICD-10-CM

## 2020-07-06 DIAGNOSIS — T45.1X5A CHEMOTHERAPY-INDUCED NAUSEA: ICD-10-CM

## 2020-07-06 DIAGNOSIS — Z79.01 CHRONIC ANTICOAGULATION: ICD-10-CM

## 2020-07-06 DIAGNOSIS — R19.7 DIARRHEA, UNSPECIFIED TYPE: ICD-10-CM

## 2020-07-06 DIAGNOSIS — D69.6 THROMBOCYTOPENIA: ICD-10-CM

## 2020-07-06 DIAGNOSIS — D50.0 IRON DEFICIENCY ANEMIA DUE TO CHRONIC BLOOD LOSS: ICD-10-CM

## 2020-07-06 DIAGNOSIS — Z95.2 AORTIC VALVE REPLACED: ICD-10-CM

## 2020-07-06 DIAGNOSIS — E87.6 HYPOKALEMIA DUE TO EXCESSIVE GASTROINTESTINAL LOSS OF POTASSIUM: ICD-10-CM

## 2020-07-06 DIAGNOSIS — R03.1 BLOOD PRESSURE LOWER THAN PRIOR MEASUREMENT: ICD-10-CM

## 2020-07-06 DIAGNOSIS — C20 RECTAL ADENOCARCINOMA: ICD-10-CM

## 2020-07-06 PROCEDURE — 25000003 PHARM REV CODE 250: Performed by: INTERNAL MEDICINE

## 2020-07-06 PROCEDURE — 99999 PR PBB SHADOW E&M-EST. PATIENT-LVL V: ICD-10-PCS | Mod: PBBFAC,,, | Performed by: INTERNAL MEDICINE

## 2020-07-06 PROCEDURE — 3074F PR MOST RECENT SYSTOLIC BLOOD PRESSURE < 130 MM HG: ICD-10-PCS | Mod: CPTII,S$GLB,, | Performed by: INTERNAL MEDICINE

## 2020-07-06 PROCEDURE — 63600175 PHARM REV CODE 636 W HCPCS: Performed by: INTERNAL MEDICINE

## 2020-07-06 PROCEDURE — 3008F PR BODY MASS INDEX (BMI) DOCUMENTED: ICD-10-PCS | Mod: CPTII,S$GLB,, | Performed by: INTERNAL MEDICINE

## 2020-07-06 PROCEDURE — 99215 OFFICE O/P EST HI 40 MIN: CPT | Mod: S$GLB,,, | Performed by: INTERNAL MEDICINE

## 2020-07-06 PROCEDURE — 99215 PR OFFICE/OUTPT VISIT, EST, LEVL V, 40-54 MIN: ICD-10-PCS | Mod: S$GLB,,, | Performed by: INTERNAL MEDICINE

## 2020-07-06 PROCEDURE — 1126F AMNT PAIN NOTED NONE PRSNT: CPT | Mod: S$GLB,,, | Performed by: INTERNAL MEDICINE

## 2020-07-06 PROCEDURE — 3074F SYST BP LT 130 MM HG: CPT | Mod: CPTII,S$GLB,, | Performed by: INTERNAL MEDICINE

## 2020-07-06 PROCEDURE — 96366 THER/PROPH/DIAG IV INF ADDON: CPT

## 2020-07-06 PROCEDURE — 1101F PR PT FALLS ASSESS DOC 0-1 FALLS W/OUT INJ PAST YR: ICD-10-PCS | Mod: CPTII,S$GLB,, | Performed by: INTERNAL MEDICINE

## 2020-07-06 PROCEDURE — 1159F PR MEDICATION LIST DOCUMENTED IN MEDICAL RECORD: ICD-10-PCS | Mod: S$GLB,,, | Performed by: INTERNAL MEDICINE

## 2020-07-06 PROCEDURE — 3078F PR MOST RECENT DIASTOLIC BLOOD PRESSURE < 80 MM HG: ICD-10-PCS | Mod: CPTII,S$GLB,, | Performed by: INTERNAL MEDICINE

## 2020-07-06 PROCEDURE — 99999 PR PBB SHADOW E&M-EST. PATIENT-LVL V: CPT | Mod: PBBFAC,,, | Performed by: INTERNAL MEDICINE

## 2020-07-06 PROCEDURE — 1101F PT FALLS ASSESS-DOCD LE1/YR: CPT | Mod: CPTII,S$GLB,, | Performed by: INTERNAL MEDICINE

## 2020-07-06 PROCEDURE — 96365 THER/PROPH/DIAG IV INF INIT: CPT

## 2020-07-06 PROCEDURE — 3008F BODY MASS INDEX DOCD: CPT | Mod: CPTII,S$GLB,, | Performed by: INTERNAL MEDICINE

## 2020-07-06 PROCEDURE — 1126F PR PAIN SEVERITY QUANTIFIED, NO PAIN PRESENT: ICD-10-PCS | Mod: S$GLB,,, | Performed by: INTERNAL MEDICINE

## 2020-07-06 PROCEDURE — 1159F MED LIST DOCD IN RCRD: CPT | Mod: S$GLB,,, | Performed by: INTERNAL MEDICINE

## 2020-07-06 PROCEDURE — 3078F DIAST BP <80 MM HG: CPT | Mod: CPTII,S$GLB,, | Performed by: INTERNAL MEDICINE

## 2020-07-06 RX ORDER — FLUOROURACIL 50 MG/ML
400 INJECTION, SOLUTION INTRAVENOUS
Status: CANCELLED | OUTPATIENT
Start: 2020-07-06

## 2020-07-06 RX ORDER — HEPARIN 100 UNIT/ML
500 SYRINGE INTRAVENOUS
Status: CANCELLED | OUTPATIENT
Start: 2020-07-06

## 2020-07-06 RX ORDER — DIPHENHYDRAMINE HYDROCHLORIDE 50 MG/ML
50 INJECTION INTRAMUSCULAR; INTRAVENOUS ONCE AS NEEDED
Status: CANCELLED | OUTPATIENT
Start: 2020-07-06

## 2020-07-06 RX ORDER — SODIUM CHLORIDE 0.9 % (FLUSH) 0.9 %
10 SYRINGE (ML) INJECTION
Status: CANCELLED | OUTPATIENT
Start: 2020-07-14

## 2020-07-06 RX ORDER — EPINEPHRINE 0.3 MG/.3ML
0.3 INJECTION SUBCUTANEOUS ONCE AS NEEDED
Status: CANCELLED | OUTPATIENT
Start: 2020-07-06

## 2020-07-06 RX ORDER — HEPARIN 100 UNIT/ML
500 SYRINGE INTRAVENOUS
Status: CANCELLED | OUTPATIENT
Start: 2020-07-14

## 2020-07-06 RX ORDER — HEPARIN 100 UNIT/ML
500 SYRINGE INTRAVENOUS
Status: DISCONTINUED | OUTPATIENT
Start: 2020-07-06 | End: 2020-07-06 | Stop reason: HOSPADM

## 2020-07-06 RX ORDER — SODIUM CHLORIDE 0.9 % (FLUSH) 0.9 %
10 SYRINGE (ML) INJECTION
Status: CANCELLED | OUTPATIENT
Start: 2020-07-06

## 2020-07-06 RX ADMIN — POTASSIUM CHLORIDE: 149 INJECTION, SOLUTION, CONCENTRATE INTRAVENOUS at 08:07

## 2020-07-06 RX ADMIN — HEPARIN 500 UNITS: 100 SYRINGE at 10:07

## 2020-07-06 NOTE — PROGRESS NOTES
Patient contacted: Patient's INR is subtherapeutic at 1.6. Patient states she ate cabbage x 1 since last visit on 6/29/20. Patient reports no other changes. Patient reports no chest pain, shortness of breath, no coughing up blood or swelling/pain of legs. Will give boosted dose of warfarin 7.5 mg today--only--7/6/20; increase dosage to warfarin 2.5 on Mondays and Fridays ; and 1.25 mg all other days. Recheck on 7/13/20. Patient repeated back correctly and verbalizes understanding.

## 2020-07-06 NOTE — PROGRESS NOTES
Subjective:      DATE OF VISIT: 7/6/2020   ?    ?   Patient ID:?Adalgisa Wright is a 69 y.o. female.?? MR#: [id number]   ?   PRIMARY PROVIDER: Dr. Quintanilla    CHIEF COMPLAINT:  Follow-up  ?   ONCOLOGIC DIAGNOSIS: Rectosigmoid adenocarcinoma, Stage III, pT3 pN0 pM0  ?   CURRENT TREATMENT: FOLFOX, C1D1 3/23/20    Follow-up        Ms. Wright was previously seen by my colleague Dr. Haq last on 08/21/2019 for iron deficiency anemia.  She has a mechanical aortic valve replacement 16 years ago on therapeutic anticoagulation.  Labs notable for low haptoglobin in it is felt hemolytic process may be contributing to her anemia.  She notes having recent EKG as part of preoperative evaluation but has not had follow-up with her cardiologist recently or repeat echocardiogram.  She denies edema, shortness of breath or chest pain.    She was referred to Gastroenterology for further evaluation of iron deficiency anemia and concern for GI bleed.     11/7/19 EGD and colonoscopy revealed a nonobstructing ulcerated mass 20 cm from the anal verge. Pathology:  Moderately differentiated adenocarcinoma, MSI intact.    12/02/2019 PET-CT notable for avid sigmoid lesion SUV 15.7 as well as avid right lobe liver lesion 19 mm, SUV 4.1.  No other areas of avidity concerning for metastatic disease.    12/9/19 liver biopsy, Pathology:  Diagnosis 1.  Liver mass, biopsy:   - Not diagnostic of malignancy   - Scant atypical liver sampling, see comments     Comments: The sections show a scant fragmented sample of liver tissue with   psuedoglands in one core and mild architectural distortion. Cytologic atypiia   is not prominent. Reticulin stain shows mildly irregular pattern of staining.   No portal tracts present for evaluation. Back to back pseudoglands raises the   possibly of a well differetniated hepatocellular lesion. However, the scant   sample limits interpretation. Due to the presence of a clinical mass.   Resampling is indicated.       02/13/2020 surgical resection of sigmoid and upper rectum  Pathology:  Adenocarcinoma, moderately differentiated, 2.5 x 2 cm, margins negative, 6 of 14 lymph nodes positive for metastatic adenocarcinoma, many tumor deposits and mesenteric tissue, pT3 pN2, MSI intact    03/23/2020 cycle 1 day 1 FOLFOX     05/20/2020 restaging PET showed interval resolution of liver lesion and no evidence of recurrence/metastatic disease    INTERVAL EVENTS    She continues to struggle with therapeutic INR fluctuating between supratherapeutic and subtherapeutic.  She is trying to regulate her diet but does eat variable amounts of vitamin K containing vegetables.  She has some improvement in nausea but does not routinely take antiemetics.  Over the last week she has had persistent diarrhea despite Imodium.  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  Blood pressure is lower and mild lightheadedness today.  She does have mouth ulcers using salt and soda rinses and Orajel.    Review of Systems    ?   A comprehensive 14-point review of systems was reviewed with patient and was negative other than as specified above.   ?     Objective:      Physical Exam      ?   Vitals:    07/06/20 0803   BP: 99/65   Pulse: 78   Resp: 16   Temp: 98.7 °F (37.1 °C)        ECOG:?0   General appearance: Generally well appearing, in no acute distress, wearing mask.   Head, eyes, ears, nose, and throat: moist mucous membranes.  Two shallow ulcers on left lateral tongue and right fecal mucosa, shallow.  Respiratory:  Clear to auscultation bilaterally  Cardiovascular:  Regular rate and rhythm, no murmurs rubs or gallops  Extremities: Warm, without edema.   Neurologic: Alert and oriented. Grossly normal strength, coordination, and gait.   Skin:  Abdominal cellulitis with only minimal erythema, no warmth or exudate.  No other rash, petechiae or ecchymosis.  Psychiatric:  Normal mood and affect.    ?   Laboratory:  ?   Lab Visit on 07/06/2020   Component Date Value Ref  Range Status    WBC 07/06/2020 6.09  3.90 - 12.70 K/uL Final    RBC 07/06/2020 2.99* 4.00 - 5.40 M/uL Final    Hemoglobin 07/06/2020 9.9* 12.0 - 16.0 g/dL Final    Hematocrit 07/06/2020 32.4* 37.0 - 48.5 % Final    Mean Corpuscular Volume 07/06/2020 108* 82 - 98 fL Final    Mean Corpuscular Hemoglobin 07/06/2020 33.1* 27.0 - 31.0 pg Final    Mean Corpuscular Hemoglobin Conc 07/06/2020 30.6* 32.0 - 36.0 g/dL Final    RDW 07/06/2020 17.2* 11.5 - 14.5 % Final    Platelets 07/06/2020 90* 150 - 350 K/uL Final    MPV 07/06/2020 12.6  9.2 - 12.9 fL Final    Immature Granulocytes 07/06/2020 CANCELED  0.0 - 0.5 % Final    Immature Grans (Abs) 07/06/2020 CANCELED  0.00 - 0.04 K/uL Final    nRBC 07/06/2020 0  0 /100 WBC Final    Gran% 07/06/2020 70.0  38.0 - 73.0 % Final    Lymph% 07/06/2020 14.0* 18.0 - 48.0 % Final    Mono% 07/06/2020 9.0  4.0 - 15.0 % Final    Eosinophil% 07/06/2020 0.0  0.0 - 8.0 % Final    Basophil% 07/06/2020 0.0  0.0 - 1.9 % Final    Bands 07/06/2020 7.0  % Final    Platelet Estimate 07/06/2020 Decreased*  Final    Aniso 07/06/2020 Moderate   Final    Poik 07/06/2020 Slight   Final    Poly 07/06/2020 Occasional   Final    Ovalocytes 07/06/2020 Occasional   Final    Tear Drop Cells 07/06/2020 Occasional   Final    Differential Method 07/06/2020 Manual   Final    Sodium 07/06/2020 142  136 - 145 mmol/L Final    Potassium 07/06/2020 2.9* 3.5 - 5.1 mmol/L Final    Chloride 07/06/2020 106  95 - 110 mmol/L Final    CO2 07/06/2020 24  23 - 29 mmol/L Final    Glucose 07/06/2020 100  70 - 110 mg/dL Final    BUN, Bld 07/06/2020 11  8 - 23 mg/dL Final    Creatinine 07/06/2020 0.9  0.5 - 1.4 mg/dL Final    Calcium 07/06/2020 8.4* 8.7 - 10.5 mg/dL Final    Total Protein 07/06/2020 6.3  6.0 - 8.4 g/dL Final    Albumin 07/06/2020 3.6  3.5 - 5.2 g/dL Final    Total Bilirubin 07/06/2020 0.3  0.1 - 1.0 mg/dL Final    Alkaline Phosphatase 07/06/2020 188* 55 - 135 U/L Final    AST  07/06/2020 23  10 - 40 U/L Final    ALT 07/06/2020 16  10 - 44 U/L Final    Anion Gap 07/06/2020 12  8 - 16 mmol/L Final    eGFR if African American 07/06/2020 >60  >60 mL/min/1.73 m^2 Final    eGFR if non African American 07/06/2020 >60  >60 mL/min/1.73 m^2 Final    Prothrombin Time 07/06/2020 17.5* 9.0 - 12.5 sec Final    INR 07/06/2020 1.6* 0.8 - 1.2 Final       Tumor markers    CEA    CEA   Date Value Ref Range Status   05/25/2020 4.4 0.0 - 5.0 ng/mL Final     Comment:     CEA Normal Range:  Non-Smokers: 0-3.0 ng/mL  Smokers:     0-5.0 ng/mL     11/18/2019 3.5 0.0 - 5.0 ng/mL Final     Comment:     CEA Normal Range:  Non-Smokers: 0-3.0 ng/mL  Smokers:     0-5.0 ng/mL              ? IMAGING    05/20/2020  NM PET CT ROUTINE    CLINICAL HISTORY:  colon cancer;  Abnormal findings on diagnostic imaging of other parts of digestive tract    TECHNIQUE:  Segmented attenuation corrected 3-D PET imaging was obtained from the skull base through the mid thighs utilizing 12.49 mCi F-18-FDG.  Noncontrast CT imaging was performed for attenuation correction, diagnosis, and anatomical fusion with PET.    COMPARISON:  12/02/2019.    FINDINGS:  Head/neck: There is normal physiologic FDG uptake noted within the visualized brain parenchyma. No FDG avid lymphadenopathy within the neck.    Chest: No FDG avid pulmonary nodules/masses. No FDG avid mediastinal, hilar or axillary lymph nodes.Right IJ MediPort catheter now seen in place.    Abdomen/Pelvis: Normal physiologic FDG uptake noted within the liver, spleen, urinary tract, and bowel.Interval low anterior resection with prior hypermetabolic rectosigmoid colon mass no longer identified.  Interval resolution of hypermetabolic mass in the posterior right lobe of the liver, and correlative subtle hypoattenuating lesion no longer visible on noncontrast CT images.  No new liver lesions.  Adrenals unremarkable.  No FDG avid retroperitoneal lymph nodes.    Skeletal: Intense diffusely  increased FDG uptake throughout the visualized axial and appendicular skeleton consistent with chemotherapy related marrow hyperplasia.  No discrete intraosseous lesions.  Chronic compression deformity of several thoracic vertebrae.      Impression       1. Interval colorectal surgery with resection of prior hypermetabolic rectosigmoid mass.  2. Interval resolution of hypermetabolic liver mass.  3. No detrimental change           ?   Assessment/Plan:       1. Colon adenocarcinoma    2. Chemotherapy-induced nausea    3. Iron deficiency anemia due to chronic blood loss    4. Chronic anticoagulation    5. Thrombocytopenia    6. Diarrhea, unspecified type    7. Hypokalemia due to excessive gastrointestinal loss of potassium    8. Blood pressure lower than prior measurement          Plan:     # rectal adenocarcinoma, pT3 pN2 pM0, Stage III, MSI stable:  Initiated adjuvant chemotherapy C1D1 3/23/20.  Restaging PET-CT after 4 cycles shows interval resolution of mildly avid right hepatic lobe lesion previously nondiagnostic on multiple biopsies; I discussed that interval improvement while on chemotherapy does raise concern for potential malignant involvement.  Case discussed at multidisciplinary tumor board with review of imaging and recommendation to continue adjuvant chemotherapy with plan for follow-up with surgery for consideration of resection of likely oligo metastatic liver disease.  Labs reviewed and notable for progressive thrombocytopenia platelet count less than 100 will hold chemotherapy today and weight 1 week with repeat labs.  Some worsening anemia    # Nausea:  Discussed prophylactic use of dexamethasone on days 2 3 and 4 and using promethazine and Zofran additionally as needed    # Diarrhea:  Advised on use of Imodium and Lomotil, likely contributing to hypokalemia    # subtherapeutic INR:  Has had difficulty maintaining therapeutic INR.  INR today 1.6.  Recommended consistent use of vitamin K containing  foods.  Advised her to follow-up with anticoagulation clinic for dosing adjustments of Coumadin p.r.n..    # chronic anticoagulation with a  mechanical aortic valve:  On chronic anticoagulation due to mechanical valve.  Supratherapeutic INR, management per above.    #  mild normocytic anemia: IV iron for 2 doses, completed 12/04/2019.  Repeat labs with resolution of anemia and now mild worsening likely related to antineoplastic therapy and recent epistaxis.  Continue to monitor throughout therapy.    # Hypokalemia:  Taking 40 mEq daily.  Advise increasing to 60 mEq and will give 20 mEq with 1 L IV fluids today.  Diary management per above.    # Lower blood pressure:  1 L IV fluids today with potassium supplementation per above.    Follow-Up:   - FOLFOX held today due to thrombocytopenia  - Revisit in 1 week with labs and consideration of chemotherapy  - 1 L IV fluids with 20 mEq potassium supplementation today, advised increasing potassium supplementation at home to20 mEq t.i.d. (previously 40 mEq daily)

## 2020-07-06 NOTE — DISCHARGE INSTRUCTIONS
.[facility]  [address] [facility]  [address]  [phone] phone     [phone] fax  Hours of Operation: Monday- Friday 8:00am- 5:00pm  After hours phone  [phone]  Hematology / Oncology Physicians on call      Dr. Bill Carey, CATRACHITA Mcdonald NP Tyesha Taylor, NP    Please call with any concerns regarding your appointment today.  .FALL PREVENTION   Falls often occur due to slipping, tripping or losing your balance. Here are ways to reduce your risk of falling again.   Was there anything that caused your fall that can be fixed, removed or replaced?   Make your home safe by keeping walkways clear of objects you may trip over.   Use non-slip pads under rugs.   Do not walk in poorly lit areas.   Do not stand on chairs or wobbly ladders.   Use caution when reaching overhead or looking upward. This position can cause a loss of balance.   Be sure your shoes fit properly, have non-slip bottoms and are in good condition.   Be cautious when going up and down stairs, curbs, and when walking on uneven sidewalks.   If your balance is poor, consider using a cane or walker.   If your fall was related to alcohol use, stop or limit alcohol intake.   If your fall was related to use of sleeping medicines, talk to your doctor about this. You may need to reduce your dosage at bedtime if you awaken during the night to go to the bathroom.   To reduce the need for nighttime bathroom trips:   Avoid drinking fluids for several hours before going to bed   Empty your bladder before going to bed   Men can keep a urinal at the bedside   © 3973-3673 [facility], [address] [place] [address], [place] [address]. All rights reserved. This information is not intended as a substitute for professional medical care. Always follow your healthcare professional's instructions.

## 2020-07-13 ENCOUNTER — OFFICE VISIT (OUTPATIENT)
Dept: HEMATOLOGY/ONCOLOGY | Facility: CLINIC | Age: 70
End: 2020-07-13
Payer: MEDICARE

## 2020-07-13 ENCOUNTER — ANTI-COAG VISIT (OUTPATIENT)
Dept: CARDIOLOGY | Facility: CLINIC | Age: 70
End: 2020-07-13
Payer: MEDICARE

## 2020-07-13 ENCOUNTER — INFUSION (OUTPATIENT)
Dept: INFUSION THERAPY | Facility: HOSPITAL | Age: 70
End: 2020-07-13
Attending: INTERNAL MEDICINE
Payer: MEDICARE

## 2020-07-13 ENCOUNTER — TELEPHONE (OUTPATIENT)
Dept: CARDIOLOGY | Facility: CLINIC | Age: 70
End: 2020-07-13

## 2020-07-13 VITALS
DIASTOLIC BLOOD PRESSURE: 64 MMHG | TEMPERATURE: 99 F | WEIGHT: 116.63 LBS | BODY MASS INDEX: 24.48 KG/M2 | RESPIRATION RATE: 18 BRPM | HEIGHT: 58 IN | HEART RATE: 100 BPM | SYSTOLIC BLOOD PRESSURE: 115 MMHG | OXYGEN SATURATION: 95 %

## 2020-07-13 VITALS
TEMPERATURE: 98 F | OXYGEN SATURATION: 95 % | SYSTOLIC BLOOD PRESSURE: 147 MMHG | RESPIRATION RATE: 16 BRPM | HEART RATE: 87 BPM | DIASTOLIC BLOOD PRESSURE: 78 MMHG

## 2020-07-13 DIAGNOSIS — R79.1 SUPRATHERAPEUTIC INR: ICD-10-CM

## 2020-07-13 DIAGNOSIS — D63.0 ANEMIA IN NEOPLASTIC DISEASE: ICD-10-CM

## 2020-07-13 DIAGNOSIS — Z79.01 LONG TERM (CURRENT) USE OF ANTICOAGULANTS: ICD-10-CM

## 2020-07-13 DIAGNOSIS — C18.9 COLON ADENOCARCINOMA: Primary | ICD-10-CM

## 2020-07-13 DIAGNOSIS — D69.6 THROMBOCYTOPENIA: ICD-10-CM

## 2020-07-13 DIAGNOSIS — E87.6 HYPOKALEMIA: ICD-10-CM

## 2020-07-13 DIAGNOSIS — Z95.2 AORTIC VALVE REPLACED: ICD-10-CM

## 2020-07-13 PROCEDURE — 96411 CHEMO IV PUSH ADDL DRUG: CPT

## 2020-07-13 PROCEDURE — 3008F PR BODY MASS INDEX (BMI) DOCUMENTED: ICD-10-PCS | Mod: CPTII,S$GLB,, | Performed by: INTERNAL MEDICINE

## 2020-07-13 PROCEDURE — 3074F PR MOST RECENT SYSTOLIC BLOOD PRESSURE < 130 MM HG: ICD-10-PCS | Mod: CPTII,S$GLB,, | Performed by: INTERNAL MEDICINE

## 2020-07-13 PROCEDURE — 99215 OFFICE O/P EST HI 40 MIN: CPT | Mod: S$GLB,,, | Performed by: INTERNAL MEDICINE

## 2020-07-13 PROCEDURE — 25000003 PHARM REV CODE 250: Performed by: INTERNAL MEDICINE

## 2020-07-13 PROCEDURE — 1101F PT FALLS ASSESS-DOCD LE1/YR: CPT | Mod: CPTII,S$GLB,, | Performed by: INTERNAL MEDICINE

## 2020-07-13 PROCEDURE — 96415 CHEMO IV INFUSION ADDL HR: CPT

## 2020-07-13 PROCEDURE — 96368 THER/DIAG CONCURRENT INF: CPT

## 2020-07-13 PROCEDURE — 1159F PR MEDICATION LIST DOCUMENTED IN MEDICAL RECORD: ICD-10-PCS | Mod: S$GLB,,, | Performed by: INTERNAL MEDICINE

## 2020-07-13 PROCEDURE — 1159F MED LIST DOCD IN RCRD: CPT | Mod: S$GLB,,, | Performed by: INTERNAL MEDICINE

## 2020-07-13 PROCEDURE — 3008F BODY MASS INDEX DOCD: CPT | Mod: CPTII,S$GLB,, | Performed by: INTERNAL MEDICINE

## 2020-07-13 PROCEDURE — 1125F AMNT PAIN NOTED PAIN PRSNT: CPT | Mod: S$GLB,,, | Performed by: INTERNAL MEDICINE

## 2020-07-13 PROCEDURE — 3078F DIAST BP <80 MM HG: CPT | Mod: CPTII,S$GLB,, | Performed by: INTERNAL MEDICINE

## 2020-07-13 PROCEDURE — 96367 TX/PROPH/DG ADDL SEQ IV INF: CPT

## 2020-07-13 PROCEDURE — 3074F SYST BP LT 130 MM HG: CPT | Mod: CPTII,S$GLB,, | Performed by: INTERNAL MEDICINE

## 2020-07-13 PROCEDURE — 99999 PR PBB SHADOW E&M-EST. PATIENT-LVL IV: CPT | Mod: PBBFAC,,, | Performed by: INTERNAL MEDICINE

## 2020-07-13 PROCEDURE — 96413 CHEMO IV INFUSION 1 HR: CPT

## 2020-07-13 PROCEDURE — 63600175 PHARM REV CODE 636 W HCPCS: Performed by: INTERNAL MEDICINE

## 2020-07-13 PROCEDURE — 96416 CHEMO PROLONG INFUSE W/PUMP: CPT

## 2020-07-13 PROCEDURE — 99215 PR OFFICE/OUTPT VISIT, EST, LEVL V, 40-54 MIN: ICD-10-PCS | Mod: S$GLB,,, | Performed by: INTERNAL MEDICINE

## 2020-07-13 PROCEDURE — 1125F PR PAIN SEVERITY QUANTIFIED, PAIN PRESENT: ICD-10-PCS | Mod: S$GLB,,, | Performed by: INTERNAL MEDICINE

## 2020-07-13 PROCEDURE — 99999 PR PBB SHADOW E&M-EST. PATIENT-LVL IV: ICD-10-PCS | Mod: PBBFAC,,, | Performed by: INTERNAL MEDICINE

## 2020-07-13 PROCEDURE — 1101F PR PT FALLS ASSESS DOC 0-1 FALLS W/OUT INJ PAST YR: ICD-10-PCS | Mod: CPTII,S$GLB,, | Performed by: INTERNAL MEDICINE

## 2020-07-13 PROCEDURE — 3078F PR MOST RECENT DIASTOLIC BLOOD PRESSURE < 80 MM HG: ICD-10-PCS | Mod: CPTII,S$GLB,, | Performed by: INTERNAL MEDICINE

## 2020-07-13 RX ORDER — FLUOROURACIL 50 MG/ML
340 INJECTION, SOLUTION INTRAVENOUS
Status: CANCELLED | OUTPATIENT
Start: 2020-07-13

## 2020-07-13 RX ORDER — FLUOROURACIL 50 MG/ML
340 INJECTION, SOLUTION INTRAVENOUS
Status: COMPLETED | OUTPATIENT
Start: 2020-07-13 | End: 2020-07-13

## 2020-07-13 RX ADMIN — PALONOSETRON HYDROCHLORIDE: 0.25 INJECTION, SOLUTION INTRAVENOUS at 09:07

## 2020-07-13 RX ADMIN — DEXTROSE MONOHYDRATE 590 MG: 50 INJECTION, SOLUTION INTRAVENOUS at 09:07

## 2020-07-13 RX ADMIN — OXALIPLATIN 106 MG: 5 INJECTION, SOLUTION INTRAVENOUS at 09:07

## 2020-07-13 RX ADMIN — FLUOROURACIL 500 MG: 50 INJECTION, SOLUTION INTRAVENOUS at 12:07

## 2020-07-13 RX ADMIN — FLUOROURACIL 3000 MG: 50 INJECTION, SOLUTION INTRAVENOUS at 12:07

## 2020-07-13 NOTE — DISCHARGE INSTRUCTIONS
[facility]  [address] [facility]  [address]  [phone] phone     [phone] fax  Hours of Operation: Monday- Friday 8:00am- 5:00pm  After hours phone  [phone]  Hematology / Oncology Physicians on call      CATRACHITA Gonzalez Dr., Dr., Dr., Dr., NP Sydney Prescott, NP Tyesha Taylor, NP    Please call with any concerns regarding your appointment today.    HOME CARE AFTER CHEMOTHERAPY   Meals   Many patients feel sick and lose their appetites during treatment. Eat small meals several times a day. Choose bland foods with little taste or smell if you have problems with nausea. Be sure to cook all food thoroughly. This kills bacteria and helps you avoid intestinal infection. Soft foods are easier to swallow and digest.   Activity   Exercise keeps you strong and keeps your heart and lungs active. Talk to your doctor about an appropriate exercise program for you.   Skin Care   To prevent a skin infection, bathe or shower once a day. Use a moisturizing soap and wash with warm water. Avoid very hot or cold water. Chemotherapy can make your skin dry . Apply moisturizing lotion to help relieve dry skin. Some drugs used in high doses can cause slight burns to appear (like sunburn). Ask for a special cream to help relieve the burn and protect your skin.   Prevent Mouth Sores   During chemotherapy, many people get mouth sores. Do the following to help prevent mouth sores or to ease discomfort.   Brush your teeth with a soft-bristle toothbrush after every meal.  Don't use dental floss if your platelet count is below 50,000. Your doctor or nurse will tell you if this is the case.  Use an oral swab or special soft toothbrush if your gums bleed during regular brushing.  Use mouthwash as directed. If you can't tolerate commercial mouthwash, use salt and baking soda to clean your mouth. Mix 1 teaspoon of salt and 1  teaspoon of baking soda into a glass of water. Swish and spit.  Call your doctor or return to this facility if you develop any of the following:   Sore throat   White patches in the mouth or throat   Fever of 100.4ºF (38ºC) or higher, or as directed by your healthcare provider  © 2000-2011 [facility], [address] [address]. All rights reserved. This information is not intended as a substitute for professional medical care. Always follow your healthcare professional's   FALL PREVENTION   Falls often occur due to slipping, tripping or losing your balance. Here are ways to reduce your risk of falling again.   Was there anything that caused your fall that can be fixed, removed or replaced?   Make your home safe by keeping walkways clear of objects you may trip over.   Use non-slip pads under rugs.   Do not walk in poorly lit areas.   Do not stand on chairs or wobbly ladders.   Use caution when reaching overhead or looking upward. This position can cause a loss of balance.   Be sure your shoes fit properly, have non-slip bottoms and are in good condition.   Be cautious when going up and down stairs, curbs, and when walking on uneven sidewalks.   If your balance is poor, consider using a cane or walker.   If your fall was related to alcohol use, stop or limit alcohol intake.   If your fall was related to use of sleeping medicines, talk to your doctor about this. You may need to reduce your dosage at bedtime if you awaken during the night to go to the bathroom.   To reduce the need for nighttime bathroom trips:   Avoid drinking fluids for several hours before going to bed   Empty your bladder before going to bed   Men can keep a urinal at the bedside   © 2000-2011 [facility], [address] [place] [address]. All rights reserved. This information is not intended as a substitute for professional medical care. Always follow your healthcare professional's instructions.  WAYS TO HELP  PREVENT INFECTION         WASH YOUR HANDS OFTEN DURING THE DAY, ESPECIALLY BEFORE YOU EAT, AFTER USING THE BATHROOM, AND AFTER TOUCHING ANIMALS     STAY AWAY FROM PEOPLE WHO HAVE ILLNESSES YOU CAN CATCH; SUCH AS COLDS, FLU, CHICKEN POX     TRY TO AVOID CROWDS     STAY AWAY FROM CHILDREN WHO RECENTLY HAVE RECEIVED LIVE VIRUS VACCINES     MAINTAIN GOOD MOUTH CARE     DO NOT SQUEEZE OR SCRATCH PIMPLES     CLEAN CUTS & SCRAPES RIGHT AWAY AND DAILY UNTIL HEALED WITH WARM WATER, SOAP & AN ANTISEPTIC     AVOID CONTACT WITH LITTER BOXES, BIRD CAGES, & FISH TANKS     AVOID STANDING WATER, IE., BIRD BATHS, FLOWER POTS/VASES, OR HUMIDIFIERS     WEAR GLOVES WHEN GARDENING OR CLEANING UP AFTER OTHERS, ESPECIALLY BABIES & SMALL CHILDREN     DO NOT EAT RAW FISH, SEAFOOD, MEAT, OR EGGS

## 2020-07-13 NOTE — PROGRESS NOTES
Subjective:      DATE OF VISIT: 7/13/2020   ?  Patient ID:?Adalgisa Wright is a 69 y.o. female.?? MR#: [id number]   ?   PRIMARY PROVIDER: Dr. Quintanilla    CHIEF COMPLAINT:  Follow-up  ?   ONCOLOGIC DIAGNOSIS: Rectosigmoid adenocarcinoma, Stage III, pT3 pN0 pM0  ?   CURRENT TREATMENT: FOLFOX, C1D1 3/23/20    Follow-up        Ms. Wright was previously seen by my colleague Dr. Haq last on 08/21/2019 for iron deficiency anemia.  She has a mechanical aortic valve replacement 16 years ago on therapeutic anticoagulation.  Labs notable for low haptoglobin in it is felt hemolytic process may be contributing to her anemia.  She notes having recent EKG as part of preoperative evaluation but has not had follow-up with her cardiologist recently or repeat echocardiogram.  She denies edema, shortness of breath or chest pain.    She was referred to Gastroenterology for further evaluation of iron deficiency anemia and concern for GI bleed.     11/7/19 EGD and colonoscopy revealed a nonobstructing ulcerated mass 20 cm from the anal verge. Pathology:  Moderately differentiated adenocarcinoma, MSI intact.    12/02/2019 PET-CT notable for avid sigmoid lesion SUV 15.7 as well as avid right lobe liver lesion 19 mm, SUV 4.1.  No other areas of avidity concerning for metastatic disease.    12/9/19 liver biopsy, Pathology:  Diagnosis 1.  Liver mass, biopsy:   - Not diagnostic of malignancy   - Scant atypical liver sampling, see comments     Comments: The sections show a scant fragmented sample of liver tissue with   psuedoglands in one core and mild architectural distortion. Cytologic atypiia   is not prominent. Reticulin stain shows mildly irregular pattern of staining.   No portal tracts present for evaluation. Back to back pseudoglands raises the   possibly of a well differetniated hepatocellular lesion. However, the scant   sample limits interpretation. Due to the presence of a clinical mass.   Resampling is indicated.      02/13/2020  surgical resection of sigmoid and upper rectum  Pathology:  Adenocarcinoma, moderately differentiated, 2.5 x 2 cm, margins negative, 6 of 14 lymph nodes positive for metastatic adenocarcinoma, many tumor deposits and mesenteric tissue, pT3 pN2, MSI intact    03/23/2020 cycle 1 day 1 FOLFOX     05/20/2020 restaging PET showed interval resolution of liver lesion and no evidence of recurrence/metastatic disease    INTERVAL EVENTS    Presents with her  today.  She is doing exceptionally well with improvement in appetite especially over the last week.  No other new issues or concerns regarding bleeding.  INR checked today mildly subtherapeutic and asked her to contact Coumadin clinic for dosing instructions.    Review of Systems    ?   A comprehensive 14-point review of systems was reviewed with patient and was negative other than as specified above.   ?     Objective:      Physical Exam      ?   Vitals:    07/13/20 0733   BP: 115/64   Pulse: 100   Resp: 18   Temp: 98.7 °F (37.1 °C)        ECOG:?0   General appearance: Generally well appearing, in no acute distress, wearing mask.   Head, eyes, ears, nose, and throat: moist mucous membranes.    Respiratory:  Clear to auscultation bilaterally  Cardiovascular:  Regular rate and rhythm, no murmurs rubs or gallops  Extremities: Warm, without edema.   Neurologic: Alert and oriented. Grossly normal strength, coordination, and gait.   Skin:  Abdominal cellulitis with only minimal erythema, no warmth or exudate.  No other rash, petechiae or ecchymosis.  Psychiatric:  Normal mood and affect.    ?   Laboratory:  ?   Lab Visit on 07/13/2020   Component Date Value Ref Range Status    WBC 07/13/2020 5.41  3.90 - 12.70 K/uL Final    RBC 07/13/2020 2.92* 4.00 - 5.40 M/uL Final    Hemoglobin 07/13/2020 9.7* 12.0 - 16.0 g/dL Final    Hematocrit 07/13/2020 32.0* 37.0 - 48.5 % Final    Mean Corpuscular Volume 07/13/2020 110* 82 - 98 fL Final    Mean Corpuscular Hemoglobin  07/13/2020 33.2* 27.0 - 31.0 pg Final    Mean Corpuscular Hemoglobin Conc 07/13/2020 30.3* 32.0 - 36.0 g/dL Final    RDW 07/13/2020 17.1* 11.5 - 14.5 % Final    Platelets 07/13/2020 111* 150 - 350 K/uL Final    MPV 07/13/2020 11.3  9.2 - 12.9 fL Final    Immature Granulocytes 07/13/2020 3.5* 0.0 - 0.5 % Final    Gran # (ANC) 07/13/2020 4.1  1.8 - 7.7 K/uL Final    Immature Grans (Abs) 07/13/2020 0.19* 0.00 - 0.04 K/uL Final    Lymph # 07/13/2020 0.6* 1.0 - 4.8 K/uL Final    Mono # 07/13/2020 0.5  0.3 - 1.0 K/uL Final    Eos # 07/13/2020 0.0  0.0 - 0.5 K/uL Final    Baso # 07/13/2020 0.04  0.00 - 0.20 K/uL Final    nRBC 07/13/2020 0  0 /100 WBC Final    Gran% 07/13/2020 75.2* 38.0 - 73.0 % Final    Lymph% 07/13/2020 11.5* 18.0 - 48.0 % Final    Mono% 07/13/2020 8.7  4.0 - 15.0 % Final    Eosinophil% 07/13/2020 0.4  0.0 - 8.0 % Final    Basophil% 07/13/2020 0.7  0.0 - 1.9 % Final    Differential Method 07/13/2020 Automated   Final    Sodium 07/13/2020 141  136 - 145 mmol/L Final    Potassium 07/13/2020 3.1* 3.5 - 5.1 mmol/L Final    Chloride 07/13/2020 101  95 - 110 mmol/L Final    CO2 07/13/2020 29  23 - 29 mmol/L Final    Glucose 07/13/2020 102  70 - 110 mg/dL Final    BUN, Bld 07/13/2020 19  8 - 23 mg/dL Final    Creatinine 07/13/2020 0.8  0.5 - 1.4 mg/dL Final    Calcium 07/13/2020 8.4* 8.7 - 10.5 mg/dL Final    Total Protein 07/13/2020 6.6  6.0 - 8.4 g/dL Final    Albumin 07/13/2020 3.5  3.5 - 5.2 g/dL Final    Total Bilirubin 07/13/2020 0.3  0.1 - 1.0 mg/dL Final    Alkaline Phosphatase 07/13/2020 207* 55 - 135 U/L Final    AST 07/13/2020 30  10 - 40 U/L Final    ALT 07/13/2020 15  10 - 44 U/L Final    Anion Gap 07/13/2020 11  8 - 16 mmol/L Final    eGFR if African American 07/13/2020 >60  >60 mL/min/1.73 m^2 Final    eGFR if non African American 07/13/2020 >60  >60 mL/min/1.73 m^2 Final    Prothrombin Time 07/13/2020 19.1* 9.0 - 12.5 sec Final    INR 07/13/2020 1.8* 0.8 -  1.2 Final       Tumor markers    CEA    CEA   Date Value Ref Range Status   05/25/2020 4.4 0.0 - 5.0 ng/mL Final     Comment:     CEA Normal Range:  Non-Smokers: 0-3.0 ng/mL  Smokers:     0-5.0 ng/mL     11/18/2019 3.5 0.0 - 5.0 ng/mL Final     Comment:     CEA Normal Range:  Non-Smokers: 0-3.0 ng/mL  Smokers:     0-5.0 ng/mL              ? IMAGING    05/20/2020  NM PET CT ROUTINE    CLINICAL HISTORY:  colon cancer;  Abnormal findings on diagnostic imaging of other parts of digestive tract    TECHNIQUE:  Segmented attenuation corrected 3-D PET imaging was obtained from the skull base through the mid thighs utilizing 12.49 mCi F-18-FDG.  Noncontrast CT imaging was performed for attenuation correction, diagnosis, and anatomical fusion with PET.    COMPARISON:  12/02/2019.    FINDINGS:  Head/neck: There is normal physiologic FDG uptake noted within the visualized brain parenchyma. No FDG avid lymphadenopathy within the neck.    Chest: No FDG avid pulmonary nodules/masses. No FDG avid mediastinal, hilar or axillary lymph nodes.Right IJ MediPort catheter now seen in place.    Abdomen/Pelvis: Normal physiologic FDG uptake noted within the liver, spleen, urinary tract, and bowel.Interval low anterior resection with prior hypermetabolic rectosigmoid colon mass no longer identified.  Interval resolution of hypermetabolic mass in the posterior right lobe of the liver, and correlative subtle hypoattenuating lesion no longer visible on noncontrast CT images.  No new liver lesions.  Adrenals unremarkable.  No FDG avid retroperitoneal lymph nodes.    Skeletal: Intense diffusely increased FDG uptake throughout the visualized axial and appendicular skeleton consistent with chemotherapy related marrow hyperplasia.  No discrete intraosseous lesions.  Chronic compression deformity of several thoracic vertebrae.      Impression       1. Interval colorectal surgery with resection of prior hypermetabolic rectosigmoid mass.  2. Interval  resolution of hypermetabolic liver mass.  3. No detrimental change           ?   Assessment/Plan:       1. Colon adenocarcinoma    2. Supratherapeutic INR    3. Hypokalemia    4. Thrombocytopenia    5. Anemia in neoplastic disease          Plan:     # rectal adenocarcinoma, pT3 pN2 pM0, Stage III, MSI stable:  Initiated adjuvant chemotherapy C1D1 3/23/20.  Restaging PET-CT after 4 cycles shows interval resolution of mildly avid right hepatic lobe lesion previously nondiagnostic on multiple biopsies; I discussed that interval improvement while on chemotherapy does raise concern for potential malignant involvement.  Case discussed at multidisciplinary tumor board with review of imaging and recommendation to continue adjuvant chemotherapy with plan for follow-up with surgery for consideration of resection of likely oligo metastatic liver disease.  I again discussed imaging in multidisciplinary tumor board discussion in detail with her and her  in our follow-up today.   Labs reviewed and does show improvement in thrombocytopenia although still a lower end.  Given persistent cytopenia and need for dose delay will dose reduce by 15% 5 fluorouracil and oxaliplatin.    # Nausea:  Well controlled.  Previously discussed prophylactic use of dexamethasone on days 2 3 and 4 and using promethazine and Zofran additionally as needed.    # Diarrhea:  Advised on use of Imodium and Lomotil, likely contributing to hypokalemia    # subtherapeutic INR:  Has had difficulty maintaining therapeutic INR.  Labs today show she continues to be subtherapeutic.  Encouraged her to follow up with Coumadin clinic closely.    # chronic anticoagulation with a  mechanical aortic valve:  On chronic anticoagulation due to mechanical valve.  Supratherapeutic INR, management per above.    #  mild normocytic anemia: IV iron for 2 doses, completed 12/04/2019.  Repeat labs with resolution of anemia and now mild worsening likely related to  antineoplastic therapy and recent epistaxis.  Continue to monitor throughout therapy.    # Hypokalemia:  Taking 60 mEq but persistent hypokalemia despite resolution of diarrhea.  Recommended increasing to an additional tab daily.  Will closely monitor potassium level.    # Lower blood pressure:  1 L IV fluids today with potassium supplementation per above.    Follow-Up:   - FOLFOX today with revisit in repeat labs in 2 weeks.

## 2020-07-13 NOTE — PROGRESS NOTES
INR low again but close to range. Will bolus and plan does increase. Repeat INR in 3 days due to mechanical AVR, must be sure INR is >2.0 ASAP. Consider lovenox bridge if INR drops.

## 2020-07-15 ENCOUNTER — INFUSION (OUTPATIENT)
Dept: INFUSION THERAPY | Facility: HOSPITAL | Age: 70
End: 2020-07-15
Attending: INTERNAL MEDICINE
Payer: MEDICARE

## 2020-07-15 VITALS
DIASTOLIC BLOOD PRESSURE: 68 MMHG | TEMPERATURE: 98 F | RESPIRATION RATE: 18 BRPM | HEART RATE: 74 BPM | SYSTOLIC BLOOD PRESSURE: 104 MMHG | OXYGEN SATURATION: 95 %

## 2020-07-15 DIAGNOSIS — C18.9 COLON ADENOCARCINOMA: Primary | ICD-10-CM

## 2020-07-15 PROCEDURE — 96377 APPLICATON ON-BODY INJECTOR: CPT

## 2020-07-15 PROCEDURE — 63600175 PHARM REV CODE 636 W HCPCS: Mod: JG | Performed by: INTERNAL MEDICINE

## 2020-07-15 PROCEDURE — 25000003 PHARM REV CODE 250: Performed by: INTERNAL MEDICINE

## 2020-07-15 PROCEDURE — A4216 STERILE WATER/SALINE, 10 ML: HCPCS | Performed by: INTERNAL MEDICINE

## 2020-07-15 RX ORDER — SODIUM CHLORIDE 0.9 % (FLUSH) 0.9 %
10 SYRINGE (ML) INJECTION
Status: DISCONTINUED | OUTPATIENT
Start: 2020-07-15 | End: 2020-07-15 | Stop reason: HOSPADM

## 2020-07-15 RX ORDER — HEPARIN 100 UNIT/ML
500 SYRINGE INTRAVENOUS
Status: DISCONTINUED | OUTPATIENT
Start: 2020-07-15 | End: 2020-07-15 | Stop reason: HOSPADM

## 2020-07-15 RX ADMIN — HEPARIN 500 UNITS: 100 SYRINGE at 10:07

## 2020-07-15 RX ADMIN — Medication 10 ML: at 10:07

## 2020-07-15 RX ADMIN — PEGFILGRASTIM 6 MG: KIT SUBCUTANEOUS at 10:07

## 2020-07-16 ENCOUNTER — ANTI-COAG VISIT (OUTPATIENT)
Dept: CARDIOLOGY | Facility: CLINIC | Age: 70
End: 2020-07-16
Payer: MEDICARE

## 2020-07-16 DIAGNOSIS — Z95.2 AORTIC VALVE REPLACED: ICD-10-CM

## 2020-07-16 DIAGNOSIS — Z79.01 LONG TERM (CURRENT) USE OF ANTICOAGULANTS: Primary | ICD-10-CM

## 2020-07-16 LAB — INR PPP: 2.7 (ref 2–3)

## 2020-07-16 PROCEDURE — 85610 PROTHROMBIN TIME: CPT | Mod: QW,S$GLB,, | Performed by: INTERNAL MEDICINE

## 2020-07-16 PROCEDURE — 93793 ANTICOAG MGMT PT WARFARIN: CPT | Mod: S$GLB,,,

## 2020-07-16 PROCEDURE — 93793 PR ANTICOAGULANT MGMT FOR PT TAKING WARFARIN: ICD-10-PCS | Mod: S$GLB,,,

## 2020-07-16 PROCEDURE — 85610 POCT INR: ICD-10-PCS | Mod: QW,S$GLB,, | Performed by: INTERNAL MEDICINE

## 2020-07-16 NOTE — PROGRESS NOTES
Patient's INR is therapeutic at 2.7.  Previous instructions - not followed.  Patient repeated taking 1.25 mg on Tuesday, 7/14/2020, instead of 2.5 mg as instructed.  No other changes reported.  Send to PharmD for review/further dosing instructions.

## 2020-07-16 NOTE — PROGRESS NOTES
LPN notes reviewed. INR now at goal. Medications, chart, and patient findings reviewed. See calendar for adjustments to dose and follow up plan.

## 2020-07-22 ENCOUNTER — ANTI-COAG VISIT (OUTPATIENT)
Dept: CARDIOLOGY | Facility: CLINIC | Age: 70
End: 2020-07-22
Payer: MEDICARE

## 2020-07-22 DIAGNOSIS — Z79.01 LONG TERM (CURRENT) USE OF ANTICOAGULANTS: Primary | ICD-10-CM

## 2020-07-22 DIAGNOSIS — Z95.2 AORTIC VALVE REPLACED: ICD-10-CM

## 2020-07-22 LAB — INR PPP: 1.4 (ref 2–3)

## 2020-07-22 PROCEDURE — 93793 PR ANTICOAGULANT MGMT FOR PT TAKING WARFARIN: ICD-10-PCS | Mod: S$GLB,,,

## 2020-07-22 PROCEDURE — 85610 POCT INR: ICD-10-PCS | Mod: QW,S$GLB,, | Performed by: INTERNAL MEDICINE

## 2020-07-22 PROCEDURE — 93793 ANTICOAG MGMT PT WARFARIN: CPT | Mod: S$GLB,,,

## 2020-07-22 PROCEDURE — 85610 PROTHROMBIN TIME: CPT | Mod: QW,S$GLB,, | Performed by: INTERNAL MEDICINE

## 2020-07-22 NOTE — PROGRESS NOTES
Patient's INR is subtherapeutic at 1.1.  Patient reports she has been eating salads weekly that includes Lakeisha lettuce and spinach. Advised patient of increased risk of clotting when eating foods containing vitamin K . Advised to eliminate spinach and lakeisha and substitute with iceberg lettuce.  Patient agrees. Patient reports no other changes. Patient reports no new or worsening symptoms of shortness of breath, chest pain, coughing up blood, pain or welling in legs. Sent to PharmD for dosing/instructions.

## 2020-07-22 NOTE — PROGRESS NOTES
LPN notes reviewed. INR has been very difficult to manage lately and unclear what is causing significant fluctuations. Level today possible low 2/2 increased vit K intake, but has been below goal frequently so will increase weekly dose. Will also boost dose today and repeat INR in less than 1 week. See calendar.

## 2020-07-27 ENCOUNTER — ANTI-COAG VISIT (OUTPATIENT)
Dept: CARDIOLOGY | Facility: CLINIC | Age: 70
End: 2020-07-27
Payer: MEDICARE

## 2020-07-27 ENCOUNTER — INFUSION (OUTPATIENT)
Dept: INFUSION THERAPY | Facility: HOSPITAL | Age: 70
End: 2020-07-27
Attending: INTERNAL MEDICINE
Payer: MEDICARE

## 2020-07-27 ENCOUNTER — OFFICE VISIT (OUTPATIENT)
Dept: HEMATOLOGY/ONCOLOGY | Facility: CLINIC | Age: 70
End: 2020-07-27
Payer: MEDICARE

## 2020-07-27 VITALS
RESPIRATION RATE: 19 BRPM | OXYGEN SATURATION: 96 % | BODY MASS INDEX: 23.74 KG/M2 | HEIGHT: 58 IN | HEART RATE: 77 BPM | DIASTOLIC BLOOD PRESSURE: 70 MMHG | WEIGHT: 113.13 LBS | SYSTOLIC BLOOD PRESSURE: 111 MMHG | TEMPERATURE: 98 F

## 2020-07-27 VITALS
SYSTOLIC BLOOD PRESSURE: 135 MMHG | OXYGEN SATURATION: 95 % | TEMPERATURE: 98 F | DIASTOLIC BLOOD PRESSURE: 81 MMHG | HEART RATE: 84 BPM | RESPIRATION RATE: 16 BRPM

## 2020-07-27 DIAGNOSIS — D69.6 THROMBOCYTOPENIA: ICD-10-CM

## 2020-07-27 DIAGNOSIS — C18.9 COLON ADENOCARCINOMA: Primary | ICD-10-CM

## 2020-07-27 DIAGNOSIS — E87.6 HYPOKALEMIA: ICD-10-CM

## 2020-07-27 DIAGNOSIS — D63.0 ANEMIA IN NEOPLASTIC DISEASE: ICD-10-CM

## 2020-07-27 DIAGNOSIS — Z95.2 AORTIC VALVE REPLACED: ICD-10-CM

## 2020-07-27 DIAGNOSIS — Z79.01 LONG TERM (CURRENT) USE OF ANTICOAGULANTS: ICD-10-CM

## 2020-07-27 DIAGNOSIS — R79.1 SUPRATHERAPEUTIC INR: ICD-10-CM

## 2020-07-27 PROCEDURE — 1101F PT FALLS ASSESS-DOCD LE1/YR: CPT | Mod: CPTII,S$GLB,, | Performed by: INTERNAL MEDICINE

## 2020-07-27 PROCEDURE — 96368 THER/DIAG CONCURRENT INF: CPT

## 2020-07-27 PROCEDURE — 3078F DIAST BP <80 MM HG: CPT | Mod: CPTII,S$GLB,, | Performed by: INTERNAL MEDICINE

## 2020-07-27 PROCEDURE — 99215 OFFICE O/P EST HI 40 MIN: CPT | Mod: S$GLB,,, | Performed by: INTERNAL MEDICINE

## 2020-07-27 PROCEDURE — 99999 PR PBB SHADOW E&M-EST. PATIENT-LVL IV: ICD-10-PCS | Mod: PBBFAC,,, | Performed by: INTERNAL MEDICINE

## 2020-07-27 PROCEDURE — 3008F BODY MASS INDEX DOCD: CPT | Mod: CPTII,S$GLB,, | Performed by: INTERNAL MEDICINE

## 2020-07-27 PROCEDURE — 3074F PR MOST RECENT SYSTOLIC BLOOD PRESSURE < 130 MM HG: ICD-10-PCS | Mod: CPTII,S$GLB,, | Performed by: INTERNAL MEDICINE

## 2020-07-27 PROCEDURE — 96416 CHEMO PROLONG INFUSE W/PUMP: CPT

## 2020-07-27 PROCEDURE — 96413 CHEMO IV INFUSION 1 HR: CPT

## 2020-07-27 PROCEDURE — 99215 PR OFFICE/OUTPT VISIT, EST, LEVL V, 40-54 MIN: ICD-10-PCS | Mod: S$GLB,,, | Performed by: INTERNAL MEDICINE

## 2020-07-27 PROCEDURE — 1126F PR PAIN SEVERITY QUANTIFIED, NO PAIN PRESENT: ICD-10-PCS | Mod: S$GLB,,, | Performed by: INTERNAL MEDICINE

## 2020-07-27 PROCEDURE — 96415 CHEMO IV INFUSION ADDL HR: CPT

## 2020-07-27 PROCEDURE — 99999 PR PBB SHADOW E&M-EST. PATIENT-LVL IV: CPT | Mod: PBBFAC,,, | Performed by: INTERNAL MEDICINE

## 2020-07-27 PROCEDURE — 1159F MED LIST DOCD IN RCRD: CPT | Mod: S$GLB,,, | Performed by: INTERNAL MEDICINE

## 2020-07-27 PROCEDURE — 25000003 PHARM REV CODE 250: Performed by: INTERNAL MEDICINE

## 2020-07-27 PROCEDURE — 3078F PR MOST RECENT DIASTOLIC BLOOD PRESSURE < 80 MM HG: ICD-10-PCS | Mod: CPTII,S$GLB,, | Performed by: INTERNAL MEDICINE

## 2020-07-27 PROCEDURE — 63600175 PHARM REV CODE 636 W HCPCS: Performed by: INTERNAL MEDICINE

## 2020-07-27 PROCEDURE — 1126F AMNT PAIN NOTED NONE PRSNT: CPT | Mod: S$GLB,,, | Performed by: INTERNAL MEDICINE

## 2020-07-27 PROCEDURE — 96411 CHEMO IV PUSH ADDL DRUG: CPT

## 2020-07-27 PROCEDURE — 1159F PR MEDICATION LIST DOCUMENTED IN MEDICAL RECORD: ICD-10-PCS | Mod: S$GLB,,, | Performed by: INTERNAL MEDICINE

## 2020-07-27 PROCEDURE — 3008F PR BODY MASS INDEX (BMI) DOCUMENTED: ICD-10-PCS | Mod: CPTII,S$GLB,, | Performed by: INTERNAL MEDICINE

## 2020-07-27 PROCEDURE — 3074F SYST BP LT 130 MM HG: CPT | Mod: CPTII,S$GLB,, | Performed by: INTERNAL MEDICINE

## 2020-07-27 PROCEDURE — 1101F PR PT FALLS ASSESS DOC 0-1 FALLS W/OUT INJ PAST YR: ICD-10-PCS | Mod: CPTII,S$GLB,, | Performed by: INTERNAL MEDICINE

## 2020-07-27 PROCEDURE — 96367 TX/PROPH/DG ADDL SEQ IV INF: CPT

## 2020-07-27 RX ORDER — HEPARIN 100 UNIT/ML
500 SYRINGE INTRAVENOUS
Status: CANCELLED | OUTPATIENT
Start: 2020-07-27

## 2020-07-27 RX ORDER — SODIUM CHLORIDE 0.9 % (FLUSH) 0.9 %
10 SYRINGE (ML) INJECTION
Status: CANCELLED | OUTPATIENT
Start: 2020-07-27

## 2020-07-27 RX ORDER — FLUOROURACIL 50 MG/ML
340 INJECTION, SOLUTION INTRAVENOUS
Status: CANCELLED | OUTPATIENT
Start: 2020-07-27

## 2020-07-27 RX ORDER — FLUOROURACIL 50 MG/ML
340 INJECTION, SOLUTION INTRAVENOUS
Status: COMPLETED | OUTPATIENT
Start: 2020-07-27 | End: 2020-07-27

## 2020-07-27 RX ORDER — HEPARIN 100 UNIT/ML
500 SYRINGE INTRAVENOUS
Status: CANCELLED | OUTPATIENT
Start: 2020-07-28

## 2020-07-27 RX ORDER — SODIUM CHLORIDE 0.9 % (FLUSH) 0.9 %
10 SYRINGE (ML) INJECTION
Status: CANCELLED | OUTPATIENT
Start: 2020-07-28

## 2020-07-27 RX ORDER — DIPHENHYDRAMINE HYDROCHLORIDE 50 MG/ML
50 INJECTION INTRAMUSCULAR; INTRAVENOUS ONCE AS NEEDED
Status: CANCELLED | OUTPATIENT
Start: 2020-07-27

## 2020-07-27 RX ORDER — POTASSIUM CHLORIDE 20 MEQ/1
60 TABLET, EXTENDED RELEASE ORAL DAILY
Qty: 90 TABLET | Refills: 11 | Status: SHIPPED | OUTPATIENT
Start: 2020-07-27 | End: 2021-06-07

## 2020-07-27 RX ORDER — EPINEPHRINE 0.3 MG/.3ML
0.3 INJECTION SUBCUTANEOUS ONCE AS NEEDED
Status: CANCELLED | OUTPATIENT
Start: 2020-07-27

## 2020-07-27 RX ADMIN — FLUOROURACIL 495 MG: 50 INJECTION, SOLUTION INTRAVENOUS at 11:07

## 2020-07-27 RX ADMIN — FLUOROURACIL 2960 MG: 50 INJECTION, SOLUTION INTRAVENOUS at 11:07

## 2020-07-27 RX ADMIN — LEUCOVORIN CALCIUM 580 MG: 350 INJECTION, POWDER, LYOPHILIZED, FOR SUSPENSION INTRAMUSCULAR; INTRAVENOUS at 09:07

## 2020-07-27 RX ADMIN — DEXAMETHASONE SODIUM PHOSPHATE: 4 INJECTION, SOLUTION INTRAMUSCULAR; INTRAVENOUS at 08:07

## 2020-07-27 RX ADMIN — OXALIPLATIN 105 MG: 5 INJECTION, SOLUTION, CONCENTRATE INTRAVENOUS at 09:07

## 2020-07-27 NOTE — DISCHARGE INSTRUCTIONS
[facility]  [address] [address]  [address]  [phone] phone     [phone] fax  Hours of Operation: Monday- Friday 8:00am- 5:00pm  After hours phone  [phone]  Hematology / Oncology Physicians on call      CATRACHITA Gonzalez Dr., Dr., Dr., Dr., NP Sydney Prescott, NP Tyesha Taylor, NP    Please call with any concerns regarding your appointment today.    HOME CARE AFTER CHEMOTHERAPY   Meals   Many patients feel sick and lose their appetites during treatment. Eat small meals several times a day. Choose bland foods with little taste or smell if you have problems with nausea. Be sure to cook all food thoroughly. This kills bacteria and helps you avoid intestinal infection. Soft foods are easier to swallow and digest.   Activity   Exercise keeps you strong and keeps your heart and lungs active. Talk to your doctor about an appropriate exercise program for you.   Skin Care   To prevent a skin infection, bathe or shower once a day. Use a moisturizing soap and wash with warm water. Avoid very hot or cold water. Chemotherapy can make your skin dry . Apply moisturizing lotion to help relieve dry skin. Some drugs used in high doses can cause slight burns to appear (like sunburn). Ask for a special cream to help relieve the burn and protect your skin.   Prevent Mouth Sores   During chemotherapy, many people get mouth sores. Do the following to help prevent mouth sores or to ease discomfort.   Brush your teeth with a soft-bristle toothbrush after every meal.  Don't use dental floss if your platelet count is below 50,000. Your doctor or nurse will tell you if this is the case.  Use an oral swab or special soft toothbrush if your gums bleed during regular brushing.  Use mouthwash as directed. If you can't tolerate commercial mouthwash, use salt and baking soda to clean your mouth. Mix 1 teaspoon of salt and 1  teaspoon of baking soda into a glass of water. Swish and spit.  Call your doctor or return to this facility if you develop any of the following:   Sore throat   White patches in the mouth or throat   Fever of 100.4ºF (38ºC) or higher, or as directed by your healthcare provider  © 2000-2011 [facility], [address] [place] [address]. All rights reserved. This information is not intended as a substitute for professional medical care. Always follow your healthcare professional's   FALL PREVENTION   Falls often occur due to slipping, tripping or losing your balance. Here are ways to reduce your risk of falling again.   Was there anything that caused your fall that can be fixed, removed or replaced?   Make your home safe by keeping walkways clear of objects you may trip over.   Use non-slip pads under rugs.   Do not walk in poorly lit areas.   Do not stand on chairs or wobbly ladders.   Use caution when reaching overhead or looking upward. This position can cause a loss of balance.   Be sure your shoes fit properly, have non-slip bottoms and are in good condition.   Be cautious when going up and down stairs, curbs, and when walking on uneven sidewalks.   If your balance is poor, consider using a cane or walker.   If your fall was related to alcohol use, stop or limit alcohol intake.   If your fall was related to use of sleeping medicines, talk to your doctor about this. You may need to reduce your dosage at bedtime if you awaken during the night to go to the bathroom.   To reduce the need for nighttime bathroom trips:   Avoid drinking fluids for several hours before going to bed   Empty your bladder before going to bed   Men can keep a urinal at the bedside   © 2000-2011 [facility], [address] [place] [address]. All rights reserved. This information is not intended as a substitute for professional medical care. Always follow your healthcare professional's instructions.  WAYS TO HELP  PREVENT INFECTION         WASH YOUR HANDS OFTEN DURING THE DAY, ESPECIALLY BEFORE YOU EAT, AFTER USING THE BATHROOM, AND AFTER TOUCHING ANIMALS     STAY AWAY FROM PEOPLE WHO HAVE ILLNESSES YOU CAN CATCH; SUCH AS COLDS, FLU, CHICKEN POX     TRY TO AVOID CROWDS     STAY AWAY FROM CHILDREN WHO RECENTLY HAVE RECEIVED LIVE VIRUS VACCINES     MAINTAIN GOOD MOUTH CARE     DO NOT SQUEEZE OR SCRATCH PIMPLES     CLEAN CUTS & SCRAPES RIGHT AWAY AND DAILY UNTIL HEALED WITH WARM WATER, SOAP & AN ANTISEPTIC     AVOID CONTACT WITH LITTER BOXES, BIRD CAGES, & FISH TANKS     AVOID STANDING WATER, IE., BIRD BATHS, FLOWER POTS/VASES, OR HUMIDIFIERS     WEAR GLOVES WHEN GARDENING OR CLEANING UP AFTER OTHERS, ESPECIALLY BABIES & SMALL CHILDREN     DO NOT EAT RAW FISH, SEAFOOD, MEAT, OR EGGS

## 2020-07-27 NOTE — PROGRESS NOTES
Subjective:      DATE OF VISIT: 7/27/2020   ?  Patient ID:?Adalgisa Wright is a 69 y.o. female.?? MR#: [id number]   ?   PRIMARY PROVIDER: Dr. Quintanilla    CHIEF COMPLAINT:  Follow-up  ?   ONCOLOGIC DIAGNOSIS: Rectosigmoid adenocarcinoma, Stage III, pT3 pN0 pM0  ?   CURRENT TREATMENT: FOLFOX, C1D1 3/23/20    Follow-up        Ms. Wright was previously seen by my colleague Dr. Hqa last on 08/21/2019 for iron deficiency anemia.  She has a mechanical aortic valve replacement 16 years ago on therapeutic anticoagulation.  Labs notable for low haptoglobin in it is felt hemolytic process may be contributing to her anemia.  She notes having recent EKG as part of preoperative evaluation but has not had follow-up with her cardiologist recently or repeat echocardiogram.  She denies edema, shortness of breath or chest pain.    She was referred to Gastroenterology for further evaluation of iron deficiency anemia and concern for GI bleed.     11/7/19 EGD and colonoscopy revealed a nonobstructing ulcerated mass 20 cm from the anal verge. Pathology:  Moderately differentiated adenocarcinoma, MSI intact.    12/02/2019 PET-CT notable for avid sigmoid lesion SUV 15.7 as well as avid right lobe liver lesion 19 mm, SUV 4.1.  No other areas of avidity concerning for metastatic disease.    12/9/19 liver biopsy, Pathology:  Diagnosis 1.  Liver mass, biopsy:   - Not diagnostic of malignancy   - Scant atypical liver sampling, see comments     Comments: The sections show a scant fragmented sample of liver tissue with   psuedoglands in one core and mild architectural distortion. Cytologic atypiia   is not prominent. Reticulin stain shows mildly irregular pattern of staining.   No portal tracts present for evaluation. Back to back pseudoglands raises the   possibly of a well differetniated hepatocellular lesion. However, the scant   sample limits interpretation. Due to the presence of a clinical mass.   Resampling is indicated.      02/13/2020  surgical resection of sigmoid and upper rectum  Pathology:  Adenocarcinoma, moderately differentiated, 2.5 x 2 cm, margins negative, 6 of 14 lymph nodes positive for metastatic adenocarcinoma, many tumor deposits and mesenteric tissue, pT3 pN2, MSI intact    03/23/2020 cycle 1 day 1 FOLFOX     05/20/2020 restaging PET showed interval resolution of liver lesion and no evidence of recurrence/metastatic disease    INTERVAL EVENTS    Ms. Wright has been doing well trying to modify diet due to variable INR limiting greens.  She has been taking 4 tabs supplemental potassium 20 mEq each daily and banana regularly.  She has been doing well with out notable diarrhea or other GI upset, neuropathy, fatigue or anorexia.  No fevers or chills.    Review of Systems    ?   A comprehensive 14-point review of systems was reviewed with patient and was negative other than as specified above.   ?     Objective:      Physical Exam      ?   Vitals:    07/27/20 0754   BP: 111/70   Pulse: 77   Resp: 19   Temp: 98 °F (36.7 °C)        ECOG:?0   General appearance: Generally well appearing, in no acute distress, wearing mask.   Head, eyes, ears, nose, and throat: moist mucous membranes.    Respiratory:  Clear to auscultation bilaterally  Cardiovascular:  Regular rate and rhythm, no murmurs rubs or gallops  Extremities: Warm, without edema.   Neurologic: Alert and oriented. Grossly normal strength, coordination, and gait.   Skin:  No rash, petechiae or ecchymosis.  Psychiatric:  Normal mood and affect.    ?   Laboratory:  ?   Lab Visit on 07/27/2020   Component Date Value Ref Range Status    WBC 07/27/2020 8.76  3.90 - 12.70 K/uL Final    RBC 07/27/2020 3.23* 4.00 - 5.40 M/uL Final    Hemoglobin 07/27/2020 11.0* 12.0 - 16.0 g/dL Final    Hematocrit 07/27/2020 35.7* 37.0 - 48.5 % Final    Mean Corpuscular Volume 07/27/2020 111* 82 - 98 fL Final    Mean Corpuscular Hemoglobin 07/27/2020 34.1* 27.0 - 31.0 pg Final    Mean Corpuscular  Hemoglobin Conc 07/27/2020 30.8* 32.0 - 36.0 g/dL Final    RDW 07/27/2020 15.9* 11.5 - 14.5 % Final    Platelets 07/27/2020 107* 150 - 350 K/uL Final    MPV 07/27/2020 11.9  9.2 - 12.9 fL Final    Immature Granulocytes 07/27/2020 CANCELED  0.0 - 0.5 % Final    Immature Grans (Abs) 07/27/2020 CANCELED  0.00 - 0.04 K/uL Final    nRBC 07/27/2020 0  0 /100 WBC Final    Gran% 07/27/2020 84.0* 38.0 - 73.0 % Final    Lymph% 07/27/2020 13.0* 18.0 - 48.0 % Final    Mono% 07/27/2020 1.0* 4.0 - 15.0 % Final    Eosinophil% 07/27/2020 0.0  0.0 - 8.0 % Final    Basophil% 07/27/2020 1.0  0.0 - 1.9 % Final    Myelocytes 07/27/2020 1.0  % Final    Platelet Estimate 07/27/2020 Decreased*  Final    Aniso 07/27/2020 Slight   Final    Poik 07/27/2020 Slight   Final    Poly 07/27/2020 Occasional   Final    Tear Drop Cells 07/27/2020 Moderate   Final    Differential Method 07/27/2020 Manual   Final    Sodium 07/27/2020 141  136 - 145 mmol/L Final    Potassium 07/27/2020 4.7  3.5 - 5.1 mmol/L Final    Chloride 07/27/2020 103  95 - 110 mmol/L Final    CO2 07/27/2020 26  23 - 29 mmol/L Final    Glucose 07/27/2020 97  70 - 110 mg/dL Final    BUN, Bld 07/27/2020 15  8 - 23 mg/dL Final    Creatinine 07/27/2020 0.8  0.5 - 1.4 mg/dL Final    Calcium 07/27/2020 9.8  8.7 - 10.5 mg/dL Final    Total Protein 07/27/2020 7.1  6.0 - 8.4 g/dL Final    Albumin 07/27/2020 3.8  3.5 - 5.2 g/dL Final    Total Bilirubin 07/27/2020 0.3  0.1 - 1.0 mg/dL Final    Alkaline Phosphatase 07/27/2020 277* 55 - 135 U/L Final    AST 07/27/2020 24  10 - 40 U/L Final    ALT 07/27/2020 17  10 - 44 U/L Final    Anion Gap 07/27/2020 12  8 - 16 mmol/L Final    eGFR if African American 07/27/2020 >60  >60 mL/min/1.73 m^2 Final    eGFR if non African American 07/27/2020 >60  >60 mL/min/1.73 m^2 Final    Prothrombin Time 07/27/2020 20.7* 9.0 - 12.5 sec Final    INR 07/27/2020 2.0* 0.8 - 1.2 Final     Lab Results   Component Value Date    IRON  81 06/03/2020    [facility] 373 06/03/2020    FERRITIN 2,300 (H) 06/03/2020       Tumor markers    CEA    CEA   Date Value Ref Range Status   05/25/2020 4.4 0.0 - 5.0 ng/mL Final     Comment:     CEA Normal Range:  Non-Smokers: 0-3.0 ng/mL  Smokers:     0-5.0 ng/mL     11/18/2019 3.5 0.0 - 5.0 ng/mL Final     Comment:     CEA Normal Range:  Non-Smokers: 0-3.0 ng/mL  Smokers:     0-5.0 ng/mL              ? IMAGING    05/20/2020  NM PET CT ROUTINE    CLINICAL HISTORY:  colon cancer;  Abnormal findings on diagnostic imaging of other parts of digestive tract    TECHNIQUE:  Segmented attenuation corrected 3-D PET imaging was obtained from the skull base through the mid thighs utilizing 12.49 mCi F-18-FDG.  Noncontrast CT imaging was performed for attenuation correction, diagnosis, and anatomical fusion with PET.    COMPARISON:  12/02/2019.    FINDINGS:  Head/neck: There is normal physiologic FDG uptake noted within the visualized brain parenchyma. No FDG avid lymphadenopathy within the neck.    Chest: No FDG avid pulmonary nodules/masses. No FDG avid mediastinal, hilar or axillary lymph nodes.Right IJ MediPort catheter now seen in place.    Abdomen/Pelvis: Normal physiologic FDG uptake noted within the liver, spleen, urinary tract, and bowel.Interval low anterior resection with prior hypermetabolic rectosigmoid colon mass no longer identified.  Interval resolution of hypermetabolic mass in the posterior right lobe of the liver, and correlative subtle hypoattenuating lesion no longer visible on noncontrast CT images.  No new liver lesions.  Adrenals unremarkable.  No FDG avid retroperitoneal lymph nodes.    Skeletal: Intense diffusely increased FDG uptake throughout the visualized axial and appendicular skeleton consistent with chemotherapy related marrow hyperplasia.  No discrete intraosseous lesions.  Chronic compression deformity of several thoracic vertebrae.      Impression       1. Interval colorectal surgery with  resection of prior hypermetabolic rectosigmoid mass.  2. Interval resolution of hypermetabolic liver mass.  3. No detrimental change           ?   Assessment/Plan:       1. Colon adenocarcinoma    2. Supratherapeutic INR    3. Thrombocytopenia    4. Anemia in neoplastic disease    5. Hypokalemia          Plan:     # rectal adenocarcinoma, pT3 pN2 pM0, Stage III, MSI stable:  Initiated adjuvant chemotherapy C1D1 3/23/20.  Restaging PET-CT after 4 cycles showed interval resolution of mildly avid right hepatic lobe lesion previously nondiagnostic on multiple biopsies; I discussed that interval improvement while on chemotherapy does raise concern for potential malignant involvement.  Case discussed at multidisciplinary tumor board with review of imaging and recommendation to continue adjuvant chemotherapy with plan for follow-up with surgery for consideration of resection of likely oligo metastatic liver disease.  I again discussed imaging in multidisciplinary tumor board discussion in detail with her and her  in our follow-up today.   - labs today with mild thrombocytopenia >110K and improvement in anemia.  Will continue with same dose reduced 15% 5 fluorouracil and oxaliplatin.    # Nausea:  Well controlled.  Previously discussed prophylactic use of dexamethasone on days 2 3 and 4 and using promethazine and Zofran additionally as needed.    # Diarrhea:  Minimal, using Imodium and Lomotil p.r.n.    # Hypokalemia:  Resolved, using 4 tabs 20 mEq daily along with banana, can reduce to 3 tabs daily.  Refill sent to her local pharmacy per patient request.    # chronic anticoagulation with a  mechanical aortic valve:  On chronic anticoagulation due to mechanical valve.  Has had frequent subtherapeutic and supratherapeutic INR, being managed closely by Coumadin clinic.  Therapeutic 2.0 today no change made per Coumadin clinic note.    #  mild normocytic anemia: IV iron for 2 doses, completed 12/04/2019.  Repeat labs  with resolution of anemia and now mild worsening likely related to antineoplastic therapy and recent epistaxis.  Continue to monitor throughout therapy.    Follow-Up:   - FOLFOX today with revisit in repeat labs in 2 weeks.

## 2020-07-28 ENCOUNTER — TELEPHONE (OUTPATIENT)
Dept: HEMATOLOGY/ONCOLOGY | Facility: CLINIC | Age: 70
End: 2020-07-28

## 2020-07-28 NOTE — TELEPHONE ENCOUNTER
----- Message from Rima John sent at 7/28/2020 12:14 PM CDT -----  Type:  Pharmacy Calling to Clarify an RX    Name of Caller:Mansi  Pharmacy Name:[facility]   Prescription Name:potassium chloride 20 meq ER   What do they need to clarify?:refill  Best Call Back Number:[phone]  Additional Information: States they need to know if the patient still on this medication.      Thank you

## 2020-07-28 NOTE — TELEPHONE ENCOUNTER
----- Message from Rima John sent at 7/28/2020 12:14 PM CDT -----  Type:  Pharmacy Calling to Clarify an RX    Name of Caller:Mansi  Pharmacy Name:[facility]   Prescription Name:potassium chloride 20 meq ER   What do they need to clarify?:refill  Best Call Back Number:[phone]  Additional Information: States they need to know if the patient still on this medication.      Thank you

## 2020-07-29 ENCOUNTER — INFUSION (OUTPATIENT)
Dept: INFUSION THERAPY | Facility: HOSPITAL | Age: 70
End: 2020-07-29
Attending: INTERNAL MEDICINE
Payer: MEDICARE

## 2020-07-29 ENCOUNTER — TELEPHONE (OUTPATIENT)
Dept: HEMATOLOGY/ONCOLOGY | Facility: CLINIC | Age: 70
End: 2020-07-29

## 2020-07-29 VITALS
SYSTOLIC BLOOD PRESSURE: 124 MMHG | HEART RATE: 73 BPM | RESPIRATION RATE: 18 BRPM | DIASTOLIC BLOOD PRESSURE: 69 MMHG | TEMPERATURE: 99 F | OXYGEN SATURATION: 95 %

## 2020-07-29 DIAGNOSIS — C18.9 COLON ADENOCARCINOMA: Primary | ICD-10-CM

## 2020-07-29 PROCEDURE — 25000003 PHARM REV CODE 250: Performed by: INTERNAL MEDICINE

## 2020-07-29 PROCEDURE — A4216 STERILE WATER/SALINE, 10 ML: HCPCS | Performed by: INTERNAL MEDICINE

## 2020-07-29 PROCEDURE — 96377 APPLICATON ON-BODY INJECTOR: CPT

## 2020-07-29 PROCEDURE — 63600175 PHARM REV CODE 636 W HCPCS: Mod: JG | Performed by: INTERNAL MEDICINE

## 2020-07-29 RX ORDER — SODIUM CHLORIDE 0.9 % (FLUSH) 0.9 %
10 SYRINGE (ML) INJECTION
Status: DISCONTINUED | OUTPATIENT
Start: 2020-07-29 | End: 2020-07-29 | Stop reason: HOSPADM

## 2020-07-29 RX ORDER — HEPARIN 100 UNIT/ML
500 SYRINGE INTRAVENOUS
Status: DISCONTINUED | OUTPATIENT
Start: 2020-07-29 | End: 2020-07-29 | Stop reason: HOSPADM

## 2020-07-29 RX ADMIN — HEPARIN 500 UNITS: 100 SYRINGE at 10:07

## 2020-07-29 RX ADMIN — Medication 10 ML: at 10:07

## 2020-07-29 RX ADMIN — PEGFILGRASTIM 6 MG: KIT SUBCUTANEOUS at 10:07

## 2020-07-29 NOTE — TELEPHONE ENCOUNTER
Pt left VM requesting a call back from  regarding wigs. SW was able to leave a VM for call-back.    F/u by the end of the week.

## 2020-08-03 ENCOUNTER — ANTI-COAG VISIT (OUTPATIENT)
Dept: CARDIOLOGY | Facility: CLINIC | Age: 70
End: 2020-08-03
Payer: MEDICARE

## 2020-08-03 DIAGNOSIS — Z95.2 AORTIC VALVE REPLACED: ICD-10-CM

## 2020-08-03 DIAGNOSIS — Z79.01 LONG TERM (CURRENT) USE OF ANTICOAGULANTS: Primary | ICD-10-CM

## 2020-08-03 LAB — INR PPP: 1.8 (ref 2–3)

## 2020-08-03 PROCEDURE — 85610 PROTHROMBIN TIME: CPT | Mod: QW,S$GLB,, | Performed by: INTERNAL MEDICINE

## 2020-08-03 PROCEDURE — 85610 POCT INR: ICD-10-PCS | Mod: QW,S$GLB,, | Performed by: INTERNAL MEDICINE

## 2020-08-03 NOTE — PROGRESS NOTES
Patient's INR is subtherapeutic at 1.8. Patient reports no changes in diet/medications. Patient reports she followed previous instructions.  Instructions given:Will iIncrease warfarin dosage to 5 mg on Mondays; and 2.5 mg all other days. Recheck on 8/10/20 at [facility] lab. Patient verbalizes understanding.

## 2020-08-10 ENCOUNTER — OFFICE VISIT (OUTPATIENT)
Dept: HEMATOLOGY/ONCOLOGY | Facility: CLINIC | Age: 70
End: 2020-08-10
Payer: MEDICARE

## 2020-08-10 ENCOUNTER — ANTI-COAG VISIT (OUTPATIENT)
Dept: CARDIOLOGY | Facility: CLINIC | Age: 70
End: 2020-08-10
Payer: MEDICARE

## 2020-08-10 ENCOUNTER — INFUSION (OUTPATIENT)
Dept: INFUSION THERAPY | Facility: HOSPITAL | Age: 70
End: 2020-08-10
Attending: INTERNAL MEDICINE
Payer: MEDICARE

## 2020-08-10 ENCOUNTER — SOCIAL WORK (OUTPATIENT)
Dept: HEMATOLOGY/ONCOLOGY | Facility: CLINIC | Age: 70
End: 2020-08-10

## 2020-08-10 VITALS
HEIGHT: 58 IN | WEIGHT: 113 LBS | TEMPERATURE: 97 F | OXYGEN SATURATION: 98 % | BODY MASS INDEX: 23.72 KG/M2 | SYSTOLIC BLOOD PRESSURE: 106 MMHG | RESPIRATION RATE: 18 BRPM | DIASTOLIC BLOOD PRESSURE: 70 MMHG | HEART RATE: 71 BPM

## 2020-08-10 VITALS
OXYGEN SATURATION: 97 % | HEART RATE: 74 BPM | DIASTOLIC BLOOD PRESSURE: 70 MMHG | WEIGHT: 113.75 LBS | TEMPERATURE: 97 F | HEIGHT: 58 IN | SYSTOLIC BLOOD PRESSURE: 102 MMHG | BODY MASS INDEX: 23.88 KG/M2

## 2020-08-10 DIAGNOSIS — Z95.2 AORTIC VALVE REPLACED: ICD-10-CM

## 2020-08-10 DIAGNOSIS — C20 RECTAL ADENOCARCINOMA: ICD-10-CM

## 2020-08-10 DIAGNOSIS — D50.0 IRON DEFICIENCY ANEMIA DUE TO CHRONIC BLOOD LOSS: ICD-10-CM

## 2020-08-10 DIAGNOSIS — Z79.01 CHRONIC ANTICOAGULATION: ICD-10-CM

## 2020-08-10 DIAGNOSIS — M50.30 DDD (DEGENERATIVE DISC DISEASE), CERVICAL: ICD-10-CM

## 2020-08-10 DIAGNOSIS — E78.5 HYPERLIPIDEMIA, UNSPECIFIED HYPERLIPIDEMIA TYPE: ICD-10-CM

## 2020-08-10 DIAGNOSIS — Z79.01 LONG TERM (CURRENT) USE OF ANTICOAGULANTS: ICD-10-CM

## 2020-08-10 DIAGNOSIS — C18.9 COLON ADENOCARCINOMA: Primary | ICD-10-CM

## 2020-08-10 PROCEDURE — 63600175 PHARM REV CODE 636 W HCPCS: Performed by: INTERNAL MEDICINE

## 2020-08-10 PROCEDURE — 99999 PR PBB SHADOW E&M-EST. PATIENT-LVL V: ICD-10-PCS | Mod: PBBFAC,,, | Performed by: NURSE PRACTITIONER

## 2020-08-10 PROCEDURE — 3074F PR MOST RECENT SYSTOLIC BLOOD PRESSURE < 130 MM HG: ICD-10-PCS | Mod: CPTII,S$GLB,, | Performed by: NURSE PRACTITIONER

## 2020-08-10 PROCEDURE — 1101F PR PT FALLS ASSESS DOC 0-1 FALLS W/OUT INJ PAST YR: ICD-10-PCS | Mod: CPTII,S$GLB,, | Performed by: NURSE PRACTITIONER

## 2020-08-10 PROCEDURE — 1159F MED LIST DOCD IN RCRD: CPT | Mod: S$GLB,,, | Performed by: NURSE PRACTITIONER

## 2020-08-10 PROCEDURE — 3008F BODY MASS INDEX DOCD: CPT | Mod: CPTII,S$GLB,, | Performed by: NURSE PRACTITIONER

## 2020-08-10 PROCEDURE — 3008F PR BODY MASS INDEX (BMI) DOCUMENTED: ICD-10-PCS | Mod: CPTII,S$GLB,, | Performed by: NURSE PRACTITIONER

## 2020-08-10 PROCEDURE — 1101F PT FALLS ASSESS-DOCD LE1/YR: CPT | Mod: CPTII,S$GLB,, | Performed by: NURSE PRACTITIONER

## 2020-08-10 PROCEDURE — 96413 CHEMO IV INFUSION 1 HR: CPT

## 2020-08-10 PROCEDURE — 96415 CHEMO IV INFUSION ADDL HR: CPT

## 2020-08-10 PROCEDURE — 99215 PR OFFICE/OUTPT VISIT, EST, LEVL V, 40-54 MIN: ICD-10-PCS | Mod: S$GLB,,, | Performed by: NURSE PRACTITIONER

## 2020-08-10 PROCEDURE — 1126F AMNT PAIN NOTED NONE PRSNT: CPT | Mod: S$GLB,,, | Performed by: NURSE PRACTITIONER

## 2020-08-10 PROCEDURE — 96368 THER/DIAG CONCURRENT INF: CPT

## 2020-08-10 PROCEDURE — 1159F PR MEDICATION LIST DOCUMENTED IN MEDICAL RECORD: ICD-10-PCS | Mod: S$GLB,,, | Performed by: NURSE PRACTITIONER

## 2020-08-10 PROCEDURE — 3074F SYST BP LT 130 MM HG: CPT | Mod: CPTII,S$GLB,, | Performed by: NURSE PRACTITIONER

## 2020-08-10 PROCEDURE — 99215 OFFICE O/P EST HI 40 MIN: CPT | Mod: S$GLB,,, | Performed by: NURSE PRACTITIONER

## 2020-08-10 PROCEDURE — 1126F PR PAIN SEVERITY QUANTIFIED, NO PAIN PRESENT: ICD-10-PCS | Mod: S$GLB,,, | Performed by: NURSE PRACTITIONER

## 2020-08-10 PROCEDURE — 96416 CHEMO PROLONG INFUSE W/PUMP: CPT

## 2020-08-10 PROCEDURE — 3078F PR MOST RECENT DIASTOLIC BLOOD PRESSURE < 80 MM HG: ICD-10-PCS | Mod: CPTII,S$GLB,, | Performed by: NURSE PRACTITIONER

## 2020-08-10 PROCEDURE — 96367 TX/PROPH/DG ADDL SEQ IV INF: CPT

## 2020-08-10 PROCEDURE — 25000003 PHARM REV CODE 250: Performed by: INTERNAL MEDICINE

## 2020-08-10 PROCEDURE — 99999 PR PBB SHADOW E&M-EST. PATIENT-LVL V: CPT | Mod: PBBFAC,,, | Performed by: NURSE PRACTITIONER

## 2020-08-10 PROCEDURE — 3078F DIAST BP <80 MM HG: CPT | Mod: CPTII,S$GLB,, | Performed by: NURSE PRACTITIONER

## 2020-08-10 RX ORDER — DIPHENHYDRAMINE HYDROCHLORIDE 50 MG/ML
50 INJECTION INTRAMUSCULAR; INTRAVENOUS ONCE AS NEEDED
Status: CANCELLED | OUTPATIENT
Start: 2020-08-10

## 2020-08-10 RX ORDER — SODIUM CHLORIDE 0.9 % (FLUSH) 0.9 %
10 SYRINGE (ML) INJECTION
Status: CANCELLED | OUTPATIENT
Start: 2020-08-11

## 2020-08-10 RX ORDER — SODIUM CHLORIDE 0.9 % (FLUSH) 0.9 %
10 SYRINGE (ML) INJECTION
Status: CANCELLED | OUTPATIENT
Start: 2020-08-10

## 2020-08-10 RX ORDER — HEPARIN 100 UNIT/ML
500 SYRINGE INTRAVENOUS
Status: CANCELLED | OUTPATIENT
Start: 2020-08-11

## 2020-08-10 RX ORDER — HEPARIN 100 UNIT/ML
500 SYRINGE INTRAVENOUS
Status: CANCELLED | OUTPATIENT
Start: 2020-08-10

## 2020-08-10 RX ORDER — EPINEPHRINE 0.3 MG/.3ML
0.3 INJECTION SUBCUTANEOUS ONCE AS NEEDED
Status: CANCELLED | OUTPATIENT
Start: 2020-08-10

## 2020-08-10 RX ADMIN — LEUCOVORIN CALCIUM 580 MG: 350 INJECTION, POWDER, LYOPHILIZED, FOR SUSPENSION INTRAMUSCULAR; INTRAVENOUS at 10:08

## 2020-08-10 RX ADMIN — OXALIPLATIN 105 MG: 5 INJECTION, SOLUTION, CONCENTRATE INTRAVENOUS at 10:08

## 2020-08-10 RX ADMIN — FLUOROURACIL 2960 MG: 50 INJECTION, SOLUTION INTRAVENOUS at 12:08

## 2020-08-10 RX ADMIN — DEXAMETHASONE SODIUM PHOSPHATE: 4 INJECTION, SOLUTION INTRAMUSCULAR; INTRAVENOUS at 09:08

## 2020-08-10 RX ADMIN — DEXTROSE: 5 SOLUTION INTRAVENOUS at 09:08

## 2020-08-10 NOTE — PATIENT INSTRUCTIONS
COVID-19 and Cancer Patients    What is COVID-19?  COVID-19 is a new type of virus that can cause mild to severe infections in the lungs. Like other viruses, it can lead to serious infections for people with weakened immune systems. COVID-19 may cause more severe infections than other viruses. We do not have a vaccine to help control its spread, but experts are working to make a vaccine.    How does COVID-19 spread?  The virus can spread easily, just like the common cold or flu. It spreads when an infected person coughs or sneezes droplets that can get into the eyes, nose, or mouth of people nearby. Droplets also land on surfaces that people touch before touching their own eyes, nose, or mouth.    How can I protect myself?  These are some of the best ways to protect yourself and others from the virus:  - Wash your hands often with soap and water for at least 20 seconds.  - Use hand  with 60% or more alcohol until you can wash your hands with soap and water.  - Avoid touching your eyes, nose, and mouth without washing your hands first.  - Clean and disinfect surfaces often. Regular household wipes and sprays will kill the virus. Be sure to  clean places that people touch a lot, such as door handles, phones, keyboards, and light switches.  - Avoid handshakes, hugging, and standing or sitting close to people who are coughing or sneezing.  - Be as healthy as you can. Get plenty of sleep, eat healthy, exercise, and manage your stress.  If you are sick, follow these steps:  - Stay home.  - Cover your nose and mouth when you cough or sneeze. If you use a tissue, put it in the garbage right  away. If you do not have a tissue, cough or sneeze into your elbow crease.  - Call before going to your medical appointments. Let them know about recent travel or if you have had  contact with a person with COVID-19.    What should I do if I have cancer?  Some people with cancer might have a higher risk of getting COVID-19 or  having a serious infection from it. Do  your best to follow the steps listed above to protect yourself. Ask your doctor or nurse if they have special  recommendations based on your health or type of treatment.  Call your doctor right away if any of these happen to you:  - You have a fever higher than 100.4 degrees F.  - You feel short of breath.  - You develop a cough, runny nose, or congestion.    Call anytime [phone]--day, night, or weekend.     As of 3/12/2020  Should I wear a mask?  Experts don't believe that wearing a mask is helpful for the general public. Some people should use certain types of masks because of their own health or the type of work they do. Talk to your doctor or nurse to see if you would benefit from wearing a mask. If you arrive at the hospital with respiratory symptoms, please ask for a mask.    What if I care for or live with a cancer patient?  If you are caring for or living with someone with cancer, do your best to keep them from getting the virus.  Follow the steps to protect yourself listed on this sheet.  If you become sick yourself, call your doctor to see what more you should do to protect your loved one.    What about people visiting?   If you are sick or have been exposed to COVID-19, we ask that you not come to [facility].    If patients have symptoms, call the [facility] Covid-19 Line ([phone])    We ask that you find someone who isn't sick to join your loved one at their appointments.  To protect all patients, we may limit the number of people who can visit someone staying in the hospital.  Please check with your care team.    How will [facility] protect me from getting COVID-19?  Our hospital and clinics are taking steps to keep infected patients separate from those who may be at risk. At every appointment, your care team will ask questions about overall health and recent travel. We may ask some patients to wait in a separate room or to  reschedule until they are feeling better if they have symptoms.  We are also taking extra steps to clean and disinfect surfaces throughout our hospital and clinics.     Will you still care for me if I get sick?  Yes. Your care is our top priority. Although we may change some ways we care for you, we will never put your care or health at risk.            CALL BEFORE YOU ACT   Dial 211 or Text keyword LACOVID to [phone] for general questions and information.   If patients have symptoms, call the [facility] Covid-[facility] Line ([phone])               [facility] ([email or web address])    [email or web address]

## 2020-08-10 NOTE — PROGRESS NOTES
Subjective:       Patient ID: Adalgisa Wright is a 69 y.o. female.    Chief Complaint: Colon Cancer    69 year old female, patient of Dr. Quintanilla presents for evaluation prior to chemotherapy.    She was seen previously by Dr. Haq for anemia  She has a mechanical aortic valve replacement 16 years ago on therapeutic anticoagulation.  Labs notable for low haptoglobin in it is felt hemolytic process may be contributing to her anemia.  She notes having recent EKG as part of preoperative evaluation but has not had follow-up with her cardiologist recently or repeat echocardiogram.  She denies edema, shortness of breath or chest pain.     She was referred to Gastroenterology for further evaluation of iron deficiency anemia and concern for GI bleed.      11/7/19 EGD and colonoscopy revealed a nonobstructing ulcerated mass 20 cm from the anal verge. Pathology:  Moderately differentiated adenocarcinoma, MSI intact.     12/02/2019 PET-CT notable for avid sigmoid lesion SUV 15.7 as well as avid right lobe liver lesion 19 mm, SUV 4.1.  No other areas of avidity concerning for metastatic disease.     12/9/19 liver biopsy, Pathology:  Diagnosis 1.  Liver mass, biopsy:   - Not diagnostic of malignancy   - Scant atypical liver sampling     02/13/2020 surgical resection of sigmoid and upper rectum  Pathology:  Adenocarcinoma, moderately differentiated, 2.5 x 2 cm, margins negative, 6 of 14 lymph nodes positive for metastatic adenocarcinoma, many tumor deposits and mesenteric tissue, pT3 pN2, MSI intact     03/23/2020 cycle 1 day 1 FOLFOX      05/20/2020 restaging PET showed interval resolution of liver lesion and no evidence of recurrence/metastatic disease      Today's visit:  Patient presents for evaluation for cycle 10 FOLFOX, denies any new complaints    Review of Systems   Constitutional: Positive for fatigue. Negative for activity change, appetite change, chills, diaphoresis, fever and unexpected weight change.   HENT:  Negative for congestion, hearing loss, nosebleeds, postnasal drip, sore throat and trouble swallowing.    Eyes: Negative for discharge and visual disturbance.   Respiratory: Negative for cough, chest tightness and shortness of breath.    Cardiovascular: Negative for chest pain, palpitations and leg swelling.   Gastrointestinal: Negative for abdominal distention, abdominal pain, blood in stool, constipation, diarrhea, nausea and vomiting.   Endocrine: Negative for cold intolerance and heat intolerance.   Genitourinary: Negative for difficulty urinating, dyspareunia and hematuria.   Musculoskeletal: Negative for arthralgias, back pain, gait problem and myalgias.   Skin: Negative.    Neurological: Negative for dizziness, weakness, light-headedness and headaches.   Hematological: Negative for adenopathy. Does not bruise/bleed easily.   Psychiatric/Behavioral: Negative for agitation, behavioral problems and confusion. The patient is not nervous/anxious.        Medication List with Changes/Refills   Current Medications    ACEBUTOLOL (SECTRAL) 400 MG CAPSULE    Take 400 mg by mouth 2 (two) times daily.    ACETAMINOPHEN (TYLENOL) 325 MG TABLET    Take 2 tablets (650 mg total) by mouth every 6 (six) hours.    ALPRAZOLAM (XANAX) 1 MG TABLET    Take 1 mg by mouth 2 (two) times daily as needed.    BUTALBITAL-ACETAMINOPHEN-CAFFEINE -40 MG (FIORICET, ESGIC) -40 MG PER TABLET    TAKE ONE TABLET BY MOUTH EVERY SIX HOURS AS NEEDED    DEXAMETHASONE (DECADRON) 4 MG TAB    TAKE 2 TABLETS BY MOUTH ON DAY 2 AND 3 FOLLOWING EACH CYCLE OF CHEMO    DIPHENOXYLATE-ATROPINE 2.5-0.025 MG (LOMOTIL) 2.5-0.025 MG PER TABLET    Take 1 tablet by mouth 4 (four) times daily as needed for Diarrhea.    ERGOCALCIFEROL (ERGOCALCIFEROL) 50,000 UNIT CAP    Take 1 capsule by mouth every 30 days.    FUROSEMIDE (LASIX) 40 MG TABLET    Take 40 mg by mouth Daily.    LINZESS 145 MCG CAP CAPSULE    TAKE 1 CAPSULE (145 MCG TOTAL) BY MOUTH ONCE DAILY.     LOVASTATIN (MEVACOR) 40 MG TABLET    Take 40 mg by mouth every evening.    MULTIVITAMIN (THERAGRAN) TABLET    Take 1 tablet by mouth.    NOZASEPTIN (NOZIN) NASAL     1 each by Each Nostril route 2 (two) times daily.    ONDANSETRON (ZOFRAN) 8 MG TABLET    Take 1 tablet (8 mg total) by mouth every 12 (twelve) hours as needed for Nausea.    OXYCODONE (ROXICODONE) 5 MG IMMEDIATE RELEASE TABLET    Take 1 tablet (5 mg total) by mouth every 4 (four) hours as needed.    PANTOPRAZOLE (PROTONIX) 20 MG TABLET    Take 1 tablet (20 mg total) by mouth once daily.    POTASSIUM CHLORIDE SA (K-DUR,KLOR-CON) 20 MEQ TABLET    Take 3 tablets (60 mEq total) by mouth once daily.    PROCHLORPERAZINE (COMPAZINE) 5 MG TABLET    Take 1 tablet (5 mg total) by mouth every 6 (six) hours as needed for Nausea.    SERTRALINE (ZOLOFT) 100 MG TABLET    Take 1 tablet by mouth Daily.    TRAMADOL (ULTRAM) 50 MG TABLET    50 mg every 8 (eight) hours as needed.     WARFARIN (COUMADIN) 2.5 MG TABLET    1.25mg PO Tues/Thurs/Sat and 2.5mg PO all other days -- OR AS DIRECTED BY COUMADIN CLINIC    ZOLPIDEM (AMBIEN) 10 MG TAB    Take 5 mg by mouth nightly as needed.      Objective:     Vitals:    08/10/20 0837   BP: 102/70   Pulse: 74   Temp: 97.3 °F (36.3 °C)     Physical Exam  Vitals signs reviewed.   Constitutional:       General: She is not in acute distress.     Appearance: She is well-developed.   HENT:      Head: Normocephalic and atraumatic.      Right Ear: Hearing and external ear normal.      Left Ear: Hearing and external ear normal.      Nose: No rhinorrhea.      Right Sinus: No maxillary sinus tenderness or frontal sinus tenderness.      Left Sinus: No maxillary sinus tenderness or frontal sinus tenderness.      Mouth/Throat:      Mouth: No oral lesions.      Pharynx: Uvula midline.   Eyes:      General:         Right eye: No discharge.         Left eye: No discharge.      Conjunctiva/sclera: Conjunctivae normal.      Pupils: Pupils are  equal, round, and reactive to light.   Neck:      Musculoskeletal: Normal range of motion.      Thyroid: No thyromegaly.      Vascular: No carotid bruit.      Trachea: No tracheal deviation.   Cardiovascular:      Rate and Rhythm: Normal rate and regular rhythm.      Pulses:           Dorsalis pedis pulses are 2+ on the right side and 2+ on the left side.      Heart sounds: Normal heart sounds, S1 normal and S2 normal. No murmur.   Pulmonary:      Effort: Pulmonary effort is normal. No respiratory distress.      Breath sounds: Examination of the right-lower field reveals decreased breath sounds. Examination of the left-lower field reveals decreased breath sounds. Decreased breath sounds present.   Abdominal:      General: Bowel sounds are normal. There is no distension.      Palpations: Abdomen is soft. There is no mass.      Tenderness: There is no abdominal tenderness.   Musculoskeletal: Normal range of motion.   Lymphadenopathy:      Cervical: No cervical adenopathy.      Upper Body:      Right upper body: No supraclavicular adenopathy.      Left upper body: No supraclavicular adenopathy.   Skin:     General: Skin is warm and dry.      Capillary Refill: Capillary refill takes less than 2 seconds.      Findings: No rash.   Neurological:      Mental Status: She is alert and oriented to person, place, and time.      Sensory: No sensory deficit.      Coordination: Coordination normal.      Gait: Gait normal.   Psychiatric:         Mood and Affect: Mood is not anxious or depressed.         Speech: Speech normal.         Behavior: Behavior normal.         Thought Content: Thought content normal.         Judgment: Judgment normal.         Assessment:       Problem List Items Addressed This Visit        Neuro    DDD (degenerative disc disease), cervical       Cardiac/Vascular    HLD (hyperlipidemia)       Hematology    Chronic anticoagulation       Oncology    Iron deficiency anemia due to chronic blood loss    Rectal  adenocarcinoma    Colon adenocarcinoma - Primary          Plan:         Nausea:  --patient reports improvement in nausea management    Colon ADenocarcinoma:  --Reviewed lab results with patient, platelet count:  94 K, elevated alk-phos  --discussed lab results with hafsa Kendall to proceed with treatment today, dose reduction per treatment plan      Follow-Up:  Return to clinic in 2 weeks with labs and possible FOLFOX, patient aware to call clinic with any questions and return to clinic earlier if needed.

## 2020-08-10 NOTE — DISCHARGE INSTRUCTIONS
.[facility]  [address] [facility]  [address] [facility]  [phone] phone     [phone] fax  Hours of Operation: Monday- Friday 8:00am- 5:00pm  After hours phone  [phone]  Hematology / Oncology Physicians on call      Dr. Bill Carey, CATRACHITA Mcdonald NP Tyesha Taylor, NP    Please call with any concerns regarding your appointment today.  .FALL PREVENTION   Falls often occur due to slipping, tripping or losing your balance. Here are ways to reduce your risk of falling again.   Was there anything that caused your fall that can be fixed, removed or replaced?   Make your home safe by keeping walkways clear of objects you may trip over.   Use non-slip pads under rugs.   Do not walk in poorly lit areas.   Do not stand on chairs or wobbly ladders.   Use caution when reaching overhead or looking upward. This position can cause a loss of balance.   Be sure your shoes fit properly, have non-slip bottoms and are in good condition.   Be cautious when going up and down stairs, curbs, and when walking on uneven sidewalks.   If your balance is poor, consider using a cane or walker.   If your fall was related to alcohol use, stop or limit alcohol intake.   If your fall was related to use of sleeping medicines, talk to your doctor about this. You may need to reduce your dosage at bedtime if you awaken during the night to go to the bathroom.   To reduce the need for nighttime bathroom trips:   Avoid drinking fluids for several hours before going to bed   Empty your bladder before going to bed   Men can keep a urinal at the bedside   © 7725-5329 [facility], [address] [place] [address], [place] [address]. All rights reserved. This information is not intended as a substitute for professional medical care. Always follow your healthcare professional's instructions.    .WAYS TO HELP PREVENT  INFECTION         WASH YOUR HANDS OFTEN DURING THE DAY, ESPECIALLY BEFORE YOU EAT, AFTER USING THE BATHROOM, AND AFTER TOUCHING ANIMALS     STAY AWAY FROM PEOPLE WHO HAVE ILLNESSES YOU CAN CATCH; SUCH AS COLDS, FLU, CHICKEN POX     TRY TO AVOID CROWDS     STAY AWAY FROM CHILDREN WHO RECENTLY HAVE RECEIVED LIVE VIRUS VACCINES     MAINTAIN GOOD MOUTH CARE     DO NOT SQUEEZE OR SCRATCH PIMPLES     CLEAN CUTS & SCRAPES RIGHT AWAY AND DAILY UNTIL HEALED WITH WARM WATER, SOAP & AN ANTISEPTIC     AVOID CONTACT WITH LITTER BOXES, BIRD CAGES, & FISH TANKS     AVOID STANDING WATER, IE., BIRD BATHS, FLOWER POTS/VASES, OR HUMIDIFIERS     WEAR GLOVES WHEN GARDENING OR CLEANING UP AFTER OTHERS, ESPECIALLY BABIES & SMALL CHILDREN     DO NOT EAT RAW FISH, SEAFOOD, MEAT, OR EGGS  .HOME CARE AFTER CHEMOTHERAPY   Meals   Many patients feel sick and lose their appetites during treatment. Eat small meals several times a day. Choose bland foods with little taste or smell if you have problems with nausea. Be sure to cook all food thoroughly. This kills bacteria and helps you avoid intestinal infection. Soft foods are easier to swallow and digest.   Activity   Exercise keeps you strong and keeps your heart and lungs active. Talk to your doctor about an appropriate exercise program for you.   Skin Care   To prevent a skin infection, bathe or shower once a day. Use a moisturizing soap and wash with warm water. Avoid very hot or cold water. Chemotherapy can make your skin dry . Apply moisturizing lotion to help relieve dry skin. Some drugs used in high doses can cause slight burns to appear (like sunburn). Ask for a special cream to help relieve the burn and protect your skin.   Prevent Mouth Sores   During chemotherapy, many people get mouth sores. Do the following to help prevent mouth sores or to ease discomfort.   Brush your teeth with a soft-bristle toothbrush after every meal.  Don't use dental floss if your platelet count  "is below 50,000. Your doctor or nurse will tell you if this is the case.  Use an oral swab or special soft toothbrush if your gums bleed during regular brushing.  Use mouthwash as directed. If you can't tolerate commercial mouthwash, use salt and baking soda to clean your mouth. Mix 1 teaspoon of salt and 1 teaspoon of baking soda into a glass of water. Swish and spit.  Call your doctor or return to this facility if you develop any of the following:   Sore throat   White patches in the mouth or throat   Fever of 100.4ºF (38ºC) or higher, or as directed by your healthcare provider  © 5418-0029 formerly [facility], [address] [place] [address], [place] [id number]. All rights reserved. This information is not intended as a substitute for professional medical care. Always follow your healthcare professional's     .Support Groups/Classes    Support groups and classes are being offered at the   [facility] and [facility]!!    "Cooking with Cancer" (Nutrition Class):  Second Wednesday of each month   at noon at the [facility].  Metastatic Support Group:  Third Tuesday of each month   at noon at the [facility].  Next Steps Class/Group: Second and fourth Thursday of each month at noon at the [facility].  [facility] (Breast Cancer Support Group): First Tuesday of each month   at 5:30pm at the [facility] location.  [facility]: [facility]: Second and third Tuesday of each month from 7:30am - 2pm.  [facility]: First and fourth Tuesday of each month from 7:30am - 2pm    If you are interested in attending or would like more information please ask our social workers or your nurse!    "

## 2020-08-10 NOTE — PROGRESS NOTES
"SW was requested by patient to f/u about wigs. SW colleague, Aiyana, attempted to reach pt previously about this, unsuccessfully. Pt accompanied by her signification other, Rubén, today. Discussed obtaining wig through [facility] (most cost-effective) and also another option is [employer] (could possibly be covered by insurance) but if not covered by insurance could be more expensive. Provided pt with information on [facility] and [employer]. If she would like an referral/"hair prosthesis" form for [employer] asked her to let SW know. Referral made to [facility]. Pt is well-groomed and in no visible distress. She had no other needs for SW today. Will notify primary SW and plan to f/u further as needs are identified.    Oncology Social Work   Intake  Date of Diagnosis: 03/23/20  Cancer Type: GI  MD Assigned: Danika Quintanilla  Date of referral to social work: 08/10/20  Initial social work contact: 08/10/20  Referral to initial contact timeline: 0  Referral contact method: Other  Other referral contact method: in person  Contact method: In person  Date worked: 08/10/20  Start of Treatment: 03/23/20  Diagnosis to Treat Timeline (days): 0 days     Intervention  Current Status: Active  Treatment Type(s): Chemotherapy  Chemotherapy Regimen: FOLFOX       Concerns: referral to [facility], [employer] for wigs     Supportive Checklist      Follow Up  Follow up as needed.       "

## 2020-08-10 NOTE — PROGRESS NOTES
Patient contacted: Patient's INR is therapeutic at 2.4.  Patient reports no changes. Instructions given: continue warfarin 5 mg on Mondays; and 2.5 mg all other days. Recheck on 8/21/20 with other appointment (pt's request). Patient repeated back correctly and verbalizes understanding.

## 2020-08-12 ENCOUNTER — INFUSION (OUTPATIENT)
Dept: INFUSION THERAPY | Facility: HOSPITAL | Age: 70
End: 2020-08-12
Attending: INTERNAL MEDICINE
Payer: MEDICARE

## 2020-08-12 VITALS
RESPIRATION RATE: 18 BRPM | SYSTOLIC BLOOD PRESSURE: 122 MMHG | OXYGEN SATURATION: 97 % | HEART RATE: 76 BPM | TEMPERATURE: 97 F | DIASTOLIC BLOOD PRESSURE: 74 MMHG

## 2020-08-12 DIAGNOSIS — C18.9 COLON ADENOCARCINOMA: Primary | ICD-10-CM

## 2020-08-12 PROCEDURE — 63600175 PHARM REV CODE 636 W HCPCS: Mod: JG | Performed by: INTERNAL MEDICINE

## 2020-08-12 PROCEDURE — 25000003 PHARM REV CODE 250: Performed by: INTERNAL MEDICINE

## 2020-08-12 PROCEDURE — A4216 STERILE WATER/SALINE, 10 ML: HCPCS | Performed by: INTERNAL MEDICINE

## 2020-08-12 PROCEDURE — 96377 APPLICATON ON-BODY INJECTOR: CPT

## 2020-08-12 RX ORDER — SODIUM CHLORIDE 0.9 % (FLUSH) 0.9 %
10 SYRINGE (ML) INJECTION
Status: DISCONTINUED | OUTPATIENT
Start: 2020-08-12 | End: 2020-08-12 | Stop reason: HOSPADM

## 2020-08-12 RX ORDER — HEPARIN 100 UNIT/ML
SYRINGE INTRAVENOUS
Status: DISPENSED
Start: 2020-08-12 | End: 2020-08-12

## 2020-08-12 RX ORDER — HEPARIN 100 UNIT/ML
500 SYRINGE INTRAVENOUS
Status: DISCONTINUED | OUTPATIENT
Start: 2020-08-12 | End: 2020-08-12 | Stop reason: HOSPADM

## 2020-08-12 RX ADMIN — Medication 10 ML: at 09:08

## 2020-08-12 RX ADMIN — PEGFILGRASTIM 6 MG: KIT SUBCUTANEOUS at 09:08

## 2020-08-12 RX ADMIN — HEPARIN 500 UNITS: 100 SYRINGE at 09:08

## 2020-08-21 ENCOUNTER — ANTI-COAG VISIT (OUTPATIENT)
Dept: CARDIOLOGY | Facility: CLINIC | Age: 70
End: 2020-08-21
Payer: MEDICARE

## 2020-08-21 ENCOUNTER — LAB VISIT (OUTPATIENT)
Dept: LAB | Facility: HOSPITAL | Age: 70
End: 2020-08-21
Attending: INTERNAL MEDICINE
Payer: MEDICARE

## 2020-08-21 DIAGNOSIS — Z95.2 AORTIC VALVE REPLACED: ICD-10-CM

## 2020-08-21 DIAGNOSIS — Z79.01 LONG TERM (CURRENT) USE OF ANTICOAGULANTS: ICD-10-CM

## 2020-08-21 DIAGNOSIS — C18.9 COLON ADENOCARCINOMA: ICD-10-CM

## 2020-08-21 LAB
ALBUMIN SERPL BCP-MCNC: 4 G/DL (ref 3.5–5.2)
ALP SERPL-CCNC: 480 U/L (ref 55–135)
ALT SERPL W/O P-5'-P-CCNC: 29 U/L (ref 10–44)
ANION GAP SERPL CALC-SCNC: 13 MMOL/L (ref 8–16)
AST SERPL-CCNC: 35 U/L (ref 10–40)
BASOPHILS NFR BLD: 1 % (ref 0–1.9)
BILIRUB SERPL-MCNC: 0.4 MG/DL (ref 0.1–1)
BUN SERPL-MCNC: 9 MG/DL (ref 8–23)
CALCIUM SERPL-MCNC: 9.5 MG/DL (ref 8.7–10.5)
CHLORIDE SERPL-SCNC: 105 MMOL/L (ref 95–110)
CO2 SERPL-SCNC: 23 MMOL/L (ref 23–29)
CREAT SERPL-MCNC: 0.9 MG/DL (ref 0.5–1.4)
DIFFERENTIAL METHOD: ABNORMAL
EOSINOPHIL NFR BLD: 1 % (ref 0–8)
ERYTHROCYTE [DISTWIDTH] IN BLOOD BY AUTOMATED COUNT: 15.4 % (ref 11.5–14.5)
EST. GFR  (AFRICAN AMERICAN): >60 ML/MIN/1.73 M^2
EST. GFR  (NON AFRICAN AMERICAN): >60 ML/MIN/1.73 M^2
GLUCOSE SERPL-MCNC: 100 MG/DL (ref 70–110)
HCT VFR BLD AUTO: 35.7 % (ref 37–48.5)
HGB BLD-MCNC: 11 G/DL (ref 12–16)
IMM GRANULOCYTES # BLD AUTO: ABNORMAL K/UL (ref 0–0.04)
IMM GRANULOCYTES NFR BLD AUTO: ABNORMAL % (ref 0–0.5)
INR PPP: 1.5 (ref 0.8–1.2)
LYMPHOCYTES NFR BLD: 12 % (ref 18–48)
MCH RBC QN AUTO: 33.5 PG (ref 27–31)
MCHC RBC AUTO-ENTMCNC: 30.8 G/DL (ref 32–36)
MCV RBC AUTO: 109 FL (ref 82–98)
METAMYELOCYTES NFR BLD MANUAL: 1 %
MONOCYTES NFR BLD: 5 % (ref 4–15)
NEUTROPHILS NFR BLD: 80 % (ref 38–73)
NRBC BLD-RTO: 0 /100 WBC
PLATELET # BLD AUTO: 83 K/UL (ref 150–350)
PMV BLD AUTO: 12.5 FL (ref 9.2–12.9)
POTASSIUM SERPL-SCNC: 3.8 MMOL/L (ref 3.5–5.1)
PROT SERPL-MCNC: 7.7 G/DL (ref 6–8.4)
PROTHROMBIN TIME: 16.1 SEC (ref 9–12.5)
RBC # BLD AUTO: 3.28 M/UL (ref 4–5.4)
SODIUM SERPL-SCNC: 141 MMOL/L (ref 136–145)
WBC # BLD AUTO: 9.71 K/UL (ref 3.9–12.7)

## 2020-08-21 PROCEDURE — 36415 COLL VENOUS BLD VENIPUNCTURE: CPT

## 2020-08-21 PROCEDURE — 85027 COMPLETE CBC AUTOMATED: CPT

## 2020-08-21 PROCEDURE — 93793 PR ANTICOAGULANT MGMT FOR PT TAKING WARFARIN: ICD-10-PCS | Mod: S$GLB,,,

## 2020-08-21 PROCEDURE — 93793 ANTICOAG MGMT PT WARFARIN: CPT | Mod: S$GLB,,,

## 2020-08-21 PROCEDURE — 85610 PROTHROMBIN TIME: CPT

## 2020-08-21 PROCEDURE — 85007 BL SMEAR W/DIFF WBC COUNT: CPT

## 2020-08-21 PROCEDURE — 80053 COMPREHEN METABOLIC PANEL: CPT

## 2020-08-21 NOTE — PROGRESS NOTES
INR not at goal. Medications, chart, and patient findings reviewed. See calendar for adjustments to dose and follow up plan.  No changes, no missed doses or extra greens.  Appetite varies with chemo treatment weeks, additionally gets steroids prior to chemo.  All of these issues can contribute to labile INR.  We will recheck Wednesday at [facility] as she reports that she will have equipment to return.  Dose reviewed in tablet number and MG.

## 2020-08-24 ENCOUNTER — DOCUMENTATION ONLY (OUTPATIENT)
Dept: HEMATOLOGY/ONCOLOGY | Facility: CLINIC | Age: 70
End: 2020-08-24

## 2020-08-24 ENCOUNTER — INFUSION (OUTPATIENT)
Dept: INFUSION THERAPY | Facility: HOSPITAL | Age: 70
End: 2020-08-24
Attending: INTERNAL MEDICINE
Payer: MEDICARE

## 2020-08-24 ENCOUNTER — OFFICE VISIT (OUTPATIENT)
Dept: HEMATOLOGY/ONCOLOGY | Facility: CLINIC | Age: 70
End: 2020-08-24
Payer: MEDICARE

## 2020-08-24 VITALS
SYSTOLIC BLOOD PRESSURE: 146 MMHG | HEIGHT: 58 IN | OXYGEN SATURATION: 95 % | DIASTOLIC BLOOD PRESSURE: 79 MMHG | BODY MASS INDEX: 24.11 KG/M2 | RESPIRATION RATE: 18 BRPM | HEART RATE: 80 BPM | TEMPERATURE: 98 F | WEIGHT: 114.88 LBS

## 2020-08-24 VITALS
DIASTOLIC BLOOD PRESSURE: 71 MMHG | TEMPERATURE: 98 F | SYSTOLIC BLOOD PRESSURE: 124 MMHG | BODY MASS INDEX: 24.11 KG/M2 | HEART RATE: 81 BPM | RESPIRATION RATE: 16 BRPM | HEIGHT: 58 IN | WEIGHT: 114.88 LBS | OXYGEN SATURATION: 96 %

## 2020-08-24 DIAGNOSIS — C18.9 COLON ADENOCARCINOMA: Primary | ICD-10-CM

## 2020-08-24 DIAGNOSIS — D53.9 MACROCYTIC ANEMIA: ICD-10-CM

## 2020-08-24 DIAGNOSIS — R74.8 ALKALINE PHOSPHATASE ELEVATION: ICD-10-CM

## 2020-08-24 DIAGNOSIS — C18.9 COLON ADENOCARCINOMA: ICD-10-CM

## 2020-08-24 DIAGNOSIS — D69.6 THROMBOCYTOPENIA: ICD-10-CM

## 2020-08-24 DIAGNOSIS — R97.0 CARCINOEMBRYONIC ANTIGEN (CEA) ELEVATION: Primary | ICD-10-CM

## 2020-08-24 LAB
ALBUMIN SERPL BCP-MCNC: 3.7 G/DL (ref 3.5–5.2)
ALP SERPL-CCNC: 406 U/L (ref 55–135)
ALT SERPL W/O P-5'-P-CCNC: 23 U/L (ref 10–44)
ANION GAP SERPL CALC-SCNC: 12 MMOL/L (ref 8–16)
AST SERPL-CCNC: 34 U/L (ref 10–40)
BASOPHILS # BLD AUTO: 0.09 K/UL (ref 0–0.2)
BASOPHILS NFR BLD: 1.1 % (ref 0–1.9)
BILIRUB SERPL-MCNC: 0.3 MG/DL (ref 0.1–1)
BUN SERPL-MCNC: 13 MG/DL (ref 8–23)
CALCIUM SERPL-MCNC: 9.5 MG/DL (ref 8.7–10.5)
CHLORIDE SERPL-SCNC: 107 MMOL/L (ref 95–110)
CO2 SERPL-SCNC: 22 MMOL/L (ref 23–29)
CREAT SERPL-MCNC: 0.9 MG/DL (ref 0.5–1.4)
DIFFERENTIAL METHOD: ABNORMAL
EOSINOPHIL # BLD AUTO: 0 K/UL (ref 0–0.5)
EOSINOPHIL NFR BLD: 0.1 % (ref 0–8)
ERYTHROCYTE [DISTWIDTH] IN BLOOD BY AUTOMATED COUNT: 15.1 % (ref 11.5–14.5)
EST. GFR  (AFRICAN AMERICAN): >60 ML/MIN/1.73 M^2
EST. GFR  (NON AFRICAN AMERICAN): >60 ML/MIN/1.73 M^2
GLUCOSE SERPL-MCNC: 114 MG/DL (ref 70–110)
HCT VFR BLD AUTO: 32 % (ref 37–48.5)
HGB BLD-MCNC: 9.9 G/DL (ref 12–16)
IMM GRANULOCYTES # BLD AUTO: 0.42 K/UL (ref 0–0.04)
IMM GRANULOCYTES NFR BLD AUTO: 5.1 % (ref 0–0.5)
LYMPHOCYTES # BLD AUTO: 0.9 K/UL (ref 1–4.8)
LYMPHOCYTES NFR BLD: 10.8 % (ref 18–48)
MCH RBC QN AUTO: 33.6 PG (ref 27–31)
MCHC RBC AUTO-ENTMCNC: 30.9 G/DL (ref 32–36)
MCV RBC AUTO: 109 FL (ref 82–98)
MONOCYTES # BLD AUTO: 0.6 K/UL (ref 0.3–1)
MONOCYTES NFR BLD: 7.4 % (ref 4–15)
NEUTROPHILS # BLD AUTO: 6.7 K/UL (ref 1.8–7.7)
NEUTROPHILS NFR BLD: 80.6 % (ref 38–73)
NRBC BLD-RTO: 0 /100 WBC
PLATELET # BLD AUTO: 92 K/UL (ref 150–350)
PLATELET BLD QL SMEAR: ABNORMAL
PMV BLD AUTO: 12.2 FL (ref 9.2–12.9)
POTASSIUM SERPL-SCNC: 4.8 MMOL/L (ref 3.5–5.1)
PROT SERPL-MCNC: 6.9 G/DL (ref 6–8.4)
RBC # BLD AUTO: 2.95 M/UL (ref 4–5.4)
SODIUM SERPL-SCNC: 141 MMOL/L (ref 136–145)
WBC # BLD AUTO: 8.26 K/UL (ref 3.9–12.7)

## 2020-08-24 PROCEDURE — 3074F SYST BP LT 130 MM HG: CPT | Mod: CPTII,S$GLB,, | Performed by: INTERNAL MEDICINE

## 2020-08-24 PROCEDURE — 25000003 PHARM REV CODE 250: Performed by: INTERNAL MEDICINE

## 2020-08-24 PROCEDURE — 96416 CHEMO PROLONG INFUSE W/PUMP: CPT

## 2020-08-24 PROCEDURE — 3078F DIAST BP <80 MM HG: CPT | Mod: CPTII,S$GLB,, | Performed by: INTERNAL MEDICINE

## 2020-08-24 PROCEDURE — 85027 COMPLETE CBC AUTOMATED: CPT

## 2020-08-24 PROCEDURE — 1126F AMNT PAIN NOTED NONE PRSNT: CPT | Mod: S$GLB,,, | Performed by: INTERNAL MEDICINE

## 2020-08-24 PROCEDURE — 3008F PR BODY MASS INDEX (BMI) DOCUMENTED: ICD-10-PCS | Mod: CPTII,S$GLB,, | Performed by: INTERNAL MEDICINE

## 2020-08-24 PROCEDURE — 96368 THER/DIAG CONCURRENT INF: CPT

## 2020-08-24 PROCEDURE — 1101F PT FALLS ASSESS-DOCD LE1/YR: CPT | Mod: CPTII,S$GLB,, | Performed by: INTERNAL MEDICINE

## 2020-08-24 PROCEDURE — 99215 OFFICE O/P EST HI 40 MIN: CPT | Mod: S$GLB,,, | Performed by: INTERNAL MEDICINE

## 2020-08-24 PROCEDURE — 63600175 PHARM REV CODE 636 W HCPCS: Performed by: INTERNAL MEDICINE

## 2020-08-24 PROCEDURE — C8957 PROLONGED IV INF, REQ PUMP: HCPCS

## 2020-08-24 PROCEDURE — 3074F PR MOST RECENT SYSTOLIC BLOOD PRESSURE < 130 MM HG: ICD-10-PCS | Mod: CPTII,S$GLB,, | Performed by: INTERNAL MEDICINE

## 2020-08-24 PROCEDURE — 3008F BODY MASS INDEX DOCD: CPT | Mod: CPTII,S$GLB,, | Performed by: INTERNAL MEDICINE

## 2020-08-24 PROCEDURE — 80053 COMPREHEN METABOLIC PANEL: CPT

## 2020-08-24 PROCEDURE — 1159F MED LIST DOCD IN RCRD: CPT | Mod: S$GLB,,, | Performed by: INTERNAL MEDICINE

## 2020-08-24 PROCEDURE — 99215 PR OFFICE/OUTPT VISIT, EST, LEVL V, 40-54 MIN: ICD-10-PCS | Mod: S$GLB,,, | Performed by: INTERNAL MEDICINE

## 2020-08-24 PROCEDURE — 3078F PR MOST RECENT DIASTOLIC BLOOD PRESSURE < 80 MM HG: ICD-10-PCS | Mod: CPTII,S$GLB,, | Performed by: INTERNAL MEDICINE

## 2020-08-24 PROCEDURE — 99999 PR PBB SHADOW E&M-EST. PATIENT-LVL IV: CPT | Mod: PBBFAC,,, | Performed by: INTERNAL MEDICINE

## 2020-08-24 PROCEDURE — 1101F PR PT FALLS ASSESS DOC 0-1 FALLS W/OUT INJ PAST YR: ICD-10-PCS | Mod: CPTII,S$GLB,, | Performed by: INTERNAL MEDICINE

## 2020-08-24 PROCEDURE — 1159F PR MEDICATION LIST DOCUMENTED IN MEDICAL RECORD: ICD-10-PCS | Mod: S$GLB,,, | Performed by: INTERNAL MEDICINE

## 2020-08-24 PROCEDURE — 96367 TX/PROPH/DG ADDL SEQ IV INF: CPT

## 2020-08-24 PROCEDURE — 1126F PR PAIN SEVERITY QUANTIFIED, NO PAIN PRESENT: ICD-10-PCS | Mod: S$GLB,,, | Performed by: INTERNAL MEDICINE

## 2020-08-24 PROCEDURE — 85007 BL SMEAR W/DIFF WBC COUNT: CPT

## 2020-08-24 PROCEDURE — 96415 CHEMO IV INFUSION ADDL HR: CPT

## 2020-08-24 PROCEDURE — 96413 CHEMO IV INFUSION 1 HR: CPT

## 2020-08-24 PROCEDURE — 99999 PR PBB SHADOW E&M-EST. PATIENT-LVL IV: ICD-10-PCS | Mod: PBBFAC,,, | Performed by: INTERNAL MEDICINE

## 2020-08-24 RX ORDER — SODIUM CHLORIDE 0.9 % (FLUSH) 0.9 %
10 SYRINGE (ML) INJECTION
Status: CANCELLED | OUTPATIENT
Start: 2020-08-26

## 2020-08-24 RX ORDER — SODIUM CHLORIDE 0.9 % (FLUSH) 0.9 %
10 SYRINGE (ML) INJECTION
Status: CANCELLED | OUTPATIENT
Start: 2020-08-24

## 2020-08-24 RX ORDER — EPINEPHRINE 0.3 MG/.3ML
0.3 INJECTION SUBCUTANEOUS ONCE AS NEEDED
Status: CANCELLED | OUTPATIENT
Start: 2020-08-24

## 2020-08-24 RX ORDER — FLUOROURACIL 50 MG/ML
340 INJECTION, SOLUTION INTRAVENOUS
Status: CANCELLED | OUTPATIENT
Start: 2020-08-24

## 2020-08-24 RX ORDER — HEPARIN 100 UNIT/ML
500 SYRINGE INTRAVENOUS
Status: CANCELLED | OUTPATIENT
Start: 2020-08-24

## 2020-08-24 RX ORDER — HEPARIN 100 UNIT/ML
500 SYRINGE INTRAVENOUS
Status: CANCELLED | OUTPATIENT
Start: 2020-08-26

## 2020-08-24 RX ORDER — DIPHENHYDRAMINE HYDROCHLORIDE 50 MG/ML
50 INJECTION INTRAMUSCULAR; INTRAVENOUS ONCE AS NEEDED
Status: CANCELLED | OUTPATIENT
Start: 2020-08-24

## 2020-08-24 RX ORDER — FLUOROURACIL 50 MG/ML
340 INJECTION, SOLUTION INTRAVENOUS
Status: DISCONTINUED | OUTPATIENT
Start: 2020-08-24 | End: 2020-08-24

## 2020-08-24 RX ADMIN — FLUOROURACIL 2980 MG: 50 INJECTION, SOLUTION INTRAVENOUS at 01:08

## 2020-08-24 RX ADMIN — LEUCOVORIN CALCIUM 585 MG: 350 INJECTION, POWDER, LYOPHILIZED, FOR SUSPENSION INTRAMUSCULAR; INTRAVENOUS at 10:08

## 2020-08-24 RX ADMIN — DEXAMETHASONE SODIUM PHOSPHATE: 4 INJECTION, SOLUTION INTRAMUSCULAR; INTRAVENOUS at 10:08

## 2020-08-24 RX ADMIN — OXALIPLATIN 105 MG: 5 INJECTION, SOLUTION INTRAVENOUS at 10:08

## 2020-08-24 NOTE — PROGRESS NOTES
Met with pt and her significant other to f/u at her request. She said she had not heard from [facility]. Let her know that it is okay to call them and let them know she is interested in wigs. They can register her over the phone and provide instructions about how to obtain the wigs. Otherwise, pt has one more treatment. She is well-groomed and in no visible distress today. She had no other needs for SW at this time; encouraged her to f/u with SW if she continues to have any difficulty getting connected with [facility]. Will remain available to assist as needs are identified.

## 2020-08-24 NOTE — PROGRESS NOTES
Subjective:      DATE OF VISIT: 8/24/2020   ?  Patient ID:?Adalgisa Wright is a 69 y.o. female.?? MR#: [id number]   ?   PRIMARY PROVIDER: Dr. Quintanilla    CHIEF COMPLAINT:  Follow-up  ?   ONCOLOGIC DIAGNOSIS: Rectosigmoid adenocarcinoma, Stage III, pT3 pN0 pM0  ?   CURRENT TREATMENT: FOLFOX, C1D1 3/23/20    Follow-up        Ms. Wright was previously seen by my colleague Dr. Haq last on 08/21/2019 for iron deficiency anemia.  She has a mechanical aortic valve replacement 16 years ago on therapeutic anticoagulation.  Labs notable for low haptoglobin in it is felt hemolytic process may be contributing to her anemia.  She notes having recent EKG as part of preoperative evaluation but has not had follow-up with her cardiologist recently or repeat echocardiogram.  She denies edema, shortness of breath or chest pain.    She was referred to Gastroenterology for further evaluation of iron deficiency anemia and concern for GI bleed.     11/7/19 EGD and colonoscopy revealed a nonobstructing ulcerated mass 20 cm from the anal verge. Pathology:  Moderately differentiated adenocarcinoma, MSI intact.    12/02/2019 PET-CT notable for avid sigmoid lesion SUV 15.7 as well as avid right lobe liver lesion 19 mm, SUV 4.1.  No other areas of avidity concerning for metastatic disease.    12/9/19 liver biopsy, Pathology:  Diagnosis 1.  Liver mass, biopsy:   - Not diagnostic of malignancy   - Scant atypical liver sampling, see comments     Comments: The sections show a scant fragmented sample of liver tissue with   psuedoglands in one core and mild architectural distortion. Cytologic atypiia   is not prominent. Reticulin stain shows mildly irregular pattern of staining.   No portal tracts present for evaluation. Back to back pseudoglands raises the   possibly of a well differetniated hepatocellular lesion. However, the scant   sample limits interpretation. Due to the presence of a clinical mass.   Resampling is indicated.      02/13/2020  surgical resection of sigmoid and upper rectum  Pathology:  Adenocarcinoma, moderately differentiated, 2.5 x 2 cm, margins negative, 6 of 14 lymph nodes positive for metastatic adenocarcinoma, many tumor deposits and mesenteric tissue, pT3 pN2, MSI intact    03/23/2020 cycle 1 day 1 FOLFOX     05/20/2020 restaging PET showed interval resolution of liver lesion and no evidence of recurrence/metastatic disease    INTERVAL EVENTS    Ms. Wright presents for follow-up accompanied by her .  She is feeling well without evidence of recurrent epistaxis or other signs or symptoms of bleeding.  His chills or infectious symptoms.  Energy not a stable.    Review of Systems    ?   A comprehensive 14-point review of systems was reviewed with patient and was negative other than as specified above.   ?     Objective:      Physical Exam      ?   Vitals:    08/24/20 0817   BP: 124/71   Pulse: 81   Resp: 16   Temp: 98.1 °F (36.7 °C)        ECOG:?0   General appearance: Generally well appearing, in no acute distress, wearing mask.   Head, eyes, ears, nose, and throat: moist mucous membranes.    Respiratory:  Clear to auscultation bilaterally  Cardiovascular:  Regular rate and rhythm, no murmurs rubs or gallops  Extremities: Warm, without edema.   Neurologic: Alert and oriented. Grossly normal strength, coordination, and gait.   Skin:  No rash, petechiae or ecchymosis.  Psychiatric:  Normal mood and affect.    ?   Laboratory:  ?   Infusion on 08/24/2020   Component Date Value Ref Range Status    Sodium 08/24/2020 141  136 - 145 mmol/L Final    Potassium 08/24/2020 4.8  3.5 - 5.1 mmol/L Final    Chloride 08/24/2020 107  95 - 110 mmol/L Final    CO2 08/24/2020 22* 23 - 29 mmol/L Final    Glucose 08/24/2020 114* 70 - 110 mg/dL Final    BUN, Bld 08/24/2020 13  8 - 23 mg/dL Final    Creatinine 08/24/2020 0.9  0.5 - 1.4 mg/dL Final    Calcium 08/24/2020 9.5  8.7 - 10.5 mg/dL Final    Total Protein 08/24/2020 6.9  6.0 - 8.4 g/dL  Final    Albumin 08/24/2020 3.7  3.5 - 5.2 g/dL Final    Total Bilirubin 08/24/2020 0.3  0.1 - 1.0 mg/dL Final    Alkaline Phosphatase 08/24/2020 406* 55 - 135 U/L Final    AST 08/24/2020 34  10 - 40 U/L Final    ALT 08/24/2020 23  10 - 44 U/L Final    Anion Gap 08/24/2020 12  8 - 16 mmol/L Final    eGFR if  08/24/2020 >60  >60 mL/min/1.73 m^2 Final    eGFR if non African American 08/24/2020 >60  >60 mL/min/1.73 m^2 Final    WBC 08/24/2020 8.26  3.90 - 12.70 K/uL Final    RBC 08/24/2020 2.95* 4.00 - 5.40 M/uL Final    Hemoglobin 08/24/2020 9.9* 12.0 - 16.0 g/dL Final    Hematocrit 08/24/2020 32.0* 37.0 - 48.5 % Final    Mean Corpuscular Volume 08/24/2020 109* 82 - 98 fL Final    Mean Corpuscular Hemoglobin 08/24/2020 33.6* 27.0 - 31.0 pg Final    Mean Corpuscular Hemoglobin Conc 08/24/2020 30.9* 32.0 - 36.0 g/dL Final    RDW 08/24/2020 15.1* 11.5 - 14.5 % Final    Platelets 08/24/2020 92* 150 - 350 K/uL Final    MPV 08/24/2020 12.2  9.2 - 12.9 fL Final    Immature Granulocytes 08/24/2020 5.1* 0.0 - 0.5 % Final    Gran # (ANC) 08/24/2020 6.7  1.8 - 7.7 K/uL Final    Immature Grans (Abs) 08/24/2020 0.42* 0.00 - 0.04 K/uL Final    Lymph # 08/24/2020 0.9* 1.0 - 4.8 K/uL Final    Mono # 08/24/2020 0.6  0.3 - 1.0 K/uL Final    Eos # 08/24/2020 0.0  0.0 - 0.5 K/uL Final    Baso # 08/24/2020 0.09  0.00 - 0.20 K/uL Final    nRBC 08/24/2020 0  0 /100 WBC Final    Gran% 08/24/2020 80.6* 38.0 - 73.0 % Final    Lymph% 08/24/2020 10.8* 18.0 - 48.0 % Final    Mono% 08/24/2020 7.4  4.0 - 15.0 % Final    Eosinophil% 08/24/2020 0.1  0.0 - 8.0 % Final    Basophil% 08/24/2020 1.1  0.0 - 1.9 % Final    Platelet Estimate 08/24/2020 Decreased*  Final    Differential Method 08/24/2020 Automated   Final     Lab Results   Component Value Date    IRON 81 06/03/2020    TIBC 373 06/03/2020    FERRITIN 2,300 (H) 06/03/2020       Tumor markers    CEA    CEA   Date Value Ref Range Status    08/10/2020 7.8 (H) 0.0 - 5.0 ng/mL Final     Comment:     CEA Normal Range:  Non-Smokers: 0-3.0 ng/mL  Smokers:     0-5.0 ng/mL     05/25/2020 4.4 0.0 - 5.0 ng/mL Final     Comment:     CEA Normal Range:  Non-Smokers: 0-3.0 ng/mL  Smokers:     0-5.0 ng/mL     11/18/2019 3.5 0.0 - 5.0 ng/mL Final     Comment:     CEA Normal Range:  Non-Smokers: 0-3.0 ng/mL  Smokers:     0-5.0 ng/mL              ? IMAGING    05/20/2020  NM PET CT ROUTINE    CLINICAL HISTORY:  colon cancer;  Abnormal findings on diagnostic imaging of other parts of digestive tract    TECHNIQUE:  Segmented attenuation corrected 3-D PET imaging was obtained from the skull base through the mid thighs utilizing 12.49 mCi F-18-FDG.  Noncontrast CT imaging was performed for attenuation correction, diagnosis, and anatomical fusion with PET.    COMPARISON:  12/02/2019.    FINDINGS:  Head/neck: There is normal physiologic FDG uptake noted within the visualized brain parenchyma. No FDG avid lymphadenopathy within the neck.    Chest: No FDG avid pulmonary nodules/masses. No FDG avid mediastinal, hilar or axillary lymph nodes.Right IJ MediPort catheter now seen in place.    Abdomen/Pelvis: Normal physiologic FDG uptake noted within the liver, spleen, urinary tract, and bowel.Interval low anterior resection with prior hypermetabolic rectosigmoid colon mass no longer identified.  Interval resolution of hypermetabolic mass in the posterior right lobe of the liver, and correlative subtle hypoattenuating lesion no longer visible on noncontrast CT images.  No new liver lesions.  Adrenals unremarkable.  No FDG avid retroperitoneal lymph nodes.    Skeletal: Intense diffusely increased FDG uptake throughout the visualized axial and appendicular skeleton consistent with chemotherapy related marrow hyperplasia.  No discrete intraosseous lesions.  Chronic compression deformity of several thoracic vertebrae.      Impression       1. Interval colorectal surgery with resection  of prior hypermetabolic rectosigmoid mass.  2. Interval resolution of hypermetabolic liver mass.  3. No detrimental change           ?   Assessment/Plan:       1. Carcinoembryonic antigen (CEA) elevation    2. Colon adenocarcinoma    3. Macrocytic anemia    4. Thrombocytopenia    5. Alkaline phosphatase elevation          Plan:     # rectosigmoid adenocarcinoma, pT3 pN2 pM0, Stage III, MSI stable:  Initiated adjuvant chemotherapy C1D1 3/23/20.  Restaging PET-CT after 4 cycles showed interval resolution of mildly avid right hepatic lobe lesion previously nondiagnostic on multiple biopsies; I discussed that interval improvement while on chemotherapy does raise concern for potential malignant involvement.  Case discussed at multidisciplinary tumor board with review of imaging and recommendation to continue adjuvant chemotherapy with plan for follow-up with surgery for consideration of resection of likely oligo metastatic liver disease.     - thrombocytopenia similar to last cycle and will continue with elimination of 5-FU bolus and dose reduction of 15% 5 fluorouracil CI and oxaliplatin. Reviewed bleeding precautions.    - discussed mils elevation in CEA and will need to repeat and repeat imaging due for but plan to complete adjuvant therapy in next 2 weeks will rescan after this.     - alkaline phosphatase elevation: discussed possibly related to liver lesion/malignancy vs chemo or other medication. No other LFT abnormality noted. Mild decline from Friday labs. She did have pain in back and tylenol use, cannot rule out contribution and recommended limiting tylenol use.     # chronic anticoagulation with a  mechanical aortic valve:  On chronic anticoagulation due to mechanical valve.  Has had frequent subtherapeutic and supratherapeutic INR, being managed closely by Coumadin clinic.  Therapeutic 2.0 today no change made per Coumadin clinic note.    #  macrocytic anemia: IV iron for 2 doses, completed 12/04/2019.   Repeat labs with resolution of anemia and now mild worsening likely related to antineoplastic therapy and recent epistaxis.  Continue to monitor throughout therapy.    Follow-Up:   - FOLFOX today dose reduced no bolus 5-FU  revisit in repeat labs in 2 weeks and last chemo planned.

## 2020-08-26 ENCOUNTER — INFUSION (OUTPATIENT)
Dept: INFUSION THERAPY | Facility: HOSPITAL | Age: 70
End: 2020-08-26
Attending: INTERNAL MEDICINE
Payer: MEDICARE

## 2020-08-26 ENCOUNTER — ANTI-COAG VISIT (OUTPATIENT)
Dept: CARDIOLOGY | Facility: CLINIC | Age: 70
End: 2020-08-26
Payer: MEDICARE

## 2020-08-26 VITALS
HEART RATE: 81 BPM | OXYGEN SATURATION: 95 % | RESPIRATION RATE: 18 BRPM | SYSTOLIC BLOOD PRESSURE: 122 MMHG | DIASTOLIC BLOOD PRESSURE: 81 MMHG | TEMPERATURE: 98 F

## 2020-08-26 DIAGNOSIS — C18.9 COLON ADENOCARCINOMA: Primary | ICD-10-CM

## 2020-08-26 DIAGNOSIS — Z79.01 LONG TERM (CURRENT) USE OF ANTICOAGULANTS: Primary | ICD-10-CM

## 2020-08-26 DIAGNOSIS — Z95.2 AORTIC VALVE REPLACED: ICD-10-CM

## 2020-08-26 LAB — INR PPP: 3.5 (ref 2–3)

## 2020-08-26 PROCEDURE — A4216 STERILE WATER/SALINE, 10 ML: HCPCS | Performed by: INTERNAL MEDICINE

## 2020-08-26 PROCEDURE — 93793 ANTICOAG MGMT PT WARFARIN: CPT | Mod: S$GLB,,,

## 2020-08-26 PROCEDURE — 93793 PR ANTICOAGULANT MGMT FOR PT TAKING WARFARIN: ICD-10-PCS | Mod: S$GLB,,,

## 2020-08-26 PROCEDURE — 85610 POCT INR: ICD-10-PCS | Mod: QW,S$GLB,, | Performed by: INTERNAL MEDICINE

## 2020-08-26 PROCEDURE — 63600175 PHARM REV CODE 636 W HCPCS: Mod: JG | Performed by: INTERNAL MEDICINE

## 2020-08-26 PROCEDURE — 85610 PROTHROMBIN TIME: CPT | Mod: QW,S$GLB,, | Performed by: INTERNAL MEDICINE

## 2020-08-26 PROCEDURE — 96377 APPLICATON ON-BODY INJECTOR: CPT

## 2020-08-26 PROCEDURE — 25000003 PHARM REV CODE 250: Performed by: INTERNAL MEDICINE

## 2020-08-26 RX ORDER — SODIUM CHLORIDE 0.9 % (FLUSH) 0.9 %
10 SYRINGE (ML) INJECTION
Status: DISCONTINUED | OUTPATIENT
Start: 2020-08-26 | End: 2020-08-26 | Stop reason: HOSPADM

## 2020-08-26 RX ORDER — HEPARIN 100 UNIT/ML
500 SYRINGE INTRAVENOUS
Status: DISCONTINUED | OUTPATIENT
Start: 2020-08-26 | End: 2020-08-26 | Stop reason: HOSPADM

## 2020-08-26 RX ADMIN — HEPARIN 500 UNITS: 100 SYRINGE at 10:08

## 2020-08-26 RX ADMIN — PEGFILGRASTIM 6 MG: KIT SUBCUTANEOUS at 10:08

## 2020-08-26 RX ADMIN — Medication 10 ML: at 10:08

## 2020-08-26 NOTE — PROGRESS NOTES
Patient's INR is supra-therapeutic at 3.5. Patient reports she followed previous instructions. Patient reports no changes. Patient reports no bleeding issues. Advised patient to go to ED if any signs/symptoms of bleeding. Sent to PharmD for dosing/instructions.

## 2020-08-26 NOTE — PROGRESS NOTES
INR not at goal. Medications, chart, and patient findings reviewed. See calendar for adjustments to dose and follow up plan.  Patient sensitive to dosing changes, we will lower dose today rather than hold since she was recently boosted for low INR.  ED with any s/s of bleeding

## 2020-09-04 ENCOUNTER — TELEPHONE (OUTPATIENT)
Dept: INFUSION THERAPY | Facility: HOSPITAL | Age: 70
End: 2020-09-04

## 2020-09-04 NOTE — TELEPHONE ENCOUNTER
Patient called back and I scheduled her pump at the [facility].  Unable to mix at [facility].

## 2020-09-09 ENCOUNTER — OFFICE VISIT (OUTPATIENT)
Dept: HEMATOLOGY/ONCOLOGY | Facility: CLINIC | Age: 70
End: 2020-09-09
Payer: MEDICARE

## 2020-09-09 ENCOUNTER — ANTI-COAG VISIT (OUTPATIENT)
Dept: CARDIOLOGY | Facility: CLINIC | Age: 70
End: 2020-09-09
Payer: MEDICARE

## 2020-09-09 ENCOUNTER — INFUSION (OUTPATIENT)
Dept: INFUSION THERAPY | Facility: HOSPITAL | Age: 70
End: 2020-09-09
Attending: INTERNAL MEDICINE
Payer: MEDICARE

## 2020-09-09 VITALS
SYSTOLIC BLOOD PRESSURE: 117 MMHG | RESPIRATION RATE: 16 BRPM | HEART RATE: 73 BPM | HEIGHT: 58 IN | WEIGHT: 114.19 LBS | TEMPERATURE: 98 F | BODY MASS INDEX: 23.97 KG/M2 | OXYGEN SATURATION: 96 % | DIASTOLIC BLOOD PRESSURE: 73 MMHG

## 2020-09-09 VITALS
DIASTOLIC BLOOD PRESSURE: 75 MMHG | BODY MASS INDEX: 23.93 KG/M2 | OXYGEN SATURATION: 95 % | HEART RATE: 81 BPM | HEIGHT: 58 IN | WEIGHT: 114 LBS | RESPIRATION RATE: 18 BRPM | TEMPERATURE: 98 F | SYSTOLIC BLOOD PRESSURE: 122 MMHG

## 2020-09-09 DIAGNOSIS — Z79.01 LONG TERM (CURRENT) USE OF ANTICOAGULANTS: ICD-10-CM

## 2020-09-09 DIAGNOSIS — R26.89 IMBALANCE: ICD-10-CM

## 2020-09-09 DIAGNOSIS — C18.9 COLON ADENOCARCINOMA: ICD-10-CM

## 2020-09-09 DIAGNOSIS — G62.0 PERIPHERAL NEUROPATHY DUE TO CHEMOTHERAPY: ICD-10-CM

## 2020-09-09 DIAGNOSIS — R93.3 ABNORMAL FINDINGS ON DIAGNOSTIC IMAGING OF OTHER PARTS OF DIGESTIVE TRACT: ICD-10-CM

## 2020-09-09 DIAGNOSIS — T45.1X5A PERIPHERAL NEUROPATHY DUE TO CHEMOTHERAPY: ICD-10-CM

## 2020-09-09 DIAGNOSIS — Z95.2 AORTIC VALVE REPLACED: ICD-10-CM

## 2020-09-09 DIAGNOSIS — C18.9 COLON ADENOCARCINOMA: Primary | ICD-10-CM

## 2020-09-09 DIAGNOSIS — R97.0 CARCINOEMBRYONIC ANTIGEN (CEA) ELEVATION: Primary | ICD-10-CM

## 2020-09-09 DIAGNOSIS — G25.9 EXTRAPYRAMIDAL AND MOVEMENT DISORDER, UNSPECIFIED: ICD-10-CM

## 2020-09-09 DIAGNOSIS — D69.6 THROMBOCYTOPENIA: ICD-10-CM

## 2020-09-09 PROCEDURE — 3008F PR BODY MASS INDEX (BMI) DOCUMENTED: ICD-10-PCS | Mod: CPTII,S$GLB,, | Performed by: INTERNAL MEDICINE

## 2020-09-09 PROCEDURE — 1159F PR MEDICATION LIST DOCUMENTED IN MEDICAL RECORD: ICD-10-PCS | Mod: S$GLB,,, | Performed by: INTERNAL MEDICINE

## 2020-09-09 PROCEDURE — 99999 PR PBB SHADOW E&M-EST. PATIENT-LVL IV: ICD-10-PCS | Mod: PBBFAC,,, | Performed by: INTERNAL MEDICINE

## 2020-09-09 PROCEDURE — 25000003 PHARM REV CODE 250: Performed by: INTERNAL MEDICINE

## 2020-09-09 PROCEDURE — 3288F FALL RISK ASSESSMENT DOCD: CPT | Mod: CPTII,S$GLB,, | Performed by: INTERNAL MEDICINE

## 2020-09-09 PROCEDURE — 3078F PR MOST RECENT DIASTOLIC BLOOD PRESSURE < 80 MM HG: ICD-10-PCS | Mod: CPTII,S$GLB,, | Performed by: INTERNAL MEDICINE

## 2020-09-09 PROCEDURE — 99215 PR OFFICE/OUTPT VISIT, EST, LEVL V, 40-54 MIN: ICD-10-PCS | Mod: S$GLB,,, | Performed by: INTERNAL MEDICINE

## 2020-09-09 PROCEDURE — 3078F DIAST BP <80 MM HG: CPT | Mod: CPTII,S$GLB,, | Performed by: INTERNAL MEDICINE

## 2020-09-09 PROCEDURE — 63600175 PHARM REV CODE 636 W HCPCS: Performed by: INTERNAL MEDICINE

## 2020-09-09 PROCEDURE — 1126F AMNT PAIN NOTED NONE PRSNT: CPT | Mod: S$GLB,,, | Performed by: INTERNAL MEDICINE

## 2020-09-09 PROCEDURE — 96413 CHEMO IV INFUSION 1 HR: CPT

## 2020-09-09 PROCEDURE — 1126F PR PAIN SEVERITY QUANTIFIED, NO PAIN PRESENT: ICD-10-PCS | Mod: S$GLB,,, | Performed by: INTERNAL MEDICINE

## 2020-09-09 PROCEDURE — 1159F MED LIST DOCD IN RCRD: CPT | Mod: S$GLB,,, | Performed by: INTERNAL MEDICINE

## 2020-09-09 PROCEDURE — 1100F PR PT FALLS ASSESS DOC 2+ FALLS/FALL W/INJURY/YR: ICD-10-PCS | Mod: CPTII,S$GLB,, | Performed by: INTERNAL MEDICINE

## 2020-09-09 PROCEDURE — 99215 OFFICE O/P EST HI 40 MIN: CPT | Mod: S$GLB,,, | Performed by: INTERNAL MEDICINE

## 2020-09-09 PROCEDURE — 1100F PTFALLS ASSESS-DOCD GE2>/YR: CPT | Mod: CPTII,S$GLB,, | Performed by: INTERNAL MEDICINE

## 2020-09-09 PROCEDURE — 96416 CHEMO PROLONG INFUSE W/PUMP: CPT

## 2020-09-09 PROCEDURE — 3074F SYST BP LT 130 MM HG: CPT | Mod: CPTII,S$GLB,, | Performed by: INTERNAL MEDICINE

## 2020-09-09 PROCEDURE — 3074F PR MOST RECENT SYSTOLIC BLOOD PRESSURE < 130 MM HG: ICD-10-PCS | Mod: CPTII,S$GLB,, | Performed by: INTERNAL MEDICINE

## 2020-09-09 PROCEDURE — 96368 THER/DIAG CONCURRENT INF: CPT

## 2020-09-09 PROCEDURE — 3288F PR FALLS RISK ASSESSMENT DOCUMENTED: ICD-10-PCS | Mod: CPTII,S$GLB,, | Performed by: INTERNAL MEDICINE

## 2020-09-09 PROCEDURE — 96415 CHEMO IV INFUSION ADDL HR: CPT

## 2020-09-09 PROCEDURE — 96367 TX/PROPH/DG ADDL SEQ IV INF: CPT

## 2020-09-09 PROCEDURE — 99999 PR PBB SHADOW E&M-EST. PATIENT-LVL IV: CPT | Mod: PBBFAC,,, | Performed by: INTERNAL MEDICINE

## 2020-09-09 PROCEDURE — 3008F BODY MASS INDEX DOCD: CPT | Mod: CPTII,S$GLB,, | Performed by: INTERNAL MEDICINE

## 2020-09-09 RX ORDER — EPINEPHRINE 0.3 MG/.3ML
0.3 INJECTION SUBCUTANEOUS ONCE AS NEEDED
Status: CANCELLED | OUTPATIENT
Start: 2020-09-09

## 2020-09-09 RX ORDER — SODIUM CHLORIDE 0.9 % (FLUSH) 0.9 %
10 SYRINGE (ML) INJECTION
Status: CANCELLED | OUTPATIENT
Start: 2020-09-10

## 2020-09-09 RX ORDER — SODIUM CHLORIDE 0.9 % (FLUSH) 0.9 %
10 SYRINGE (ML) INJECTION
Status: CANCELLED | OUTPATIENT
Start: 2020-09-09

## 2020-09-09 RX ORDER — DIPHENHYDRAMINE HYDROCHLORIDE 50 MG/ML
50 INJECTION INTRAMUSCULAR; INTRAVENOUS ONCE AS NEEDED
Status: CANCELLED | OUTPATIENT
Start: 2020-09-09

## 2020-09-09 RX ORDER — HEPARIN 100 UNIT/ML
500 SYRINGE INTRAVENOUS
Status: CANCELLED | OUTPATIENT
Start: 2020-09-09

## 2020-09-09 RX ORDER — HEPARIN 100 UNIT/ML
500 SYRINGE INTRAVENOUS
Status: CANCELLED | OUTPATIENT
Start: 2020-09-10

## 2020-09-09 RX ADMIN — FLUOROURACIL 2960 MG: 50 INJECTION, SOLUTION INTRAVENOUS at 12:09

## 2020-09-09 RX ADMIN — DEXAMETHASONE SODIUM PHOSPHATE: 4 INJECTION, SOLUTION INTRAMUSCULAR; INTRAVENOUS at 10:09

## 2020-09-09 RX ADMIN — DEXTROSE MONOHYDRATE: 50 INJECTION, SOLUTION INTRAVENOUS at 10:09

## 2020-09-09 RX ADMIN — LEUCOVORIN CALCIUM 580 MG: 350 INJECTION, POWDER, LYOPHILIZED, FOR SOLUTION INTRAMUSCULAR; INTRAVENOUS at 10:09

## 2020-09-09 RX ADMIN — OXALIPLATIN 105 MG: 5 INJECTION, SOLUTION, CONCENTRATE INTRAVENOUS at 10:09

## 2020-09-09 NOTE — PROGRESS NOTES
Patient's INR is therapeutic at 2.4. Attempted to contact patient twice. Left voice message with instructions and office call back number [phone]. Instructions: Continue warfarin 5 mg on Mondays and Fridays; and 2.5 mg all other days. Recheck in two weeks.

## 2020-09-09 NOTE — DISCHARGE INSTRUCTIONS
.[facility]  [address] [address]  [address]  [phone] phone     [phone] fax  Hours of Operation: Monday- Friday 8:00am- 5:00pm  After hours phone  [phone]  Hematology / Oncology Physicians on call      Dr. Bill Carey, CATRACHITA Mcdonald NP Tyesha Taylor, NP    Please call with any concerns regarding your appointment today.  .FALL PREVENTION   Falls often occur due to slipping, tripping or losing your balance. Here are ways to reduce your risk of falling again.   Was there anything that caused your fall that can be fixed, removed or replaced?   Make your home safe by keeping walkways clear of objects you may trip over.   Use non-slip pads under rugs.   Do not walk in poorly lit areas.   Do not stand on chairs or wobbly ladders.   Use caution when reaching overhead or looking upward. This position can cause a loss of balance.   Be sure your shoes fit properly, have non-slip bottoms and are in good condition.   Be cautious when going up and down stairs, curbs, and when walking on uneven sidewalks.   If your balance is poor, consider using a cane or walker.   If your fall was related to alcohol use, stop or limit alcohol intake.   If your fall was related to use of sleeping medicines, talk to your doctor about this. You may need to reduce your dosage at bedtime if you awaken during the night to go to the bathroom.   To reduce the need for nighttime bathroom trips:   Avoid drinking fluids for several hours before going to bed   Empty your bladder before going to bed   Men can keep a urinal at the bedside   © 8572-5667 [facility], [address] [place] [address], [place] [address]. All rights reserved. This information is not intended as a substitute for professional medical care. Always follow your healthcare professional's instructions.    .WAYS TO HELP PREVENT  INFECTION         WASH YOUR HANDS OFTEN DURING THE DAY, ESPECIALLY BEFORE YOU EAT, AFTER USING THE BATHROOM, AND AFTER TOUCHING ANIMALS     STAY AWAY FROM PEOPLE WHO HAVE ILLNESSES YOU CAN CATCH; SUCH AS COLDS, FLU, CHICKEN POX     TRY TO AVOID CROWDS     STAY AWAY FROM CHILDREN WHO RECENTLY HAVE RECEIVED LIVE VIRUS VACCINES     MAINTAIN GOOD MOUTH CARE     DO NOT SQUEEZE OR SCRATCH PIMPLES     CLEAN CUTS & SCRAPES RIGHT AWAY AND DAILY UNTIL HEALED WITH WARM WATER, SOAP & AN ANTISEPTIC     AVOID CONTACT WITH LITTER BOXES, BIRD CAGES, & FISH TANKS     AVOID STANDING WATER, IE., BIRD BATHS, FLOWER POTS/VASES, OR HUMIDIFIERS     WEAR GLOVES WHEN GARDENING OR CLEANING UP AFTER OTHERS, ESPECIALLY BABIES & SMALL CHILDREN     DO NOT EAT RAW FISH, SEAFOOD, MEAT, OR EGGS  .HOME CARE AFTER CHEMOTHERAPY   Meals   Many patients feel sick and lose their appetites during treatment. Eat small meals several times a day. Choose bland foods with little taste or smell if you have problems with nausea. Be sure to cook all food thoroughly. This kills bacteria and helps you avoid intestinal infection. Soft foods are easier to swallow and digest.   Activity   Exercise keeps you strong and keeps your heart and lungs active. Talk to your doctor about an appropriate exercise program for you.   Skin Care   To prevent a skin infection, bathe or shower once a day. Use a moisturizing soap and wash with warm water. Avoid very hot or cold water. Chemotherapy can make your skin dry . Apply moisturizing lotion to help relieve dry skin. Some drugs used in high doses can cause slight burns to appear (like sunburn). Ask for a special cream to help relieve the burn and protect your skin.   Prevent Mouth Sores   During chemotherapy, many people get mouth sores. Do the following to help prevent mouth sores or to ease discomfort.   Brush your teeth with a soft-bristle toothbrush after every meal.  Don't use dental floss if your platelet count  "is below 50,000. Your doctor or nurse will tell you if this is the case.  Use an oral swab or special soft toothbrush if your gums bleed during regular brushing.  Use mouthwash as directed. If you can't tolerate commercial mouthwash, use salt and baking soda to clean your mouth. Mix 1 teaspoon of salt and 1 teaspoon of baking soda into a glass of water. Swish and spit.  Call your doctor or return to this facility if you develop any of the following:   Sore throat   White patches in the mouth or throat   Fever of 100.4ºF (38ºC) or higher, or as directed by your healthcare provider  © 2377-6699 [facility], [address] [place] [address], [place] [id number]. All rights reserved. This information is not intended as a substitute for professional medical care. Always follow your healthcare professional's     .Support Groups/Classes    Support groups and classes are being offered at the   [facility] and [facility]!!    "Cooking with Cancer" (Nutrition Class):  Second Wednesday of each month   at noon at the [facility].  Metastatic Support Group:  Third Tuesday of each month   at noon at the [facility].  Next Steps Class/Group: Second and fourth Thursday of each month at noon at the [facility].  [facility] (Breast Cancer Support Group): First Tuesday of each month   at 5:30pm at the [facility] location.  [facility]: [facility]: Second and third Tuesday of each month from 7:30am - 2pm.  [facility]: First and fourth Tuesday of each month from 7:30am - 2pm    If you are interested in attending or would like more information please ask our social workers or your nurse!      "

## 2020-09-09 NOTE — Clinical Note
Patient getting restaging PET-CT 09/23/2020.  I would like to present her case again at tumor board that Friday and will be planning to see her later that day to discuss results and tumor board consensus recommendations.  Can you please add her to the list for tumor board that day with review of PET-CT in comparison to prior 5/2020 as well as 12/2019 and MRI abd. Question: assess for possible oligometastatic liver lesion, recommended treatment vs surveillance

## 2020-09-10 ENCOUNTER — TELEPHONE (OUTPATIENT)
Dept: CARDIOLOGY | Facility: CLINIC | Age: 70
End: 2020-09-10

## 2020-09-10 NOTE — TELEPHONE ENCOUNTER
----- Message from Laura Rivera LPN sent at 9/10/2020  3:16 PM CDT -----  Patient called - Requesting to speak with you regarding previous visit//thanks

## 2020-09-10 NOTE — TELEPHONE ENCOUNTER
Returned call to patient--call went straight to voice mail-- left voice message to call our office back at [phone].

## 2020-09-10 NOTE — PROGRESS NOTES
Subjective:      DATE OF VISIT: 9/9/2020   ?  Patient ID:?Adalgisa Wright is a 69 y.o. female.?? MR#: [id number]   ?   PRIMARY PROVIDER: Dr. Quintanilla    CHIEF COMPLAINT:  Follow-up  ?   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   CURRENT TREATMENT: FOLFOX, C1D1 3/23/20    Follow-up        Ms. Wright was previously seen by my colleague Dr. Haq last on 08/21/2019 for iron deficiency anemia.  She has a mechanical aortic valve replacement 16 years ago on therapeutic anticoagulation.  Labs notable for low haptoglobin in it is felt hemolytic process may be contributing to her anemia.  She notes having recent EKG as part of preoperative evaluation but has not had follow-up with her cardiologist recently or repeat echocardiogram.  She denies edema, shortness of breath or chest pain.    She was referred to Gastroenterology for further evaluation of iron deficiency anemia and concern for GI bleed.     11/7/19 EGD and colonoscopy revealed a nonobstructing ulcerated mass 20 cm from the anal verge. Pathology:  Moderately differentiated adenocarcinoma, MSI intact.    12/02/2019 PET-CT notable for avid sigmoid lesion SUV 15.7 as well as avid right lobe liver lesion 19 mm, SUV 4.1.  No other areas of avidity concerning for metastatic disease.    12/9/19 liver biopsy, Pathology:  Diagnosis 1.  Liver mass, biopsy:   - Not diagnostic of malignancy   - Scant atypical liver sampling, see comments     Comments: The sections show a scant fragmented sample of liver tissue with   psuedoglands in one core and mild architectural distortion. Cytologic atypiia   is not prominent. Reticulin stain shows mildly irregular pattern of staining.   No portal tracts present for evaluation. Back to back pseudoglands raises the   possibly of a well differetniated hepatocellular lesion. However, the scant   sample limits interpretation. Due to the  presence of a clinical mass.   Resampling is indicated.      02/13/2020 surgical resection of sigmoid and upper rectum  Pathology:  Adenocarcinoma, moderately differentiated, 2.5 x 2 cm, margins negative, 6 of 14 lymph nodes positive for metastatic adenocarcinoma, many tumor deposits and mesenteric tissue, pT3 pN2, MSI intact    03/23/2020 cycle 1 day 1 FOLFOX     05/20/2020 restaging PET showed interval resolution of liver lesion and no evidence of recurrence/metastatic disease    09/09/2020 completion of cycle 12 FOLFOX    INTERVAL EVENTS    Ms. Wright presents for follow-up accompanied by her .  She is planned to complete her last infusion of chemotherapy, cycle 12 today.  She has been doing well without further bleeding/epistaxis.  INR within normal limits today.  She does endorse some issues with balance, no fall.  She does endorse progressive neuropathy which is mild to moderate.  No nausea/vomiting, or other GI upset.    Review of Systems    ?   A comprehensive 14-point review of systems was reviewed with patient and was negative other than as specified above.   ?     Objective:      Physical Exam      ?   Vitals:    09/09/20 0832   BP: 117/73   Pulse: 73   Resp: 16   Temp: 98.2 °F (36.8 °C)        ECOG:?0   General appearance: Generally well appearing, in no acute distress, wearing mask.   Head, eyes, ears, nose, and throat: moist mucous membranes.    Respiratory:  Clear to auscultation bilaterally  Cardiovascular:  Regular rate and rhythm, no murmurs rubs or gallops  Extremities: Warm, without edema.   Neurologic: Alert and oriented. Grossly normal strength, coordination, and gait.   Skin:  No rash, petechiae or ecchymosis.  Psychiatric:  Normal mood and affect.    ?   Laboratory:  ?   Lab Visit on 09/09/2020   Component Date Value Ref Range Status    WBC 09/09/2020 7.78  3.90 - 12.70 K/uL Final    RBC 09/09/2020 3.13* 4.00 - 5.40 M/uL Final    Hemoglobin 09/09/2020 10.4* 12.0 - 16.0 g/dL Final     Hematocrit 09/09/2020 34.6* 37.0 - 48.5 % Final    Mean Corpuscular Volume 09/09/2020 111* 82 - 98 fL Final    Mean Corpuscular Hemoglobin 09/09/2020 33.2* 27.0 - 31.0 pg Final    Mean Corpuscular Hemoglobin Conc 09/09/2020 30.1* 32.0 - 36.0 g/dL Final    RDW 09/09/2020 14.8* 11.5 - 14.5 % Final    Platelets 09/09/2020 99* 150 - 350 K/uL Final    MPV 09/09/2020 11.7  9.2 - 12.9 fL Final    Immature Granulocytes 09/09/2020 3.7* 0.0 - 0.5 % Final    Gran # (ANC) 09/09/2020 6.1  1.8 - 7.7 K/uL Final    Immature Grans (Abs) 09/09/2020 0.29* 0.00 - 0.04 K/uL Final    Lymph # 09/09/2020 0.9* 1.0 - 4.8 K/uL Final    Mono # 09/09/2020 0.5  0.3 - 1.0 K/uL Final    Eos # 09/09/2020 0.0  0.0 - 0.5 K/uL Final    Baso # 09/09/2020 0.03  0.00 - 0.20 K/uL Final    nRBC 09/09/2020 0  0 /100 WBC Final    Gran% 09/09/2020 78.0* 38.0 - 73.0 % Final    Lymph% 09/09/2020 11.8* 18.0 - 48.0 % Final    Mono% 09/09/2020 6.0  4.0 - 15.0 % Final    Eosinophil% 09/09/2020 0.1  0.0 - 8.0 % Final    Basophil% 09/09/2020 0.4  0.0 - 1.9 % Final    Differential Method 09/09/2020 Automated   Final    Sodium 09/09/2020 141  136 - 145 mmol/L Final    Potassium 09/09/2020 4.7  3.5 - 5.1 mmol/L Final    Chloride 09/09/2020 106  95 - 110 mmol/L Final    CO2 09/09/2020 28  23 - 29 mmol/L Final    Glucose 09/09/2020 112* 70 - 110 mg/dL Final    BUN, Bld 09/09/2020 16  8 - 23 mg/dL Final    Creatinine 09/09/2020 0.9  0.5 - 1.4 mg/dL Final    Calcium 09/09/2020 9.7  8.7 - 10.5 mg/dL Final    Total Protein 09/09/2020 7.1  6.0 - 8.4 g/dL Final    Albumin 09/09/2020 3.7  3.5 - 5.2 g/dL Final    Total Bilirubin 09/09/2020 0.4  0.1 - 1.0 mg/dL Final    Alkaline Phosphatase 09/09/2020 448* 55 - 135 U/L Final    AST 09/09/2020 54* 10 - 40 U/L Final    ALT 09/09/2020 31  10 - 44 U/L Final    Anion Gap 09/09/2020 7* 8 - 16 mmol/L Final    eGFR if African American 09/09/2020 >60  >60 mL/min/1.73 m^2 Final    eGFR if non African  American 09/09/2020 >60  >60 mL/min/1.73 m^2 Final    Prothrombin Time 09/09/2020 24.5* 9.0 - 12.5 sec Final    INR 09/09/2020 2.4* 0.8 - 1.2 Final     Lab Results   Component Value Date    IRON 81 06/03/2020    TIBC 373 06/03/2020    FERRITIN 2,300 (H) 06/03/2020       Tumor markers    CEA    CEA   Date Value Ref Range Status   08/10/2020 7.8 (H) 0.0 - 5.0 ng/mL Final     Comment:     CEA Normal Range:  Non-Smokers: 0-3.0 ng/mL  Smokers:     0-5.0 ng/mL     05/25/2020 4.4 0.0 - 5.0 ng/mL Final     Comment:     CEA Normal Range:  Non-Smokers: 0-3.0 ng/mL  Smokers:     0-5.0 ng/mL     11/18/2019 3.5 0.0 - 5.0 ng/mL Final     Comment:     CEA Normal Range:  Non-Smokers: 0-3.0 ng/mL  Smokers:     0-5.0 ng/mL              ? IMAGING    05/20/2020  NM PET CT ROUTINE    CLINICAL HISTORY:  colon cancer;  Abnormal findings on diagnostic imaging of other parts of digestive tract    TECHNIQUE:  Segmented attenuation corrected 3-D PET imaging was obtained from the skull base through the mid thighs utilizing 12.49 mCi F-18-FDG.  Noncontrast CT imaging was performed for attenuation correction, diagnosis, and anatomical fusion with PET.    COMPARISON:  12/02/2019.    FINDINGS:  Head/neck: There is normal physiologic FDG uptake noted within the visualized brain parenchyma. No FDG avid lymphadenopathy within the neck.    Chest: No FDG avid pulmonary nodules/masses. No FDG avid mediastinal, hilar or axillary lymph nodes.Right IJ MediPort catheter now seen in place.    Abdomen/Pelvis: Normal physiologic FDG uptake noted within the liver, spleen, urinary tract, and bowel.Interval low anterior resection with prior hypermetabolic rectosigmoid colon mass no longer identified.  Interval resolution of hypermetabolic mass in the posterior right lobe of the liver, and correlative subtle hypoattenuating lesion no longer visible on noncontrast CT images.  No new liver lesions.  Adrenals unremarkable.  No FDG avid retroperitoneal lymph  nodes.    Skeletal: Intense diffusely increased FDG uptake throughout the visualized axial and appendicular skeleton consistent with chemotherapy related marrow hyperplasia.  No discrete intraosseous lesions.  Chronic compression deformity of several thoracic vertebrae.      Impression       1. Interval colorectal surgery with resection of prior hypermetabolic rectosigmoid mass.  2. Interval resolution of hypermetabolic liver mass.  3. No detrimental change           ?   Assessment/Plan:       1. Carcinoembryonic antigen (CEA) elevation    2. Colon adenocarcinoma    3. Abnormal findings on diagnostic imaging of other parts of digestive tract     4. Extrapyramidal and movement disorder, unspecified     5. Peripheral neuropathy due to chemotherapy    6. Imbalance    7. Thrombocytopenia          Plan:     # rectosigmoid adenocarcinoma, pT3 pN2 pMx, Stage III vs IV ?  Oligometastatic disease in liver, MSI stable:  Initiated adjuvant chemotherapy C1D1 3/23/20.  Restaging PET-CT after 4 cycles showed interval resolution of mildly avid right hepatic lobe lesion previously nondiagnostic on multiple biopsies; I discussed that interval improvement while on chemotherapy does raise concern for potential malignant involvement.  Case discussed at multidisciplinary tumor board with review of imaging and recommendation to continue adjuvant chemotherapy with plan for follow-up with surgery for consideration of resection of likely oligo metastatic liver disease.   Labs reviewed okay to proceed with her final cycle 12 FOLFOX.  Discussed plan for restaging and will discuss her case at multidisciplinary tumor board with follow-up afterwards discuss consensus recommendation.    - thrombocytopenia similar with mild improvement from last cycle and will continue with elimination of 5-FU bolus and dose reduction of 15% 5 fluorouracil CI and oxaliplatin for her last infusion. Reviewed bleeding precautions.   Repeat CEA not drawn today and  will get prior to restaging scan    - alkaline phosphatase elevation: discussed possibly related to liver lesion/malignancy vs chemo or other medication. No other LFT abnormality noted. Mild decline from Friday labs. She did have pain in back and tylenol use, cannot rule out contribution and recommended limiting tylenol use.     # peripheral neuropathy:  Likely secondary to oxaliplatin given progression throughout treatment.  Will recheck vitamin B12 level.  Moderate will continue to monitor can offer medical management if patient finds more bothersome or interested.  May be contributing to imbalance.  Will check vitamin B12 level specially in setting of macrocytic anemia.    # chronic anticoagulation with a  mechanical aortic valve:  On chronic anticoagulation due to mechanical valve.  Has had frequent subtherapeutic and supratherapeutic INR, being managed closely by Coumadin clinic.  Therapeutic 2.0 today no change made per Coumadin clinic note.    #  macrocytic anemia: IV iron for 2 doses, completed 12/04/2019.  Repeat labs with resolution of anemia and now mild worsening likely related to antineoplastic therapy and recent epistaxis.  Continue to monitor.  Will check vitamin B12 level    Follow-Up:   Final infusion of FOLFOX today dose reduced no bolus 5-FU  Repeat PET scan, labs prior including vitamin B12 and CEA   tumor board discussion,  follow-up afterwards  follow-up planned with surgery as well

## 2020-09-11 ENCOUNTER — INFUSION (OUTPATIENT)
Dept: INFUSION THERAPY | Facility: HOSPITAL | Age: 70
End: 2020-09-11
Attending: INTERNAL MEDICINE
Payer: MEDICARE

## 2020-09-11 VITALS
SYSTOLIC BLOOD PRESSURE: 105 MMHG | TEMPERATURE: 98 F | RESPIRATION RATE: 18 BRPM | DIASTOLIC BLOOD PRESSURE: 68 MMHG | HEART RATE: 71 BPM | OXYGEN SATURATION: 97 %

## 2020-09-11 DIAGNOSIS — C18.9 COLON ADENOCARCINOMA: Primary | ICD-10-CM

## 2020-09-11 PROCEDURE — 63600175 PHARM REV CODE 636 W HCPCS: Mod: JG | Performed by: INTERNAL MEDICINE

## 2020-09-11 PROCEDURE — A4216 STERILE WATER/SALINE, 10 ML: HCPCS | Performed by: INTERNAL MEDICINE

## 2020-09-11 PROCEDURE — 25000003 PHARM REV CODE 250: Performed by: INTERNAL MEDICINE

## 2020-09-11 PROCEDURE — 96377 APPLICATON ON-BODY INJECTOR: CPT

## 2020-09-11 RX ORDER — SODIUM CHLORIDE 0.9 % (FLUSH) 0.9 %
10 SYRINGE (ML) INJECTION
Status: DISCONTINUED | OUTPATIENT
Start: 2020-09-11 | End: 2020-09-11 | Stop reason: HOSPADM

## 2020-09-11 RX ORDER — HEPARIN 100 UNIT/ML
500 SYRINGE INTRAVENOUS
Status: DISCONTINUED | OUTPATIENT
Start: 2020-09-11 | End: 2020-09-11 | Stop reason: HOSPADM

## 2020-09-11 RX ADMIN — HEPARIN 500 UNITS: 100 SYRINGE at 11:09

## 2020-09-11 RX ADMIN — PEGFILGRASTIM 6 MG: KIT SUBCUTANEOUS at 11:09

## 2020-09-11 RX ADMIN — Medication 10 ML: at 11:09

## 2020-09-11 NOTE — NURSING
.Pt here for 5FU continuous pump d/c. Pump stopped and disconnected.  Existing dressing appeared clean and intact.  _R___ chestwall mediport  Flushed with 10ml NS and 5 ml heparin solution.  Needle D/C, site without redness, swelling, or drainage noted.  Dressing applied.  Patient tolerated well.

## 2020-09-17 ENCOUNTER — TELEPHONE (OUTPATIENT)
Dept: HEMATOLOGY/ONCOLOGY | Facility: CLINIC | Age: 70
End: 2020-09-17

## 2020-09-17 DIAGNOSIS — T45.1X5A PERIPHERAL NEUROPATHY DUE TO CHEMOTHERAPY: Primary | ICD-10-CM

## 2020-09-17 DIAGNOSIS — G62.0 PERIPHERAL NEUROPATHY DUE TO CHEMOTHERAPY: Primary | ICD-10-CM

## 2020-09-17 RX ORDER — GABAPENTIN 300 MG/1
300 CAPSULE ORAL 2 TIMES DAILY
Qty: 30 CAPSULE | Refills: 11 | Status: SHIPPED | OUTPATIENT
Start: 2020-09-17 | End: 2020-10-23 | Stop reason: SDUPTHER

## 2020-09-17 NOTE — TELEPHONE ENCOUNTER
Nurse returned call to pt. She requested pain meds due to pain in feet, hands, message sent to dr quintanilla

## 2020-09-17 NOTE — TELEPHONE ENCOUNTER
----- Message from Mago Byers LPN sent at 9/17/2020  4:20 PM CDT -----  Contact: self  [facility],  Pt is requesting a refill on pain meds due to pain in feet and hands  ----- Message -----  From: Erasto King  Sent: 9/17/2020   2:09 PM CDT  To: Dwight BERMUDEZ Staff    Would like to consult with nurse regarding pain and tingling in hand after recent procedure.  Adalgisa Wright would like to know if she can have something prescribed for pain.  Please contact Adalgisa Wright @ [phone].  Thanks/As

## 2020-09-17 NOTE — TELEPHONE ENCOUNTER
----- Message from Erasto King sent at 9/17/2020  2:09 PM CDT -----  Contact: self  Would like to consult with nurse regarding pain and tingling in hand after recent procedure.  Adalgisa Wright would like to know if she can have something prescribed for pain.  Please contact Adalgisa Wright @ [phone].  Thanks/As

## 2020-09-22 ENCOUNTER — TELEPHONE (OUTPATIENT)
Dept: RADIOLOGY | Facility: HOSPITAL | Age: 70
End: 2020-09-22

## 2020-09-23 ENCOUNTER — HOSPITAL ENCOUNTER (OUTPATIENT)
Dept: RADIOLOGY | Facility: HOSPITAL | Age: 70
Discharge: HOME | End: 2020-09-23
Attending: INTERNAL MEDICINE
Payer: MEDICARE

## 2020-09-23 DIAGNOSIS — R93.3 ABNORMAL FINDINGS ON DIAGNOSTIC IMAGING OF OTHER PARTS OF DIGESTIVE TRACT: ICD-10-CM

## 2020-09-23 DIAGNOSIS — C18.9 COLON ADENOCARCINOMA: ICD-10-CM

## 2020-09-23 PROCEDURE — 78815 PET IMAGE W/CT SKULL-THIGH: CPT | Mod: 26,PS,, | Performed by: RADIOLOGY

## 2020-09-23 PROCEDURE — 78815 PET IMAGE W/CT SKULL-THIGH: CPT | Mod: TC

## 2020-09-23 PROCEDURE — 78815 NM PET CT ROUTINE: ICD-10-PCS | Mod: 26,PS,, | Performed by: RADIOLOGY

## 2020-09-25 ENCOUNTER — PATIENT MESSAGE (OUTPATIENT)
Dept: HEMATOLOGY/ONCOLOGY | Facility: CLINIC | Age: 70
End: 2020-09-25

## 2020-09-25 ENCOUNTER — ANTI-COAG VISIT (OUTPATIENT)
Dept: CARDIOLOGY | Facility: CLINIC | Age: 70
End: 2020-09-25
Payer: MEDICARE

## 2020-09-25 ENCOUNTER — TELEPHONE (OUTPATIENT)
Dept: HEMATOLOGY/ONCOLOGY | Facility: CLINIC | Age: 70
End: 2020-09-25

## 2020-09-25 ENCOUNTER — TUMOR BOARD CONFERENCE (OUTPATIENT)
Dept: HEMATOLOGY/ONCOLOGY | Facility: CLINIC | Age: 70
End: 2020-09-25

## 2020-09-25 ENCOUNTER — OFFICE VISIT (OUTPATIENT)
Dept: HEMATOLOGY/ONCOLOGY | Facility: CLINIC | Age: 70
End: 2020-09-25
Payer: MEDICARE

## 2020-09-25 ENCOUNTER — LAB VISIT (OUTPATIENT)
Dept: LAB | Facility: HOSPITAL | Age: 70
End: 2020-09-25
Attending: INTERNAL MEDICINE
Payer: MEDICARE

## 2020-09-25 ENCOUNTER — DOCUMENTATION ONLY (OUTPATIENT)
Dept: HEMATOLOGY/ONCOLOGY | Facility: CLINIC | Age: 70
End: 2020-09-25

## 2020-09-25 VITALS
BODY MASS INDEX: 24.02 KG/M2 | DIASTOLIC BLOOD PRESSURE: 65 MMHG | WEIGHT: 114.44 LBS | SYSTOLIC BLOOD PRESSURE: 109 MMHG | OXYGEN SATURATION: 95 % | HEIGHT: 58 IN | TEMPERATURE: 98 F | HEART RATE: 78 BPM

## 2020-09-25 DIAGNOSIS — Z79.01 LONG TERM (CURRENT) USE OF ANTICOAGULANTS: ICD-10-CM

## 2020-09-25 DIAGNOSIS — R93.3 ABNORMAL FINDINGS ON DIAGNOSTIC IMAGING OF OTHER PARTS OF DIGESTIVE TRACT: ICD-10-CM

## 2020-09-25 DIAGNOSIS — C18.9 COLON ADENOCARCINOMA: Primary | ICD-10-CM

## 2020-09-25 DIAGNOSIS — Z95.2 AORTIC VALVE REPLACED: ICD-10-CM

## 2020-09-25 DIAGNOSIS — D53.9 MACROCYTIC ANEMIA: ICD-10-CM

## 2020-09-25 LAB
INR PPP: 1.7 (ref 0.8–1.2)
PROTHROMBIN TIME: 18.2 SEC (ref 9–12.5)

## 2020-09-25 PROCEDURE — 1159F MED LIST DOCD IN RCRD: CPT | Mod: S$GLB,,, | Performed by: INTERNAL MEDICINE

## 2020-09-25 PROCEDURE — 1125F AMNT PAIN NOTED PAIN PRSNT: CPT | Mod: S$GLB,,, | Performed by: INTERNAL MEDICINE

## 2020-09-25 PROCEDURE — 3078F PR MOST RECENT DIASTOLIC BLOOD PRESSURE < 80 MM HG: ICD-10-PCS | Mod: CPTII,S$GLB,, | Performed by: INTERNAL MEDICINE

## 2020-09-25 PROCEDURE — 99999 PR PBB SHADOW E&M-EST. PATIENT-LVL V: CPT | Mod: PBBFAC,,, | Performed by: INTERNAL MEDICINE

## 2020-09-25 PROCEDURE — 3008F PR BODY MASS INDEX (BMI) DOCUMENTED: ICD-10-PCS | Mod: CPTII,S$GLB,, | Performed by: INTERNAL MEDICINE

## 2020-09-25 PROCEDURE — 99215 OFFICE O/P EST HI 40 MIN: CPT | Mod: S$GLB,,, | Performed by: INTERNAL MEDICINE

## 2020-09-25 PROCEDURE — 1101F PT FALLS ASSESS-DOCD LE1/YR: CPT | Mod: CPTII,S$GLB,, | Performed by: INTERNAL MEDICINE

## 2020-09-25 PROCEDURE — 99999 PR PBB SHADOW E&M-EST. PATIENT-LVL V: ICD-10-PCS | Mod: PBBFAC,,, | Performed by: INTERNAL MEDICINE

## 2020-09-25 PROCEDURE — 3074F PR MOST RECENT SYSTOLIC BLOOD PRESSURE < 130 MM HG: ICD-10-PCS | Mod: CPTII,S$GLB,, | Performed by: INTERNAL MEDICINE

## 2020-09-25 PROCEDURE — 3008F BODY MASS INDEX DOCD: CPT | Mod: CPTII,S$GLB,, | Performed by: INTERNAL MEDICINE

## 2020-09-25 PROCEDURE — 3078F DIAST BP <80 MM HG: CPT | Mod: CPTII,S$GLB,, | Performed by: INTERNAL MEDICINE

## 2020-09-25 PROCEDURE — 3074F SYST BP LT 130 MM HG: CPT | Mod: CPTII,S$GLB,, | Performed by: INTERNAL MEDICINE

## 2020-09-25 PROCEDURE — 1159F PR MEDICATION LIST DOCUMENTED IN MEDICAL RECORD: ICD-10-PCS | Mod: S$GLB,,, | Performed by: INTERNAL MEDICINE

## 2020-09-25 PROCEDURE — 1101F PR PT FALLS ASSESS DOC 0-1 FALLS W/OUT INJ PAST YR: ICD-10-PCS | Mod: CPTII,S$GLB,, | Performed by: INTERNAL MEDICINE

## 2020-09-25 PROCEDURE — 1125F PR PAIN SEVERITY QUANTIFIED, PAIN PRESENT: ICD-10-PCS | Mod: S$GLB,,, | Performed by: INTERNAL MEDICINE

## 2020-09-25 PROCEDURE — 36415 COLL VENOUS BLD VENIPUNCTURE: CPT

## 2020-09-25 PROCEDURE — 85610 PROTHROMBIN TIME: CPT

## 2020-09-25 PROCEDURE — 99215 PR OFFICE/OUTPT VISIT, EST, LEVL V, 40-54 MIN: ICD-10-PCS | Mod: S$GLB,,, | Performed by: INTERNAL MEDICINE

## 2020-09-25 RX ORDER — WARFARIN SODIUM 5 MG/1
TABLET ORAL DAILY
COMMUNITY
End: 2020-10-19 | Stop reason: SDUPTHER

## 2020-09-25 NOTE — PROGRESS NOTES
Patient's INR is subtherapeutic at 1.7.  Patient reports followed previous instructions.  Patient reports she ate cabbage on 9/18/20 and mayonnaise on 9/19/20. Patient reports no other changes. Patient reports no signs/symtpoms of clotting.  Sent to PharmD for dosing/instructions.

## 2020-09-25 NOTE — TELEPHONE ENCOUNTER
----- Message from Yenny Arreaga sent at 9/25/2020  2:50 PM CDT -----  Pt would like to speak with Mamta regarding schedule for CC . Please call back at [phone]

## 2020-09-25 NOTE — PROGRESS NOTES
INR not at goal. Medications, chart, and patient findings reviewed. See calendar for adjustments to dose and follow up plan.  We will boost and rechallenge since we have a causative factor for the decline in INR.

## 2020-09-25 NOTE — PROGRESS NOTES
Subjective:      DATE OF VISIT: 9/25/2020   ?  Patient ID:?Adalgisa Wright is a 69 y.o. female.?? MR#: [id number]   ?   PRIMARY PROVIDER: Dr. Quintanilla    CHIEF COMPLAINT:  Follow-up  ?   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   CURRENT TREATMENT: FOLFOX, C1D1 3/23/20    Follow-up        Ms. Wright was previously seen by my colleague Dr. Haq last on 08/21/2019 for iron deficiency anemia.  She has a mechanical aortic valve replacement 16 years ago on therapeutic anticoagulation.  Labs notable for low haptoglobin in it is felt hemolytic process may be contributing to her anemia.  She notes having recent EKG as part of preoperative evaluation but has not had follow-up with her cardiologist recently or repeat echocardiogram.  She denies edema, shortness of breath or chest pain.    She was referred to Gastroenterology for further evaluation of iron deficiency anemia and concern for GI bleed.     11/7/19 EGD and colonoscopy revealed a nonobstructing ulcerated mass 20 cm from the anal verge. Pathology:  Moderately differentiated adenocarcinoma, MSI intact.    12/02/2019 PET-CT notable for avid sigmoid lesion SUV 15.7 as well as avid right lobe liver lesion 19 mm, SUV 4.1.  No other areas of avidity concerning for metastatic disease.    12/9/19 liver biopsy, Pathology:  Diagnosis 1.  Liver mass, biopsy:   - Not diagnostic of malignancy   - Scant atypical liver sampling, see comments     Comments: The sections show a scant fragmented sample of liver tissue with   psuedoglands in one core and mild architectural distortion. Cytologic atypiia   is not prominent. Reticulin stain shows mildly irregular pattern of staining.   No portal tracts present for evaluation. Back to back pseudoglands raises the   possibly of a well differetniated hepatocellular lesion. However, the scant   sample limits interpretation. Due to the  presence of a clinical mass.   Resampling is indicated.      02/13/2020 surgical resection of sigmoid and upper rectum  Pathology:  Adenocarcinoma, moderately differentiated, 2.5 x 2 cm, margins negative, 6 of 14 lymph nodes positive for metastatic adenocarcinoma, many tumor deposits and mesenteric tissue, pT3 pN2, MSI intact    03/23/2020 cycle 1 day 1 FOLFOX     05/20/2020 restaging PET showed interval resolution of liver lesion and no evidence of recurrence/metastatic disease    09/09/2020 completion of cycle 12 FOLFOX    9/2020 post treatment PET without evidence of metastatic disease    9/25/20 tumor board discussion, see below    INTERVAL EVENTS    Ms. Wright presents for follow-up accompanied by her  to review results of restaging PET-CT after 6 months FOLFOX.  We presented her case at multidisciplinary tumor board this morning, see accompanying note.  She continues to have some imbalance on her feet and mild forgetfulness.  No nausea or vomiting, weight loss.  No bleeding issues.  She continues to follow with INR checks with Coumadin clinic.    Review of Systems    ?   A comprehensive 14-point review of systems was reviewed with patient and was negative other than as specified above.   ?     Objective:      Physical Exam      ?   Vitals:    09/25/20 1322   BP: 109/65   Pulse: 78   Temp: 98 °F (36.7 °C)        ECOG:?0   General appearance: Generally well appearing, in no acute distress.   Head, eyes, ears, nose, and throat: moist mucous membranes.   Respiratory:  Normal work of breathing  Abdomen: nontender, nondistended.   Extremities: Warm, without edema.   Neurologic: Alert and oriented. Grossly normal strength, coordination, and gait.   Skin: No rashes, ecchymoses or petechial lesion.   Psychiatric:  Normal mood and affect.    Laboratory:  ?   Lab Visit on 09/25/2020   Component Date Value Ref Range Status    Prothrombin Time 09/25/2020 18.2* 9.0 - 12.5 sec Final    INR 09/25/2020 1.7* 0.8 - 1.2  Final     Lab Results   Component Value Date    IRON 130 09/09/2020    TIBC 407 09/09/2020    FERRITIN 2,300 (H) 06/03/2020       Tumor markers    CEA    CEA   Date Value Ref Range Status   08/10/2020 7.8 (H) 0.0 - 5.0 ng/mL Final     Comment:     CEA Normal Range:  Non-Smokers: 0-3.0 ng/mL  Smokers:     0-5.0 ng/mL     05/25/2020 4.4 0.0 - 5.0 ng/mL Final     Comment:     CEA Normal Range:  Non-Smokers: 0-3.0 ng/mL  Smokers:     0-5.0 ng/mL     11/18/2019 3.5 0.0 - 5.0 ng/mL Final     Comment:     CEA Normal Range:  Non-Smokers: 0-3.0 ng/mL  Smokers:     0-5.0 ng/mL              ? IMAGING  Results for orders placed or performed during the hospital encounter of 09/23/20 (from the past 2160 hour(s))   NM PET CT Routine FDG    Impression    No FDG PET/CT findings to suggest recurrent or metastatic disease.      Electronically signed by: DAYANNA Dubon MD  Date:    09/23/2020  Time:    09:54           ?   Assessment/Plan:       1. Colon adenocarcinoma    2. Abnormal findings on diagnostic imaging of other parts of digestive tract     3. Macrocytic anemia          Plan:     # rectosigmoid adenocarcinoma, pT3 pN2 pMx, Stage III vs IV possible oligometastatic disease in liver, MSI stable:  Initiated adjuvant chemotherapy C1D1 3/23/20.  Restaging PET-CT after 4 cycles showed interval resolution of mildly avid right hepatic lobe lesion previously nondiagnostic on multiple biopsies; I discussed that interval improvement while on chemotherapy does raise concern for potential malignant involvement.  Case discussed at multidisciplinary tumor board with review of imaging and recommendation to continue adjuvant chemotherapy with plan for follow-up with surgery for consideration of resection of likely oligo metastatic liver disease.   Completed 6 months FOLFOX on 09/09/2020.    Restaging PET negative for evidence of recurrence or metastatic disease.  We reviewed her case in multidisciplinary tumor board 09/25/2020.  We did  discuss that despite negative biopsy x2 of liver abnormality on initial MRI/PET mild avidity response with chemotherapy is concerning for possible metastatic involvement.  We did discuss consideration local treatment with either surgical resection or Interventional Radiology ablation.  Patient has been are not interested in further surgery at this point but may be amenable to interventional radiology techniques.  We will arrange for consultation with Interventional Radiology as well as MRI liver.  Repeat CEA today pending.  I discussed plan for surveillance with history and physical exam every 3 months and labs at that point including CEA, CBC and CMP.  Follow-up with surgery and plan for repeat colonoscopy likely 1 year after surgery and imaging Q 6 months.     # peripheral neuropathy:  Likely secondary to oxaliplatin given progression throughout treatment.  Will recheck vitamin B12 level.  Moderate will continue to monitor can offer medical management if patient finds more bothersome or interested.  May be contributing to imbalance.  Will check vitamin B12, MMA and homocystine level specially in setting of macrocytic anemia.    # chronic anticoagulation with mechanical aortic valve:  On chronic anticoagulation due to mechanical valve.  Has had frequent subtherapeutic and supratherapeutic INR, being managed closely by Coumadin clinic.  Therapeutic 2.0 today no change made per Coumadin clinic note.    #  macrocytic anemia: IV iron for 2 doses, completed 12/04/2019.  Repeat labs with resolution of anemia and now mild worsening likely related to antineoplastic therapy and recent epistaxis.  Continue to monitor.  Will check vitamin B12 level, I made homocystine levels.    Follow-Up:   Patient Instructions   Labs today: vit B12, MMA, homocysteine, CBC, CMP, CEA  MRI liver, please call her with appointment when scheduled  Referral to IR for consideration of RFA liver  RV in 3 months with labs a couple days prior, CBC,  CMP, CEA

## 2020-09-25 NOTE — PATIENT INSTRUCTIONS
Labs today: vit B12, MMA, homocysteine, CBC, CMP, CEA  MRI liver, please call her with appointment when scheduled  Referral to IR for consideration of RFA liver  RV in 3 months with labs a couple days prior, CBC, CMP, CEA

## 2020-09-25 NOTE — PROGRESS NOTES
Tumor Board Documentation      Adalgisa Wright was presented by Danika Quintanilla MD at our Tumor Board on 9/25/2020, which included representatives from Medical Oncology, Hematology, Radiation Oncology, Surgical Oncology, Pathology, Navigation, Research, Plastic Surgery, Radiology, Gastrointestinal.    Adalgisa currently presents as a current patient with (P) Colon cancer, with history of the following treatments: Adjuvant Chemotherapy.    Additionally, we reviewed previous medical and familial history, history of present illness, and recent lab results along with all available histopathologic and imaging studies. The tumor board considered available treatment options and made the following recommendations:  (P) Imaging, Radiation, Surgery, Additional observation    Obtain MRI liver/Evaluation per IR/Surgical Oncology/ Review CEA/ Consider local treatment followed by surveillance       The following procedures/referrals were also placed: No orders of the defined types were placed in this encounter.      Clinical Trial Status: (P) Not discussed     National site-specific guidelines were discussed with respect to the case.    Tumor board is a meeting of clinicians from various specialty areas who evaluate and discuss patients for whom a multidisciplinary approach is being considered. Final determinations in the plan of care are those of the provider(s). The responsibility for follow up of recommendations given during tumor board is that of the provider.     Danika Quintanilla MD

## 2020-09-28 ENCOUNTER — TELEPHONE (OUTPATIENT)
Dept: RADIOLOGY | Facility: HOSPITAL | Age: 70
End: 2020-09-28

## 2020-09-28 NOTE — PROGRESS NOTES
Interventional Radiology:  Patient came in for consult with Dr. Moraes.  Viewed scans and discussed Liver ablation (with contrast).  Patient is scheduled for MRI tomorrow and has an appt with Dr. Smith on 10/5.  A definite decision will be made after appt with Dr. Smith.

## 2020-09-29 ENCOUNTER — HOSPITAL ENCOUNTER (OUTPATIENT)
Dept: RADIOLOGY | Facility: HOSPITAL | Age: 70
Discharge: HOME | End: 2020-09-29
Attending: INTERNAL MEDICINE
Payer: MEDICARE

## 2020-09-29 DIAGNOSIS — C18.9 COLON ADENOCARCINOMA: ICD-10-CM

## 2020-09-29 DIAGNOSIS — R93.3 ABNORMAL FINDINGS ON DIAGNOSTIC IMAGING OF OTHER PARTS OF DIGESTIVE TRACT: ICD-10-CM

## 2020-09-29 PROCEDURE — 74183 MRI ABDOMEN W WO CONTRAST: ICD-10-PCS | Mod: 26,,, | Performed by: RADIOLOGY

## 2020-09-29 PROCEDURE — 74183 MRI ABD W/O CNTR FLWD CNTR: CPT | Mod: TC,PO

## 2020-09-29 PROCEDURE — 25500020 PHARM REV CODE 255: Mod: PO | Performed by: INTERNAL MEDICINE

## 2020-09-29 PROCEDURE — 74183 MRI ABD W/O CNTR FLWD CNTR: CPT | Mod: 26,,, | Performed by: RADIOLOGY

## 2020-09-29 PROCEDURE — A9585 GADOBUTROL INJECTION: HCPCS | Mod: PO | Performed by: INTERNAL MEDICINE

## 2020-09-29 RX ORDER — GADOBUTROL 604.72 MG/ML
5 INJECTION INTRAVENOUS
Status: COMPLETED | OUTPATIENT
Start: 2020-09-29 | End: 2020-09-29

## 2020-09-29 RX ADMIN — GADOBUTROL 5 ML: 604.72 INJECTION INTRAVENOUS at 09:09

## 2020-10-02 ENCOUNTER — OFFICE VISIT (OUTPATIENT)
Dept: SURGERY | Facility: CLINIC | Age: 70
End: 2020-10-02
Payer: MEDICARE

## 2020-10-02 DIAGNOSIS — C19 ADENOCARCINOMA OF RECTOSIGMOID JUNCTION: Primary | ICD-10-CM

## 2020-10-02 DIAGNOSIS — K76.9 LIVER LESION: ICD-10-CM

## 2020-10-02 PROCEDURE — 1101F PR PT FALLS ASSESS DOC 0-1 FALLS W/OUT INJ PAST YR: ICD-10-PCS | Mod: CPTII,,, | Performed by: COLON & RECTAL SURGERY

## 2020-10-02 PROCEDURE — 99214 PR OFFICE/OUTPT VISIT, EST, LEVL IV, 30-39 MIN: ICD-10-PCS | Mod: ,,, | Performed by: COLON & RECTAL SURGERY

## 2020-10-02 PROCEDURE — 1101F PT FALLS ASSESS-DOCD LE1/YR: CPT | Mod: CPTII,,, | Performed by: COLON & RECTAL SURGERY

## 2020-10-02 PROCEDURE — 99214 OFFICE O/P EST MOD 30 MIN: CPT | Mod: ,,, | Performed by: COLON & RECTAL SURGERY

## 2020-10-02 PROCEDURE — 1159F PR MEDICATION LIST DOCUMENTED IN MEDICAL RECORD: ICD-10-PCS | Mod: ,,, | Performed by: COLON & RECTAL SURGERY

## 2020-10-02 PROCEDURE — 1159F MED LIST DOCD IN RCRD: CPT | Mod: ,,, | Performed by: COLON & RECTAL SURGERY

## 2020-10-02 PROCEDURE — 99212 OFFICE O/P EST SF 10 MIN: CPT | Performed by: COLON & RECTAL SURGERY

## 2020-10-02 NOTE — PROGRESS NOTES
History & Physical    SUBJECTIVE:     CC: rectosigmoid cancer  Ref MD: Brenda Ochoa MD    History of Present Illness:  Patient is a 69 y.o. 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  Patient was undergoing a colonoscopy on November 7, 2019 where a rectosigmoid mass was found and biopsied which showed moderately differentiated adenocarcinoma.  Patient had no other intramucosal lesions at that time.  She is undergoing colonoscopy for iron deficiency anemia.  She had previously been found the month before to have iron deficiency anemia as well as blood when wiping after a bowel movement.  She is on chronic anticoagulation therapy of Coumadin for mechanical heart valve which was done in 2003.  She denies any fever, chills, nausea, vomiting, diarrhea, melena, weight loss or any other signs or symptoms.  She has no personal family history of colorectal cancer or IBD.  Her last colonoscopy prior to this was in 2017 were no lesion was found in this area. Her only significant past surgical history is this mechanical valve replacement.  No episodes of fecal incontinence per her report and some chronic constipation noted. She is referred to Colorectal surgery for evaluation.  I have personally spoken with her referring provider and reviewed her previous records.    2/13/2020: Robotic LAR with CR anastomosis    Interval history:  Since last clinic visit, the patient completed adjuvant chemotherapy in September 2020.  She had a repeat PET scan that did not show any evidence of metastatic disease.  However she has had a recent rise in her CEA level including last week which showed a level of 8.7, which was up from 4.4 4 months ago.  She underwent MRI of the liver which showed may continue lesion all this was indeterminate.  She is pending evaluation by heme Onc and IR for liver ablation. She continues to tolerate a regular diet and have normal bowel function. She denies any current fever, chills, nausea,  vomiting.      Review of patient's allergies indicates:   Allergen Reactions    Penicillins Rash       Current Outpatient Medications   Medication Sig Dispense Refill    acebutolol (SECTRAL) 400 mg capsule Take 400 mg by mouth 2 (two) times daily.      acetaminophen (TYLENOL) 325 MG tablet Take 2 tablets (650 mg total) by mouth every 6 (six) hours.  0    ALPRAZolam (XANAX) 1 MG tablet Take 1 mg by mouth 2 (two) times daily as needed.      butalbital-acetaminophen-caffeine -40 mg (FIORICET, ESGIC) -40 mg per tablet TAKE ONE TABLET BY MOUTH EVERY SIX HOURS AS NEEDED      dexAMETHasone (DECADRON) 4 MG Tab TAKE 2 TABLETS BY MOUTH ON DAY 2 AND 3 FOLLOWING EACH CYCLE OF CHEMO 24 tablet 0    diphenoxylate-atropine 2.5-0.025 mg (LOMOTIL) 2.5-0.025 mg per tablet Take 1 tablet by mouth 4 (four) times daily as needed for Diarrhea. 30 tablet 1    ergocalciferol (ERGOCALCIFEROL) 50,000 unit Cap Take 1 capsule by mouth every 30 days.      furosemide (LASIX) 40 MG tablet Take 40 mg by mouth Daily.      gabapentin (NEURONTIN) 300 MG capsule Take 1 capsule (300 mg total) by mouth 2 (two) times daily. 30 capsule 11    LINZESS 145 mcg Cap capsule TAKE 1 CAPSULE (145 MCG TOTAL) BY MOUTH ONCE DAILY. (Patient taking differently: daily as needed. ) 30 capsule 5    lovastatin (MEVACOR) 40 MG tablet Take 40 mg by mouth every evening.      multivitamin (THERAGRAN) tablet Take 1 tablet by mouth.      nozaseptin (NOZIN) nasal  1 each by Each Nostril route 2 (two) times daily. 1 applicator 0    ondansetron (ZOFRAN) 8 MG tablet Take 1 tablet (8 mg total) by mouth every 12 (twelve) hours as needed for Nausea. 30 tablet 2    oxyCODONE (ROXICODONE) 5 MG immediate release tablet Take 1 tablet (5 mg total) by mouth every 4 (four) hours as needed. 20 tablet 0    pantoprazole (PROTONIX) 20 MG tablet Take 1 tablet (20 mg total) by mouth once daily. 30 tablet 11    potassium chloride SA (K-DUR,KLOR-CON) 20 MEQ  tablet Take 3 tablets (60 mEq total) by mouth once daily. 90 tablet 11    prochlorperazine (COMPAZINE) 5 MG tablet Take 1 tablet (5 mg total) by mouth every 6 (six) hours as needed for Nausea. 30 tablet 1    sertraline (ZOLOFT) 100 MG tablet Take 1 tablet by mouth Daily.      traMADol (ULTRAM) 50 mg tablet 50 mg every 8 (eight) hours as needed.       warfarin (COUMADIN) 2.5 MG tablet 1.25mg PO Tues/Thurs/Sat and 2.5mg PO all other days -- OR AS DIRECTED BY COUMADIN CLINIC (Patient taking differently: Take 1 tablet every day except on Mondays and Fridays. -- OR AS DIRECTED BY COUMADIN CLINIC) 30 tablet 5    warfarin (COUMADIN) 5 MG tablet Take by mouth once daily. Take one tablet by mouth on Mondays and Fridays. AS DIRECTED BY COUMADIN CLINIC.       zolpidem (AMBIEN) 10 mg Tab Take 5 mg by mouth nightly as needed.        Current Facility-Administered Medications   Medication Dose Route Frequency Provider Last Rate Last Dose    lidocaine (PF) 10 mg/ml (1%) injection 10 mg  1 mL Intradermal Once Ankur Smith MD           Past Medical History:   Diagnosis Date    Anticoagulant long-term use     Aortic valve defect     Benign neoplasm of ascending colon 4/6/2017    Cancer     CHF (congestive heart failure)     DDD (degenerative disc disease), cervical 7/20/2018    GERD (gastroesophageal reflux disease)     Hemorrhoids     HLD (hyperlipidemia)     Hypertension     Insomnia 12/1/2014    Irritable bowel syndrome with constipation 4/9/2018    Postmenopausal osteoporosis 7/20/2018     Past Surgical History:   Procedure Laterality Date    AORTIC VALVE REPLACEMENT  2003    COLONOSCOPY Left 4/6/2017    Procedure: COLONOSCOPY;  Surgeon: Sander Ulloa MD;  Location: [facility];  Service: Endoscopy;  Laterality: Left;    COLONOSCOPY N/A 11/7/2019    Procedure: COLONOSCOPY;  Surgeon: Brenda Ochoa MD;  Location: [facility];  Service: Endoscopy;  Laterality: N/A;    ESOPHAGOGASTRODUODENOSCOPY N/A  11/7/2019    Procedure: ESOPHAGOGASTRODUODENOSCOPY (EGD) needs PT/INR;  Surgeon: Brenda Ochoa MD;  Location: [facility];  Service: Endoscopy;  Laterality: N/A;    FLEXIBLE SIGMOIDOSCOPY N/A 2/13/2020    Procedure: SIGMOIDOSCOPY, FLEXIBLE;  Surgeon: Ankur Smith MD;  Location: [facility] OR;  Service: General;  Laterality: N/A;    INJECTION OF ANESTHETIC AGENT INTO TISSUE PLANE DEFINED BY TRANSVERSUS ABDOMINIS MUSCLE N/A 2/13/2020    Procedure: BLOCK, TRANSVERSUS ABDOMINIS PLANE;  Surgeon: Ankur Smith MD;  Location: [facility] OR;  Service: General;  Laterality: N/A;    INSERTION OF TUNNELED CENTRAL VENOUS CATHETER (CVC) WITH SUBCUTANEOUS PORT N/A 3/17/2020    Procedure: [id number];  Surgeon: Ankur Smith MD;  Location: [facility] OR;  Service: General;  Laterality: N/A;    ROBOT-ASSISTED LOW ANTERIOR RESECTION OF COLON N/A 2/13/2020    Procedure: XI ROBOTIC RESECTION, COLON, LOW ANTERIOR;  Surgeon: Ankur Smith MD;  Location: [facility] OR;  Service: General;  Laterality: N/A;     Family History   Problem Relation Age of Onset    Diabetes Mother     Heart disease Mother     Hypertension Mother     Heart disease Father     Hypertension Father     Hypertension Sister     Hypertension Son     Heart disease Son     Colon cancer Neg Hx      Social History     Tobacco Use    Smoking status: Never Smoker    Smokeless tobacco: Never Used   Substance Use Topics    Alcohol use: Not Currently    Drug use: No        Review of Systems:  Review of Systems   Constitutional: Negative for activity change, appetite change, chills, fatigue, fever and unexpected weight change.   HENT: Negative for congestion, ear pain, sore throat and trouble swallowing.    Eyes: Negative for pain, redness and itching.   Respiratory: Negative for cough, shortness of breath and wheezing.    Cardiovascular: Negative for chest pain, palpitations and leg swelling.   Gastrointestinal: Negative for abdominal distention,  abdominal pain, anal bleeding, blood in stool, constipation, diarrhea, nausea, rectal pain and vomiting.   Endocrine: Negative for cold intolerance, heat intolerance and polyuria.   Genitourinary: Negative for dysuria, flank pain, frequency and hematuria.   Musculoskeletal: Negative for gait problem, joint swelling and neck pain.   Skin: Negative for color change, rash and wound.   Allergic/Immunologic: Negative for environmental allergies and immunocompromised state.   Neurological: Negative for dizziness, speech difficulty, weakness and numbness.   Psychiatric/Behavioral: Negative for agitation, confusion and hallucinations.       OBJECTIVE:       Physical Exam:  Physical Exam  Constitutional:       Appearance: She is well-developed.   HENT:      Head: Normocephalic and atraumatic.   Eyes:      Conjunctiva/sclera: Conjunctivae normal.   Neck:      Musculoskeletal: Normal range of motion.      Thyroid: No thyromegaly.   Cardiovascular:      Rate and Rhythm: Normal rate and regular rhythm.   Pulmonary:      Effort: Pulmonary effort is normal. No respiratory distress.   Abdominal:      General: There is no distension.      Palpations: Abdomen is soft. There is no mass.      Tenderness: There is no abdominal tenderness.      Comments: Incisions well-healed; no erythema or drainage   Musculoskeletal: Normal range of motion.         General: No tenderness.   Skin:     General: Skin is warm and dry.      Capillary Refill: Capillary refill takes less than 2 seconds.      Findings: No rash.   Neurological:      Mental Status: She is alert and oriented to person, place, and time.       Laboratory  Lab Results   Component Value Date    WBC 11.13 09/25/2020    HGB 10.3 (L) 09/25/2020    HCT 33.7 (L) 09/25/2020     (L) 09/25/2020    ALT 21 09/25/2020    AST 31 09/25/2020     09/25/2020    K 4.5 09/25/2020     09/25/2020    CREATININE 1.1 09/25/2020    BUN 20 09/25/2020    CO2 27 09/25/2020    INR 1.7 (H)  09/25/2020     CEA: 8.7 (Sept 2020) <--4.4 (May 2020) <-- 3.5 (Nov 2019)    Diagnostic Results:  Reviewed colonoscopy report images well as pathology showing rectosigmoid mass consistent with adenocarcinoma    MRI Liver Sept 2020:  FINDINGS:  The previously described lesion within the posterior segment of the right hepatic lobe segment 7 is not well seen on the current exam and only vaguely visible on the 5 and 10 minutes delayed images and measures may be at most 15 mm by approximately 14 mm.  There are at least 4-5 tiny nonenhancing cysts primarily within the posterior segment of the right hepatic lobe with a single cyst noted within the lateral segment of the left hepatic lobe.  No new liver lesions are identified.  The IVC, hepatic veins and portal vein are normally opacified.  The spleen is unremarkable.  The adrenals are unremarkable in appearance.  The kidneys demonstrate no focal abnormality.     Impression:     1. Previously described vague hypodense lesion not as well seen on the current study and may even be smaller.  Again, the lesion is indeterminate.  Please note that part of the difference in appearance may be related to the fact that Gadavist was used on the current exam where as the prior study utilized Eovist which has different enhancement characteristics.  No new liver lesions are identified.        PET Sept 2020:  FINDINGS:  Head/neck: There is normal physiologic FDG uptake noted within the visualized brain parenchyma. No FDG avid lymphadenopathy within the neck.     Chest: No FDG avid pulmonary nodules/masses. No FDG avid mediastinal, hilar or axillary lymph nodes.Physiologic FDG uptake in the myocardium.     Abdomen/Pelvis: Normal physiologic FDG uptake noted within the liver, spleen, urinary tract, and bowel.No FDG avid retroperitoneal lymph nodes.  Anastomotic surgical staples at the rectosigmoid junction.  No FDG avid residual or recurrent bowel mass.     Skeletal: No FDG avid osseus  lesions identified.  Persistent diffuse FDG uptake throughout the visualized skeletal structures consistent with chemotherapy related marrow hyperplasia.     Impression:     No FDG PET/CT findings to suggest recurrent or metastatic disease.      ASSESSMENT/PLAN:     69-year-old female with adenocarcinoma of the rectosigmoid junction with liver lesion with negative biopsy x2 and negative MRI liver for definitive metastatic disease who is now s/p robotic LAR on 2/13/2020 with final path showing Stage III (T3N2) rectal adenocarcinoma who has now received adjuvant chemotherapy    - agree with evaluation by IR for liver ablation especially given rise in CEA level with no other lesion found  - needs routine surveillance from a colorectal surgery standpoint with physical exam, CEA level and repeat colonoscopy in 1 year  - RTC 3 months for CEA level and scheduling of colonoscopy    Ankur Smith MD  Colon and Rectal Surgery  [facility]

## 2020-10-02 NOTE — Clinical Note
Saw her today. She states someone talked to her about Liver ablation but hasn't had it scheduled and is waiting to know next step. Given CEA at 8.7 last week (up from 4.4 four months ago), definitively in favor of ablation at this point.

## 2020-10-05 ENCOUNTER — ANTI-COAG VISIT (OUTPATIENT)
Dept: CARDIOLOGY | Facility: CLINIC | Age: 70
End: 2020-10-05
Payer: MEDICARE

## 2020-10-05 DIAGNOSIS — Z79.01 LONG TERM (CURRENT) USE OF ANTICOAGULANTS: Primary | ICD-10-CM

## 2020-10-05 DIAGNOSIS — Z95.2 AORTIC VALVE REPLACED: ICD-10-CM

## 2020-10-05 LAB — INR PPP: 2.3 (ref 2–3)

## 2020-10-05 PROCEDURE — 85610 POCT INR: ICD-10-PCS | Mod: QW,S$GLB,, | Performed by: INTERNAL MEDICINE

## 2020-10-05 PROCEDURE — 93793 PR ANTICOAGULANT MGMT FOR PT TAKING WARFARIN: ICD-10-PCS | Mod: S$GLB,,,

## 2020-10-05 PROCEDURE — 85610 PROTHROMBIN TIME: CPT | Mod: QW,S$GLB,, | Performed by: INTERNAL MEDICINE

## 2020-10-05 PROCEDURE — 93793 ANTICOAG MGMT PT WARFARIN: CPT | Mod: S$GLB,,,

## 2020-10-05 RX ORDER — PANTOPRAZOLE SODIUM 20 MG/1
20 TABLET, DELAYED RELEASE ORAL DAILY
Qty: 30 TABLET | Refills: 0 | Status: SHIPPED | OUTPATIENT
Start: 2020-10-05 | End: 2022-01-27

## 2020-10-05 NOTE — PROGRESS NOTES
Patient's INR is therapeutic at 2.3.  Patient followed previous instructions. Patient reports no changes. Instructions given: Continue warfarin 5 mg on Mondays and Fridays; and 2.5 mg all other days. Recheck in two weeks. Patient verbalizes understanding.

## 2020-10-06 ENCOUNTER — TELEPHONE (OUTPATIENT)
Dept: HEMATOLOGY/ONCOLOGY | Facility: CLINIC | Age: 70
End: 2020-10-06

## 2020-10-06 ENCOUNTER — PATIENT MESSAGE (OUTPATIENT)
Dept: GASTROENTEROLOGY | Facility: CLINIC | Age: 70
End: 2020-10-06

## 2020-10-06 NOTE — TELEPHONE ENCOUNTER
----- Message from Ana David sent at 10/6/2020 11:06 AM CDT -----  ..Type:  Patient Returning Call    Who Called:pt   Who Left Message for Patient: mohini   Does the patient know what this is regarding?:  unknown   Would the patient rather a call back or a response via [facility]?  Call back   Best Call Back Number: [phone]  Additional Information:  Pt is returing a call to nurse

## 2020-10-19 ENCOUNTER — ANTI-COAG VISIT (OUTPATIENT)
Dept: CARDIOLOGY | Facility: CLINIC | Age: 70
End: 2020-10-19
Payer: MEDICARE

## 2020-10-19 DIAGNOSIS — Z95.2 AORTIC VALVE REPLACED: ICD-10-CM

## 2020-10-19 DIAGNOSIS — Z79.01 LONG TERM (CURRENT) USE OF ANTICOAGULANTS: Primary | ICD-10-CM

## 2020-10-19 LAB — INR PPP: 1.5 (ref 2–3)

## 2020-10-19 PROCEDURE — 93793 ANTICOAG MGMT PT WARFARIN: CPT | Mod: S$GLB,,,

## 2020-10-19 PROCEDURE — 93793 PR ANTICOAGULANT MGMT FOR PT TAKING WARFARIN: ICD-10-PCS | Mod: S$GLB,,,

## 2020-10-19 PROCEDURE — 85610 POCT INR: ICD-10-PCS | Mod: QW,S$GLB,, | Performed by: INTERNAL MEDICINE

## 2020-10-19 PROCEDURE — 85610 PROTHROMBIN TIME: CPT | Mod: QW,S$GLB,, | Performed by: INTERNAL MEDICINE

## 2020-10-19 RX ORDER — WARFARIN SODIUM 5 MG/1
5 TABLET ORAL DAILY
Qty: 30 TABLET | Refills: 3 | Status: SHIPPED | OUTPATIENT
Start: 2020-10-19

## 2020-10-19 NOTE — PROGRESS NOTES
INR not at goal. Medications, chart, and patient findings reviewed. See calendar for adjustments to dose and follow up plan.  Boost today and rechallenge as she was therapeutic last time with this dose.  Consider overall increase next INR.

## 2020-10-19 NOTE — PROGRESS NOTES
Patient's INR is subtherapeutic at 1.5. Patient reports followed previous instructions. Patient reports no changes. Patient reports no signs/symptoms of clotting. Instructions given:  Will give boosted dose of warfarin 7.5 mg today 10/19/20; then re-challenge warfarin 7.5 mg on Mondays and Fridays; and 5 mg all other days. Recheck in two weeks. Patient verbalizes understanding.

## 2020-10-21 ENCOUNTER — TELEPHONE (OUTPATIENT)
Dept: HEMATOLOGY/ONCOLOGY | Facility: CLINIC | Age: 70
End: 2020-10-21

## 2020-10-21 NOTE — TELEPHONE ENCOUNTER
Nurse returned call to pt, she stated she is having a lot of problem with her hands and feet. They always feel cold, the medication is not helping. This message was sent to dr quintanilla.

## 2020-10-21 NOTE — TELEPHONE ENCOUNTER
----- Message from Tania Lowry sent at 10/21/2020 10:47 AM CDT -----  Contact: pt  Type:  Needs Medical Advice    Who Called:  pt  Symptoms (please be specific): hand / feet pain  How long has patient had these symptoms:  n/a  Pharmacy name and phone #:  n/a  Would the patient rather a call back or a response via [facility]? Call back  Best Call Back Number: [phone]  Additional Information: n/a

## 2020-10-23 ENCOUNTER — PATIENT MESSAGE (OUTPATIENT)
Dept: HEMATOLOGY/ONCOLOGY | Facility: CLINIC | Age: 70
End: 2020-10-23

## 2020-10-23 DIAGNOSIS — G62.0 PERIPHERAL NEUROPATHY DUE TO CHEMOTHERAPY: ICD-10-CM

## 2020-10-23 DIAGNOSIS — T45.1X5A PERIPHERAL NEUROPATHY DUE TO CHEMOTHERAPY: ICD-10-CM

## 2020-10-23 RX ORDER — GABAPENTIN 300 MG/1
300 CAPSULE ORAL 3 TIMES DAILY
Qty: 45 CAPSULE | Refills: 11 | Status: ON HOLD | OUTPATIENT
Start: 2020-10-23 | End: 2021-02-26

## 2020-10-23 RX ORDER — GABAPENTIN 300 MG/1
300 CAPSULE ORAL 2 TIMES DAILY
Qty: 30 CAPSULE | Refills: 11 | Status: SHIPPED | OUTPATIENT
Start: 2020-10-23 | End: 2020-10-23 | Stop reason: SDUPTHER

## 2020-10-27 DIAGNOSIS — K76.9 LIVER LESION: Primary | ICD-10-CM

## 2020-10-29 ENCOUNTER — TELEPHONE (OUTPATIENT)
Dept: RADIOLOGY | Facility: HOSPITAL | Age: 70
End: 2020-10-29

## 2020-10-29 DIAGNOSIS — K76.9 LIVER LESION: Primary | ICD-10-CM

## 2020-10-29 DIAGNOSIS — Z01.818 PRE-OP TESTING: ICD-10-CM

## 2020-10-29 DIAGNOSIS — R16.0 LIVER MASS: ICD-10-CM

## 2020-10-30 ENCOUNTER — TELEPHONE (OUTPATIENT)
Dept: RADIOLOGY | Facility: HOSPITAL | Age: 70
End: 2020-10-30

## 2020-10-30 NOTE — TELEPHONE ENCOUNTER
Called patient and left her a message that she will need to be off Coumadin for 5 days prior to procedure - Dr. Quintanilla aware

## 2020-11-02 ENCOUNTER — PATIENT MESSAGE (OUTPATIENT)
Dept: HEMATOLOGY/ONCOLOGY | Facility: CLINIC | Age: 70
End: 2020-11-02

## 2020-11-02 ENCOUNTER — TELEPHONE (OUTPATIENT)
Dept: HEMATOLOGY/ONCOLOGY | Facility: CLINIC | Age: 70
End: 2020-11-02

## 2020-11-02 ENCOUNTER — ANTI-COAG VISIT (OUTPATIENT)
Dept: CARDIOLOGY | Facility: CLINIC | Age: 70
End: 2020-11-02
Payer: MEDICARE

## 2020-11-02 DIAGNOSIS — Z79.01 LONG TERM (CURRENT) USE OF ANTICOAGULANTS: Primary | ICD-10-CM

## 2020-11-02 DIAGNOSIS — Z95.2 AORTIC VALVE REPLACED: ICD-10-CM

## 2020-11-02 LAB — INR PPP: 1.2 (ref 2–3)

## 2020-11-02 PROCEDURE — 93793 PR ANTICOAGULANT MGMT FOR PT TAKING WARFARIN: ICD-10-PCS | Mod: S$GLB,,,

## 2020-11-02 PROCEDURE — 85610 PROTHROMBIN TIME: CPT | Mod: QW,S$GLB,, | Performed by: INTERNAL MEDICINE

## 2020-11-02 PROCEDURE — 93793 ANTICOAG MGMT PT WARFARIN: CPT | Mod: S$GLB,,,

## 2020-11-02 PROCEDURE — 85610 POCT INR: ICD-10-PCS | Mod: QW,S$GLB,, | Performed by: INTERNAL MEDICINE

## 2020-11-02 NOTE — TELEPHONE ENCOUNTER
----- Message from Vera lEi sent at 11/2/2020  2:27 PM CST -----  Contact: Adalgisa  Pt called in regarding speaking to the nurse about her Coumadin levels and about her waiting until after her procedure for her next apt. Please give her a call back at [phone].    Thanks,  Pb

## 2020-11-02 NOTE — PROGRESS NOTES
Patient's INR is subtherapeutic at 1.2--tending downward. Patient reports followed previous instructions.  Patient reports no signs/symptoms of clotting. Patient reports she is scheduled for a liver microwave ablation on 11/12/20 with Dr. Gonzalez Moraes. Sent to PharmD for dosing/instrucitons.

## 2020-11-02 NOTE — TELEPHONE ENCOUNTER
Returned pt's call back.. pt called w concerns of her coumadin being placed on hold for 5 days prior to her CT scan.. called radiology to make sure, it's correct. 5 days prior to procedure.. informed pt. Patient asked that Dr. Quintanilla is informed and that she's ok w that. Sent to to advice for clearance to patient.

## 2020-11-02 NOTE — PROGRESS NOTES
INR not at goal. Medications, chart, and patient findings reviewed. See calendar for adjustments to dose and follow up plan.  Patient with very erratic INRs.  She has had bleeding issues in the past and was seen in the ED in May for epistaxis, given Vit K.  Complicated by upcoming liver procedure as there is concern for metastatic disease to her liver.  Platelet count recently <100K, history of falls, concerned for lovenox use.  We will boost today as her INR is continuing to drop.  Dr Quintanilla messaged for bridging concerns.  Has upcoming procedure calling for warfarin washout as well(at end of week).  Hold orders on calendar, boost post procedure and resume per calendar.

## 2020-11-04 NOTE — PROGRESS NOTES
Dr Kemp, cardiologist (outside MD) was contacted regarding low INR/bridging concerns for upcoming procedure.  Recent labs and INRs discussed with CASSIE Macdonald.  She reports that Dr Kemp will bridge pt prior to procedure and after.  She will call Mrs Wright and review those directions.  Copy also sent to coumadin clinic.  We will see her again post procedure on 11/16 as scheduled.

## 2020-11-06 ENCOUNTER — INFUSION (OUTPATIENT)
Dept: INFUSION THERAPY | Facility: HOSPITAL | Age: 70
End: 2020-11-06
Attending: INTERNAL MEDICINE
Payer: MEDICARE

## 2020-11-06 DIAGNOSIS — D12.2 BENIGN NEOPLASM OF ASCENDING COLON: Primary | ICD-10-CM

## 2020-11-06 PROCEDURE — 96523 IRRIG DRUG DELIVERY DEVICE: CPT

## 2020-11-06 PROCEDURE — 25000003 PHARM REV CODE 250: Performed by: INTERNAL MEDICINE

## 2020-11-06 PROCEDURE — 63600175 PHARM REV CODE 636 W HCPCS: Performed by: INTERNAL MEDICINE

## 2020-11-06 PROCEDURE — A4216 STERILE WATER/SALINE, 10 ML: HCPCS | Performed by: INTERNAL MEDICINE

## 2020-11-06 RX ORDER — SODIUM CHLORIDE 0.9 % (FLUSH) 0.9 %
10 SYRINGE (ML) INJECTION
Status: DISCONTINUED | OUTPATIENT
Start: 2020-11-06 | End: 2020-11-06 | Stop reason: HOSPADM

## 2020-11-06 RX ORDER — HEPARIN 100 UNIT/ML
500 SYRINGE INTRAVENOUS
Status: CANCELLED | OUTPATIENT
Start: 2020-11-06

## 2020-11-06 RX ORDER — SODIUM CHLORIDE 0.9 % (FLUSH) 0.9 %
10 SYRINGE (ML) INJECTION
Status: CANCELLED | OUTPATIENT
Start: 2020-11-06

## 2020-11-06 RX ORDER — HEPARIN 100 UNIT/ML
500 SYRINGE INTRAVENOUS
Status: DISCONTINUED | OUTPATIENT
Start: 2020-11-06 | End: 2020-11-06 | Stop reason: HOSPADM

## 2020-11-06 RX ADMIN — HEPARIN 500 UNITS: 100 SYRINGE at 10:11

## 2020-11-06 RX ADMIN — SODIUM CHLORIDE, PRESERVATIVE FREE 10 ML: 5 INJECTION INTRAVENOUS at 10:11

## 2020-11-06 NOTE — DISCHARGE INSTRUCTIONS
[facility]  [address] [facility]  [address]  [phone] phone     [phone] fax  Hours of Operation: Monday- Friday 8:00am- 5:00pm  After hours phone  [phone]  Hematology / Oncology Physicians on call      CATRACHITA Gonzalez Dr., Dr., Dr., Dr., NP Sydney Prescott, NP Tyesha Taylor, NP    Please call with any concerns regarding your appointment today.

## 2020-11-06 NOTE — NURSING
"Pt here for Mediport Flush. Right chestwall mediport accessed with a 20g 1" soto via sterile technique. Excellent blood return noted. Flushed with 10ml NS and 5 ml heparin solution. Needle D/C, site without redness, swelling, or drainage noted. Dressing applied. Patient tolerated well.     "

## 2020-11-11 ENCOUNTER — TELEPHONE (OUTPATIENT)
Dept: RADIOLOGY | Facility: HOSPITAL | Age: 70
End: 2020-11-11

## 2020-11-11 NOTE — TELEPHONE ENCOUNTER
Interventional [facility]:    Pre procedure call complete, please arrive at hospital at 930, npo p mn x sips of water with medications with small sips of water.  Please have ride.  All questions answered, pt verbalized understanding of instructions.

## 2020-11-12 ENCOUNTER — ANESTHESIA (OUTPATIENT)
Dept: ANESTHESIOLOGY | Facility: HOSPITAL | Age: 70
End: 2020-11-12
Payer: MEDICARE

## 2020-11-12 ENCOUNTER — HOSPITAL ENCOUNTER (OUTPATIENT)
Facility: HOSPITAL | Age: 70
Discharge: HOME | End: 2020-11-12
Attending: RADIOLOGY | Admitting: RADIOLOGY
Payer: MEDICARE

## 2020-11-12 ENCOUNTER — HOSPITAL ENCOUNTER (OUTPATIENT)
Dept: RADIOLOGY | Facility: HOSPITAL | Age: 70
Discharge: HOME | End: 2020-11-12
Attending: PHYSICIAN ASSISTANT
Payer: MEDICARE

## 2020-11-12 ENCOUNTER — ANESTHESIA EVENT (OUTPATIENT)
Dept: ANESTHESIOLOGY | Facility: HOSPITAL | Age: 70
End: 2020-11-12
Payer: MEDICARE

## 2020-11-12 VITALS
SYSTOLIC BLOOD PRESSURE: 124 MMHG | BODY MASS INDEX: 23.73 KG/M2 | DIASTOLIC BLOOD PRESSURE: 66 MMHG | HEART RATE: 62 BPM | RESPIRATION RATE: 14 BRPM | WEIGHT: 110 LBS | HEIGHT: 57 IN | OXYGEN SATURATION: 97 %

## 2020-11-12 DIAGNOSIS — K76.9 LIVER LESION: ICD-10-CM

## 2020-11-12 LAB — SARS-COV-2 RDRP RESP QL NAA+PROBE: NEGATIVE

## 2020-11-12 PROCEDURE — 37000008 HC ANESTHESIA 1ST 15 MINUTES: Performed by: RADIOLOGY

## 2020-11-12 PROCEDURE — 47382 PERCUT ABLATE LIVER RF: CPT

## 2020-11-12 PROCEDURE — 25000003 PHARM REV CODE 250: Performed by: RADIOLOGY

## 2020-11-12 PROCEDURE — 71000039 HC RECOVERY, EACH ADD'L HOUR: Performed by: RADIOLOGY

## 2020-11-12 PROCEDURE — 77013 CT GUIDE FOR TISSUE ABLATION: CPT | Mod: TC

## 2020-11-12 PROCEDURE — 63600175 PHARM REV CODE 636 W HCPCS: Performed by: NURSE ANESTHETIST, CERTIFIED REGISTERED

## 2020-11-12 PROCEDURE — 63600175 PHARM REV CODE 636 W HCPCS: Performed by: RADIOLOGY

## 2020-11-12 PROCEDURE — 25500020 PHARM REV CODE 255: Performed by: PHYSICIAN ASSISTANT

## 2020-11-12 PROCEDURE — U0002 COVID-19 LAB TEST NON-CDC: HCPCS

## 2020-11-12 PROCEDURE — 25000003 PHARM REV CODE 250: Performed by: NURSE ANESTHETIST, CERTIFIED REGISTERED

## 2020-11-12 PROCEDURE — 71000033 HC RECOVERY, INTIAL HOUR: Performed by: RADIOLOGY

## 2020-11-12 PROCEDURE — 37000009 HC ANESTHESIA EA ADD 15 MINS: Performed by: RADIOLOGY

## 2020-11-12 RX ORDER — FENTANYL CITRATE 50 UG/ML
INJECTION, SOLUTION INTRAMUSCULAR; INTRAVENOUS
Status: DISCONTINUED | OUTPATIENT
Start: 2020-11-12 | End: 2020-11-12

## 2020-11-12 RX ORDER — PROPOFOL 10 MG/ML
VIAL (ML) INTRAVENOUS
Status: DISCONTINUED | OUTPATIENT
Start: 2020-11-12 | End: 2020-11-12

## 2020-11-12 RX ORDER — NEOSTIGMINE METHYLSULFATE 1 MG/ML
INJECTION, SOLUTION INTRAVENOUS
Status: DISCONTINUED | OUTPATIENT
Start: 2020-11-12 | End: 2020-11-12

## 2020-11-12 RX ORDER — SUCCINYLCHOLINE CHLORIDE 20 MG/ML
INJECTION INTRAMUSCULAR; INTRAVENOUS
Status: DISCONTINUED | OUTPATIENT
Start: 2020-11-12 | End: 2020-11-12

## 2020-11-12 RX ORDER — PHENYLEPHRINE HYDROCHLORIDE 10 MG/ML
INJECTION INTRAVENOUS
Status: DISCONTINUED | OUTPATIENT
Start: 2020-11-12 | End: 2020-11-12

## 2020-11-12 RX ORDER — ROCURONIUM BROMIDE 10 MG/ML
INJECTION, SOLUTION INTRAVENOUS
Status: DISCONTINUED | OUTPATIENT
Start: 2020-11-12 | End: 2020-11-12

## 2020-11-12 RX ORDER — LIDOCAINE HYDROCHLORIDE 10 MG/ML
INJECTION INFILTRATION; PERINEURAL CODE/TRAUMA/SEDATION MEDICATION
Status: COMPLETED | OUTPATIENT
Start: 2020-11-12 | End: 2020-11-12

## 2020-11-12 RX ORDER — ONDANSETRON 2 MG/ML
INJECTION INTRAMUSCULAR; INTRAVENOUS
Status: DISCONTINUED | OUTPATIENT
Start: 2020-11-12 | End: 2020-11-12

## 2020-11-12 RX ORDER — LIDOCAINE HYDROCHLORIDE 10 MG/ML
INJECTION, SOLUTION EPIDURAL; INFILTRATION; INTRACAUDAL; PERINEURAL
Status: DISCONTINUED | OUTPATIENT
Start: 2020-11-12 | End: 2020-11-12

## 2020-11-12 RX ORDER — SODIUM CHLORIDE, SODIUM LACTATE, POTASSIUM CHLORIDE, CALCIUM CHLORIDE 600; 310; 30; 20 MG/100ML; MG/100ML; MG/100ML; MG/100ML
INJECTION, SOLUTION INTRAVENOUS CONTINUOUS PRN
Status: DISCONTINUED | OUTPATIENT
Start: 2020-11-12 | End: 2020-11-12

## 2020-11-12 RX ORDER — CIPROFLOXACIN 2 MG/ML
400 INJECTION, SOLUTION INTRAVENOUS ONCE
Status: COMPLETED | OUTPATIENT
Start: 2020-11-12 | End: 2020-11-12

## 2020-11-12 RX ADMIN — PHENYLEPHRINE HYDROCHLORIDE 100 MCG: 10 INJECTION INTRAVENOUS at 12:11

## 2020-11-12 RX ADMIN — ROCURONIUM BROMIDE 25 MG: 10 INJECTION, SOLUTION INTRAVENOUS at 11:11

## 2020-11-12 RX ADMIN — LIDOCAINE HYDROCHLORIDE 50 MG: 10 INJECTION, SOLUTION EPIDURAL; INFILTRATION; INTRACAUDAL; PERINEURAL at 11:11

## 2020-11-12 RX ADMIN — SUCCINYLCHOLINE CHLORIDE 100 MG: 20 INJECTION, SOLUTION INTRAMUSCULAR; INTRAVENOUS at 11:11

## 2020-11-12 RX ADMIN — ROCURONIUM BROMIDE 10 MG: 10 INJECTION, SOLUTION INTRAVENOUS at 12:11

## 2020-11-12 RX ADMIN — FENTANYL CITRATE 100 MCG: 50 INJECTION, SOLUTION INTRAMUSCULAR; INTRAVENOUS at 11:11

## 2020-11-12 RX ADMIN — LIDOCAINE HYDROCHLORIDE 10 ML: 10 INJECTION, SOLUTION INFILTRATION; PERINEURAL at 12:11

## 2020-11-12 RX ADMIN — ROCURONIUM BROMIDE 5 MG: 10 INJECTION, SOLUTION INTRAVENOUS at 11:11

## 2020-11-12 RX ADMIN — IOHEXOL 150 ML: 350 INJECTION, SOLUTION INTRAVENOUS at 01:11

## 2020-11-12 RX ADMIN — NEOSTIGMINE METHYLSULFATE 4 MG: 1 INJECTION INTRAVENOUS at 12:11

## 2020-11-12 RX ADMIN — CIPROFLOXACIN 400 MG: 2 INJECTION, SOLUTION INTRAVENOUS at 11:11

## 2020-11-12 RX ADMIN — GLYCOPYRROLATE 0.6 MG: 0.2 INJECTION, SOLUTION INTRAMUSCULAR; INTRAVITREAL at 12:11

## 2020-11-12 RX ADMIN — PROPOFOL 120 MG: 10 INJECTION, EMULSION INTRAVENOUS at 11:11

## 2020-11-12 RX ADMIN — ONDANSETRON 4 MG: 2 INJECTION, SOLUTION INTRAMUSCULAR; INTRAVENOUS at 12:11

## 2020-11-12 RX ADMIN — SODIUM CHLORIDE, SODIUM LACTATE, POTASSIUM CHLORIDE, CALCIUM CHLORIDE: 600; 310; 30; 20 INJECTION, SOLUTION INTRAVENOUS at 11:11

## 2020-11-12 NOTE — DISCHARGE INSTRUCTIONS
For extreme shortness of breath, go to the ER    Remove dressing tomorrow evening    Use lortab already at home for pain.

## 2020-11-12 NOTE — PLAN OF CARE
Gave home care instructions to patient and SO, verbalized understanding.   All questions answered to the satisfaction of both.  To POV via WC, into POV without difficulty, distress or assist.

## 2020-11-12 NOTE — DISCHARGE SUMMARY
Pre Op Diagnosis: liver mass     Post Op Diagnosis: same     Procedure:  ablation     Procedure performed by: Dimitry GRANT, Champ CHO     Written Informed Consent Obtained: Yes     Specimen Removed:  no     Estimated Blood Loss:  minimal     Findings: Local anesthesia and moderate sedation were used.     The patient tolerated the procedure well and there were no complications.      Sterile technique was performed in the RUQ, lidocaine was used as a local anesthetic.  Ablation to liver mass guided by CT.  Anesthesia in room.  Pt tolerated the procedure well without immediate complications.  Please see radiologist report for details. F/u with PCP and/or ordering physician.

## 2020-11-12 NOTE — ANESTHESIA POSTPROCEDURE EVALUATION
Anesthesia Post Evaluation    Patient: Adalgisa Wright    Procedure(s) Performed: Procedure(s) (LRB):  Liver microwave ablation (N/A)    Final Anesthesia Type: general    Patient location during evaluation: PACU  Patient participation: Yes- Able to Participate  Level of consciousness: awake and alert  Post-procedure vital signs: reviewed and stable  Pain management: adequate  Airway patency: patent  BENJAMIN mitigation strategies: Extubation while patient is awake  PONV status at discharge: No PONV  Anesthetic complications: no      Cardiovascular status: hemodynamically stable  Respiratory status: spontaneous ventilation  Hydration status: euvolemic  Follow-up not needed.          Vitals Value Taken Time   /67 11/12/20 1459   Temp 36.3 °C (97.4 °F) 11/12/20 1457   Pulse 76 11/12/20 1459   Resp 13 11/12/20 1459   SpO2 93 % 11/12/20 1459   Vitals shown include unvalidated device data.      No case tracking events are documented in the log.      Pain/Keny Score: Keny Score: 8 (11/12/2020  2:00 PM)

## 2020-11-12 NOTE — ANESTHESIA PROCEDURE NOTES
Intubation  Performed by: Jesse Oleary CRNA  Authorized by: Jose A Guzman II, MD     Intubation:     Induction:  Intravenous    Intubated:  Postinduction    Mask Ventilation:  Easy mask    Attempts:  1    Attempted By:  CRNA    Method of Intubation:  Direct    Blade:  Silverio 3    Laryngeal View Grade: Grade I - full view of chords      Difficult Airway Encountered?: No      Complications:  None    Airway Device:  Oral endotracheal tube    Airway Device Size:  7.0    Style/Cuff Inflation:  Cuffed (inflated to minimal occlusive pressure)    Inflation Amount (mL):  4    Tube secured:  21    Secured at:  The lips    Placement Verified By:  Capnometry    Complicating Factors:  None    Findings Post-Intubation:  BS equal bilateral

## 2020-11-12 NOTE — SEDATION DOCUMENTATION
Pt placed on CT table at this time supine with arms across her chest. IBRAHIMA. Holwager CRNA to monitor pt throughout the procedure. Will continue to monitor as well.

## 2020-11-12 NOTE — H&P
[facility]  History & Physical    Subjective:      Chief Complaint/Reason for Admission: liver mass    Adalgisa Wright is a 69 y.o. female.    Past Medical History:   Diagnosis Date    Anticoagulant long-term use     Aortic valve defect     Benign neoplasm of ascending colon 4/6/2017    Cancer     CHF (congestive heart failure)     DDD (degenerative disc disease), cervical 7/20/2018    GERD (gastroesophageal reflux disease)     Hemorrhoids     HLD (hyperlipidemia)     Hypertension     Insomnia 12/1/2014    Irritable bowel syndrome with constipation 4/9/2018    Postmenopausal osteoporosis 7/20/2018     Past Surgical History:   Procedure Laterality Date    AORTIC VALVE REPLACEMENT  2003    COLONOSCOPY Left 4/6/2017    Procedure: COLONOSCOPY;  Surgeon: Sander Ulloa MD;  Location: [facility] ENDO;  Service: Endoscopy;  Laterality: Left;    COLONOSCOPY N/A 11/7/2019    Procedure: COLONOSCOPY;  Surgeon: Brenda Ochoa MD;  Location: [facility];  Service: Endoscopy;  Laterality: N/A;    ESOPHAGOGASTRODUODENOSCOPY N/A 11/7/2019    Procedure: ESOPHAGOGASTRODUODENOSCOPY (EGD) needs PT/INR;  Surgeon: Brenda Ochoa MD;  Location: [facility];  Service: Endoscopy;  Laterality: N/A;    FLEXIBLE SIGMOIDOSCOPY N/A 2/13/2020    Procedure: SIGMOIDOSCOPY, FLEXIBLE;  Surgeon: Ankur Smith MD;  Location: [facility];  Service: General;  Laterality: N/A;    INJECTION OF ANESTHETIC AGENT INTO TISSUE PLANE DEFINED BY TRANSVERSUS ABDOMINIS MUSCLE N/A 2/13/2020    Procedure: BLOCK, TRANSVERSUS ABDOMINIS PLANE;  Surgeon: Ankur Smith MD;  Location: [facility] OR;  Service: General;  Laterality: N/A;    INSERTION OF TUNNELED CENTRAL VENOUS CATHETER (CVC) WITH SUBCUTANEOUS PORT N/A 3/17/2020    Procedure: [id number];  Surgeon: Ankur Smith MD;  Location: [facility];  Service: General;  Laterality: N/A;    ROBOT-ASSISTED LOW ANTERIOR RESECTION OF COLON N/A 2/13/2020    Procedure: XI ROBOTIC  RESECTION, COLON, LOW ANTERIOR;  Surgeon: Ankur Smith MD;  Location: [facility];  Service: General;  Laterality: N/A;     Family History   Problem Relation Age of Onset    Diabetes Mother     Heart disease Mother     Hypertension Mother     Heart disease Father     Hypertension Father     Hypertension Sister     Hypertension Son     Heart disease Son     Colon cancer Neg Hx      Social History     Tobacco Use    Smoking status: Never Smoker    Smokeless tobacco: Never Used   Substance Use Topics    Alcohol use: Not Currently    Drug use: No       (Not in a hospital admission)    Review of patient's allergies indicates:   Allergen Reactions    Penicillins Rash        Review of Systems   Constitutional: Negative.    HENT: Negative.    Eyes: Negative.    Cardiovascular: Negative.    Gastrointestinal: Negative.    Skin: Negative.        Objective:      Vital Signs (Most Recent)  Pulse: 62 (11/12/20 0950)  Resp: 14 (11/12/20 0950)  BP: 124/66 (11/12/20 0950)  SpO2: 97 % (11/12/20 0950)    Vital Signs Range (Last 24H):  Pulse:  [62]   Resp:  [14]   BP: (124)/(66)   SpO2:  [97 %]     Physical Exam  Constitutional:       Appearance: Normal appearance.   HENT:      Head: Normocephalic.      Mouth/Throat:      Mouth: Mucous membranes are moist.   Eyes:      Extraocular Movements: Extraocular movements intact.   Neck:      Musculoskeletal: Normal range of motion.   Pulmonary:      Effort: Pulmonary effort is normal.      Breath sounds: Normal breath sounds.   Abdominal:      General: Abdomen is flat. Bowel sounds are normal.   Neurological:      Mental Status: She is alert.         Data Review:    CBC:   Lab Results   Component Value Date    WBC 2.52 (L) 11/12/2020    RBC 3.68 (L) 11/12/2020    HGB 11.5 (L) 11/12/2020    HCT 38.4 11/12/2020    PLT 95 (L) 11/12/2020      ECG: no prior ECG.     Assessment:      There are no hospital problems to display for this patient.      Plan:    Liver mass ablation

## 2020-11-12 NOTE — TRANSFER OF CARE
Anesthesia Transfer of Care Note    Patient: Adalgisa Wright    Procedure(s) Performed: Procedure(s) (LRB):  Liver microwave ablation (N/A)    Patient location: PACU    Anesthesia Type: general    Transport from OR: Transported from OR on room air with adequate spontaneous ventilation    Post pain: adequate analgesia    Post assessment: no apparent anesthetic complications and tolerated procedure well    Post vital signs: stable    Level of consciousness: awake, alert and oriented    Nausea/Vomiting: no nausea/vomiting    Complications: none    Transfer of care protocol was followed      Last vitals: There were no vitals taken for this visit.

## 2020-11-12 NOTE — ANESTHESIA PREPROCEDURE EVALUATION
11/12/2020  Adalgisa Wright is a 69 y.o., female.    Pre-op Assessment    I have reviewed the Patient Summary Reports.     I have reviewed the Nursing Notes. I have reviewed the NPO Status.   I have reviewed the Medications.     Review of Systems  Anesthesia Hx:  No problems with previous Anesthesia    Social:  Non-Smoker, No Alcohol Use    Hematology/Oncology:         -- Anemia: Current/Recent Cancer. Other (see Oncology comments) surgery and chemotherapy Oncology Comments: colon     EENT/Dental:EENT/Dental Normal   Cardiovascular:   Hypertension, well controlled Valvular problems/Murmurs CHF hyperlipidemia ECG has been reviewed. Aortic Valve Replacement 2003    Sinus rhythm with 1st degree A-V block   Right bundle branch block   Moderate voltage criteria for LVH, may be normal variant   Abnormal ECG   No previous ECGs available   Confirmed by ZENOBIA LOONEY MD ([id number]) on 3/2/2020 9:46:20 PM   Pulmonary:  Pulmonary Normal    Renal/:  Renal/ Normal     Hepatic/GI:   GERD, well controlled Rectal adenocarcinoma    Irritable bowel syndrome with constipation   Musculoskeletal:   Arthritis  DDD (degenerative disc disease), cervical Spine Disorders: lumbar    Neurological:   Peripheral Neuropathy    Endocrine:  Endocrine Normal    Dermatological:  Skin Normal    Psych:  Psychiatric Normal           Physical Exam  General:  Well nourished    Airway/Jaw/Neck:  Airway Findings: Mouth Opening: Normal Tongue: Normal  General Airway Assessment: Adult  Mallampati: II  TM Distance: 4 - 6 cm  Jaw/Neck Findings:  Neck ROM: Normal ROM      Dental:  Dental Findings: In tact, Upper partial dentures   Chest/Lungs:  Chest/Lungs Findings: Clear to auscultation, Normal Respiratory Rate     Heart/Vascular:  Heart Findings: Rate: Normal  Rhythm: Regular Rhythm  Sounds: Normal        Mental Status:  Mental Status Findings:   Cooperative, Alert and Oriented         Anesthesia Plan  Type of Anesthesia, risks & benefits discussed:  Anesthesia Type:  general  Patient's Preference: MAC  Intra-op Monitoring Plan: standard ASA monitors  Intra-op Monitoring Plan Comments:   Post Op Pain Control Plan: multimodal analgesia and per primary service following discharge from PACU  Post Op Pain Control Plan Comments:   Induction:   IV  Beta Blocker:  Patient is on a Beta-Blocker and has received one dose within the past 24 hours (No further documentation required).       Informed Consent: Patient understands risks and agrees with Anesthesia plan.  Questions answered. Anesthesia consent signed with patient.  ASA Score: 2     Day of Surgery Review of History & Physical:  There are no significant changes.          Ready For Surgery From Anesthesia Perspective.         Patient Active Problem List   Diagnosis    Anismus    Benign neoplasm of ascending colon    Postmenopausal osteoporosis    Irritable bowel syndrome with constipation    Hypertension    Aortic valve replaced    Chronic anticoagulation    DDD (degenerative disc disease), cervical    Gastroesophageal reflux disease without esophagitis    Iron deficiency anemia due to chronic blood loss    HLD (hyperlipidemia)    Rectal adenocarcinoma    Colon adenocarcinoma    Supratherapeutic INR    Long term (current) use of anticoagulants       Chemistry        Component Value Date/Time     11/12/2020 0850    K 5.1 11/12/2020 0850     11/12/2020 0850    CO2 27 11/12/2020 0850    BUN 16 11/12/2020 0850    CREATININE 0.8 11/12/2020 0850    GLU 95 11/12/2020 0850        Component Value Date/Time    CALCIUM 9.4 11/12/2020 0850    ALKPHOS 335 (H) 11/12/2020 0850    AST 29 11/12/2020 0850    ALT 23 11/12/2020 0850    BILITOT 0.4 11/12/2020 0850    ESTGFRAFRICA >60 11/12/2020 0850    EGFRNONAA >60 11/12/2020 0850        Lab Results   Component Value Date    WBC 3.30 (L) 02/16/2020    HGB  10.1 (L) 02/16/2020    HCT 32.5 (L) 02/16/2020     (H) 02/16/2020     (L) 02/16/2020

## 2020-11-16 DIAGNOSIS — K76.9 LIVER LESION: Primary | ICD-10-CM

## 2020-11-18 ENCOUNTER — ANTI-COAG VISIT (OUTPATIENT)
Dept: CARDIOLOGY | Facility: CLINIC | Age: 70
End: 2020-11-18
Payer: MEDICARE

## 2020-11-18 DIAGNOSIS — Z79.01 LONG TERM (CURRENT) USE OF ANTICOAGULANTS: Primary | ICD-10-CM

## 2020-11-18 DIAGNOSIS — Z95.2 AORTIC VALVE REPLACED: ICD-10-CM

## 2020-11-18 LAB — INR PPP: 1.1 (ref 2–3)

## 2020-11-18 PROCEDURE — 93793 ANTICOAG MGMT PT WARFARIN: CPT | Mod: S$GLB,,,

## 2020-11-18 PROCEDURE — 85610 POCT INR: ICD-10-PCS | Mod: QW,S$GLB,, | Performed by: NUCLEAR MEDICINE

## 2020-11-18 PROCEDURE — 85610 PROTHROMBIN TIME: CPT | Mod: QW,S$GLB,, | Performed by: NUCLEAR MEDICINE

## 2020-11-18 PROCEDURE — 93793 PR ANTICOAGULANT MGMT FOR PT TAKING WARFARIN: ICD-10-PCS | Mod: S$GLB,,,

## 2020-11-18 RX ORDER — ENOXAPARIN SODIUM 100 MG/ML
50 INJECTION SUBCUTANEOUS EVERY 12 HOURS
Qty: 12 SYRINGE | Refills: 0 | Status: SHIPPED | OUTPATIENT
Start: 2020-11-18 | End: 2021-01-25

## 2020-11-18 NOTE — PROGRESS NOTES
"Patient's INR is subtherapeutic at 1.1.  Reports previous instructions were followed - verified.  Diet has changed - "not eating my normal amount".  No signs or symptoms reported.  Sent to PharmD for further review/dosing.    "

## 2020-11-18 NOTE — PROGRESS NOTES
INR not at goal. Medications, chart, and patient findings reviewed. See calendar for adjustments to dose and follow up plan.  Patent only has one lovenox syringe remaining.  She has refused to call Dr Kemp for refills.  I will refill her syringes just to continue bridge for the next few days for valve protection.  Boost and resume, recheck Friday.  Wt 50 kg, cr 0.8, plt 95.

## 2020-11-20 ENCOUNTER — HOSPITAL ENCOUNTER (EMERGENCY)
Facility: HOSPITAL | Age: 70
Discharge: HOME | End: 2020-11-20
Attending: EMERGENCY MEDICINE
Payer: MEDICARE

## 2020-11-20 VITALS
HEART RATE: 80 BPM | WEIGHT: 114.63 LBS | DIASTOLIC BLOOD PRESSURE: 60 MMHG | RESPIRATION RATE: 18 BRPM | SYSTOLIC BLOOD PRESSURE: 134 MMHG | OXYGEN SATURATION: 95 % | BODY MASS INDEX: 24.81 KG/M2 | TEMPERATURE: 98 F

## 2020-11-20 DIAGNOSIS — R04.0 RIGHT-SIDED EPISTAXIS: ICD-10-CM

## 2020-11-20 DIAGNOSIS — R04.0 EPISTAXIS: Primary | ICD-10-CM

## 2020-11-20 LAB
INR PPP: 1.1 (ref 0.8–1.2)
PROTHROMBIN TIME: 11.8 SEC (ref 9–12.5)

## 2020-11-20 PROCEDURE — 25000003 PHARM REV CODE 250: Mod: ER | Performed by: EMERGENCY MEDICINE

## 2020-11-20 PROCEDURE — 30901 CONTROL OF NOSEBLEED: CPT | Mod: ER

## 2020-11-20 PROCEDURE — 99283 EMERGENCY DEPT VISIT LOW MDM: CPT | Mod: ER

## 2020-11-20 PROCEDURE — 85610 PROTHROMBIN TIME: CPT | Mod: ER

## 2020-11-20 RX ORDER — OXYMETAZOLINE HCL 0.05 %
1 SPRAY, NON-AEROSOL (ML) NASAL
Status: COMPLETED | OUTPATIENT
Start: 2020-11-20 | End: 2020-11-20

## 2020-11-20 RX ADMIN — Medication 1 SPRAY: at 09:11

## 2020-11-20 NOTE — PROGRESS NOTES
Patient has been advised on updated dose instructions (per calendar).  Follow up has been scheduled for Monday, 11/23/2020 at 2:00pm (Li JONES).  Patient verbalized understanding.

## 2020-11-20 NOTE — PROGRESS NOTES
11/20 - pt reports that she will not be able to make coumadin clinic follow up today.  She also reports that she has not picked up her lovenox due to insurance PA.  She has used her last injection this AM (11/20) according to the patient.  We will therefore NOT continue those as I do not want to increase bleeding risk and/or coumadin toxicity over the weekend without an INR check.  Continue warfarin as planned per calendar since she has received several large boosts over the past week.  She must understand her risk of clotting and bleeding without proper follow up.  She has a very labile INR in general, I do not feel comfortable with wararin boost/lovenox continuation without her INR being checked.    Recheck Monday per patient, we will resume lovenox if she remains low on Monday.

## 2020-11-21 NOTE — ED PROVIDER NOTES
Encounter Date: 11/20/2020       History     Chief Complaint   Patient presents with    Epistaxis     uncontrolled nose bleed onset 1 hour ago. pt on coumadin     Chief complaint:  Right-sided nose bleed    History of present illness:  69-year-old female complains of onset in the last hour of bleeding from the right nostril.  The patient is on Coumadin for a prosthetic heart valve and states her last INR was 2 days ago that was 1.1.  Patient denies any trauma to the area.  Denies any issues with elevated blood pressure that she knows of.  Severity of the bleed is mild to moderate.  Location is the right nostril.  There is no bleeding from the left nare.  Patient denies any weakness, headache.  No other sources of bleeding.        Review of patient's allergies indicates:   Allergen Reactions    Penicillins Rash     Past Medical History:   Diagnosis Date    Anticoagulant long-term use     Aortic valve defect     Benign neoplasm of ascending colon 4/6/2017    Cancer     CHF (congestive heart failure)     DDD (degenerative disc disease), cervical 7/20/2018    GERD (gastroesophageal reflux disease)     Hemorrhoids     HLD (hyperlipidemia)     Hypertension     Insomnia 12/1/2014    Irritable bowel syndrome with constipation 4/9/2018    Postmenopausal osteoporosis 7/20/2018     Past Surgical History:   Procedure Laterality Date    AORTIC VALVE REPLACEMENT  2003    COLONOSCOPY Left 4/6/2017    Procedure: COLONOSCOPY;  Surgeon: Sander Ulloa MD;  Location: [facility];  Service: Endoscopy;  Laterality: Left;    COLONOSCOPY N/A 11/7/2019    Procedure: COLONOSCOPY;  Surgeon: Brenda Ochoa MD;  Location: [facility];  Service: Endoscopy;  Laterality: N/A;    COMPUTED TOMOGRAPHY N/A 11/12/2020    Procedure: Liver microwave ablation;  Surgeon: Gonzalez Moraes MD;  Location: [facility];  Service: General;  Laterality: N/A;    ESOPHAGOGASTRODUODENOSCOPY N/A 11/7/2019    Procedure: ESOPHAGOGASTRODUODENOSCOPY  (EGD) needs PT/INR;  Surgeon: Brenda Ochoa MD;  Location: [facility] ENDO;  Service: Endoscopy;  Laterality: N/A;    FLEXIBLE SIGMOIDOSCOPY N/A 2/13/2020    Procedure: SIGMOIDOSCOPY, FLEXIBLE;  Surgeon: Ankur Smith MD;  Location: [facility] OR;  Service: General;  Laterality: N/A;    INJECTION OF ANESTHETIC AGENT INTO TISSUE PLANE DEFINED BY TRANSVERSUS ABDOMINIS MUSCLE N/A 2/13/2020    Procedure: BLOCK, TRANSVERSUS ABDOMINIS PLANE;  Surgeon: Ankur Smith MD;  Location: [facility] OR;  Service: General;  Laterality: N/A;    INSERTION OF TUNNELED CENTRAL VENOUS CATHETER (CVC) WITH SUBCUTANEOUS PORT N/A 3/17/2020    Procedure: [id number];  Surgeon: Ankur Smith MD;  Location: [facility] OR;  Service: General;  Laterality: N/A;    ROBOT-ASSISTED LOW ANTERIOR RESECTION OF COLON N/A 2/13/2020    Procedure: XI ROBOTIC RESECTION, COLON, LOW ANTERIOR;  Surgeon: Ankur Smith MD;  Location: [facility] OR;  Service: General;  Laterality: N/A;     Family History   Problem Relation Age of Onset    Diabetes Mother     Heart disease Mother     Hypertension Mother     Heart disease Father     Hypertension Father     Hypertension Sister     Hypertension Son     Heart disease Son     Colon cancer Neg Hx      Social History     Tobacco Use    Smoking status: Never Smoker    Smokeless tobacco: Never Used   Substance Use Topics    Alcohol use: Not Currently    Drug use: No     Review of Systems   Constitutional: Negative for activity change, fatigue and fever.   HENT: Positive for nosebleeds. Negative for congestion, facial swelling, sinus pressure, sinus pain, trouble swallowing and voice change.    Eyes: Negative.    Respiratory: Negative for shortness of breath.    Cardiovascular: Negative for chest pain.   Gastrointestinal: Negative for nausea and vomiting.   Skin: Negative.    Neurological: Negative.    Psychiatric/Behavioral: Negative.    All other systems reviewed and are negative.      Physical Exam      Initial Vitals [11/20/20 2032]   BP Pulse Resp Temp SpO2   134/60 72 18 98 °F (36.7 °C) 95 %      MAP       --         Physical Exam    Nursing note and vitals reviewed.  Constitutional: She appears well-developed and well-nourished. No distress.   HENT:   Head: Normocephalic and atraumatic.   Right Ear: External ear normal.   Left Ear: External ear normal.   Nose: No mucosal edema, sinus tenderness or nasal deformity. Epistaxis (Very mild to the right nostril with no visible etiology following Afrin installation) is observed. Right sinus exhibits no maxillary sinus tenderness and no frontal sinus tenderness. Left sinus exhibits no maxillary sinus tenderness and no frontal sinus tenderness.   Mouth/Throat: Oropharynx is clear and moist.   Eyes: Conjunctivae and EOM are normal.   Neck: Normal range of motion. Neck supple.   Cardiovascular: Normal rate, regular rhythm and intact distal pulses.   Pulmonary/Chest: Breath sounds normal.   Musculoskeletal: Normal range of motion.   Neurological: She is alert and oriented to person, place, and time. She has normal strength. GCS score is 15. GCS eye subscore is 4. GCS verbal subscore is 5. GCS motor subscore is 6.   Skin: Skin is warm and dry. Capillary refill takes less than 2 seconds. No erythema.   Psychiatric: She has a normal mood and affect. Her behavior is normal. Judgment and thought content normal.         ED Course   Epistaxis Mgmt    Date/Time: 11/21/2020 2:57 AM  Performed by: Jose A Edge MD  Authorized by: Jose A Edge MD   Consent Done: Not Needed  Patient sedated: no  Treatment site: right anterior  Repair method: oxymetazoline  Post-procedure assessment: bleeding stopped  Treatment complexity: simple  Patient tolerance: Patient tolerated the procedure well with no immediate complications        Labs Reviewed   PROTIME-INR     Labs Reviewed   PROTIME-INR            Imaging Results    None          Medical Decision Making:   Clinical Tests:    Lab Tests: Ordered and Reviewed       <> Summary of Lab: INR 1.1  ED Management:  Patient received 100% relief with the Afrin nasal spray and was observed for 30 min with no further bleed.  Instructed on treatment of recurrent epistaxis and understands the distinct possibility of having to return should the bleeding recur.  Patient advised not to manipulate the nose in any way, 2 sprays saline in her nose for moisture rising affect.                             Clinical Impression:        ICD-10-CM ICD-9-CM   1. Epistaxis  R04.0 784.7   2. Right-sided epistaxis  R04.0 784.7                                Jose A Edge MD  11/21/20 0258

## 2020-11-21 NOTE — DISCHARGE INSTRUCTIONS
Saline to nose every 2-3 hours.  Avoid any manipulation of the nose itself.  Return if bleeding worsens.

## 2020-11-23 ENCOUNTER — TELEPHONE (OUTPATIENT)
Dept: CARDIOLOGY | Facility: CLINIC | Age: 70
End: 2020-11-23

## 2020-11-23 NOTE — TELEPHONE ENCOUNTER
Called patient in reference to missed Coumadin Clinic appointment. Patient reports she could not come today because she was at [facility] on last night ( 11/22/20). Patient reports she fell out of bed and broke her right leg. Patient reports she was also at [facility] on 11/20/20 due to a nose bleed. Patient states she will give our call office a call back to reschedule.

## 2020-11-27 NOTE — PROGRESS NOTES
Recent ED visit with epistaxis and to outside ED with foot injury from falling out of bed.  Patient report that she could not come to coumadin clinic follow up on 11/23.  INR is now scheduled for 12/3/20 with lab visit.  See phone notes.

## 2020-11-30 VITALS
SYSTOLIC BLOOD PRESSURE: 153 MMHG | TEMPERATURE: 97 F | HEART RATE: 76 BPM | RESPIRATION RATE: 16 BRPM | OXYGEN SATURATION: 93 % | DIASTOLIC BLOOD PRESSURE: 74 MMHG

## 2020-12-10 ENCOUNTER — TELEPHONE (OUTPATIENT)
Dept: CARDIOLOGY | Facility: CLINIC | Age: 70
End: 2020-12-10

## 2020-12-10 NOTE — TELEPHONE ENCOUNTER
Called patient to reschedule appointment. Patient reports she fell and broke her leg on 11/20/20. States  she has been seeing cardiologist Dr. Kemp ([phone]) at  Cardiology and he has been monitoring INR. Patient reports she is scheduled to have leg surgery on12/22/20. Called to Dr. Kemp to follow up--left voice message with  to call our office back at [phone].

## 2020-12-16 ENCOUNTER — ANTI-COAG VISIT (OUTPATIENT)
Dept: CARDIOLOGY | Facility: CLINIC | Age: 70
End: 2020-12-16

## 2020-12-16 ENCOUNTER — TELEPHONE (OUTPATIENT)
Dept: CARDIOLOGY | Facility: CLINIC | Age: 70
End: 2020-12-16

## 2020-12-16 ENCOUNTER — LAB VISIT (OUTPATIENT)
Dept: LAB | Facility: HOSPITAL | Age: 70
End: 2020-12-16
Payer: MEDICARE

## 2020-12-16 DIAGNOSIS — I10 ESSENTIAL HYPERTENSION, MALIGNANT: Primary | ICD-10-CM

## 2020-12-16 LAB
INR PPP: 2.6 (ref 0.8–1.2)
PROTHROMBIN TIME: 26.8 SEC (ref 9–12.5)

## 2020-12-16 PROCEDURE — 85610 PROTHROMBIN TIME: CPT | Mod: PO

## 2020-12-16 NOTE — PROGRESS NOTES
Patient will now have warfarin/INRs monitored by cardiologist Dr Kemp, outside of [facility].  We will discharge her from our coumadin clinic and resolve her episode.

## 2020-12-16 NOTE — TELEPHONE ENCOUNTER
Spoke with Michelle, nurse at  Cardiology with Dr. Kemp. States Dr. Kemp ordered INR that was done on 12/16/20 at [facility] and has taken care of all dosing/instructions for pre and post procedure (Open Reduction Internal Fixation Tibial Plateau) scheduled at [facility] for 12/18/20.

## 2020-12-17 ENCOUNTER — LAB VISIT (OUTPATIENT)
Dept: LAB | Facility: HOSPITAL | Age: 70
End: 2020-12-17
Payer: MEDICARE

## 2020-12-17 ENCOUNTER — TELEPHONE (OUTPATIENT)
Dept: HEMATOLOGY/ONCOLOGY | Facility: CLINIC | Age: 70
End: 2020-12-17

## 2020-12-17 DIAGNOSIS — I10 ESSENTIAL HYPERTENSION, MALIGNANT: Primary | ICD-10-CM

## 2020-12-17 LAB
INR PPP: 1.7 (ref 0.8–1.2)
PROTHROMBIN TIME: 17.7 SEC (ref 9–12.5)

## 2020-12-17 PROCEDURE — 85610 PROTHROMBIN TIME: CPT | Mod: PO

## 2020-12-17 NOTE — TELEPHONE ENCOUNTER
----- Message from Bonnie Mcgee sent at 12/17/2020  1:08 PM CST -----  Regarding: MRI  Contact: [email or web address]  Patient needs to reschedule her mri, tried to call hospital radiology but machine kept picking up, please call her back at [phone]

## 2020-12-17 NOTE — TELEPHONE ENCOUNTER
Pt called and stated she had a fall and has fractured her tibia. Surgery scheduled for 12/30. Rescheduled MRI to 12/22.

## 2020-12-18 ENCOUNTER — PATIENT MESSAGE (OUTPATIENT)
Dept: PULMONOLOGY | Facility: HOSPITAL | Age: 70
End: 2020-12-18

## 2020-12-18 ENCOUNTER — TELEPHONE (OUTPATIENT)
Dept: PULMONOLOGY | Facility: HOSPITAL | Age: 70
End: 2020-12-18

## 2020-12-18 ENCOUNTER — TELEPHONE (OUTPATIENT)
Dept: HEMATOLOGY/ONCOLOGY | Facility: CLINIC | Age: 70
End: 2020-12-18

## 2020-12-18 DIAGNOSIS — U07.1 COVID-19 VIRUS DETECTED: Primary | ICD-10-CM

## 2020-12-18 NOTE — TELEPHONE ENCOUNTER
:  Patient with colon cancer. Recent positive COVID 19 . Trying to set up BAM infusion. She reports that she needs transportation. Will need to come to the 3rd floor infusion center for BAM infusion that will last about an hour. Can you call her and assist her with transportation?

## 2020-12-18 NOTE — TELEPHONE ENCOUNTER
Patient tested positive for COVID 19  Significant other is in ICU   Called and discussed treatment with BAM  Patient agrees but needs transportation       Fact Sheet for Patients, Parents and Caregivers  Emergency Use Authorization (EUA) of Bamlanivimab for Coronavirus Disease 2019 (COVID-19)  You are being given a medicine called bamlanivimab for the treatment of coronavirus disease 2019 (COVID-  19). This Fact Sheet contains information to help you understand the potential risks and potential benefits of  taking bamlanivimab, which you may receive.  Receiving bamlanivimab may benefit certain people with COVID-19.  Read this Fact Sheet for information about bamlanivimab. Talk to your healthcare provider if you have  questions. It is your choice to receive bamlanivimab or stop it at any time.    What is COVID-19?  COVID-19 is caused by a virus called a coronavirus. People can get COVID-19 through contact with  another person who has the virus.  COVID-19 illnesses have ranged from very mild (including some with no reported symptoms) to severe,  including illness resulting in death. While information so far suggests that most COVID-19 illness is mild,  serious illness can happen and may cause some of your other medical conditions to become worse. People  of all ages with severe, long-lasting (chronic) medical conditions like heart disease, lung disease, and  diabetes, for example, seem to be at higher risk of being hospitalized for COVID-19.    What are the symptoms of COVID-19?  The symptoms of COVID-19 include fever, cough, and shortness of breath, which may appear 2 to 14 days after  exposure. Serious illness including breathing problems can occur and may cause your other medical conditions to  become worse.  What is bamlanivimab?  Bamlanivimab is an investigational medicine used for the treatment of COVID-19 in non-hospitalized adults and  adolescents 12 years of age and older with mild to moderate symptoms who  weigh 88 pounds (40 kg) or more,  and who are at high risk for developing severe COVID-19 symptoms or the need for hospitalization. Bamlanivimab  is investigational because it is still being studied. There is limited information known about the safety or  effectiveness of using bamlanivimab to treat people with COVID-19.  The FDA has authorized the emergency use of bamlanivimab for the treatment of COVID-19 under an Emergency  Use Authorization (EUA). For more information on EUA, see the section What is an Emergency Use  Authorization (EUA)? at the end of this Fact Sheet.    What should I tell my healthcare provider before I receive bamlanivimab?  Tell your healthcare provider about all of your medical conditions, including if you:  Have any allergies  Are pregnant or plan to become pregnant  Are breastfeeding or plan to breastfeed  Have any serious illnesses  Are taking any medications (prescription, over-the-counter, vitamins, and herbal products)    How will I receive bamlanivimab?  Bamlanivimab is given to you through a vein (intravenous or IV) for at least 1 hour.  You will receive one dose of bamlanivimab by IV infusion.    What are the important possible side effects of bamlanivimab?  Possible side effects of bamlanivimab are:   Allergic reactions. Allergic reactions can happen during and after infusion with bamlanivimab. Tell your  healthcare provider right away if you get any of the following signs and symptoms of allergic reactions: fever,  chills, nausea, headache, shortness of breath, low blood pressure, wheezing, swelling of your lips, face, or  throat, rash including hives, itching, muscle aches, and dizziness.  The side effects of getting any medicine by vein may include brief pain, bleeding, bruising of the skin, soreness,  swelling, and possible infection at the infusion site.  These are not all the possible side effects of bamlanivimab. Not a lot of people have been given bamlanivimab.  Serious  and unexpected side effects may happen. Bamlanivimab is still being studied so it is possible that all of  the risks are not known at this time.  It is possible that bamlanivimab could interfere with your body's own ability to fight off a future infection of  SARS-CoV-2. Similarly, bamlanivimab may reduce your bodys immune response to a vaccine for SARS-CoV-2.  Specific studies have not been conducted to address these possible risks. Talk to your healthcare provider if you  have any questions.    What other treatment choices are there?  Like bamlanivimab, FDA may allow for the emergency use of other medicines to treat people with COVID-19. Go  to https://www.fpetz88dnjkhbjskmhrinuaukm.nih.gov/ for information on the emergency use of other medicines that  are not approved by FDA to treat people with COVID-19. Your healthcare provider may talk with you about clinical  trials you may be eligible for.  It is your choice to be treated or not to be treated with bamlanivimab. Should you decide not to receive  bamlanivimab or stop it at any time, it will not change your standard medical care.    What if I am pregnant or breastfeeding?  There is limited experience treating pregnant women or breastfeeding mothers with bamlanivimab. For a mother  and unborn baby, the benefit of receiving bamlanivimab may be greater than the risk from the treatment. If you  are pregnant or breastfeeding, discuss your options and specific situation with your healthcare provider.    How do I report side effects with bamlanivimab?  Tell your healthcare provider right away if you have any side effect that bothers you or does not go away.  Report side effects to FDA MedWatch at www.fda.gov/medwatch, call 0-042-SmartwareToday.com-6318, or contact [employer] and  [employer] at 6-747-XbzajW87 (1-362.935.6801).    How can I learn more?   Ask your healthcare provider   Visit www.[email or web address]   Visit https://www.sjdfn79mtjgexzihqueraovjzm.nih.gov/   Contact your  local or state public health department    What is an Emergency Use Authorization (EUA)?  The [place] FDA has made bamlanivimab available under an emergency access mechanism called an  EUA. The EUA is supported by a  of Health and Human Service ([facility]) declaration that circumstances  exist to justify the emergency use of drugs and biological products during the COVID-19 pandemic.  Bamlanivimab has not undergone the same type of review as an FDA-approved or cleared product. The FDA may  issue an EUA when certain criteria are met, which includes that there are no adequate, approved, and available  alternatives. In addition, the FDA decision is based on the totality of scientific evidence available showing that it is  reasonable to believe that the product meets certain criteria for safety, performance, and labeling and may be  effective in treatment of patients during the COVID-19 pandemic. All of these criteria must be met to allow for the  product to be used in the treatment of patients during the COVID-19 pandemic.  3  The EUA for bamlanivimab is in effect for the duration of the COVID-19 declaration justifying emergency use of  these products, unless terminated or revoked (after which the product may no longer be used).  Literature issued November 2020    [employer], [place] [address], [place]  Copyright © 2020, [employer]. All rights reserved

## 2020-12-18 NOTE — PROGRESS NOTES
Pt referred to SW because she needs transportation to COVID antibody infusion.     Called pt to discuss. Unfortunately, her significant other is in ICU, COVID +, on ventilator and not doing well. She is leery of the antibody infusion because she does not know much about it. In addition, she has an MRI scheduled Tuesday (next infusion availability). Furthermore, transportation cannot be offered since she is COVID +. Will ask nurse to call pt to discuss concerns and schedule as indicated. Pt would also like a call from Dr. Quintanilla about her concerns--will send Dr. Quintanilla message with pt's request.     Pt had no other needs for SW at this time. SW will continue to remain available for ongoing support and assistance.    Oncology Social Work   Intake/Intervention  Date of Diagnosis: 03/23/20  Cancer Type: GI  MD Assigned: Danika Quintanilla  Date of referral to social work: 12/18/20  Initial social work contact: 08/10/20  Referral to initial contact timeline: 0  Referral contact method: In basket  Other referral contact method: in person  Contact method: Phone  Start of Treatment: 03/23/20    General Referrals: Transportation    Concerns: referral to Cancer Services, [employer] for wigs     Supportive Checklist      Follow Up  No follow-ups on file.

## 2020-12-18 NOTE — TELEPHONE ENCOUNTER
----- Message from Alana Cardoza sent at 12/18/2020 11:11 AM CST -----  Contact: Adalgisa Diana is requested a call back in regards to testing positive for covid, she stated that a dr at the hospital offered her to take an antibiotic for the virus. She will like a second opinion before taking it. Please call her back at   [phone].      Thanks  DD

## 2020-12-19 ENCOUNTER — PATIENT MESSAGE (OUTPATIENT)
Dept: ADMINISTRATIVE | Facility: OTHER | Age: 70
End: 2020-12-19

## 2020-12-20 ENCOUNTER — NURSE TRIAGE (OUTPATIENT)
Dept: ADMINISTRATIVE | Facility: CLINIC | Age: 70
End: 2020-12-20

## 2020-12-20 NOTE — TELEPHONE ENCOUNTER
Patient escalated for no response. Patient states that she does not have pulse ox yet. Patient denies any SOB, CP, or fever. No further action at this time.    Reason for Disposition   Information only question and nurse able to answer    Additional Information   Negative: Nursing judgment   Negative: Nursing judgment   Negative: Nursing judgment   Negative: Nursing judgment    Protocols used: INFORMATION ONLY CALL - NO TRIAGE-A-OH

## 2020-12-21 ENCOUNTER — TELEPHONE (OUTPATIENT)
Dept: HEMATOLOGY/ONCOLOGY | Facility: CLINIC | Age: 70
End: 2020-12-21

## 2020-12-21 ENCOUNTER — NURSE TRIAGE (OUTPATIENT)
Dept: ADMINISTRATIVE | Facility: CLINIC | Age: 70
End: 2020-12-21

## 2020-12-21 ENCOUNTER — LAB VISIT (OUTPATIENT)
Dept: LAB | Facility: HOSPITAL | Age: 70
End: 2020-12-21
Attending: INTERNAL MEDICINE
Payer: MEDICARE

## 2020-12-21 DIAGNOSIS — Z79.01 LONG TERM (CURRENT) USE OF ANTICOAGULANTS: Primary | ICD-10-CM

## 2020-12-21 LAB
INR PPP: 1.4 (ref 0.8–1.2)
PROTHROMBIN TIME: 14.5 SEC (ref 9–12.5)

## 2020-12-21 PROCEDURE — 85610 PROTHROMBIN TIME: CPT | Mod: PO

## 2020-12-21 NOTE — TELEPHONE ENCOUNTER
Pt contacted for surveillance no response  escalation - Pt reports she will not be able to  pulse ox any time soon. Nt removed pt from surveillance program and pt agrees to [id number] program.  Info only  protocol used and pt advised on home care. Pt instructed to call OOC for worsening of symptoms or health questions.    Reason for Disposition   Information only question and nurse able to answer    Protocols used: INFORMATION ONLY CALL - NO TRIAGE-A-OH

## 2020-12-21 NOTE — TELEPHONE ENCOUNTER
Patient escalated for no response. Patient still does not have pulse ox. Pt will probably get pulse ox tomorrow.    Reason for Disposition   Information only question and nurse able to answer    Additional Information   Negative: Nursing judgment   Negative: Nursing judgment   Negative: Nursing judgment   Negative: Nursing judgment    Protocols used: INFORMATION ONLY CALL - NO TRIAGE-A-OH

## 2020-12-21 NOTE — TELEPHONE ENCOUNTER
----- Message from Erin Fabian sent at 12/21/2020 11:51 AM CST -----  Patients son called and stated he has a question about his moms treatment and wanted to let you all I know she has covid.       [phone]

## 2020-12-21 NOTE — TELEPHONE ENCOUNTER
Spoke to pt's son and he stated pt's live-in boyfriend passed away today of Covid-19. Ms Adalgisa is also positive but asymptomatic. She has opted not to do the infusion at this time. Will reschedule up coming scan and surgery.

## 2020-12-23 ENCOUNTER — LAB VISIT (OUTPATIENT)
Dept: LAB | Facility: HOSPITAL | Age: 70
End: 2020-12-23
Attending: INTERNAL MEDICINE
Payer: MEDICARE

## 2020-12-23 DIAGNOSIS — I10 ESSENTIAL HYPERTENSION, MALIGNANT: Primary | ICD-10-CM

## 2020-12-23 LAB
INR PPP: 1.4 (ref 0.8–1.2)
PROTHROMBIN TIME: 14.8 SEC (ref 9–12.5)

## 2020-12-23 PROCEDURE — 85610 PROTHROMBIN TIME: CPT | Mod: PO

## 2020-12-28 ENCOUNTER — PATIENT MESSAGE (OUTPATIENT)
Dept: PHARMACY | Facility: CLINIC | Age: 70
End: 2020-12-28

## 2021-01-08 ENCOUNTER — HOSPITAL ENCOUNTER (OUTPATIENT)
Dept: RADIOLOGY | Facility: HOSPITAL | Age: 71
Discharge: HOME | End: 2021-01-08
Attending: PHYSICIAN ASSISTANT
Payer: MEDICARE

## 2021-01-08 DIAGNOSIS — K76.9 LIVER LESION: ICD-10-CM

## 2021-01-08 DIAGNOSIS — Z01.818 PRE-OP TESTING: Primary | ICD-10-CM

## 2021-01-11 ENCOUNTER — TELEPHONE (OUTPATIENT)
Dept: HEMATOLOGY/ONCOLOGY | Facility: CLINIC | Age: 71
End: 2021-01-11

## 2021-01-11 ENCOUNTER — TELEPHONE (OUTPATIENT)
Dept: SURGERY | Facility: CLINIC | Age: 71
End: 2021-01-11

## 2021-01-13 ENCOUNTER — OFFICE VISIT (OUTPATIENT)
Dept: SURGERY | Facility: CLINIC | Age: 71
End: 2021-01-13
Payer: MEDICARE

## 2021-01-13 VITALS — DIASTOLIC BLOOD PRESSURE: 64 MMHG | TEMPERATURE: 99 F | HEART RATE: 68 BPM | SYSTOLIC BLOOD PRESSURE: 104 MMHG

## 2021-01-13 DIAGNOSIS — K76.9 LIVER LESION: ICD-10-CM

## 2021-01-13 DIAGNOSIS — C20 RECTAL ADENOCARCINOMA: ICD-10-CM

## 2021-01-13 DIAGNOSIS — C19 ADENOCARCINOMA OF RECTOSIGMOID JUNCTION: Primary | ICD-10-CM

## 2021-01-13 PROCEDURE — 1126F AMNT PAIN NOTED NONE PRSNT: CPT | Mod: S$GLB,,, | Performed by: COLON & RECTAL SURGERY

## 2021-01-13 PROCEDURE — 99999 PR PBB SHADOW E&M-EST. PATIENT-LVL V: CPT | Mod: PBBFAC,,, | Performed by: COLON & RECTAL SURGERY

## 2021-01-13 PROCEDURE — 3078F PR MOST RECENT DIASTOLIC BLOOD PRESSURE < 80 MM HG: ICD-10-PCS | Mod: CPTII,S$GLB,, | Performed by: COLON & RECTAL SURGERY

## 2021-01-13 PROCEDURE — 99214 OFFICE O/P EST MOD 30 MIN: CPT | Mod: S$GLB,,, | Performed by: COLON & RECTAL SURGERY

## 2021-01-13 PROCEDURE — 1159F PR MEDICATION LIST DOCUMENTED IN MEDICAL RECORD: ICD-10-PCS | Mod: S$GLB,,, | Performed by: COLON & RECTAL SURGERY

## 2021-01-13 PROCEDURE — 99214 PR OFFICE/OUTPT VISIT, EST, LEVL IV, 30-39 MIN: ICD-10-PCS | Mod: S$GLB,,, | Performed by: COLON & RECTAL SURGERY

## 2021-01-13 PROCEDURE — 99999 PR PBB SHADOW E&M-EST. PATIENT-LVL V: ICD-10-PCS | Mod: PBBFAC,,, | Performed by: COLON & RECTAL SURGERY

## 2021-01-13 PROCEDURE — 3074F PR MOST RECENT SYSTOLIC BLOOD PRESSURE < 130 MM HG: ICD-10-PCS | Mod: CPTII,S$GLB,, | Performed by: COLON & RECTAL SURGERY

## 2021-01-13 PROCEDURE — 3074F SYST BP LT 130 MM HG: CPT | Mod: CPTII,S$GLB,, | Performed by: COLON & RECTAL SURGERY

## 2021-01-13 PROCEDURE — 1159F MED LIST DOCD IN RCRD: CPT | Mod: S$GLB,,, | Performed by: COLON & RECTAL SURGERY

## 2021-01-13 PROCEDURE — 1126F PR PAIN SEVERITY QUANTIFIED, NO PAIN PRESENT: ICD-10-PCS | Mod: S$GLB,,, | Performed by: COLON & RECTAL SURGERY

## 2021-01-13 PROCEDURE — 3078F DIAST BP <80 MM HG: CPT | Mod: CPTII,S$GLB,, | Performed by: COLON & RECTAL SURGERY

## 2021-01-13 RX ORDER — SODIUM, POTASSIUM,MAG SULFATES 17.5-3.13G
1 SOLUTION, RECONSTITUTED, ORAL ORAL DAILY
Qty: 1 KIT | Refills: 0 | Status: SHIPPED | OUTPATIENT
Start: 2021-01-13 | End: 2021-01-15

## 2021-01-15 ENCOUNTER — HOSPITAL ENCOUNTER (OUTPATIENT)
Dept: RADIOLOGY | Facility: HOSPITAL | Age: 71
Discharge: HOME | End: 2021-01-15
Attending: PHYSICIAN ASSISTANT
Payer: MEDICARE

## 2021-01-15 PROCEDURE — 74183 MRI ABD W/O CNTR FLWD CNTR: CPT | Mod: TC,PO

## 2021-01-15 PROCEDURE — 74183 MRI ABD W/O CNTR FLWD CNTR: CPT | Mod: 26,,, | Performed by: RADIOLOGY

## 2021-01-15 PROCEDURE — 25500020 PHARM REV CODE 255: Mod: PO | Performed by: PHYSICIAN ASSISTANT

## 2021-01-15 PROCEDURE — 74183 MRI ABDOMEN W WO CONTRAST: ICD-10-PCS | Mod: 26,,, | Performed by: RADIOLOGY

## 2021-01-15 PROCEDURE — A9585 GADOBUTROL INJECTION: HCPCS | Mod: PO | Performed by: PHYSICIAN ASSISTANT

## 2021-01-15 RX ORDER — GADOBUTROL 604.72 MG/ML
4 INJECTION INTRAVENOUS
Status: COMPLETED | OUTPATIENT
Start: 2021-01-15 | End: 2021-01-15

## 2021-01-15 RX ADMIN — GADOBUTROL 10 ML: 604.72 INJECTION INTRAVENOUS at 12:01

## 2021-01-19 ENCOUNTER — PATIENT MESSAGE (OUTPATIENT)
Dept: HEMATOLOGY/ONCOLOGY | Facility: CLINIC | Age: 71
End: 2021-01-19

## 2021-01-25 ENCOUNTER — OFFICE VISIT (OUTPATIENT)
Dept: HEMATOLOGY/ONCOLOGY | Facility: CLINIC | Age: 71
End: 2021-01-25
Payer: MEDICARE

## 2021-01-25 ENCOUNTER — INFUSION (OUTPATIENT)
Dept: INFUSION THERAPY | Facility: HOSPITAL | Age: 71
End: 2021-01-25
Attending: INTERNAL MEDICINE
Payer: MEDICARE

## 2021-01-25 VITALS
SYSTOLIC BLOOD PRESSURE: 106 MMHG | BODY MASS INDEX: 24.81 KG/M2 | HEIGHT: 57 IN | DIASTOLIC BLOOD PRESSURE: 63 MMHG | OXYGEN SATURATION: 99 % | HEART RATE: 64 BPM | TEMPERATURE: 98 F

## 2021-01-25 DIAGNOSIS — D12.2 BENIGN NEOPLASM OF ASCENDING COLON: Primary | ICD-10-CM

## 2021-01-25 DIAGNOSIS — C18.9 COLON ADENOCARCINOMA: Primary | ICD-10-CM

## 2021-01-25 DIAGNOSIS — D64.9 ANEMIA, UNSPECIFIED TYPE: ICD-10-CM

## 2021-01-25 DIAGNOSIS — R03.1 BLOOD PRESSURE LOWER THAN PRIOR MEASUREMENT: ICD-10-CM

## 2021-01-25 DIAGNOSIS — D72.819 LEUKOPENIA, UNSPECIFIED TYPE: ICD-10-CM

## 2021-01-25 PROCEDURE — 3008F BODY MASS INDEX DOCD: CPT | Mod: CPTII,S$GLB,, | Performed by: INTERNAL MEDICINE

## 2021-01-25 PROCEDURE — 3288F PR FALLS RISK ASSESSMENT DOCUMENTED: ICD-10-PCS | Mod: CPTII,S$GLB,, | Performed by: INTERNAL MEDICINE

## 2021-01-25 PROCEDURE — 1159F PR MEDICATION LIST DOCUMENTED IN MEDICAL RECORD: ICD-10-PCS | Mod: S$GLB,,, | Performed by: INTERNAL MEDICINE

## 2021-01-25 PROCEDURE — 3008F PR BODY MASS INDEX (BMI) DOCUMENTED: ICD-10-PCS | Mod: CPTII,S$GLB,, | Performed by: INTERNAL MEDICINE

## 2021-01-25 PROCEDURE — 1126F PR PAIN SEVERITY QUANTIFIED, NO PAIN PRESENT: ICD-10-PCS | Mod: S$GLB,,, | Performed by: INTERNAL MEDICINE

## 2021-01-25 PROCEDURE — 1126F AMNT PAIN NOTED NONE PRSNT: CPT | Mod: S$GLB,,, | Performed by: INTERNAL MEDICINE

## 2021-01-25 PROCEDURE — 99214 PR OFFICE/OUTPT VISIT, EST, LEVL IV, 30-39 MIN: ICD-10-PCS | Mod: S$GLB,,, | Performed by: INTERNAL MEDICINE

## 2021-01-25 PROCEDURE — 3078F DIAST BP <80 MM HG: CPT | Mod: CPTII,S$GLB,, | Performed by: INTERNAL MEDICINE

## 2021-01-25 PROCEDURE — 1100F PTFALLS ASSESS-DOCD GE2>/YR: CPT | Mod: CPTII,S$GLB,, | Performed by: INTERNAL MEDICINE

## 2021-01-25 PROCEDURE — 3074F SYST BP LT 130 MM HG: CPT | Mod: CPTII,S$GLB,, | Performed by: INTERNAL MEDICINE

## 2021-01-25 PROCEDURE — 25000003 PHARM REV CODE 250: Performed by: INTERNAL MEDICINE

## 2021-01-25 PROCEDURE — 1159F MED LIST DOCD IN RCRD: CPT | Mod: S$GLB,,, | Performed by: INTERNAL MEDICINE

## 2021-01-25 PROCEDURE — 99214 OFFICE O/P EST MOD 30 MIN: CPT | Mod: S$GLB,,, | Performed by: INTERNAL MEDICINE

## 2021-01-25 PROCEDURE — 3078F PR MOST RECENT DIASTOLIC BLOOD PRESSURE < 80 MM HG: ICD-10-PCS | Mod: CPTII,S$GLB,, | Performed by: INTERNAL MEDICINE

## 2021-01-25 PROCEDURE — 63600175 PHARM REV CODE 636 W HCPCS: Performed by: INTERNAL MEDICINE

## 2021-01-25 PROCEDURE — 96523 IRRIG DRUG DELIVERY DEVICE: CPT

## 2021-01-25 PROCEDURE — A4216 STERILE WATER/SALINE, 10 ML: HCPCS | Performed by: INTERNAL MEDICINE

## 2021-01-25 PROCEDURE — 99999 PR PBB SHADOW E&M-EST. PATIENT-LVL V: CPT | Mod: PBBFAC,,, | Performed by: INTERNAL MEDICINE

## 2021-01-25 PROCEDURE — 3074F PR MOST RECENT SYSTOLIC BLOOD PRESSURE < 130 MM HG: ICD-10-PCS | Mod: CPTII,S$GLB,, | Performed by: INTERNAL MEDICINE

## 2021-01-25 PROCEDURE — 99999 PR PBB SHADOW E&M-EST. PATIENT-LVL V: ICD-10-PCS | Mod: PBBFAC,,, | Performed by: INTERNAL MEDICINE

## 2021-01-25 PROCEDURE — 1100F PR PT FALLS ASSESS DOC 2+ FALLS/FALL W/INJURY/YR: ICD-10-PCS | Mod: CPTII,S$GLB,, | Performed by: INTERNAL MEDICINE

## 2021-01-25 PROCEDURE — 3288F FALL RISK ASSESSMENT DOCD: CPT | Mod: CPTII,S$GLB,, | Performed by: INTERNAL MEDICINE

## 2021-01-25 RX ORDER — HEPARIN 100 UNIT/ML
500 SYRINGE INTRAVENOUS
Status: CANCELLED | OUTPATIENT
Start: 2021-01-25

## 2021-01-25 RX ORDER — SODIUM CHLORIDE 0.9 % (FLUSH) 0.9 %
10 SYRINGE (ML) INJECTION
Status: CANCELLED | OUTPATIENT
Start: 2021-01-25

## 2021-01-25 RX ORDER — HYDROCODONE BITARTRATE AND ACETAMINOPHEN 5; 325 MG/1; MG/1
TABLET ORAL
COMMUNITY
Start: 2021-01-12 | End: 2021-01-18

## 2021-01-25 RX ORDER — HEPARIN 100 UNIT/ML
500 SYRINGE INTRAVENOUS
Status: DISCONTINUED | OUTPATIENT
Start: 2021-01-25 | End: 2021-01-25 | Stop reason: HOSPADM

## 2021-01-25 RX ORDER — SODIUM CHLORIDE 0.9 % (FLUSH) 0.9 %
10 SYRINGE (ML) INJECTION
Status: DISCONTINUED | OUTPATIENT
Start: 2021-01-25 | End: 2021-01-25 | Stop reason: HOSPADM

## 2021-01-25 RX ORDER — HYDROCODONE BITARTRATE AND ACETAMINOPHEN 7.5; 325 MG/1; MG/1
TABLET ORAL 2 TIMES DAILY PRN
COMMUNITY
Start: 2020-11-17 | End: 2020-12-17

## 2021-01-25 RX ORDER — PREGABALIN 100 MG/1
1 CAPSULE ORAL 2 TIMES DAILY
COMMUNITY
Start: 2020-12-23 | End: 2021-09-13 | Stop reason: SDUPTHER

## 2021-01-25 RX ORDER — CEPHALEXIN 500 MG/1
CAPSULE ORAL
COMMUNITY
Start: 2021-01-12 | End: 2021-01-26

## 2021-01-25 RX ADMIN — SODIUM CHLORIDE, PRESERVATIVE FREE 10 ML: 5 INJECTION INTRAVENOUS at 03:01

## 2021-01-25 RX ADMIN — HEPARIN 500 UNITS: 100 SYRINGE at 03:01

## 2021-02-03 RX ORDER — PANTOPRAZOLE SODIUM 20 MG/1
20 TABLET, DELAYED RELEASE ORAL DAILY
Qty: 30 TABLET | Refills: 0 | OUTPATIENT
Start: 2021-02-03 | End: 2022-02-03

## 2021-02-22 ENCOUNTER — HOSPITAL ENCOUNTER (OUTPATIENT)
Dept: RADIOLOGY | Facility: HOSPITAL | Age: 71
Discharge: HOME | End: 2021-02-22
Attending: INTERNAL MEDICINE
Payer: MEDICARE

## 2021-02-22 ENCOUNTER — TELEPHONE (OUTPATIENT)
Dept: SURGERY | Facility: CLINIC | Age: 71
End: 2021-02-22

## 2021-02-22 DIAGNOSIS — C18.9 COLON ADENOCARCINOMA: ICD-10-CM

## 2021-02-22 PROCEDURE — 71260 CT THORAX DX C+: CPT | Mod: 26,,, | Performed by: RADIOLOGY

## 2021-02-22 PROCEDURE — 71260 CT THORAX DX C+: CPT | Mod: TC,PO

## 2021-02-22 PROCEDURE — 25500020 PHARM REV CODE 255: Mod: PO | Performed by: INTERNAL MEDICINE

## 2021-02-22 PROCEDURE — 71260 CT CHEST ABDOMEN PELVIS W W/O CONTRAST (XPD): ICD-10-PCS | Mod: 26,,, | Performed by: RADIOLOGY

## 2021-02-22 PROCEDURE — 74178 CT ABD&PLV WO CNTR FLWD CNTR: CPT | Mod: 26,,, | Performed by: RADIOLOGY

## 2021-02-22 PROCEDURE — 74178 CT CHEST ABDOMEN PELVIS W W/O CONTRAST (XPD): ICD-10-PCS | Mod: 26,,, | Performed by: RADIOLOGY

## 2021-02-22 RX ADMIN — IOHEXOL 30 ML: 350 INJECTION, SOLUTION INTRAVENOUS at 08:02

## 2021-02-22 RX ADMIN — IOHEXOL 75 ML: 350 INJECTION, SOLUTION INTRAVENOUS at 08:02

## 2021-02-23 ENCOUNTER — LAB VISIT (OUTPATIENT)
Dept: OTOLARYNGOLOGY | Facility: CLINIC | Age: 71
End: 2021-02-23
Payer: MEDICARE

## 2021-02-23 DIAGNOSIS — C19 ADENOCARCINOMA OF RECTOSIGMOID JUNCTION: ICD-10-CM

## 2021-02-23 DIAGNOSIS — Z01.818 PRE-OP TESTING: ICD-10-CM

## 2021-02-23 PROCEDURE — U0003 INFECTIOUS AGENT DETECTION BY NUCLEIC ACID (DNA OR RNA); SEVERE ACUTE RESPIRATORY SYNDROME CORONAVIRUS 2 (SARS-COV-2) (CORONAVIRUS DISEASE [COVID-19]), AMPLIFIED PROBE TECHNIQUE, MAKING USE OF HIGH THROUGHPUT TECHNOLOGIES AS DESCRIBED BY CMS-2020-01-R: HCPCS

## 2021-02-23 PROCEDURE — U0005 INFEC AGEN DETEC AMPLI PROBE: HCPCS

## 2021-02-24 ENCOUNTER — TELEPHONE (OUTPATIENT)
Dept: ENDOSCOPY | Facility: HOSPITAL | Age: 71
End: 2021-02-24

## 2021-02-24 LAB — SARS-COV-2 RNA RESP QL NAA+PROBE: NOT DETECTED

## 2021-02-25 ENCOUNTER — OFFICE VISIT (OUTPATIENT)
Dept: HEMATOLOGY/ONCOLOGY | Facility: CLINIC | Age: 71
End: 2021-02-25
Payer: MEDICARE

## 2021-02-25 VITALS
WEIGHT: 110.88 LBS | SYSTOLIC BLOOD PRESSURE: 122 MMHG | BODY MASS INDEX: 23.92 KG/M2 | HEIGHT: 57 IN | TEMPERATURE: 97 F | OXYGEN SATURATION: 97 % | DIASTOLIC BLOOD PRESSURE: 57 MMHG | HEART RATE: 60 BPM

## 2021-02-25 DIAGNOSIS — T45.1X5A PERIPHERAL NEUROPATHY DUE TO CHEMOTHERAPY: ICD-10-CM

## 2021-02-25 DIAGNOSIS — C18.9 COLON ADENOCARCINOMA: Primary | ICD-10-CM

## 2021-02-25 DIAGNOSIS — G62.0 PERIPHERAL NEUROPATHY DUE TO CHEMOTHERAPY: ICD-10-CM

## 2021-02-25 PROCEDURE — 3008F BODY MASS INDEX DOCD: CPT | Mod: CPTII,S$GLB,, | Performed by: INTERNAL MEDICINE

## 2021-02-25 PROCEDURE — 3078F DIAST BP <80 MM HG: CPT | Mod: CPTII,S$GLB,, | Performed by: INTERNAL MEDICINE

## 2021-02-25 PROCEDURE — 1101F PT FALLS ASSESS-DOCD LE1/YR: CPT | Mod: CPTII,S$GLB,, | Performed by: INTERNAL MEDICINE

## 2021-02-25 PROCEDURE — 99999 PR PBB SHADOW E&M-EST. PATIENT-LVL V: CPT | Mod: PBBFAC,,, | Performed by: INTERNAL MEDICINE

## 2021-02-25 PROCEDURE — 3008F PR BODY MASS INDEX (BMI) DOCUMENTED: ICD-10-PCS | Mod: CPTII,S$GLB,, | Performed by: INTERNAL MEDICINE

## 2021-02-25 PROCEDURE — 1159F PR MEDICATION LIST DOCUMENTED IN MEDICAL RECORD: ICD-10-PCS | Mod: S$GLB,,, | Performed by: INTERNAL MEDICINE

## 2021-02-25 PROCEDURE — 3074F PR MOST RECENT SYSTOLIC BLOOD PRESSURE < 130 MM HG: ICD-10-PCS | Mod: CPTII,S$GLB,, | Performed by: INTERNAL MEDICINE

## 2021-02-25 PROCEDURE — 99215 PR OFFICE/OUTPT VISIT, EST, LEVL V, 40-54 MIN: ICD-10-PCS | Mod: S$GLB,,, | Performed by: INTERNAL MEDICINE

## 2021-02-25 PROCEDURE — 3074F SYST BP LT 130 MM HG: CPT | Mod: CPTII,S$GLB,, | Performed by: INTERNAL MEDICINE

## 2021-02-25 PROCEDURE — 3288F FALL RISK ASSESSMENT DOCD: CPT | Mod: CPTII,S$GLB,, | Performed by: INTERNAL MEDICINE

## 2021-02-25 PROCEDURE — 3288F PR FALLS RISK ASSESSMENT DOCUMENTED: ICD-10-PCS | Mod: CPTII,S$GLB,, | Performed by: INTERNAL MEDICINE

## 2021-02-25 PROCEDURE — 99215 OFFICE O/P EST HI 40 MIN: CPT | Mod: S$GLB,,, | Performed by: INTERNAL MEDICINE

## 2021-02-25 PROCEDURE — 1101F PR PT FALLS ASSESS DOC 0-1 FALLS W/OUT INJ PAST YR: ICD-10-PCS | Mod: CPTII,S$GLB,, | Performed by: INTERNAL MEDICINE

## 2021-02-25 PROCEDURE — 1159F MED LIST DOCD IN RCRD: CPT | Mod: S$GLB,,, | Performed by: INTERNAL MEDICINE

## 2021-02-25 PROCEDURE — 1126F PR PAIN SEVERITY QUANTIFIED, NO PAIN PRESENT: ICD-10-PCS | Mod: S$GLB,,, | Performed by: INTERNAL MEDICINE

## 2021-02-25 PROCEDURE — 3078F PR MOST RECENT DIASTOLIC BLOOD PRESSURE < 80 MM HG: ICD-10-PCS | Mod: CPTII,S$GLB,, | Performed by: INTERNAL MEDICINE

## 2021-02-25 PROCEDURE — 1126F AMNT PAIN NOTED NONE PRSNT: CPT | Mod: S$GLB,,, | Performed by: INTERNAL MEDICINE

## 2021-02-25 PROCEDURE — 99999 PR PBB SHADOW E&M-EST. PATIENT-LVL V: ICD-10-PCS | Mod: PBBFAC,,, | Performed by: INTERNAL MEDICINE

## 2021-02-25 RX ORDER — ENOXAPARIN SODIUM 100 MG/ML
INJECTION SUBCUTANEOUS
COMMUNITY
Start: 2021-02-22 | End: 2021-02-26

## 2021-02-26 ENCOUNTER — HOSPITAL ENCOUNTER (OUTPATIENT)
Facility: HOSPITAL | Age: 71
Discharge: HOME | End: 2021-02-26
Attending: COLON & RECTAL SURGERY | Admitting: COLON & RECTAL SURGERY
Payer: MEDICARE

## 2021-02-26 ENCOUNTER — ANESTHESIA (OUTPATIENT)
Dept: ENDOSCOPY | Facility: HOSPITAL | Age: 71
End: 2021-02-26
Payer: MEDICARE

## 2021-02-26 ENCOUNTER — ANESTHESIA EVENT (OUTPATIENT)
Dept: ENDOSCOPY | Facility: HOSPITAL | Age: 71
End: 2021-02-26
Payer: MEDICARE

## 2021-02-26 VITALS
BODY MASS INDEX: 23.8 KG/M2 | TEMPERATURE: 97 F | DIASTOLIC BLOOD PRESSURE: 83 MMHG | WEIGHT: 110 LBS | HEART RATE: 69 BPM | SYSTOLIC BLOOD PRESSURE: 147 MMHG | RESPIRATION RATE: 16 BRPM | OXYGEN SATURATION: 95 %

## 2021-02-26 DIAGNOSIS — Z12.11 SCREENING FOR COLON CANCER: ICD-10-CM

## 2021-02-26 DIAGNOSIS — C18.9 COLON ADENOCARCINOMA: Primary | ICD-10-CM

## 2021-02-26 PROCEDURE — 45380 COLONOSCOPY AND BIOPSY: CPT | Mod: 59,,, | Performed by: COLON & RECTAL SURGERY

## 2021-02-26 PROCEDURE — 37000009 HC ANESTHESIA EA ADD 15 MINS: Performed by: COLON & RECTAL SURGERY

## 2021-02-26 PROCEDURE — 27201012 HC FORCEPS, HOT/COLD, DISP: Performed by: COLON & RECTAL SURGERY

## 2021-02-26 PROCEDURE — 45380 PR COLONOSCOPY,BIOPSY: ICD-10-PCS | Mod: 59,,, | Performed by: COLON & RECTAL SURGERY

## 2021-02-26 PROCEDURE — 88305 TISSUE EXAM BY PATHOLOGIST: CPT | Mod: 26,,, | Performed by: PATHOLOGY

## 2021-02-26 PROCEDURE — 45380 COLONOSCOPY AND BIOPSY: CPT | Mod: 59 | Performed by: COLON & RECTAL SURGERY

## 2021-02-26 PROCEDURE — 45385 PR COLONOSCOPY,REMV LESN,SNARE: ICD-10-PCS | Mod: PT,,, | Performed by: COLON & RECTAL SURGERY

## 2021-02-26 PROCEDURE — 27201089 HC SNARE, DISP (ANY): Performed by: COLON & RECTAL SURGERY

## 2021-02-26 PROCEDURE — 25000003 PHARM REV CODE 250: Performed by: NURSE ANESTHETIST, CERTIFIED REGISTERED

## 2021-02-26 PROCEDURE — 45385 COLONOSCOPY W/LESION REMOVAL: CPT | Performed by: COLON & RECTAL SURGERY

## 2021-02-26 PROCEDURE — 45385 COLONOSCOPY W/LESION REMOVAL: CPT | Mod: PT,,, | Performed by: COLON & RECTAL SURGERY

## 2021-02-26 PROCEDURE — 88305 TISSUE EXAM BY PATHOLOGIST: CPT | Performed by: PATHOLOGY

## 2021-02-26 PROCEDURE — 37000008 HC ANESTHESIA 1ST 15 MINUTES: Performed by: COLON & RECTAL SURGERY

## 2021-02-26 PROCEDURE — 88305 TISSUE EXAM BY PATHOLOGIST: ICD-10-PCS | Mod: 26,,, | Performed by: PATHOLOGY

## 2021-02-26 PROCEDURE — 63600175 PHARM REV CODE 636 W HCPCS: Performed by: NURSE ANESTHETIST, CERTIFIED REGISTERED

## 2021-02-26 RX ORDER — PROPOFOL 10 MG/ML
VIAL (ML) INTRAVENOUS
Status: DISCONTINUED | OUTPATIENT
Start: 2021-02-26 | End: 2021-02-26

## 2021-02-26 RX ORDER — SODIUM CHLORIDE, SODIUM LACTATE, POTASSIUM CHLORIDE, CALCIUM CHLORIDE 600; 310; 30; 20 MG/100ML; MG/100ML; MG/100ML; MG/100ML
INJECTION, SOLUTION INTRAVENOUS CONTINUOUS PRN
Status: DISCONTINUED | OUTPATIENT
Start: 2021-02-26 | End: 2021-02-26

## 2021-02-26 RX ORDER — PHENYLEPHRINE HYDROCHLORIDE 10 MG/ML
INJECTION INTRAVENOUS
Status: DISCONTINUED | OUTPATIENT
Start: 2021-02-26 | End: 2021-02-26

## 2021-02-26 RX ORDER — SODIUM CHLORIDE, SODIUM LACTATE, POTASSIUM CHLORIDE, CALCIUM CHLORIDE 600; 310; 30; 20 MG/100ML; MG/100ML; MG/100ML; MG/100ML
INJECTION, SOLUTION INTRAVENOUS CONTINUOUS
Status: DISCONTINUED | OUTPATIENT
Start: 2021-02-26 | End: 2021-02-26 | Stop reason: HOSPADM

## 2021-02-26 RX ORDER — LIDOCAINE HYDROCHLORIDE 10 MG/ML
INJECTION, SOLUTION EPIDURAL; INFILTRATION; INTRACAUDAL; PERINEURAL
Status: DISCONTINUED | OUTPATIENT
Start: 2021-02-26 | End: 2021-02-26

## 2021-02-26 RX ADMIN — PHENYLEPHRINE HYDROCHLORIDE 100 MCG: 10 INJECTION INTRAVENOUS at 08:02

## 2021-02-26 RX ADMIN — GLYCOPYRROLATE 0.2 MG: 0.2 INJECTION, SOLUTION INTRAMUSCULAR; INTRAVITREAL at 08:02

## 2021-02-26 RX ADMIN — SODIUM CHLORIDE, SODIUM LACTATE, POTASSIUM CHLORIDE, AND CALCIUM CHLORIDE: .6; .31; .03; .02 INJECTION, SOLUTION INTRAVENOUS at 08:02

## 2021-02-26 RX ADMIN — LIDOCAINE HYDROCHLORIDE 50 MG: 10 INJECTION, SOLUTION EPIDURAL; INFILTRATION; INTRACAUDAL; PERINEURAL at 08:02

## 2021-02-26 RX ADMIN — PROPOFOL 200 MG: 10 INJECTION, EMULSION INTRAVENOUS at 08:02

## 2021-03-03 ENCOUNTER — TELEPHONE (OUTPATIENT)
Dept: SURGERY | Facility: CLINIC | Age: 71
End: 2021-03-03

## 2021-03-03 LAB
FINAL PATHOLOGIC DIAGNOSIS: NORMAL
GROSS: NORMAL
Lab: NORMAL

## 2021-04-12 ENCOUNTER — OFFICE VISIT (OUTPATIENT)
Dept: SURGERY | Facility: CLINIC | Age: 71
End: 2021-04-12
Payer: MEDICARE

## 2021-04-12 VITALS
SYSTOLIC BLOOD PRESSURE: 117 MMHG | BODY MASS INDEX: 25.09 KG/M2 | WEIGHT: 115.94 LBS | HEART RATE: 64 BPM | DIASTOLIC BLOOD PRESSURE: 72 MMHG

## 2021-04-12 DIAGNOSIS — C19 ADENOCARCINOMA OF RECTOSIGMOID JUNCTION: Primary | ICD-10-CM

## 2021-04-12 PROCEDURE — 1126F PR PAIN SEVERITY QUANTIFIED, NO PAIN PRESENT: ICD-10-PCS | Mod: S$GLB,,, | Performed by: COLON & RECTAL SURGERY

## 2021-04-12 PROCEDURE — 3008F BODY MASS INDEX DOCD: CPT | Mod: CPTII,S$GLB,, | Performed by: COLON & RECTAL SURGERY

## 2021-04-12 PROCEDURE — 1100F PTFALLS ASSESS-DOCD GE2>/YR: CPT | Mod: CPTII,S$GLB,, | Performed by: COLON & RECTAL SURGERY

## 2021-04-12 PROCEDURE — 99499 UNLISTED E&M SERVICE: CPT | Mod: S$GLB,,, | Performed by: COLON & RECTAL SURGERY

## 2021-04-12 PROCEDURE — 99499 NO LOS: ICD-10-PCS | Mod: S$GLB,,, | Performed by: COLON & RECTAL SURGERY

## 2021-04-12 PROCEDURE — 1100F PR PT FALLS ASSESS DOC 2+ FALLS/FALL W/INJURY/YR: ICD-10-PCS | Mod: CPTII,S$GLB,, | Performed by: COLON & RECTAL SURGERY

## 2021-04-12 PROCEDURE — 3288F FALL RISK ASSESSMENT DOCD: CPT | Mod: CPTII,S$GLB,, | Performed by: COLON & RECTAL SURGERY

## 2021-04-12 PROCEDURE — 3008F PR BODY MASS INDEX (BMI) DOCUMENTED: ICD-10-PCS | Mod: CPTII,S$GLB,, | Performed by: COLON & RECTAL SURGERY

## 2021-04-12 PROCEDURE — 3288F PR FALLS RISK ASSESSMENT DOCUMENTED: ICD-10-PCS | Mod: CPTII,S$GLB,, | Performed by: COLON & RECTAL SURGERY

## 2021-04-12 PROCEDURE — 1126F AMNT PAIN NOTED NONE PRSNT: CPT | Mod: S$GLB,,, | Performed by: COLON & RECTAL SURGERY

## 2021-04-12 RX ORDER — DULOXETIN HYDROCHLORIDE 30 MG/1
30 CAPSULE, DELAYED RELEASE ORAL
COMMUNITY
Start: 2021-03-15 | End: 2022-09-02

## 2021-04-29 ENCOUNTER — PATIENT MESSAGE (OUTPATIENT)
Dept: RESEARCH | Facility: HOSPITAL | Age: 71
End: 2021-04-29

## 2021-05-24 ENCOUNTER — OFFICE VISIT (OUTPATIENT)
Dept: SURGERY | Facility: CLINIC | Age: 71
End: 2021-05-24
Payer: MEDICARE

## 2021-05-24 DIAGNOSIS — Z79.01 CHRONIC ANTICOAGULATION: Primary | ICD-10-CM

## 2021-05-24 PROCEDURE — 3288F PR FALLS RISK ASSESSMENT DOCUMENTED: ICD-10-PCS | Mod: CPTII,S$GLB,, | Performed by: COLON & RECTAL SURGERY

## 2021-05-24 PROCEDURE — 99499 UNLISTED E&M SERVICE: CPT | Mod: S$GLB,,, | Performed by: COLON & RECTAL SURGERY

## 2021-05-24 PROCEDURE — 99999 PR PBB SHADOW E&M-EST. PATIENT-LVL III: CPT | Mod: PBBFAC,,, | Performed by: COLON & RECTAL SURGERY

## 2021-05-24 PROCEDURE — 99499 NO LOS: ICD-10-PCS | Mod: S$GLB,,, | Performed by: COLON & RECTAL SURGERY

## 2021-05-24 PROCEDURE — 1101F PR PT FALLS ASSESS DOC 0-1 FALLS W/OUT INJ PAST YR: ICD-10-PCS | Mod: CPTII,S$GLB,, | Performed by: COLON & RECTAL SURGERY

## 2021-05-24 PROCEDURE — 3288F FALL RISK ASSESSMENT DOCD: CPT | Mod: CPTII,S$GLB,, | Performed by: COLON & RECTAL SURGERY

## 2021-05-24 PROCEDURE — 1101F PT FALLS ASSESS-DOCD LE1/YR: CPT | Mod: CPTII,S$GLB,, | Performed by: COLON & RECTAL SURGERY

## 2021-05-24 PROCEDURE — 99999 PR PBB SHADOW E&M-EST. PATIENT-LVL III: ICD-10-PCS | Mod: PBBFAC,,, | Performed by: COLON & RECTAL SURGERY

## 2021-05-26 ENCOUNTER — PATIENT MESSAGE (OUTPATIENT)
Dept: SURGERY | Facility: CLINIC | Age: 71
End: 2021-05-26

## 2021-06-03 ENCOUNTER — LAB VISIT (OUTPATIENT)
Dept: LAB | Facility: HOSPITAL | Age: 71
End: 2021-06-03
Attending: INTERNAL MEDICINE
Payer: MEDICARE

## 2021-06-03 DIAGNOSIS — C18.9 COLON ADENOCARCINOMA: ICD-10-CM

## 2021-06-03 LAB
ALBUMIN SERPL BCP-MCNC: 4.2 G/DL (ref 3.5–5.2)
ALP SERPL-CCNC: 166 U/L (ref 55–135)
ALT SERPL W/O P-5'-P-CCNC: 16 U/L (ref 10–44)
ANION GAP SERPL CALC-SCNC: 11 MMOL/L (ref 8–16)
AST SERPL-CCNC: 20 U/L (ref 10–40)
BASOPHILS # BLD AUTO: 0.02 K/UL (ref 0–0.2)
BASOPHILS NFR BLD: 0.6 % (ref 0–1.9)
BILIRUB SERPL-MCNC: 0.4 MG/DL (ref 0.1–1)
BUN SERPL-MCNC: 22 MG/DL (ref 8–23)
CALCIUM SERPL-MCNC: 9.8 MG/DL (ref 8.7–10.5)
CHLORIDE SERPL-SCNC: 101 MMOL/L (ref 95–110)
CO2 SERPL-SCNC: 29 MMOL/L (ref 23–29)
CREAT SERPL-MCNC: 1.1 MG/DL (ref 0.5–1.4)
DIFFERENTIAL METHOD: ABNORMAL
EOSINOPHIL # BLD AUTO: 0.1 K/UL (ref 0–0.5)
EOSINOPHIL NFR BLD: 1.6 % (ref 0–8)
ERYTHROCYTE [DISTWIDTH] IN BLOOD BY AUTOMATED COUNT: 15.1 % (ref 11.5–14.5)
EST. GFR  (AFRICAN AMERICAN): 59.2 ML/MIN/1.73 M^2
EST. GFR  (NON AFRICAN AMERICAN): 51.3 ML/MIN/1.73 M^2
GLUCOSE SERPL-MCNC: 75 MG/DL (ref 70–110)
HCT VFR BLD AUTO: 36.7 % (ref 37–48.5)
HGB BLD-MCNC: 11.8 G/DL (ref 12–16)
IMM GRANULOCYTES # BLD AUTO: 0.01 K/UL (ref 0–0.04)
IMM GRANULOCYTES NFR BLD AUTO: 0.3 % (ref 0–0.5)
LYMPHOCYTES # BLD AUTO: 1.2 K/UL (ref 1–4.8)
LYMPHOCYTES NFR BLD: 38.3 % (ref 18–48)
MCH RBC QN AUTO: 30.3 PG (ref 27–31)
MCHC RBC AUTO-ENTMCNC: 32.2 G/DL (ref 32–36)
MCV RBC AUTO: 94 FL (ref 82–98)
MONOCYTES # BLD AUTO: 0.3 K/UL (ref 0.3–1)
MONOCYTES NFR BLD: 9.3 % (ref 4–15)
NEUTROPHILS # BLD AUTO: 1.6 K/UL (ref 1.8–7.7)
NEUTROPHILS NFR BLD: 49.9 % (ref 38–73)
NRBC BLD-RTO: 0 /100 WBC
PLATELET # BLD AUTO: 149 K/UL (ref 150–450)
PMV BLD AUTO: 10 FL (ref 9.2–12.9)
POTASSIUM SERPL-SCNC: 4.7 MMOL/L (ref 3.5–5.1)
PROT SERPL-MCNC: 7.3 G/DL (ref 6–8.4)
RBC # BLD AUTO: 3.9 M/UL (ref 4–5.4)
SODIUM SERPL-SCNC: 141 MMOL/L (ref 136–145)
WBC # BLD AUTO: 3.11 K/UL (ref 3.9–12.7)

## 2021-06-03 PROCEDURE — 82378 CARCINOEMBRYONIC ANTIGEN: CPT | Performed by: INTERNAL MEDICINE

## 2021-06-03 PROCEDURE — 36415 COLL VENOUS BLD VENIPUNCTURE: CPT | Mod: PO | Performed by: INTERNAL MEDICINE

## 2021-06-03 PROCEDURE — 80053 COMPREHEN METABOLIC PANEL: CPT | Mod: PO | Performed by: INTERNAL MEDICINE

## 2021-06-03 PROCEDURE — 85025 COMPLETE CBC W/AUTO DIFF WBC: CPT | Mod: PO | Performed by: INTERNAL MEDICINE

## 2021-06-04 LAB — CEA SERPL-MCNC: 3.4 NG/ML (ref 0–5)

## 2021-06-05 ENCOUNTER — PATIENT MESSAGE (OUTPATIENT)
Dept: HEMATOLOGY/ONCOLOGY | Facility: CLINIC | Age: 71
End: 2021-06-05

## 2021-06-07 ENCOUNTER — OFFICE VISIT (OUTPATIENT)
Dept: HEMATOLOGY/ONCOLOGY | Facility: CLINIC | Age: 71
End: 2021-06-07
Payer: MEDICARE

## 2021-06-07 ENCOUNTER — INFUSION (OUTPATIENT)
Dept: INFUSION THERAPY | Facility: HOSPITAL | Age: 71
End: 2021-06-07
Attending: NURSE PRACTITIONER
Payer: MEDICARE

## 2021-06-07 VITALS
BODY MASS INDEX: 26.53 KG/M2 | SYSTOLIC BLOOD PRESSURE: 131 MMHG | WEIGHT: 122.56 LBS | TEMPERATURE: 99 F | HEART RATE: 59 BPM | DIASTOLIC BLOOD PRESSURE: 75 MMHG | OXYGEN SATURATION: 98 %

## 2021-06-07 DIAGNOSIS — T45.1X5A PERIPHERAL NEUROPATHY DUE TO CHEMOTHERAPY: ICD-10-CM

## 2021-06-07 DIAGNOSIS — D72.819 LEUKOPENIA, UNSPECIFIED TYPE: ICD-10-CM

## 2021-06-07 DIAGNOSIS — C18.9 COLON ADENOCARCINOMA: Primary | ICD-10-CM

## 2021-06-07 DIAGNOSIS — D12.2 BENIGN NEOPLASM OF ASCENDING COLON: Primary | ICD-10-CM

## 2021-06-07 DIAGNOSIS — D64.9 ANEMIA, UNSPECIFIED TYPE: ICD-10-CM

## 2021-06-07 DIAGNOSIS — G62.0 PERIPHERAL NEUROPATHY DUE TO CHEMOTHERAPY: ICD-10-CM

## 2021-06-07 PROCEDURE — 1100F PTFALLS ASSESS-DOCD GE2>/YR: CPT | Mod: CPTII,S$GLB,, | Performed by: INTERNAL MEDICINE

## 2021-06-07 PROCEDURE — 63600175 PHARM REV CODE 636 W HCPCS: Performed by: INTERNAL MEDICINE

## 2021-06-07 PROCEDURE — 25000003 PHARM REV CODE 250: Performed by: INTERNAL MEDICINE

## 2021-06-07 PROCEDURE — 3288F PR FALLS RISK ASSESSMENT DOCUMENTED: ICD-10-PCS | Mod: CPTII,S$GLB,, | Performed by: INTERNAL MEDICINE

## 2021-06-07 PROCEDURE — 3008F PR BODY MASS INDEX (BMI) DOCUMENTED: ICD-10-PCS | Mod: CPTII,S$GLB,, | Performed by: INTERNAL MEDICINE

## 2021-06-07 PROCEDURE — 99215 PR OFFICE/OUTPT VISIT, EST, LEVL V, 40-54 MIN: ICD-10-PCS | Mod: S$GLB,,, | Performed by: INTERNAL MEDICINE

## 2021-06-07 PROCEDURE — 1100F PR PT FALLS ASSESS DOC 2+ FALLS/FALL W/INJURY/YR: ICD-10-PCS | Mod: CPTII,S$GLB,, | Performed by: INTERNAL MEDICINE

## 2021-06-07 PROCEDURE — 1126F PR PAIN SEVERITY QUANTIFIED, NO PAIN PRESENT: ICD-10-PCS | Mod: S$GLB,,, | Performed by: INTERNAL MEDICINE

## 2021-06-07 PROCEDURE — 1126F AMNT PAIN NOTED NONE PRSNT: CPT | Mod: S$GLB,,, | Performed by: INTERNAL MEDICINE

## 2021-06-07 PROCEDURE — 3288F FALL RISK ASSESSMENT DOCD: CPT | Mod: CPTII,S$GLB,, | Performed by: INTERNAL MEDICINE

## 2021-06-07 PROCEDURE — 99215 OFFICE O/P EST HI 40 MIN: CPT | Mod: S$GLB,,, | Performed by: INTERNAL MEDICINE

## 2021-06-07 PROCEDURE — 96523 IRRIG DRUG DELIVERY DEVICE: CPT

## 2021-06-07 PROCEDURE — A4216 STERILE WATER/SALINE, 10 ML: HCPCS | Performed by: INTERNAL MEDICINE

## 2021-06-07 PROCEDURE — 1159F PR MEDICATION LIST DOCUMENTED IN MEDICAL RECORD: ICD-10-PCS | Mod: S$GLB,,, | Performed by: INTERNAL MEDICINE

## 2021-06-07 PROCEDURE — 3008F BODY MASS INDEX DOCD: CPT | Mod: CPTII,S$GLB,, | Performed by: INTERNAL MEDICINE

## 2021-06-07 PROCEDURE — 1159F MED LIST DOCD IN RCRD: CPT | Mod: S$GLB,,, | Performed by: INTERNAL MEDICINE

## 2021-06-07 PROCEDURE — 99999 PR PBB SHADOW E&M-EST. PATIENT-LVL IV: CPT | Mod: PBBFAC,,, | Performed by: INTERNAL MEDICINE

## 2021-06-07 PROCEDURE — 99999 PR PBB SHADOW E&M-EST. PATIENT-LVL IV: ICD-10-PCS | Mod: PBBFAC,,, | Performed by: INTERNAL MEDICINE

## 2021-06-07 RX ORDER — HEPARIN 100 UNIT/ML
500 SYRINGE INTRAVENOUS
Status: DISCONTINUED | OUTPATIENT
Start: 2021-06-07 | End: 2021-06-07 | Stop reason: HOSPADM

## 2021-06-07 RX ORDER — SODIUM CHLORIDE 0.9 % (FLUSH) 0.9 %
10 SYRINGE (ML) INJECTION
Status: DISCONTINUED | OUTPATIENT
Start: 2021-06-07 | End: 2021-06-07 | Stop reason: HOSPADM

## 2021-06-07 RX ORDER — HEPARIN 100 UNIT/ML
500 SYRINGE INTRAVENOUS
OUTPATIENT
Start: 2021-06-07

## 2021-06-07 RX ORDER — SODIUM CHLORIDE 0.9 % (FLUSH) 0.9 %
10 SYRINGE (ML) INJECTION
OUTPATIENT
Start: 2021-06-07

## 2021-06-07 RX ADMIN — HEPARIN 500 UNITS: 100 SYRINGE at 03:06

## 2021-06-07 RX ADMIN — SODIUM CHLORIDE, PRESERVATIVE FREE 10 ML: 5 INJECTION INTRAVENOUS at 03:06

## 2021-06-14 ENCOUNTER — TELEPHONE (OUTPATIENT)
Dept: SURGERY | Facility: CLINIC | Age: 71
End: 2021-06-14

## 2021-06-18 ENCOUNTER — TELEPHONE (OUTPATIENT)
Dept: SURGERY | Facility: CLINIC | Age: 71
End: 2021-06-18

## 2021-07-13 NOTE — TELEPHONE ENCOUNTER
Called  And left voicemail message advising pt of her 2 week Post Op appt with Dr Smith on Monday 3/2/2020, @[address], [place] location, [address]. [address]. Left my name and call back number [phone] should she need to reschedule this appt     ----- Message from Hafsa Ibarra RN sent at 2/16/2020 11:00 AM CST -----  Patient needs 2 week post op appointment. I was unable to make this appointment. Can you please assist the patient with this appointment.   Thank you,   Hafsa MATTHEWS  Med/surg    
intact

## 2021-08-09 ENCOUNTER — OFFICE VISIT (OUTPATIENT)
Dept: SURGERY | Facility: CLINIC | Age: 71
End: 2021-08-09
Payer: MEDICARE

## 2021-08-09 ENCOUNTER — LAB VISIT (OUTPATIENT)
Dept: LAB | Facility: HOSPITAL | Age: 71
End: 2021-08-09
Attending: COLON & RECTAL SURGERY
Payer: MEDICARE

## 2021-08-09 VITALS
WEIGHT: 127.88 LBS | SYSTOLIC BLOOD PRESSURE: 117 MMHG | BODY MASS INDEX: 27.67 KG/M2 | TEMPERATURE: 99 F | DIASTOLIC BLOOD PRESSURE: 78 MMHG | HEART RATE: 63 BPM

## 2021-08-09 DIAGNOSIS — Z79.01 CHRONIC ANTICOAGULATION: ICD-10-CM

## 2021-08-09 DIAGNOSIS — K76.9 LIVER LESION: ICD-10-CM

## 2021-08-09 DIAGNOSIS — Z79.01 CHRONIC ANTICOAGULATION: Primary | ICD-10-CM

## 2021-08-09 DIAGNOSIS — Z01.818 PRE-OP TESTING: Primary | ICD-10-CM

## 2021-08-09 DIAGNOSIS — C19 ADENOCARCINOMA OF RECTOSIGMOID JUNCTION: ICD-10-CM

## 2021-08-09 LAB
INR PPP: 1.4 (ref 0.8–1.2)
PROTHROMBIN TIME: 15.1 SEC (ref 9–12.5)

## 2021-08-09 PROCEDURE — 99999 PR PBB SHADOW E&M-EST. PATIENT-LVL III: CPT | Mod: PBBFAC,,, | Performed by: COLON & RECTAL SURGERY

## 2021-08-09 PROCEDURE — 3074F PR MOST RECENT SYSTOLIC BLOOD PRESSURE < 130 MM HG: ICD-10-PCS | Mod: CPTII,S$GLB,, | Performed by: COLON & RECTAL SURGERY

## 2021-08-09 PROCEDURE — 99214 OFFICE O/P EST MOD 30 MIN: CPT | Mod: 25,S$GLB,, | Performed by: COLON & RECTAL SURGERY

## 2021-08-09 PROCEDURE — 36415 COLL VENOUS BLD VENIPUNCTURE: CPT | Performed by: COLON & RECTAL SURGERY

## 2021-08-09 PROCEDURE — 1159F MED LIST DOCD IN RCRD: CPT | Mod: CPTII,S$GLB,, | Performed by: COLON & RECTAL SURGERY

## 2021-08-09 PROCEDURE — 3078F PR MOST RECENT DIASTOLIC BLOOD PRESSURE < 80 MM HG: ICD-10-PCS | Mod: CPTII,S$GLB,, | Performed by: COLON & RECTAL SURGERY

## 2021-08-09 PROCEDURE — 1126F PR PAIN SEVERITY QUANTIFIED, NO PAIN PRESENT: ICD-10-PCS | Mod: CPTII,S$GLB,, | Performed by: COLON & RECTAL SURGERY

## 2021-08-09 PROCEDURE — 3074F SYST BP LT 130 MM HG: CPT | Mod: CPTII,S$GLB,, | Performed by: COLON & RECTAL SURGERY

## 2021-08-09 PROCEDURE — 99214 PR OFFICE/OUTPT VISIT, EST, LEVL IV, 30-39 MIN: ICD-10-PCS | Mod: 25,S$GLB,, | Performed by: COLON & RECTAL SURGERY

## 2021-08-09 PROCEDURE — 3078F DIAST BP <80 MM HG: CPT | Mod: CPTII,S$GLB,, | Performed by: COLON & RECTAL SURGERY

## 2021-08-09 PROCEDURE — 3008F BODY MASS INDEX DOCD: CPT | Mod: CPTII,S$GLB,, | Performed by: COLON & RECTAL SURGERY

## 2021-08-09 PROCEDURE — 1160F RVW MEDS BY RX/DR IN RCRD: CPT | Mod: CPTII,S$GLB,, | Performed by: COLON & RECTAL SURGERY

## 2021-08-09 PROCEDURE — 1126F AMNT PAIN NOTED NONE PRSNT: CPT | Mod: CPTII,S$GLB,, | Performed by: COLON & RECTAL SURGERY

## 2021-08-09 PROCEDURE — 1159F PR MEDICATION LIST DOCUMENTED IN MEDICAL RECORD: ICD-10-PCS | Mod: CPTII,S$GLB,, | Performed by: COLON & RECTAL SURGERY

## 2021-08-09 PROCEDURE — 3008F PR BODY MASS INDEX (BMI) DOCUMENTED: ICD-10-PCS | Mod: CPTII,S$GLB,, | Performed by: COLON & RECTAL SURGERY

## 2021-08-09 PROCEDURE — 99999 PR PBB SHADOW E&M-EST. PATIENT-LVL III: ICD-10-PCS | Mod: PBBFAC,,, | Performed by: COLON & RECTAL SURGERY

## 2021-08-09 PROCEDURE — 36590 PR REMOVAL TUNNELED CV CATH W SUBQ PORT OR PUMP: ICD-10-PCS | Mod: S$GLB,,, | Performed by: COLON & RECTAL SURGERY

## 2021-08-09 PROCEDURE — 1160F PR REVIEW ALL MEDS BY PRESCRIBER/CLIN PHARMACIST DOCUMENTED: ICD-10-PCS | Mod: CPTII,S$GLB,, | Performed by: COLON & RECTAL SURGERY

## 2021-08-09 PROCEDURE — 85610 PROTHROMBIN TIME: CPT | Performed by: COLON & RECTAL SURGERY

## 2021-08-09 PROCEDURE — 36590 REMOVAL TUNNELED CV CATH: CPT | Mod: S$GLB,,, | Performed by: COLON & RECTAL SURGERY

## 2021-09-03 ENCOUNTER — TELEPHONE (OUTPATIENT)
Dept: RADIOLOGY | Facility: HOSPITAL | Age: 71
End: 2021-09-03

## 2021-09-07 ENCOUNTER — HOSPITAL ENCOUNTER (OUTPATIENT)
Dept: RADIOLOGY | Facility: HOSPITAL | Age: 71
Discharge: HOME | End: 2021-09-07
Attending: INTERNAL MEDICINE
Payer: MEDICARE

## 2021-09-07 DIAGNOSIS — C18.9 COLON ADENOCARCINOMA: ICD-10-CM

## 2021-09-07 PROCEDURE — 71260 CT THORAX DX C+: CPT | Mod: 26,,, | Performed by: RADIOLOGY

## 2021-09-07 PROCEDURE — 74177 CT ABD & PELVIS W/CONTRAST: CPT | Mod: 26,,, | Performed by: RADIOLOGY

## 2021-09-07 PROCEDURE — 71260 CT THORAX DX C+: CPT | Mod: TC,PO

## 2021-09-07 PROCEDURE — 74177 CT CHEST ABDOMEN PELVIS WITH CONTRAST (XPD): ICD-10-PCS | Mod: 26,,, | Performed by: RADIOLOGY

## 2021-09-07 PROCEDURE — 74177 CT ABD & PELVIS W/CONTRAST: CPT | Mod: TC,PO

## 2021-09-07 PROCEDURE — 25500020 PHARM REV CODE 255: Mod: PO | Performed by: INTERNAL MEDICINE

## 2021-09-07 PROCEDURE — 71260 CT CHEST ABDOMEN PELVIS WITH CONTRAST (XPD): ICD-10-PCS | Mod: 26,,, | Performed by: RADIOLOGY

## 2021-09-07 RX ADMIN — IOHEXOL 75 ML: 350 INJECTION, SOLUTION INTRAVENOUS at 10:09

## 2021-09-07 RX ADMIN — IOHEXOL 30 ML: 350 INJECTION, SOLUTION INTRAVENOUS at 10:09

## 2021-09-13 ENCOUNTER — OFFICE VISIT (OUTPATIENT)
Dept: HEMATOLOGY/ONCOLOGY | Facility: CLINIC | Age: 71
End: 2021-09-13
Payer: MEDICARE

## 2021-09-13 VITALS
HEART RATE: 73 BPM | HEIGHT: 58 IN | SYSTOLIC BLOOD PRESSURE: 134 MMHG | WEIGHT: 128.31 LBS | OXYGEN SATURATION: 98 % | TEMPERATURE: 98 F | DIASTOLIC BLOOD PRESSURE: 83 MMHG | BODY MASS INDEX: 26.93 KG/M2

## 2021-09-13 DIAGNOSIS — G62.0 PERIPHERAL NEUROPATHY DUE TO CHEMOTHERAPY: ICD-10-CM

## 2021-09-13 DIAGNOSIS — F34.1 DYSTHYMIA: ICD-10-CM

## 2021-09-13 DIAGNOSIS — D72.819 LEUKOPENIA, UNSPECIFIED TYPE: ICD-10-CM

## 2021-09-13 DIAGNOSIS — C18.9 COLON ADENOCARCINOMA: Primary | ICD-10-CM

## 2021-09-13 DIAGNOSIS — T45.1X5A PERIPHERAL NEUROPATHY DUE TO CHEMOTHERAPY: ICD-10-CM

## 2021-09-13 PROCEDURE — 1159F PR MEDICATION LIST DOCUMENTED IN MEDICAL RECORD: ICD-10-PCS | Mod: CPTII,S$GLB,, | Performed by: INTERNAL MEDICINE

## 2021-09-13 PROCEDURE — 1101F PT FALLS ASSESS-DOCD LE1/YR: CPT | Mod: CPTII,S$GLB,, | Performed by: INTERNAL MEDICINE

## 2021-09-13 PROCEDURE — 3079F PR MOST RECENT DIASTOLIC BLOOD PRESSURE 80-89 MM HG: ICD-10-PCS | Mod: CPTII,S$GLB,, | Performed by: INTERNAL MEDICINE

## 2021-09-13 PROCEDURE — 3008F BODY MASS INDEX DOCD: CPT | Mod: CPTII,S$GLB,, | Performed by: INTERNAL MEDICINE

## 2021-09-13 PROCEDURE — 1126F PR PAIN SEVERITY QUANTIFIED, NO PAIN PRESENT: ICD-10-PCS | Mod: CPTII,S$GLB,, | Performed by: INTERNAL MEDICINE

## 2021-09-13 PROCEDURE — 1126F AMNT PAIN NOTED NONE PRSNT: CPT | Mod: CPTII,S$GLB,, | Performed by: INTERNAL MEDICINE

## 2021-09-13 PROCEDURE — 99999 PR PBB SHADOW E&M-EST. PATIENT-LVL IV: ICD-10-PCS | Mod: PBBFAC,,, | Performed by: INTERNAL MEDICINE

## 2021-09-13 PROCEDURE — 3079F DIAST BP 80-89 MM HG: CPT | Mod: CPTII,S$GLB,, | Performed by: INTERNAL MEDICINE

## 2021-09-13 PROCEDURE — 1160F PR REVIEW ALL MEDS BY PRESCRIBER/CLIN PHARMACIST DOCUMENTED: ICD-10-PCS | Mod: CPTII,S$GLB,, | Performed by: INTERNAL MEDICINE

## 2021-09-13 PROCEDURE — 1160F RVW MEDS BY RX/DR IN RCRD: CPT | Mod: CPTII,S$GLB,, | Performed by: INTERNAL MEDICINE

## 2021-09-13 PROCEDURE — 99214 PR OFFICE/OUTPT VISIT, EST, LEVL IV, 30-39 MIN: ICD-10-PCS | Mod: S$GLB,,, | Performed by: INTERNAL MEDICINE

## 2021-09-13 PROCEDURE — 3075F PR MOST RECENT SYSTOLIC BLOOD PRESS GE 130-139MM HG: ICD-10-PCS | Mod: CPTII,S$GLB,, | Performed by: INTERNAL MEDICINE

## 2021-09-13 PROCEDURE — 99999 PR PBB SHADOW E&M-EST. PATIENT-LVL IV: CPT | Mod: PBBFAC,,, | Performed by: INTERNAL MEDICINE

## 2021-09-13 PROCEDURE — 3008F PR BODY MASS INDEX (BMI) DOCUMENTED: ICD-10-PCS | Mod: CPTII,S$GLB,, | Performed by: INTERNAL MEDICINE

## 2021-09-13 PROCEDURE — 1101F PR PT FALLS ASSESS DOC 0-1 FALLS W/OUT INJ PAST YR: ICD-10-PCS | Mod: CPTII,S$GLB,, | Performed by: INTERNAL MEDICINE

## 2021-09-13 PROCEDURE — 1159F MED LIST DOCD IN RCRD: CPT | Mod: CPTII,S$GLB,, | Performed by: INTERNAL MEDICINE

## 2021-09-13 PROCEDURE — 3075F SYST BP GE 130 - 139MM HG: CPT | Mod: CPTII,S$GLB,, | Performed by: INTERNAL MEDICINE

## 2021-09-13 PROCEDURE — 3288F PR FALLS RISK ASSESSMENT DOCUMENTED: ICD-10-PCS | Mod: CPTII,S$GLB,, | Performed by: INTERNAL MEDICINE

## 2021-09-13 PROCEDURE — 3288F FALL RISK ASSESSMENT DOCD: CPT | Mod: CPTII,S$GLB,, | Performed by: INTERNAL MEDICINE

## 2021-09-13 PROCEDURE — 99214 OFFICE O/P EST MOD 30 MIN: CPT | Mod: S$GLB,,, | Performed by: INTERNAL MEDICINE

## 2021-09-13 RX ORDER — PREGABALIN 100 MG/1
100 CAPSULE ORAL 2 TIMES DAILY
Qty: 60 CAPSULE | Refills: 2 | Status: SHIPPED | OUTPATIENT
Start: 2021-09-13 | End: 2021-12-30

## 2021-09-14 ENCOUNTER — TELEPHONE (OUTPATIENT)
Dept: HEMATOLOGY/ONCOLOGY | Facility: CLINIC | Age: 71
End: 2021-09-14

## 2021-11-08 ENCOUNTER — OFFICE VISIT (OUTPATIENT)
Dept: SURGERY | Facility: CLINIC | Age: 71
End: 2021-11-08
Payer: MEDICARE

## 2021-11-08 VITALS
DIASTOLIC BLOOD PRESSURE: 79 MMHG | TEMPERATURE: 99 F | WEIGHT: 131.63 LBS | SYSTOLIC BLOOD PRESSURE: 138 MMHG | HEART RATE: 66 BPM | BODY MASS INDEX: 27.51 KG/M2

## 2021-11-08 DIAGNOSIS — K76.9 LIVER LESION: ICD-10-CM

## 2021-11-08 DIAGNOSIS — C20 RECTAL ADENOCARCINOMA: ICD-10-CM

## 2021-11-08 DIAGNOSIS — C19 ADENOCARCINOMA OF RECTOSIGMOID JUNCTION: Primary | ICD-10-CM

## 2021-11-08 PROCEDURE — 3075F PR MOST RECENT SYSTOLIC BLOOD PRESS GE 130-139MM HG: ICD-10-PCS | Mod: CPTII,S$GLB,, | Performed by: COLON & RECTAL SURGERY

## 2021-11-08 PROCEDURE — 1160F RVW MEDS BY RX/DR IN RCRD: CPT | Mod: CPTII,S$GLB,, | Performed by: COLON & RECTAL SURGERY

## 2021-11-08 PROCEDURE — 1126F AMNT PAIN NOTED NONE PRSNT: CPT | Mod: CPTII,S$GLB,, | Performed by: COLON & RECTAL SURGERY

## 2021-11-08 PROCEDURE — 1160F PR REVIEW ALL MEDS BY PRESCRIBER/CLIN PHARMACIST DOCUMENTED: ICD-10-PCS | Mod: CPTII,S$GLB,, | Performed by: COLON & RECTAL SURGERY

## 2021-11-08 PROCEDURE — 1159F MED LIST DOCD IN RCRD: CPT | Mod: CPTII,S$GLB,, | Performed by: COLON & RECTAL SURGERY

## 2021-11-08 PROCEDURE — 99999 PR PBB SHADOW E&M-EST. PATIENT-LVL III: ICD-10-PCS | Mod: PBBFAC,,, | Performed by: COLON & RECTAL SURGERY

## 2021-11-08 PROCEDURE — 3078F PR MOST RECENT DIASTOLIC BLOOD PRESSURE < 80 MM HG: ICD-10-PCS | Mod: CPTII,S$GLB,, | Performed by: COLON & RECTAL SURGERY

## 2021-11-08 PROCEDURE — 1126F PR PAIN SEVERITY QUANTIFIED, NO PAIN PRESENT: ICD-10-PCS | Mod: CPTII,S$GLB,, | Performed by: COLON & RECTAL SURGERY

## 2021-11-08 PROCEDURE — 99214 PR OFFICE/OUTPT VISIT, EST, LEVL IV, 30-39 MIN: ICD-10-PCS | Mod: S$GLB,,, | Performed by: COLON & RECTAL SURGERY

## 2021-11-08 PROCEDURE — 3078F DIAST BP <80 MM HG: CPT | Mod: CPTII,S$GLB,, | Performed by: COLON & RECTAL SURGERY

## 2021-11-08 PROCEDURE — 99999 PR PBB SHADOW E&M-EST. PATIENT-LVL III: CPT | Mod: PBBFAC,,, | Performed by: COLON & RECTAL SURGERY

## 2021-11-08 PROCEDURE — 3008F PR BODY MASS INDEX (BMI) DOCUMENTED: ICD-10-PCS | Mod: CPTII,S$GLB,, | Performed by: COLON & RECTAL SURGERY

## 2021-11-08 PROCEDURE — 3008F BODY MASS INDEX DOCD: CPT | Mod: CPTII,S$GLB,, | Performed by: COLON & RECTAL SURGERY

## 2021-11-08 PROCEDURE — 99214 OFFICE O/P EST MOD 30 MIN: CPT | Mod: S$GLB,,, | Performed by: COLON & RECTAL SURGERY

## 2021-11-08 PROCEDURE — 1159F PR MEDICATION LIST DOCUMENTED IN MEDICAL RECORD: ICD-10-PCS | Mod: CPTII,S$GLB,, | Performed by: COLON & RECTAL SURGERY

## 2021-11-08 PROCEDURE — 3075F SYST BP GE 130 - 139MM HG: CPT | Mod: CPTII,S$GLB,, | Performed by: COLON & RECTAL SURGERY

## 2021-12-29 ENCOUNTER — HOSPITAL ENCOUNTER (OUTPATIENT)
Dept: RADIOLOGY | Facility: HOSPITAL | Age: 71
Discharge: HOME | End: 2021-12-29
Attending: NURSE PRACTITIONER
Payer: MEDICARE

## 2021-12-29 DIAGNOSIS — R07.89 OTHER CHEST PAIN: Primary | ICD-10-CM

## 2021-12-29 DIAGNOSIS — R07.89 OTHER CHEST PAIN: ICD-10-CM

## 2021-12-29 PROCEDURE — 71046 XR CHEST PA AND LATERAL: ICD-10-PCS | Mod: 26,,, | Performed by: RADIOLOGY

## 2021-12-29 PROCEDURE — 71046 X-RAY EXAM CHEST 2 VIEWS: CPT | Mod: TC,PO

## 2021-12-29 PROCEDURE — 71046 X-RAY EXAM CHEST 2 VIEWS: CPT | Mod: 26,,, | Performed by: RADIOLOGY

## 2022-01-25 ENCOUNTER — LAB VISIT (OUTPATIENT)
Dept: LAB | Facility: HOSPITAL | Age: 72
End: 2022-01-25
Attending: INTERNAL MEDICINE
Payer: MEDICARE

## 2022-01-25 DIAGNOSIS — C18.9 COLON ADENOCARCINOMA: ICD-10-CM

## 2022-01-25 LAB
ALBUMIN SERPL BCP-MCNC: 4.4 G/DL (ref 3.5–5.2)
ALBUMIN SERPL BCP-MCNC: 4.4 G/DL (ref 3.5–5.2)
ALP SERPL-CCNC: 98 U/L (ref 55–135)
ALP SERPL-CCNC: 98 U/L (ref 55–135)
ALT SERPL W/O P-5'-P-CCNC: 12 U/L (ref 10–44)
ALT SERPL W/O P-5'-P-CCNC: 12 U/L (ref 10–44)
ANION GAP SERPL CALC-SCNC: 14 MMOL/L (ref 8–16)
ANION GAP SERPL CALC-SCNC: 14 MMOL/L (ref 8–16)
AST SERPL-CCNC: 16 U/L (ref 10–40)
AST SERPL-CCNC: 16 U/L (ref 10–40)
BASOPHILS # BLD AUTO: 0.02 K/UL (ref 0–0.2)
BASOPHILS NFR BLD: 0.5 % (ref 0–1.9)
BILIRUB SERPL-MCNC: 0.4 MG/DL (ref 0.1–1)
BILIRUB SERPL-MCNC: 0.4 MG/DL (ref 0.1–1)
BUN SERPL-MCNC: 23 MG/DL (ref 8–23)
BUN SERPL-MCNC: 23 MG/DL (ref 8–23)
CALCIUM SERPL-MCNC: 9.9 MG/DL (ref 8.7–10.5)
CALCIUM SERPL-MCNC: 9.9 MG/DL (ref 8.7–10.5)
CHLORIDE SERPL-SCNC: 102 MMOL/L (ref 95–110)
CHLORIDE SERPL-SCNC: 102 MMOL/L (ref 95–110)
CO2 SERPL-SCNC: 24 MMOL/L (ref 23–29)
CO2 SERPL-SCNC: 24 MMOL/L (ref 23–29)
CREAT SERPL-MCNC: 1.4 MG/DL (ref 0.5–1.4)
CREAT SERPL-MCNC: 1.4 MG/DL (ref 0.5–1.4)
DIFFERENTIAL METHOD: ABNORMAL
EOSINOPHIL # BLD AUTO: 0.1 K/UL (ref 0–0.5)
EOSINOPHIL NFR BLD: 1.9 % (ref 0–8)
ERYTHROCYTE [DISTWIDTH] IN BLOOD BY AUTOMATED COUNT: 12.6 % (ref 11.5–14.5)
EST. GFR  (AFRICAN AMERICAN): 43.9 ML/MIN/1.73 M^2
EST. GFR  (AFRICAN AMERICAN): 43.9 ML/MIN/1.73 M^2
EST. GFR  (NON AFRICAN AMERICAN): 38.1 ML/MIN/1.73 M^2
EST. GFR  (NON AFRICAN AMERICAN): 38.1 ML/MIN/1.73 M^2
GLUCOSE SERPL-MCNC: 110 MG/DL (ref 70–110)
GLUCOSE SERPL-MCNC: 110 MG/DL (ref 70–110)
HCT VFR BLD AUTO: 39.4 % (ref 37–48.5)
HGB BLD-MCNC: 13 G/DL (ref 12–16)
IMM GRANULOCYTES # BLD AUTO: 0 K/UL (ref 0–0.04)
IMM GRANULOCYTES NFR BLD AUTO: 0 % (ref 0–0.5)
LYMPHOCYTES # BLD AUTO: 1.8 K/UL (ref 1–4.8)
LYMPHOCYTES NFR BLD: 49.9 % (ref 18–48)
MCH RBC QN AUTO: 31.7 PG (ref 27–31)
MCHC RBC AUTO-ENTMCNC: 33 G/DL (ref 32–36)
MCV RBC AUTO: 96 FL (ref 82–98)
MONOCYTES # BLD AUTO: 0.3 K/UL (ref 0.3–1)
MONOCYTES NFR BLD: 9.2 % (ref 4–15)
NEUTROPHILS # BLD AUTO: 1.4 K/UL (ref 1.8–7.7)
NEUTROPHILS NFR BLD: 38.5 % (ref 38–73)
NRBC BLD-RTO: 0 /100 WBC
PLATELET # BLD AUTO: 189 K/UL (ref 150–450)
PMV BLD AUTO: 9.7 FL (ref 9.2–12.9)
POTASSIUM SERPL-SCNC: 3.9 MMOL/L (ref 3.5–5.1)
POTASSIUM SERPL-SCNC: 3.9 MMOL/L (ref 3.5–5.1)
PROT SERPL-MCNC: 7.3 G/DL (ref 6–8.4)
PROT SERPL-MCNC: 7.3 G/DL (ref 6–8.4)
RBC # BLD AUTO: 4.1 M/UL (ref 4–5.4)
SODIUM SERPL-SCNC: 140 MMOL/L (ref 136–145)
SODIUM SERPL-SCNC: 140 MMOL/L (ref 136–145)
WBC # BLD AUTO: 3.69 K/UL (ref 3.9–12.7)

## 2022-01-25 PROCEDURE — 80053 COMPREHEN METABOLIC PANEL: CPT | Mod: PO | Performed by: INTERNAL MEDICINE

## 2022-01-25 PROCEDURE — 36415 COLL VENOUS BLD VENIPUNCTURE: CPT | Mod: PO | Performed by: INTERNAL MEDICINE

## 2022-01-25 PROCEDURE — 85025 COMPLETE CBC W/AUTO DIFF WBC: CPT | Mod: PO | Performed by: INTERNAL MEDICINE

## 2022-01-25 PROCEDURE — 82378 CARCINOEMBRYONIC ANTIGEN: CPT | Performed by: INTERNAL MEDICINE

## 2022-01-26 LAB — CEA SERPL-MCNC: 3.2 NG/ML (ref 0–5)

## 2022-01-27 ENCOUNTER — OFFICE VISIT (OUTPATIENT)
Dept: HEMATOLOGY/ONCOLOGY | Facility: CLINIC | Age: 72
End: 2022-01-27
Payer: MEDICARE

## 2022-01-27 VITALS
OXYGEN SATURATION: 97 % | SYSTOLIC BLOOD PRESSURE: 141 MMHG | DIASTOLIC BLOOD PRESSURE: 82 MMHG | HEIGHT: 58 IN | RESPIRATION RATE: 18 BRPM | WEIGHT: 129.44 LBS | TEMPERATURE: 98 F | HEART RATE: 77 BPM | BODY MASS INDEX: 27.17 KG/M2

## 2022-01-27 DIAGNOSIS — D64.9 ANEMIA, UNSPECIFIED TYPE: ICD-10-CM

## 2022-01-27 DIAGNOSIS — G62.0 PERIPHERAL NEUROPATHY DUE TO CHEMOTHERAPY: ICD-10-CM

## 2022-01-27 DIAGNOSIS — T45.1X5A PERIPHERAL NEUROPATHY DUE TO CHEMOTHERAPY: ICD-10-CM

## 2022-01-27 DIAGNOSIS — C18.9 COLON ADENOCARCINOMA: Primary | ICD-10-CM

## 2022-01-27 DIAGNOSIS — N17.9 AKI (ACUTE KIDNEY INJURY): ICD-10-CM

## 2022-01-27 PROCEDURE — 3077F PR MOST RECENT SYSTOLIC BLOOD PRESSURE >= 140 MM HG: ICD-10-PCS | Mod: CPTII,S$GLB,, | Performed by: INTERNAL MEDICINE

## 2022-01-27 PROCEDURE — 99999 PR PBB SHADOW E&M-EST. PATIENT-LVL V: CPT | Mod: PBBFAC,,, | Performed by: INTERNAL MEDICINE

## 2022-01-27 PROCEDURE — 3079F DIAST BP 80-89 MM HG: CPT | Mod: CPTII,S$GLB,, | Performed by: INTERNAL MEDICINE

## 2022-01-27 PROCEDURE — 3288F PR FALLS RISK ASSESSMENT DOCUMENTED: ICD-10-PCS | Mod: CPTII,S$GLB,, | Performed by: INTERNAL MEDICINE

## 2022-01-27 PROCEDURE — 3008F BODY MASS INDEX DOCD: CPT | Mod: CPTII,S$GLB,, | Performed by: INTERNAL MEDICINE

## 2022-01-27 PROCEDURE — 1160F PR REVIEW ALL MEDS BY PRESCRIBER/CLIN PHARMACIST DOCUMENTED: ICD-10-PCS | Mod: CPTII,S$GLB,, | Performed by: INTERNAL MEDICINE

## 2022-01-27 PROCEDURE — 1160F RVW MEDS BY RX/DR IN RCRD: CPT | Mod: CPTII,S$GLB,, | Performed by: INTERNAL MEDICINE

## 2022-01-27 PROCEDURE — 1101F PT FALLS ASSESS-DOCD LE1/YR: CPT | Mod: CPTII,S$GLB,, | Performed by: INTERNAL MEDICINE

## 2022-01-27 PROCEDURE — 99214 OFFICE O/P EST MOD 30 MIN: CPT | Mod: S$GLB,,, | Performed by: INTERNAL MEDICINE

## 2022-01-27 PROCEDURE — 3079F PR MOST RECENT DIASTOLIC BLOOD PRESSURE 80-89 MM HG: ICD-10-PCS | Mod: CPTII,S$GLB,, | Performed by: INTERNAL MEDICINE

## 2022-01-27 PROCEDURE — 1126F PR PAIN SEVERITY QUANTIFIED, NO PAIN PRESENT: ICD-10-PCS | Mod: CPTII,S$GLB,, | Performed by: INTERNAL MEDICINE

## 2022-01-27 PROCEDURE — 3077F SYST BP >= 140 MM HG: CPT | Mod: CPTII,S$GLB,, | Performed by: INTERNAL MEDICINE

## 2022-01-27 PROCEDURE — 1101F PR PT FALLS ASSESS DOC 0-1 FALLS W/OUT INJ PAST YR: ICD-10-PCS | Mod: CPTII,S$GLB,, | Performed by: INTERNAL MEDICINE

## 2022-01-27 PROCEDURE — 99999 PR PBB SHADOW E&M-EST. PATIENT-LVL V: ICD-10-PCS | Mod: PBBFAC,,, | Performed by: INTERNAL MEDICINE

## 2022-01-27 PROCEDURE — 3288F FALL RISK ASSESSMENT DOCD: CPT | Mod: CPTII,S$GLB,, | Performed by: INTERNAL MEDICINE

## 2022-01-27 PROCEDURE — 99214 PR OFFICE/OUTPT VISIT, EST, LEVL IV, 30-39 MIN: ICD-10-PCS | Mod: S$GLB,,, | Performed by: INTERNAL MEDICINE

## 2022-01-27 PROCEDURE — 1159F MED LIST DOCD IN RCRD: CPT | Mod: CPTII,S$GLB,, | Performed by: INTERNAL MEDICINE

## 2022-01-27 PROCEDURE — 1159F PR MEDICATION LIST DOCUMENTED IN MEDICAL RECORD: ICD-10-PCS | Mod: CPTII,S$GLB,, | Performed by: INTERNAL MEDICINE

## 2022-01-27 PROCEDURE — 3008F PR BODY MASS INDEX (BMI) DOCUMENTED: ICD-10-PCS | Mod: CPTII,S$GLB,, | Performed by: INTERNAL MEDICINE

## 2022-01-27 PROCEDURE — 1126F AMNT PAIN NOTED NONE PRSNT: CPT | Mod: CPTII,S$GLB,, | Performed by: INTERNAL MEDICINE

## 2022-01-27 RX ORDER — PREGABALIN 100 MG/1
100 CAPSULE ORAL 2 TIMES DAILY
Qty: 60 CAPSULE | Refills: 2 | Status: SHIPPED | OUTPATIENT
Start: 2022-01-27

## 2022-01-27 NOTE — PROGRESS NOTES
Subjective:      DATE OF VISIT: 1/27/2022   ?  Patient ID:?Adalgisa Wright is a 71 y.o. female.?? MR#: [id number]   ?   PRIMARY PROVIDER: Dr. Quintanilla    CHIEF COMPLAINT:  Follow-up  ?   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   CURRENT TREATMENT:     PAST TREATMENT: FOLFOX, C1D1 3/23/20 - 9/11/20; liver ablation 11/2020    Follow-up    She has been doing well since last visit aside from right leg pain in arthritic pain on meloxicam and occasional steroid injection.  No change in appetite or bowel habits, evidence of bleeding or unintentional weight loss.  She does endorse urinary hesitancy especially over the last couple months  Review of Systems    ?   A comprehensive 14-point review of systems was reviewed with patient and was negative other than as specified above.   ?     Objective:      Physical Exam      ?   Vitals:    01/27/22 1328   BP: (!) 141/82   Pulse: 77   Resp: 18   Temp: 97.8 °F (36.6 °C)      ECOG:?0   General appearance: Generally well appearing, in no acute distress.   Head, eyes, ears, nose, and throat: moist mucous membranes.   Respiratory:  Normal work of breathing  Abdomen: nontender, nondistended.   Extremities: Warm, without edema.   Neurologic: Alert and oriented. Grossly normal strength, coordination, and gait.   Skin: No rashes, ecchymoses or petechial lesion.   Psychiatric:  Normal mood and affect.    Laboratory:  ?   No visits with results within 1 Day(s) from this visit.   Latest known visit with results is:   Lab Visit on 01/25/2022   Component Date Value Ref Range Status    Sodium 01/25/2022 140  136 - 145 mmol/L Final    Potassium 01/25/2022 3.9  3.5 - 5.1 mmol/L Final    Chloride 01/25/2022 102  95 - 110 mmol/L Final    CO2 01/25/2022 24  23 - 29 mmol/L Final    Glucose 01/25/2022 110  70 - 110 mg/dL Final    BUN 01/25/2022 23  8 - 23 mg/dL Final    Creatinine 01/25/2022 1.4   0.5 - 1.4 mg/dL Final    Calcium 01/25/2022 9.9  8.7 - 10.5 mg/dL Final    Total Protein 01/25/2022 7.3  6.0 - 8.4 g/dL Final    Albumin 01/25/2022 4.4  3.5 - 5.2 g/dL Final    Total Bilirubin 01/25/2022 0.4  0.1 - 1.0 mg/dL Final    Alkaline Phosphatase 01/25/2022 98  55 - 135 U/L Final    AST 01/25/2022 16  10 - 40 U/L Final    ALT 01/25/2022 12  10 - 44 U/L Final    Anion Gap 01/25/2022 14  8 - 16 mmol/L Final    eGFR if  01/25/2022 43.9* >60 mL/min/1.73 m^2 Final    eGFR if non  01/25/2022 38.1* >60 mL/min/1.73 m^2 Final    WBC 01/25/2022 3.69* 3.90 - 12.70 K/uL Final    RBC 01/25/2022 4.10  4.00 - 5.40 M/uL Final    Hemoglobin 01/25/2022 13.0  12.0 - 16.0 g/dL Final    Hematocrit 01/25/2022 39.4  37.0 - 48.5 % Final    MCV 01/25/2022 96  82 - 98 fL Final    MCH 01/25/2022 31.7* 27.0 - 31.0 pg Final    MCHC 01/25/2022 33.0  32.0 - 36.0 g/dL Final    RDW 01/25/2022 12.6  11.5 - 14.5 % Final    Platelets 01/25/2022 189  150 - 450 K/uL Final    MPV 01/25/2022 9.7  9.2 - 12.9 fL Final    Immature Granulocytes 01/25/2022 0.0  0.0 - 0.5 % Final    Gran # (ANC) 01/25/2022 1.4* 1.8 - 7.7 K/uL Final    Immature Grans (Abs) 01/25/2022 0.00  0.00 - 0.04 K/uL Final    Lymph # 01/25/2022 1.8  1.0 - 4.8 K/uL Final    Mono # 01/25/2022 0.3  0.3 - 1.0 K/uL Final    Eos # 01/25/2022 0.1  0.0 - 0.5 K/uL Final    Baso # 01/25/2022 0.02  0.00 - 0.20 K/uL Final    nRBC 01/25/2022 0  0 /100 WBC Final    Gran % 01/25/2022 38.5  38.0 - 73.0 % Final    Lymph % 01/25/2022 49.9* 18.0 - 48.0 % Final    Mono % 01/25/2022 9.2  4.0 - 15.0 % Final    Eosinophil % 01/25/2022 1.9  0.0 - 8.0 % Final    Basophil % 01/25/2022 0.5  0.0 - 1.9 % Final    Differential Method 01/25/2022 Automated   Final    Sodium 01/25/2022 140  136 - 145 mmol/L Final    Potassium 01/25/2022 3.9  3.5 - 5.1 mmol/L Final    Chloride 01/25/2022 102  95 - 110 mmol/L Final    CO2 01/25/2022 24  23 - 29  mmol/L Final    Glucose 01/25/2022 110  70 - 110 mg/dL Final    BUN 01/25/2022 23  8 - 23 mg/dL Final    Creatinine 01/25/2022 1.4  0.5 - 1.4 mg/dL Final    Calcium 01/25/2022 9.9  8.7 - 10.5 mg/dL Final    Total Protein 01/25/2022 7.3  6.0 - 8.4 g/dL Final    Albumin 01/25/2022 4.4  3.5 - 5.2 g/dL Final    Total Bilirubin 01/25/2022 0.4  0.1 - 1.0 mg/dL Final    Alkaline Phosphatase 01/25/2022 98  55 - 135 U/L Final    AST 01/25/2022 16  10 - 40 U/L Final    ALT 01/25/2022 12  10 - 44 U/L Final    Anion Gap 01/25/2022 14  8 - 16 mmol/L Final    eGFR if  01/25/2022 43.9* >60 mL/min/1.73 m^2 Final    eGFR if non  01/25/2022 38.1* >60 mL/min/1.73 m^2 Final    CEA 01/25/2022 3.2  0.0 - 5.0 ng/mL Final     Lab Results   Component Value Date    IRON 71 06/07/2021    TIBC 383 06/07/2021    FERRITIN 193 06/07/2021       Tumor markers    CEA    CEA   Date Value Ref Range Status   01/25/2022 3.2 0.0 - 5.0 ng/mL Final     Comment:     CEA Normal Range:  Non-Smokers: 0-3.0 ng/mL  Smokers:     0-5.0 ng/mL     09/07/2021 3.5 0.0 - 5.0 ng/mL Final     Comment:     CEA Normal Range:  Non-Smokers: 0-3.0 ng/mL  Smokers:     0-5.0 ng/mL     06/03/2021 3.4 0.0 - 5.0 ng/mL Final     Comment:     CEA Normal Range:  Non-Smokers: 0-3.0 ng/mL  Smokers:     0-5.0 ng/mL     01/15/2021 6.4 (H) 0.0 - 5.0 ng/mL Final     Comment:     CEA Normal Range:  Non-Smokers: 0-3.0 ng/mL  Smokers:     0-5.0 ng/mL     09/25/2020 8.7 (H) 0.0 - 5.0 ng/mL Final     Comment:     CEA Normal Range:  Non-Smokers: 0-3.0 ng/mL  Smokers:     0-5.0 ng/mL     08/10/2020 7.8 (H) 0.0 - 5.0 ng/mL Final     Comment:     CEA Normal Range:  Non-Smokers: 0-3.0 ng/mL  Smokers:     0-5.0 ng/mL     05/25/2020 4.4 0.0 - 5.0 ng/mL Final     Comment:     CEA Normal Range:  Non-Smokers: 0-3.0 ng/mL  Smokers:     0-5.0 ng/mL     11/18/2019 3.5 0.0 - 5.0 ng/mL Final     Comment:     CEA Normal Range:  Non-Smokers: 0-3.0 ng/mL  Smokers:      0-5.0 ng/mL              ? IMAGING  No results found for this or any previous visit (from the past 2160 hour(s)).    No results found for this or any previous visit (from the past 2160 hour(s)).    No results found for this or any previous visit (from the past 2160 hour(s)).  CT CHEST ABDOMEN PELVIS WITH CONTRAST (XPD)     CLINICAL HISTORY:  restaging colon cancer; Malignant neoplasm of colon, unspecified     TECHNIQUE:  Low dose axial images, sagittal and coronal reformations were obtained from the thoracic inlet to the pubic symphysis following the IV administration of 75 mL of Omnipaque 350 and the oral administration of 30 ml of Omnipaque 350.     COMPARISON:  02/22/2021.     FINDINGS:  Chest:     Heart and great vessels: Status post median sternotomy and aortic valve replacement.  Aortic atherosclerosis.  No aneurysm.  No pericardial effusion.     Adenopathy: None demonstrated.     Lungs: Bilateral lung scarring or atelectasis.  No suspicious pulmonary nodules/masses.  No pleural effusion.     Abdomen:     Liver: Area of low attenuation compatible with post treatment change in the posterior right hepatic lobe, not significantly changed measuring 3.2 x 2.4 cm.  No abnormal enhancement.  Additional subcentimeter hypodense foci elsewhere in the posterior right hepatic lobe, unchanged.  No new hepatic lesions.     Gallbladder and biliary: Within normal limits.     Spleen: Within normal limits.     Pancreas: Within normal limits.     Adrenals: Within normal limits.     Kidneys: Within normal limits.     Bowel: Within normal limits.  No evidence of obstruction.     Peritoneum: No ascites or pneumoperitoneum.     Abdominal Adenopathy: None.     Vasculature: Within normal limits.     Pelvis:     Urinary bladder: Unremarkable.     Reproductive organs: Within normal limits.     Pelvis adenopathy: None.     Bones: Multilevel chronic compression fractures in the thoracolumbar spine.  No acute findings.     Miscellaneous:  None.     Impression:     Stable CT of the chest, abdomen, and pelvis.  ?   Assessment/Plan:       1. Colon adenocarcinoma    2. Peripheral neuropathy due to chemotherapy    3. Anemia, unspecified type    4. MECHE (acute kidney injury)          Plan:     # rectosigmoid adenocarcinoma, pT3 pN2 pMx, Stage III vs IV possible oligometastatic disease in liver, MSI stable:  Initiated adjuvant chemotherapy C1D1 3/23/20.  Restaging PET-CT after 4 cycles showed interval resolution of mildly avid right hepatic lobe lesion previously nondiagnostic on multiple biopsies; I discussed that interval improvement while on chemotherapy does raise concern for potential malignant involvement.  Case discussed at multidisciplinary tumor board with review of imaging and recommendation to continue adjuvant chemotherapy with plan for follow-up with surgery for consideration of resection of likely oligo metastatic liver disease.   Completed 6 months FOLFOX on 09/09/2020.    Restaging PET negative for evidence of recurrence or metastatic disease.  We reviewed her case in multidisciplinary tumor board 09/25/2020.  We did discuss that despite negative biopsy x2 of liver abnormality on initial MRI/PET mild avidity response with chemotherapy is concerning for possible metastatic involvement.  Recommendation at tumor board for local treatment and patient pursued IR ablation completed November 2020.    1/21/21 MR liver with post treatment affects noted in no evidence of new/recurrent lesion.  Restaging CT chest abdomen pelvis 02/22/2021 with post treatment changes in right hepatic lobe and no other concerning findings for recurrent/metastatic disease.  02/26/2021 colonoscopy with Dr. Smith   Repeat CEA continues to be normalized 3.5.  Restaging CT 09/07/2021 without concerning findings for recurrent/metastatic disease.      Most recent lab work without cytopenia, mild lymphocyte predominance but normal WBC.  She does have new MECHE and discussed broad  differential diagnosis including dehydration, NSAID use with loss scanned given on chronic pain recently or potential anatomic issue; she does note some new urinary hesitancy.  Recommend discussion with primary care.  Will be getting restaging imaging with CT chest abdomen pelvis in about 6 weeks.     # peripheral neuropathy:  She does have persistent neuropathy in fingertips and toes, helped with pregabalin and duloxetine.  Refilled pregabalin per patient request    Follow-Up:   Patient Instructions   CT c/a/p in about 6 wks with cbc, cmp prior in [facility] with Dr. Quintanilla after please

## 2022-03-10 ENCOUNTER — HOSPITAL ENCOUNTER (OUTPATIENT)
Dept: RADIOLOGY | Facility: HOSPITAL | Age: 72
Discharge: HOME | End: 2022-03-10
Attending: INTERNAL MEDICINE
Payer: MEDICARE

## 2022-03-10 DIAGNOSIS — C18.9 COLON ADENOCARCINOMA: ICD-10-CM

## 2022-03-10 PROCEDURE — 25500020 PHARM REV CODE 255: Mod: PO | Performed by: INTERNAL MEDICINE

## 2022-03-10 PROCEDURE — 74177 CT CHEST ABDOMEN PELVIS WITH CONTRAST (XPD): ICD-10-PCS | Mod: 26,,, | Performed by: RADIOLOGY

## 2022-03-10 PROCEDURE — 71260 CT CHEST ABDOMEN PELVIS WITH CONTRAST (XPD): ICD-10-PCS | Mod: 26,,, | Performed by: RADIOLOGY

## 2022-03-10 PROCEDURE — 74177 CT ABD & PELVIS W/CONTRAST: CPT | Mod: 26,,, | Performed by: RADIOLOGY

## 2022-03-10 PROCEDURE — 74177 CT ABD & PELVIS W/CONTRAST: CPT | Mod: TC,PO

## 2022-03-10 PROCEDURE — 71260 CT THORAX DX C+: CPT | Mod: TC,PO

## 2022-03-10 PROCEDURE — 71260 CT THORAX DX C+: CPT | Mod: 26,,, | Performed by: RADIOLOGY

## 2022-03-10 RX ADMIN — IOHEXOL 30 ML: 350 INJECTION, SOLUTION INTRAVENOUS at 09:03

## 2022-03-10 RX ADMIN — IOHEXOL 75 ML: 350 INJECTION, SOLUTION INTRAVENOUS at 09:03

## 2022-03-14 ENCOUNTER — LAB VISIT (OUTPATIENT)
Dept: LAB | Facility: HOSPITAL | Age: 72
End: 2022-03-14
Attending: INTERNAL MEDICINE
Payer: MEDICARE

## 2022-03-14 ENCOUNTER — OFFICE VISIT (OUTPATIENT)
Dept: HEMATOLOGY/ONCOLOGY | Facility: CLINIC | Age: 72
End: 2022-03-14
Payer: MEDICARE

## 2022-03-14 VITALS
WEIGHT: 136.25 LBS | HEIGHT: 58 IN | BODY MASS INDEX: 28.6 KG/M2 | OXYGEN SATURATION: 98 % | RESPIRATION RATE: 20 BRPM | HEART RATE: 71 BPM | TEMPERATURE: 98 F | SYSTOLIC BLOOD PRESSURE: 134 MMHG | DIASTOLIC BLOOD PRESSURE: 77 MMHG

## 2022-03-14 DIAGNOSIS — G60.3 IDIOPATHIC PROGRESSIVE NEUROPATHY: ICD-10-CM

## 2022-03-14 DIAGNOSIS — T45.1X5A PERIPHERAL NEUROPATHY DUE TO CHEMOTHERAPY: ICD-10-CM

## 2022-03-14 DIAGNOSIS — C18.9 COLON ADENOCARCINOMA: ICD-10-CM

## 2022-03-14 DIAGNOSIS — D64.9 ANEMIA, UNSPECIFIED TYPE: ICD-10-CM

## 2022-03-14 DIAGNOSIS — D72.819 LEUKOPENIA, UNSPECIFIED TYPE: Primary | ICD-10-CM

## 2022-03-14 DIAGNOSIS — G62.0 PERIPHERAL NEUROPATHY DUE TO CHEMOTHERAPY: ICD-10-CM

## 2022-03-14 LAB
ESTIMATED AVG GLUCOSE: 105 MG/DL (ref 68–131)
HBA1C MFR BLD: 5.3 % (ref 4–5.6)
VIT B12 SERPL-MCNC: 241 PG/ML (ref 210–950)

## 2022-03-14 PROCEDURE — 3288F FALL RISK ASSESSMENT DOCD: CPT | Mod: CPTII,S$GLB,, | Performed by: INTERNAL MEDICINE

## 2022-03-14 PROCEDURE — 3078F PR MOST RECENT DIASTOLIC BLOOD PRESSURE < 80 MM HG: ICD-10-PCS | Mod: CPTII,S$GLB,, | Performed by: INTERNAL MEDICINE

## 2022-03-14 PROCEDURE — 3075F SYST BP GE 130 - 139MM HG: CPT | Mod: CPTII,S$GLB,, | Performed by: INTERNAL MEDICINE

## 2022-03-14 PROCEDURE — 99999 PR PBB SHADOW E&M-EST. PATIENT-LVL IV: ICD-10-PCS | Mod: PBBFAC,,, | Performed by: INTERNAL MEDICINE

## 2022-03-14 PROCEDURE — 1159F MED LIST DOCD IN RCRD: CPT | Mod: CPTII,S$GLB,, | Performed by: INTERNAL MEDICINE

## 2022-03-14 PROCEDURE — 99999 PR PBB SHADOW E&M-EST. PATIENT-LVL IV: CPT | Mod: PBBFAC,,, | Performed by: INTERNAL MEDICINE

## 2022-03-14 PROCEDURE — 1159F PR MEDICATION LIST DOCUMENTED IN MEDICAL RECORD: ICD-10-PCS | Mod: CPTII,S$GLB,, | Performed by: INTERNAL MEDICINE

## 2022-03-14 PROCEDURE — 1126F AMNT PAIN NOTED NONE PRSNT: CPT | Mod: CPTII,S$GLB,, | Performed by: INTERNAL MEDICINE

## 2022-03-14 PROCEDURE — 99214 OFFICE O/P EST MOD 30 MIN: CPT | Mod: S$GLB,,, | Performed by: INTERNAL MEDICINE

## 2022-03-14 PROCEDURE — 3078F DIAST BP <80 MM HG: CPT | Mod: CPTII,S$GLB,, | Performed by: INTERNAL MEDICINE

## 2022-03-14 PROCEDURE — 36415 COLL VENOUS BLD VENIPUNCTURE: CPT | Performed by: INTERNAL MEDICINE

## 2022-03-14 PROCEDURE — 82607 VITAMIN B-12: CPT | Performed by: INTERNAL MEDICINE

## 2022-03-14 PROCEDURE — 99214 PR OFFICE/OUTPT VISIT, EST, LEVL IV, 30-39 MIN: ICD-10-PCS | Mod: S$GLB,,, | Performed by: INTERNAL MEDICINE

## 2022-03-14 PROCEDURE — 1126F PR PAIN SEVERITY QUANTIFIED, NO PAIN PRESENT: ICD-10-PCS | Mod: CPTII,S$GLB,, | Performed by: INTERNAL MEDICINE

## 2022-03-14 PROCEDURE — 3288F PR FALLS RISK ASSESSMENT DOCUMENTED: ICD-10-PCS | Mod: CPTII,S$GLB,, | Performed by: INTERNAL MEDICINE

## 2022-03-14 PROCEDURE — 3075F PR MOST RECENT SYSTOLIC BLOOD PRESS GE 130-139MM HG: ICD-10-PCS | Mod: CPTII,S$GLB,, | Performed by: INTERNAL MEDICINE

## 2022-03-14 PROCEDURE — 83036 HEMOGLOBIN GLYCOSYLATED A1C: CPT | Performed by: INTERNAL MEDICINE

## 2022-03-14 PROCEDURE — 3008F BODY MASS INDEX DOCD: CPT | Mod: CPTII,S$GLB,, | Performed by: INTERNAL MEDICINE

## 2022-03-14 PROCEDURE — 1101F PR PT FALLS ASSESS DOC 0-1 FALLS W/OUT INJ PAST YR: ICD-10-PCS | Mod: CPTII,S$GLB,, | Performed by: INTERNAL MEDICINE

## 2022-03-14 PROCEDURE — 3008F PR BODY MASS INDEX (BMI) DOCUMENTED: ICD-10-PCS | Mod: CPTII,S$GLB,, | Performed by: INTERNAL MEDICINE

## 2022-03-14 PROCEDURE — 1101F PT FALLS ASSESS-DOCD LE1/YR: CPT | Mod: CPTII,S$GLB,, | Performed by: INTERNAL MEDICINE

## 2022-03-14 NOTE — PROGRESS NOTES
Subjective:      DATE OF VISIT: 3/14/2022   ?  Patient ID:?Adalgisa Wright is a 71 y.o. female.?? MR#: [id number]   ?   PRIMARY PROVIDER: Dr. Quintanilla    CHIEF COMPLAINT:  Follow-up  ?   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   CURRENT TREATMENT:     PAST TREATMENT: FOLFOX, C1D1 3/23/20 - 9/11/20; liver ablation 11/2020    Follow-up    Plans to see orthopedics due to right knee pain which has been progressive impairing walking, currently using pain.  She continues to have peripheral neuropathy bilateral feet continued on Cymbalta and pregabalin without notable improvement.  She does have some difficulty urinating at times, previously treated by primary care for urinary tract infection but does note continuing to have some difficulty initiating stream.  She does struggle with constipation generally stays regular but requires use of senna/Colace.    Review of Systems    ?   A comprehensive 14-point review of systems was reviewed with patient and was negative other than as specified above.   ?     Objective:      Physical Exam      ?   Vitals:    03/14/22 1307   BP: 134/77   Pulse: 71   Resp: 20   Temp: 98.3 °F (36.8 °C)      ECOG:?1   General appearance: Generally well appearing, in no acute distress.   Head, eyes, ears, nose, and throat: moist mucous membranes.   Respiratory:  Normal work of breathing  Abdomen: nontender, nondistended.   Extremities: Warm, without edema.   Neurologic: Alert and oriented. Grossly normal strength, coordination, and gait on tolerated, using cane.   Skin: No rashes, ecchymoses or petechial lesion.   Psychiatric:  Normal mood and affect.    Laboratory:  ?   No visits with results within 1 Day(s) from this visit.   Latest known visit with results is:   Lab Visit on 03/10/2022   Component Date Value Ref Range Status    WBC 03/10/2022 3.27 (A) 3.90 - 12.70 K/uL Final    RBC 03/10/2022  3.52 (A) 4.00 - 5.40 M/uL Final    Hemoglobin 03/10/2022 11.5 (A) 12.0 - 16.0 g/dL Final    Hematocrit 03/10/2022 35.9 (A) 37.0 - 48.5 % Final    MCV 03/10/2022 102 (A) 82 - 98 fL Final    MCH 03/10/2022 32.7 (A) 27.0 - 31.0 pg Final    MCHC 03/10/2022 32.0  32.0 - 36.0 g/dL Final    RDW 03/10/2022 13.2  11.5 - 14.5 % Final    Platelets 03/10/2022 152  150 - 450 K/uL Final    MPV 03/10/2022 10.3  9.2 - 12.9 fL Final    Immature Granulocytes 03/10/2022 0.6 (A) 0.0 - 0.5 % Final    Gran # (ANC) 03/10/2022 1.1 (A) 1.8 - 7.7 K/uL Final    Immature Grans (Abs) 03/10/2022 0.02  0.00 - 0.04 K/uL Final    Lymph # 03/10/2022 1.8  1.0 - 4.8 K/uL Final    Mono # 03/10/2022 0.3  0.3 - 1.0 K/uL Final    Eos # 03/10/2022 0.0  0.0 - 0.5 K/uL Final    Baso # 03/10/2022 0.03  0.00 - 0.20 K/uL Final    nRBC 03/10/2022 0  0 /100 WBC Final    Gran % 03/10/2022 32.5 (A) 38.0 - 73.0 % Final    Lymph % 03/10/2022 55.0 (A) 18.0 - 48.0 % Final    Mono % 03/10/2022 9.8  4.0 - 15.0 % Final    Eosinophil % 03/10/2022 1.2  0.0 - 8.0 % Final    Basophil % 03/10/2022 0.9  0.0 - 1.9 % Final    Differential Method 03/10/2022 Automated   Final    Sodium 03/10/2022 137  136 - 145 mmol/L Final    Potassium 03/10/2022 4.5  3.5 - 5.1 mmol/L Final    Chloride 03/10/2022 103  95 - 110 mmol/L Final    CO2 03/10/2022 26  23 - 29 mmol/L Final    Glucose 03/10/2022 102  70 - 110 mg/dL Final    BUN 03/10/2022 31 (A) 8 - 23 mg/dL Final    Creatinine 03/10/2022 1.3  0.5 - 1.4 mg/dL Final    Calcium 03/10/2022 8.9  8.7 - 10.5 mg/dL Final    Total Protein 03/10/2022 6.9  6.0 - 8.4 g/dL Final    Albumin 03/10/2022 4.1  3.5 - 5.2 g/dL Final    Total Bilirubin 03/10/2022 0.3  0.1 - 1.0 mg/dL Final    Alkaline Phosphatase 03/10/2022 101  55 - 135 U/L Final    AST 03/10/2022 14  10 - 40 U/L Final    ALT 03/10/2022 16  10 - 44 U/L Final    Anion Gap 03/10/2022 8  8 - 16 mmol/L Final    eGFR if  03/10/2022 48.0 (A)  >60 mL/min/1.73 m^2 Final    eGFR if non  03/10/2022 41.7 (A) >60 mL/min/1.73 m^2 Final     Lab Results   Component Value Date    IRON 71 06/07/2021    TIBC 383 06/07/2021    FERRITIN 193 06/07/2021       Tumor markers    CEA    CEA   Date Value Ref Range Status   01/25/2022 3.2 0.0 - 5.0 ng/mL Final     Comment:     CEA Normal Range:  Non-Smokers: 0-3.0 ng/mL  Smokers:     0-5.0 ng/mL     09/07/2021 3.5 0.0 - 5.0 ng/mL Final     Comment:     CEA Normal Range:  Non-Smokers: 0-3.0 ng/mL  Smokers:     0-5.0 ng/mL     06/03/2021 3.4 0.0 - 5.0 ng/mL Final     Comment:     CEA Normal Range:  Non-Smokers: 0-3.0 ng/mL  Smokers:     0-5.0 ng/mL     01/15/2021 6.4 (H) 0.0 - 5.0 ng/mL Final     Comment:     CEA Normal Range:  Non-Smokers: 0-3.0 ng/mL  Smokers:     0-5.0 ng/mL     09/25/2020 8.7 (H) 0.0 - 5.0 ng/mL Final     Comment:     CEA Normal Range:  Non-Smokers: 0-3.0 ng/mL  Smokers:     0-5.0 ng/mL     08/10/2020 7.8 (H) 0.0 - 5.0 ng/mL Final     Comment:     CEA Normal Range:  Non-Smokers: 0-3.0 ng/mL  Smokers:     0-5.0 ng/mL     05/25/2020 4.4 0.0 - 5.0 ng/mL Final     Comment:     CEA Normal Range:  Non-Smokers: 0-3.0 ng/mL  Smokers:     0-5.0 ng/mL     11/18/2019 3.5 0.0 - 5.0 ng/mL Final     Comment:     CEA Normal Range:  Non-Smokers: 0-3.0 ng/mL  Smokers:     0-5.0 ng/mL              ? IMAGING  No results found for this or any previous visit (from the past 2160 hour(s)).    No results found for this or any previous visit (from the past 2160 hour(s)).    Results for orders placed or performed during the hospital encounter of 03/10/22 (from the past 2160 hour(s))   CT Chest Abdomen Pelvis With Contrast    Impression    1. No evidence to suggest metastatic disease to the chest.  2. Stable appearance of the liver lesions.  No new or definitively enlarging liver lesions are identified.      Electronically signed by: Hadley Lilly DO  Date:    03/10/2022  Time:    09:31     CT CHEST ABDOMEN PELVIS  WITH CONTRAST (XPD)     CLINICAL HISTORY:  restaging colon cancer; Malignant neoplasm of colon, unspecified     TECHNIQUE:  Low dose axial images, sagittal and coronal reformations were obtained from the thoracic inlet to the pubic symphysis following the IV administration of 75 mL of Omnipaque 350 and the oral administration of 30 ml of Omnipaque 350.     COMPARISON:  02/22/2021.     FINDINGS:  Chest:     Heart and great vessels: Status post median sternotomy and aortic valve replacement.  Aortic atherosclerosis.  No aneurysm.  No pericardial effusion.     Adenopathy: None demonstrated.     Lungs: Bilateral lung scarring or atelectasis.  No suspicious pulmonary nodules/masses.  No pleural effusion.     Abdomen:     Liver: Area of low attenuation compatible with post treatment change in the posterior right hepatic lobe, not significantly changed measuring 3.2 x 2.4 cm.  No abnormal enhancement.  Additional subcentimeter hypodense foci elsewhere in the posterior right hepatic lobe, unchanged.  No new hepatic lesions.     Gallbladder and biliary: Within normal limits.     Spleen: Within normal limits.     Pancreas: Within normal limits.     Adrenals: Within normal limits.     Kidneys: Within normal limits.     Bowel: Within normal limits.  No evidence of obstruction.     Peritoneum: No ascites or pneumoperitoneum.     Abdominal Adenopathy: None.     Vasculature: Within normal limits.     Pelvis:     Urinary bladder: Unremarkable.     Reproductive organs: Within normal limits.     Pelvis adenopathy: None.     Bones: Multilevel chronic compression fractures in the thoracolumbar spine.  No acute findings.     Miscellaneous: None.     Impression:     Stable CT of the chest, abdomen, and pelvis.  ?   Assessment/Plan:       1. Leukopenia, unspecified type    2. Peripheral neuropathy due to chemotherapy    3. Idiopathic progressive neuropathy     4. Colon adenocarcinoma    5. Anemia, unspecified type          Plan:     #  rectosigmoid adenocarcinoma, pT3 pN2 pMx, Stage III vs IV possible oligometastatic disease in liver, MSI stable:  Initiated adjuvant chemotherapy C1D1 3/23/20.  Restaging PET-CT after 4 cycles showed interval resolution of mildly avid right hepatic lobe lesion previously nondiagnostic on multiple biopsies; I discussed that interval improvement while on chemotherapy does raise concern for potential malignant involvement.  Case discussed at multidisciplinary tumor board with review of imaging and recommendation to continue adjuvant chemotherapy with plan for follow-up with surgery for consideration of resection of likely oligo metastatic liver disease.   Completed 6 months FOLFOX on 09/09/2020.    Restaging PET negative for evidence of recurrence or metastatic disease.  We reviewed her case in multidisciplinary tumor board 09/25/2020.  We did discuss that despite negative biopsy x2 of liver abnormality on initial MRI/PET mild avidity response with chemotherapy is concerning for possible metastatic involvement.  Recommendation at tumor board for local treatment and patient pursued IR ablation completed November 2020.    1/21/21 MR liver with post treatment affects noted in no evidence of new/recurrent lesion.  Restaging CT chest abdomen pelvis 02/22/2021 with post treatment changes in right hepatic lobe and no other concerning findings for recurrent/metastatic disease.  02/26/2021 colonoscopy with Dr. Smith   Repeat CEA continues to be normalized.  Restaging CT 3/2022 without concerning findings for recurrent/metastatic disease; liver is stable. Recommend continued surveillance RV in 3 mo and repeat imaging in 6 mo.      Labs with chronic leukopenia mild lymphocyte predominance relatively stable continue to monitor.  Waxing waning mild anemia mildly macrocytic, will be checking vitamin B12 per below and recommend follow-up.    Difficulty urinating:  She notes past urinary tract infection with antibiotic treatment but  this issue persists.  Recommend discussion with primary care and consideration of urology referral.    Constipation:  Recommend use of senna Colace MiraLax milk of magnesia increase fiber in diet/supplement.  No previously clear iron deficiency and can avoid oral iron since this may add to constipation.    # peripheral neuropathy:  She does have persistent neuropathy in fingertips and toes, helped with pregabalin and duloxetine but continues to be bothered by this.  We will check a vitamin B12 level can trial B complex.  Will check hemoglobin A1c.  No known history of type 2 diabetes.  Thyroid function recently checked by primary care within normal limits.        Follow-Up:   Patient Instructions   Labs today b12 and hgb A1c  RV in 3 mo with cbc, cmp cea prior

## 2022-03-22 NOTE — ED NOTES
Nose bleed started again after coughing. Per MD order have pt blow nose and and readminister medication into right nare. Per MD order, pt blew nose, neosynephrine readministered. Patient tolerated well. Pt instructed to hold pressure for 15 minutes. New blue pad placed to patients to chest, cleansed pt's face of blood.    Implemented All Universal Safety Interventions:  [place] to call system. Call bell, personal items and telephone within reach. Instruct patient to call for assistance. Room bathroom lighting operational. Non-slip footwear when patient is off stretcher. Physically safe environment: no spills, clutter or unnecessary equipment. Stretcher in lowest position, wheels locked, appropriate side rails in place.

## 2022-04-04 ENCOUNTER — HOSPITAL ENCOUNTER (OUTPATIENT)
Dept: RADIOLOGY | Facility: HOSPITAL | Age: 72
Discharge: HOME | End: 2022-04-04
Attending: INTERNAL MEDICINE
Payer: MEDICARE

## 2022-04-04 VITALS — BODY MASS INDEX: 28.55 KG/M2 | WEIGHT: 136 LBS | HEIGHT: 58 IN

## 2022-04-04 DIAGNOSIS — Z12.39 BREAST CANCER SCREENING: ICD-10-CM

## 2022-04-04 PROCEDURE — 77067 SCR MAMMO BI INCL CAD: CPT | Mod: TC,PO

## 2022-04-04 PROCEDURE — 77067 SCR MAMMO BI INCL CAD: CPT | Mod: 26,,, | Performed by: RADIOLOGY

## 2022-04-04 PROCEDURE — 77063 MAMMO DIGITAL SCREENING BILAT WITH TOMO: ICD-10-PCS | Mod: 26,,, | Performed by: RADIOLOGY

## 2022-04-04 PROCEDURE — 77063 BREAST TOMOSYNTHESIS BI: CPT | Mod: 26,,, | Performed by: RADIOLOGY

## 2022-04-04 PROCEDURE — 77067 MAMMO DIGITAL SCREENING BILAT WITH TOMO: ICD-10-PCS | Mod: 26,,, | Performed by: RADIOLOGY

## 2022-05-09 ENCOUNTER — OFFICE VISIT (OUTPATIENT)
Dept: SURGERY | Facility: CLINIC | Age: 72
End: 2022-05-09
Payer: MEDICARE

## 2022-05-09 ENCOUNTER — LAB VISIT (OUTPATIENT)
Dept: LAB | Facility: HOSPITAL | Age: 72
End: 2022-05-09
Attending: INTERNAL MEDICINE
Payer: MEDICARE

## 2022-05-09 VITALS
SYSTOLIC BLOOD PRESSURE: 152 MMHG | TEMPERATURE: 99 F | DIASTOLIC BLOOD PRESSURE: 83 MMHG | HEART RATE: 86 BPM | BODY MASS INDEX: 29.4 KG/M2 | WEIGHT: 140.63 LBS

## 2022-05-09 DIAGNOSIS — C19 ADENOCARCINOMA OF RECTOSIGMOID JUNCTION: ICD-10-CM

## 2022-05-09 DIAGNOSIS — C20 RECTAL ADENOCARCINOMA: ICD-10-CM

## 2022-05-09 DIAGNOSIS — C20 RECTAL ADENOCARCINOMA: Primary | ICD-10-CM

## 2022-05-09 DIAGNOSIS — K76.9 LIVER LESION: ICD-10-CM

## 2022-05-09 LAB — CEA SERPL-MCNC: 4.2 NG/ML (ref 0–5)

## 2022-05-09 PROCEDURE — 3008F BODY MASS INDEX DOCD: CPT | Mod: CPTII,S$GLB,, | Performed by: COLON & RECTAL SURGERY

## 2022-05-09 PROCEDURE — 99999 PR PBB SHADOW E&M-EST. PATIENT-LVL III: CPT | Mod: PBBFAC,,, | Performed by: COLON & RECTAL SURGERY

## 2022-05-09 PROCEDURE — 36415 COLL VENOUS BLD VENIPUNCTURE: CPT

## 2022-05-09 PROCEDURE — 1126F AMNT PAIN NOTED NONE PRSNT: CPT | Mod: CPTII,S$GLB,, | Performed by: COLON & RECTAL SURGERY

## 2022-05-09 PROCEDURE — 3044F HG A1C LEVEL LT 7.0%: CPT | Mod: CPTII,S$GLB,, | Performed by: COLON & RECTAL SURGERY

## 2022-05-09 PROCEDURE — 3077F SYST BP >= 140 MM HG: CPT | Mod: CPTII,S$GLB,, | Performed by: COLON & RECTAL SURGERY

## 2022-05-09 PROCEDURE — 3079F DIAST BP 80-89 MM HG: CPT | Mod: CPTII,S$GLB,, | Performed by: COLON & RECTAL SURGERY

## 2022-05-09 PROCEDURE — 99214 OFFICE O/P EST MOD 30 MIN: CPT | Mod: S$GLB,,, | Performed by: COLON & RECTAL SURGERY

## 2022-05-09 PROCEDURE — 1159F PR MEDICATION LIST DOCUMENTED IN MEDICAL RECORD: ICD-10-PCS | Mod: CPTII,S$GLB,, | Performed by: COLON & RECTAL SURGERY

## 2022-05-09 PROCEDURE — 99999 PR PBB SHADOW E&M-EST. PATIENT-LVL III: ICD-10-PCS | Mod: PBBFAC,,, | Performed by: COLON & RECTAL SURGERY

## 2022-05-09 PROCEDURE — 3079F PR MOST RECENT DIASTOLIC BLOOD PRESSURE 80-89 MM HG: ICD-10-PCS | Mod: CPTII,S$GLB,, | Performed by: COLON & RECTAL SURGERY

## 2022-05-09 PROCEDURE — 99214 PR OFFICE/OUTPT VISIT, EST, LEVL IV, 30-39 MIN: ICD-10-PCS | Mod: S$GLB,,, | Performed by: COLON & RECTAL SURGERY

## 2022-05-09 PROCEDURE — 3044F PR MOST RECENT HEMOGLOBIN A1C LEVEL <7.0%: ICD-10-PCS | Mod: CPTII,S$GLB,, | Performed by: COLON & RECTAL SURGERY

## 2022-05-09 PROCEDURE — 1159F MED LIST DOCD IN RCRD: CPT | Mod: CPTII,S$GLB,, | Performed by: COLON & RECTAL SURGERY

## 2022-05-09 PROCEDURE — 3008F PR BODY MASS INDEX (BMI) DOCUMENTED: ICD-10-PCS | Mod: CPTII,S$GLB,, | Performed by: COLON & RECTAL SURGERY

## 2022-05-09 PROCEDURE — 1126F PR PAIN SEVERITY QUANTIFIED, NO PAIN PRESENT: ICD-10-PCS | Mod: CPTII,S$GLB,, | Performed by: COLON & RECTAL SURGERY

## 2022-05-09 PROCEDURE — 3077F PR MOST RECENT SYSTOLIC BLOOD PRESSURE >= 140 MM HG: ICD-10-PCS | Mod: CPTII,S$GLB,, | Performed by: COLON & RECTAL SURGERY

## 2022-05-09 PROCEDURE — 82378 CARCINOEMBRYONIC ANTIGEN: CPT

## 2022-05-09 NOTE — PROGRESS NOTES
History & Physical    SUBJECTIVE:     CC: rectosigmoid cancer  Ref MD: Brenda Ochoa MD    History of Present Illness:  Patient is a 71 y.o. 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  Patient was undergoing a colonoscopy on November 7, 2019 where a rectosigmoid mass was found and biopsied which showed moderately differentiated adenocarcinoma.  Patient had no other intramucosal lesions at that time.  She is undergoing colonoscopy for iron deficiency anemia.  She had previously been found the month before to have iron deficiency anemia as well as blood when wiping after a bowel movement.  She is on chronic anticoagulation therapy of Coumadin for mechanical heart valve which was done in 2003.  She denies any fever, chills, nausea, vomiting, diarrhea, melena, weight loss or any other signs or symptoms.  She has no personal family history of colorectal cancer or IBD.  Her last colonoscopy prior to this was in 2017 were no lesion was found in this area. Her only significant past surgical history is this mechanical valve replacement.  No episodes of fecal incontinence per her report and some chronic constipation noted. She is referred to Colorectal surgery for evaluation.  I have personally spoken with her referring provider and reviewed her previous records.    2/13/2020: Robotic LAR with CR anastomosis  11/12/2020: liver ablation    Interval history:  Since last clinic visit, the patient continues to do well. CT scan in March 2022 with stable liver lesions but no new findings. CEA in January 2021 normal. She is tolerating a regular diet with hematochezia or melena. Denies fevers, chills, nausea, and vomiting.     Review of patient's allergies indicates:   Allergen Reactions    Penicillins Rash       Current Outpatient Medications   Medication Sig Dispense Refill    acebutolol (SECTRAL) 400 mg capsule Take 400 mg by mouth 2 (two) times daily.      ALPRAZolam (XANAX) 1 MG tablet Take 1 mg by mouth  2 (two) times daily as needed.      ergocalciferol (ERGOCALCIFEROL) 50,000 unit Cap Take 1 capsule by mouth every 30 days.      furosemide (LASIX) 40 MG tablet Take 40 mg by mouth Daily.      lovastatin (MEVACOR) 40 MG tablet Take 40 mg by mouth every evening.      multivitamin (THERAGRAN) tablet Take 1 tablet by mouth.      pregabalin (LYRICA) 100 MG capsule Take 1 capsule (100 mg total) by mouth 2 (two) times daily. 60 capsule 2    warfarin (COUMADIN) 2.5 MG tablet Take 1 tablet every day EXCEPT on Mondays and Fridays. -- OR AS DIRECTED BY COUMADIN CLINIC 90 tablet 1    warfarin (COUMADIN) 5 MG tablet Take 1 tablet (5 mg total) by mouth Daily. Take one tablet by mouth on Mondays and Fridays. AS DIRECTED BY COUMADIN CLINIC. 30 tablet 3    zolpidem (AMBIEN) 10 mg Tab Take 5 mg by mouth nightly as needed.       DULoxetine (CYMBALTA) 30 MG capsule Take 30 mg by mouth.       Current Facility-Administered Medications   Medication Dose Route Frequency Provider Last Rate Last Admin    lidocaine (PF) 10 mg/ml (1%) injection 10 mg  1 mL Intradermal Once Ankur Smith MD           Past Medical History:   Diagnosis Date    Anticoagulant long-term use     Aortic valve defect     Benign neoplasm of ascending colon 4/6/2017    Cancer     CHF (congestive heart failure)     DDD (degenerative disc disease), cervical 7/20/2018    GERD (gastroesophageal reflux disease)     Hemorrhoids     HLD (hyperlipidemia)     Hypertension     Insomnia 12/1/2014    Irritable bowel syndrome with constipation 4/9/2018    Postmenopausal osteoporosis 7/20/2018     Past Surgical History:   Procedure Laterality Date    AORTIC VALVE REPLACEMENT  2003    COLONOSCOPY Left 4/6/2017    Procedure: COLONOSCOPY;  Surgeon: Sander Ulloa MD;  Location: [facility] ENDO;  Service: Endoscopy;  Laterality: Left;    COLONOSCOPY N/A 11/7/2019    Procedure: COLONOSCOPY;  Surgeon: Brenda Ochoa MD;  [facility];  Service: Endoscopy;   Laterality: N/A;    COLONOSCOPY N/A 2/26/2021    Procedure: COLONOSCOPY;  Surgeon: Ankur Smith MD;  Location: [facility] ENDO;  Service: General;  Laterality: N/A;    COMPUTED TOMOGRAPHY N/A 11/12/2020    Procedure: Liver microwave ablation;  Surgeon: Gonzalez Moraes MD;  Location: [facility] JARAD;  Service: General;  Laterality: N/A;    ESOPHAGOGASTRODUODENOSCOPY N/A 11/7/2019    Procedure: ESOPHAGOGASTRODUODENOSCOPY (EGD) needs PT/INR;  Surgeon: Brenda Ochoa MD;  Location: [facility] ENDO;  Service: Endoscopy;  Laterality: N/A;    FLEXIBLE SIGMOIDOSCOPY N/A 2/13/2020    Procedure: SIGMOIDOSCOPY, FLEXIBLE;  Surgeon: Ankur Smith MD;  Location: [facility] OR;  Service: General;  Laterality: N/A;    INJECTION OF ANESTHETIC AGENT INTO TISSUE PLANE DEFINED BY TRANSVERSUS ABDOMINIS MUSCLE N/A 2/13/2020    Procedure: BLOCK, TRANSVERSUS ABDOMINIS PLANE;  Surgeon: Ankur Smith MD;  Location: [facility] OR;  Service: General;  Laterality: N/A;    INSERTION OF TUNNELED CENTRAL VENOUS CATHETER (CVC) WITH SUBCUTANEOUS PORT N/A 3/17/2020    Procedure: SUCRRUUHY-RZGB-R-[id number];  Surgeon: Ankur Smith MD;  Location: [facility];  Service: General;  Laterality: N/A;    ROBOT-ASSISTED LOW ANTERIOR RESECTION OF COLON N/A 2/13/2020    Procedure: XI ROBOTIC RESECTION, COLON, LOW ANTERIOR;  Surgeon: Ankur Smith MD;  Location: [facility];  Service: General;  Laterality: N/A;     Family History   Problem Relation Age of Onset    Diabetes Mother     Heart disease Mother     Hypertension Mother     Heart disease Father     Hypertension Father     Hypertension Sister     Hypertension Son     Heart disease Son     Colon cancer Neg Hx      Social History     Tobacco Use    Smoking status: Never Smoker    Smokeless tobacco: Never Used   Substance Use Topics    Alcohol use: Not Currently    Drug use: No        Review of Systems:  Review of Systems   Constitutional: Negative for activity change, appetite change, chills, fatigue,  fever and unexpected weight change.   HENT: Negative for congestion, ear pain, sore throat and trouble swallowing.    Eyes: Negative for pain, redness and itching.   Respiratory: Negative for cough, shortness of breath and wheezing.    Cardiovascular: Negative for chest pain, palpitations and leg swelling.   Gastrointestinal: Negative for abdominal distention, abdominal pain, anal bleeding, blood in stool, constipation, diarrhea, nausea, rectal pain and vomiting.   Endocrine: Negative for cold intolerance, heat intolerance and polyuria.   Genitourinary: Negative for dysuria, flank pain, frequency and hematuria.   Musculoskeletal: Negative for gait problem, joint swelling and neck pain.   Skin: Negative for color change, rash and wound.   Allergic/Immunologic: Negative for environmental allergies and immunocompromised state.   Neurological: Negative for dizziness, speech difficulty, weakness and numbness.   Psychiatric/Behavioral: Negative for agitation, confusion and hallucinations.       OBJECTIVE:       Physical Exam:  Physical Exam  Constitutional:       Appearance: She is well-developed.   HENT:      Head: Normocephalic and atraumatic.   Eyes:      Conjunctiva/sclera: Conjunctivae normal.   Neck:      Thyroid: No thyromegaly.   Cardiovascular:      Rate and Rhythm: Normal rate and regular rhythm.   Pulmonary:      Effort: Pulmonary effort is normal. No respiratory distress.   Abdominal:      General: There is no distension.      Palpations: Abdomen is soft. There is no mass.      Tenderness: There is no abdominal tenderness.      Comments: Incisions well-healed; no erythema or drainage   Musculoskeletal:         General: No tenderness. Normal range of motion.      Cervical back: Normal range of motion.   Skin:     General: Skin is warm and dry.      Capillary Refill: Capillary refill takes less than 2 seconds.      Findings: No rash.   Neurological:      Mental Status: She is alert and oriented to person, place,  and time.       Laboratory  Lab Results   Component Value Date    WBC 3.27 (L) 03/10/2022    HGB 11.5 (L) 03/10/2022    HCT 35.9 (L) 03/10/2022     03/10/2022    ALT 16 03/10/2022    AST 14 03/10/2022     03/10/2022    K 4.5 03/10/2022     03/10/2022    CREATININE 1.3 03/10/2022    BUN 31 (H) 03/10/2022    CO2 26 03/10/2022    INR 1.4 (H) 08/09/2021    HGBA1C 5.3 03/14/2022     CEA: 3.2 (Jan 2022) <-- 3.5 (Sept 2021) <-- 3.4 (June 2021) <-- 6.4 (Feb 2021) <-- 8.7 (Sept 2020) <--4.4 (May 2020) <-- 3.5 (Nov 2019)    Diagnostic Results:  CT March 2022:  EXAMINATION:  CT CHEST ABDOMEN PELVIS WITH CONTRAST (XPD)     CLINICAL HISTORY:  restaging colon cancer history;Malignant neoplasm of colon, unspecified     TECHNIQUE:  Low-dose CT of the chest with contrast and CT abdomen and pelvis with and without contrast was acquired helically from the lung apices through the ischial tuberosities status post administration of 75 cc of Omnipaque 350. Oral contrast was administered.  Axial and reformatted images were reviewed.     COMPARISON:  09/07/2021     FINDINGS:  CHEST     No infiltrates or pleural effusions are identified.  No discrete pulmonary nodules or masses are identified.     Vascular calcification seen involving the aorta.  No aneurysm.  There is evidence for prior median sternotomy.  There is no pericardial effusion.  No enlarged mediastinal, hilar or axillary lymph nodes are identified.     ABDOMEN     Liver/gallbladder/biliary: There is a hypodense lesion within the posterior segment of the right hepatic lobe that measures proximally 3.4 x 2.5 cm in size and is similar in size and appearance to the prior exam.  Small less than 5 mm hypodensity also seen adjacent to the superior aspect of the lesion medially.  There is also a small hypodense lesion seen posterior and inferior to this lesion within the posterior segment of the right hepatic lobe series 4, image 31.  Small hypodense lesion also seen  within the lateral segment of the left hepatic lobe series 4, image 32.  All of these lesions are thought to be stable but are better seen on the current exam due to differences in phase of contrast.  The gallbladder is present and unremarkable.  No biliary ductal dilation.     Pancreas: The pancreas is unremarkable in appearance.     Spleen: The spleen is not enlarged.     Adrenals: Unremarkable     Kidneys: The kidneys are equally perfused and demonstrate no solid masses. No nephrolithiasis.     Bowel/Mesentery: There is no evidence of bowel obstruction.  No mesenteric stranding or adenopathy.     Retroperitoneum: No adenopathy.  The aorta demonstrates a normal caliber.     PELVIS     Genitourinary/Reproductive organs: Unremarkable     Adenopathy: Stable appearance of some nonenlarged right inguinal lymph nodes.     Free Fluid: No free fluid     [place] Structures/Soft tissues: Stable chronic compression deformities at the thoracolumbar junction.     Impression:     1. No evidence to suggest metastatic disease to the chest.  2. Stable appearance of the liver lesions.  No new or definitively enlarging liver lesions are identified.    ASSESSMENT/PLAN:     70-year-old female with adenocarcinoma of the rectosigmoid junction with liver lesion with negative biopsy x2 and negative MRI liver for definitive metastatic disease who is now s/p robotic LAR on 2/13/2020 with final path showing Stage III (T3N2) rectal adenocarcinoma who has now received adjuvant chemotherapy and underwent IR CT guided liver ablation on 11/12/2020    - Encourage high fiber diet and powdered fiber supplementation again.  - Colonoscopy in spring 2024  - CEA today and in 3 months  - CT scan in 6 months  - RTC 3 months for surveillance    Ankur Smith MD  Colon and Rectal Surgery  [facility]

## 2022-07-28 ENCOUNTER — TELEPHONE (OUTPATIENT)
Dept: HEMATOLOGY/ONCOLOGY | Facility: CLINIC | Age: 72
End: 2022-07-28
Payer: MEDICARE

## 2022-07-28 DIAGNOSIS — C18.9 COLON ADENOCARCINOMA: Primary | ICD-10-CM

## 2022-08-15 ENCOUNTER — LAB VISIT (OUTPATIENT)
Dept: LAB | Facility: HOSPITAL | Age: 72
End: 2022-08-15
Attending: COLON & RECTAL SURGERY
Payer: MEDICARE

## 2022-08-15 ENCOUNTER — OFFICE VISIT (OUTPATIENT)
Dept: SURGERY | Facility: CLINIC | Age: 72
End: 2022-08-15
Payer: MEDICARE

## 2022-08-15 VITALS
SYSTOLIC BLOOD PRESSURE: 134 MMHG | HEART RATE: 71 BPM | DIASTOLIC BLOOD PRESSURE: 84 MMHG | WEIGHT: 144 LBS | BODY MASS INDEX: 30.1 KG/M2

## 2022-08-15 DIAGNOSIS — C19 ADENOCARCINOMA OF RECTOSIGMOID JUNCTION: Primary | ICD-10-CM

## 2022-08-15 DIAGNOSIS — K76.9 LIVER LESION: ICD-10-CM

## 2022-08-15 DIAGNOSIS — C18.9 COLON ADENOCARCINOMA: ICD-10-CM

## 2022-08-15 DIAGNOSIS — C20 RECTAL ADENOCARCINOMA: ICD-10-CM

## 2022-08-15 LAB
ALBUMIN SERPL BCP-MCNC: 4.3 G/DL (ref 3.5–5.2)
ALP SERPL-CCNC: 126 U/L (ref 55–135)
ALT SERPL W/O P-5'-P-CCNC: 12 U/L (ref 10–44)
ANION GAP SERPL CALC-SCNC: 9 MMOL/L (ref 8–16)
AST SERPL-CCNC: 17 U/L (ref 10–40)
BASOPHILS # BLD AUTO: 0.03 K/UL (ref 0–0.2)
BASOPHILS NFR BLD: 0.9 % (ref 0–1.9)
BILIRUB SERPL-MCNC: 0.4 MG/DL (ref 0.1–1)
BUN SERPL-MCNC: 18 MG/DL (ref 8–23)
CALCIUM SERPL-MCNC: 9.4 MG/DL (ref 8.7–10.5)
CHLORIDE SERPL-SCNC: 101 MMOL/L (ref 95–110)
CO2 SERPL-SCNC: 30 MMOL/L (ref 23–29)
CREAT SERPL-MCNC: 1 MG/DL (ref 0.5–1.4)
DIFFERENTIAL METHOD: ABNORMAL
EOSINOPHIL # BLD AUTO: 0.1 K/UL (ref 0–0.5)
EOSINOPHIL NFR BLD: 1.5 % (ref 0–8)
ERYTHROCYTE [DISTWIDTH] IN BLOOD BY AUTOMATED COUNT: 13.6 % (ref 11.5–14.5)
EST. GFR  (NO RACE VARIABLE): >60 ML/MIN/1.73 M^2
GLUCOSE SERPL-MCNC: 90 MG/DL (ref 70–110)
HCT VFR BLD AUTO: 36.9 % (ref 37–48.5)
HGB BLD-MCNC: 11.4 G/DL (ref 12–16)
IMM GRANULOCYTES # BLD AUTO: 0.02 K/UL (ref 0–0.04)
IMM GRANULOCYTES NFR BLD AUTO: 0.6 % (ref 0–0.5)
LYMPHOCYTES # BLD AUTO: 1.2 K/UL (ref 1–4.8)
LYMPHOCYTES NFR BLD: 36.3 % (ref 18–48)
MCH RBC QN AUTO: 30.1 PG (ref 27–31)
MCHC RBC AUTO-ENTMCNC: 30.9 G/DL (ref 32–36)
MCV RBC AUTO: 97 FL (ref 82–98)
MONOCYTES # BLD AUTO: 0.2 K/UL (ref 0.3–1)
MONOCYTES NFR BLD: 7.1 % (ref 4–15)
NEUTROPHILS # BLD AUTO: 1.8 K/UL (ref 1.8–7.7)
NEUTROPHILS NFR BLD: 53.6 % (ref 38–73)
NRBC BLD-RTO: 0 /100 WBC
PLATELET # BLD AUTO: 189 K/UL (ref 150–450)
PMV BLD AUTO: 10.4 FL (ref 9.2–12.9)
POTASSIUM SERPL-SCNC: 4 MMOL/L (ref 3.5–5.1)
PROT SERPL-MCNC: 7.5 G/DL (ref 6–8.4)
RBC # BLD AUTO: 3.79 M/UL (ref 4–5.4)
SODIUM SERPL-SCNC: 140 MMOL/L (ref 136–145)
WBC # BLD AUTO: 3.36 K/UL (ref 3.9–12.7)

## 2022-08-15 PROCEDURE — 99214 OFFICE O/P EST MOD 30 MIN: CPT | Mod: S$GLB,,, | Performed by: COLON & RECTAL SURGERY

## 2022-08-15 PROCEDURE — 99214 PR OFFICE/OUTPT VISIT, EST, LEVL IV, 30-39 MIN: ICD-10-PCS | Mod: S$GLB,,, | Performed by: COLON & RECTAL SURGERY

## 2022-08-15 PROCEDURE — 99999 PR PBB SHADOW E&M-EST. PATIENT-LVL III: ICD-10-PCS | Mod: PBBFAC,,, | Performed by: COLON & RECTAL SURGERY

## 2022-08-15 PROCEDURE — 3044F HG A1C LEVEL LT 7.0%: CPT | Mod: CPTII,S$GLB,, | Performed by: COLON & RECTAL SURGERY

## 2022-08-15 PROCEDURE — 99999 PR PBB SHADOW E&M-EST. PATIENT-LVL III: CPT | Mod: PBBFAC,,, | Performed by: COLON & RECTAL SURGERY

## 2022-08-15 PROCEDURE — 82378 CARCINOEMBRYONIC ANTIGEN: CPT | Performed by: COLON & RECTAL SURGERY

## 2022-08-15 PROCEDURE — 3079F PR MOST RECENT DIASTOLIC BLOOD PRESSURE 80-89 MM HG: ICD-10-PCS | Mod: CPTII,S$GLB,, | Performed by: COLON & RECTAL SURGERY

## 2022-08-15 PROCEDURE — 3044F PR MOST RECENT HEMOGLOBIN A1C LEVEL <7.0%: ICD-10-PCS | Mod: CPTII,S$GLB,, | Performed by: COLON & RECTAL SURGERY

## 2022-08-15 PROCEDURE — 36415 COLL VENOUS BLD VENIPUNCTURE: CPT | Performed by: COLON & RECTAL SURGERY

## 2022-08-15 PROCEDURE — 3079F DIAST BP 80-89 MM HG: CPT | Mod: CPTII,S$GLB,, | Performed by: COLON & RECTAL SURGERY

## 2022-08-15 PROCEDURE — 3008F BODY MASS INDEX DOCD: CPT | Mod: CPTII,S$GLB,, | Performed by: COLON & RECTAL SURGERY

## 2022-08-15 PROCEDURE — 1126F AMNT PAIN NOTED NONE PRSNT: CPT | Mod: CPTII,S$GLB,, | Performed by: COLON & RECTAL SURGERY

## 2022-08-15 PROCEDURE — 3008F PR BODY MASS INDEX (BMI) DOCUMENTED: ICD-10-PCS | Mod: CPTII,S$GLB,, | Performed by: COLON & RECTAL SURGERY

## 2022-08-15 PROCEDURE — 3075F SYST BP GE 130 - 139MM HG: CPT | Mod: CPTII,S$GLB,, | Performed by: COLON & RECTAL SURGERY

## 2022-08-15 PROCEDURE — 3075F PR MOST RECENT SYSTOLIC BLOOD PRESS GE 130-139MM HG: ICD-10-PCS | Mod: CPTII,S$GLB,, | Performed by: COLON & RECTAL SURGERY

## 2022-08-15 PROCEDURE — 85025 COMPLETE CBC W/AUTO DIFF WBC: CPT | Performed by: INTERNAL MEDICINE

## 2022-08-15 PROCEDURE — 1126F PR PAIN SEVERITY QUANTIFIED, NO PAIN PRESENT: ICD-10-PCS | Mod: CPTII,S$GLB,, | Performed by: COLON & RECTAL SURGERY

## 2022-08-15 PROCEDURE — 80053 COMPREHEN METABOLIC PANEL: CPT | Performed by: INTERNAL MEDICINE

## 2022-08-15 PROCEDURE — 1159F PR MEDICATION LIST DOCUMENTED IN MEDICAL RECORD: ICD-10-PCS | Mod: CPTII,S$GLB,, | Performed by: COLON & RECTAL SURGERY

## 2022-08-15 PROCEDURE — 1159F MED LIST DOCD IN RCRD: CPT | Mod: CPTII,S$GLB,, | Performed by: COLON & RECTAL SURGERY

## 2022-08-15 NOTE — PROGRESS NOTES
History & Physical    SUBJECTIVE:     CC: rectosigmoid cancer  Ref MD: Brenda Ochoa MD    History of Present Illness:  Patient is a 71 y.o. 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  Patient was undergoing a colonoscopy on November 7, 2019 where a rectosigmoid mass was found and biopsied which showed moderately differentiated adenocarcinoma.  Patient had no other intramucosal lesions at that time.  She is undergoing colonoscopy for iron deficiency anemia.  She had previously been found the month before to have iron deficiency anemia as well as blood when wiping after a bowel movement.  She is on chronic anticoagulation therapy of Coumadin for mechanical heart valve which was done in 2003.  She denies any fever, chills, nausea, vomiting, diarrhea, melena, weight loss or any other signs or symptoms.  She has no personal family history of colorectal cancer or IBD.  Her last colonoscopy prior to this was in 2017 were no lesion was found in this area. Her only significant past surgical history is this mechanical valve replacement.  No episodes of fecal incontinence per her report and some chronic constipation noted. She is referred to Colorectal surgery for evaluation.  I have personally spoken with her referring provider and reviewed her previous records.    2/13/2020: Robotic LAR with CR anastomosis  11/12/2020: liver ablation    Interval history:  Since last clinic visit, the patient continues to do well. CT scan in March 2022 with stable liver lesions but no new findings. CEA today pending. She is tolerating a regular diet with hematochezia or melena. Denies fevers, chills, nausea, and vomiting.     Review of patient's allergies indicates:   Allergen Reactions    Penicillins Rash       Current Outpatient Medications   Medication Sig Dispense Refill    acebutolol (SECTRAL) 400 mg capsule Take 400 mg by mouth 2 (two) times daily.      ALPRAZolam (XANAX) 1 MG tablet Take 1 mg by mouth 2 (two)  times daily as needed.      ergocalciferol (ERGOCALCIFEROL) 50,000 unit Cap Take 1 capsule by mouth every 30 days.      furosemide (LASIX) 40 MG tablet Take 40 mg by mouth Daily.      lovastatin (MEVACOR) 40 MG tablet Take 40 mg by mouth every evening.      multivitamin (THERAGRAN) tablet Take 1 tablet by mouth.      pregabalin (LYRICA) 100 MG capsule Take 1 capsule (100 mg total) by mouth 2 (two) times daily. 60 capsule 2    warfarin (COUMADIN) 2.5 MG tablet Take 1 tablet every day EXCEPT on Mondays and Fridays. -- OR AS DIRECTED BY COUMADIN CLINIC 90 tablet 1    warfarin (COUMADIN) 5 MG tablet Take 1 tablet (5 mg total) by mouth Daily. Take one tablet by mouth on Mondays and Fridays. AS DIRECTED BY COUMADIN CLINIC. 30 tablet 3    zolpidem (AMBIEN) 10 mg Tab Take 5 mg by mouth nightly as needed.       DULoxetine (CYMBALTA) 30 MG capsule Take 30 mg by mouth.       Current Facility-Administered Medications   Medication Dose Route Frequency Provider Last Rate Last Admin    lidocaine (PF) 10 mg/ml (1%) injection 10 mg  1 mL Intradermal Once Ankur Smith MD           Past Medical History:   Diagnosis Date    Anticoagulant long-term use     Aortic valve defect     Benign neoplasm of ascending colon 4/6/2017    Cancer     CHF (congestive heart failure)     DDD (degenerative disc disease), cervical 7/20/2018    GERD (gastroesophageal reflux disease)     Hemorrhoids     HLD (hyperlipidemia)     Hypertension     Insomnia 12/1/2014    Irritable bowel syndrome with constipation 4/9/2018    Postmenopausal osteoporosis 7/20/2018     Past Surgical History:   Procedure Laterality Date    AORTIC VALVE REPLACEMENT  2003    COLONOSCOPY Left 4/6/2017    Procedure: COLONOSCOPY;  Surgeon: Sander Ulloa MD;  Location: [facility] ENDO;  Service: Endoscopy;  Laterality: Left;    COLONOSCOPY N/A 11/7/2019    Procedure: COLONOSCOPY;  Surgeon: Brenda Ochoa MD;  Location: [facility] ENDO;  Service: Endoscopy;   Laterality: N/A;    COLONOSCOPY N/A 2/26/2021    Procedure: COLONOSCOPY;  Surgeon: Ankur Smith MD;  Location: [facility] ENDO;  Service: General;  Laterality: N/A;    COMPUTED TOMOGRAPHY N/A 11/12/2020    Procedure: Liver microwave ablation;  Surgeon: Gonzalez Moraes MD;  Location: [facility] JARAD;  Service: General;  Laterality: N/A;    ESOPHAGOGASTRODUODENOSCOPY N/A 11/7/2019    Procedure: ESOPHAGOGASTRODUODENOSCOPY (EGD) needs PT/INR;  Surgeon: Brenda Ochoa MD;  Location: [facility] ENDO;  Service: Endoscopy;  Laterality: N/A;    FLEXIBLE SIGMOIDOSCOPY N/A 2/13/2020    Procedure: SIGMOIDOSCOPY, FLEXIBLE;  Surgeon: Ankur Smith MD;  Location: [facility] OR;  Service: General;  Laterality: N/A;    INJECTION OF ANESTHETIC AGENT INTO TISSUE PLANE DEFINED BY TRANSVERSUS ABDOMINIS MUSCLE N/A 2/13/2020    Procedure: BLOCK, TRANSVERSUS ABDOMINIS PLANE;  Surgeon: Ankur Smith MD;  Location: [facility] OR;  Service: General;  Laterality: N/A;    INSERTION OF TUNNELED CENTRAL VENOUS CATHETER (CVC) WITH SUBCUTANEOUS PORT N/A 3/17/2020    Procedure: [id number];  Surgeon: Ankur Smith MD;  Location: [facility];  Service: General;  Laterality: N/A;    ROBOT-ASSISTED LOW ANTERIOR RESECTION OF COLON N/A 2/13/2020    Procedure: XI ROBOTIC RESECTION, COLON, LOW ANTERIOR;  Surgeon: Ankur Smith MD;  Location: [facility];  Service: General;  Laterality: N/A;     Family History   Problem Relation Age of Onset    Diabetes Mother     Heart disease Mother     Hypertension Mother     Heart disease Father     Hypertension Father     Hypertension Sister     Hypertension Son     Heart disease Son     Colon cancer Neg Hx      Social History     Tobacco Use    Smoking status: Never Smoker    Smokeless tobacco: Never Used   Substance Use Topics    Alcohol use: Not Currently    Drug use: No        Review of Systems:  Review of Systems   Constitutional: Negative for activity change, appetite change, chills, fatigue,  fever and unexpected weight change.   HENT: Negative for congestion, ear pain, sore throat and trouble swallowing.    Eyes: Negative for pain, redness and itching.   Respiratory: Negative for cough, shortness of breath and wheezing.    Cardiovascular: Negative for chest pain, palpitations and leg swelling.   Gastrointestinal: Negative for abdominal distention, abdominal pain, anal bleeding, blood in stool, constipation, diarrhea, nausea, rectal pain and vomiting.   Endocrine: Negative for cold intolerance, heat intolerance and polyuria.   Genitourinary: Negative for dysuria, flank pain, frequency and hematuria.   Musculoskeletal: Negative for gait problem, joint swelling and neck pain.   Skin: Negative for color change, rash and wound.   Allergic/Immunologic: Negative for environmental allergies and immunocompromised state.   Neurological: Negative for dizziness, speech difficulty, weakness and numbness.   Psychiatric/Behavioral: Negative for agitation, confusion and hallucinations.       OBJECTIVE:       Physical Exam:  Physical Exam  Constitutional:       Appearance: She is well-developed.   HENT:      Head: Normocephalic and atraumatic.   Eyes:      Conjunctiva/sclera: Conjunctivae normal.   Neck:      Thyroid: No thyromegaly.   Cardiovascular:      Rate and Rhythm: Normal rate and regular rhythm.   Pulmonary:      Effort: Pulmonary effort is normal. No respiratory distress.   Abdominal:      General: There is no distension.      Palpations: Abdomen is soft. There is no mass.      Tenderness: There is no abdominal tenderness.      Comments: Incisions well-healed; no erythema or drainage   Musculoskeletal:         General: No tenderness. Normal range of motion.      Cervical back: Normal range of motion.   Skin:     General: Skin is warm and dry.      Capillary Refill: Capillary refill takes less than 2 seconds.      Findings: No rash.   Neurological:      Mental Status: She is alert and oriented to person, place,  and time.       Laboratory  Lab Results   Component Value Date    WBC 3.27 (L) 03/10/2022    HGB 11.5 (L) 03/10/2022    HCT 35.9 (L) 03/10/2022     03/10/2022    ALT 16 03/10/2022    AST 14 03/10/2022     03/10/2022    K 4.5 03/10/2022     03/10/2022    CREATININE 1.3 03/10/2022    BUN 31 (H) 03/10/2022    CO2 26 03/10/2022    INR 1.4 (H) 08/09/2021    HGBA1C 5.3 03/14/2022     Component Ref Range & Units 3 mo ago   (5/9/22) 6 mo ago   (1/25/22) 11 mo ago   (9/7/21) 1 yr ago   (6/3/21) 1 yr ago   (1/15/21) 1 yr ago   (9/25/20) 2 yr ago   (8/10/20)   CEA 0.0 - 5.0 ng/mL 4.2  3.2 CM  3.5 CM  3.4 CM  6.4 High  CM  8.7 High  CM  7.8 High  CM        Diagnostic Results:  CT March 2022:  EXAMINATION:  CT CHEST ABDOMEN PELVIS WITH CONTRAST (XPD)     CLINICAL HISTORY:  restaging colon cancer history;Malignant neoplasm of colon, unspecified     TECHNIQUE:  Low-dose CT of the chest with contrast and CT abdomen and pelvis with and without contrast was acquired helically from the lung apices through the ischial tuberosities status post administration of 75 cc of Omnipaque 350. Oral contrast was administered.  Axial and reformatted images were reviewed.     COMPARISON:  09/07/2021     FINDINGS:  CHEST     No infiltrates or pleural effusions are identified.  No discrete pulmonary nodules or masses are identified.     Vascular calcification seen involving the aorta.  No aneurysm.  There is evidence for prior median sternotomy.  There is no pericardial effusion.  No enlarged mediastinal, hilar or axillary lymph nodes are identified.     ABDOMEN     Liver/gallbladder/biliary: There is a hypodense lesion within the posterior segment of the right hepatic lobe that measures proximally 3.4 x 2.5 cm in size and is similar in size and appearance to the prior exam.  Small less than 5 mm hypodensity also seen adjacent to the superior aspect of the lesion medially.  There is also a small hypodense lesion seen posterior and  inferior to this lesion within the posterior segment of the right hepatic lobe series 4, image 31.  Small hypodense lesion also seen within the lateral segment of the left hepatic lobe series 4, image 32.  All of these lesions are thought to be stable but are better seen on the current exam due to differences in phase of contrast.  The gallbladder is present and unremarkable.  No biliary ductal dilation.     Pancreas: The pancreas is unremarkable in appearance.     Spleen: The spleen is not enlarged.     Adrenals: Unremarkable     Kidneys: The kidneys are equally perfused and demonstrate no solid masses. No nephrolithiasis.     Bowel/Mesentery: There is no evidence of bowel obstruction.  No mesenteric stranding or adenopathy.     Retroperitoneum: No adenopathy.  The aorta demonstrates a normal caliber.     PELVIS     Genitourinary/Reproductive organs: Unremarkable     Adenopathy: Stable appearance of some nonenlarged right inguinal lymph nodes.     Free Fluid: No free fluid     [place] Structures/Soft tissues: Stable chronic compression deformities at the thoracolumbar junction.     Impression:     1. No evidence to suggest metastatic disease to the chest.  2. Stable appearance of the liver lesions.  No new or definitively enlarging liver lesions are identified.    ASSESSMENT/PLAN:     71-year-old female with adenocarcinoma of the rectosigmoid junction with liver lesion with negative biopsy x2 and negative MRI liver for definitive metastatic disease who is now s/p robotic LAR on 2/13/2020 with final path showing Stage III (T3N2) rectal adenocarcinoma who has now received adjuvant chemotherapy and underwent IR CT guided liver ablation on 11/12/2020    - Colonoscopy in spring 2024  - CEA pending today and in 3 months  - CT scan next month, ordered  - RTC 3-6 months for surveillance    Ankur Smith MD  Colon and Rectal Surgery  [facility]

## 2022-08-16 ENCOUNTER — TELEPHONE (OUTPATIENT)
Dept: SURGERY | Facility: CLINIC | Age: 72
End: 2022-08-16
Payer: MEDICARE

## 2022-08-16 LAB
CEA SERPL-MCNC: 3.8 NG/ML (ref 0–5)
CEA SERPL-MCNC: 3.8 NG/ML (ref 0–5)

## 2022-08-16 NOTE — TELEPHONE ENCOUNTER
Left patient voicemail with new CT details rescheduled for [place] as requested. CT is scheduled for 8/19/22 at 9:30 am, arrive 1 hour prior to scheduled time and fast for 4 hours before. Call back number [phone] left.

## 2022-08-25 ENCOUNTER — HOSPITAL ENCOUNTER (OUTPATIENT)
Dept: RADIOLOGY | Facility: HOSPITAL | Age: 72
Discharge: HOME | End: 2022-08-25
Attending: COLON & RECTAL SURGERY
Payer: MEDICARE

## 2022-08-25 DIAGNOSIS — C19 ADENOCARCINOMA OF RECTOSIGMOID JUNCTION: ICD-10-CM

## 2022-08-25 PROCEDURE — 74177 CT ABD & PELVIS W/CONTRAST: CPT | Mod: 26,,, | Performed by: RADIOLOGY

## 2022-08-25 PROCEDURE — 25500020 PHARM REV CODE 255: Mod: PO | Performed by: COLON & RECTAL SURGERY

## 2022-08-25 PROCEDURE — A9698 NON-RAD CONTRAST MATERIALNOC: HCPCS | Mod: PO | Performed by: COLON & RECTAL SURGERY

## 2022-08-25 PROCEDURE — 71260 CT THORAX DX C+: CPT | Mod: TC,PO

## 2022-08-25 PROCEDURE — 71260 CT THORAX DX C+: CPT | Mod: 26,,, | Performed by: RADIOLOGY

## 2022-08-25 PROCEDURE — 74177 CT ABD & PELVIS W/CONTRAST: CPT | Mod: TC,PO

## 2022-08-25 PROCEDURE — 71260 CT CHEST ABDOMEN PELVIS WITH CONTRAST (XPD): ICD-10-PCS | Mod: 26,,, | Performed by: RADIOLOGY

## 2022-08-25 PROCEDURE — 74177 CT CHEST ABDOMEN PELVIS WITH CONTRAST (XPD): ICD-10-PCS | Mod: 26,,, | Performed by: RADIOLOGY

## 2022-08-25 RX ADMIN — IOHEXOL 75 ML: 350 INJECTION, SOLUTION INTRAVENOUS at 10:08

## 2022-08-25 RX ADMIN — IOHEXOL 500 ML: 12 SOLUTION ORAL at 10:08

## 2022-09-02 ENCOUNTER — OFFICE VISIT (OUTPATIENT)
Dept: HEMATOLOGY/ONCOLOGY | Facility: CLINIC | Age: 72
End: 2022-09-02
Payer: MEDICARE

## 2022-09-02 VITALS
WEIGHT: 147.94 LBS | TEMPERATURE: 97 F | DIASTOLIC BLOOD PRESSURE: 80 MMHG | SYSTOLIC BLOOD PRESSURE: 124 MMHG | BODY MASS INDEX: 31.91 KG/M2 | HEART RATE: 74 BPM | HEIGHT: 57 IN | OXYGEN SATURATION: 95 %

## 2022-09-02 DIAGNOSIS — D64.9 ANEMIA, UNSPECIFIED TYPE: ICD-10-CM

## 2022-09-02 DIAGNOSIS — D72.819 LEUKOPENIA, UNSPECIFIED TYPE: ICD-10-CM

## 2022-09-02 DIAGNOSIS — C18.9 COLON ADENOCARCINOMA: Primary | ICD-10-CM

## 2022-09-02 DIAGNOSIS — T45.1X5A PERIPHERAL NEUROPATHY DUE TO CHEMOTHERAPY: ICD-10-CM

## 2022-09-02 DIAGNOSIS — G62.0 PERIPHERAL NEUROPATHY DUE TO CHEMOTHERAPY: ICD-10-CM

## 2022-09-02 PROCEDURE — 3079F PR MOST RECENT DIASTOLIC BLOOD PRESSURE 80-89 MM HG: ICD-10-PCS | Mod: CPTII,S$GLB,, | Performed by: INTERNAL MEDICINE

## 2022-09-02 PROCEDURE — 3008F BODY MASS INDEX DOCD: CPT | Mod: CPTII,S$GLB,, | Performed by: INTERNAL MEDICINE

## 2022-09-02 PROCEDURE — 3074F SYST BP LT 130 MM HG: CPT | Mod: CPTII,S$GLB,, | Performed by: INTERNAL MEDICINE

## 2022-09-02 PROCEDURE — 1159F MED LIST DOCD IN RCRD: CPT | Mod: CPTII,S$GLB,, | Performed by: INTERNAL MEDICINE

## 2022-09-02 PROCEDURE — 1126F PR PAIN SEVERITY QUANTIFIED, NO PAIN PRESENT: ICD-10-PCS | Mod: CPTII,S$GLB,, | Performed by: INTERNAL MEDICINE

## 2022-09-02 PROCEDURE — 1101F PR PT FALLS ASSESS DOC 0-1 FALLS W/OUT INJ PAST YR: ICD-10-PCS | Mod: CPTII,S$GLB,, | Performed by: INTERNAL MEDICINE

## 2022-09-02 PROCEDURE — 3008F PR BODY MASS INDEX (BMI) DOCUMENTED: ICD-10-PCS | Mod: CPTII,S$GLB,, | Performed by: INTERNAL MEDICINE

## 2022-09-02 PROCEDURE — 1159F PR MEDICATION LIST DOCUMENTED IN MEDICAL RECORD: ICD-10-PCS | Mod: CPTII,S$GLB,, | Performed by: INTERNAL MEDICINE

## 2022-09-02 PROCEDURE — 3074F PR MOST RECENT SYSTOLIC BLOOD PRESSURE < 130 MM HG: ICD-10-PCS | Mod: CPTII,S$GLB,, | Performed by: INTERNAL MEDICINE

## 2022-09-02 PROCEDURE — 1101F PT FALLS ASSESS-DOCD LE1/YR: CPT | Mod: CPTII,S$GLB,, | Performed by: INTERNAL MEDICINE

## 2022-09-02 PROCEDURE — 99999 PR PBB SHADOW E&M-EST. PATIENT-LVL III: CPT | Mod: PBBFAC,,, | Performed by: INTERNAL MEDICINE

## 2022-09-02 PROCEDURE — 99214 OFFICE O/P EST MOD 30 MIN: CPT | Mod: S$GLB,,, | Performed by: INTERNAL MEDICINE

## 2022-09-02 PROCEDURE — 1126F AMNT PAIN NOTED NONE PRSNT: CPT | Mod: CPTII,S$GLB,, | Performed by: INTERNAL MEDICINE

## 2022-09-02 PROCEDURE — 3044F HG A1C LEVEL LT 7.0%: CPT | Mod: CPTII,S$GLB,, | Performed by: INTERNAL MEDICINE

## 2022-09-02 PROCEDURE — 99999 PR PBB SHADOW E&M-EST. PATIENT-LVL III: ICD-10-PCS | Mod: PBBFAC,,, | Performed by: INTERNAL MEDICINE

## 2022-09-02 PROCEDURE — 3044F PR MOST RECENT HEMOGLOBIN A1C LEVEL <7.0%: ICD-10-PCS | Mod: CPTII,S$GLB,, | Performed by: INTERNAL MEDICINE

## 2022-09-02 PROCEDURE — 3288F FALL RISK ASSESSMENT DOCD: CPT | Mod: CPTII,S$GLB,, | Performed by: INTERNAL MEDICINE

## 2022-09-02 PROCEDURE — 3079F DIAST BP 80-89 MM HG: CPT | Mod: CPTII,S$GLB,, | Performed by: INTERNAL MEDICINE

## 2022-09-02 PROCEDURE — 3288F PR FALLS RISK ASSESSMENT DOCUMENTED: ICD-10-PCS | Mod: CPTII,S$GLB,, | Performed by: INTERNAL MEDICINE

## 2022-09-02 PROCEDURE — 99214 PR OFFICE/OUTPT VISIT, EST, LEVL IV, 30-39 MIN: ICD-10-PCS | Mod: S$GLB,,, | Performed by: INTERNAL MEDICINE

## 2022-09-02 RX ORDER — DULOXETIN HYDROCHLORIDE 60 MG/1
60 CAPSULE, DELAYED RELEASE ORAL
Status: ON HOLD | COMMUNITY
Start: 2022-09-01 | End: 2023-03-18 | Stop reason: HOSPADM

## 2022-09-02 NOTE — PROGRESS NOTES
Subjective:      DATE OF VISIT: 9/2/2022   ?  Patient ID:?Adalgisa Wright is a 71 y.o. female.?? MR#: [id number]   ?   PRIMARY PROVIDER: Dr. Quintanilla    CHIEF COMPLAINT:  Follow-up  ?   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   CURRENT TREATMENT: surveillance    PAST TREATMENT: FOLFOX, C1D1 3/23/20 - 9/11/20; liver ablation 11/2020    Follow-up  5/2022 had r knee replacement with improvement in mobility and pain. No bowel changes or cbleeding. Continues on coumadin with cardiology. Constipation improved on Doculax.    Review of Systems    ?   A comprehensive 14-point review of systems was reviewed with patient and was negative other than as specified above.   ?     Objective:      Physical Exam      ?   Vitals:    09/02/22 1021   BP: 124/80   Pulse: 74   Temp: 97 °F (36.1 °C)      ECOG:?0  General appearance: Generally well appearing, in no acute distress.   Head, eyes, ears, nose, and throat: moist mucous membranes.   Respiratory:  Normal work of breathing  Abdomen: nontender, nondistended.   Extremities: Warm, without edema.   Neurologic: Alert and oriented.   Skin: No rashes, ecchymoses or petechial lesion.   Psychiatric:  Normal mood and affect.    Laboratory:  ?   No visits with results within 1 Day(s) from this visit.   Latest known visit with results is:   Lab Visit on 08/15/2022   Component Date Value Ref Range Status    CEA 08/15/2022 3.8  0.0 - 5.0 ng/mL Final    WBC 08/15/2022 3.36 (L)  3.90 - 12.70 K/uL Final    RBC 08/15/2022 3.79 (L)  4.00 - 5.40 M/uL Final    Hemoglobin 08/15/2022 11.4 (L)  12.0 - 16.0 g/dL Final    Hematocrit 08/15/2022 36.9 (L)  37.0 - 48.5 % Final    MCV 08/15/2022 97  82 - 98 fL Final    MCH 08/15/2022 30.1  27.0 - 31.0 pg Final    MCHC 08/15/2022 30.9 (L)  32.0 - 36.0 g/dL Final    RDW 08/15/2022 13.6  11.5 - 14.5 % Final    Platelets 08/15/2022 189  150 - 450 K/uL  Final    MPV 08/15/2022 10.4  9.2 - 12.9 fL Final    Immature Granulocytes 08/15/2022 0.6 (H)  0.0 - 0.5 % Final    Gran # (ANC) 08/15/2022 1.8  1.8 - 7.7 K/uL Final    Immature Grans (Abs) 08/15/2022 0.02  0.00 - 0.04 K/uL Final    Lymph # 08/15/2022 1.2  1.0 - 4.8 K/uL Final    Mono # 08/15/2022 0.2 (L)  0.3 - 1.0 K/uL Final    Eos # 08/15/2022 0.1  0.0 - 0.5 K/uL Final    Baso # 08/15/2022 0.03  0.00 - 0.20 K/uL Final    nRBC 08/15/2022 0  0 /100 WBC Final    Gran % 08/15/2022 53.6  38.0 - 73.0 % Final    Lymph % 08/15/2022 36.3  18.0 - 48.0 % Final    Mono % 08/15/2022 7.1  4.0 - 15.0 % Final    Eosinophil % 08/15/2022 1.5  0.0 - 8.0 % Final    Basophil % 08/15/2022 0.9  0.0 - 1.9 % Final    Differential Method 08/15/2022 Automated   Final    Sodium 08/15/2022 140  136 - 145 mmol/L Final    Potassium 08/15/2022 4.0  3.5 - 5.1 mmol/L Final    Chloride 08/15/2022 101  95 - 110 mmol/L Final    CO2 08/15/2022 30 (H)  23 - 29 mmol/L Final    Glucose 08/15/2022 90  70 - 110 mg/dL Final    BUN 08/15/2022 18  8 - 23 mg/dL Final    Creatinine 08/15/2022 1.0  0.5 - 1.4 mg/dL Final    Calcium 08/15/2022 9.4  8.7 - 10.5 mg/dL Final    Total Protein 08/15/2022 7.5  6.0 - 8.4 g/dL Final    Albumin 08/15/2022 4.3  3.5 - 5.2 g/dL Final    Total Bilirubin 08/15/2022 0.4  0.1 - 1.0 mg/dL Final    Alkaline Phosphatase 08/15/2022 126  55 - 135 U/L Final    AST 08/15/2022 17  10 - 40 U/L Final    ALT 08/15/2022 12  10 - 44 U/L Final    Anion Gap 08/15/2022 9  8 - 16 mmol/L Final    eGFR 08/15/2022 >60  >60 mL/min/1.73 m^2 Final    CEA 08/15/2022 3.8  0.0 - 5.0 ng/mL Final     Lab Results   Component Value Date    IRON 71 06/07/2021    TIBC 383 06/07/2021    FERRITIN 193 06/07/2021       Tumor markers    CEA    CEA   Date Value Ref Range Status   08/15/2022 3.8 0.0 - 5.0 ng/mL Final     Comment:     CEA Normal Range:  Non-Smokers: 0-3.0 ng/mL  Smokers:     0-5.0 ng/mL    The testing method is a  chemiluminescent microparticle immunoassay   manufactured by [employer] and performed on the [employer]   or   [employer] system. Values obtained with different assay manufacturers   for   methods may be different and cannot be used interchangeably.     08/15/2022 3.8 0.0 - 5.0 ng/mL Final     Comment:     CEA Normal Range:  Non-Smokers: 0-3.0 ng/mL  Smokers:     0-5.0 ng/mL    The testing method is a chemiluminescent microparticle immunoassay   manufactured by [employer] and performed on the [employer]   or   [employer] system. Values obtained with different assay manufacturers   for   methods may be different and cannot be used interchangeably.     05/09/2022 4.2 0.0 - 5.0 ng/mL Final     Comment:     CEA Normal Range:  Non-Smokers: 0-3.0 ng/mL  Smokers:     0-5.0 ng/mL     01/25/2022 3.2 0.0 - 5.0 ng/mL Final     Comment:     CEA Normal Range:  Non-Smokers: 0-3.0 ng/mL  Smokers:     0-5.0 ng/mL     09/07/2021 3.5 0.0 - 5.0 ng/mL Final     Comment:     CEA Normal Range:  Non-Smokers: 0-3.0 ng/mL  Smokers:     0-5.0 ng/mL     06/03/2021 3.4 0.0 - 5.0 ng/mL Final     Comment:     CEA Normal Range:  Non-Smokers: 0-3.0 ng/mL  Smokers:     0-5.0 ng/mL     01/15/2021 6.4 (H) 0.0 - 5.0 ng/mL Final     Comment:     CEA Normal Range:  Non-Smokers: 0-3.0 ng/mL  Smokers:     0-5.0 ng/mL     09/25/2020 8.7 (H) 0.0 - 5.0 ng/mL Final     Comment:     CEA Normal Range:  Non-Smokers: 0-3.0 ng/mL  Smokers:     0-5.0 ng/mL     08/10/2020 7.8 (H) 0.0 - 5.0 ng/mL Final     Comment:     CEA Normal Range:  Non-Smokers: 0-3.0 ng/mL  Smokers:     0-5.0 ng/mL     05/25/2020 4.4 0.0 - 5.0 ng/mL Final     Comment:     CEA Normal Range:  Non-Smokers: 0-3.0 ng/mL  Smokers:     0-5.0 ng/mL              ? IMAGING  No results found for this or any previous visit (from the past 2160 hour(s)).    No results found for this or any previous visit (from the past 2160 hour(s)).    Results for orders placed or performed during the  hospital encounter of 08/25/22 (from the past 2160 hour(s))   CT Chest Abdomen Pelvis With Contrast (xpd)    Impression    1. Similar appearance of the chest, abdomen and pelvis without definite CT evidence of local recurrence or metastatic disease.  2. Other findings as above including nonspecific mild periportal edema.      Electronically signed by: Jonny Zimmer MD  Date:    08/25/2022  Time:    11:05     CT CHEST ABDOMEN PELVIS WITH CONTRAST (XPD)     CLINICAL HISTORY:  restaging colon cancer; Malignant neoplasm of colon, unspecified     TECHNIQUE:  Low dose axial images, sagittal and coronal reformations were obtained from the thoracic inlet to the pubic symphysis following the IV administration of 75 mL of Omnipaque 350 and the oral administration of 30 ml of Omnipaque 350.     COMPARISON:  02/22/2021.     FINDINGS:  Chest:     Heart and great vessels: Status post median sternotomy and aortic valve replacement.  Aortic atherosclerosis.  No aneurysm.  No pericardial effusion.     Adenopathy: None demonstrated.     Lungs: Bilateral lung scarring or atelectasis.  No suspicious pulmonary nodules/masses.  No pleural effusion.     Abdomen:     Liver: Area of low attenuation compatible with post treatment change in the posterior right hepatic lobe, not significantly changed measuring 3.2 x 2.4 cm.  No abnormal enhancement.  Additional subcentimeter hypodense foci elsewhere in the posterior right hepatic lobe, unchanged.  No new hepatic lesions.     Gallbladder and biliary: Within normal limits.     Spleen: Within normal limits.     Pancreas: Within normal limits.     Adrenals: Within normal limits.     Kidneys: Within normal limits.     Bowel: Within normal limits.  No evidence of obstruction.     Peritoneum: No ascites or pneumoperitoneum.     Abdominal Adenopathy: None.     Vasculature: Within normal limits.     Pelvis:     Urinary bladder: Unremarkable.     Reproductive organs: Within normal limits.     Pelvis  adenopathy: None.     Bones: Multilevel chronic compression fractures in the thoracolumbar spine.  No acute findings.     Miscellaneous: None.     Impression:     Stable CT of the chest, abdomen, and pelvis.  ?   Assessment/Plan:       1. Colon adenocarcinoma    2. Leukopenia, unspecified type    3. Anemia, unspecified type    4. Peripheral neuropathy due to chemotherapy            Plan:     # rectosigmoid adenocarcinoma, pT3 pN2 pMx, Stage III vs IV possible oligometastatic disease in liver, MSI stable:  Initiated adjuvant chemotherapy C1D1 3/23/20.  Restaging PET-CT after 4 cycles showed interval resolution of mildly avid right hepatic lobe lesion previously nondiagnostic on multiple biopsies; I discussed that interval improvement while on chemotherapy does raise concern for potential malignant involvement.  Case discussed at multidisciplinary tumor board with review of imaging and recommendation to continue adjuvant chemotherapy with plan for follow-up with surgery for consideration of resection of likely oligo metastatic liver disease.   Completed 6 months FOLFOX on 09/09/2020.    Restaging PET negative for evidence of recurrence or metastatic disease.  We reviewed her case in multidisciplinary tumor board 09/25/2020.  We did discuss that despite negative biopsy x2 of liver abnormality on initial MRI/PET mild avidity response with chemotherapy is concerning for possible metastatic involvement.  Recommendation at tumor board for local treatment and patient pursued IR ablation completed November 2020.    1/21/21 MR liver with post treatment affects noted in no evidence of new/recurrent lesion.  Restaging CT chest abdomen pelvis 02/22/2021 with post treatment changes in right hepatic lobe and no other concerning findings for recurrent/metastatic disease.  02/26/2021 colonoscopy with Dr. Smith   Repeat CEA continues to be normalized.  Restaging CT 3/2022 without concerning findings for recurrent/metastatic disease;  liver is stable. Recommend continued surveillance RV in 3 mo and repeat imaging in 6 mo.    Chronic leukopenia and mild anemia stable, continue to monitor.       # peripheral neuropathy: on duloxetine increased dose and lyrica         Follow-Up:   Patient Instructions    3 mo labs couple days prior, cea, cbc, cmp

## 2023-01-03 ENCOUNTER — LAB VISIT (OUTPATIENT)
Dept: LAB | Facility: HOSPITAL | Age: 73
End: 2023-01-03
Attending: INTERNAL MEDICINE
Payer: COMMERCIAL

## 2023-01-03 DIAGNOSIS — C18.9 COLON ADENOCARCINOMA: ICD-10-CM

## 2023-01-03 LAB
ALBUMIN SERPL BCP-MCNC: 4.1 G/DL (ref 3.5–5.2)
ALP SERPL-CCNC: 101 U/L (ref 55–135)
ALT SERPL W/O P-5'-P-CCNC: 14 U/L (ref 10–44)
ANION GAP SERPL CALC-SCNC: 9 MMOL/L (ref 8–16)
AST SERPL-CCNC: 19 U/L (ref 10–40)
BASOPHILS # BLD AUTO: 0.02 K/UL (ref 0–0.2)
BASOPHILS NFR BLD: 0.6 % (ref 0–1.9)
BILIRUB SERPL-MCNC: 0.9 MG/DL (ref 0.1–1)
BUN SERPL-MCNC: 9 MG/DL (ref 8–23)
CALCIUM SERPL-MCNC: 9.2 MG/DL (ref 8.7–10.5)
CHLORIDE SERPL-SCNC: 101 MMOL/L (ref 95–110)
CO2 SERPL-SCNC: 28 MMOL/L (ref 23–29)
CREAT SERPL-MCNC: 1 MG/DL (ref 0.5–1.4)
DIFFERENTIAL METHOD: ABNORMAL
EOSINOPHIL # BLD AUTO: 0.1 K/UL (ref 0–0.5)
EOSINOPHIL NFR BLD: 3 % (ref 0–8)
ERYTHROCYTE [DISTWIDTH] IN BLOOD BY AUTOMATED COUNT: 14 % (ref 11.5–14.5)
EST. GFR  (NO RACE VARIABLE): >60 ML/MIN/1.73 M^2
GLUCOSE SERPL-MCNC: 105 MG/DL (ref 70–110)
HCT VFR BLD AUTO: 40.8 % (ref 37–48.5)
HGB BLD-MCNC: 13 G/DL (ref 12–16)
IMM GRANULOCYTES # BLD AUTO: 0.02 K/UL (ref 0–0.04)
IMM GRANULOCYTES NFR BLD AUTO: 0.6 % (ref 0–0.5)
LYMPHOCYTES # BLD AUTO: 0.6 K/UL (ref 1–4.8)
LYMPHOCYTES NFR BLD: 16.9 % (ref 18–48)
MCH RBC QN AUTO: 29.8 PG (ref 27–31)
MCHC RBC AUTO-ENTMCNC: 31.9 G/DL (ref 32–36)
MCV RBC AUTO: 94 FL (ref 82–98)
MONOCYTES # BLD AUTO: 0.3 K/UL (ref 0.3–1)
MONOCYTES NFR BLD: 7.5 % (ref 4–15)
NEUTROPHILS # BLD AUTO: 2.6 K/UL (ref 1.8–7.7)
NEUTROPHILS NFR BLD: 71.4 % (ref 38–73)
NRBC BLD-RTO: 0 /100 WBC
PLATELET # BLD AUTO: 125 K/UL (ref 150–450)
PMV BLD AUTO: 10.6 FL (ref 9.2–12.9)
POTASSIUM SERPL-SCNC: 4.6 MMOL/L (ref 3.5–5.1)
PROT SERPL-MCNC: 7.4 G/DL (ref 6–8.4)
RBC # BLD AUTO: 4.36 M/UL (ref 4–5.4)
SODIUM SERPL-SCNC: 138 MMOL/L (ref 136–145)
WBC # BLD AUTO: 3.62 K/UL (ref 3.9–12.7)

## 2023-01-03 PROCEDURE — 82378 CARCINOEMBRYONIC ANTIGEN: CPT | Performed by: INTERNAL MEDICINE

## 2023-01-03 PROCEDURE — 80053 COMPREHEN METABOLIC PANEL: CPT | Mod: PO | Performed by: INTERNAL MEDICINE

## 2023-01-03 PROCEDURE — 36415 COLL VENOUS BLD VENIPUNCTURE: CPT | Mod: PO | Performed by: INTERNAL MEDICINE

## 2023-01-03 PROCEDURE — 85025 COMPLETE CBC W/AUTO DIFF WBC: CPT | Mod: PO | Performed by: INTERNAL MEDICINE

## 2023-01-04 LAB — CEA SERPL-MCNC: 4.8 NG/ML (ref 0–5)

## 2023-01-23 ENCOUNTER — OFFICE VISIT (OUTPATIENT)
Dept: HEMATOLOGY/ONCOLOGY | Facility: CLINIC | Age: 73
End: 2023-01-23
Payer: COMMERCIAL

## 2023-01-23 VITALS
HEART RATE: 69 BPM | DIASTOLIC BLOOD PRESSURE: 80 MMHG | OXYGEN SATURATION: 98 % | TEMPERATURE: 97 F | BODY MASS INDEX: 31.77 KG/M2 | RESPIRATION RATE: 18 BRPM | WEIGHT: 146.81 LBS | SYSTOLIC BLOOD PRESSURE: 142 MMHG

## 2023-01-23 DIAGNOSIS — D64.9 ANEMIA, UNSPECIFIED TYPE: ICD-10-CM

## 2023-01-23 DIAGNOSIS — C18.9 COLON ADENOCARCINOMA: Primary | ICD-10-CM

## 2023-01-23 DIAGNOSIS — D72.819 LEUKOPENIA, UNSPECIFIED TYPE: ICD-10-CM

## 2023-01-23 DIAGNOSIS — D69.6 THROMBOCYTOPENIA: ICD-10-CM

## 2023-01-23 PROCEDURE — 1160F PR REVIEW ALL MEDS BY PRESCRIBER/CLIN PHARMACIST DOCUMENTED: ICD-10-PCS | Mod: CPTII,S$GLB,, | Performed by: INTERNAL MEDICINE

## 2023-01-23 PROCEDURE — 99214 PR OFFICE/OUTPT VISIT, EST, LEVL IV, 30-39 MIN: ICD-10-PCS | Mod: S$GLB,,, | Performed by: INTERNAL MEDICINE

## 2023-01-23 PROCEDURE — 3077F PR MOST RECENT SYSTOLIC BLOOD PRESSURE >= 140 MM HG: ICD-10-PCS | Mod: CPTII,S$GLB,, | Performed by: INTERNAL MEDICINE

## 2023-01-23 PROCEDURE — 3288F FALL RISK ASSESSMENT DOCD: CPT | Mod: CPTII,S$GLB,, | Performed by: INTERNAL MEDICINE

## 2023-01-23 PROCEDURE — 99999 PR PBB SHADOW E&M-EST. PATIENT-LVL III: CPT | Mod: PBBFAC,,, | Performed by: INTERNAL MEDICINE

## 2023-01-23 PROCEDURE — 3079F PR MOST RECENT DIASTOLIC BLOOD PRESSURE 80-89 MM HG: ICD-10-PCS | Mod: CPTII,S$GLB,, | Performed by: INTERNAL MEDICINE

## 2023-01-23 PROCEDURE — 3288F PR FALLS RISK ASSESSMENT DOCUMENTED: ICD-10-PCS | Mod: CPTII,S$GLB,, | Performed by: INTERNAL MEDICINE

## 2023-01-23 PROCEDURE — 3008F BODY MASS INDEX DOCD: CPT | Mod: CPTII,S$GLB,, | Performed by: INTERNAL MEDICINE

## 2023-01-23 PROCEDURE — 99214 OFFICE O/P EST MOD 30 MIN: CPT | Mod: S$GLB,,, | Performed by: INTERNAL MEDICINE

## 2023-01-23 PROCEDURE — 1126F AMNT PAIN NOTED NONE PRSNT: CPT | Mod: CPTII,S$GLB,, | Performed by: INTERNAL MEDICINE

## 2023-01-23 PROCEDURE — 3077F SYST BP >= 140 MM HG: CPT | Mod: CPTII,S$GLB,, | Performed by: INTERNAL MEDICINE

## 2023-01-23 PROCEDURE — 1159F PR MEDICATION LIST DOCUMENTED IN MEDICAL RECORD: ICD-10-PCS | Mod: CPTII,S$GLB,, | Performed by: INTERNAL MEDICINE

## 2023-01-23 PROCEDURE — 1101F PR PT FALLS ASSESS DOC 0-1 FALLS W/OUT INJ PAST YR: ICD-10-PCS | Mod: CPTII,S$GLB,, | Performed by: INTERNAL MEDICINE

## 2023-01-23 PROCEDURE — 1101F PT FALLS ASSESS-DOCD LE1/YR: CPT | Mod: CPTII,S$GLB,, | Performed by: INTERNAL MEDICINE

## 2023-01-23 PROCEDURE — 99999 PR PBB SHADOW E&M-EST. PATIENT-LVL III: ICD-10-PCS | Mod: PBBFAC,,, | Performed by: INTERNAL MEDICINE

## 2023-01-23 PROCEDURE — 1160F RVW MEDS BY RX/DR IN RCRD: CPT | Mod: CPTII,S$GLB,, | Performed by: INTERNAL MEDICINE

## 2023-01-23 PROCEDURE — 1159F MED LIST DOCD IN RCRD: CPT | Mod: CPTII,S$GLB,, | Performed by: INTERNAL MEDICINE

## 2023-01-23 PROCEDURE — 1126F PR PAIN SEVERITY QUANTIFIED, NO PAIN PRESENT: ICD-10-PCS | Mod: CPTII,S$GLB,, | Performed by: INTERNAL MEDICINE

## 2023-01-23 PROCEDURE — 3008F PR BODY MASS INDEX (BMI) DOCUMENTED: ICD-10-PCS | Mod: CPTII,S$GLB,, | Performed by: INTERNAL MEDICINE

## 2023-01-23 PROCEDURE — 3079F DIAST BP 80-89 MM HG: CPT | Mod: CPTII,S$GLB,, | Performed by: INTERNAL MEDICINE

## 2023-01-23 RX ORDER — PANTOPRAZOLE SODIUM 20 MG/1
1 TABLET, DELAYED RELEASE ORAL EVERY MORNING
COMMUNITY
Start: 2023-01-12

## 2023-01-23 NOTE — PROGRESS NOTES
Subjective:      DATE OF VISIT: 1/23/2023   ?  Patient ID:?Adalgisa Wright is a 72 y.o. female.?? MR#: [id number]   ?   PRIMARY PROVIDER: Dr. Quintanilla    CHIEF COMPLAINT:  Follow-up  ?   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   CURRENT TREATMENT: surveillance    PAST TREATMENT: FOLFOX, C1D1 3/23/20 - 9/11/20; liver ablation 11/2020    Follow-up  She presents with her friend doing well.  She has taken Lyrica now 3 times daily with some improvement in peripheral neuropathy.  No change in bowel evidence of bleeding or unintentional weight loss.  She is trying to get more exercise walking.    Review of Systems    ?   A comprehensive 14-point review of systems was reviewed with patient and was negative other than as specified above.   ?     Objective:      Physical Exam      ?   Vitals:    01/23/23 1107   BP: (!) 142/80   Pulse: 69   Resp: 18   Temp: 96.7 °F (35.9 °C)      ECOG:?0  General appearance: Generally well appearing, in no acute distress.   Head, eyes, ears, nose, and throat: moist mucous membranes.   Respiratory:  Normal work of breathing  Abdomen: nontender, nondistended.   Extremities: Warm, without edema.   Neurologic: Alert and oriented.   Skin: No rashes, ecchymoses or petechial lesion.   Psychiatric:  Normal mood and affect.    Laboratory:  ?   No visits with results within 1 Day(s) from this visit.   Latest known visit with results is:   Lab Visit on 01/03/2023   Component Date Value Ref Range Status    WBC 01/03/2023 3.62 (L)  3.90 - 12.70 K/uL Final    RBC 01/03/2023 4.36  4.00 - 5.40 M/uL Final    Hemoglobin 01/03/2023 13.0  12.0 - 16.0 g/dL Final    Hematocrit 01/03/2023 40.8  37.0 - 48.5 % Final    MCV 01/03/2023 94  82 - 98 fL Final    MCH 01/03/2023 29.8  27.0 - 31.0 pg Final    MCHC 01/03/2023 31.9 (L)  32.0 - 36.0 g/dL Final    RDW 01/03/2023 14.0  11.5 - 14.5 % Final    Platelets 01/03/2023  125 (L)  150 - 450 K/uL Final    MPV 01/03/2023 10.6  9.2 - 12.9 fL Final    Immature Granulocytes 01/03/2023 0.6 (H)  0.0 - 0.5 % Final    Gran # (ANC) 01/03/2023 2.6  1.8 - 7.7 K/uL Final    Immature Grans (Abs) 01/03/2023 0.02  0.00 - 0.04 K/uL Final    Lymph # 01/03/2023 0.6 (L)  1.0 - 4.8 K/uL Final    Mono # 01/03/2023 0.3  0.3 - 1.0 K/uL Final    Eos # 01/03/2023 0.1  0.0 - 0.5 K/uL Final    Baso # 01/03/2023 0.02  0.00 - 0.20 K/uL Final    nRBC 01/03/2023 0  0 /100 WBC Final    Gran % 01/03/2023 71.4  38.0 - 73.0 % Final    Lymph % 01/03/2023 16.9 (L)  18.0 - 48.0 % Final    Mono % 01/03/2023 7.5  4.0 - 15.0 % Final    Eosinophil % 01/03/2023 3.0  0.0 - 8.0 % Final    Basophil % 01/03/2023 0.6  0.0 - 1.9 % Final    Differential Method 01/03/2023 Automated   Final    Sodium 01/03/2023 138  136 - 145 mmol/L Final    Potassium 01/03/2023 4.6  3.5 - 5.1 mmol/L Final    Chloride 01/03/2023 101  95 - 110 mmol/L Final    CO2 01/03/2023 28  23 - 29 mmol/L Final    Glucose 01/03/2023 105  70 - 110 mg/dL Final    BUN 01/03/2023 9  8 - 23 mg/dL Final    Creatinine 01/03/2023 1.0  0.5 - 1.4 mg/dL Final    Calcium 01/03/2023 9.2  8.7 - 10.5 mg/dL Final    Total Protein 01/03/2023 7.4  6.0 - 8.4 g/dL Final    Albumin 01/03/2023 4.1  3.5 - 5.2 g/dL Final    Total Bilirubin 01/03/2023 0.9  0.1 - 1.0 mg/dL Final    Alkaline Phosphatase 01/03/2023 101  55 - 135 U/L Final    AST 01/03/2023 19  10 - 40 U/L Final    ALT 01/03/2023 14  10 - 44 U/L Final    Anion Gap 01/03/2023 9  8 - 16 mmol/L Final    eGFR 01/03/2023 >60.0  >60 mL/min/1.73 m^2 Final    CEA 01/03/2023 4.8  0.0 - 5.0 ng/mL Final     Lab Results   Component Value Date    IRON 71 06/07/2021    TIBC 383 06/07/2021    FERRITIN 193 06/07/2021       Tumor markers    CEA    CEA   Date Value Ref Range Status   01/03/2023 4.8 0.0 - 5.0 ng/mL Final     Comment:     CEA Normal Range:  Non-Smokers: 0-3.0 ng/mL  Smokers:     0-5.0 ng/mL    The testing method is a  chemiluminescent microparticle immunoassay   manufactured by [employer] and performed on the [employer] ([place]   or   [employer] system. Values obtained with different assay manufacturers   for   methods may be different and cannot be used interchangeably.     08/15/2022 3.8 0.0 - 5.0 ng/mL Final     Comment:     CEA Normal Range:  Non-Smokers: 0-3.0 ng/mL  Smokers:     0-5.0 ng/mL    The testing method is a chemiluminescent microparticle immunoassay   manufactured by [employer] and performed on the [employer] [place]   or   [employer] system. Values obtained with different assay manufacturers   for   methods may be different and cannot be used interchangeably.     08/15/2022 3.8 0.0 - 5.0 ng/mL Final     Comment:     CEA Normal Range:  Non-Smokers: 0-3.0 ng/mL  Smokers:     0-5.0 ng/mL    The testing method is a chemiluminescent microparticle immunoassay   manufactured by [employer] and performed on the [employer] [place]   or   [employer] system. Values obtained with different assay manufacturers   for   methods may be different and cannot be used interchangeably.     05/09/2022 4.2 0.0 - 5.0 ng/mL Final     Comment:     CEA Normal Range:  Non-Smokers: 0-3.0 ng/mL  Smokers:     0-5.0 ng/mL     01/25/2022 3.2 0.0 - 5.0 ng/mL Final     Comment:     CEA Normal Range:  Non-Smokers: 0-3.0 ng/mL  Smokers:     0-5.0 ng/mL     09/07/2021 3.5 0.0 - 5.0 ng/mL Final     Comment:     CEA Normal Range:  Non-Smokers: 0-3.0 ng/mL  Smokers:     0-5.0 ng/mL     06/03/2021 3.4 0.0 - 5.0 ng/mL Final     Comment:     CEA Normal Range:  Non-Smokers: 0-3.0 ng/mL  Smokers:     0-5.0 ng/mL     01/15/2021 6.4 (H) 0.0 - 5.0 ng/mL Final     Comment:     CEA Normal Range:  Non-Smokers: 0-3.0 ng/mL  Smokers:     0-5.0 ng/mL     09/25/2020 8.7 (H) 0.0 - 5.0 ng/mL Final     Comment:     CEA Normal Range:  Non-Smokers: 0-3.0 ng/mL  Smokers:     0-5.0 ng/mL     08/10/2020 7.8 (H) 0.0 - 5.0 ng/mL Final     Comment:     CEA Normal  Range:  Non-Smokers: 0-3.0 ng/mL  Smokers:     0-5.0 ng/mL              ? IMAGING  No results found for this or any previous visit (from the past 2160 hour(s)).    No results found for this or any previous visit (from the past 2160 hour(s)).    No results found for this or any previous visit (from the past 2160 hour(s)).    CT CHEST ABDOMEN PELVIS WITH CONTRAST (XPD)     CLINICAL HISTORY:  restaging colon cancer; Malignant neoplasm of colon, unspecified     TECHNIQUE:  Low dose axial images, sagittal and coronal reformations were obtained from the thoracic inlet to the pubic symphysis following the IV administration of 75 mL of Omnipaque 350 and the oral administration of 30 ml of Omnipaque 350.     COMPARISON:  02/22/2021.     FINDINGS:  Chest:     Heart and great vessels: Status post median sternotomy and aortic valve replacement.  Aortic atherosclerosis.  No aneurysm.  No pericardial effusion.     Adenopathy: None demonstrated.     Lungs: Bilateral lung scarring or atelectasis.  No suspicious pulmonary nodules/masses.  No pleural effusion.     Abdomen:     Liver: Area of low attenuation compatible with post treatment change in the posterior right hepatic lobe, not significantly changed measuring 3.2 x 2.4 cm.  No abnormal enhancement.  Additional subcentimeter hypodense foci elsewhere in the posterior right hepatic lobe, unchanged.  No new hepatic lesions.     Gallbladder and biliary: Within normal limits.     Spleen: Within normal limits.     Pancreas: Within normal limits.     Adrenals: Within normal limits.     Kidneys: Within normal limits.     Bowel: Within normal limits.  No evidence of obstruction.     Peritoneum: No ascites or pneumoperitoneum.     Abdominal Adenopathy: None.     Vasculature: Within normal limits.     Pelvis:     Urinary bladder: Unremarkable.     Reproductive organs: Within normal limits.     Pelvis adenopathy: None.     Bones: Multilevel chronic compression fractures in the  thoracolumbar spine.  No acute findings.     Miscellaneous: None.     Impression:     Stable CT of the chest, abdomen, and pelvis.  ?   Assessment/Plan:       1. Colon adenocarcinoma    2. Leukopenia, unspecified type    3. Anemia, unspecified type    4. Thrombocytopenia              Plan:     # rectosigmoid adenocarcinoma, pT3 pN2 pMx, Stage III vs IV possible oligometastatic disease in liver, MSI stable:  Initiated adjuvant chemotherapy C1D1 3/23/20.  Restaging PET-CT after 4 cycles showed interval resolution of mildly avid right hepatic lobe lesion previously nondiagnostic on multiple biopsies; I discussed that interval improvement while on chemotherapy does raise concern for potential malignant involvement.  Case discussed at multidisciplinary tumor board with review of imaging and recommendation to continue adjuvant chemotherapy with plan for follow-up with surgery for consideration of resection of likely oligo metastatic liver disease.   Completed 6 months FOLFOX on 09/09/2020.    Restaging PET negative for evidence of recurrence or metastatic disease.  We reviewed her case in multidisciplinary tumor board 09/25/2020.  We did discuss that despite negative biopsy x2 of liver abnormality on initial MRI/PET mild avidity response with chemotherapy is concerning for possible metastatic involvement.  Recommendation at tumor board for local treatment and patient pursued IR ablation completed November 2020.    1/21/21 MR liver with post treatment affects noted in no evidence of new/recurrent lesion.  Restaging CT chest abdomen pelvis 02/22/2021 with post treatment changes in right hepatic lobe and no other concerning findings for recurrent/metastatic disease.     - Labs January 2023 with stable chronic leukopenia.  Prior anemia has resolved.  No concerning clinical symptoms.  CEA continues to be normal.  Will get restaging imaging during next follow-up in 3 months.  I encouraged her to follow-up routinely with  colorectal surgery last colonoscopy 02/26/2021 recommended 3 year follow-up per notes which would be February 2024.      # peripheral neuropathy: on duloxetine increased dose and lyrica now t.i.d. with some improvement        Follow-Up:   Route Chart for Scheduling    Med Onc Chart Routing      Follow up with physician 3 months.   Follow up with JUDY    Infusion scheduling note    Injection scheduling note    Labs CMP, CBC and CEA   Lab interval: every 12 weeks     Imaging CT chest abdomen pelvis   in 3 mo couple days prior to md visit with labs   Pharmacy appointment    Other referrals          Therapy Plan Information  Flushes  heparin, porcine (PF) 100 unit/mL injection flush 500 Units  500 Units, Intravenous, Every visit  sodium chloride 0.9% flush 10 mL  10 mL, Intravenous, Every visit

## 2023-02-25 ENCOUNTER — HOSPITAL ENCOUNTER (EMERGENCY)
Facility: HOSPITAL | Age: 73
Discharge: HOME | End: 2023-02-26
Attending: EMERGENCY MEDICINE
Payer: MEDICARE

## 2023-02-25 VITALS
RESPIRATION RATE: 20 BRPM | DIASTOLIC BLOOD PRESSURE: 65 MMHG | BODY MASS INDEX: 29.21 KG/M2 | TEMPERATURE: 98 F | OXYGEN SATURATION: 95 % | WEIGHT: 135 LBS | SYSTOLIC BLOOD PRESSURE: 154 MMHG | HEART RATE: 71 BPM

## 2023-02-25 DIAGNOSIS — R04.0 EPISTAXIS: Primary | ICD-10-CM

## 2023-02-25 DIAGNOSIS — R79.1 SUPRATHERAPEUTIC INR: ICD-10-CM

## 2023-02-25 PROCEDURE — 99283 EMERGENCY DEPT VISIT LOW MDM: CPT | Mod: 25,ER

## 2023-02-25 PROCEDURE — 30901 CONTROL OF NOSEBLEED: CPT | Mod: RT,ER

## 2023-02-26 LAB
APTT BLDCRRT: 33.2 SEC (ref 21–32)
BASOPHILS # BLD AUTO: 0.03 K/UL (ref 0–0.2)
BASOPHILS NFR BLD: 0.5 % (ref 0–1.9)
DIFFERENTIAL METHOD: ABNORMAL
EOSINOPHIL # BLD AUTO: 0 K/UL (ref 0–0.5)
EOSINOPHIL NFR BLD: 0.7 % (ref 0–8)
ERYTHROCYTE [DISTWIDTH] IN BLOOD BY AUTOMATED COUNT: 14.6 % (ref 11.5–14.5)
HCT VFR BLD AUTO: 28 % (ref 37–48.5)
HGB BLD-MCNC: 9 G/DL (ref 12–16)
IMM GRANULOCYTES # BLD AUTO: 0.02 K/UL (ref 0–0.04)
IMM GRANULOCYTES NFR BLD AUTO: 0.3 % (ref 0–0.5)
INR PPP: 4.6 (ref 0.8–1.2)
LYMPHOCYTES # BLD AUTO: 1 K/UL (ref 1–4.8)
LYMPHOCYTES NFR BLD: 16.2 % (ref 18–48)
MCH RBC QN AUTO: 30.6 PG (ref 27–31)
MCHC RBC AUTO-ENTMCNC: 32.1 G/DL (ref 32–36)
MCV RBC AUTO: 95 FL (ref 82–98)
MONOCYTES # BLD AUTO: 0.4 K/UL (ref 0.3–1)
MONOCYTES NFR BLD: 7.3 % (ref 4–15)
NEUTROPHILS # BLD AUTO: 4.4 K/UL (ref 1.8–7.7)
NEUTROPHILS NFR BLD: 75 % (ref 38–73)
NRBC BLD-RTO: 0 /100 WBC
PLATELET # BLD AUTO: 145 K/UL (ref 150–450)
PMV BLD AUTO: 12.3 FL (ref 9.2–12.9)
PROTHROMBIN TIME: 46 SEC (ref 9–12.5)
RBC # BLD AUTO: 2.94 M/UL (ref 4–5.4)
WBC # BLD AUTO: 5.86 K/UL (ref 3.9–12.7)

## 2023-02-26 PROCEDURE — 30901 CONTROL OF NOSEBLEED: CPT | Mod: RT,ER

## 2023-02-26 PROCEDURE — 25000003 PHARM REV CODE 250: Mod: ER | Performed by: EMERGENCY MEDICINE

## 2023-02-26 PROCEDURE — 85025 COMPLETE CBC W/AUTO DIFF WBC: CPT | Mod: ER | Performed by: EMERGENCY MEDICINE

## 2023-02-26 PROCEDURE — 85610 PROTHROMBIN TIME: CPT | Mod: ER | Performed by: EMERGENCY MEDICINE

## 2023-02-26 PROCEDURE — 85730 THROMBOPLASTIN TIME PARTIAL: CPT | Mod: ER | Performed by: EMERGENCY MEDICINE

## 2023-02-26 RX ADMIN — PHENYLEPHRINE HYDROCHLORIDE 2 SPRAY: 0.5 SPRAY NASAL at 12:02

## 2023-02-26 NOTE — ED PROVIDER NOTES
Encounter Date: 2/25/2023       History     Chief Complaint   Patient presents with    Epistaxis     C/o nosebleeds. States started at 8pm tonight. Actively bleeding. On warfarin.       Patient is a 72-year-old female who presents today with complaints of epistaxis for the past 4 hours.  Patient has a history of chronic atrial fibrillation and valve replacement and is on Coumadin.  Bleeding has been coming from the right nostril.  Patient has tried pinching the nose without effect.  She states she has required nasal packing once in the past.  Denies chest pain, shortness of breath, lightheadedness, near-syncope, syncope, blood in his stool, and all other symptoms.    Review of patient's allergies indicates:   Allergen Reactions    Penicillins Rash     Past Medical History:   Diagnosis Date    Anticoagulant long-term use     Aortic valve defect     Benign neoplasm of ascending colon 4/6/2017    Cancer     CHF (congestive heart failure)     DDD (degenerative disc disease), cervical 7/20/2018    GERD (gastroesophageal reflux disease)     Hemorrhoids     HLD (hyperlipidemia)     Hypertension     Insomnia 12/1/2014    Irritable bowel syndrome with constipation 4/9/2018    Postmenopausal osteoporosis 7/20/2018     Past Surgical History:   Procedure Laterality Date    AORTIC VALVE REPLACEMENT  2003    COLONOSCOPY Left 4/6/2017    Procedure: COLONOSCOPY;  Surgeon: Sander Ulloa MD;  Location: [facility];  Service: Endoscopy;  Laterality: Left;    COLONOSCOPY N/A 11/7/2019    Procedure: COLONOSCOPY;  Surgeon: Brenda Ochoa MD;  Location: [facility];  Service: Endoscopy;  Laterality: N/A;    COLONOSCOPY N/A 2/26/2021    Procedure: COLONOSCOPY;  Surgeon: Ankur Smith MD;  Location: [facility];  Service: General;  Laterality: N/A;    COMPUTED TOMOGRAPHY N/A 11/12/2020    Procedure: Liver microwave ablation;  Surgeon: Gonzalez Moraes MD;  Location: [facility];  Service: General;  Laterality: N/A;     ESOPHAGOGASTRODUODENOSCOPY N/A 11/7/2019    Procedure: ESOPHAGOGASTRODUODENOSCOPY (EGD) needs PT/INR;  Surgeon: Brenda Ochoa MD;  Location: [facility] ENDO;  Service: Endoscopy;  Laterality: N/A;    FLEXIBLE SIGMOIDOSCOPY N/A 2/13/2020    Procedure: SIGMOIDOSCOPY, FLEXIBLE;  Surgeon: Ankur Smith MD;  Location: [facility] OR;  Service: General;  Laterality: N/A;    INJECTION OF ANESTHETIC AGENT INTO TISSUE PLANE DEFINED BY TRANSVERSUS ABDOMINIS MUSCLE N/A 2/13/2020    Procedure: BLOCK, TRANSVERSUS ABDOMINIS PLANE;  Surgeon: Ankur Smith MD;  Location: [facility] OR;  Service: General;  Laterality: N/A;    INSERTION OF TUNNELED CENTRAL VENOUS CATHETER (CVC) WITH SUBCUTANEOUS PORT N/A 3/17/2020    Procedure: [id number];  Surgeon: Ankur Smith MD;  Location: [facility] OR;  Service: General;  Laterality: N/A;    ROBOT-ASSISTED LOW ANTERIOR RESECTION OF COLON N/A 2/13/2020    Procedure: XI ROBOTIC RESECTION, COLON, LOW ANTERIOR;  Surgeon: Ankur Smith MD;  Location: [facility] OR;  Service: General;  Laterality: N/A;     Family History   Problem Relation Age of Onset    Diabetes Mother     Heart disease Mother     Hypertension Mother     Heart disease Father     Hypertension Father     Hypertension Sister     Hypertension Son     Heart disease Son     Colon cancer Neg Hx      Social History     Tobacco Use    Smoking status: Never    Smokeless tobacco: Never   Substance Use Topics    Alcohol use: Not Currently    Drug use: No     Review of Systems   HENT:  Positive for nosebleeds.    All other systems reviewed and are negative.    Physical Exam     Initial Vitals [02/25/23 2349]   BP Pulse Resp Temp SpO2   (!) 154/65 71 20 98.1 °F (36.7 °C) 95 %      MAP       --         Physical Exam    Nursing note and vitals reviewed.  Constitutional: She appears well-developed and well-nourished. No distress.   HENT:   Head: Normocephalic and atraumatic.   Epistaxis from the right nostril.  No epistaxis of the left nostril    Eyes: Conjunctivae and EOM are normal. Pupils are equal, round, and reactive to light.   Neck: Neck supple. No tracheal deviation present.   Cardiovascular:  Normal rate, normal heart sounds and intact distal pulses.           Irregular rhythm   Pulmonary/Chest: Breath sounds normal. No respiratory distress.   Abdominal: Abdomen is soft. She exhibits no distension. There is no abdominal tenderness. There is no rebound and no guarding.   Musculoskeletal:         General: No tenderness or edema. Normal range of motion.      Cervical back: Neck supple.     Neurological: She is alert and oriented to person, place, and time.   No focal deficits   Skin: Skin is warm. No rash noted. No erythema.   Psychiatric: She has a normal mood and affect. Her behavior is normal.       ED Course   Epistaxis Mgmt    Date/Time: 2/26/2023 3:11 AM  Performed by: Gonzalez Carey MD  Authorized by: Gonzalez Carey MD   Consent Done: Yes  Consent: Verbal consent obtained.  Consent given by: patient  Treatment site: right anterior  Repair method: anterior pack and Rhino Rocket  Post-procedure assessment: bleeding stopped  Treatment complexity: simple  Patient tolerance: Patient tolerated the procedure well with no immediate complications      Labs Reviewed   CBC W/ AUTO DIFFERENTIAL - Abnormal; Notable for the following components:       Result Value    RBC 2.94 (*)     Hemoglobin 9.0 (*)     Hematocrit 28.0 (*)     RDW 14.6 (*)     Platelets 145 (*)     Gran % 75.0 (*)     Lymph % 16.2 (*)     All other components within normal limits   APTT - Abnormal; Notable for the following components:    aPTT 33.2 (*)     All other components within normal limits   PROTIME-INR - Abnormal; Notable for the following components:    Prothrombin Time 46.0 (*)     INR 4.6 (*)     All other components within normal limits     Results for orders placed or performed during the hospital encounter of 02/25/23   CBC auto differential   Result Value Ref Range    WBC  5.86 3.90 - 12.70 K/uL    RBC 2.94 (L) 4.00 - 5.40 M/uL    Hemoglobin 9.0 (L) 12.0 - 16.0 g/dL    Hematocrit 28.0 (L) 37.0 - 48.5 %    MCV 95 82 - 98 fL    MCH 30.6 27.0 - 31.0 pg    MCHC 32.1 32.0 - 36.0 g/dL    RDW 14.6 (H) 11.5 - 14.5 %    Platelets 145 (L) 150 - 450 K/uL    MPV 12.3 9.2 - 12.9 fL    Immature Granulocytes 0.3 0.0 - 0.5 %    Gran # (ANC) 4.4 1.8 - 7.7 K/uL    Immature Grans (Abs) 0.02 0.00 - 0.04 K/uL    Lymph # 1.0 1.0 - 4.8 K/uL    Mono # 0.4 0.3 - 1.0 K/uL    Eos # 0.0 0.0 - 0.5 K/uL    Baso # 0.03 0.00 - 0.20 K/uL    nRBC 0 0 /100 WBC    Gran % 75.0 (H) 38.0 - 73.0 %    Lymph % 16.2 (L) 18.0 - 48.0 %    Mono % 7.3 4.0 - 15.0 %    Eosinophil % 0.7 0.0 - 8.0 %    Basophil % 0.5 0.0 - 1.9 %    Differential Method Automated    APTT   Result Value Ref Range    aPTT 33.2 (H) 21.0 - 32.0 sec   Protime-INR   Result Value Ref Range    Prothrombin Time 46.0 (H) 9.0 - 12.5 sec    INR 4.6 (H) 0.8 - 1.2            Imaging Results    None          Medications   phenylephrine HCL 0.5% nasal spray 2 spray (2 sprays Each Nostril Given 2/26/23 0014)     Medical Decision Making:   Initial Assessment:   Epistaxis in a patient on Coumadin  Clinical Tests:   Lab Tests: Ordered and Reviewed  The following lab test(s) were unremarkable: CBC, PT and PTT       <> Summary of Lab: Supratherapeutic INR  ED Management:  Epistaxis did not stop after 2 rounds of Afrin and pinching the nose.  Rhino rocket was placed with resolution of the bleed.  Referral sent to ENT.  Patient advised to skip Coumadin dose tomorrow and follow-up with Coumadin Clinic and Cardiology on Monday for further instructions                        Clinical Impression:   Final diagnoses:  [R04.0] Epistaxis (Primary)  [R79.1] Supratherapeutic INR        ED Disposition Condition    Discharge Stable          ED Prescriptions    None       Follow-up Information       Follow up With Specialties Details Why Contact Info    Cammie Hassan PA-C  Otolaryngology Schedule an appointment as soon as possible for a visit   [id number] [address] [facility]  [place] [id number]  [phone]               Gonzalez Carey MD  02/26/23 0325

## 2023-02-27 ENCOUNTER — OFFICE VISIT (OUTPATIENT)
Dept: OTOLARYNGOLOGY | Facility: CLINIC | Age: 73
End: 2023-02-27
Payer: MEDICARE

## 2023-02-27 VITALS — BODY MASS INDEX: 33.2 KG/M2 | WEIGHT: 153.44 LBS

## 2023-02-27 DIAGNOSIS — R79.1 SUPRATHERAPEUTIC INR: ICD-10-CM

## 2023-02-27 DIAGNOSIS — Z79.01 LONG TERM (CURRENT) USE OF ANTICOAGULANTS: ICD-10-CM

## 2023-02-27 DIAGNOSIS — R04.0 EPISTAXIS: Primary | ICD-10-CM

## 2023-02-27 PROCEDURE — 3008F PR BODY MASS INDEX (BMI) DOCUMENTED: ICD-10-PCS | Mod: CPTII,S$GLB,, | Performed by: OTOLARYNGOLOGY

## 2023-02-27 PROCEDURE — 99999 PR PBB SHADOW E&M-EST. PATIENT-LVL III: CPT | Mod: PBBFAC,,, | Performed by: OTOLARYNGOLOGY

## 2023-02-27 PROCEDURE — 3008F BODY MASS INDEX DOCD: CPT | Mod: CPTII,S$GLB,, | Performed by: OTOLARYNGOLOGY

## 2023-02-27 PROCEDURE — 1159F MED LIST DOCD IN RCRD: CPT | Mod: CPTII,S$GLB,, | Performed by: OTOLARYNGOLOGY

## 2023-02-27 PROCEDURE — 99999 PR PBB SHADOW E&M-EST. PATIENT-LVL III: ICD-10-PCS | Mod: PBBFAC,,, | Performed by: OTOLARYNGOLOGY

## 2023-02-27 PROCEDURE — 30903 CONTROL OF NOSEBLEED: CPT | Mod: LT,S$GLB,, | Performed by: OTOLARYNGOLOGY

## 2023-02-27 PROCEDURE — 3288F FALL RISK ASSESSMENT DOCD: CPT | Mod: CPTII,S$GLB,, | Performed by: OTOLARYNGOLOGY

## 2023-02-27 PROCEDURE — 1100F PR PT FALLS ASSESS DOC 2+ FALLS/FALL W/INJURY/YR: ICD-10-PCS | Mod: CPTII,S$GLB,, | Performed by: OTOLARYNGOLOGY

## 2023-02-27 PROCEDURE — 1100F PTFALLS ASSESS-DOCD GE2>/YR: CPT | Mod: CPTII,S$GLB,, | Performed by: OTOLARYNGOLOGY

## 2023-02-27 PROCEDURE — 3288F PR FALLS RISK ASSESSMENT DOCUMENTED: ICD-10-PCS | Mod: CPTII,S$GLB,, | Performed by: OTOLARYNGOLOGY

## 2023-02-27 PROCEDURE — 1159F PR MEDICATION LIST DOCUMENTED IN MEDICAL RECORD: ICD-10-PCS | Mod: CPTII,S$GLB,, | Performed by: OTOLARYNGOLOGY

## 2023-02-27 PROCEDURE — 99214 OFFICE O/P EST MOD 30 MIN: CPT | Mod: 25,S$GLB,, | Performed by: OTOLARYNGOLOGY

## 2023-02-27 PROCEDURE — 30903 PR CTRL NOSEBLEED,ANTER,COMPLEX: ICD-10-PCS | Mod: LT,S$GLB,, | Performed by: OTOLARYNGOLOGY

## 2023-02-27 PROCEDURE — 99214 PR OFFICE/OUTPT VISIT, EST, LEVL IV, 30-39 MIN: ICD-10-PCS | Mod: 25,S$GLB,, | Performed by: OTOLARYNGOLOGY

## 2023-02-27 NOTE — PROGRESS NOTES
Referring Provider:    Law Self  No address on file  Subjective:   Patient: Adalgisa Wright [id number], :1950   Visit date:2023 4:16 PM    Chief Complaint:  Epistaxis (Since sat )    HPI:    Prior notes reviewed by myself.  Clinical documentation obtained by nursing staff reviewed.     73 y/o female here for f/u on epistaxis.  She was packed in the ER on Saturday due to brisk right sided epistaxis. Prior to this she did fail management with pressure and topical decongestants.  She does have hypertension and is on coumadin due to a mechanical aortic valve.  Her INR has been supratherapeutic up to 4+.        Objective:     Physical Exam:  Vitals:  Wt 69.6 kg (153 lb 7 oz)   BMI 33.20 kg/m²   General appearance:  Well developed, well nourished    Ears:  Otoscopy of external auditory canals and tympanic membranes was normal, clinical speech reception thresholds grossly intact, no mass/lesion of auricle.    Nose:  No masses/lesions of external nose, rhino rocket nasal pack in place right nare (removed), excoriated nasal mucosa with multiple areas of bleeding right anterior and mid septum, septum, and turbinates were within normal limits.    Mouth:  No mass/lesion of lips, teeth, gums, hard/soft palate, tongue, tonsils, or oropharynx.    Neck & Lymphatics:  No cervical lymphadenopathy, no neck mass/crepitus/ asymmetry, trachea is midline, no thyroid enlargement/tenderness/mass.        [x]  Data Reviewed:    Lab Results   Component Value Date    WBC 5.86 2023    HGB 9.0 (L) 2023    HCT 28.0 (L) 2023    MCV 95 2023    EOSINOPHIL 0.7 2023       PROCEDURE NOTE:  Control of Anterior Epistaxis, complex  Preprocedure diagnosis:  Recurrent epistaxis  Postprocedure diangosis:  Same  Complications:  None  Blood Loss:  Minimal    Procedure in detail:  After verbal consent was obtained, the patient's nasal cavity was anesthesized using topical Ponticaine.  Upon examination, the  patient had ectatic vessels on their anterior septum, on both the right and the left side.  Under direct visualization with a head lamp and a nasal speculum, a silver nitrate stick was appiled to first their right anterior and mid septum.  A minimal amount of bleeding was noted.  The patient tolerated the procedure well, and there were no significant complications.          Assessment & Plan:   Epistaxis  -     mupirocin (BACTROBAN) 2 % ointment; by Nasal route 2 (two) times daily. Apply to nose twice daily for 7 days  Dispense: 15 g; Refill: 3    Supratherapeutic INR    Long term (current) use of anticoagulants        Nose Bleed Instructions:  We had a long discussion regarding the importance of nasal moisture, and the use of a nasal saline spray or gel into nose four times daily to keep moist.    Bactroban ointment in nostril twice daily if ordered.  Do not sleep or sit for long periods of time under a ceiling fan or other source of aggressive airflow.  Use a humidifier in bedroom or any room in your home you spend long periods of time.  Engage in only light activity. No strenuous activity. No heavy lifting or straining.   Use a stool softener to avoid straining.   No bending over at the hips. Keep nose above your heart at all times.  Sneeze with an open mouth to reduce pressure from nose.   Avoid foods or drinks hot in temperature for at least 48 hours then progress slowly.  Avoid hot steamy showers or baths for one week. pi

## 2023-02-28 RX ORDER — MUPIROCIN 20 MG/G
OINTMENT TOPICAL 2 TIMES DAILY
Qty: 15 G | Refills: 3 | Status: SHIPPED | OUTPATIENT
Start: 2023-02-28 | End: 2023-03-07

## 2023-03-15 ENCOUNTER — HOSPITAL ENCOUNTER (OUTPATIENT)
Facility: HOSPITAL | Age: 73
Discharge: HOME | End: 2023-03-18
Attending: EMERGENCY MEDICINE | Admitting: FAMILY MEDICINE
Payer: MEDICARE

## 2023-03-15 DIAGNOSIS — R53.83 FATIGUE: ICD-10-CM

## 2023-03-15 DIAGNOSIS — R07.9 CHEST PAIN: ICD-10-CM

## 2023-03-15 DIAGNOSIS — I50.9 CHF (CONGESTIVE HEART FAILURE): ICD-10-CM

## 2023-03-15 DIAGNOSIS — E86.0 DEHYDRATION: ICD-10-CM

## 2023-03-15 DIAGNOSIS — R53.1 WEAKNESS: ICD-10-CM

## 2023-03-15 DIAGNOSIS — E87.6 HYPOKALEMIA: ICD-10-CM

## 2023-03-15 DIAGNOSIS — N17.9 AKI (ACUTE KIDNEY INJURY): Primary | ICD-10-CM

## 2023-03-15 LAB
ALBUMIN SERPL BCP-MCNC: 3.4 G/DL (ref 3.5–5.2)
ALBUMIN SERPL BCP-MCNC: 3.8 G/DL (ref 3.5–5.2)
ALLENS TEST: ABNORMAL
ALP SERPL-CCNC: 103 U/L (ref 55–135)
ALP SERPL-CCNC: 126 U/L (ref 55–135)
ALT SERPL W/O P-5'-P-CCNC: 203 U/L (ref 10–44)
ALT SERPL W/O P-5'-P-CCNC: 213 U/L (ref 10–44)
AMMONIA PLAS-SCNC: 23 UMOL/L (ref 10–50)
ANION GAP SERPL CALC-SCNC: 17 MMOL/L (ref 8–16)
ANION GAP SERPL CALC-SCNC: 18 MMOL/L (ref 8–16)
APTT BLDCRRT: 25 SEC (ref 21–32)
AST SERPL-CCNC: 175 U/L (ref 10–40)
AST SERPL-CCNC: 190 U/L (ref 10–40)
BACTERIA #/AREA URNS AUTO: NORMAL /HPF
BASOPHILS # BLD AUTO: 0.01 K/UL (ref 0–0.2)
BASOPHILS NFR BLD: 0.1 % (ref 0–1.9)
BILIRUB SERPL-MCNC: 1.1 MG/DL (ref 0.1–1)
BILIRUB SERPL-MCNC: 1.3 MG/DL (ref 0.1–1)
BILIRUB UR QL STRIP: NEGATIVE
BNP SERPL-MCNC: 339 PG/ML (ref 0–99)
BUN SERPL-MCNC: 27 MG/DL (ref 8–23)
BUN SERPL-MCNC: 33 MG/DL (ref 8–23)
CALCIUM SERPL-MCNC: 7.4 MG/DL (ref 8.7–10.5)
CALCIUM SERPL-MCNC: 8.3 MG/DL (ref 8.7–10.5)
CHLORIDE SERPL-SCNC: 102 MMOL/L (ref 95–110)
CHLORIDE SERPL-SCNC: 96 MMOL/L (ref 95–110)
CLARITY UR REFRACT.AUTO: CLEAR
CO2 SERPL-SCNC: 22 MMOL/L (ref 23–29)
CO2 SERPL-SCNC: 25 MMOL/L (ref 23–29)
COLOR UR AUTO: YELLOW
CREAT SERPL-MCNC: 1.8 MG/DL (ref 0.5–1.4)
CREAT SERPL-MCNC: 2.1 MG/DL (ref 0.5–1.4)
CREAT SERPL-MCNC: 2.6 MG/DL (ref 0.5–1.4)
DELSYS: ABNORMAL
DIFFERENTIAL METHOD: ABNORMAL
EOSINOPHIL # BLD AUTO: 0 K/UL (ref 0–0.5)
EOSINOPHIL NFR BLD: 0.1 % (ref 0–8)
ERYTHROCYTE [DISTWIDTH] IN BLOOD BY AUTOMATED COUNT: 15.6 % (ref 11.5–14.5)
EST. GFR  (NO RACE VARIABLE): 19.1 ML/MIN/1.73 M^2
EST. GFR  (NO RACE VARIABLE): 24.7 ML/MIN/1.73 M^2
EST. GFR  (NO RACE VARIABLE): 30 ML/MIN/1.73 M^2
FIO2: 21
GLUCOSE SERPL-MCNC: 119 MG/DL (ref 70–110)
GLUCOSE SERPL-MCNC: 120 MG/DL (ref 70–110)
GLUCOSE SERPL-MCNC: 94 MG/DL (ref 70–110)
GLUCOSE UR QL STRIP: NEGATIVE
HCO3 UR-SCNC: 28.1 MMOL/L (ref 24–28)
HCT VFR BLD AUTO: 27.3 % (ref 37–48.5)
HCT VFR BLD CALC: 22 %PCV (ref 36–54)
HEP C VIRUS HOLD SPECIMEN: NORMAL
HGB BLD-MCNC: 8.5 G/DL (ref 12–16)
HGB UR QL STRIP: NEGATIVE
HYALINE CASTS UR QL AUTO: 0 /LPF
IMM GRANULOCYTES # BLD AUTO: 0.04 K/UL (ref 0–0.04)
IMM GRANULOCYTES NFR BLD AUTO: 0.6 % (ref 0–0.5)
INR PPP: 2.2 (ref 0.8–1.2)
KETONES UR QL STRIP: NEGATIVE
LACTATE SERPL-SCNC: 1 MMOL/L (ref 0.5–2.2)
LACTATE SERPL-SCNC: 3.3 MMOL/L (ref 0.5–2.2)
LEUKOCYTE ESTERASE UR QL STRIP: NEGATIVE
LYMPHOCYTES # BLD AUTO: 0.6 K/UL (ref 1–4.8)
LYMPHOCYTES NFR BLD: 8.6 % (ref 18–48)
MAGNESIUM SERPL-MCNC: 1.6 MG/DL (ref 1.6–2.6)
MCH RBC QN AUTO: 28.2 PG (ref 27–31)
MCHC RBC AUTO-ENTMCNC: 31.1 G/DL (ref 32–36)
MCV RBC AUTO: 91 FL (ref 82–98)
MICROSCOPIC COMMENT: NORMAL
MODE: ABNORMAL
MONOCYTES # BLD AUTO: 0.6 K/UL (ref 0.3–1)
MONOCYTES NFR BLD: 9.3 % (ref 4–15)
NEUTROPHILS # BLD AUTO: 5.5 K/UL (ref 1.8–7.7)
NEUTROPHILS NFR BLD: 81.3 % (ref 38–73)
NITRITE UR QL STRIP: NEGATIVE
NRBC BLD-RTO: 0 /100 WBC
PCO2 BLDA: 30.8 MMHG (ref 35–45)
PH SMN: 7.57 [PH] (ref 7.35–7.45)
PH UR STRIP: 6 [PH] (ref 5–8)
PLATELET # BLD AUTO: 211 K/UL (ref 150–450)
PMV BLD AUTO: 12.3 FL (ref 9.2–12.9)
PO2 BLDA: 124 MMHG (ref 80–100)
POC BE: 6 MMOL/L
POC IONIZED CALCIUM: 0.97 MMOL/L (ref 1.06–1.42)
POC SATURATED O2: 99 % (ref 95–100)
POTASSIUM BLD-SCNC: 2.9 MMOL/L (ref 3.5–5.1)
POTASSIUM SERPL-SCNC: 2.8 MMOL/L (ref 3.5–5.1)
POTASSIUM SERPL-SCNC: 3.6 MMOL/L (ref 3.5–5.1)
PROCALCITONIN SERPL IA-MCNC: 0.16 NG/ML
PROT SERPL-MCNC: 5.9 G/DL (ref 6–8.4)
PROT SERPL-MCNC: 6.4 G/DL (ref 6–8.4)
PROT UR QL STRIP: ABNORMAL
PROTHROMBIN TIME: 22.6 SEC (ref 9–12.5)
RBC # BLD AUTO: 3.01 M/UL (ref 4–5.4)
RBC #/AREA URNS AUTO: 1 /HPF (ref 0–4)
SAMPLE: ABNORMAL
SITE: ABNORMAL
SODIUM BLD-SCNC: 134 MMOL/L (ref 136–145)
SODIUM SERPL-SCNC: 138 MMOL/L (ref 136–145)
SODIUM SERPL-SCNC: 142 MMOL/L (ref 136–145)
SODIUM UR-SCNC: 42 MMOL/L (ref 20–250)
SP GR UR STRIP: 1.02 (ref 1–1.03)
TROPONIN I SERPL DL<=0.01 NG/ML-MCNC: 0.03 NG/ML (ref 0–0.03)
TROPONIN I SERPL DL<=0.01 NG/ML-MCNC: 0.04 NG/ML (ref 0–0.03)
TSH SERPL DL<=0.005 MIU/L-ACNC: 2.86 UIU/ML (ref 0.4–4)
URN SPEC COLLECT METH UR: ABNORMAL
UROBILINOGEN UR STRIP-ACNC: NEGATIVE EU/DL
WBC # BLD AUTO: 6.76 K/UL (ref 3.9–12.7)
WBC #/AREA URNS AUTO: 1 /HPF (ref 0–5)

## 2023-03-15 PROCEDURE — 82330 ASSAY OF CALCIUM: CPT | Mod: ER

## 2023-03-15 PROCEDURE — G0378 HOSPITAL OBSERVATION PER HR: HCPCS

## 2023-03-15 PROCEDURE — 84300 ASSAY OF URINE SODIUM: CPT | Mod: ER | Performed by: EMERGENCY MEDICINE

## 2023-03-15 PROCEDURE — 82607 VITAMIN B-12: CPT | Performed by: FAMILY MEDICINE

## 2023-03-15 PROCEDURE — 96366 THER/PROPH/DIAG IV INF ADDON: CPT | Mod: ER

## 2023-03-15 PROCEDURE — 84145 PROCALCITONIN (PCT): CPT | Mod: ER | Performed by: EMERGENCY MEDICINE

## 2023-03-15 PROCEDURE — 63600175 PHARM REV CODE 636 W HCPCS: Mod: ER | Performed by: EMERGENCY MEDICINE

## 2023-03-15 PROCEDURE — 82746 ASSAY OF FOLIC ACID SERUM: CPT | Performed by: FAMILY MEDICINE

## 2023-03-15 PROCEDURE — 81000 URINALYSIS NONAUTO W/SCOPE: CPT | Mod: ER | Performed by: EMERGENCY MEDICINE

## 2023-03-15 PROCEDURE — 80053 COMPREHEN METABOLIC PANEL: CPT | Mod: ER | Performed by: EMERGENCY MEDICINE

## 2023-03-15 PROCEDURE — 94761 N-INVAS EAR/PLS OXIMETRY MLT: CPT | Mod: ER

## 2023-03-15 PROCEDURE — 51702 INSERT TEMP BLADDER CATH: CPT | Mod: ER

## 2023-03-15 PROCEDURE — 96367 TX/PROPH/DG ADDL SEQ IV INF: CPT | Mod: ER,59

## 2023-03-15 PROCEDURE — 87040 BLOOD CULTURE FOR BACTERIA: CPT | Performed by: EMERGENCY MEDICINE

## 2023-03-15 PROCEDURE — 84295 ASSAY OF SERUM SODIUM: CPT | Mod: ER

## 2023-03-15 PROCEDURE — 83880 ASSAY OF NATRIURETIC PEPTIDE: CPT | Mod: ER | Performed by: EMERGENCY MEDICINE

## 2023-03-15 PROCEDURE — 96365 THER/PROPH/DIAG IV INF INIT: CPT | Mod: ER

## 2023-03-15 PROCEDURE — 99900035 HC TECH TIME PER 15 MIN (STAT): Mod: ER

## 2023-03-15 PROCEDURE — 25000003 PHARM REV CODE 250: Mod: ER | Performed by: FAMILY MEDICINE

## 2023-03-15 PROCEDURE — 36600 WITHDRAWAL OF ARTERIAL BLOOD: CPT | Mod: ER,59

## 2023-03-15 PROCEDURE — 93010 ELECTROCARDIOGRAM REPORT: CPT | Mod: ,,, | Performed by: INTERNAL MEDICINE

## 2023-03-15 PROCEDURE — 25000003 PHARM REV CODE 250: Mod: ER | Performed by: EMERGENCY MEDICINE

## 2023-03-15 PROCEDURE — 83735 ASSAY OF MAGNESIUM: CPT | Mod: ER | Performed by: EMERGENCY MEDICINE

## 2023-03-15 PROCEDURE — 25000003 PHARM REV CODE 250: Performed by: FAMILY MEDICINE

## 2023-03-15 PROCEDURE — 82565 ASSAY OF CREATININE: CPT | Mod: ER,91 | Performed by: FAMILY MEDICINE

## 2023-03-15 PROCEDURE — 93010 EKG 12-LEAD: ICD-10-PCS | Mod: ,,, | Performed by: INTERNAL MEDICINE

## 2023-03-15 PROCEDURE — 85610 PROTHROMBIN TIME: CPT | Mod: ER | Performed by: EMERGENCY MEDICINE

## 2023-03-15 PROCEDURE — 93005 ELECTROCARDIOGRAM TRACING: CPT | Mod: ER

## 2023-03-15 PROCEDURE — 82803 BLOOD GASES ANY COMBINATION: CPT | Mod: ER

## 2023-03-15 PROCEDURE — 82140 ASSAY OF AMMONIA: CPT | Mod: ER | Performed by: EMERGENCY MEDICINE

## 2023-03-15 PROCEDURE — 80047 BASIC METABLC PNL IONIZED CA: CPT | Mod: ER

## 2023-03-15 PROCEDURE — 36415 COLL VENOUS BLD VENIPUNCTURE: CPT | Performed by: NURSE PRACTITIONER

## 2023-03-15 PROCEDURE — 84484 ASSAY OF TROPONIN QUANT: CPT | Mod: 91 | Performed by: NURSE PRACTITIONER

## 2023-03-15 PROCEDURE — 82800 BLOOD PH: CPT | Mod: ER

## 2023-03-15 PROCEDURE — 86592 SYPHILIS TEST NON-TREP QUAL: CPT | Performed by: FAMILY MEDICINE

## 2023-03-15 PROCEDURE — 85014 HEMATOCRIT: CPT | Mod: ER,91

## 2023-03-15 PROCEDURE — 63600175 PHARM REV CODE 636 W HCPCS: Mod: ER | Performed by: FAMILY MEDICINE

## 2023-03-15 PROCEDURE — 85025 COMPLETE CBC W/AUTO DIFF WBC: CPT | Mod: ER | Performed by: EMERGENCY MEDICINE

## 2023-03-15 PROCEDURE — 84443 ASSAY THYROID STIM HORMONE: CPT | Mod: ER | Performed by: FAMILY MEDICINE

## 2023-03-15 PROCEDURE — 96361 HYDRATE IV INFUSION ADD-ON: CPT | Mod: ER

## 2023-03-15 PROCEDURE — 99285 EMERGENCY DEPT VISIT HI MDM: CPT | Mod: 25,ER

## 2023-03-15 PROCEDURE — 80074 ACUTE HEPATITIS PANEL: CPT | Performed by: FAMILY MEDICINE

## 2023-03-15 PROCEDURE — 83605 ASSAY OF LACTIC ACID: CPT | Mod: ER | Performed by: EMERGENCY MEDICINE

## 2023-03-15 PROCEDURE — 63600175 PHARM REV CODE 636 W HCPCS: Performed by: FAMILY MEDICINE

## 2023-03-15 PROCEDURE — 80053 COMPREHEN METABOLIC PANEL: CPT | Mod: 91 | Performed by: NURSE PRACTITIONER

## 2023-03-15 PROCEDURE — 84484 ASSAY OF TROPONIN QUANT: CPT | Mod: ER | Performed by: EMERGENCY MEDICINE

## 2023-03-15 PROCEDURE — 86803 HEPATITIS C AB TEST: CPT | Performed by: EMERGENCY MEDICINE

## 2023-03-15 PROCEDURE — 96366 THER/PROPH/DIAG IV INF ADDON: CPT | Mod: 59

## 2023-03-15 PROCEDURE — 85730 THROMBOPLASTIN TIME PARTIAL: CPT | Mod: ER | Performed by: EMERGENCY MEDICINE

## 2023-03-15 PROCEDURE — 87389 HIV-1 AG W/HIV-1&-2 AB AG IA: CPT | Performed by: EMERGENCY MEDICINE

## 2023-03-15 PROCEDURE — 82565 ASSAY OF CREATININE: CPT | Mod: ER

## 2023-03-15 PROCEDURE — 84132 ASSAY OF SERUM POTASSIUM: CPT | Mod: ER

## 2023-03-15 RX ORDER — ACETAMINOPHEN 325 MG/1
650 TABLET ORAL EVERY 8 HOURS PRN
Status: DISCONTINUED | OUTPATIENT
Start: 2023-03-15 | End: 2023-03-18 | Stop reason: HOSPADM

## 2023-03-15 RX ORDER — POTASSIUM CHLORIDE 7.45 MG/ML
10 INJECTION INTRAVENOUS
Status: COMPLETED | OUTPATIENT
Start: 2023-03-15 | End: 2023-03-15

## 2023-03-15 RX ORDER — PROCHLORPERAZINE EDISYLATE 5 MG/ML
2.5 INJECTION INTRAMUSCULAR; INTRAVENOUS EVERY 6 HOURS PRN
Status: DISCONTINUED | OUTPATIENT
Start: 2023-03-15 | End: 2023-03-18 | Stop reason: HOSPADM

## 2023-03-15 RX ORDER — POLYETHYLENE GLYCOL 3350 17 G/17G
17 POWDER, FOR SOLUTION ORAL DAILY PRN
Status: DISCONTINUED | OUTPATIENT
Start: 2023-03-15 | End: 2023-03-18 | Stop reason: HOSPADM

## 2023-03-15 RX ORDER — GLUCAGON 1 MG
1 KIT INJECTION
Status: DISCONTINUED | OUTPATIENT
Start: 2023-03-15 | End: 2023-03-18 | Stop reason: HOSPADM

## 2023-03-15 RX ORDER — MAG HYDROX/ALUMINUM HYD/SIMETH 200-200-20
30 SUSPENSION, ORAL (FINAL DOSE FORM) ORAL 4 TIMES DAILY PRN
Status: DISCONTINUED | OUTPATIENT
Start: 2023-03-15 | End: 2023-03-18 | Stop reason: HOSPADM

## 2023-03-15 RX ORDER — SODIUM CHLORIDE 9 MG/ML
INJECTION, SOLUTION INTRAVENOUS CONTINUOUS
Status: DISCONTINUED | OUTPATIENT
Start: 2023-03-15 | End: 2023-03-16

## 2023-03-15 RX ORDER — IBUPROFEN 200 MG
24 TABLET ORAL
Status: DISCONTINUED | OUTPATIENT
Start: 2023-03-15 | End: 2023-03-18 | Stop reason: HOSPADM

## 2023-03-15 RX ORDER — SODIUM CHLORIDE 0.9 % (FLUSH) 0.9 %
3 SYRINGE (ML) INJECTION EVERY 12 HOURS PRN
Status: DISCONTINUED | OUTPATIENT
Start: 2023-03-15 | End: 2023-03-18 | Stop reason: HOSPADM

## 2023-03-15 RX ORDER — ACETAMINOPHEN 650 MG/1
650 SUPPOSITORY RECTAL EVERY 4 HOURS PRN
Status: DISCONTINUED | OUTPATIENT
Start: 2023-03-15 | End: 2023-03-18 | Stop reason: HOSPADM

## 2023-03-15 RX ORDER — SIMETHICONE 80 MG
1 TABLET,CHEWABLE ORAL 4 TIMES DAILY PRN
Status: DISCONTINUED | OUTPATIENT
Start: 2023-03-15 | End: 2023-03-18 | Stop reason: HOSPADM

## 2023-03-15 RX ORDER — POTASSIUM CHLORIDE 20 MEQ/1
40 TABLET, EXTENDED RELEASE ORAL ONCE
Status: DISCONTINUED | OUTPATIENT
Start: 2023-03-15 | End: 2023-03-15

## 2023-03-15 RX ORDER — IBUPROFEN 200 MG
16 TABLET ORAL
Status: DISCONTINUED | OUTPATIENT
Start: 2023-03-15 | End: 2023-03-18 | Stop reason: HOSPADM

## 2023-03-15 RX ORDER — TALC
6 POWDER (GRAM) TOPICAL NIGHTLY PRN
Status: DISCONTINUED | OUTPATIENT
Start: 2023-03-15 | End: 2023-03-18 | Stop reason: HOSPADM

## 2023-03-15 RX ADMIN — POTASSIUM CHLORIDE 10 MEQ: 7.46 INJECTION, SOLUTION INTRAVENOUS at 06:03

## 2023-03-15 RX ADMIN — POTASSIUM CHLORIDE 10 MEQ: 7.46 INJECTION, SOLUTION INTRAVENOUS at 10:03

## 2023-03-15 RX ADMIN — POTASSIUM CHLORIDE 10 MEQ: 7.46 INJECTION, SOLUTION INTRAVENOUS at 09:03

## 2023-03-15 RX ADMIN — SODIUM CHLORIDE: 9 INJECTION, SOLUTION INTRAVENOUS at 09:03

## 2023-03-15 RX ADMIN — CEFEPIME 2 G: 2 INJECTION, POWDER, FOR SOLUTION INTRAVENOUS at 02:03

## 2023-03-15 RX ADMIN — POTASSIUM CHLORIDE 10 MEQ: 7.46 INJECTION, SOLUTION INTRAVENOUS at 03:03

## 2023-03-15 RX ADMIN — SODIUM CHLORIDE 1000 ML: 9 INJECTION, SOLUTION INTRAVENOUS at 02:03

## 2023-03-15 RX ADMIN — SODIUM CHLORIDE: 9 INJECTION, SOLUTION INTRAVENOUS at 05:03

## 2023-03-15 RX ADMIN — SODIUM CHLORIDE 500 ML: 9 INJECTION, SOLUTION INTRAVENOUS at 01:03

## 2023-03-15 NOTE — ED PROVIDER NOTES
Encounter Date: 3/15/2023       History     Chief Complaint   Patient presents with    Altered Mental Status     Confusion and possible hallucinations per family, concerned with UTI.     The history is provided by the patient.   Altered Mental Status  This is a new problem. The current episode started more than 2 days ago (last seen 1 week ago, had a fall last night). The problem occurs constantly. The problem has not changed since onset.Pt lives alone and has no immediate family, cousins checked on her last night, altered.  Review of patient's allergies indicates:   Allergen Reactions    Penicillins Rash     Past Medical History:   Diagnosis Date    Anticoagulant long-term use     Aortic valve defect     Benign neoplasm of ascending colon 4/6/2017    Cancer     CHF (congestive heart failure)     DDD (degenerative disc disease), cervical 7/20/2018    GERD (gastroesophageal reflux disease)     Hemorrhoids     HLD (hyperlipidemia)     Hypertension     Insomnia 12/1/2014    Irritable bowel syndrome with constipation 4/9/2018    Postmenopausal osteoporosis 7/20/2018     Past Surgical History:   Procedure Laterality Date    AORTIC VALVE REPLACEMENT  2003    COLONOSCOPY Left 4/6/2017    Procedure: COLONOSCOPY;  Surgeon: Sander Ulloa MD;  Location: [facility];  Service: Endoscopy;  Laterality: Left;    COLONOSCOPY N/A 11/7/2019    Procedure: COLONOSCOPY;  Surgeon: Brenda Ochoa MD;  Location: [facility];  Service: Endoscopy;  Laterality: N/A;    COLONOSCOPY N/A 2/26/2021    Procedure: COLONOSCOPY;  Surgeon: Ankur Smith MD;  Location: [facility];  Service: General;  Laterality: N/A;    COMPUTED TOMOGRAPHY N/A 11/12/2020    Procedure: Liver microwave ablation;  Surgeon: Gonzalez Moraes MD;  Location: [facility];  Service: General;  Laterality: N/A;    ESOPHAGOGASTRODUODENOSCOPY N/A 11/7/2019    Procedure: ESOPHAGOGASTRODUODENOSCOPY (EGD) needs PT/INR;  Surgeon: Brenda Ochoa MD;  Location: [facility];  Service:  Endoscopy;  Laterality: N/A;    FLEXIBLE SIGMOIDOSCOPY N/A 2/13/2020    Procedure: SIGMOIDOSCOPY, FLEXIBLE;  Surgeon: Ankur Smith MD;  Location: [facility] OR;  Service: General;  Laterality: N/A;    INJECTION OF ANESTHETIC AGENT INTO TISSUE PLANE DEFINED BY TRANSVERSUS ABDOMINIS MUSCLE N/A 2/13/2020    Procedure: BLOCK, TRANSVERSUS ABDOMINIS PLANE;  Surgeon: Ankur Smith MD;  Location: [facility] OR;  Service: General;  Laterality: N/A;    INSERTION OF TUNNELED CENTRAL VENOUS CATHETER (CVC) WITH SUBCUTANEOUS PORT N/A 3/17/2020    Procedure: [id number];  Surgeon: Ankur Smith MD;  Location: [facility] OR;  Service: General;  Laterality: N/A;    ROBOT-ASSISTED LOW ANTERIOR RESECTION OF COLON N/A 2/13/2020    Procedure: XI ROBOTIC RESECTION, COLON, LOW ANTERIOR;  Surgeon: Ankur Smith MD;  Location: [facility] OR;  Service: General;  Laterality: N/A;     Family History   Problem Relation Age of Onset    Diabetes Mother     Heart disease Mother     Hypertension Mother     Heart disease Father     Hypertension Father     Hypertension Sister     Hypertension Son     Heart disease Son     Colon cancer Neg Hx      Social History     Tobacco Use    Smoking status: Never    Smokeless tobacco: Never   Substance Use Topics    Alcohol use: Not Currently    Drug use: No     Review of Systems   Unable to perform ROS: Mental status change     Physical Exam     Initial Vitals [03/15/23 1224]   BP Pulse Resp Temp SpO2   130/61 75 18 97.6 °F (36.4 °C) 96 %      MAP       --         Physical Exam    Nursing note and vitals reviewed.  Constitutional: She appears well-developed and well-nourished. She appears distressed.   Elderly   HENT:   Head: Normocephalic and atraumatic.   Mouth/Throat: Oropharynx is clear and moist. Mucous membranes are dry.   Eyes: Conjunctivae and EOM are normal. Pupils are equal, round, and reactive to light.   Neck: Neck supple.   Normal range of motion.  Cardiovascular:  Normal rate, regular  rhythm and normal heart sounds.           Pulmonary/Chest: Breath sounds normal. No respiratory distress.   Abdominal: Abdomen is soft. Bowel sounds are normal. She exhibits no distension. There is no abdominal tenderness.   Musculoskeletal:         General: Normal range of motion.      Cervical back: Normal range of motion and neck supple.     Neurological: She is alert. She has normal strength.   Generalized weakness, Somnolent, arouseable, answers questions   Skin: Skin is warm and dry.   Psychiatric: She has a normal mood and affect. Thought content normal.       ED Course   Critical Care    Date/Time: 3/15/2023 2:31 PM  Performed by: Mohsen Mcintyre MD  Authorized by: Mohsen Mcintyre MD   Total critical care time (exclusive of procedural time) : 46 minutes  Critical care time was exclusive of separately billable procedures and treating other patients.  Critical care was necessary to treat or prevent imminent or life-threatening deterioration of the following conditions: renal failure.  Critical care was time spent personally by me on the following activities: blood draw for specimens, development of treatment plan with patient or surrogate, discussions with consultants, examination of patient, obtaining history from patient or surrogate, ordering and performing treatments and interventions, ordering and review of laboratory studies, ordering and review of radiographic studies, pulse oximetry, re-evaluation of patient's condition and review of old charts.      Labs Reviewed   CBC W/ AUTO DIFFERENTIAL - Abnormal; Notable for the following components:       Result Value    RBC 3.01 (*)     Hemoglobin 8.5 (*)     Hematocrit 27.3 (*)     MCHC 31.1 (*)     RDW 15.6 (*)     Immature Granulocytes 0.6 (*)     Lymph # 0.6 (*)     Gran % 81.3 (*)     Lymph % 8.6 (*)     All other components within normal limits    Narrative:     Release to patient->Immediate   COMPREHENSIVE METABOLIC PANEL - Abnormal;  Notable for the following components:    Potassium 2.8 (*)     Glucose 120 (*)     BUN 33 (*)     Creatinine 2.6 (*)     Calcium 8.3 (*)     Total Bilirubin 1.3 (*)      (*)      (*)     Anion Gap 17 (*)     eGFR 19.1 (*)     All other components within normal limits    Narrative:     Release to patient->Immediate   LACTIC ACID, PLASMA - Abnormal; Notable for the following components:    Lactate (Lactic Acid) 3.3 (*)     All other components within normal limits    Narrative:     Release to patient->Immediate   URINALYSIS, REFLEX TO URINE CULTURE - Abnormal; Notable for the following components:    Protein, UA 1+ (*)     All other components within normal limits    Narrative:     Specimen Source->Urine   TROPONIN I - Abnormal; Notable for the following components:    Troponin I 0.041 (*)     All other components within normal limits    Narrative:     Release to patient->Immediate   B-TYPE NATRIURETIC PEPTIDE - Abnormal; Notable for the following components:     (*)     All other components within normal limits    Narrative:     Release to patient->Immediate   PROTIME-INR - Abnormal; Notable for the following components:    Prothrombin Time 22.6 (*)     INR 2.2 (*)     All other components within normal limits   ISTAT PROCEDURE - Abnormal; Notable for the following components:    POC PH 7.568 (*)     POC PCO2 30.8 (*)     POC PO2 124 (*)     POC HCO3 28.1 (*)     POC Glucose 119 (*)     POC Sodium 134 (*)     POC Potassium 2.9 (*)     POC Ionized Calcium 0.97 (*)     POC Hematocrit 22 (*)     All other components within normal limits   CULTURE, BLOOD   CULTURE, BLOOD   PROCALCITONIN    Narrative:     Release to patient->Immediate   MAGNESIUM    Narrative:     Release to patient->Immediate   APTT   URINALYSIS MICROSCOPIC    Narrative:     Specimen Source->Urine   HIV 1 / 2 ANTIBODY   HEPATITIS C ANTIBODY   HEP C VIRUS HOLD SPECIMEN     Results for orders placed or performed during the hospital  encounter of 03/15/23   CBC auto differential   Result Value Ref Range    WBC 6.76 3.90 - 12.70 K/uL    RBC 3.01 (L) 4.00 - 5.40 M/uL    Hemoglobin 8.5 (L) 12.0 - 16.0 g/dL    Hematocrit 27.3 (L) 37.0 - 48.5 %    MCV 91 82 - 98 fL    MCH 28.2 27.0 - 31.0 pg    MCHC 31.1 (L) 32.0 - 36.0 g/dL    RDW 15.6 (H) 11.5 - 14.5 %    Platelets 211 150 - 450 K/uL    MPV 12.3 9.2 - 12.9 fL    Immature Granulocytes 0.6 (H) 0.0 - 0.5 %    Gran # (ANC) 5.5 1.8 - 7.7 K/uL    Immature Grans (Abs) 0.04 0.00 - 0.04 K/uL    Lymph # 0.6 (L) 1.0 - 4.8 K/uL    Mono # 0.6 0.3 - 1.0 K/uL    Eos # 0.0 0.0 - 0.5 K/uL    Baso # 0.01 0.00 - 0.20 K/uL    nRBC 0 0 /100 WBC    Gran % 81.3 (H) 38.0 - 73.0 %    Lymph % 8.6 (L) 18.0 - 48.0 %    Mono % 9.3 4.0 - 15.0 %    Eosinophil % 0.1 0.0 - 8.0 %    Basophil % 0.1 0.0 - 1.9 %    Differential Method Automated    Comprehensive metabolic panel   Result Value Ref Range    Sodium 138 136 - 145 mmol/L    Potassium 2.8 (L) 3.5 - 5.1 mmol/L    Chloride 96 95 - 110 mmol/L    CO2 25 23 - 29 mmol/L    Glucose 120 (H) 70 - 110 mg/dL    BUN 33 (H) 8 - 23 mg/dL    Creatinine 2.6 (H) 0.5 - 1.4 mg/dL    Calcium 8.3 (L) 8.7 - 10.5 mg/dL    Total Protein 6.4 6.0 - 8.4 g/dL    Albumin 3.8 3.5 - 5.2 g/dL    Total Bilirubin 1.3 (H) 0.1 - 1.0 mg/dL    Alkaline Phosphatase 126 55 - 135 U/L     (H) 10 - 40 U/L     (H) 10 - 44 U/L    Anion Gap 17 (H) 8 - 16 mmol/L    eGFR 19.1 (A) >60 mL/min/1.73 m^2   Lactic acid, plasma #1   Result Value Ref Range    Lactate (Lactic Acid) 3.3 (H) 0.5 - 2.2 mmol/L   Urinalysis, Reflex to Urine Culture Urine, Catheterized    Specimen: Urine   Result Value Ref Range    Specimen UA Urine, Catheterized     Color, UA Yellow Yellow, Straw, Padmini    Appearance, UA Clear Clear    pH, UA 6.0 5.0 - 8.0    Specific Gravity, UA 1.020 1.005 - 1.030    Protein, UA 1+ (A) Negative    Glucose, UA Negative Negative    Ketones, UA Negative Negative    Bilirubin (UA) Negative Negative     Occult Blood UA Negative Negative    Nitrite, UA Negative Negative    Urobilinogen, UA Negative <2.0 EU/dL    Leukocytes, UA Negative Negative   Troponin I   Result Value Ref Range    Troponin I 0.041 (H) 0.000 - 0.026 ng/mL   Procalcitonin   Result Value Ref Range    Procalcitonin 0.16 <0.25 ng/mL   Brain natriuretic peptide   Result Value Ref Range     (H) 0 - 99 pg/mL   Magnesium   Result Value Ref Range    Magnesium 1.6 1.6 - 2.6 mg/dL   Protime-INR   Result Value Ref Range    Prothrombin Time 22.6 (H) 9.0 - 12.5 sec    INR 2.2 (H) 0.8 - 1.2   APTT   Result Value Ref Range    aPTT 25.0 21.0 - 32.0 sec   Urinalysis Microscopic   Result Value Ref Range    RBC, UA 1 0 - 4 /hpf    WBC, UA 1 0 - 5 /hpf    Bacteria Rare None-Occ /hpf    Hyaline Casts, UA 0 0-1/lpf /lpf    Microscopic Comment SEE COMMENT    ISTAT PROCEDURE   Result Value Ref Range    POC PH 7.568 (HH) 7.35 - 7.45    POC PCO2 30.8 (L) 35 - 45 mmHg    POC PO2 124 (H) 80 - 100 mmHg    POC HCO3 28.1 (H) 24 - 28 mmol/L    POC BE 6 -2 to 2 mmol/L    POC SATURATED O2 99 95 - 100 %    POC Glucose 119 (H) 70 - 110 mg/dL    POC Sodium 134 (L) 136 - 145 mmol/L    POC Potassium 2.9 (L) 3.5 - 5.1 mmol/L    POC Ionized Calcium 0.97 (L) 1.06 - 1.42 mmol/L    POC Hematocrit 22 (L) 36 - 54 %PCV    Sample ARTERIAL     Site RB     Allens Test Pass     DelSys Room Air     Mode SPONT     FiO2 21        EKG Readings: (Independently Interpreted)   Initial Reading: No STEMI. Rhythm: Atrial Fibrillation. Heart Rate: 65. Ectopy: No Ectopy. ST Segments: Non-Specific ST Segment Depression. T Waves Flipped: V2, V3, V4, V5 and V6. Axis: Normal. Clinical Impression: Atrial Fibrillation   ECG Results              EKG 12-lead (In process)  Result time 03/15/23 13:44:02      In process by Interface, Lab In [facility] (03/15/23 13:44:02)                   Narrative:    Test Reason : AMS    Vent. Rate : 065 BPM     Atrial Rate : 082 BPM     P-R Int : 000 ms          QRS Dur : 148  ms      QT Int : 506 ms       P-R-T Axes : 000 -12 146 degrees     QTc Int : 526 ms    Atrial fibrillation  Right bundle branch block  T wave abnormality, consider lateral ischemia  Abnormal ECG  When compared with ECG of 02-MAR-2020 11:02,  Atrial fibrillation has replaced Sinus rhythm  Nonspecific T wave abnormality, improved in Inferior leads  T wave inversion now evident in Lateral leads  QT has lengthened    Referred By: [email or web address]   SELF           Confirmed By:                                   Imaging Results              CT Head Without Contrast (Final result)  Result time 03/15/23 13:24:06      Final result by Mohsen Lindsay MD (03/15/23 13:24:06)                   Impression:      No evidence of acute intracranial abnormality.      Electronically signed by: Mohsen Lindsay  Date:    03/15/2023  Time:    13:24               Narrative:    EXAMINATION:  CT HEAD WITHOUT CONTRAST    CLINICAL HISTORY:  Dizziness, persistent/recurrent, cardiac or vascular cause suspected; Other fatigue    TECHNIQUE:  Low dose axial images were obtained through the head.  Coronal and sagittal reformations were also performed. Contrast was not administered.    All CT scans at this location are performed using dose optimization techniques including the following: Automated exposure control; adjustment of the mA and/or kv; use of iterative reconstruction technique.    COMPARISON:  CT dated 04/20/2018    FINDINGS:  Cerebral and ventricular volumes are within normal limits.  No evidence of hydrocephalus.    No CT evidence of acute territorial infarct, intracranial hemorrhage, or mass lesion.  No midline shift or mass effect.    Midline structures and posterior fossa structures appear unremarkable.  Basal cisterns appear clear.  Mild bilateral carotid siphon calcification noted.    Calvarium is intact without acute or aggressive abnormality.  Orbits and globes appear within normal limits.  Small right maxillary sinus mucosal  retention cyst and mild mucosal thickening of the right sphenoid sinus.  Remaining paranasal sinuses and mastoid air cells are clear.                                       X-Ray Chest AP Portable (Final result)  Result time 03/15/23 13:36:27      Final result by Mohsen Lindsay MD (03/15/23 13:36:27)                   Impression:      No acute cardiopulmonary abnormality.      Electronically signed by: Mohsen Lindsay  Date:    03/15/2023  Time:    13:36               Narrative:    EXAMINATION:  XR CHEST AP PORTABLE    CLINICAL HISTORY:  Sepsis;    TECHNIQUE:  Single frontal portable view of the chest was performed.    COMPARISON:  X-ray dated 12/29/2021    FINDINGS:  Median sternotomy wires are intact.  Aortic arch calcification and artificial aortic valve noted.  Mildly enlarged cardiac silhouette is unchanged.  Trachea is midline.  Pulmonary vasculature is unremarkable.  Lungs are clear.  No significant pleural effusion or pneumothorax.  No acute bony abnormality.                        Wet Read by Mohsen Mcintyre MD (03/15/23 13:27:32, [facility], Emergency Medicine)    Left pleural effusion/inf                                     Medications   sodium chloride 0.9% bolus 1,000 mL 1,000 mL (has no administration in time range)   ceFEPIme in dextrose 5% 2 gram/50 mL IVPB 2 g (has no administration in time range)   potassium chloride 10 mEq in 100 mL IVPB (has no administration in time range)   sodium chloride 0.9% bolus 500 mL 500 mL (0 mLs Intravenous Stopped 3/15/23 1352)     Medical Decision Making:   Initial Assessment:   71 yo Weakness, not seen for 1 week found on floor  Differential Diagnosis:   Dehydration, ACS, Stroke, MECHE, electrolyte imbalance  Independently Interpreted Test(s):   I have ordered and independently interpreted X-rays - see prior notes.  I have ordered and independently interpreted EKG Reading(s) - see prior notes  Clinical Tests:   Lab Tests: Ordered and  Reviewed  The following lab test(s) were unremarkable: CBC, CMP, Urinalysis and Troponin       <> Summary of Lab: Labs Reviewed  CBC W/ AUTO DIFFERENTIAL - Abnormal; Notable for the following components:      Result Value   RBC 3.01 (*)    Hemoglobin 8.5 (*)    Hematocrit 27.3 (*)    MCHC 31.1 (*)    RDW 15.6 (*)    Immature Granulocytes 0.6 (*)    Lymph # 0.6 (*)    Gran % 81.3 (*)    Lymph % 8.6 (*)    All other components within normal limits   Narrative:    Release to patient->Immediate  COMPREHENSIVE METABOLIC PANEL - Abnormal; Notable for the following components:   Potassium 2.8 (*)    Glucose 120 (*)    BUN 33 (*)    Creatinine 2.6 (*)    Calcium 8.3 (*)    Total Bilirubin 1.3 (*)     (*)     (*)    Anion Gap 17 (*)    eGFR 19.1 (*)    All other components within normal limits   Narrative:    Release to patient->Immediate  LACTIC ACID, PLASMA - Abnormal; Notable for the following components:   Lactate (Lactic Acid) 3.3 (*)    All other components within normal limits   Narrative:    Release to patient->Immediate  URINALYSIS, REFLEX TO URINE CULTURE - Abnormal; Notable for the following components:   Protein, UA 1+ (*)    All other components within normal limits   Narrative:    Specimen Source->Urine  TROPONIN I - Abnormal; Notable for the following components:   Troponin I 0.041 (*)    All other components within normal limits   Narrative:    Release to patient->Immediate  B-TYPE NATRIURETIC PEPTIDE - Abnormal; Notable for the following components:    (*)    All other components within normal limits   Narrative:    Release to patient->Immediate  PROTIME-INR - Abnormal; Notable for the following components:   Prothrombin Time 22.6 (*)    INR 2.2 (*)    All other components within normal limits  ISTAT PROCEDURE - Abnormal; Notable for the following components:   POC PH 7.568 (*)    POC PCO2 30.8 (*)    POC PO2 124 (*)    POC HCO3 28.1 (*)    POC Glucose 119 (*)    POC Sodium 134  (*)    POC Potassium 2.9 (*)    POC Ionized Calcium 0.97 (*)    POC Hematocrit 22 (*)      Radiological Study: Ordered and Reviewed  Medical Tests: Ordered and Reviewed  ED Management:  Pt admitted to Hospital for MECHE and further evaluation, IVFs, IV K+  Other:   I have discussed this case with another health care provider.       <> Summary of the Discussion: Admitted under Dr Hough case discussed                         Clinical Impression:   Final diagnoses:  [R53.1] Weakness  [R53.83] Fatigue  [E87.6] Hypokalemia  [N17.9] MECHE (acute kidney injury) (Primary)  [E86.0] Dehydration        ED Disposition Condition    Observation                 Mohsen Mcintyre MD  03/15/23 9805

## 2023-03-15 NOTE — PLAN OF CARE
[facility] ED Transfer of Care Note       Referring facility: [facility]   Referring provider: KINGSLEY LAM  Accepting facility:   Accepting provider:   Admitting provider:   Manuel Hough MD  Reason for transfer:    Transfer diagnosis:   Transfer specialty requested:   Transfer specialty notified: yes  Transfer level:   Bed type requested: Standard  Isolation status: No active isolations   Admission class or status:       Narrative     Patient is a 71 y/o female who presents for ams. Labs indicative of MECHE. Fluids started. Potassium replacement started. Patient to be placed in observation.       Objective     Vitals: Temp: 97.6 °F (36.4 °C) (03/15/23 1224)  Pulse: 68 (03/15/23 1433)  Resp: (!) 23 (03/15/23 1432)  BP: (!) 141/62 (03/15/23 1433)  SpO2: 96 % (03/15/23 1433)  Recent Labs:   Results for orders placed or performed during the hospital encounter of 03/15/23   CBC auto differential   Result Value Ref Range    WBC 6.76 3.90 - 12.70 K/uL    RBC 3.01 (L) 4.00 - 5.40 M/uL    Hemoglobin 8.5 (L) 12.0 - 16.0 g/dL    Hematocrit 27.3 (L) 37.0 - 48.5 %    MCV 91 82 - 98 fL    MCH 28.2 27.0 - 31.0 pg    MCHC 31.1 (L) 32.0 - 36.0 g/dL    RDW 15.6 (H) 11.5 - 14.5 %    Platelets 211 150 - 450 K/uL    MPV 12.3 9.2 - 12.9 fL    Immature Granulocytes 0.6 (H) 0.0 - 0.5 %    Gran # (ANC) 5.5 1.8 - 7.7 K/uL    Immature Grans (Abs) 0.04 0.00 - 0.04 K/uL    Lymph # 0.6 (L) 1.0 - 4.8 K/uL    Mono # 0.6 0.3 - 1.0 K/uL    Eos # 0.0 0.0 - 0.5 K/uL    Baso # 0.01 0.00 - 0.20 K/uL    nRBC 0 0 /100 WBC    Gran % 81.3 (H) 38.0 - 73.0 %    Lymph % 8.6 (L) 18.0 - 48.0 %    Mono % 9.3 4.0 - 15.0 %    Eosinophil % 0.1 0.0 - 8.0 %    Basophil % 0.1 0.0 - 1.9 %    Differential Method Automated    Comprehensive metabolic panel   Result Value Ref Range    Sodium 138 136 - 145 mmol/L    Potassium 2.8 (L) 3.5 - 5.1 mmol/L    Chloride 96 95 - 110 mmol/L    CO2 25 23 - 29 mmol/L    Glucose 120 (H) 70 - 110 mg/dL    BUN 33 (H) 8 - 23 mg/dL     Creatinine 2.6 (H) 0.5 - 1.4 mg/dL    Calcium 8.3 (L) 8.7 - 10.5 mg/dL    Total Protein 6.4 6.0 - 8.4 g/dL    Albumin 3.8 3.5 - 5.2 g/dL    Total Bilirubin 1.3 (H) 0.1 - 1.0 mg/dL    Alkaline Phosphatase 126 55 - 135 U/L     (H) 10 - 40 U/L     (H) 10 - 44 U/L    Anion Gap 17 (H) 8 - 16 mmol/L    eGFR 19.1 (A) >60 mL/min/1.73 m^2   Lactic acid, plasma #1   Result Value Ref Range    Lactate (Lactic Acid) 3.3 (H) 0.5 - 2.2 mmol/L   Urinalysis, Reflex to Urine Culture Urine, Catheterized    Specimen: Urine   Result Value Ref Range    Specimen UA Urine, Catheterized     Color, UA Yellow Yellow, Straw, Padmini    Appearance, UA Clear Clear    pH, UA 6.0 5.0 - 8.0    Specific Gravity, UA 1.020 1.005 - 1.030    Protein, UA 1+ (A) Negative    Glucose, UA Negative Negative    Ketones, UA Negative Negative    Bilirubin (UA) Negative Negative    Occult Blood UA Negative Negative    Nitrite, UA Negative Negative    Urobilinogen, UA Negative <2.0 EU/dL    Leukocytes, UA Negative Negative   Troponin I   Result Value Ref Range    Troponin I 0.041 (H) 0.000 - 0.026 ng/mL   Procalcitonin   Result Value Ref Range    Procalcitonin 0.16 <0.25 ng/mL   Brain natriuretic peptide   Result Value Ref Range     (H) 0 - 99 pg/mL   Magnesium   Result Value Ref Range    Magnesium 1.6 1.6 - 2.6 mg/dL   Protime-INR   Result Value Ref Range    Prothrombin Time 22.6 (H) 9.0 - 12.5 sec    INR 2.2 (H) 0.8 - 1.2   APTT   Result Value Ref Range    aPTT 25.0 21.0 - 32.0 sec   Urinalysis Microscopic   Result Value Ref Range    RBC, UA 1 0 - 4 /hpf    WBC, UA 1 0 - 5 /hpf    Bacteria Rare None-Occ /hpf    Hyaline Casts, UA 0 0-1/lpf /lpf    Microscopic Comment SEE COMMENT    ISTAT PROCEDURE   Result Value Ref Range    POC PH 7.568 (HH) 7.35 - 7.45    POC PCO2 30.8 (L) 35 - 45 mmHg    POC PO2 124 (H) 80 - 100 mmHg    POC HCO3 28.1 (H) 24 - 28 mmol/L    POC BE 6 -2 to 2 mmol/L    POC SATURATED O2 99 95 - 100 %    POC Glucose 119 (H) 70 -  110 mg/dL    POC Sodium 134 (L) 136 - 145 mmol/L    POC Potassium 2.9 (L) 3.5 - 5.1 mmol/L    POC Ionized Calcium 0.97 (L) 1.06 - 1.42 mmol/L    POC Hematocrit 22 (L) 36 - 54 %PCV    Sample ARTERIAL     Site RB     Allens Test Pass     DelSys Room Air     Mode SPONT     FiO2 21       Recent imaging:  X-Ray Chest AP Portable  Narrative: EXAMINATION:  XR CHEST AP PORTABLE    CLINICAL HISTORY:  Sepsis;    TECHNIQUE:  Single frontal portable view of the chest was performed.    COMPARISON:  X-ray dated 12/29/2021    FINDINGS:  Median sternotomy wires are intact.  Aortic arch calcification and artificial aortic valve noted.  Mildly enlarged cardiac silhouette is unchanged.  Trachea is midline.  Pulmonary vasculature is unremarkable.  Lungs are clear.  No significant pleural effusion or pneumothorax.  No acute bony abnormality.  Impression: No acute cardiopulmonary abnormality.    Electronically signed by: Mohsen Lindsay  Date:    03/15/2023  Time:    13:36  CT Head Without Contrast  Narrative: EXAMINATION:  CT HEAD WITHOUT CONTRAST    CLINICAL HISTORY:  Dizziness, persistent/recurrent, cardiac or vascular cause suspected; Other fatigue    TECHNIQUE:  Low dose axial images were obtained through the head.  Coronal and sagittal reformations were also performed. Contrast was not administered.    All CT scans at this location are performed using dose optimization techniques including the following: Automated exposure control; adjustment of the mA and/or kv; use of iterative reconstruction technique.    COMPARISON:  CT dated 04/20/2018    FINDINGS:  Cerebral and ventricular volumes are within normal limits.  No evidence of hydrocephalus.    No CT evidence of acute territorial infarct, intracranial hemorrhage, or mass lesion.  No midline shift or mass effect.    Midline structures and posterior fossa structures appear unremarkable.  Basal cisterns appear clear.  Mild bilateral carotid siphon calcification noted.    Calvarium  is intact without acute or aggressive abnormality.  Orbits and globes appear within normal limits.  Small right maxillary sinus mucosal retention cyst and mild mucosal thickening of the right sphenoid sinus.  Remaining paranasal sinuses and mastoid air cells are clear.  Impression: No evidence of acute intracranial abnormality.    Electronically signed by: Mohsen Lindsay  Date:    03/15/2023  Time:    13:24       Airway:     Vent settings:     IV access:        Peripheral IV - Single Lumen 03/15/23 1234 20 G Left Antecubital (Active)   Site Assessment Clean;Dry;Intact 03/15/23 1234   Extremity Assessment Distal to IV No redness;No swelling;No warmth 03/15/23 1234   Line Status Blood return noted;Flushed 03/15/23 1234   Dressing Status Clean;Dry;Intact 03/15/23 1234   Dressing Intervention First dressing 03/15/23 1234     Infusions:   Allergies:   Review of patient's allergies indicates:   Allergen Reactions    Penicillins Rash      NPO: Yes    Anticoagulation:   Anticoagulants       None             Instructions      [facility]  Admit to Hospital Medicine  Upon patient arrival to floor, please contact Hospital Medicine on call.

## 2023-03-16 PROBLEM — R41.82 AMS (ALTERED MENTAL STATUS): Status: ACTIVE | Noted: 2023-03-16

## 2023-03-16 PROBLEM — D64.9 LOW HEMOGLOBIN: Status: ACTIVE | Noted: 2023-03-16

## 2023-03-16 PROBLEM — N17.9 AKI (ACUTE KIDNEY INJURY): Status: ACTIVE | Noted: 2023-03-16

## 2023-03-16 PROBLEM — R79.89 ELEVATED LACTIC ACID LEVEL: Status: ACTIVE | Noted: 2023-03-16

## 2023-03-16 PROBLEM — R74.01 TRANSAMINITIS: Status: ACTIVE | Noted: 2023-03-16

## 2023-03-16 PROBLEM — E87.6 HYPOKALEMIA: Status: ACTIVE | Noted: 2023-03-16

## 2023-03-16 LAB
ALBUMIN SERPL BCP-MCNC: 3.2 G/DL (ref 3.5–5.2)
ALLENS TEST: ABNORMAL
ALP SERPL-CCNC: 94 U/L (ref 55–135)
ALT SERPL W/O P-5'-P-CCNC: 192 U/L (ref 10–44)
AMPHET+METHAMPHET UR QL: NEGATIVE
ANION GAP SERPL CALC-SCNC: 16 MMOL/L (ref 8–16)
AORTIC ROOT ANNULUS: 3.78 CM
ASCENDING AORTA: 3.79 CM
AST SERPL-CCNC: 158 U/L (ref 10–40)
AV INDEX (PROSTH): 0.82
AV MEAN GRADIENT: 10 MMHG
AV PEAK GRADIENT: 18 MMHG
AV VALVE AREA: 2.4 CM2
AV VELOCITY RATIO: 0.87
BARBITURATES UR QL SCN>200 NG/ML: NEGATIVE
BASOPHILS # BLD AUTO: 0.01 K/UL (ref 0–0.2)
BASOPHILS NFR BLD: 0.2 % (ref 0–1.9)
BENZODIAZ UR QL SCN>200 NG/ML: NEGATIVE
BILIRUB SERPL-MCNC: 1 MG/DL (ref 0.1–1)
BSA FOR ECHO PROCEDURE: 1.72 M2
BUN SERPL-MCNC: 23 MG/DL (ref 8–23)
BZE UR QL SCN: NEGATIVE
CALCIUM SERPL-MCNC: 7.4 MG/DL (ref 8.7–10.5)
CANNABINOIDS UR QL SCN: NEGATIVE
CHLORIDE SERPL-SCNC: 104 MMOL/L (ref 95–110)
CO2 SERPL-SCNC: 21 MMOL/L (ref 23–29)
CREAT SERPL-MCNC: 1.6 MG/DL (ref 0.5–1.4)
CREAT UR-MCNC: 111.6 MG/DL (ref 15–325)
CV ECHO LV RWT: 0.55 CM
DELSYS: ABNORMAL
DIFFERENTIAL METHOD: ABNORMAL
DOP CALC AO PEAK VEL: 2.13 M/S
DOP CALC AO VTI: 36.2 CM
DOP CALC LVOT AREA: 2.9 CM2
DOP CALC LVOT DIAMETER: 1.93 CM
DOP CALC LVOT PEAK VEL: 1.85 M/S
DOP CALC LVOT STROKE VOLUME: 86.84 CM3
DOP CALC RVOT PEAK VEL: 0.93 M/S
DOP CALC RVOT VTI: 19.3 CM
DOP CALCLVOT PEAK VEL VTI: 29.7 CM
E WAVE DECELERATION TIME: 91.56 MSEC
E/A RATIO: 1.94
E/E' RATIO: 12.88 M/S
ECHO LV POSTERIOR WALL: 1.05 CM (ref 0.6–1.1)
EJECTION FRACTION: 50 %
EOSINOPHIL # BLD AUTO: 0 K/UL (ref 0–0.5)
EOSINOPHIL NFR BLD: 0.4 % (ref 0–8)
ERYTHROCYTE [DISTWIDTH] IN BLOOD BY AUTOMATED COUNT: 15.7 % (ref 11.5–14.5)
EST. GFR  (NO RACE VARIABLE): 34 ML/MIN/1.73 M^2
FOLATE SERPL-MCNC: 10.8 NG/ML (ref 4–24)
FRACTIONAL SHORTENING: 24 % (ref 28–44)
GLUCOSE SERPL-MCNC: 77 MG/DL (ref 70–110)
HAV IGM SERPL QL IA: NORMAL
HBV CORE IGM SERPL QL IA: NORMAL
HBV SURFACE AG SERPL QL IA: NORMAL
HCO3 UR-SCNC: 25 MMOL/L (ref 24–28)
HCT VFR BLD AUTO: 23.2 % (ref 37–48.5)
HCT VFR BLD AUTO: 25.8 % (ref 37–48.5)
HCV AB SERPL QL IA: NEGATIVE
HCV AB SERPL QL IA: NORMAL
HGB BLD-MCNC: 7.1 G/DL (ref 12–16)
HGB BLD-MCNC: 7.8 G/DL (ref 12–16)
HIV 1+2 AB+HIV1 P24 AG SERPL QL IA: NEGATIVE
IMM GRANULOCYTES # BLD AUTO: 0.02 K/UL (ref 0–0.04)
IMM GRANULOCYTES NFR BLD AUTO: 0.4 % (ref 0–0.5)
INR PPP: 1.9 (ref 0.8–1.2)
INTERVENTRICULAR SEPTUM: 1.16 CM (ref 0.6–1.1)
IVC DIAMETER: 2.2 CM
IVRT: 30.45 MSEC
LA MAJOR: 6 CM
LA MINOR: 5.34 CM
LA WIDTH: 4.8 CM
LEFT ATRIUM SIZE: 4.19 CM
LEFT ATRIUM VOLUME INDEX: 58.2 ML/M2
LEFT ATRIUM VOLUME: 96.6 CM3
LEFT INTERNAL DIMENSION IN SYSTOLE: 2.89 CM (ref 2.1–4)
LEFT VENTRICLE DIASTOLIC VOLUME INDEX: 37.48 ML/M2
LEFT VENTRICLE DIASTOLIC VOLUME: 62.21 ML
LEFT VENTRICLE MASS INDEX: 82 G/M2
LEFT VENTRICLE SYSTOLIC VOLUME INDEX: 19.3 ML/M2
LEFT VENTRICLE SYSTOLIC VOLUME: 32.03 ML
LEFT VENTRICULAR INTERNAL DIMENSION IN DIASTOLE: 3.81 CM (ref 3.5–6)
LEFT VENTRICULAR MASS: 136.1 G
LV LATERAL E/E' RATIO: 10.3 M/S
LV SEPTAL E/E' RATIO: 17.17 M/S
LVOT MG: 9.25 MMHG
LVOT MV: 1.49 CM/S
LYMPHOCYTES # BLD AUTO: 1.1 K/UL (ref 1–4.8)
LYMPHOCYTES NFR BLD: 24.4 % (ref 18–48)
MCH RBC QN AUTO: 27.6 PG (ref 27–31)
MCHC RBC AUTO-ENTMCNC: 30.6 G/DL (ref 32–36)
MCV RBC AUTO: 90 FL (ref 82–98)
METHADONE UR QL SCN>300 NG/ML: NEGATIVE
MONOCYTES # BLD AUTO: 0.6 K/UL (ref 0.3–1)
MONOCYTES NFR BLD: 13 % (ref 4–15)
MV PEAK A VEL: 0.53 M/S
MV PEAK E VEL: 1.03 M/S
NEUTROPHILS # BLD AUTO: 2.8 K/UL (ref 1.8–7.7)
NEUTROPHILS NFR BLD: 61.6 % (ref 38–73)
NRBC BLD-RTO: 0 /100 WBC
OPIATES UR QL SCN: NEGATIVE
PCO2 BLDA: 35.1 MMHG (ref 35–45)
PCP UR QL SCN>25 NG/ML: NEGATIVE
PH SMN: 7.46 [PH] (ref 7.35–7.45)
PISA MRMAX VEL: 5.94 M/S
PISA TR MAX VEL: 3.76 M/S
PLATELET # BLD AUTO: 135 K/UL (ref 150–450)
PLATELET BLD QL SMEAR: ABNORMAL
PMV BLD AUTO: 12.4 FL (ref 9.2–12.9)
PO2 BLDA: 79 MMHG (ref 80–100)
POC BE: 1 MMOL/L
POC SATURATED O2: 96 % (ref 95–100)
POTASSIUM SERPL-SCNC: 3.3 MMOL/L (ref 3.5–5.1)
PROT SERPL-MCNC: 5.4 G/DL (ref 6–8.4)
PROTHROMBIN TIME: 20.2 SEC (ref 9–12.5)
PV MEAN GRADIENT: 1.97 MMHG
RA MAJOR: 4.81 CM
RA PRESSURE: 15 MMHG
RA WIDTH: 3.9 CM
RBC # BLD AUTO: 2.57 M/UL (ref 4–5.4)
RIGHT VENTRICULAR END-DIASTOLIC DIMENSION: 2.96 CM
RPR SER QL: NORMAL
SAMPLE: ABNORMAL
SITE: ABNORMAL
SODIUM SERPL-SCNC: 141 MMOL/L (ref 136–145)
STJ: 3.96 CM
TDI LATERAL: 0.1 M/S
TDI SEPTAL: 0.06 M/S
TDI: 0.08 M/S
TOXICOLOGY INFORMATION: NORMAL
TR MAX PG: 57 MMHG
TR MEAN GRADIENT: 49 MMHG
TRICUSPID ANNULAR PLANE SYSTOLIC EXCURSION: 1.5 CM
TROPONIN I SERPL DL<=0.01 NG/ML-MCNC: 0.02 NG/ML (ref 0–0.03)
TROPONIN I SERPL DL<=0.01 NG/ML-MCNC: 0.03 NG/ML (ref 0–0.03)
TV REST PULMONARY ARTERY PRESSURE: 72 MMHG
VIT B12 SERPL-MCNC: 1250 PG/ML (ref 210–950)
WBC # BLD AUTO: 4.47 K/UL (ref 3.9–12.7)

## 2023-03-16 PROCEDURE — 94761 N-INVAS EAR/PLS OXIMETRY MLT: CPT

## 2023-03-16 PROCEDURE — 82800 BLOOD PH: CPT

## 2023-03-16 PROCEDURE — 25000003 PHARM REV CODE 250: Performed by: NURSE PRACTITIONER

## 2023-03-16 PROCEDURE — 36600 WITHDRAWAL OF ARTERIAL BLOOD: CPT

## 2023-03-16 PROCEDURE — 96361 HYDRATE IV INFUSION ADD-ON: CPT

## 2023-03-16 PROCEDURE — 36415 COLL VENOUS BLD VENIPUNCTURE: CPT | Performed by: NURSE PRACTITIONER

## 2023-03-16 PROCEDURE — 84484 ASSAY OF TROPONIN QUANT: CPT | Mod: 91 | Performed by: NURSE PRACTITIONER

## 2023-03-16 PROCEDURE — 80307 DRUG TEST PRSMV CHEM ANLYZR: CPT | Performed by: FAMILY MEDICINE

## 2023-03-16 PROCEDURE — 99222 PR INITIAL HOSPITAL CARE,LEVL II: ICD-10-PCS | Mod: 95,,, | Performed by: INTERNAL MEDICINE

## 2023-03-16 PROCEDURE — G0378 HOSPITAL OBSERVATION PER HR: HCPCS

## 2023-03-16 PROCEDURE — 85014 HEMATOCRIT: CPT | Performed by: NURSE PRACTITIONER

## 2023-03-16 PROCEDURE — 85018 HEMOGLOBIN: CPT | Performed by: NURSE PRACTITIONER

## 2023-03-16 PROCEDURE — 36415 COLL VENOUS BLD VENIPUNCTURE: CPT | Performed by: FAMILY MEDICINE

## 2023-03-16 PROCEDURE — 80053 COMPREHEN METABOLIC PANEL: CPT | Performed by: NURSE PRACTITIONER

## 2023-03-16 PROCEDURE — 82803 BLOOD GASES ANY COMBINATION: CPT

## 2023-03-16 PROCEDURE — 85025 COMPLETE CBC W/AUTO DIFF WBC: CPT | Performed by: NURSE PRACTITIONER

## 2023-03-16 PROCEDURE — 99900035 HC TECH TIME PER 15 MIN (STAT)

## 2023-03-16 PROCEDURE — 99222 1ST HOSP IP/OBS MODERATE 55: CPT | Mod: 95,,, | Performed by: INTERNAL MEDICINE

## 2023-03-16 PROCEDURE — 85610 PROTHROMBIN TIME: CPT | Performed by: FAMILY MEDICINE

## 2023-03-16 PROCEDURE — 25000003 PHARM REV CODE 250: Performed by: FAMILY MEDICINE

## 2023-03-16 RX ORDER — WARFARIN SODIUM 5 MG/1
5 TABLET ORAL EVERY OTHER DAY
Status: DISCONTINUED | OUTPATIENT
Start: 2023-03-16 | End: 2023-03-17

## 2023-03-16 RX ORDER — WARFARIN 2.5 MG/1
2.5 TABLET ORAL EVERY OTHER DAY
Status: DISCONTINUED | OUTPATIENT
Start: 2023-03-17 | End: 2023-03-17

## 2023-03-16 RX ADMIN — WARFARIN SODIUM 5 MG: 5 TABLET ORAL at 05:03

## 2023-03-16 RX ADMIN — SODIUM CHLORIDE: 9 INJECTION, SOLUTION INTRAVENOUS at 02:03

## 2023-03-16 RX ADMIN — MELATONIN TAB 3 MG 6 MG: 3 TAB at 08:03

## 2023-03-16 RX ADMIN — POTASSIUM BICARBONATE 20 MEQ: 391 TABLET, EFFERVESCENT ORAL at 02:03

## 2023-03-16 NOTE — ASSESSMENT & PLAN NOTE
Initial lactic acid 3.3.  Improved to 1.0 after IV fluids administered.  Plan:  -continue to monitor  -IVFs

## 2023-03-16 NOTE — ASSESSMENT & PLAN NOTE
Patient is chronically on statin.will not continue for now.  Plan:  -Continue home medication once verified   -low fat/low calorie diet

## 2023-03-16 NOTE — PROGRESS NOTES
[facility] Medicine  Progress Note    Patient Name: Adalgisa Wright  MRN: [id number]  Patient Class: OP- Observation   Admission Date: 3/15/2023  Length of Stay: 0 days  Attending Physician: Manuel Hough MD  Primary Care Provider: Shan Stroud MD        Subjective:     Principal Problem:AMS (altered mental status)        HPI:  Adalgisa Wright is a 72 y.o. female with a PMH  has a past medical history of Anticoagulant long-term use, Aortic valve defect, Benign neoplasm of ascending colon (4/6/2017), Cancer, CHF (congestive heart failure), DDD (degenerative disc disease), cervical (7/20/2018), GERD (gastroesophageal reflux disease), Hemorrhoids, HLD (hyperlipidemia), Hypertension, Insomnia (12/1/2014), Irritable bowel syndrome with constipation (4/9/2018), and Postmenopausal osteoporosis (7/20/2018). Presents as a transfer from our [facility] facility for further evaluation of AMS, hypokalemia, and EMCHE.  History obtained by chart review as patient remains altered.  Patient was reportedly found by family in a confused state.  Patient reportedly has continuous falls at home.  Patient does live alone.  Patient reportedly has no immediate family and remote family checks on her from time to time.  Patient is able to tell me her name in his awake and alert but is unsure why she is in the hospital.      ER workup revealed initial potassium of 2.8, magnesium 1.6, BUN/creatinine of 33/2.6, lactic acid of 3.3 with repeat of 1.0, H/H of 8.5/27.3 (previously 9.0/28.0 on 02/26/2023 and 13.0/40.8 on 01/03/2023).  AST/ALT of 190/213, BNP of 339, troponin of 0.041, negative hepatitis panel.  TSH, pro count, and UA unremarkable.  Chest x-ray and CT of head without acute findings.  EKG revealed AFib with right bundle-branch block with a ventricular rate of 65 beats per minute with a QT/QTC of 506/526.  ABG did reveal pH of 7.568, pCO2 of 30.8, PO2 of 124, HC03 of 28.1, and O2 saturation 99%.  Patient received IV and p.o.  potassium as well as 2 g of cefepime and 1500 cc of normal saline.  Patient admitted to hospital under observation status.    PCP: Shan Stroud      Overview/Hospital Course:  Patient admitted to observation for altered mental status in the setting of elevated LFTs and MECHE.  CT of head showed no evidence of acute intracranial abnormality.  UA negative for infectious process.  Ammonia within normal limits.  LFTs elevated with downward trend.  Liver US showed no evidence of acute intracranial abnormality.  Hepatology consulted.  IV hydration given initially with creatinine improved Cr 2.6>1.6.  H&H trended down from 8.5/27 to 7.1/23- will follow repeat results.  Denies any evidence of active bleeding. Coumadin held. Calcium corrected to 7.6. Potassium of 3.3 replaced. BNP elevated and Troponin flat with chest xray showing no acute abnormality.      Interval History: pt in bed upon exam and alert and oriented upon assessment. H/H trended down and MECHE improved 1.8>1.6. LFTs trending down with US + for cirrhosis and diffuse hepatocellular  disease. Hepatology consulted. Potassium replaced. BNP elevated. Troponin flat and elevated. Coumadin held. Echo pending.     Review of Systems   Constitutional:  Positive for activity change and appetite change. Negative for fatigue and fever.   HENT:  Negative for congestion, postnasal drip, sneezing, sore throat and trouble swallowing.    Respiratory:  Negative for cough, shortness of breath and wheezing.    Cardiovascular:  Negative for chest pain, palpitations and leg swelling.   Gastrointestinal:  Negative for abdominal distention, abdominal pain, nausea and vomiting.   Genitourinary:  Positive for difficulty urinating. Negative for dysuria, flank pain, frequency and urgency.   Musculoskeletal:  Negative for arthralgias and back pain.   Skin:  Negative for color change and wound.   Neurological:  Positive for weakness. Negative for dizziness and headaches.    Psychiatric/Behavioral:  Positive for confusion. Negative for agitation. The patient is not nervous/anxious.    Objective:     Vital Signs (Most Recent):  Temp: 97.6 °F (36.4 °C) (03/16/23 1210)  Pulse: 84 (03/16/23 1210)  Resp: 15 (03/16/23 1210)  BP: 130/69 (03/16/23 1210)  SpO2: 95 % (03/16/23 1210) Vital Signs (24h Range):  Temp:  [97.6 °F (36.4 °C)-98.4 °F (36.9 °C)] 97.6 °F (36.4 °C)  Pulse:  [65-84] 84  Resp:  [14-24] 15  SpO2:  [93 %-100 %] 95 %  BP: (107-176)/(52-94) 130/69     Weight: 70.8 kg (156 lb 1.4 oz)  Body mass index is 31.53 kg/m².    Intake/Output Summary (Last 24 hours) at 3/16/2023 1230  Last data filed at 3/16/2023 0820  Gross per 24 hour   Intake 1108.89 ml   Output 400 ml   Net 708.89 ml      Physical Exam  HENT:      Head: Normocephalic.      Nose: Nose normal.      Mouth/Throat:      Pharynx: Oropharynx is clear.   Cardiovascular:      Rate and Rhythm: Normal rate and regular rhythm.      Pulses: Normal pulses.      Heart sounds: Normal heart sounds.   Pulmonary:      Effort: Pulmonary effort is normal.      Breath sounds: Normal breath sounds.   Abdominal:      General: Bowel sounds are normal. There is no distension.      Palpations: Abdomen is soft.      Tenderness: There is no abdominal tenderness.   Genitourinary:     Comments: Deferred   Musculoskeletal:         General: Normal range of motion.      Cervical back: Normal range of motion.   Skin:     General: Skin is warm and dry.   Neurological:      General: No focal deficit present.      Mental Status: She is alert and oriented to person, place, and time.   Psychiatric:         Mood and Affect: Mood normal.         Behavior: Behavior normal.       Significant Labs: All pertinent labs within the past 24 hours have been reviewed.  CBC:   Recent Labs   Lab 03/15/23  1303 03/15/23  1336 03/16/23  0436   WBC 6.76  --  4.47   HGB 8.5*  --  7.1*   HCT 27.3* 22* 23.2*     --  135*     CMP:   Recent Labs   Lab 03/15/23  4662  03/15/23  1636 03/15/23  2240 03/16/23  0436     --  142 141   K 2.8*  --  3.6 3.3*   CL 96  --  102 104   CO2 25  --  22* 21*   *  --  94 77   BUN 33*  --  27* 23   CREATININE 2.6* 2.1* 1.8* 1.6*   CALCIUM 8.3*  --  7.4* 7.4*   PROT 6.4  --  5.9* 5.4*   ALBUMIN 3.8  --  3.4* 3.2*   BILITOT 1.3*  --  1.1* 1.0   ALKPHOS 126  --  103 94   *  --  175* 158*   *  --  203* 192*   ANIONGAP 17*  --  18* 16     Magnesium:   Recent Labs   Lab 03/15/23  1303   MG 1.6       Significant Imaging: I have reviewed all pertinent imaging results/findings within the past 24 hours.      Assessment/Plan:      * AMS (altered mental status)  Patient has acute metabolic encephalopathy that is secondary to Electrolyte abnormalities- K+2.8, Metabolic derangements- latic acid of 3.3, BUN/creatinine 33/2.6, AST//213, total bili 1.3, ABG with pH 7.568, PCO2 30.8, PO2 124, HCO3 28.1 and Sepsis/Infective. Patient's current mental status is Awake, Alert, Confused. Patient's baseline mental status is. awake and alert; oriented to person, place, and time Evaluation and for underlying cause(s) is underway and inclusive of Blood Chemistries, Neuro-Imaging (MRI/CT), Toxic metabolite eval  and Urine Drug screen. Will monitor neuro checks carefully, avoid narcotics and benzos that will exacerbate agitation, and use PRN medications for controls of behavior for self harm.  -improved- pt alert and oriented upon exam   - neuro checks   -ammonia 23       Low hemoglobin  Appears near baseline without evidence of active bleeding noted.   Recent Labs   Lab 03/15/23  1303 03/15/23  1336 03/15/23  1636 03/16/23  0436   HGB 8.5*  --   --  7.1*   HCT 27.3* 22*  --  23.2*   MCV 91  --   --  90   MCHC 31.1*  --   --  30.6*   RDW 15.6*  --   --  15.7*     --   --  135*   FOLATE  --   --  10.8  --    [id number]  --   --  1250*  --      Plan:  -Monitor H/H and plts-upward trend   -Type and screen, transfuse as needed   -Continue  home medications once verified  -Coumadin resumed in setting pf prosthetic valve      Elevated lactic acid level  Initial lactic acid 3.3.  Improved to 1.0 after IV fluids administered.  Plan:  -continue to monitor  -IVFs      Transaminitis  AST/ALT of 190/213>175/158.  Hepatitis panel negative.  Ultrasound of abdomen limited revealed:  -Mildly heterogeneous increased hepatic echogenicity and nodular contour may be related to cirrhosis or other diffuse hepatocellular disease/dysfunction    Plan:  -repeat labs reviewed with downward trend   -hepatology consulted  -Ammonia 23     MECHE (acute kidney injury)  Patient with acute kidney injury likely due to IVVD/dehydration and pre-renal azotemia MECHE is currently improving. Labs reviewed- Renal function/electrolytes with Estimated Creatinine Clearance: 29.3 mL/min (A) (based on SCr of 1.6 mg/dL (H)). according to latest data. Monitor urine output and serial BMP and adjust therapy as needed. Avoid nephrotoxins and renally dose meds for GFR listed above.   -improved with gentle hydration       Hypokalemia  Patient has hypokalemia which is currently improving. Last electrolytes reviewed-   Recent Labs   Lab 03/15/23  1303 03/15/23  2240 03/16/23  0436   K 2.8* 3.6 3.3*   . Will replace potassium and monitor electrolytes closely.   -replaced   -Mag 1.6         HLD (hyperlipidemia)  Patient is chronically on statin.will not continue for now.  Plan:  -Continue home medication once verified   -low fat/low calorie diet        Gastroesophageal reflux disease without esophagitis  Chronic. Stable. Currently asymptomatic. Home medications include PPI/Antacids as needed.  Plan:  -Continue PPI/Antacids as needed         Chronic anticoagulation  Reportedly on Coumadin.  INR of 2.2>1.9.  Plan:  -have pharmacy dose Coumadin   -H/H trending upward-        Hypertension  Currently normotensive. BP usually well controlled per patient with home medications.  Plan:  -Optimize pain control    -Continue home medications (acebutolol) once verified, titrate as needed   -Monitor BP  -Low salt/cardiac diet when not NPO  -IV hydralazine prn for SBP>160 or DBP>90       Congestive Heart Failure   -   -chest xray unremarkable   -troponin elevated and flat   -Echo results pending     -Elevated Troponin   -troponin 0.030>0.029   -likely due to demand in the setting of anemia, MECHE, and CHF   -Echo results pending       VTE Risk Mitigation (From admission, onward)           Ordered     Reason for No Pharmacological VTE Prophylaxis  Once        Question:  Reasons:  Answer:  Already adequately anticoagulated on oral Anticoagulants    03/15/23 2220     IP VTE HIGH RISK PATIENT  Once         03/15/23 2220     Place sequential compression device  Until discontinued         03/15/23 2220                    Discharge Planning   ROEL:      Code Status: Full Code   Is the patient medically ready for discharge?:     Reason for patient still in hospital (select all that apply): Patient trending condition, Laboratory test, Treatment, Imaging, Consult recommendations and PT / OT recommendations  Discharge Plan A: Home                  Keshia Obando NP  Department of Hospital Medicine   [facility]

## 2023-03-16 NOTE — ASSESSMENT & PLAN NOTE
AST/ALT of 190/213>175/158.  Hepatitis panel negative.  Ultrasound of abdomen limited revealed:  -Mildly heterogeneous increased hepatic echogenicity and nodular contour may be related to cirrhosis or other diffuse hepatocellular disease/dysfunction    Plan:  -repeat labs reviewed with downward trend   -hepatology consulted

## 2023-03-16 NOTE — HPI
Adalgisa Wright is a 72 y.o. female with a PMH  has a past medical history of Anticoagulant long-term use, Aortic valve defect, Benign neoplasm of ascending colon (4/6/2017), Cancer, CHF (congestive heart failure), DDD (degenerative disc disease), cervical (7/20/2018), GERD (gastroesophageal reflux disease), Hemorrhoids, HLD (hyperlipidemia), Hypertension, Insomnia (12/1/2014), Irritable bowel syndrome with constipation (4/9/2018), and Postmenopausal osteoporosis (7/20/2018). Presents as a transfer from our [facility] facility for further evaluation of AMS, hypokalemia, and MECHE.  History obtained by chart review as patient remains altered.  Patient was reportedly found by family in a confused state.  Patient reportedly has continuous falls at home.  Patient does live alone.  Patient reportedly has no immediate family and remote family checks on her from time to time.  Patient is able to tell me her name in his awake and alert but is unsure why she is in the hospital.      ER workup revealed initial potassium of 2.8, magnesium 1.6, BUN/creatinine of 33/2.6, lactic acid of 3.3 with repeat of 1.0, H/H of 8.5/27.3 (previously 9.0/28.0 on 02/26/2023 and 13.0/40.8 on 01/03/2023).  AST/ALT of 190/213, BNP of 339, troponin of 0.041, negative hepatitis panel.  TSH, pro count, and UA unremarkable.  Chest x-ray and CT of head without acute findings.  EKG revealed AFib with right bundle-branch block with a ventricular rate of 65 beats per minute with a QT/QTC of 506/526.  ABG did reveal pH of 7.568, pCO2 of 30.8, PO2 of 124, HC03 of 28.1, and O2 saturation 99%.  Patient received IV and p.o. potassium as well as 2 g of cefepime and 1500 cc of normal saline.  Patient admitted to hospital under observation status.    PCP: Shan Stroud

## 2023-03-16 NOTE — ASSESSMENT & PLAN NOTE
Patient with acute kidney injury likely due to IVVD/dehydration and pre-renal azotemia MECHE is currently improving. Labs reviewed- Renal function/electrolytes with Estimated Creatinine Clearance: 29.3 mL/min (A) (based on SCr of 1.6 mg/dL (H)). according to latest data. Monitor urine output and serial BMP and adjust therapy as needed. Avoid nephrotoxins and renally dose meds for GFR listed above.   -improved with gentle hydration

## 2023-03-16 NOTE — SUBJECTIVE & OBJECTIVE
Past Medical History:   Diagnosis Date    Anticoagulant long-term use     Aortic valve defect     Benign neoplasm of ascending colon 4/6/2017    Cancer     CHF (congestive heart failure)     DDD (degenerative disc disease), cervical 7/20/2018    GERD (gastroesophageal reflux disease)     Hemorrhoids     HLD (hyperlipidemia)     Hypertension     Insomnia 12/1/2014    Irritable bowel syndrome with constipation 4/9/2018    Postmenopausal osteoporosis 7/20/2018       Past Surgical History:   Procedure Laterality Date    AORTIC VALVE REPLACEMENT  2003    COLONOSCOPY Left 4/6/2017    Procedure: COLONOSCOPY;  Surgeon: Sander Ulloa MD;  Location: [facility] ENDO;  Service: Endoscopy;  Laterality: Left;    COLONOSCOPY N/A 11/7/2019    Procedure: COLONOSCOPY;  Surgeon: Brenda Ochoa MD;  Location: [facility];  Service: Endoscopy;  Laterality: N/A;    COLONOSCOPY N/A 2/26/2021    Procedure: COLONOSCOPY;  Surgeon: Ankur Smith MD;  Location: [facility];  Service: General;  Laterality: N/A;    COMPUTED TOMOGRAPHY N/A 11/12/2020    Procedure: Liver microwave ablation;  Surgeon: Gonzalez Moraes MD;  Location: [facility];  Service: General;  Laterality: N/A;    ESOPHAGOGASTRODUODENOSCOPY N/A 11/7/2019    Procedure: ESOPHAGOGASTRODUODENOSCOPY (EGD) needs PT/INR;  Surgeon: Brenda Ochoa MD;  Location: [facility];  Service: Endoscopy;  Laterality: N/A;    FLEXIBLE SIGMOIDOSCOPY N/A 2/13/2020    Procedure: SIGMOIDOSCOPY, FLEXIBLE;  Surgeon: Ankur Smith MD;  Location: [facility];  Service: General;  Laterality: N/A;    INJECTION OF ANESTHETIC AGENT INTO TISSUE PLANE DEFINED BY TRANSVERSUS ABDOMINIS MUSCLE N/A 2/13/2020    Procedure: BLOCK, TRANSVERSUS ABDOMINIS PLANE;  Surgeon: Ankur Smith MD;  Location: [facility] OR;  Service: General;  Laterality: N/A;    INSERTION OF TUNNELED CENTRAL VENOUS CATHETER (CVC) WITH SUBCUTANEOUS PORT N/A 3/17/2020    Procedure: [id number];  Surgeon: Ankur Smith MD;  Location:  [facility];  Service: General;  Laterality: N/A;    ROBOT-ASSISTED LOW ANTERIOR RESECTION OF COLON N/A 2/13/2020    Procedure: XI ROBOTIC RESECTION, COLON, LOW ANTERIOR;  Surgeon: Ankur Smith MD;  Location: [facility];  Service: General;  Laterality: N/A;       Review of patient's allergies indicates:   Allergen Reactions    Penicillins Rash       No current facility-administered medications on file prior to encounter.     Current Outpatient Medications on File Prior to Encounter   Medication Sig    acebutolol (SECTRAL) 400 mg capsule Take 400 mg by mouth 2 (two) times daily.    ALPRAZolam (XANAX) 1 MG tablet Take 1 mg by mouth 2 (two) times daily as needed.    DULoxetine (CYMBALTA) 60 MG capsule Take 60 mg by mouth.    ergocalciferol (ERGOCALCIFEROL) 50,000 unit Cap Take 1 capsule by mouth every 30 days.    furosemide (LASIX) 40 MG tablet Take 40 mg by mouth Daily.    lovastatin (MEVACOR) 40 MG tablet Take 40 mg by mouth every evening.    multivitamin (THERAGRAN) tablet Take 1 tablet by mouth.    pantoprazole (PROTONIX) 20 MG tablet Take 1 tablet by mouth every morning.    pregabalin (LYRICA) 100 MG capsule Take 1 capsule (100 mg total) by mouth 2 (two) times daily.    VITAMIN B COMPLEX ORAL Take 1 tablet by mouth every morning.    warfarin (COUMADIN) 2.5 MG tablet Take 1 tablet every day EXCEPT on Mondays and Fridays. -- OR AS DIRECTED BY COUMADIN CLINIC    warfarin (COUMADIN) 5 MG tablet Take 1 tablet (5 mg total) by mouth Daily. Take one tablet by mouth on Mondays and Fridays. AS DIRECTED BY COUMADIN CLINIC.    zolpidem (AMBIEN) 10 mg Tab Take 5 mg by mouth nightly as needed.      Family History       Problem Relation (Age of Onset)    Diabetes Mother    Heart disease Mother, Father, Son    Hypertension Mother, Father, Sister, Son          Tobacco Use    Smoking status: Never    Smokeless tobacco: Never   Substance and Sexual Activity    Alcohol use: Not Currently    Drug use: No    Sexual activity: Yes      Partners: Male     Review of Systems   Unable to perform ROS: Mental status change   Objective:     Vital Signs (Most Recent):  Temp: 98.2 °F (36.8 °C) (03/16/23 0346)  Pulse: 65 (03/16/23 0346)  Resp: 18 (03/16/23 0346)  BP: (!) 107/52 (03/16/23 0346)  SpO2: 95 % (03/16/23 0346)   Vital Signs (24h Range):  Temp:  [97.6 °F (36.4 °C)-98.3 °F (36.8 °C)] 98.2 °F (36.8 °C)  Pulse:  [65-75] 65  Resp:  [18-24] 18  SpO2:  [94 %-100 %] 95 %  BP: (107-176)/(52-94) 107/52     Weight: 70.8 kg (156 lb 1.4 oz)  Body mass index is 31.53 kg/m².    Physical Exam  Vitals and nursing note reviewed.   Constitutional:       General: She is awake. She is not in acute distress.     Appearance: Normal appearance. She is well-developed and well-groomed. She is not ill-appearing, toxic-appearing or diaphoretic.   HENT:      Head: Normocephalic and atraumatic.   Eyes:      Extraocular Movements: Extraocular movements intact.      Conjunctiva/sclera: Conjunctivae normal.      Pupils: Pupils are equal, round, and reactive to light.   Cardiovascular:      Rate and Rhythm: Normal rate and regular rhythm.      Heart sounds: Murmur heard.   Pulmonary:      Effort: Pulmonary effort is normal.      Breath sounds: Normal breath sounds.   Abdominal:      General: Bowel sounds are normal.      Palpations: Abdomen is soft.      Tenderness: There is no abdominal tenderness. There is no guarding or rebound.   Genitourinary:     Comments: Schmidt cath in place with 300ccs of yellow urine in collection bag.  Musculoskeletal:      Cervical back: Normal range of motion and neck supple.      Right lower leg: No edema.      Left lower leg: No edema.      Comments: MAEW   Skin:     General: Skin is warm and dry.      Capillary Refill: Capillary refill takes less than 2 seconds.   Neurological:      General: No focal deficit present.      Mental Status: She is alert. She is confused.      GCS: GCS eye subscore is 4. GCS verbal subscore is 4. GCS motor subscore is  6.      Motor: Motor function is intact.   Psychiatric:         Mood and Affect: Mood normal.         Speech: Speech normal.         Behavior: Behavior normal. Behavior is cooperative.         CRANIAL NERVES     CN III, IV, VI   Pupils are equal, round, and reactive to light.   LABS:  Recent Results (from the past 24 hour(s))   HIV 1/2 Ag/Ab (4th Gen)    Collection Time: 03/15/23  1:03 PM   Result Value Ref Range    HIV 1/2 Ag/Ab Negative Negative   Hepatitis C Antibody    Collection Time: 03/15/23  1:03 PM   Result Value Ref Range    Hepatitis C Ab Negative Negative   HCV Virus Hold Specimen    Collection Time: 03/15/23  1:03 PM   Result Value Ref Range    HEP C Virus Hold Specimen Hold for HCV sendout    CBC auto differential    Collection Time: 03/15/23  1:03 PM   Result Value Ref Range    WBC 6.76 3.90 - 12.70 K/uL    RBC 3.01 (L) 4.00 - 5.40 M/uL    Hemoglobin 8.5 (L) 12.0 - 16.0 g/dL    Hematocrit 27.3 (L) 37.0 - 48.5 %    MCV 91 82 - 98 fL    MCH 28.2 27.0 - 31.0 pg    MCHC 31.1 (L) 32.0 - 36.0 g/dL    RDW 15.6 (H) 11.5 - 14.5 %    Platelets 211 150 - 450 K/uL    MPV 12.3 9.2 - 12.9 fL    Immature Granulocytes 0.6 (H) 0.0 - 0.5 %    Gran # (ANC) 5.5 1.8 - 7.7 K/uL    Immature Grans (Abs) 0.04 0.00 - 0.04 K/uL    Lymph # 0.6 (L) 1.0 - 4.8 K/uL    Mono # 0.6 0.3 - 1.0 K/uL    Eos # 0.0 0.0 - 0.5 K/uL    Baso # 0.01 0.00 - 0.20 K/uL    nRBC 0 0 /100 WBC    Gran % 81.3 (H) 38.0 - 73.0 %    Lymph % 8.6 (L) 18.0 - 48.0 %    Mono % 9.3 4.0 - 15.0 %    Eosinophil % 0.1 0.0 - 8.0 %    Basophil % 0.1 0.0 - 1.9 %    Differential Method Automated    Comprehensive metabolic panel    Collection Time: 03/15/23  1:03 PM   Result Value Ref Range    Sodium 138 136 - 145 mmol/L    Potassium 2.8 (L) 3.5 - 5.1 mmol/L    Chloride 96 95 - 110 mmol/L    CO2 25 23 - 29 mmol/L    Glucose 120 (H) 70 - 110 mg/dL    BUN 33 (H) 8 - 23 mg/dL    Creatinine 2.6 (H) 0.5 - 1.4 mg/dL    Calcium 8.3 (L) 8.7 - 10.5 mg/dL    Total Protein 6.4 6.0  - 8.4 g/dL    Albumin 3.8 3.5 - 5.2 g/dL    Total Bilirubin 1.3 (H) 0.1 - 1.0 mg/dL    Alkaline Phosphatase 126 55 - 135 U/L     (H) 10 - 40 U/L     (H) 10 - 44 U/L    Anion Gap 17 (H) 8 - 16 mmol/L    eGFR 19.1 (A) >60 mL/min/1.73 m^2   Lactic acid, plasma #1    Collection Time: 03/15/23  1:03 PM   Result Value Ref Range    Lactate (Lactic Acid) 3.3 (H) 0.5 - 2.2 mmol/L   Troponin I    Collection Time: 03/15/23  1:03 PM   Result Value Ref Range    Troponin I 0.041 (H) 0.000 - 0.026 ng/mL   Procalcitonin    Collection Time: 03/15/23  1:03 PM   Result Value Ref Range    Procalcitonin 0.16 <0.25 ng/mL   Brain natriuretic peptide    Collection Time: 03/15/23  1:03 PM   Result Value Ref Range     (H) 0 - 99 pg/mL   Magnesium    Collection Time: 03/15/23  1:03 PM   Result Value Ref Range    Magnesium 1.6 1.6 - 2.6 mg/dL   ISTAT PROCEDURE    Collection Time: 03/15/23  1:36 PM   Result Value Ref Range    POC PH 7.568 (HH) 7.35 - 7.45    POC PCO2 30.8 (L) 35 - 45 mmHg    POC PO2 124 (H) 80 - 100 mmHg    POC HCO3 28.1 (H) 24 - 28 mmol/L    POC BE 6 -2 to 2 mmol/L    POC SATURATED O2 99 95 - 100 %    POC Glucose 119 (H) 70 - 110 mg/dL    POC Sodium 134 (L) 136 - 145 mmol/L    POC Potassium 2.9 (L) 3.5 - 5.1 mmol/L    POC Ionized Calcium 0.97 (L) 1.06 - 1.42 mmol/L    POC Hematocrit 22 (L) 36 - 54 %PCV    Sample ARTERIAL     Site RB     Allens Test Pass     DelSys Room Air     Mode SPONT     FiO2 21    Urinalysis, Reflex to Urine Culture Urine, Catheterized    Collection Time: 03/15/23  1:45 PM    Specimen: Urine   Result Value Ref Range    Specimen UA Urine, Catheterized     Color, UA Yellow Yellow, Straw, Padmini    Appearance, UA Clear Clear    pH, UA 6.0 5.0 - 8.0    Specific Gravity, UA 1.020 1.005 - 1.030    Protein, UA 1+ (A) Negative    Glucose, UA Negative Negative    Ketones, UA Negative Negative    Bilirubin (UA) Negative Negative    Occult Blood UA Negative Negative    Nitrite, UA Negative  Negative    Urobilinogen, UA Negative <2.0 EU/dL    Leukocytes, UA Negative Negative   Urinalysis Microscopic    Collection Time: 03/15/23  1:45 PM   Result Value Ref Range    RBC, UA 1 0 - 4 /hpf    WBC, UA 1 0 - 5 /hpf    Bacteria Rare None-Occ /hpf    Hyaline Casts, UA 0 0-1/lpf /lpf    Microscopic Comment SEE COMMENT    Protime-INR    Collection Time: 03/15/23  1:52 PM   Result Value Ref Range    Prothrombin Time 22.6 (H) 9.0 - 12.5 sec    INR 2.2 (H) 0.8 - 1.2   APTT    Collection Time: 03/15/23  1:52 PM   Result Value Ref Range    aPTT 25.0 21.0 - 32.0 sec   Sodium, Random Urine    Collection Time: 03/15/23  4:25 PM   Result Value Ref Range    Sodium, Urine 42 20 - 250 mmol/L   Hepatitis panel, acute    Collection Time: 03/15/23  4:36 PM   Result Value Ref Range    Hepatitis B Surface Ag Non-reactive Non-reactive    Hep B C IgM Non-reactive Non-reactive    Hep A IgM Non-reactive Non-reactive    Hepatitis C Ab Non-reactive Non-reactive   Creatinine, serum    Collection Time: 03/15/23  4:36 PM   Result Value Ref Range    Creatinine 2.1 (H) 0.5 - 1.4 mg/dL    eGFR 24.7 (A) >60 mL/min/1.73 m^2   TSH    Collection Time: 03/15/23  4:36 PM   Result Value Ref Range    TSH 2.863 0.400 - 4.000 uIU/mL   Vitamin B12    Collection Time: 03/15/23  4:36 PM   Result Value Ref Range    Vitamin B-12 1250 (H) 210 - 950 pg/mL   Folate    Collection Time: 03/15/23  4:36 PM   Result Value Ref Range    Folate 10.8 4.0 - 24.0 ng/mL   Lactic acid, plasma #2    Collection Time: 03/15/23  4:36 PM   Result Value Ref Range    Lactate (Lactic Acid) 1.0 0.5 - 2.2 mmol/L   Ammonia    Collection Time: 03/15/23  4:59 PM   Result Value Ref Range    Ammonia 23 10 - 50 umol/L   Troponin I    Collection Time: 03/15/23 10:40 PM   Result Value Ref Range    Troponin I 0.030 (H) 0.000 - 0.026 ng/mL   Comprehensive metabolic panel    Collection Time: 03/15/23 10:40 PM   Result Value Ref Range    Sodium 142 136 - 145 mmol/L    Potassium 3.6 3.5 - 5.1  mmol/L    Chloride 102 95 - 110 mmol/L    CO2 22 (L) 23 - 29 mmol/L    Glucose 94 70 - 110 mg/dL    BUN 27 (H) 8 - 23 mg/dL    Creatinine 1.8 (H) 0.5 - 1.4 mg/dL    Calcium 7.4 (L) 8.7 - 10.5 mg/dL    Total Protein 5.9 (L) 6.0 - 8.4 g/dL    Albumin 3.4 (L) 3.5 - 5.2 g/dL    Total Bilirubin 1.1 (H) 0.1 - 1.0 mg/dL    Alkaline Phosphatase 103 55 - 135 U/L     (H) 10 - 40 U/L     (H) 10 - 44 U/L    Anion Gap 18 (H) 8 - 16 mmol/L    eGFR 30 (A) >60 mL/min/1.73 m^2   Troponin I    Collection Time: 03/16/23  1:51 AM   Result Value Ref Range    Troponin I 0.023 0.000 - 0.026 ng/mL       RADIOLOGY  CT Head Without Contrast    Result Date: 3/15/2023  EXAMINATION: CT HEAD WITHOUT CONTRAST CLINICAL HISTORY: Dizziness, persistent/recurrent, cardiac or vascular cause suspected; Other fatigue TECHNIQUE: Low dose axial images were obtained through the head.  Coronal and sagittal reformations were also performed. Contrast was not administered. All CT scans at this location are performed using dose optimization techniques including the following: Automated exposure control; adjustment of the mA and/or kv; use of iterative reconstruction technique. COMPARISON: CT dated 04/20/2018 FINDINGS: Cerebral and ventricular volumes are within normal limits.  No evidence of hydrocephalus. No CT evidence of acute territorial infarct, intracranial hemorrhage, or mass lesion.  No midline shift or mass effect. Midline structures and posterior fossa structures appear unremarkable.  Basal cisterns appear clear.  Mild bilateral carotid siphon calcification noted. Calvarium is intact without acute or aggressive abnormality.  Orbits and globes appear within normal limits.  Small right maxillary sinus mucosal retention cyst and mild mucosal thickening of the right sphenoid sinus.  Remaining paranasal sinuses and mastoid air cells are clear.     No evidence of acute intracranial abnormality. Electronically signed by: Mohsen Lindsay  Date:    03/15/2023 Time:    13:24    US Abdomen Complete    Result Date: 3/15/2023  EXAMINATION: US ABDOMEN COMPLETE CLINICAL HISTORY: milli, elevated lft; TECHNIQUE: Complete abdominal ultrasound (including pancreas, liver, gallbladder, common bile duct, spleen, aorta, IVC, and kidneys) was performed. COMPARISON: CT dated 08/25/2022 FINDINGS: Liver: Normal in size, measuring 14.1 cm. Mildly increased heterogeneous echogenicity and nodular contour.  No focal hepatic lesions. Gallbladder: No calculi, wall thickening, or pericholecystic fluid.  No sonographic Holland's sign. Biliary system: The common duct is not dilated, measuring 3.5 mm.  No intrahepatic ductal dilatation. Spleen: Largely obscured by bowel gas artifact. Pancreas: The visualized portions of pancreas appear normal. Right kidney: Normal in size with no hydronephrosis, measuring 9.0 cm. Left kidney:  Normal in size with no hydronephrosis, measuring 9.7 cm. Aorta: No aneurysm. Inferior vena cava: Normal in appearance. Miscellaneous: No ascites.     Mildly heterogeneous increased hepatic echogenicity and nodular contour may be related to cirrhosis or other diffuse hepatocellular disease/dysfunction. No other significant sonographic abnormality identified. Electronically signed by: Mohsen Lindsay Date:    03/15/2023 Time:    17:15    X-Ray Chest AP Portable    Result Date: 3/15/2023  EXAMINATION: XR CHEST AP PORTABLE CLINICAL HISTORY: Sepsis; TECHNIQUE: Single frontal portable view of the chest was performed. COMPARISON: X-ray dated 12/29/2021 FINDINGS: Median sternotomy wires are intact.  Aortic arch calcification and artificial aortic valve noted.  Mildly enlarged cardiac silhouette is unchanged.  Trachea is midline.  Pulmonary vasculature is unremarkable.  Lungs are clear.  No significant pleural effusion or pneumothorax.  No acute bony abnormality.     No acute cardiopulmonary abnormality. Electronically signed by: Mohsen Lindsay  Date:    03/15/2023 Time:    13:36      EKG    MICROBIOLOGY    MDM     Amount and/or Complexity of Data Reviewed  Clinical lab tests: reviewed  Tests in the radiology section of CPT®: reviewed  Tests in the medicine section of CPT®: reviewed  Discussion of test results with the performing providers: no  Decide to obtain previous medical records or to obtain history from someone other than the patient: yes  Obtain history from someone other than the patient: no  Review and summarize past medical records: yes  Discuss the patient with other providers: yes  Independent visualization of images, tracings, or specimens: yes

## 2023-03-16 NOTE — HOSPITAL COURSE
Patient admitted to observation for altered mental status in the setting of elevated LFTs and MECHE.  CT of head showed no evidence of acute intracranial abnormality.  UA negative for infectious process.  Ammonia within normal limits.  LFTs elevated with downward trend.  Liver US showed no evidence of acute intracranial abnormality.  Hepatology consulted.  IV hydration given initially with creatinine improved Cr 2.6>1.6.  H&H trended down from 8.5/27 to 7.1/23- will follow repeat results.  Denies any evidence of active bleeding. Coumadin held. Calcium corrected to 7.6. Potassium of 3.3 replaced. BNP elevated and Troponin flat with chest xray showing no acute abnormality. On 3/17/23, INR 1.7 with Coumadin continued and Lovenox bridge initiated. LFTs trending down. Potassium normalized. PT/OT evaluation completed with home health established. H/H trending down but no transfusion required. BUN/Creatinine normalized. Pt verbalized symptom improvement with increased mobility noted. On 3/18/23, patient alert and oriented x4 with episodes of occasional forgetfulness noted. INR 1.9 noted with Lovenox bridge continued to achieve therapeutic level.  H&H stable and Venofer given for iron supplementation.  LFTs trending down with AST normalized.  Lactic acid normalized.  Blood cultures with no growth to date Vital signs stable upon discharge.  Patient seen and examined deemed stable to discharge home accompanied by cousin.  Discharge instructions reviewed with patient's last cousin and understanding verbalized.  Medications reconciled with Lovenox, iron, Zofran, and MiraLax prescribed.  Ambien and Statin held. Ambulatory referral placed to [facility] home health prior to discharge.  Pt/family instructed for patient to follow-up with PCP, Hepatology, and Cardiology upon discharge for further evaluation and lab (INR) analysis.

## 2023-03-16 NOTE — ASSESSMENT & PLAN NOTE
Appears near baseline without evidence of active bleeding noted.   Recent Labs   Lab 03/15/23  1303 03/15/23  1336 03/15/23  1636 03/16/23  0436   HGB 8.5*  --   --  7.1*   HCT 27.3* 22*  --  23.2*   MCV 91  --   --  90   MCHC 31.1*  --   --  30.6*   RDW 15.6*  --   --  15.7*     --   --  135*   FOLATE  --   --  10.8  --    [id number]  --   --  1250*  --      Plan:  -Monitor H/H and plts  -Type and screen, transfuse as needed   -Continue home medications once verified  -Hold antiplatelets/anticoagulants in setting of active bleeding/pending surgical intervention

## 2023-03-16 NOTE — HPI
72F w/ h/o stage 3 or 4 colorectal cancer and concern for liver lesion that was ablated. She gets regular liver imaging that has shown stable indeterminate liver lesions and no reports of cirrhosis on CT or MRI scans. On admission she had abnormal LFTs with a US that showed possible cirrhosis. Of note she had a bx in 2020 w/o fibrosis. Her liver tests are usually normal, they became elevated at admission with ARF, electrolyte abnormalities and altered mental status. The cause of these symptoms is unclear and they all seems to be improving. She is alert and oriented, able to provider her history.    Liver bx 1/2020  LIVER MASS, BIOPSY: Negative for malignancy Benign liver parenchyma Macrosteatosis - 5% Glycogenated nuclei Negative for steatohepatitis Minimal focal sinusoidal dilatation Focal bile duct dilatation Negative for fibrosis

## 2023-03-16 NOTE — ASSESSMENT & PLAN NOTE
Unclear etiology for elevation but seems acute and suspect the liver is a bystander in whatever caused patient's presentation. Liver tests are improving. Low concern for chronic liver based on previous imaging, bx w/o fibrosis in 2020 and acuity of recent elevation.  -Continue to montior LFTs  -No additional inpatient recommendations, message sent to nurse for patient to be seen as an outpt to ensure normalization of LFTs

## 2023-03-16 NOTE — PLAN OF CARE
Plan of care reviewed with patient and neice with verbal understanding. Chart and orders reviewed.  Pt remains free from falls this shift, Safety precautions in place.   Pt currently resting comfortably in bed. Pt is AOx4 and NPO.  Hourly rounding with bed in lowest position, side rails up, call light in reach.  Will continue to monitor until end of shift.       Problem: Adult Inpatient Plan of Care  Goal: Plan of Care Review  3/16/2023 0050 by Meeta Garcia RN  Outcome: Ongoing, Progressing  3/16/2023 0049 by Meeta Garcia RN  Outcome: Ongoing, Progressing  Goal: Absence of Hospital-Acquired Illness or Injury  Outcome: Ongoing, Progressing  Goal: Optimal Comfort and Wellbeing  Outcome: Ongoing, Progressing     Problem: Fall Injury Risk  Goal: Absence of Fall and Fall-Related Injury  Outcome: Ongoing, Progressing     Problem: Skin Injury Risk Increased  Goal: Skin Health and Integrity  Outcome: Ongoing, Progressing

## 2023-03-16 NOTE — PROGRESS NOTES
Clinical Pharmacy Consult Note: Coumadin Dosing and Monitoring     Adalgisa Wright 's Coumadin will be dosed and monitored by Pharmacy at the request of Toney Padilla NP.    Indication: Mechanical AVR  Target INR is 2-3  Lab Results   Component Value Date    INR 1.9 (H) 03/16/2023    INR 2.2 (H) 03/15/2023    INR 4.6 (H) 02/26/2023     Lab order for daily PT/INR? Yes  Coumadin diet ordered? Not yet - Clear liquid diet at the moment. Coumadin restriction may not apply    Home dosing regimen: Patient reported to nurse that she alternates taking 5 mg every other day with 2.5 mg every other day.   Drug interactions: Acetaminophen and melatonin each may increase the risk of bleeding while on warfarin.     Plan:  - Patient will be resumed on home dosing regimen of warfarin 5 mg PO every other day alternating with warfarin 2.5 mg PO every other day.   - Give warfarin 5 mg PO today.   - PT/INR will be monitored daily. Dose adjustments will be made accordingly.      Thank you for allowing us to participate in this patient's care.     Janae Holder, Esme 3/16/2023 3:15 PM

## 2023-03-16 NOTE — H&P
[facility]  History & Physical    Patient Name: Adalgisa Wright  MRN: [id number]  Patient Class: OP- Observation  Admission Date: 3/15/2023  Attending Physician: Franklin Padilla MD   Primary Care Provider: Shan Stroud MD         Patient information was obtained from past medical records and ER records.     Subjective:     Principal Problem:AMS (altered mental status)    Chief Complaint:   Chief Complaint   Patient presents with    Altered Mental Status     Confusion and possible hallucinations per family, concerned with UTI.        HPI: Adalgisa Wright is a 72 y.o. female with a PMH  has a past medical history of Anticoagulant long-term use, Aortic valve defect, Benign neoplasm of ascending colon (4/6/2017), Cancer, CHF (congestive heart failure), DDD (degenerative disc disease), cervical (7/20/2018), GERD (gastroesophageal reflux disease), Hemorrhoids, HLD (hyperlipidemia), Hypertension, Insomnia (12/1/2014), Irritable bowel syndrome with constipation (4/9/2018), and Postmenopausal osteoporosis (7/20/2018). Presents as a transfer from our [facility] facility for further evaluation of AMS, hypokalemia, and MECHE.  History obtained by chart review as patient remains altered.  Patient was reportedly found by family in a confused state.  Patient reportedly has continuous falls at home.  Patient does live alone.  Patient reportedly has no immediate family and remote family checks on her from time to time.  Patient is able to tell me her name in his awake and alert but is unsure why she is in the hospital.      ER workup revealed initial potassium of 2.8, magnesium 1.6, BUN/creatinine of 33/2.6, lactic acid of 3.3 with repeat of 1.0, H/H of 8.5/27.3 (previously 9.0/28.0 on 02/26/2023 and 13.0/40.8 on 01/03/2023).  AST/ALT of 190/213, BNP of 339, troponin of 0.041, negative hepatitis panel.  TSH, pro count, and UA unremarkable.  Chest x-ray and CT of head without acute findings.  EKG revealed  AFib with right bundle-branch block with a ventricular rate of 65 beats per minute with a QT/QTC of 506/526.  ABG did reveal pH of 7.568, pCO2 of 30.8, PO2 of 124, HC03 of 28.1, and O2 saturation 99%.  Patient received IV and p.o. potassium as well as 2 g of cefepime and 1500 cc of normal saline.  Patient admitted to hospital under observation status.    PCP: Shan Stroud      Past Medical History:   Diagnosis Date    Anticoagulant long-term use     Aortic valve defect     Benign neoplasm of ascending colon 4/6/2017    Cancer     CHF (congestive heart failure)     DDD (degenerative disc disease), cervical 7/20/2018    GERD (gastroesophageal reflux disease)     Hemorrhoids     HLD (hyperlipidemia)     Hypertension     Insomnia 12/1/2014    Irritable bowel syndrome with constipation 4/9/2018    Postmenopausal osteoporosis 7/20/2018       Past Surgical History:   Procedure Laterality Date    AORTIC VALVE REPLACEMENT  2003    COLONOSCOPY Left 4/6/2017    Procedure: COLONOSCOPY;  Surgeon: Sander Ulloa MD;  Location: [facility] ENDO;  Service: Endoscopy;  Laterality: Left;    COLONOSCOPY N/A 11/7/2019    Procedure: COLONOSCOPY;  Surgeon: Brenda Ochoa MD;  Location: [facility];  Service: Endoscopy;  Laterality: N/A;    COLONOSCOPY N/A 2/26/2021    Procedure: COLONOSCOPY;  Surgeon: Ankur Smith MD;  Location: [facility];  Service: General;  Laterality: N/A;    COMPUTED TOMOGRAPHY N/A 11/12/2020    Procedure: Liver microwave ablation;  Surgeon: Gonzalez Moraes MD;  Location: [facility];  Service: General;  Laterality: N/A;    ESOPHAGOGASTRODUODENOSCOPY N/A 11/7/2019    Procedure: ESOPHAGOGASTRODUODENOSCOPY (EGD) needs PT/INR;  Surgeon: Brenda Ochoa MD;  Location: [facility] ENDO;  Service: Endoscopy;  Laterality: N/A;    FLEXIBLE SIGMOIDOSCOPY N/A 2/13/2020    Procedure: SIGMOIDOSCOPY, FLEXIBLE;  Surgeon: Ankur Smith MD;  Location: [facility] OR;  Service: General;  Laterality: N/A;    INJECTION  OF ANESTHETIC AGENT INTO TISSUE PLANE DEFINED BY TRANSVERSUS ABDOMINIS MUSCLE N/A 2/13/2020    Procedure: BLOCK, TRANSVERSUS ABDOMINIS PLANE;  Surgeon: Ankur Smith MD;  Location: [facility] OR;  Service: General;  Laterality: N/A;    INSERTION OF TUNNELED CENTRAL VENOUS CATHETER (CVC) WITH SUBCUTANEOUS PORT N/A 3/17/2020    Procedure: [id number];  Surgeon: Ankur Smith MD;  Location: [facility] OR;  Service: General;  Laterality: N/A;    ROBOT-ASSISTED LOW ANTERIOR RESECTION OF COLON N/A 2/13/2020    Procedure: XI ROBOTIC RESECTION, COLON, LOW ANTERIOR;  Surgeon: Ankur Smith MD;  Location: [facility] OR;  Service: General;  Laterality: N/A;       Review of patient's allergies indicates:   Allergen Reactions    Penicillins Rash       No current facility-administered medications on file prior to encounter.     Current Outpatient Medications on File Prior to Encounter   Medication Sig    acebutolol (SECTRAL) 400 mg capsule Take 400 mg by mouth 2 (two) times daily.    ALPRAZolam (XANAX) 1 MG tablet Take 1 mg by mouth 2 (two) times daily as needed.    DULoxetine (CYMBALTA) 60 MG capsule Take 60 mg by mouth.    ergocalciferol (ERGOCALCIFEROL) 50,000 unit Cap Take 1 capsule by mouth every 30 days.    furosemide (LASIX) 40 MG tablet Take 40 mg by mouth Daily.    lovastatin (MEVACOR) 40 MG tablet Take 40 mg by mouth every evening.    multivitamin (THERAGRAN) tablet Take 1 tablet by mouth.    pantoprazole (PROTONIX) 20 MG tablet Take 1 tablet by mouth every morning.    pregabalin (LYRICA) 100 MG capsule Take 1 capsule (100 mg total) by mouth 2 (two) times daily.    VITAMIN B COMPLEX ORAL Take 1 tablet by mouth every morning.    warfarin (COUMADIN) 2.5 MG tablet Take 1 tablet every day EXCEPT on Mondays and Fridays. -- OR AS DIRECTED BY COUMADIN CLINIC    warfarin (COUMADIN) 5 MG tablet Take 1 tablet (5 mg total) by mouth Daily. Take one tablet by mouth on Mondays and Fridays. AS DIRECTED BY  COUMADIN CLINIC.    zolpidem (AMBIEN) 10 mg Tab Take 5 mg by mouth nightly as needed.      Family History       Problem Relation (Age of Onset)    Diabetes Mother    Heart disease Mother, Father, Son    Hypertension Mother, Father, Sister, Son          Tobacco Use    Smoking status: Never    Smokeless tobacco: Never   Substance and Sexual Activity    Alcohol use: Not Currently    Drug use: No    Sexual activity: Yes     Partners: Male     Review of Systems   Unable to perform ROS: Mental status change   Objective:     Vital Signs (Most Recent):  Temp: 98.2 °F (36.8 °C) (03/16/23 0346)  Pulse: 65 (03/16/23 0346)  Resp: 18 (03/16/23 0346)  BP: (!) 107/52 (03/16/23 0346)  SpO2: 95 % (03/16/23 0346)   Vital Signs (24h Range):  Temp:  [97.6 °F (36.4 °C)-98.3 °F (36.8 °C)] 98.2 °F (36.8 °C)  Pulse:  [65-75] 65  Resp:  [18-24] 18  SpO2:  [94 %-100 %] 95 %  BP: (107-176)/(52-94) 107/52     Weight: 70.8 kg (156 lb 1.4 oz)  Body mass index is 31.53 kg/m².    Physical Exam  Vitals and nursing note reviewed.   Constitutional:       General: She is awake. She is not in acute distress.     Appearance: Normal appearance. She is well-developed and well-groomed. She is not ill-appearing, toxic-appearing or diaphoretic.   HENT:      Head: Normocephalic and atraumatic.   Eyes:      Extraocular Movements: Extraocular movements intact.      Conjunctiva/sclera: Conjunctivae normal.      Pupils: Pupils are equal, round, and reactive to light.   Cardiovascular:      Rate and Rhythm: Normal rate and regular rhythm.      Heart sounds: Murmur heard.   Pulmonary:      Effort: Pulmonary effort is normal.      Breath sounds: Normal breath sounds.   Abdominal:      General: Bowel sounds are normal.      Palpations: Abdomen is soft.      Tenderness: There is no abdominal tenderness. There is no guarding or rebound.   Genitourinary:     Comments: Schmidt cath in place with 300ccs of yellow urine in collection bag.  Musculoskeletal:       Cervical back: Normal range of motion and neck supple.      Right lower leg: No edema.      Left lower leg: No edema.      Comments: LUAN   Skin:     General: Skin is warm and dry.      Capillary Refill: Capillary refill takes less than 2 seconds.   Neurological:      General: No focal deficit present.      Mental Status: She is alert. She is confused.      GCS: GCS eye subscore is 4. GCS verbal subscore is 4. GCS motor subscore is 6.      Motor: Motor function is intact.   Psychiatric:         Mood and Affect: Mood normal.         Speech: Speech normal.         Behavior: Behavior normal. Behavior is cooperative.         CRANIAL NERVES     CN III, IV, VI   Pupils are equal, round, and reactive to light.   LABS:  Recent Results (from the past 24 hour(s))   HIV 1/2 Ag/Ab (4th Gen)    Collection Time: 03/15/23  1:03 PM   Result Value Ref Range    HIV 1/2 Ag/Ab Negative Negative   Hepatitis C Antibody    Collection Time: 03/15/23  1:03 PM   Result Value Ref Range    Hepatitis C Ab Negative Negative   HCV Virus Hold Specimen    Collection Time: 03/15/23  1:03 PM   Result Value Ref Range    HEP C Virus Hold Specimen Hold for HCV sendout    CBC auto differential    Collection Time: 03/15/23  1:03 PM   Result Value Ref Range    WBC 6.76 3.90 - 12.70 K/uL    RBC 3.01 (L) 4.00 - 5.40 M/uL    Hemoglobin 8.5 (L) 12.0 - 16.0 g/dL    Hematocrit 27.3 (L) 37.0 - 48.5 %    MCV 91 82 - 98 fL    MCH 28.2 27.0 - 31.0 pg    MCHC 31.1 (L) 32.0 - 36.0 g/dL    RDW 15.6 (H) 11.5 - 14.5 %    Platelets 211 150 - 450 K/uL    MPV 12.3 9.2 - 12.9 fL    Immature Granulocytes 0.6 (H) 0.0 - 0.5 %    Gran # (ANC) 5.5 1.8 - 7.7 K/uL    Immature Grans (Abs) 0.04 0.00 - 0.04 K/uL    Lymph # 0.6 (L) 1.0 - 4.8 K/uL    Mono # 0.6 0.3 - 1.0 K/uL    Eos # 0.0 0.0 - 0.5 K/uL    Baso # 0.01 0.00 - 0.20 K/uL    nRBC 0 0 /100 WBC    Gran % 81.3 (H) 38.0 - 73.0 %    Lymph % 8.6 (L) 18.0 - 48.0 %    Mono % 9.3 4.0 - 15.0 %    Eosinophil % 0.1 0.0 - 8.0 %     Basophil % 0.1 0.0 - 1.9 %    Differential Method Automated    Comprehensive metabolic panel    Collection Time: 03/15/23  1:03 PM   Result Value Ref Range    Sodium 138 136 - 145 mmol/L    Potassium 2.8 (L) 3.5 - 5.1 mmol/L    Chloride 96 95 - 110 mmol/L    CO2 25 23 - 29 mmol/L    Glucose 120 (H) 70 - 110 mg/dL    BUN 33 (H) 8 - 23 mg/dL    Creatinine 2.6 (H) 0.5 - 1.4 mg/dL    Calcium 8.3 (L) 8.7 - 10.5 mg/dL    Total Protein 6.4 6.0 - 8.4 g/dL    Albumin 3.8 3.5 - 5.2 g/dL    Total Bilirubin 1.3 (H) 0.1 - 1.0 mg/dL    Alkaline Phosphatase 126 55 - 135 U/L     (H) 10 - 40 U/L     (H) 10 - 44 U/L    Anion Gap 17 (H) 8 - 16 mmol/L    eGFR 19.1 (A) >60 mL/min/1.73 m^2   Lactic acid, plasma #1    Collection Time: 03/15/23  1:03 PM   Result Value Ref Range    Lactate (Lactic Acid) 3.3 (H) 0.5 - 2.2 mmol/L   Troponin I    Collection Time: 03/15/23  1:03 PM   Result Value Ref Range    Troponin I 0.041 (H) 0.000 - 0.026 ng/mL   Procalcitonin    Collection Time: 03/15/23  1:03 PM   Result Value Ref Range    Procalcitonin 0.16 <0.25 ng/mL   Brain natriuretic peptide    Collection Time: 03/15/23  1:03 PM   Result Value Ref Range     (H) 0 - 99 pg/mL   Magnesium    Collection Time: 03/15/23  1:03 PM   Result Value Ref Range    Magnesium 1.6 1.6 - 2.6 mg/dL   ISTAT PROCEDURE    Collection Time: 03/15/23  1:36 PM   Result Value Ref Range    POC PH 7.568 (HH) 7.35 - 7.45    POC PCO2 30.8 (L) 35 - 45 mmHg    POC PO2 124 (H) 80 - 100 mmHg    POC HCO3 28.1 (H) 24 - 28 mmol/L    POC BE 6 -2 to 2 mmol/L    POC SATURATED O2 99 95 - 100 %    POC Glucose 119 (H) 70 - 110 mg/dL    POC Sodium 134 (L) 136 - 145 mmol/L    POC Potassium 2.9 (L) 3.5 - 5.1 mmol/L    POC Ionized Calcium 0.97 (L) 1.06 - 1.42 mmol/L    POC Hematocrit 22 (L) 36 - 54 %PCV    Sample ARTERIAL     Site RB     Allens Test Pass     DelSys Room Air     Mode SPONT     FiO2 21    Urinalysis, Reflex to Urine Culture Urine, Catheterized    Collection  Time: 03/15/23  1:45 PM    Specimen: Urine   Result Value Ref Range    Specimen UA Urine, Catheterized     Color, UA Yellow Yellow, Straw, Padmini    Appearance, UA Clear Clear    pH, UA 6.0 5.0 - 8.0    Specific Gravity, UA 1.020 1.005 - 1.030    Protein, UA 1+ (A) Negative    Glucose, UA Negative Negative    Ketones, UA Negative Negative    Bilirubin (UA) Negative Negative    Occult Blood UA Negative Negative    Nitrite, UA Negative Negative    Urobilinogen, UA Negative <2.0 EU/dL    Leukocytes, UA Negative Negative   Urinalysis Microscopic    Collection Time: 03/15/23  1:45 PM   Result Value Ref Range    RBC, UA 1 0 - 4 /hpf    WBC, UA 1 0 - 5 /hpf    Bacteria Rare None-Occ /hpf    Hyaline Casts, UA 0 0-1/lpf /lpf    Microscopic Comment SEE COMMENT    Protime-INR    Collection Time: 03/15/23  1:52 PM   Result Value Ref Range    Prothrombin Time 22.6 (H) 9.0 - 12.5 sec    INR 2.2 (H) 0.8 - 1.2   APTT    Collection Time: 03/15/23  1:52 PM   Result Value Ref Range    aPTT 25.0 21.0 - 32.0 sec   Sodium, Random Urine    Collection Time: 03/15/23  4:25 PM   Result Value Ref Range    Sodium, Urine 42 20 - 250 mmol/L   Hepatitis panel, acute    Collection Time: 03/15/23  4:36 PM   Result Value Ref Range    Hepatitis B Surface Ag Non-reactive Non-reactive    Hep B C IgM Non-reactive Non-reactive    Hep A IgM Non-reactive Non-reactive    Hepatitis C Ab Non-reactive Non-reactive   Creatinine, serum    Collection Time: 03/15/23  4:36 PM   Result Value Ref Range    Creatinine 2.1 (H) 0.5 - 1.4 mg/dL    eGFR 24.7 (A) >60 mL/min/1.73 m^2   TSH    Collection Time: 03/15/23  4:36 PM   Result Value Ref Range    TSH 2.863 0.400 - 4.000 uIU/mL   Vitamin B12    Collection Time: 03/15/23  4:36 PM   Result Value Ref Range    Vitamin B-12 1250 (H) 210 - 950 pg/mL   Folate    Collection Time: 03/15/23  4:36 PM   Result Value Ref Range    Folate 10.8 4.0 - 24.0 ng/mL   Lactic acid, plasma #2    Collection Time: 03/15/23  4:36 PM   Result  Value Ref Range    Lactate (Lactic Acid) 1.0 0.5 - 2.2 mmol/L   Ammonia    Collection Time: 03/15/23  4:59 PM   Result Value Ref Range    Ammonia 23 10 - 50 umol/L   Troponin I    Collection Time: 03/15/23 10:40 PM   Result Value Ref Range    Troponin I 0.030 (H) 0.000 - 0.026 ng/mL   Comprehensive metabolic panel    Collection Time: 03/15/23 10:40 PM   Result Value Ref Range    Sodium 142 136 - 145 mmol/L    Potassium 3.6 3.5 - 5.1 mmol/L    Chloride 102 95 - 110 mmol/L    CO2 22 (L) 23 - 29 mmol/L    Glucose 94 70 - 110 mg/dL    BUN 27 (H) 8 - 23 mg/dL    Creatinine 1.8 (H) 0.5 - 1.4 mg/dL    Calcium 7.4 (L) 8.7 - 10.5 mg/dL    Total Protein 5.9 (L) 6.0 - 8.4 g/dL    Albumin 3.4 (L) 3.5 - 5.2 g/dL    Total Bilirubin 1.1 (H) 0.1 - 1.0 mg/dL    Alkaline Phosphatase 103 55 - 135 U/L     (H) 10 - 40 U/L     (H) 10 - 44 U/L    Anion Gap 18 (H) 8 - 16 mmol/L    eGFR 30 (A) >60 mL/min/1.73 m^2   Troponin I    Collection Time: 03/16/23  1:51 AM   Result Value Ref Range    Troponin I 0.023 0.000 - 0.026 ng/mL       RADIOLOGY  CT Head Without Contrast    Result Date: 3/15/2023  EXAMINATION: CT HEAD WITHOUT CONTRAST CLINICAL HISTORY: Dizziness, persistent/recurrent, cardiac or vascular cause suspected; Other fatigue TECHNIQUE: Low dose axial images were obtained through the head.  Coronal and sagittal reformations were also performed. Contrast was not administered. All CT scans at this location are performed using dose optimization techniques including the following: Automated exposure control; adjustment of the mA and/or kv; use of iterative reconstruction technique. COMPARISON: CT dated 04/20/2018 FINDINGS: Cerebral and ventricular volumes are within normal limits.  No evidence of hydrocephalus. No CT evidence of acute territorial infarct, intracranial hemorrhage, or mass lesion.  No midline shift or mass effect. Midline structures and posterior fossa structures appear unremarkable.  Basal cisterns appear  clear.  Mild bilateral carotid siphon calcification noted. Calvarium is intact without acute or aggressive abnormality.  Orbits and globes appear within normal limits.  Small right maxillary sinus mucosal retention cyst and mild mucosal thickening of the right sphenoid sinus.  Remaining paranasal sinuses and mastoid air cells are clear.     No evidence of acute intracranial abnormality. Electronically signed by: Mohsen Lindsay Date:    03/15/2023 Time:    13:24    US Abdomen Complete    Result Date: 3/15/2023  EXAMINATION: US ABDOMEN COMPLETE CLINICAL HISTORY: milli, elevated lft; TECHNIQUE: Complete abdominal ultrasound (including pancreas, liver, gallbladder, common bile duct, spleen, aorta, IVC, and kidneys) was performed. COMPARISON: CT dated 08/25/2022 FINDINGS: Liver: Normal in size, measuring 14.1 cm. Mildly increased heterogeneous echogenicity and nodular contour.  No focal hepatic lesions. Gallbladder: No calculi, wall thickening, or pericholecystic fluid.  No sonographic Holland's sign. Biliary system: The common duct is not dilated, measuring 3.5 mm.  No intrahepatic ductal dilatation. Spleen: Largely obscured by bowel gas artifact. Pancreas: The visualized portions of pancreas appear normal. Right kidney: Normal in size with no hydronephrosis, measuring 9.0 cm. Left kidney:  Normal in size with no hydronephrosis, measuring 9.7 cm. Aorta: No aneurysm. Inferior vena cava: Normal in appearance. Miscellaneous: No ascites.     Mildly heterogeneous increased hepatic echogenicity and nodular contour may be related to cirrhosis or other diffuse hepatocellular disease/dysfunction. No other significant sonographic abnormality identified. Electronically signed by: Mohsen Lindsay Date:    03/15/2023 Time:    17:15    X-Ray Chest AP Portable    Result Date: 3/15/2023  EXAMINATION: XR CHEST AP PORTABLE CLINICAL HISTORY: Sepsis; TECHNIQUE: Single frontal portable view of the chest was performed. COMPARISON: X-ray  dated 12/29/2021 FINDINGS: Median sternotomy wires are intact.  Aortic arch calcification and artificial aortic valve noted.  Mildly enlarged cardiac silhouette is unchanged.  Trachea is midline.  Pulmonary vasculature is unremarkable.  Lungs are clear.  No significant pleural effusion or pneumothorax.  No acute bony abnormality.     No acute cardiopulmonary abnormality. Electronically signed by: Mohsen Lindsay Date:    03/15/2023 Time:    13:36      EKG    MICROBIOLOGY    MDM     Amount and/or Complexity of Data Reviewed  Clinical lab tests: reviewed  Tests in the radiology section of CPT®: reviewed  Tests in the medicine section of CPT®: reviewed  Discussion of test results with the performing providers: no  Decide to obtain previous medical records or to obtain history from someone other than the patient: yes  Obtain history from someone other than the patient: no  Review and summarize past medical records: yes  Discuss the patient with other providers: yes  Independent visualization of images, tracings, or specimens: yes          Assessment/Plan:     * AMS (altered mental status)  Patient has acute metabolic encephalopathy that is secondary to Electrolyte abnormalities- K+2.8, Metabolic derangements- latic acid of 3.3, BUN/creatinine 33/2.6, AST//213, total bili 1.3, ABG with pH 7.568, PCO2 30.8, PO2 124, HCO3 28.1 and Sepsis/Infective. Patient's current mental status is Awake, Alert, Confused. Patient's baseline mental status is. awake and alert; oriented to person, place, and time Evaluation and for underlying cause(s) is underway and inclusive of Blood Chemistries, Neuro-Imaging (MRI/CT), Toxic metabolite eval  and Urine Drug screen. Will monitor neuro checks carefully, avoid narcotics and benzos that will exacerbate agitation, and use PRN medications for controls of behavior for self harm.          Hypokalemia  Patient has hypokalemia which is currently improving. Last electrolytes reviewed- Recent  Labs   Lab 03/15/23  1303 03/15/23  2240   K 2.8* 3.6   . Will replace potassium and monitor electrolytes closely.         MECHE (acute kidney injury)  Patient with acute kidney injury likely due to IVVD/dehydration and pre-renal azotemia MECHE is currently improving. Labs reviewed- Renal function/electrolytes with Estimated Creatinine Clearance: 26.1 mL/min (A) (based on SCr of 1.8 mg/dL (H)). according to latest data. Monitor urine output and serial BMP and adjust therapy as needed. Avoid nephrotoxins and renally dose meds for GFR listed above.       Transaminitis  AST/ALT of 190/213.  Hepatitis panel negative.  Ultrasound of abdomen limited revealed:  -Mildly heterogeneous increased hepatic echogenicity and nodular contour may be related to cirrhosis or other diffuse hepatocellular disease/dysfunction.    Plan:  -repeat labs in am  -hepatology consult if warranted    Elevated lactic acid level  Initial lactic acid 3.3.  Improved to 1.0 after IV fluids administered.  Plan:  -continue to monitor  -IVFs      Low hemoglobin  Appears near baseline without evidence of active bleeding noted.   Recent Labs   Lab 03/15/23  1303 03/15/23  1336 03/15/23  1636   HGB 8.5*  --   --    HCT 27.3* 22*  --    MCV 91  --   --    MCHC 31.1*  --   --    RDW 15.6*  --   --      --   --    FOLATE  --   --  10.8   [id number]  --   --  1250*     Plan:  -Monitor H/H and plts  -Type and screen, transfuse as needed   -Continue home medications once verified  -Hold antiplatelets/anticoagulants in setting of active bleeding/pending surgical intervention           Hypertension  Currently normotensive. BP usually well controlled per patient with home medications.  Plan:  -Optimize pain control   -Continue home medications (acebutolol) once verified, titrate as needed   -Monitor BP  -Low salt/cardiac diet when not NPO  -IV hydralazine prn for SBP>160 or DBP>90           Chronic anticoagulation  Reportedly on Coumadin.  INR of  2.2.  Plan:  -have pharmacy dose Coumadin  -continue Coumadin once verified with patient      Gastroesophageal reflux disease without esophagitis  Chronic. Stable. Currently asymptomatic. Home medications include PPI/Antacids as needed.  Plan:  -Continue PPI/Antacids as needed         HLD (hyperlipidemia)  Patient is chronically on statin.will not continue for now. Last Lipid Panel: No results found for: CHOL, HDL, LDLCALC, TRIG, CHOLHDL  Plan:  -Continue home medication once verified   -low fat/low calorie diet        VTE Risk Mitigation (From admission, onward)         Ordered     Reason for No Pharmacological VTE Prophylaxis  Once        Question:  Reasons:  Answer:  Already adequately anticoagulated on oral Anticoagulants    03/15/23 2220     IP VTE HIGH RISK PATIENT  Once         03/15/23 2220     Place sequential compression device  Until discontinued         03/15/23 2220              //Core Measures   -DVT proph: SCDs, withholding anticoagulation pending verification of home coumadin dosing and pharmacy dosing  -Code status full  -Surrogate: none    Toney Padilla  [facility]     Components of this note were documented using a voice recognition system and are subject to errors not corrected at the time the document was proof read. Please contact the author for any clarifications.       Toney Padilla NP  Department of Hospital Medicine  [facility] Med S[facility]

## 2023-03-16 NOTE — ASSESSMENT & PLAN NOTE
Patient with acute kidney injury likely due to IVVD/dehydration and pre-renal azotemia MECHE is currently improving. Labs reviewed- Renal function/electrolytes with Estimated Creatinine Clearance: 26.1 mL/min (A) (based on SCr of 1.8 mg/dL (H)). according to latest data. Monitor urine output and serial BMP and adjust therapy as needed. Avoid nephrotoxins and renally dose meds for GFR listed above.

## 2023-03-16 NOTE — SUBJECTIVE & OBJECTIVE
Interval History: pt in bed upon exam and alert and oriented upon assessment. H/H trended down and MECHE improved 1.8>1.6. LFTs trending down with US + for cirrhosis and diffuse hepatocellular  disease. Hepatology consulted. Potassium replaced. BNP elevated. Troponin flat and elevated. Coumadin held. Echo pending.     Review of Systems   Constitutional:  Positive for activity change and appetite change. Negative for fatigue and fever.   HENT:  Negative for congestion, postnasal drip, sneezing, sore throat and trouble swallowing.    Respiratory:  Negative for cough, shortness of breath and wheezing.    Cardiovascular:  Negative for chest pain, palpitations and leg swelling.   Gastrointestinal:  Negative for abdominal distention, abdominal pain, nausea and vomiting.   Genitourinary:  Positive for difficulty urinating. Negative for dysuria, flank pain, frequency and urgency.   Musculoskeletal:  Negative for arthralgias and back pain.   Skin:  Negative for color change and wound.   Neurological:  Positive for weakness. Negative for dizziness and headaches.   Psychiatric/Behavioral:  Positive for confusion. Negative for agitation. The patient is not nervous/anxious.    Objective:     Vital Signs (Most Recent):  Temp: 97.6 °F (36.4 °C) (03/16/23 1210)  Pulse: 84 (03/16/23 1210)  Resp: 15 (03/16/23 1210)  BP: 130/69 (03/16/23 1210)  SpO2: 95 % (03/16/23 1210) Vital Signs (24h Range):  Temp:  [97.6 °F (36.4 °C)-98.4 °F (36.9 °C)] 97.6 °F (36.4 °C)  Pulse:  [65-84] 84  Resp:  [14-24] 15  SpO2:  [93 %-100 %] 95 %  BP: (107-176)/(52-94) 130/69     Weight: 70.8 kg (156 lb 1.4 oz)  Body mass index is 31.53 kg/m².    Intake/Output Summary (Last 24 hours) at 3/16/2023 1230  Last data filed at 3/16/2023 0820  Gross per 24 hour   Intake 1108.89 ml   Output 400 ml   Net 708.89 ml      Physical Exam  HENT:      Head: Normocephalic.      Nose: Nose normal.      Mouth/Throat:      Pharynx: Oropharynx is clear.   Cardiovascular:      Rate  and Rhythm: Normal rate and regular rhythm.      Pulses: Normal pulses.      Heart sounds: Normal heart sounds.   Pulmonary:      Effort: Pulmonary effort is normal.      Breath sounds: Normal breath sounds.   Abdominal:      General: Bowel sounds are normal. There is no distension.      Palpations: Abdomen is soft.      Tenderness: There is no abdominal tenderness.   Genitourinary:     Comments: Deferred   Musculoskeletal:         General: Normal range of motion.      Cervical back: Normal range of motion.   Skin:     General: Skin is warm and dry.   Neurological:      General: No focal deficit present.      Mental Status: She is alert and oriented to person, place, and time.   Psychiatric:         Mood and Affect: Mood normal.         Behavior: Behavior normal.       Significant Labs: All pertinent labs within the past 24 hours have been reviewed.  CBC:   Recent Labs   Lab 03/15/23  1303 03/15/23  1336 03/16/23  0436   WBC 6.76  --  4.47   HGB 8.5*  --  7.1*   HCT 27.3* 22* 23.2*     --  135*     CMP:   Recent Labs   Lab 03/15/23  1303 03/15/23  1636 03/15/23  2240 03/16/23  0436     --  142 141   K 2.8*  --  3.6 3.3*   CL 96  --  102 104   CO2 25  --  22* 21*   *  --  94 77   BUN 33*  --  27* 23   CREATININE 2.6* 2.1* 1.8* 1.6*   CALCIUM 8.3*  --  7.4* 7.4*   PROT 6.4  --  5.9* 5.4*   ALBUMIN 3.8  --  3.4* 3.2*   BILITOT 1.3*  --  1.1* 1.0   ALKPHOS 126  --  103 94   *  --  175* 158*   *  --  203* 192*   ANIONGAP 17*  --  18* 16     Magnesium:   Recent Labs   Lab 03/15/23  1303   MG 1.6       Significant Imaging: I have reviewed all pertinent imaging results/findings within the past 24 hours.

## 2023-03-16 NOTE — ASSESSMENT & PLAN NOTE
Appears near baseline without evidence of active bleeding noted.   Recent Labs   Lab 03/15/23  1303 03/15/23  1336 03/15/23  1636   HGB 8.5*  --   --    HCT 27.3* 22*  --    MCV 91  --   --    MCHC 31.1*  --   --    RDW 15.6*  --   --      --   --    FOLATE  --   --  10.8   [id number]  --   --  1250*     Plan:  -Monitor H/H and plts  -Type and screen, transfuse as needed   -Continue home medications once verified  -Hold antiplatelets/anticoagulants in setting of active bleeding/pending surgical intervention

## 2023-03-16 NOTE — SUBJECTIVE & OBJECTIVE
Review of Systems   Constitutional:  Positive for activity change and appetite change (decreased).   Genitourinary:         Urinary incontinence   Musculoskeletal:  Positive for gait problem.   Neurological:  Positive for weakness.   Psychiatric/Behavioral:  Positive for confusion and decreased concentration.      Past Medical History:   Diagnosis Date    Anticoagulant long-term use     Aortic valve defect     Benign neoplasm of ascending colon 4/6/2017    Cancer     CHF (congestive heart failure)     DDD (degenerative disc disease), cervical 7/20/2018    GERD (gastroesophageal reflux disease)     Hemorrhoids     HLD (hyperlipidemia)     Hypertension     Insomnia 12/1/2014    Irritable bowel syndrome with constipation 4/9/2018    Postmenopausal osteoporosis 7/20/2018       Past Surgical History:   Procedure Laterality Date    AORTIC VALVE REPLACEMENT  2003    COLONOSCOPY Left 4/6/2017    Procedure: COLONOSCOPY;  Surgeon: Sander Ulloa MD;  Location: [facility];  Service: Endoscopy;  Laterality: Left;    COLONOSCOPY N/A 11/7/2019    Procedure: COLONOSCOPY;  Surgeon: Brenda Ochoa MD;  Location: [facility];  Service: Endoscopy;  Laterality: N/A;    COLONOSCOPY N/A 2/26/2021    Procedure: COLONOSCOPY;  Surgeon: Ankur Smith MD;  Location: [facility] ENDO;  Service: General;  Laterality: N/A;    COMPUTED TOMOGRAPHY N/A 11/12/2020    Procedure: Liver microwave ablation;  Surgeon: Gonzalez Moraes MD;  Location: [facility];  Service: General;  Laterality: N/A;    ESOPHAGOGASTRODUODENOSCOPY N/A 11/7/2019    Procedure: ESOPHAGOGASTRODUODENOSCOPY (EGD) needs PT/INR;  Surgeon: Brenda Ochoa MD;  Location: [facility] ENDO;  Service: Endoscopy;  Laterality: N/A;    FLEXIBLE SIGMOIDOSCOPY N/A 2/13/2020    Procedure: SIGMOIDOSCOPY, FLEXIBLE;  Surgeon: Ankur Smith MD;  Location: [facility] OR;  Service: General;  Laterality: N/A;    INJECTION OF ANESTHETIC AGENT INTO TISSUE PLANE DEFINED BY TRANSVERSUS ABDOMINIS MUSCLE N/A  2/13/2020    Procedure: BLOCK, TRANSVERSUS ABDOMINIS PLANE;  Surgeon: Ankur Smith MD;  Location: [facility] OR;  Service: General;  Laterality: N/A;    INSERTION OF TUNNELED CENTRAL VENOUS CATHETER (CVC) WITH SUBCUTANEOUS PORT N/A 3/17/2020    Procedure: [id number];  Surgeon: Ankur Smith MD;  Location: [facility] OR;  Service: General;  Laterality: N/A;    ROBOT-ASSISTED LOW ANTERIOR RESECTION OF COLON N/A 2/13/2020    Procedure: XI ROBOTIC RESECTION, COLON, LOW ANTERIOR;  Surgeon: Ankur Smith MD;  Location: [facility] OR;  Service: General;  Laterality: N/A;       Family history of liver disease: n/a    Review of patient's allergies indicates:   Allergen Reactions    Penicillins Rash         Tobacco Use    Smoking status: Never    Smokeless tobacco: Never   Substance and Sexual Activity    Alcohol use: Not Currently    Drug use: No    Sexual activity: Yes     Partners: Male       Facility-Administered Medications Prior to Admission   Medication Dose Route Frequency Provider Last Rate Last Admin    lidocaine (PF) 10 mg/ml (1%) injection 10 mg  1 mL Intradermal Once Ankur Smith MD         Medications Prior to Admission   Medication Sig Dispense Refill Last Dose    acebutolol (SECTRAL) 400 mg capsule Take 400 mg by mouth 2 (two) times daily.   Unknown    ALPRAZolam (XANAX) 1 MG tablet Take 1 mg by mouth 2 (two) times daily as needed.   Unknown    DULoxetine (CYMBALTA) 60 MG capsule Take 60 mg by mouth.   Unknown    ergocalciferol (ERGOCALCIFEROL) 50,000 unit Cap Take 1 capsule by mouth every 30 days.       furosemide (LASIX) 40 MG tablet Take 40 mg by mouth Daily.   Unknown    lovastatin (MEVACOR) 40 MG tablet Take 40 mg by mouth every evening.       multivitamin (THERAGRAN) tablet Take 1 tablet by mouth.       pantoprazole (PROTONIX) 20 MG tablet Take 1 tablet by mouth every morning.   Unknown    pregabalin (LYRICA) 100 MG capsule Take 1 capsule (100 mg total) by mouth 2 (two) times daily. 60  capsule 2 Unknown    VITAMIN B COMPLEX ORAL Take 1 tablet by mouth every morning.       warfarin (COUMADIN) 2.5 MG tablet Take 1 tablet every day EXCEPT on Mondays and Fridays. -- OR AS DIRECTED BY COUMADIN CLINIC 90 tablet 1 Unknown    warfarin (COUMADIN) 5 MG tablet Take 1 tablet (5 mg total) by mouth Daily. Take one tablet by mouth on Mondays and Fridays. AS DIRECTED BY COUMADIN CLINIC. 30 tablet 3 Unknown    zolpidem (AMBIEN) 10 mg Tab Take 5 mg by mouth nightly as needed.    Unknown       Objective:     Vital Signs (Most Recent):  Temp: 97.6 °F (36.4 °C) (03/16/23 1210)  Pulse: 84 (03/16/23 1210)  Resp: 15 (03/16/23 1210)  BP: 130/69 (03/16/23 1210)  SpO2: 95 % (03/16/23 1210) Vital Signs (24h Range):  Temp:  [97.6 °F (36.4 °C)-98.4 °F (36.9 °C)] 97.6 °F (36.4 °C)  Pulse:  [65-84] 84  Resp:  [14-24] 15  SpO2:  [93 %-100 %] 95 %  BP: (107-176)/(52-94) 130/69     Weight: 70.8 kg (156 lb) (03/16/23 1210)  Body mass index is 31.51 kg/m².    Physical Exam  Vitals reviewed.   Constitutional:       General: She is not in acute distress.     Appearance: She is well-developed.   HENT:      Head: Normocephalic and atraumatic.      Mouth/Throat:      Pharynx: No oropharyngeal exudate.   Eyes:      General: No scleral icterus.        Right eye: No discharge.         Left eye: No discharge.      Conjunctiva/sclera: Conjunctivae normal.      Pupils: Pupils are equal, round, and reactive to light.   Pulmonary:      Effort: Pulmonary effort is normal. No respiratory distress.      Breath sounds: Normal breath sounds. No wheezing.   Abdominal:      General: There is no distension.      Palpations: Abdomen is soft.      Tenderness: There is no abdominal tenderness.   Musculoskeletal:      Right lower leg: Edema (trace) present.      Left lower leg: Edema (trace) present.   Neurological:      Mental Status: She is alert and oriented to person, place, and time.   Psychiatric:         Behavior: Behavior normal.       MELD-Na score:  18 at 3/16/2023  9:01 AM  MELD score: 18 at 3/16/2023  9:01 AM  Calculated from:  Serum Creatinine: 1.6 mg/dL at 3/16/2023  4:36 AM  Serum Sodium: 141 mmol/L (Using max of 137 mmol/L) at 3/16/2023  4:36 AM  Total Bilirubin: 1.0 mg/dL at 3/16/2023  4:36 AM  INR(ratio): 1.9 at 3/16/2023  9:01 AM  Age: 72 years    Significant Labs:  CBC:   Recent Labs   Lab 03/16/23  0436   WBC 4.47   RBC 2.57*   HGB 7.1*   HCT 23.2*   *     CMP:   Recent Labs   Lab 03/16/23  0436   GLU 77   CALCIUM 7.4*   ALBUMIN 3.2*   PROT 5.4*      K 3.3*   CO2 21*      BUN 23   CREATININE 1.6*   ALKPHOS 94   *   *   BILITOT 1.0     Coagulation:   Recent Labs   Lab 03/15/23  1352 03/16/23  0901   INR 2.2* 1.9*   APTT 25.0  --        Significant Imaging:  US: Reviewed  CT: Reviewed  MRI: Reviewed

## 2023-03-16 NOTE — ASSESSMENT & PLAN NOTE
Patient is chronically on statin.will not continue for now. Last Lipid Panel: No results found for: CHOL, HDL, LDLCALC, TRIG, CHOLHDL  Plan:  -Continue home medication once verified   -low fat/low calorie diet

## 2023-03-16 NOTE — ASSESSMENT & PLAN NOTE
Patient has hypokalemia which is currently improving. Last electrolytes reviewed-   Recent Labs   Lab 03/15/23  1303 03/15/23  2240 03/16/23  0436   K 2.8* 3.6 3.3*   . Will replace potassium and monitor electrolytes closely.   -replaced   -Mag 1.6

## 2023-03-16 NOTE — ASSESSMENT & PLAN NOTE
Currently normotensive. BP usually well controlled per patient with home medications.  Plan:  -Optimize pain control   -Continue home medications (acebutolol) once verified, titrate as needed   -Monitor BP  -Low salt/cardiac diet when not NPO  -IV hydralazine prn for SBP>160 or DBP>90

## 2023-03-16 NOTE — ASSESSMENT & PLAN NOTE
Patient has acute metabolic encephalopathy that is secondary to Electrolyte abnormalities- K+2.8, Metabolic derangements- latic acid of 3.3, BUN/creatinine 33/2.6, AST//213, total bili 1.3, ABG with pH 7.568, PCO2 30.8, PO2 124, HCO3 28.1 and Sepsis/Infective. Patient's current mental status is Awake, Alert, Confused. Patient's baseline mental status is. awake and alert; oriented to person, place, and time Evaluation and for underlying cause(s) is underway and inclusive of Blood Chemistries, Neuro-Imaging (MRI/CT), Toxic metabolite eval  and Urine Drug screen. Will monitor neuro checks carefully, avoid narcotics and benzos that will exacerbate agitation, and use PRN medications for controls of behavior for self harm.  -improved- pt alert and oriented upon exam

## 2023-03-16 NOTE — PLAN OF CARE
[facility] - Med Surg  Initial Discharge Assessment       Primary Care Provider: Shan Stroud MD    Admission Diagnosis: Dehydration [E86.0]  Hypokalemia [E87.6]  Fatigue [R53.83]  Weakness [R53.1]  MECHE (acute kidney injury) [N17.9]    Admission Date: 3/15/2023  Expected Discharge Date: per attending         Payor: HU[employer]NA MANAGED MEDICARE / Plan: HUMANA SNP (SPECIAL NEEDS PLAN) / Product Type: Medicare Advantage /     Extended Emergency Contact Information  Primary Emergency Contact: BERNARD REYNOSO  Mobile Phone: [phone]  Relation: Son  Secondary Emergency Contact: Laurita Lundberg  Mobile Phone: [phone]  Relation: Relative    Discharge Plan A: Home         [facility] [place] - [address]  [address] [place] [address]  Phone: [phone] Fax: [phone]    [facility] [address] [address] [address]  [address] [address] [address]  [facility] [address]  Phone: [phone] Fax: [phone]    [id number] - [place], [place] - [address] AT [facility] [address] [address]  [place] [address]  Phone: [phone] Fax: [phone]    [facility] - [place], [place] - [address]  [address]  [address]  [place] [address]  Phone: [phone] Fax: [phone]      Initial Assessment (most recent)       Adult Discharge Assessment - 03/16/23 0926          Discharge Assessment    Assessment Type Discharge Planning Assessment     Confirmed/corrected address, phone number and insurance Yes     Confirmed Demographics Correct on Facesheet     Source of Information patient     When was your last doctors appointment? --   last month    Communicated ROEL with patient/caregiver Date not available/Unable to determine     Reason For Admission dehydration     People in Home alone     Do you expect to return to your current living situation? Yes     Do you have help at home or someone to help you manage your  care at home? No     Prior to hospitilization cognitive status: Alert/Oriented     Current cognitive status: Alert/Oriented     Equipment Currently Used at Home none     Readmission within 30 days? No     Do you currently have service(s) that help you manage your care at home? No     Do you take prescription medications? Yes     Do you have prescription coverage? Yes     Coverage human[employer]     Do you have any problems affording any of your prescribed medications? No     Is the patient taking medications as prescribed? yes     Who is going to help you get home at discharge? cousin     How do you get to doctors appointments? car, drives self     Are you on dialysis? No     Do you take coumadin? No     Discharge Plan A Home                   SW to f/u as needed.

## 2023-03-16 NOTE — PLAN OF CARE
Discussed poc with pt, pt verbalized understanding    Purposeful rounding every 2hours    VS wnl  Cardiac monitoring in use, pt is afib tele monitor # [id number]    Fall precautions in place, remains injury free  Pt denies c/o Pain and nausea       Accurate I&Os  Abx given as prescribed  Bed locked at lowest position  Call light within reach    Chart check complete  Will cont with POC     Schmidt discontinued  Diet progressed  Coumadin resumed

## 2023-03-16 NOTE — ASSESSMENT & PLAN NOTE
Reportedly on Coumadin.  INR of 2.2.  Plan:  -have pharmacy dose Coumadin  -continue Coumadin once verified with patient

## 2023-03-16 NOTE — ASSESSMENT & PLAN NOTE
AST/ALT of 190/213.  Hepatitis panel negative.  Ultrasound of abdomen limited revealed:  -Mildly heterogeneous increased hepatic echogenicity and nodular contour may be related to cirrhosis or other diffuse hepatocellular disease/dysfunction.    Plan:  -repeat labs in am  -hepatology consult if warranted

## 2023-03-16 NOTE — ASSESSMENT & PLAN NOTE
Reportedly on Coumadin.  INR of 2.2>1.9.  Plan:  -have pharmacy dose Coumadin once appropriate   -held due to declining H/H   -continue Coumadin once verified with patient

## 2023-03-16 NOTE — CONSULTS
[facility] Surg  Hepatology  Telemedicine Consult Note    Patient Name: Adalgisa Wright  MRN: [id number]  Admission Date: 3/15/2023  Hospital Length of Stay: 0 days  Attending Provider: Manuel Hough MD   Primary Care Physician: Shan Stroud MD  Principal Problem:AMS (altered mental status)    Inpatient Consult to Telemedicine - Hepatology  Consult performed by: Rosana Torres MD  Consult ordered by: Keshia Obando NP  Reason for consult: Abnormal LFTs  Assessment/Recommendations: Monitor labs  Outpatient f/u        Subjective:     Transplant status: No    HPI:  72F w/ h/o stage 3 or 4 colorectal cancer and concern for liver lesion that was ablated. She gets regular liver imaging that has shown stable indeterminate liver lesions and no reports of cirrhosis on CT or MRI scans. On admission she had abnormal LFTs with a US that showed possible cirrhosis. Of note she had a bx in 2020 w/o fibrosis. Her liver tests are usually normal, they became elevated at admission with ARF, electrolyte abnormalities and altered mental status. The cause of these symptoms is unclear and they all seems to be improving. She is alert and oriented, able to provider her history.    Liver bx 1/2020  LIVER MASS, BIOPSY: Negative for malignancy Benign liver parenchyma Macrosteatosis - 5% Glycogenated nuclei Negative for steatohepatitis Minimal focal sinusoidal dilatation Focal bile duct dilatation Negative for fibrosis      Review of Systems   Constitutional:  Positive for activity change and appetite change (decreased).   Genitourinary:         Urinary incontinence   Musculoskeletal:  Positive for gait problem.   Neurological:  Positive for weakness.   Psychiatric/Behavioral:  Positive for confusion and decreased concentration.      Past Medical History:   Diagnosis Date    Anticoagulant long-term use     Aortic valve defect     Benign neoplasm of ascending colon 4/6/2017    Cancer     CHF (congestive heart failure)     DDD (degenerative  disc disease), cervical 7/20/2018    GERD (gastroesophageal reflux disease)     Hemorrhoids     HLD (hyperlipidemia)     Hypertension     Insomnia 12/1/2014    Irritable bowel syndrome with constipation 4/9/2018    Postmenopausal osteoporosis 7/20/2018       Past Surgical History:   Procedure Laterality Date    AORTIC VALVE REPLACEMENT  2003    COLONOSCOPY Left 4/6/2017    Procedure: COLONOSCOPY;  Surgeon: Sander Ulloa MD;  Location: [facility];  Service: Endoscopy;  Laterality: Left;    COLONOSCOPY N/A 11/7/2019    Procedure: COLONOSCOPY;  Surgeon: Brenda Ochoa MD;  Location: [facility];  Service: Endoscopy;  Laterality: N/A;    COLONOSCOPY N/A 2/26/2021    Procedure: COLONOSCOPY;  Surgeon: Ankur Smith MD;  Location: [facility];  Service: General;  Laterality: N/A;    COMPUTED TOMOGRAPHY N/A 11/12/2020    Procedure: Liver microwave ablation;  Surgeon: Gonzalez Moraes MD;  Location: [facility];  Service: General;  Laterality: N/A;    ESOPHAGOGASTRODUODENOSCOPY N/A 11/7/2019    Procedure: ESOPHAGOGASTRODUODENOSCOPY (EGD) needs PT/INR;  Surgeon: Brenda Ochoa MD;  Location: [facility];  Service: Endoscopy;  Laterality: N/A;    FLEXIBLE SIGMOIDOSCOPY N/A 2/13/2020    Procedure: SIGMOIDOSCOPY, FLEXIBLE;  Surgeon: Ankur Smith MD;  Location: [facility];  Service: General;  Laterality: N/A;    INJECTION OF ANESTHETIC AGENT INTO TISSUE PLANE DEFINED BY TRANSVERSUS ABDOMINIS MUSCLE N/A 2/13/2020    Procedure: BLOCK, TRANSVERSUS ABDOMINIS PLANE;  Surgeon: Ankur Smith MD;  Location: [facility];  Service: General;  Laterality: N/A;    INSERTION OF TUNNELED CENTRAL VENOUS CATHETER (CVC) WITH SUBCUTANEOUS PORT N/A 3/17/2020    Procedure: [id number];  Surgeon: Ankur Smith MD;  Location: [facility];  Service: General;  Laterality: N/A;    ROBOT-ASSISTED LOW ANTERIOR RESECTION OF COLON N/A 2/13/2020    Procedure: XI ROBOTIC RESECTION, COLON, LOW ANTERIOR;  Surgeon: Ankur CHAVEZ  MD Sarah;  Location: [facility];  Service: General;  Laterality: N/A;       Family history of liver disease: n/a    Review of patient's allergies indicates:   Allergen Reactions    Penicillins Rash         Tobacco Use    Smoking status: Never    Smokeless tobacco: Never   Substance and Sexual Activity    Alcohol use: Not Currently    Drug use: No    Sexual activity: Yes     Partners: Male       Facility-Administered Medications Prior to Admission   Medication Dose Route Frequency Provider Last Rate Last Admin    lidocaine (PF) 10 mg/ml (1%) injection 10 mg  1 mL Intradermal Once Ankur Smith MD         Medications Prior to Admission   Medication Sig Dispense Refill Last Dose    acebutolol (SECTRAL) 400 mg capsule Take 400 mg by mouth 2 (two) times daily.   Unknown    ALPRAZolam (XANAX) 1 MG tablet Take 1 mg by mouth 2 (two) times daily as needed.   Unknown    DULoxetine (CYMBALTA) 60 MG capsule Take 60 mg by mouth.   Unknown    ergocalciferol (ERGOCALCIFEROL) 50,000 unit Cap Take 1 capsule by mouth every 30 days.       furosemide (LASIX) 40 MG tablet Take 40 mg by mouth Daily.   Unknown    lovastatin (MEVACOR) 40 MG tablet Take 40 mg by mouth every evening.       multivitamin (THERAGRAN) tablet Take 1 tablet by mouth.       pantoprazole (PROTONIX) 20 MG tablet Take 1 tablet by mouth every morning.   Unknown    pregabalin (LYRICA) 100 MG capsule Take 1 capsule (100 mg total) by mouth 2 (two) times daily. 60 capsule 2 Unknown    VITAMIN B COMPLEX ORAL Take 1 tablet by mouth every morning.       warfarin (COUMADIN) 2.5 MG tablet Take 1 tablet every day EXCEPT on Mondays and Fridays. -- OR AS DIRECTED BY COUMADIN CLINIC 90 tablet 1 Unknown    warfarin (COUMADIN) 5 MG tablet Take 1 tablet (5 mg total) by mouth Daily. Take one tablet by mouth on Mondays and Fridays. AS DIRECTED BY COUMADIN CLINIC. 30 tablet 3 Unknown    zolpidem (AMBIEN) 10 mg Tab Take 5 mg by mouth nightly as needed.    Unknown        Objective:     Vital Signs (Most Recent):  Temp: 97.6 °F (36.4 °C) (03/16/23 1210)  Pulse: 84 (03/16/23 1210)  Resp: 15 (03/16/23 1210)  BP: 130/69 (03/16/23 1210)  SpO2: 95 % (03/16/23 1210) Vital Signs (24h Range):  Temp:  [97.6 °F (36.4 °C)-98.4 °F (36.9 °C)] 97.6 °F (36.4 °C)  Pulse:  [65-84] 84  Resp:  [14-24] 15  SpO2:  [93 %-100 %] 95 %  BP: (107-176)/(52-94) 130/69     Weight: 70.8 kg (156 lb) (03/16/23 1210)  Body mass index is 31.51 kg/m².    Physical Exam  Vitals reviewed.   Constitutional:       General: She is not in acute distress.     Appearance: She is well-developed.   HENT:      Head: Normocephalic and atraumatic.      Mouth/Throat:      Pharynx: No oropharyngeal exudate.   Eyes:      General: No scleral icterus.        Right eye: No discharge.         Left eye: No discharge.      Conjunctiva/sclera: Conjunctivae normal.      Pupils: Pupils are equal, round, and reactive to light.   Pulmonary:      Effort: Pulmonary effort is normal. No respiratory distress.      Breath sounds: Normal breath sounds. No wheezing.   Abdominal:      General: There is no distension.      Palpations: Abdomen is soft.      Tenderness: There is no abdominal tenderness.   Musculoskeletal:      Right lower leg: Edema (trace) present.      Left lower leg: Edema (trace) present.   Neurological:      Mental Status: She is alert and oriented to person, place, and time.   Psychiatric:         Behavior: Behavior normal.       MELD-Na score: 18 at 3/16/2023  9:01 AM  MELD score: 18 at 3/16/2023  9:01 AM  Calculated from:  Serum Creatinine: 1.6 mg/dL at 3/16/2023  4:36 AM  Serum Sodium: 141 mmol/L (Using max of 137 mmol/L) at 3/16/2023  4:36 AM  Total Bilirubin: 1.0 mg/dL at 3/16/2023  4:36 AM  INR(ratio): 1.9 at 3/16/2023  9:01 AM  Age: 72 years    Significant Labs:  CBC:   Recent Labs   Lab 03/16/23  0436   WBC 4.47   RBC 2.57*   HGB 7.1*   HCT 23.2*   *     CMP:   Recent Labs   Lab 03/16/23  0436   GLU 77   CALCIUM  7.4*   ALBUMIN 3.2*   PROT 5.4*      K 3.3*   CO2 21*      BUN 23   CREATININE 1.6*   ALKPHOS 94   *   *   BILITOT 1.0     Coagulation:   Recent Labs   Lab 03/15/23  1352 03/16/23  0901   INR 2.2* 1.9*   APTT 25.0  --        Significant Imaging:  US: Reviewed  CT: Reviewed  MRI: Reviewed    Assessment/Plan:     GI  Transaminitis  Unclear etiology for elevation but seems acute and suspect the liver is a bystander in whatever caused patient's presentation. Liver tests are improving. Low concern for chronic liver based on previous imaging, bx w/o fibrosis in 2020 and acuity of recent elevation.  -Continue to montior LFTs  -No additional inpatient recommendations, message sent to nurse for patient to be seen as an outpt to ensure normalization of LFTs        Thank you for your consult. I will sign off. Please contact us if you have any additional questions.    Rosana Torres MD  Hepatology  [facility]

## 2023-03-16 NOTE — ASSESSMENT & PLAN NOTE
Patient has hypokalemia which is currently improving. Last electrolytes reviewed- Recent Labs   Lab 03/15/23  1303 03/15/23  2240   K 2.8* 3.6   . Will replace potassium and monitor electrolytes closely.

## 2023-03-17 ENCOUNTER — TELEPHONE (OUTPATIENT)
Dept: HEPATOLOGY | Facility: CLINIC | Age: 73
End: 2023-03-17
Payer: MEDICARE

## 2023-03-17 LAB
ALBUMIN SERPL BCP-MCNC: 3.2 G/DL (ref 3.5–5.2)
ALP SERPL-CCNC: 118 U/L (ref 55–135)
ALT SERPL W/O P-5'-P-CCNC: 157 U/L (ref 10–44)
ANION GAP SERPL CALC-SCNC: 11 MMOL/L (ref 8–16)
AST SERPL-CCNC: 82 U/L (ref 10–40)
BASOPHILS # BLD AUTO: 0.01 K/UL (ref 0–0.2)
BASOPHILS NFR BLD: 0.3 % (ref 0–1.9)
BILIRUB SERPL-MCNC: 0.9 MG/DL (ref 0.1–1)
BUN SERPL-MCNC: 20 MG/DL (ref 8–23)
CALCIUM SERPL-MCNC: 7.9 MG/DL (ref 8.7–10.5)
CHLORIDE SERPL-SCNC: 107 MMOL/L (ref 95–110)
CO2 SERPL-SCNC: 24 MMOL/L (ref 23–29)
CREAT SERPL-MCNC: 1.2 MG/DL (ref 0.5–1.4)
DIFFERENTIAL METHOD: ABNORMAL
EOSINOPHIL # BLD AUTO: 0 K/UL (ref 0–0.5)
EOSINOPHIL NFR BLD: 0.6 % (ref 0–8)
ERYTHROCYTE [DISTWIDTH] IN BLOOD BY AUTOMATED COUNT: 15.8 % (ref 11.5–14.5)
EST. GFR  (NO RACE VARIABLE): 48 ML/MIN/1.73 M^2
FERRITIN SERPL-MCNC: 36 NG/ML (ref 20–300)
GLUCOSE SERPL-MCNC: 123 MG/DL (ref 70–110)
HCT VFR BLD AUTO: 23.9 % (ref 37–48.5)
HGB BLD-MCNC: 7.3 G/DL (ref 12–16)
IMM GRANULOCYTES # BLD AUTO: 0.01 K/UL (ref 0–0.04)
IMM GRANULOCYTES NFR BLD AUTO: 0.3 % (ref 0–0.5)
INR PPP: 1.7 (ref 0.8–1.2)
IRON SERPL-MCNC: 16 UG/DL (ref 30–160)
LYMPHOCYTES # BLD AUTO: 0.6 K/UL (ref 1–4.8)
LYMPHOCYTES NFR BLD: 18.5 % (ref 18–48)
MCH RBC QN AUTO: 27.2 PG (ref 27–31)
MCHC RBC AUTO-ENTMCNC: 30.5 G/DL (ref 32–36)
MCV RBC AUTO: 89 FL (ref 82–98)
MONOCYTES # BLD AUTO: 0.5 K/UL (ref 0.3–1)
MONOCYTES NFR BLD: 13.5 % (ref 4–15)
NEUTROPHILS # BLD AUTO: 2.3 K/UL (ref 1.8–7.7)
NEUTROPHILS NFR BLD: 66.8 % (ref 38–73)
NRBC BLD-RTO: 0 /100 WBC
PLATELET # BLD AUTO: 160 K/UL (ref 150–450)
PMV BLD AUTO: 11.9 FL (ref 9.2–12.9)
POTASSIUM SERPL-SCNC: 3.9 MMOL/L (ref 3.5–5.1)
PROT SERPL-MCNC: 5.7 G/DL (ref 6–8.4)
PROTHROMBIN TIME: 17.4 SEC (ref 9–12.5)
RBC # BLD AUTO: 2.68 M/UL (ref 4–5.4)
SATURATED IRON: 3 % (ref 20–50)
SODIUM SERPL-SCNC: 142 MMOL/L (ref 136–145)
TOTAL IRON BINDING CAPACITY: 493 UG/DL (ref 250–450)
TRANSFERRIN SERPL-MCNC: 333 MG/DL (ref 200–375)
WBC # BLD AUTO: 3.4 K/UL (ref 3.9–12.7)

## 2023-03-17 PROCEDURE — 97530 THERAPEUTIC ACTIVITIES: CPT

## 2023-03-17 PROCEDURE — 25000003 PHARM REV CODE 250: Performed by: NURSE PRACTITIONER

## 2023-03-17 PROCEDURE — 85610 PROTHROMBIN TIME: CPT | Performed by: FAMILY MEDICINE

## 2023-03-17 PROCEDURE — 80053 COMPREHEN METABOLIC PANEL: CPT | Performed by: NURSE PRACTITIONER

## 2023-03-17 PROCEDURE — 96372 THER/PROPH/DIAG INJ SC/IM: CPT | Performed by: FAMILY MEDICINE

## 2023-03-17 PROCEDURE — 97162 PT EVAL MOD COMPLEX 30 MIN: CPT

## 2023-03-17 PROCEDURE — 63600175 PHARM REV CODE 636 W HCPCS: Performed by: FAMILY MEDICINE

## 2023-03-17 PROCEDURE — 97110 THERAPEUTIC EXERCISES: CPT

## 2023-03-17 PROCEDURE — 25000003 PHARM REV CODE 250: Performed by: FAMILY MEDICINE

## 2023-03-17 PROCEDURE — 97166 OT EVAL MOD COMPLEX 45 MIN: CPT

## 2023-03-17 PROCEDURE — 85025 COMPLETE CBC W/AUTO DIFF WBC: CPT | Performed by: NURSE PRACTITIONER

## 2023-03-17 PROCEDURE — 36415 COLL VENOUS BLD VENIPUNCTURE: CPT | Performed by: NURSE PRACTITIONER

## 2023-03-17 PROCEDURE — 94761 N-INVAS EAR/PLS OXIMETRY MLT: CPT

## 2023-03-17 PROCEDURE — 36415 COLL VENOUS BLD VENIPUNCTURE: CPT | Performed by: FAMILY MEDICINE

## 2023-03-17 PROCEDURE — 82728 ASSAY OF FERRITIN: CPT | Performed by: FAMILY MEDICINE

## 2023-03-17 PROCEDURE — 84466 ASSAY OF TRANSFERRIN: CPT | Performed by: FAMILY MEDICINE

## 2023-03-17 PROCEDURE — G0378 HOSPITAL OBSERVATION PER HR: HCPCS

## 2023-03-17 RX ORDER — PANTOPRAZOLE SODIUM 40 MG/1
40 TABLET, DELAYED RELEASE ORAL DAILY
Status: DISCONTINUED | OUTPATIENT
Start: 2023-03-17 | End: 2023-03-18 | Stop reason: HOSPADM

## 2023-03-17 RX ORDER — WARFARIN 2.5 MG/1
2.5 TABLET ORAL EVERY OTHER DAY
Status: DISCONTINUED | OUTPATIENT
Start: 2023-03-18 | End: 2023-03-18

## 2023-03-17 RX ORDER — ENOXAPARIN SODIUM 100 MG/ML
1 INJECTION SUBCUTANEOUS EVERY 12 HOURS
Qty: 11.2 ML | Refills: 0 | Status: SHIPPED | OUTPATIENT
Start: 2023-03-17 | End: 2023-03-24

## 2023-03-17 RX ORDER — ENOXAPARIN SODIUM 100 MG/ML
1 INJECTION SUBCUTANEOUS
Status: DISCONTINUED | OUTPATIENT
Start: 2023-03-17 | End: 2023-03-18 | Stop reason: HOSPADM

## 2023-03-17 RX ORDER — WARFARIN SODIUM 5 MG/1
5 TABLET ORAL EVERY OTHER DAY
Status: DISCONTINUED | OUTPATIENT
Start: 2023-03-17 | End: 2023-03-18 | Stop reason: HOSPADM

## 2023-03-17 RX ADMIN — WARFARIN SODIUM 5 MG: 5 TABLET ORAL at 05:03

## 2023-03-17 RX ADMIN — ENOXAPARIN SODIUM 70 MG: 80 INJECTION SUBCUTANEOUS at 11:03

## 2023-03-17 RX ADMIN — PANTOPRAZOLE SODIUM 40 MG: 40 TABLET, DELAYED RELEASE ORAL at 09:03

## 2023-03-17 RX ADMIN — ACETAMINOPHEN 650 MG: 325 TABLET ORAL at 08:03

## 2023-03-17 RX ADMIN — MELATONIN TAB 3 MG 6 MG: 3 TAB at 09:03

## 2023-03-17 RX ADMIN — ENOXAPARIN SODIUM 70 MG: 80 INJECTION SUBCUTANEOUS at 09:03

## 2023-03-17 NOTE — NURSING
Pt remains free of falls/injury. Safety precautions maintained. Pt denies pain/discomfort.Lovenox given.    Regular diet tolerated. VSS. No S/S of distress noted at this time. Bed alarm set.12 hour chart check complete. Will continue to monitor.

## 2023-03-17 NOTE — PT/OT/SLP EVAL
Occupational Therapy   Evaluation    Name: Adalgisa Wright  MRN: [id number]  Admitting Diagnosis: AMS (altered mental status)  Recent Surgery: * No surgery found *      Recommendations:     Discharge Recommendations: home health OT  Discharge Equipment Recommendations:  none  Barriers to discharge:  Decreased caregiver support    Assessment:     Adalgisa Wright is a 72 y.o. female with a medical diagnosis of AMS (altered mental status).  She presents with the following performance deficits affecting function: weakness, impaired endurance, impaired self care skills, impaired functional mobility, gait instability, impaired balance, decreased coordination, decreased lower extremity function, decreased safety awareness, decreased ROM, edema.      Rehab Prognosis: Good; patient would benefit from acute skilled OT services to address these deficits and reach maximum level of function.       Plan:     Patient to be seen 2 x/week to address the above listed problems via self-care/home management, therapeutic activities, therapeutic exercises  Plan of Care Expires: 03/31/23  Plan of Care Reviewed with: patient    Subjective     Chief Complaint: weakness  Patient/Family Comments/goals: return home    Occupational Profile:  Living Environment: lives alone in a 1 story house with no steps. Pt was previously receiving HH. Pt reports no family nearby to assist.  Previous level of function: Pt (I) with ADLs, Mod (I) - (I) with functional mobility.   Roles and Routines: drives  Equipment Used at Home: cane, straight, walker, rolling (pt has but does not use BSC and shower chair)  Assistance upon Discharge: unknown    Pain/Comfort:  Pain Rating 1: 0/10  Objective:     Communicated with: Nurse and epic chart review prior to session.  Patient found supine with peripheral IV, telemetry upon OT entry to room.    General Precautions: Standard, fall  Orthopedic Precautions: N/A  Braces: N/A  Respiratory Status: Room air    Occupational  Performance:    Bed Mobility:    Patient completed Rolling/Turning to Right with stand by assistance  Patient completed Scooting/Bridging with stand by assistance  Patient completed Supine to Sit with stand by assistance    Functional Mobility/Transfers:  Patient completed Sit <> Stand Transfer with stand by assistance  with  no assistive device   Patient completed Bed <> Chair Transfer using Stand Pivot technique with stand by assistance with rolling walker  Functional Mobility: Patient completed x140ft functional mobility with SBA and RW to increase dynamic standing balance and activity tolerance needed for ADL completion. Pt initially completed 5ft with CGA and no AD, RW given to pt for safety and stability.     Activities of Daily Living:  Lower Body Dressing: supervision doffed/donned socks EOB    Cognitive/Visual Perceptual:  Cognitive/Psychosocial Skills:     -       Oriented to: Person, Place, and Time   -       Follows Commands/attention:Follows multistep  commands  -       Communication: clear/fluent  -       Safety awareness/insight to disability: intact     Physical Exam:  Edema:  B feet  Sensation:    -       Intact  Upper Extremity Range of Motion:     -       Right Upper Extremity: WNL  -       Left Upper Extremity: WNL  Upper Extremity Strength:    -       Right Upper Extremity: 4/5 grossly  -       Left Upper Extremity: 4/5 grossly   Strength:    -       Right Upper Extremity: WFL  -       Left Upper Extremity: WFL    AMPAC 6 Click ADL:  AMPAC Total Score: 20    Treatment & Education:  Patient educated on role of OT in acute setting and benefits of participation. Educated on techniques to use to increase independence and decrease fall risk with functional transfers. Educated on importance of OOB activity and calling for A to transfer back to bed/meet needs. Educated pt on BUE AROM shoulder flexion, elbow flexion/ext, and digit flexion/ext; pt demonstrated understanding by performing x5 reps  each. Encouraged completion of B UE AROM therex throughout the day to tolerance to increase functional strength and activity tolerance. Educated patient on importance of increased tolerance to upright position and direct impact on CV endurance and strength. Patient encouraged to sit up in chair for a minimum of 2 consecutive hours per day. Patient stated understanding and in agreement with POC.     Patient left up in chair with all lines intact, call button in reach, and nurse notified    GOALS:   Multidisciplinary Problems       Occupational Therapy Goals          Problem: Occupational Therapy    Goal Priority Disciplines Outcome Interventions   Occupational Therapy Goal     OT, PT/OT     Description: Goals to be met by: 3/31/23     Patient will increase functional independence with ADLs by performing:    Toileting from toilet with [place] for hygiene and clothing management.   Stand pivot transfers with Modified [place].  Upper extremity exercise program x20 reps per handout, with independence.                         History:     Past Medical History:   Diagnosis Date    Anticoagulant long-term use     Aortic valve defect     Benign neoplasm of ascending colon 4/6/2017    Cancer     CHF (congestive heart failure)     DDD (degenerative disc disease), cervical 7/20/2018    GERD (gastroesophageal reflux disease)     Hemorrhoids     HLD (hyperlipidemia)     Hypertension     Insomnia 12/1/2014    Irritable bowel syndrome with constipation 4/9/2018    Postmenopausal osteoporosis 7/20/2018         Past Surgical History:   Procedure Laterality Date    AORTIC VALVE REPLACEMENT  2003    COLONOSCOPY Left 4/6/2017    Procedure: COLONOSCOPY;  Surgeon: Sander Ulloa MD;  Location: [facility];  Service: Endoscopy;  Laterality: Left;    COLONOSCOPY N/A 11/7/2019    Procedure: COLONOSCOPY;  Surgeon: Brenda Ochoa MD;  Location: [facility];  Service: Endoscopy;  Laterality: N/A;    COLONOSCOPY N/A 2/26/2021     Procedure: COLONOSCOPY;  Surgeon: Ankur Smith MD;  Location: [facility] ENDO;  Service: General;  Laterality: N/A;    COMPUTED TOMOGRAPHY N/A 11/12/2020    Procedure: Liver microwave ablation;  Surgeon: Gonzalez Moraes MD;  Location: [facility];  Service: General;  Laterality: N/A;    ESOPHAGOGASTRODUODENOSCOPY N/A 11/7/2019    Procedure: ESOPHAGOGASTRODUODENOSCOPY (EGD) needs PT/INR;  Surgeon: Brenda Ochoa MD;  Location: [facility] ENDO;  Service: Endoscopy;  Laterality: N/A;    FLEXIBLE SIGMOIDOSCOPY N/A 2/13/2020    Procedure: SIGMOIDOSCOPY, FLEXIBLE;  Surgeon: Ankur Smith MD;  Location: [facility] OR;  Service: General;  Laterality: N/A;    INJECTION OF ANESTHETIC AGENT INTO TISSUE PLANE DEFINED BY TRANSVERSUS ABDOMINIS MUSCLE N/A 2/13/2020    Procedure: BLOCK, TRANSVERSUS ABDOMINIS PLANE;  Surgeon: Ankur Smith MD;  Location: [facility] OR;  Service: General;  Laterality: N/A;    INSERTION OF TUNNELED CENTRAL VENOUS CATHETER (CVC) WITH SUBCUTANEOUS PORT N/A 3/17/2020    Procedure: [id number];  Surgeon: Ankur Smith MD;  Location: [facility] OR;  Service: General;  Laterality: N/A;    ROBOT-ASSISTED LOW ANTERIOR RESECTION OF COLON N/A 2/13/2020    Procedure: XI ROBOTIC RESECTION, COLON, LOW ANTERIOR;  Surgeon: Ankur Smith MD;  Location: [facility] OR;  Service: General;  Laterality: N/A;       Time Tracking:     OT Date of Treatment: 03/17/23  OT Start Time: 0940  OT Stop Time: 1005  OT Total Time (min): 25 min    Billable Minutes:Evaluation 15  Therapeutic Activity 10    3/17/2023  Rhianna Israel OT

## 2023-03-17 NOTE — PT/OT/SLP EVAL
Physical Therapy Evaluation    Patient Name:  Adalgisa Wright   MRN:  [id number]    Recommendations:     Discharge Recommendations: home health PT   Discharge Equipment Recommendations: none   Barriers to discharge: None    Assessment:     Adalgisa Wright is a 72 y.o. female admitted with a medical diagnosis of AMS (altered mental status).  She presents with the following impairments/functional limitations: weakness, gait instability, impaired balance, impaired endurance, decreased ROM, decreased coordination, impaired functional mobility, impaired self care skills, decreased safety awareness, decreased lower extremity function .    Rehab Prognosis: Good; patient would benefit from acute skilled PT services to address these deficits and reach maximum level of function.    Recent Surgery: * No surgery found *      Plan:     During this hospitalization, patient to be seen 3 x/week to address the identified rehab impairments via gait training, therapeutic activities, therapeutic exercises and progress toward the following goals:    Plan of Care Expires:  03/31/23    Subjective     Chief Complaint: WEAKNESS   Patient/Family Comments/goals: INC MOBILITY   Pain/Comfort:  Pain Rating 1: 0/10  Pain Rating Post-Intervention 1: 0/10    Patients cultural, spiritual, Scientologist conflicts given the current situation:      Living Environment:   PT LIVES AT [facility] ALONE IN A ONE  STORY HOME WITH NO STEPS TO ENTER   Prior to admission, patients level of function was IND AND DRIVES .  Equipment used at home: none.  DME owned (not currently used): rolling walker, single point cane, bedside commode, and shower chair.  Upon discharge, patient will have assistance from UNKNOWN .    Objective:     Communicated with NURSE HUTCHINSON AND [facility] CHART REVIEW  prior to session.  Patient found supine with peripheral IV, telemetry, Other (comments) (AVASYS)  upon PT entry to room.    General Precautions: Standard, fall  Orthopedic Precautions:N/A  "  Braces: N/A  Respiratory Status: Room air    Exams:  RLE ROM: WFL  RLE Strength: WFL  LLE ROM: WFL  LLE Strength: WFL    Functional Mobility:  Bed Mobility:     Rolling Right: supervision  Supine to Sit: supervision  Transfers:     Sit to Stand:  contact guard assistance with rolling walker  Bed to Chair: contact guard assistance with  rolling walker  using  Stand Pivot  Gait: PT GT TRAINED X 140' WITH RW AND SBA.       AM-PAC 6 CLICK MOBILITY  Total Score:21       Treatment & Education:  PT RETURNED TO RM T/F TO CHAIR WITH RW AND CGA. PT EDUCATED ON ROLE OF P.T. AND ON TE FOR STRENGTHENING INCLUDING AP, TKE, AND MIP. PT EDUCATED ON RISK FOR FALLS DUE TO GENERALIZED WEAKNESS, EDUCATED ON "CALL DON'T FALL", ENCOURAGED TO CALL FOR ASSISTANCE WITH ALL NEEDS SUCH AS BED<>CHAIR TRANSFERS OR TRIPS TO BATHROOM.     Patient left up in chair with call button in reach.    GOALS:   Multidisciplinary Problems       Physical Therapy Goals          Problem: Physical Therapy    Goal Priority Disciplines Outcome Goal Variances Interventions   Physical Therapy Goal     PT, PT/OT      Description: LTG: 3/31/23  1. PT WILL COMPLETE BED MOBILITY IND  2. PT WILL STAND T/F TO CHAIR MOD I  3. PT WILL GT TRAIN X 250' WITH RW MOD I                       History:     Past Medical History:   Diagnosis Date    Anticoagulant long-term use     Aortic valve defect     Benign neoplasm of ascending colon 4/6/2017    Cancer     CHF (congestive heart failure)     DDD (degenerative disc disease), cervical 7/20/2018    GERD (gastroesophageal reflux disease)     Hemorrhoids     HLD (hyperlipidemia)     Hypertension     Insomnia 12/1/2014    Irritable bowel syndrome with constipation 4/9/2018    Postmenopausal osteoporosis 7/20/2018       Past Surgical History:   Procedure Laterality Date    AORTIC VALVE REPLACEMENT  2003    COLONOSCOPY Left 4/6/2017    Procedure: COLONOSCOPY;  Surgeon: Sander Ulloa MD;  Location: [facility];  Service: Endoscopy;  " Laterality: Left;    COLONOSCOPY N/A 11/7/2019    Procedure: COLONOSCOPY;  Surgeon: Brenda Ochoa MD;  Location: [facility] ENDO;  Service: Endoscopy;  Laterality: N/A;    COLONOSCOPY N/A 2/26/2021    Procedure: COLONOSCOPY;  Surgeon: Ankur Smith MD;  Location: [facility] ENDO;  Service: General;  Laterality: N/A;    COMPUTED TOMOGRAPHY N/A 11/12/2020    Procedure: Liver microwave ablation;  Surgeon: Gonzalez Moraes MD;  Location: [facility];  Service: General;  Laterality: N/A;    ESOPHAGOGASTRODUODENOSCOPY N/A 11/7/2019    Procedure: ESOPHAGOGASTRODUODENOSCOPY (EGD) needs PT/INR;  Surgeon: Brenda Ochoa MD;  Location: [facility] ENDO;  Service: Endoscopy;  Laterality: N/A;    FLEXIBLE SIGMOIDOSCOPY N/A 2/13/2020    Procedure: SIGMOIDOSCOPY, FLEXIBLE;  Surgeon: Ankur Smith MD;  Location: [facility] OR;  Service: General;  Laterality: N/A;    INJECTION OF ANESTHETIC AGENT INTO TISSUE PLANE DEFINED BY TRANSVERSUS ABDOMINIS MUSCLE N/A 2/13/2020    Procedure: BLOCK, TRANSVERSUS ABDOMINIS PLANE;  Surgeon: Ankur Smith MD;  Location: [facility] OR;  Service: General;  Laterality: N/A;    INSERTION OF TUNNELED CENTRAL VENOUS CATHETER (CVC) WITH SUBCUTANEOUS PORT N/A 3/17/2020    Procedure: [id number];  Surgeon: Ankur Smith MD;  Location: [facility] OR;  Service: General;  Laterality: N/A;    ROBOT-ASSISTED LOW ANTERIOR RESECTION OF COLON N/A 2/13/2020    Procedure: XI ROBOTIC RESECTION, COLON, LOW ANTERIOR;  Surgeon: Ankur Smith MD;  Location: [facility] OR;  Service: General;  Laterality: N/A;       Time Tracking:     PT Received On: 03/17/23  PT Start Time: 0950     PT Stop Time: 1015  PT Total Time (min): 25 min     Billable Minutes: Evaluation 15 and Therapeutic Exercise 10      03/17/2023

## 2023-03-17 NOTE — SUBJECTIVE & OBJECTIVE
Interval History: pt sitting up in chair upon exam with no additional complaints. H/H trended down with no transfusion required.  INR 1.7 with Coumadin continued on Lovenox initiated for bridge.  PT OT evaluation completed with home health recommended.     Review of Systems   Constitutional:  Positive for activity change (improved) and appetite change (improved). Negative for fatigue and fever.   HENT:  Negative for congestion, postnasal drip, sneezing, sore throat and trouble swallowing.    Respiratory:  Negative for cough, shortness of breath and wheezing.    Cardiovascular:  Negative for chest pain, palpitations and leg swelling.   Gastrointestinal:  Negative for abdominal distention, abdominal pain, nausea and vomiting.   Genitourinary:  Positive for difficulty urinating (improved). Negative for dysuria, flank pain, frequency and urgency.   Musculoskeletal:  Negative for arthralgias and back pain.   Skin:  Negative for color change and wound.   Neurological:  Positive for weakness (improved). Negative for dizziness and headaches.   Psychiatric/Behavioral:  Positive for confusion (resolved). Negative for agitation. The patient is not nervous/anxious.    Objective:     Vital Signs (Most Recent):  Temp: 98.2 °F (36.8 °C) (03/17/23 1242)  Pulse: 91 (03/17/23 1311)  Resp: 17 (03/17/23 1242)  BP: 128/60 (03/17/23 1242)  SpO2: 99 % (03/17/23 1242) Vital Signs (24h Range):  Temp:  [98 °F (36.7 °C)-98.6 °F (37 °C)] 98.2 °F (36.8 °C)  Pulse:  [73-91] 91  Resp:  [16-18] 17  SpO2:  [93 %-99 %] 99 %  BP: (109-139)/(54-72) 128/60     Weight: 70.8 kg (156 lb)  Body mass index is 31.51 kg/m².    Intake/Output Summary (Last 24 hours) at 3/17/2023 1536  Last data filed at 3/17/2023 0928  Gross per 24 hour   Intake 780 ml   Output 451 ml   Net 329 ml      Physical Exam  HENT:      Head: Normocephalic.      Nose: Nose normal.      Mouth/Throat:      Pharynx: Oropharynx is clear.   Cardiovascular:      Rate and Rhythm: Normal rate  and regular rhythm.      Pulses: Normal pulses.      Heart sounds: Normal heart sounds.   Pulmonary:      Effort: Pulmonary effort is normal.      Breath sounds: Normal breath sounds.   Abdominal:      General: Bowel sounds are normal. There is no distension.      Palpations: Abdomen is soft.      Tenderness: There is no abdominal tenderness.   Genitourinary:     Comments: Deferred   Musculoskeletal:         General: Normal range of motion.      Cervical back: Normal range of motion.   Skin:     General: Skin is warm and dry.   Neurological:      General: No focal deficit present.      Mental Status: She is alert and oriented to person, place, and time.   Psychiatric:         Mood and Affect: Mood normal.         Behavior: Behavior normal.       Significant Labs: All pertinent labs within the past 24 hours have been reviewed.  CBC:   Recent Labs   Lab 03/16/23  0436 03/16/23  1518 03/17/23  0830   WBC 4.47  --  3.40*   HGB 7.1* 7.8* 7.3*   HCT 23.2* 25.8* 23.9*   *  --  160     CMP:   Recent Labs   Lab 03/15/23  2240 03/16/23  0436 03/17/23  0830    141 142   K 3.6 3.3* 3.9    104 107   CO2 22* 21* 24   GLU 94 77 123*   BUN 27* 23 20   CREATININE 1.8* 1.6* 1.2   CALCIUM 7.4* 7.4* 7.9*   PROT 5.9* 5.4* 5.7*   ALBUMIN 3.4* 3.2* 3.2*   BILITOT 1.1* 1.0 0.9   ALKPHOS 103 94 118   * 158* 82*   * 192* 157*   ANIONGAP 18* 16 11         Significant Imaging: I have reviewed all pertinent imaging results/findings within the past 24 hours.

## 2023-03-17 NOTE — ASSESSMENT & PLAN NOTE
-resolved-kidney function normalized- 3/17/23  Patient with acute kidney injury likely due to IVVD/dehydration and pre-renal azotemia MECHE is currently improving. Labs reviewed- Renal function/electrolytes with Estimated Creatinine Clearance: 39.1 mL/min (based on SCr of 1.2 mg/dL). according to latest data. Monitor urine output and serial BMP and adjust therapy as needed. Avoid nephrotoxins and renally dose meds for GFR listed above.   -improved with gentle hydration

## 2023-03-17 NOTE — ASSESSMENT & PLAN NOTE
Patient is chronically on statin.will not continue for now-due to elevated LFTs  Plan:  -Continue home medication once verified   -low fat/low calorie diet

## 2023-03-17 NOTE — PLAN OF CARE
CM spoke with patient regarding HH upon d/c. Patient agreeable, CM offered to provide list. Patient states she has had [facility] in the past and liked this agency. Referral sent via careport. Patient in agreement for CM to send alternate referrals if needed.

## 2023-03-17 NOTE — PROGRESS NOTES
"Clinical Pharmacy Progress Note: Coumadin Dosing and Monitoring     Goal INR: 2-3   Indication: Mechanical AVR, Afib     Lab Results   Component Value Date    INR 1.7 (H) 03/17/2023    INR 1.9 (H) 03/16/2023    INR 2.2 (H) 03/15/2023     Patient has not yet been educated by pharmacist this admission.     Home dosing regimen: Warfarin 5 mg every other day alternating with warfarin 2.5 mg PO every other day.     Recent dosing history: unknown prior to arrival on 3/15/23. Patient arrived to [facility] on 3/15 at 1223, so if patient takes warfarin at home in the evening, patient may have missed warfarin dose on 3/15.   3/16/23: Warfarin 5 mg dose given   Drug interactions: Acetaminophen and melatonin each may increase the risk of bleeding while on warfarin.    Lab order for daily PT/INR? Yes  Coumadin diet ordered? Yes - modified diet to include "coumadin restriction"    Plan:   - Give booster dose of warfarin 5 mg today, since INR trended down.  - Add Lovenox Bridge per Dr. Hough until INR therapeutic.   - Pharmacy will continue to monitor daily PT/INR. Dose adjustments will be made accordingly.      Thank you for allowing us to participate in this patient's care.      Janae Holder, PharmDAYANNA 03/17/2023 9:27 AM    "

## 2023-03-17 NOTE — ASSESSMENT & PLAN NOTE
Appears near baseline without evidence of active bleeding noted.   Recent Labs   Lab 03/15/23  1303 03/15/23  1336 03/15/23  1636 03/16/23  0436 03/16/23  1518 03/17/23  0830 03/17/23  1007   HGB 8.5*  --   --  7.1* 7.8* 7.3*  --    HCT 27.3*   < >  --  23.2* 25.8* 23.9*  --    MCV 91  --   --  90  --  89  --    MCHC 31.1*  --   --  30.6*  --  30.5*  --    RDW 15.6*  --   --  15.7*  --  15.8*  --      --   --  135*  --  160  --    FERRITIN  --   --   --   --   --   --  36   FOLATE  --   --  10.8  --   --   --   --    [id number]  --   --  1250*  --   --   --   --     < > = values in this interval not displayed.     Plan:  -Monitor H/H and plts  -Type and screen, transfuse as needed   -Continue home medications once verified  -Coumadin resumed in the setting of prosthetic valve with INR monitored and Lovenox initiated to reach therapeutic level.

## 2023-03-17 NOTE — PROGRESS NOTES
[facility] Medicine  Progress Note    Patient Name: Adalgisa Wright  MRN: [id number]  Patient Class: OP- Observation   Admission Date: 3/15/2023  Length of Stay: 0 days  Attending Physician: Manuel Hough MD  Primary Care Provider: Shan Stroud MD        Subjective:     Principal Problem:AMS (altered mental status)        HPI:  Adalgisa Wright is a 72 y.o. female with a PMH  has a past medical history of Anticoagulant long-term use, Aortic valve defect, Benign neoplasm of ascending colon (4/6/2017), Cancer, CHF (congestive heart failure), DDD (degenerative disc disease), cervical (7/20/2018), GERD (gastroesophageal reflux disease), Hemorrhoids, HLD (hyperlipidemia), Hypertension, Insomnia (12/1/2014), Irritable bowel syndrome with constipation (4/9/2018), and Postmenopausal osteoporosis (7/20/2018). Presents as a transfer from our [facility] facility for further evaluation of AMS, hypokalemia, and MECHE.  History obtained by chart review as patient remains altered.  Patient was reportedly found by family in a confused state.  Patient reportedly has continuous falls at home.  Patient does live alone.  Patient reportedly has no immediate family and remote family checks on her from time to time.  Patient is able to tell me her name in his awake and alert but is unsure why she is in the hospital.      ER workup revealed initial potassium of 2.8, magnesium 1.6, BUN/creatinine of 33/2.6, lactic acid of 3.3 with repeat of 1.0, H/H of 8.5/27.3 (previously 9.0/28.0 on 02/26/2023 and 13.0/40.8 on 01/03/2023).  AST/ALT of 190/213, BNP of 339, troponin of 0.041, negative hepatitis panel.  TSH, pro count, and UA unremarkable.  Chest x-ray and CT of head without acute findings.  EKG revealed AFib with right bundle-branch block with a ventricular rate of 65 beats per minute with a QT/QTC of 506/526.  ABG did reveal pH of 7.568, pCO2 of 30.8, PO2 of 124, HC03 of 28.1, and O2 saturation 99%.  Patient received IV and p.o.  potassium as well as 2 g of cefepime and 1500 cc of normal saline.  Patient admitted to hospital under observation status.    PCP: Shan Stroud      Overview/Hospital Course:  Patient admitted to observation for altered mental status in the setting of elevated LFTs and MECHE.  CT of head showed no evidence of acute intracranial abnormality.  UA negative for infectious process.  Ammonia within normal limits.  LFTs elevated with downward trend.  Liver US showed no evidence of acute intracranial abnormality.  Hepatology consulted.  IV hydration given initially with creatinine improved Cr 2.6>1.6.  H&H trended down from 8.5/27 to 7.1/23- will follow repeat results.  Denies any evidence of active bleeding. Coumadin held. Calcium corrected to 7.6. Potassium of 3.3 replaced. BNP elevated and Troponin flat with chest xray showing no acute abnormality. On 3/17/23, INR 1.7 with Coumadin continued and Lovenox bridge initiated. LFTs trending down. Potassium normalized. PT/OT evaluation completed with home health established. H/H trending down but no transfusion required. BUN/Creatinine normalized. Pt verbalized symptom improvement with increased mobility noted.       Interval History: pt sitting up in chair upon exam with no additional complaints. H/H trended down with no transfusion required.  INR 1.7 with Coumadin continued on Lovenox initiated for bridge.  PT OT evaluation completed with home health recommended.     Review of Systems   Constitutional:  Positive for activity change (improved) and appetite change (improved). Negative for fatigue and fever.   HENT:  Negative for congestion, postnasal drip, sneezing, sore throat and trouble swallowing.    Respiratory:  Negative for cough, shortness of breath and wheezing.    Cardiovascular:  Negative for chest pain, palpitations and leg swelling.   Gastrointestinal:  Negative for abdominal distention, abdominal pain, nausea and vomiting.   Genitourinary:  Positive for  difficulty urinating (improved). Negative for dysuria, flank pain, frequency and urgency.   Musculoskeletal:  Negative for arthralgias and back pain.   Skin:  Negative for color change and wound.   Neurological:  Positive for weakness (improved). Negative for dizziness and headaches.   Psychiatric/Behavioral:  Positive for confusion (resolved). Negative for agitation. The patient is not nervous/anxious.    Objective:     Vital Signs (Most Recent):  Temp: 98.2 °F (36.8 °C) (03/17/23 1242)  Pulse: 91 (03/17/23 1311)  Resp: 17 (03/17/23 1242)  BP: 128/60 (03/17/23 1242)  SpO2: 99 % (03/17/23 1242) Vital Signs (24h Range):  Temp:  [98 °F (36.7 °C)-98.6 °F (37 °C)] 98.2 °F (36.8 °C)  Pulse:  [73-91] 91  Resp:  [16-18] 17  SpO2:  [93 %-99 %] 99 %  BP: (109-139)/(54-72) 128/60     Weight: 70.8 kg (156 lb)  Body mass index is 31.51 kg/m².    Intake/Output Summary (Last 24 hours) at 3/17/2023 1536  Last data filed at 3/17/2023 0928  Gross per 24 hour   Intake 780 ml   Output 451 ml   Net 329 ml      Physical Exam  HENT:      Head: Normocephalic.      Nose: Nose normal.      Mouth/Throat:      Pharynx: Oropharynx is clear.   Cardiovascular:      Rate and Rhythm: Normal rate and regular rhythm.      Pulses: Normal pulses.      Heart sounds: Normal heart sounds.   Pulmonary:      Effort: Pulmonary effort is normal.      Breath sounds: Normal breath sounds.   Abdominal:      General: Bowel sounds are normal. There is no distension.      Palpations: Abdomen is soft.      Tenderness: There is no abdominal tenderness.   Genitourinary:     Comments: Deferred   Musculoskeletal:         General: Normal range of motion.      Cervical back: Normal range of motion.   Skin:     General: Skin is warm and dry.   Neurological:      General: No focal deficit present.      Mental Status: She is alert and oriented to person, place, and time.   Psychiatric:         Mood and Affect: Mood normal.         Behavior: Behavior normal.        Significant Labs: All pertinent labs within the past 24 hours have been reviewed.  CBC:   Recent Labs   Lab 03/16/23  0436 03/16/23  1518 03/17/23  0830   WBC 4.47  --  3.40*   HGB 7.1* 7.8* 7.3*   HCT 23.2* 25.8* 23.9*   *  --  160     CMP:   Recent Labs   Lab 03/15/23  2240 03/16/23  0436 03/17/23  0830    141 142   K 3.6 3.3* 3.9    104 107   CO2 22* 21* 24   GLU 94 77 123*   BUN 27* 23 20   CREATININE 1.8* 1.6* 1.2   CALCIUM 7.4* 7.4* 7.9*   PROT 5.9* 5.4* 5.7*   ALBUMIN 3.4* 3.2* 3.2*   BILITOT 1.1* 1.0 0.9   ALKPHOS 103 94 118   * 158* 82*   * 192* 157*   ANIONGAP 18* 16 11         Significant Imaging: I have reviewed all pertinent imaging results/findings within the past 24 hours.      Assessment/Plan:      * AMS (altered mental status)  -resolved  -patient mentation at baseline  Patient has acute metabolic encephalopathy that is secondary to Electrolyte abnormalities- K+2.8, Metabolic derangements- latic acid of 3.3, BUN/creatinine 33/2.6, AST//213, total bili 1.3, ABG with pH 7.568, PCO2 30.8, PO2 124, HCO3 28.1 and Sepsis/Infective. Patient's current mental status is Awake, Alert, Confused. Patient's baseline mental status is. awake and alert; oriented to person, place, and time Evaluation and for underlying cause(s) is underway and inclusive of Blood Chemistries, Neuro-Imaging (MRI/CT), Toxic metabolite eval  and Urine Drug screen. Will monitor neuro checks carefully, avoid narcotics and benzos that will exacerbate agitation, and use PRN medications for controls of behavior for self harm.  -improved- pt alert and oriented upon exam           Low hemoglobin  Appears near baseline without evidence of active bleeding noted.   Recent Labs   Lab 03/15/23  1303 03/15/23  1336 03/15/23  1636 03/16/23  0436 03/16/23  1518 03/17/23  0830 03/17/23  1007   HGB 8.5*  --   --  7.1* 7.8* 7.3*  --    HCT 27.3*   < >  --  23.2* 25.8* 23.9*  --    MCV 91  --   --  90  --  89   --    MCHC 31.1*  --   --  30.6*  --  30.5*  --    RDW 15.6*  --   --  15.7*  --  15.8*  --      --   --  135*  --  160  --    FERRITIN  --   --   --   --   --   --  36   FOLATE  --   --  10.8  --   --   --   --    [id number]  --   --  1250*  --   --   --   --     < > = values in this interval not displayed.     Plan:  -Monitor H/H and plts  -Type and screen, transfuse as needed   -Continue home medications once verified  -Coumadin resumed in the setting of prosthetic valve with INR monitored and Lovenox initiated to reach therapeutic level.      Elevated lactic acid level  -resolved  Initial lactic acid 3.3.  Improved to 1.0 after IV fluids administered.  Plan:  -continue to monitor  -IVFs      Transaminitis  AST/ALT of 190/213>175/158.  Hepatitis panel negative.  Ultrasound of abdomen limited revealed:  -Mildly heterogeneous increased hepatic echogenicity and nodular contour may be related to cirrhosis or other diffuse hepatocellular disease/dysfunction    Plan:  -repeat labs reviewed with downward trend   -hepatology consulted    MECHE (acute kidney injury)  -resolved-kidney function normalized- 3/17/23  Patient with acute kidney injury likely due to IVVD/dehydration and pre-renal azotemia MECHE is currently improving. Labs reviewed- Renal function/electrolytes with Estimated Creatinine Clearance: 39.1 mL/min (based on SCr of 1.2 mg/dL). according to latest data. Monitor urine output and serial BMP and adjust therapy as needed. Avoid nephrotoxins and renally dose meds for GFR listed above.   -improved with gentle hydration       Hypokalemia  Patient has hypokalemia which is currently improving. Last electrolytes reviewed-   Recent Labs   Lab 03/15/23  2240 03/16/23  0436 03/17/23  0830   K 3.6 3.3* 3.9   . Will replace potassium and monitor electrolytes closely.   -replaced   -Mag 1.6         HLD (hyperlipidemia)  Patient is chronically on statin.will not continue for now-due to elevated LFTs  Plan:  -Continue  home medication once verified   -low fat/low calorie diet        Gastroesophageal reflux disease without esophagitis  Chronic. Stable. Currently asymptomatic. Home medications include PPI/Antacids as needed.  Plan:  -Continue PPI/Antacids as needed         Chronic anticoagulation  Reportedly on Coumadin.  INR of 2.2>1.9>1.7  Plan:  -have pharmacy dose Coumadin  -H&H stable with no transfusion required at this time   -continue Coumadin with Lovenox bridge initiated to achieve therapeutic level      Hypertension  Currently normotensive. BP usually well controlled per patient with home medications.  Plan:  -Optimize pain control   -Continue home medications (acebutolol) once verified, titrate as needed   -Monitor BP  -Low salt/cardiac diet when not NPO  -IV hydralazine prn for SBP>160 or DBP>90             VTE Risk Mitigation (From admission, onward)         Ordered     warfarin (COUMADIN) tablet 2.5 mg  Every other day         03/17/23 0926     enoxaparin injection 70 mg  Every 12 hours (non-standard times)         03/17/23 0935     warfarin (COUMADIN) tablet 5 mg  Every other day         03/17/23 0926     Reason for No Pharmacological VTE Prophylaxis  Once        Question:  Reasons:  Answer:  Already adequately anticoagulated on oral Anticoagulants    03/15/23 2220     IP VTE HIGH RISK PATIENT  Once         03/15/23 2220     Place sequential compression device  Until discontinued         03/15/23 2220                Discharge Planning   ROEL:      Code Status: Full Code   Is the patient medically ready for discharge?:     Reason for patient still in hospital (select all that apply): Patient trending condition, Laboratory test, Treatment, Imaging and PT / OT recommendations  Discharge Plan A: Home                  Keshia Obando NP  Department of Hospital Medicine   [facility] - Med Surg

## 2023-03-17 NOTE — ASSESSMENT & PLAN NOTE
Reportedly on Coumadin.  INR of 2.2>1.9>1.7  Plan:  -have pharmacy dose Coumadin  -H&H stable with no transfusion required at this time   -continue Coumadin with Lovenox bridge initiated to achieve therapeutic level

## 2023-03-17 NOTE — ASSESSMENT & PLAN NOTE
Patient has hypokalemia which is currently improving. Last electrolytes reviewed-   Recent Labs   Lab 03/15/23  2240 03/16/23  0436 03/17/23  0830   K 3.6 3.3* 3.9   . Will replace potassium and monitor electrolytes closely.   -replaced   -Mag 1.6

## 2023-03-17 NOTE — ASSESSMENT & PLAN NOTE
-resolved  -patient mentation at baseline  Patient has acute metabolic encephalopathy that is secondary to Electrolyte abnormalities- K+2.8, Metabolic derangements- latic acid of 3.3, BUN/creatinine 33/2.6, AST//213, total bili 1.3, ABG with pH 7.568, PCO2 30.8, PO2 124, HCO3 28.1 and Sepsis/Infective. Patient's current mental status is Awake, Alert, Confused. Patient's baseline mental status is. awake and alert; oriented to person, place, and time Evaluation and for underlying cause(s) is underway and inclusive of Blood Chemistries, Neuro-Imaging (MRI/CT), Toxic metabolite eval  and Urine Drug screen. Will monitor neuro checks carefully, avoid narcotics and benzos that will exacerbate agitation, and use PRN medications for controls of behavior for self harm.  -improved- pt alert and oriented upon exam

## 2023-03-17 NOTE — ASSESSMENT & PLAN NOTE
-resolved  Initial lactic acid 3.3.  Improved to 1.0 after IV fluids administered.  Plan:  -continue to monitor  -IVFs

## 2023-03-17 NOTE — PROGRESS NOTES
Clinical Pharmacy Progress Note: Medication Education     Pharmacist educated patient on warfarin indication, side effects, and drug interactions. Pharmacist discussed importance of medication compliance and INR monitoring and reviewed signs of abnormal bleeding. Pharmacist gave patient warfarin educational handout. Instructed patient to contact the Coumadin Clinic at [phone] for follow-up after discharge. Patient expressed understanding and had no further questions.    Thank you for allowing us to participate in this patient's care.     Janae Holder 3/17/2023 2:45 PM

## 2023-03-17 NOTE — PLAN OF CARE
OT kike completed. Recommends HHOT.  SBA for bed mobility, t/fs with RW, and ambulation 140ft with RW. CGA for ambulation 5ft with no AD.

## 2023-03-17 NOTE — TELEPHONE ENCOUNTER
Attempt to contact patient on [phone], but to no avail. LVM for patient to return call.       ----- Message from Rosana Torres MD sent at 3/16/2023  1:48 PM CDT -----  Please schedule patient to see Sanjuanita in about 2 weeks for post hospital f/u for abnormal LFTs

## 2023-03-17 NOTE — PLAN OF CARE
[facility] - Med Surg  Discharge Final Note    Primary Care Provider: Shan Stroud MD    Expected Discharge Date:     Final Discharge Note (most recent)       Final Note - 03/17/23 1623          Final Note    Assessment Type Final Discharge Note     Anticipated Discharge Disposition Home-Health Care Svc        Post-Acute Status    Post-Acute Authorization Home Health     Home Health Status Pending Clinical Review                     Important Message from Medicare             Contact Info       Shan Stroud MD   Specialty: Internal Medicine   Relationship: PCP - General    [facility]  [id number] [place] [id number]   Phone: [phone]       Next Steps: Follow up          DC Dispo: home with [facility]  PCP f/u: follows with [facility] and patient will need to schedule    CM called pharmacy to confirm Lovenox does not need prior auth. [facility] reviewing referral at this time. CM sending to alternate [facility] agencies as well.

## 2023-03-18 VITALS
HEIGHT: 59 IN | BODY MASS INDEX: 31.45 KG/M2 | WEIGHT: 156 LBS | RESPIRATION RATE: 17 BRPM | HEART RATE: 90 BPM | TEMPERATURE: 98 F | DIASTOLIC BLOOD PRESSURE: 82 MMHG | OXYGEN SATURATION: 97 % | SYSTOLIC BLOOD PRESSURE: 139 MMHG

## 2023-03-18 LAB
ALBUMIN SERPL BCP-MCNC: 3.3 G/DL (ref 3.5–5.2)
ALP SERPL-CCNC: 116 U/L (ref 55–135)
ALT SERPL W/O P-5'-P-CCNC: 118 U/L (ref 10–44)
ANION GAP SERPL CALC-SCNC: 10 MMOL/L (ref 8–16)
AST SERPL-CCNC: 39 U/L (ref 10–40)
BILIRUB SERPL-MCNC: 0.9 MG/DL (ref 0.1–1)
BUN SERPL-MCNC: 14 MG/DL (ref 8–23)
CALCIUM SERPL-MCNC: 8.9 MG/DL (ref 8.7–10.5)
CHLORIDE SERPL-SCNC: 106 MMOL/L (ref 95–110)
CO2 SERPL-SCNC: 27 MMOL/L (ref 23–29)
CREAT SERPL-MCNC: 0.9 MG/DL (ref 0.5–1.4)
EST. GFR  (NO RACE VARIABLE): >60 ML/MIN/1.73 M^2
GLUCOSE SERPL-MCNC: 122 MG/DL (ref 70–110)
INR PPP: 1.9 (ref 0.8–1.2)
POTASSIUM SERPL-SCNC: 3.8 MMOL/L (ref 3.5–5.1)
PROT SERPL-MCNC: 5.8 G/DL (ref 6–8.4)
PROTHROMBIN TIME: 19.8 SEC (ref 9–12.5)
SODIUM SERPL-SCNC: 143 MMOL/L (ref 136–145)

## 2023-03-18 PROCEDURE — 25000003 PHARM REV CODE 250: Performed by: FAMILY MEDICINE

## 2023-03-18 PROCEDURE — 36415 COLL VENOUS BLD VENIPUNCTURE: CPT | Performed by: FAMILY MEDICINE

## 2023-03-18 PROCEDURE — 85610 PROTHROMBIN TIME: CPT | Performed by: FAMILY MEDICINE

## 2023-03-18 PROCEDURE — 63600175 PHARM REV CODE 636 W HCPCS: Performed by: FAMILY MEDICINE

## 2023-03-18 PROCEDURE — G0378 HOSPITAL OBSERVATION PER HR: HCPCS

## 2023-03-18 PROCEDURE — 96374 THER/PROPH/DIAG INJ IV PUSH: CPT | Mod: 59

## 2023-03-18 PROCEDURE — 80053 COMPREHEN METABOLIC PANEL: CPT | Performed by: NURSE PRACTITIONER

## 2023-03-18 PROCEDURE — 63600175 PHARM REV CODE 636 W HCPCS: Performed by: NURSE PRACTITIONER

## 2023-03-18 PROCEDURE — 36415 COLL VENOUS BLD VENIPUNCTURE: CPT | Performed by: NURSE PRACTITIONER

## 2023-03-18 PROCEDURE — 25000003 PHARM REV CODE 250: Performed by: NURSE PRACTITIONER

## 2023-03-18 PROCEDURE — 96372 THER/PROPH/DIAG INJ SC/IM: CPT | Performed by: FAMILY MEDICINE

## 2023-03-18 RX ORDER — ONDANSETRON 8 MG/1
8 TABLET, ORALLY DISINTEGRATING ORAL EVERY 12 HOURS PRN
Qty: 20 TABLET | Refills: 0 | Status: SHIPPED | OUTPATIENT
Start: 2023-03-18 | End: 2023-03-18 | Stop reason: SDUPTHER

## 2023-03-18 RX ORDER — FERROUS SULFATE, DRIED 160(50) MG
1 TABLET, EXTENDED RELEASE ORAL DAILY
Status: DISCONTINUED | OUTPATIENT
Start: 2023-03-18 | End: 2023-03-18 | Stop reason: HOSPADM

## 2023-03-18 RX ORDER — POLYETHYLENE GLYCOL 3350 17 G/17G
17 POWDER, FOR SOLUTION ORAL DAILY PRN
Qty: 238 G | Refills: 0 | Status: SHIPPED | OUTPATIENT
Start: 2023-03-18 | End: 2023-03-28

## 2023-03-18 RX ORDER — ONDANSETRON 8 MG/1
8 TABLET, ORALLY DISINTEGRATING ORAL ONCE
Status: COMPLETED | OUTPATIENT
Start: 2023-03-18 | End: 2023-03-18

## 2023-03-18 RX ORDER — ONDANSETRON 8 MG/1
8 TABLET, ORALLY DISINTEGRATING ORAL EVERY 12 HOURS PRN
Qty: 20 TABLET | Refills: 0 | Status: SHIPPED | OUTPATIENT
Start: 2023-03-18 | End: 2023-03-28

## 2023-03-18 RX ORDER — DULOXETIN HYDROCHLORIDE 30 MG/1
60 CAPSULE, DELAYED RELEASE ORAL DAILY
Status: DISCONTINUED | OUTPATIENT
Start: 2023-03-18 | End: 2023-03-18 | Stop reason: HOSPADM

## 2023-03-18 RX ORDER — FERROUS SULFATE 325(65) MG
325 TABLET ORAL
Qty: 30 TABLET | Refills: 0 | Status: SHIPPED | OUTPATIENT
Start: 2023-03-19 | End: 2023-04-18

## 2023-03-18 RX ORDER — DULOXETIN HYDROCHLORIDE 60 MG/1
60 CAPSULE, DELAYED RELEASE ORAL DAILY
Qty: 30 CAPSULE | Refills: 0 | Status: SHIPPED | OUTPATIENT
Start: 2023-03-19 | End: 2023-04-18

## 2023-03-18 RX ORDER — WARFARIN SODIUM 5 MG/1
5 TABLET ORAL ONCE
Status: COMPLETED | OUTPATIENT
Start: 2023-03-18 | End: 2023-03-18

## 2023-03-18 RX ORDER — WARFARIN 2.5 MG/1
2.5 TABLET ORAL EVERY OTHER DAY
Status: DISCONTINUED | OUTPATIENT
Start: 2023-03-20 | End: 2023-03-18 | Stop reason: HOSPADM

## 2023-03-18 RX ADMIN — Medication 1 TABLET: at 08:03

## 2023-03-18 RX ADMIN — IRON SUCROSE 200 MG: 20 INJECTION, SOLUTION INTRAVENOUS at 08:03

## 2023-03-18 RX ADMIN — ENOXAPARIN SODIUM 70 MG: 80 INJECTION SUBCUTANEOUS at 10:03

## 2023-03-18 RX ADMIN — DULOXETINE 60 MG: 30 CAPSULE, DELAYED RELEASE ORAL at 01:03

## 2023-03-18 RX ADMIN — ONDANSETRON 8 MG: 8 TABLET, ORALLY DISINTEGRATING ORAL at 10:03

## 2023-03-18 RX ADMIN — PANTOPRAZOLE SODIUM 40 MG: 40 TABLET, DELAYED RELEASE ORAL at 08:03

## 2023-03-18 RX ADMIN — WARFARIN SODIUM 5 MG: 5 TABLET ORAL at 10:03

## 2023-03-18 NOTE — PROGRESS NOTES
Pharmacy Consult Note: Warfarin     Adalgisa S Adriana 's Coumadin will be dosed and monitored by Pharmacy.      Target INR goal is 2-3.    INR   Date Value Ref Range Status   03/18/2023 1.9 (H) 0.8 - 1.2 Final     Comment:     Coumadin Therapy:  2.0 - 3.0 for INR for all indicators except mechanical heart valves  and antiphospholipid syndromes which should use 2.5 - 3.5.         Indication: atrial fibrillation with AVR  Home dose: 2.5mg alternateded with 5mg every other day.  Patient will be **given a dose of 5 mg per day.    Dose for Today: 5 mg    PT/INR will be monitored daily. Dose adjustments will be made accordingly.      Thank you for allowing us to participate in this patient's care.     Anjana Alvarez, PharmD 3/18/2023 10:21 AM

## 2023-03-18 NOTE — PLAN OF CARE
Discussed poc with pt, pt verbalized understanding     Purposeful rounding every 2hours  Pt ambulated to bathroom w/ assistance and tolerated well.     Cardiac monitor in place. Pt running Afib.  Fall precautions in place, remains injury free.  Nausea being managed with PRN meds.  Pt has no c/o pain.     Accurate I&Os  Bed locked at lowest position  Call light within reach     Chart check complete  Reviewed active orders  Will continue with POC

## 2023-03-18 NOTE — PLAN OF CARE
[facility]  Discharge Final Note    Primary Care Provider: Shan Stroud MD    Expected Discharge Date: 3/18/2023    Final Discharge Note (most recent)       Final Note - 03/18/23 1002          Final Note    Assessment Type Final Discharge Note     Anticipated Discharge Disposition Home-Health Care Svc        Post-Acute Status    Home Health Status Set-up Complete/Auth obtained     Discharge Delays None known at this time                     Important Message from Medicare             Contact Info       Shan Stroud MD   Specialty: Internal Medicine   Relationship: PCP - General    [facility]  [address] [address] [address]   Phone: [phone]       Next Steps: Follow up    [facility]   Specialty: Home Health Services, Home Therapy Services    [address] [address]   Phone: [phone]       Next Steps: Follow up          [facility]  Patient has [facility] PCP, will need to schedule own appointment.

## 2023-03-18 NOTE — PLAN OF CARE
A572/A572 IBRAHIMARohith Wright is a 72 y.o.female admitted on 3/15/2023 for AMS (altered mental status)   Code Status: Full Code MRN: [id number]   Review of patient's allergies indicates:   Allergen Reactions    Penicillins Rash     Past Medical History:   Diagnosis Date    Anticoagulant long-term use     Aortic valve defect     Benign neoplasm of ascending colon 4/6/2017    Cancer     CHF (congestive heart failure)     DDD (degenerative disc disease), cervical 7/20/2018    GERD (gastroesophageal reflux disease)     Hemorrhoids     HLD (hyperlipidemia)     Hypertension     Insomnia 12/1/2014    Irritable bowel syndrome with constipation 4/9/2018    Postmenopausal osteoporosis 7/20/2018      PRN meds    acetaminophen, 650 mg, Q4H PRN  acetaminophen, 650 mg, Q8H PRN  aluminum-magnesium hydroxide-simethicone, 30 mL, QID PRN  dextrose 10%, 12.5 g, PRN  dextrose 10%, 25 g, PRN  glucagon (human recombinant), 1 mg, PRN  glucose, 16 g, PRN  glucose, 24 g, PRN  melatonin, 6 mg, Nightly PRN  polyethylene glycol, 17 g, Daily PRN  prochlorperazine, 2.5 mg, Q6H PRN  simethicone, 1 tablet, QID PRN  sodium chloride 0.9%, 3 mL, Q12H PRN      Chart check completed. Will continue plan of care.     Problem: Fluid and Electrolyte Imbalance (Acute Kidney Injury/Impairment)  Goal: Fluid and Electrolyte Balance  Outcome: Ongoing, Progressing     Problem: Fall Injury Risk  Goal: Absence of Fall and Fall-Related Injury  Outcome: Ongoing, Progressing     Problem: Renal Function Impairment (Acute Kidney Injury/Impairment)  Goal: Effective Renal Function  Outcome: Ongoing, Progressing        Orientation: oriented x 4  [place] Coma Scale Score: 15     Lead Monitored: Lead II Rhythm: atrial rhythm    Cardiac/Telemetry Box Number: [id number]  VTE Required Core Measure: Pharmacological prophylaxis initiated/maintained Last Bowel Movement: 03/15/23  [facility]; Coumadin Restriction  Voiding Characteristics: due to void  Alok Score:  20  Fall Risk Score: 12  Accucheck []   Freq?      Lines/Drains/Airways       Peripheral Intravenous Line  Duration                  Peripheral IV - Single Lumen 03/17/23 0610 Anterior;Left Forearm <1 day

## 2023-03-18 NOTE — DISCHARGE SUMMARY
[facility] Medicine  Discharge Summary      Patient Name: Adalgisa Wright  MRN: [id number]  [facility]: [id number]  Patient Class: OP- Observation  Admission Date: 3/15/2023  Hospital Length of Stay: 0 days  Discharge Date and Time: 3/18/2023  2:35 PM  Attending Physician: Dr. Manuel Hough   Discharging Provider: Keshia Obando NP  Primary Care Provider: Shan Stroud MD    Primary Care Team: Networked reference to record PCT     HPI:   Adalgisa Wright is a 72 y.o. female with a PMH  has a past medical history of Anticoagulant long-term use, Aortic valve defect, Benign neoplasm of ascending colon (4/6/2017), Cancer, CHF (congestive heart failure), DDD (degenerative disc disease), cervical (7/20/2018), GERD (gastroesophageal reflux disease), Hemorrhoids, HLD (hyperlipidemia), Hypertension, Insomnia (12/1/2014), Irritable bowel syndrome with constipation (4/9/2018), and Postmenopausal osteoporosis (7/20/2018). Presents as a transfer from our [facility] facility for further evaluation of AMS, hypokalemia, and MECHE.  History obtained by chart review as patient remains altered.  Patient was reportedly found by family in a confused state.  Patient reportedly has continuous falls at home.  Patient does live alone.  Patient reportedly has no immediate family and remote family checks on her from time to time.  Patient is able to tell me her name in his awake and alert but is unsure why she is in the hospital.      ER workup revealed initial potassium of 2.8, magnesium 1.6, BUN/creatinine of 33/2.6, lactic acid of 3.3 with repeat of 1.0, H/H of 8.5/27.3 (previously 9.0/28.0 on 02/26/2023 and 13.0/40.8 on 01/03/2023).  AST/ALT of 190/213, BNP of 339, troponin of 0.041, negative hepatitis panel.  TSH, pro count, and UA unremarkable.  Chest x-ray and CT of head without acute findings.  EKG revealed AFib with right bundle-branch block with a ventricular rate of 65 beats per minute with a QT/QTC of 506/526.  ABG did reveal pH  of 7.568, pCO2 of 30.8, PO2 of 124, HC03 of 28.1, and O2 saturation 99%.  Patient received IV and p.o. potassium as well as 2 g of cefepime and 1500 cc of normal saline.  Patient admitted to hospital under observation status.    PCP: Shan Stroud      * No surgery found *      Hospital Course:   Patient admitted to observation for altered mental status in the setting of elevated LFTs and MECHE.  CT of head showed no evidence of acute intracranial abnormality.  UA negative for infectious process.  Ammonia within normal limits.  LFTs elevated with downward trend.  Liver US showed no evidence of acute intracranial abnormality.  Hepatology consulted.  IV hydration given initially with creatinine improved Cr 2.6>1.6.  H&H trended down from 8.5/27 to 7.1/23- will follow repeat results.  Denies any evidence of active bleeding. Coumadin held. Calcium corrected to 7.6. Potassium of 3.3 replaced. BNP elevated and Troponin flat with chest xray showing no acute abnormality. On 3/17/23, INR 1.7 with Coumadin continued and Lovenox bridge initiated. LFTs trending down. Potassium normalized. PT/OT evaluation completed with home health established. H/H trending down but no transfusion required. BUN/Creatinine normalized. Pt verbalized symptom improvement with increased mobility noted. On 3/18/23, patient alert and oriented x4 with episodes of occasional forgetfulness noted. INR 1.9 noted with Lovenox bridge continued to achieve therapeutic level.  H&H stable and Venofer given for iron supplementation.  LFTs trending down with AST normalized.  Lactic acid normalized.  Blood cultures with no growth to date Vital signs stable upon discharge.  Patient seen and examined deemed stable to discharge home accompanied by cousin.  Discharge instructions reviewed with patient's last cousin and understanding verbalized.  Medications reconciled with Lovenox, iron, Zofran, and MiraLax prescribed.  Ambien and Statin held. Ambulatory referral  placed to home health prior to discharge.  Pt/family instructed for patient to follow-up with PCP, Hepatology, and Cardiology upon discharge for further evaluation with INR to be evaluated on 3/20/23..        Goals of Care Treatment Preferences:  Code Status: Full Code    Living Will: Yes              Consults:   Consults (From admission, onward)          Status Ordering Provider     Inpatient consult to Social Work  Once        Provider:  (Not yet assigned)    Completed KHANH CAMARA.     Inpatient consult to Social Work  Once        Provider:  (Not yet assigned)    Completed KHANH CAMARA.     Inpatient Consult to Telemedicine - Hepatology  Once        Provider:  Rosana Torres MD    Completed KHANH CAMARA.     Inpatient consult to Social Work  Once        Provider:  (Not yet assigned)    Completed LE, DAVID     IP consult to case management  Once        Provider:  (Not yet assigned)    Completed LE, DAVID          Final Active Diagnoses:    Diagnosis Date Noted POA    PRINCIPAL PROBLEM:  AMS (altered mental status) [R41.82] 03/16/2023 Unknown    Hypokalemia [E87.6] 03/16/2023 Unknown    MECHE (acute kidney injury) [N17.9] 03/16/2023 Unknown    Transaminitis [R74.01] 03/16/2023 Unknown    Elevated lactic acid level [R79.89] 03/16/2023 Unknown    Low hemoglobin [D64.9] 03/16/2023 Unknown    HLD (hyperlipidemia) [E78.5] 10/08/2019 Yes    Hypertension [I10] 07/20/2018 Yes    Gastroesophageal reflux disease without esophagitis [K21.9] 07/20/2018 Yes    Chronic anticoagulation [Z79.01] 04/09/2018 Not Applicable      Problems Resolved During this Admission:       Discharged Condition: stable    Disposition: Home or Self Care    Follow Up:   Follow-up Information       Shan Stroud MD Follow up.    Specialty: Internal Medicine  Contact information:  [address] [address] [facility] [address] [phone]               [facility] Follow up.    Specialties: Home Health Services, Home Therapy  Services  Contact information:  [address]  [facility] [address]  [phone]             JENNIFER Rojas Follow up in 2 week(s).    Specialties: Gastroenterology, Hepatology  Why: -hospital follow up  Contact information:  [address] [facility]  [place] [address]  [phone]               Lyndon Kemp MD Follow up in 3 day(s).    Specialty: Cardiology  Why: -hospital follow up and INR check on Monday 3/20/23- medication review  Contact information:  [address]  [place] [address]  [phone]                           Patient Instructions:      Ambulatory referral/consult to Home Health   Referral Priority: Routine Referral Type: Home Health   Referral Reason: Specialty Services Required   Requested Specialty: Home Health Services   Number of Visits Requested: 1       Significant Diagnostic Studies: Labs: All labs within the past 24 hours have been reviewed    Pending Diagnostic Studies:       None           Medications:  Reconciled Home Medications:      Medication List        START taking these medications      enoxaparin 80 mg/0.8 mL Syrg  Commonly known as: LOVENOX  Inject 0.7 mLs (70 mg total) into the skin every 12 (twelve) hours. for 7 days     ferrous sulfate 325 mg (65 mg iron) Tab tablet  Commonly known as: FEOSOL  Take 1 tablet (325 mg total) by mouth daily with breakfast.     ondansetron 8 MG Tbdl  Commonly known as: ZOFRAN-ODT  Take 1 tablet (8 mg total) by mouth every 12 (twelve) hours as needed (as needed for nausea).     polyethylene glycol 17 gram Pwpk  Commonly known as: GLYCOLAX  Take 17 g by mouth daily as needed (constipation).            CHANGE how you take these medications      DULoxetine 60 MG capsule  Commonly known as: CYMBALTA  Take 1 capsule (60 mg total) by mouth once daily.  What changed: when to take this            CONTINUE taking these medications      acebutoloL 400 mg capsule  Commonly known as: SECTRAL  Take 400 mg by mouth 2  (two) times daily.     ergocalciferol 50,000 unit Cap  Commonly known as: ERGOCALCIFEROL  Take 1 capsule by mouth every 30 days.     furosemide 40 MG tablet  Commonly known as: LASIX  Take 40 mg by mouth Daily.     multivitamin tablet  Commonly known as: THERAGRAN  Take 1 tablet by mouth.     pantoprazole 20 MG tablet  Commonly known as: PROTONIX  Take 1 tablet by mouth every morning.     pregabalin 100 MG capsule  Commonly known as: LYRICA  Take 1 capsule (100 mg total) by mouth 2 (two) times daily.     * warfarin 5 MG tablet  Commonly known as: COUMADIN  Take 1 tablet (5 mg total) by mouth Daily. Take one tablet by mouth on Mondays and Fridays. AS DIRECTED BY COUMADIN CLINIC.     * warfarin 2.5 MG tablet  Commonly known as: COUMADIN  Take 1 tablet every day EXCEPT on Mondays and Fridays. -- OR AS DIRECTED BY COUMADIN CLINIC           * This list has 2 medication(s) that are the same as other medications prescribed for you. Read the directions carefully, and ask your doctor or other care provider to review them with you.                STOP taking these medications      ALPRAZolam 1 MG tablet  Commonly known as: XANAX     lovastatin 40 MG tablet  Commonly known as: MEVACOR     VITAMIN B COMPLEX ORAL     zolpidem 10 mg Tab  Commonly known as: AMBIEN              Indwelling Lines/Drains at time of discharge:   Lines/Drains/Airways       None                   Time spent on the discharge of patient: > 38minutes         Keshia Obando NP  Department of Hospital Medicine  [facility]

## 2023-03-18 NOTE — NURSING
Gave pt written and verbal discharge education. Pt verbalizes understanding. Pt IV removed, catheter intact. Telemetry removed and returned. Pt discharged in stable condition.

## 2023-03-18 NOTE — DISCHARGE INSTRUCTIONS
Pt will need INR check on Monday 3/20/23  -Continue to take Lovenox as prescribed on 3/18/23 and 3/19/23 with INR to be checked on 3/20/23   -Pt received Coumadin 5 mg on 3/18/23- Please take next Coumadin dose on 3/19/23

## 2023-03-20 NOTE — PLAN OF CARE
[facility] returned call late Friday and can accept. Patient set up with [facility] as this was first choice.

## 2023-03-21 LAB
BACTERIA BLD CULT: NORMAL
BACTERIA BLD CULT: NORMAL

## 2023-04-11 ENCOUNTER — EXTERNAL HOME HEALTH (OUTPATIENT)
Dept: HOME HEALTH SERVICES | Facility: HOSPITAL | Age: 73
End: 2023-04-11
Payer: MEDICARE

## 2023-04-21 ENCOUNTER — HOSPITAL ENCOUNTER (OUTPATIENT)
Dept: RADIOLOGY | Facility: HOSPITAL | Age: 73
Discharge: HOME | End: 2023-04-21
Attending: INTERNAL MEDICINE
Payer: MEDICARE

## 2023-04-21 DIAGNOSIS — C18.9 COLON ADENOCARCINOMA: ICD-10-CM

## 2023-04-21 PROCEDURE — 74177 CT ABD & PELVIS W/CONTRAST: CPT | Mod: TC,PO

## 2023-04-21 PROCEDURE — 74177 CT CHEST ABDOMEN PELVIS WITH CONTRAST (XPD): ICD-10-PCS | Mod: 26,,, | Performed by: RADIOLOGY

## 2023-04-21 PROCEDURE — 74177 CT ABD & PELVIS W/CONTRAST: CPT | Mod: 26,,, | Performed by: RADIOLOGY

## 2023-04-21 PROCEDURE — A9698 NON-RAD CONTRAST MATERIALNOC: HCPCS | Mod: PO | Performed by: INTERNAL MEDICINE

## 2023-04-21 PROCEDURE — 25500020 PHARM REV CODE 255: Mod: PO | Performed by: INTERNAL MEDICINE

## 2023-04-21 PROCEDURE — 71260 CT CHEST ABDOMEN PELVIS WITH CONTRAST (XPD): ICD-10-PCS | Mod: 26,,, | Performed by: RADIOLOGY

## 2023-04-21 PROCEDURE — 71260 CT THORAX DX C+: CPT | Mod: 26,,, | Performed by: RADIOLOGY

## 2023-04-21 PROCEDURE — 71260 CT THORAX DX C+: CPT | Mod: TC,PO

## 2023-04-21 RX ADMIN — IOHEXOL 75 ML: 350 INJECTION, SOLUTION INTRAVENOUS at 10:04

## 2023-04-21 RX ADMIN — IOHEXOL 500 ML: 9 SOLUTION ORAL at 10:04

## 2023-04-24 ENCOUNTER — OFFICE VISIT (OUTPATIENT)
Dept: HEMATOLOGY/ONCOLOGY | Facility: CLINIC | Age: 73
End: 2023-04-24
Payer: MEDICARE

## 2023-04-24 ENCOUNTER — LAB VISIT (OUTPATIENT)
Dept: LAB | Facility: HOSPITAL | Age: 73
End: 2023-04-24
Attending: INTERNAL MEDICINE
Payer: MEDICARE

## 2023-04-24 VITALS
HEIGHT: 57 IN | WEIGHT: 151 LBS | DIASTOLIC BLOOD PRESSURE: 75 MMHG | BODY MASS INDEX: 32.58 KG/M2 | SYSTOLIC BLOOD PRESSURE: 139 MMHG | TEMPERATURE: 98 F | HEART RATE: 66 BPM | OXYGEN SATURATION: 95 %

## 2023-04-24 DIAGNOSIS — I50.9 CONGESTIVE HEART FAILURE, UNSPECIFIED HF CHRONICITY, UNSPECIFIED HEART FAILURE TYPE: ICD-10-CM

## 2023-04-24 DIAGNOSIS — D50.0 IRON DEFICIENCY ANEMIA DUE TO CHRONIC BLOOD LOSS: ICD-10-CM

## 2023-04-24 DIAGNOSIS — D64.9 ANEMIA, UNSPECIFIED TYPE: ICD-10-CM

## 2023-04-24 DIAGNOSIS — C18.9 COLON ADENOCARCINOMA: Primary | ICD-10-CM

## 2023-04-24 LAB
IRON SERPL-MCNC: 32 UG/DL (ref 30–160)
SATURATED IRON: 5 % (ref 20–50)
TOTAL IRON BINDING CAPACITY: 635 UG/DL (ref 250–450)
TRANSFERRIN SERPL-MCNC: 429 MG/DL (ref 200–375)

## 2023-04-24 PROCEDURE — 3078F PR MOST RECENT DIASTOLIC BLOOD PRESSURE < 80 MM HG: ICD-10-PCS | Mod: CPTII,S$GLB,, | Performed by: INTERNAL MEDICINE

## 2023-04-24 PROCEDURE — 1159F MED LIST DOCD IN RCRD: CPT | Mod: CPTII,S$GLB,, | Performed by: INTERNAL MEDICINE

## 2023-04-24 PROCEDURE — 3008F BODY MASS INDEX DOCD: CPT | Mod: CPTII,S$GLB,, | Performed by: INTERNAL MEDICINE

## 2023-04-24 PROCEDURE — 1159F PR MEDICATION LIST DOCUMENTED IN MEDICAL RECORD: ICD-10-PCS | Mod: CPTII,S$GLB,, | Performed by: INTERNAL MEDICINE

## 2023-04-24 PROCEDURE — 36415 COLL VENOUS BLD VENIPUNCTURE: CPT | Performed by: INTERNAL MEDICINE

## 2023-04-24 PROCEDURE — 99999 PR PBB SHADOW E&M-EST. PATIENT-LVL III: ICD-10-PCS | Mod: PBBFAC,,, | Performed by: INTERNAL MEDICINE

## 2023-04-24 PROCEDURE — 1101F PT FALLS ASSESS-DOCD LE1/YR: CPT | Mod: CPTII,S$GLB,, | Performed by: INTERNAL MEDICINE

## 2023-04-24 PROCEDURE — 3078F DIAST BP <80 MM HG: CPT | Mod: CPTII,S$GLB,, | Performed by: INTERNAL MEDICINE

## 2023-04-24 PROCEDURE — 1126F AMNT PAIN NOTED NONE PRSNT: CPT | Mod: CPTII,S$GLB,, | Performed by: INTERNAL MEDICINE

## 2023-04-24 PROCEDURE — 99215 PR OFFICE/OUTPT VISIT, EST, LEVL V, 40-54 MIN: ICD-10-PCS | Mod: S$GLB,,, | Performed by: INTERNAL MEDICINE

## 2023-04-24 PROCEDURE — 99215 OFFICE O/P EST HI 40 MIN: CPT | Mod: S$GLB,,, | Performed by: INTERNAL MEDICINE

## 2023-04-24 PROCEDURE — 1126F PR PAIN SEVERITY QUANTIFIED, NO PAIN PRESENT: ICD-10-PCS | Mod: CPTII,S$GLB,, | Performed by: INTERNAL MEDICINE

## 2023-04-24 PROCEDURE — 3075F SYST BP GE 130 - 139MM HG: CPT | Mod: CPTII,S$GLB,, | Performed by: INTERNAL MEDICINE

## 2023-04-24 PROCEDURE — 3075F PR MOST RECENT SYSTOLIC BLOOD PRESS GE 130-139MM HG: ICD-10-PCS | Mod: CPTII,S$GLB,, | Performed by: INTERNAL MEDICINE

## 2023-04-24 PROCEDURE — 3288F PR FALLS RISK ASSESSMENT DOCUMENTED: ICD-10-PCS | Mod: CPTII,S$GLB,, | Performed by: INTERNAL MEDICINE

## 2023-04-24 PROCEDURE — 99999 PR PBB SHADOW E&M-EST. PATIENT-LVL III: CPT | Mod: PBBFAC,,, | Performed by: INTERNAL MEDICINE

## 2023-04-24 PROCEDURE — 84466 ASSAY OF TRANSFERRIN: CPT | Performed by: INTERNAL MEDICINE

## 2023-04-24 PROCEDURE — 82728 ASSAY OF FERRITIN: CPT | Performed by: INTERNAL MEDICINE

## 2023-04-24 PROCEDURE — 3288F FALL RISK ASSESSMENT DOCD: CPT | Mod: CPTII,S$GLB,, | Performed by: INTERNAL MEDICINE

## 2023-04-24 PROCEDURE — 1101F PR PT FALLS ASSESS DOC 0-1 FALLS W/OUT INJ PAST YR: ICD-10-PCS | Mod: CPTII,S$GLB,, | Performed by: INTERNAL MEDICINE

## 2023-04-24 PROCEDURE — 3008F PR BODY MASS INDEX (BMI) DOCUMENTED: ICD-10-PCS | Mod: CPTII,S$GLB,, | Performed by: INTERNAL MEDICINE

## 2023-04-24 RX ORDER — SODIUM CHLORIDE 0.9 % (FLUSH) 0.9 %
10 SYRINGE (ML) INJECTION
OUTPATIENT
Start: 2023-05-08

## 2023-04-24 RX ORDER — HEPARIN 100 UNIT/ML
500 SYRINGE INTRAVENOUS
OUTPATIENT
Start: 2023-05-01

## 2023-04-24 RX ORDER — HEPARIN 100 UNIT/ML
500 SYRINGE INTRAVENOUS
OUTPATIENT
Start: 2023-04-24

## 2023-04-24 RX ORDER — SODIUM CHLORIDE 0.9 % (FLUSH) 0.9 %
10 SYRINGE (ML) INJECTION
OUTPATIENT
Start: 2023-05-01

## 2023-04-24 RX ORDER — SODIUM CHLORIDE 0.9 % (FLUSH) 0.9 %
10 SYRINGE (ML) INJECTION
OUTPATIENT
Start: 2023-04-24

## 2023-04-24 RX ORDER — SODIUM CHLORIDE 0.9 % (FLUSH) 0.9 %
10 SYRINGE (ML) INJECTION
OUTPATIENT
Start: 2023-05-15

## 2023-04-24 RX ORDER — HEPARIN 100 UNIT/ML
500 SYRINGE INTRAVENOUS
OUTPATIENT
Start: 2023-05-22

## 2023-04-24 RX ORDER — HEPARIN 100 UNIT/ML
500 SYRINGE INTRAVENOUS
OUTPATIENT
Start: 2023-05-08

## 2023-04-24 RX ORDER — HEPARIN 100 UNIT/ML
500 SYRINGE INTRAVENOUS
OUTPATIENT
Start: 2023-05-15

## 2023-04-24 RX ORDER — SODIUM CHLORIDE 0.9 % (FLUSH) 0.9 %
10 SYRINGE (ML) INJECTION
OUTPATIENT
Start: 2023-05-22

## 2023-04-24 NOTE — PROGRESS NOTES
Subjective:      DATE OF VISIT: 4/24/2023   ?  Patient ID:?Adalgisa Wright is a 72 y.o. female.?? MR#: [id number]   ?   PRIMARY PROVIDER: Dr. Quintanilla    CHIEF COMPLAINT:  Follow-up  ?   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   CURRENT TREATMENT: surveillance    PAST TREATMENT: FOLFOX, C1D1 3/23/20 - 9/11/20; liver ablation 11/2020    Follow-up  She is following closely with Cardiology due to recent admission for CHF exacerbation, new atrial fibrillation.  She continues on anticoagulation with Coumadin.  She denies any evidence of melena hematochezia hemoptysis hematemesis.  She is had weight gain associated with CHF exacerbation currently on diuretics.  No new pain.    Review of Systems    ?   A comprehensive 14-point review of systems was reviewed with patient and was negative other than as specified above.   ?     Objective:      Physical Exam      ?   Vitals:    04/24/23 1112   BP: 139/75   Pulse: 66   Temp: 97.5 °F (36.4 °C)      ECOG:?0  General appearance: Generally well appearing, in no acute distress.   Head, eyes, ears, nose, and throat: moist mucous membranes.   Respiratory:  Normal work of breathing  Abdomen: nontender, nondistended.   Extremities: Warm,   Diffuse edema.  Neurologic: Alert and oriented.   Skin: No rashes, ecchymoses or petechial lesion.   Psychiatric:  Normal mood and affect.    Laboratory:  ?   No visits with results within 1 Day(s) from this visit.   Latest known visit with results is:   Lab Visit on 04/21/2023   Component Date Value Ref Range Status    WBC 04/21/2023 4.74  3.90 - 12.70 K/uL Final    RBC 04/21/2023 3.44 (L)  4.00 - 5.40 M/uL Final    Hemoglobin 04/21/2023 8.9 (L)  12.0 - 16.0 g/dL Final    Hematocrit 04/21/2023 29.8 (L)  37.0 - 48.5 % Final    MCV 04/21/2023 87  82 - 98 fL Final    MCH 04/21/2023 25.9 (L)  27.0 - 31.0 pg Final    MCHC 04/21/2023 29.9 (L)  32.0  - 36.0 g/dL Final    RDW 04/21/2023 18.1 (H)  11.5 - 14.5 % Final    Platelets 04/21/2023 194  150 - 450 K/uL Final    MPV 04/21/2023 11.1  9.2 - 12.9 fL Final    Immature Granulocytes 04/21/2023 0.2  0.0 - 0.5 % Final    Gran # (ANC) 04/21/2023 3.1  1.8 - 7.7 K/uL Final    Immature Grans (Abs) 04/21/2023 0.01  0.00 - 0.04 K/uL Final    Lymph # 04/21/2023 1.1  1.0 - 4.8 K/uL Final    Mono # 04/21/2023 0.4  0.3 - 1.0 K/uL Final    Eos # 04/21/2023 0.0  0.0 - 0.5 K/uL Final    Baso # 04/21/2023 0.05  0.00 - 0.20 K/uL Final    nRBC 04/21/2023 0  0 /100 WBC Final    Gran % 04/21/2023 66.0  38.0 - 73.0 % Final    Lymph % 04/21/2023 23.0  18.0 - 48.0 % Final    Mono % 04/21/2023 9.1  4.0 - 15.0 % Final    Eosinophil % 04/21/2023 0.6  0.0 - 8.0 % Final    Basophil % 04/21/2023 1.1  0.0 - 1.9 % Final    Differential Method 04/21/2023 Automated   Final    Sodium 04/21/2023 140  136 - 145 mmol/L Final    Potassium 04/21/2023 3.6  3.5 - 5.1 mmol/L Final    Chloride 04/21/2023 97  95 - 110 mmol/L Final    CO2 04/21/2023 29  23 - 29 mmol/L Final    Glucose 04/21/2023 127 (H)  70 - 110 mg/dL Final    BUN 04/21/2023 19  8 - 23 mg/dL Final    Creatinine 04/21/2023 1.8 (H)  0.5 - 1.4 mg/dL Final    Calcium 04/21/2023 9.2  8.7 - 10.5 mg/dL Final    Total Protein 04/21/2023 7.0  6.0 - 8.4 g/dL Final    Albumin 04/21/2023 4.0  3.5 - 5.2 g/dL Final    Total Bilirubin 04/21/2023 0.8  0.1 - 1.0 mg/dL Final    Alkaline Phosphatase 04/21/2023 97  55 - 135 U/L Final    AST 04/21/2023 14  10 - 40 U/L Final    ALT 04/21/2023 11  10 - 44 U/L Final    Anion Gap 04/21/2023 14  8 - 16 mmol/L Final    eGFR 04/21/2023 29.8 (A)  >60 mL/min/1.73 m^2 Final    CEA 04/21/2023 7.2 (H)  0.0 - 5.0 ng/mL Final     Lab Results   Component Value Date    IRON 16 (L) 03/17/2023    TIBC 493 (H) 03/17/2023    FERRITIN 36 03/17/2023       Tumor markers    CEA    CEA   Date Value Ref Range Status   04/21/2023 7.2 (H) 0.0 - 5.0 ng/mL  Final     Comment:     CEA Normal Range:  Non-Smokers: 0-3.0 ng/mL  Smokers:     0-5.0 ng/mL    The testing method is a chemiluminescent microparticle immunoassay   manufactured by [employer] and performed on the [employer]   or   [employer] system. Values obtained with different assay manufacturers   for   methods may be different and cannot be used interchangeably.     01/03/2023 4.8 0.0 - 5.0 ng/mL Final     Comment:     CEA Normal Range:  Non-Smokers: 0-3.0 ng/mL  Smokers:     0-5.0 ng/mL    The testing method is a chemiluminescent microparticle immunoassay   manufactured by [employer] and performed on the    or   [employer] system. Values obtained with different assay manufacturers   for   methods may be different and cannot be used interchangeably.     08/15/2022 3.8 0.0 - 5.0 ng/mL Final     Comment:     CEA Normal Range:  Non-Smokers: 0-3.0 ng/mL  Smokers:     0-5.0 ng/mL    The testing method is a chemiluminescent microparticle immunoassay   manufactured by [employer] and performed on the [employer]   or   [employer] system. Values obtained with different assay manufacturers   for   methods may be different and cannot be used interchangeably.     08/15/2022 3.8 0.0 - 5.0 ng/mL Final     Comment:     CEA Normal Range:  Non-Smokers: 0-3.0 ng/mL  Smokers:     0-5.0 ng/mL    The testing method is a chemiluminescent microparticle immunoassay   manufactured by [employer] and performed on the [employer]   or   [employer] system. Values obtained with different assay manufacturers   for   methods may be different and cannot be used interchangeably.     05/09/2022 4.2 0.0 - 5.0 ng/mL Final     Comment:     CEA Normal Range:  Non-Smokers: 0-3.0 ng/mL  Smokers:     0-5.0 ng/mL     01/25/2022 3.2 0.0 - 5.0 ng/mL Final     Comment:     CEA Normal Range:  Non-Smokers: 0-3.0 ng/mL  Smokers:     0-5.0 ng/mL     09/07/2021 3.5 0.0 - 5.0 ng/mL Final     Comment:     CEA Normal  Range:  Non-Smokers: 0-3.0 ng/mL  Smokers:     0-5.0 ng/mL     06/03/2021 3.4 0.0 - 5.0 ng/mL Final     Comment:     CEA Normal Range:  Non-Smokers: 0-3.0 ng/mL  Smokers:     0-5.0 ng/mL     01/15/2021 6.4 (H) 0.0 - 5.0 ng/mL Final     Comment:     CEA Normal Range:  Non-Smokers: 0-3.0 ng/mL  Smokers:     0-5.0 ng/mL     09/25/2020 8.7 (H) 0.0 - 5.0 ng/mL Final     Comment:     CEA Normal Range:  Non-Smokers: 0-3.0 ng/mL  Smokers:     0-5.0 ng/mL              ? IMAGING  No results found for this or any previous visit (from the past 2160 hour(s)).    No results found for this or any previous visit (from the past 2160 hour(s)).    Results for orders placed or performed during the hospital encounter of 04/21/23 (from the past 2160 hour(s))   CT Chest Abdomen Pelvis With Contrast (xpd)    Narrative    EXAMINATION:  CT CHEST, ABDOMEN AND PELVIS    CLINICAL HISTORY:  Rectosigmoid adenocarcinoma, history of chemotherapy, liver ablation    TECHNIQUE:  Axial CT images were obtained through the chest,  abdomen and pelvis following IV administration of 75 cc Omnipaque.  Oral contrast also utilized.  Coronal and sagittal reconstructions submitted and interpreted.  Total .  Automated exposure control utilized.    COMPARISON:  08/25/2022    CT CHEST:  There is respiratory motion throughout the lung fields somewhat limiting evaluation.  No focal consolidation, pneumothorax or pleural effusion.  Small right pleural fluid is present.  No pneumothorax.  No suspicious lung nodule or mass.    Heart is enlarged.  Thoracic aorta and coronary arteries are partially calcified.  Central airways are clear.    No enlarged lymph nodes are in the chest.    No acute or suspicious osseous findings in the chest.  Lower thoracic compression deformities are stable.    CT ABDOMEN AND PELVIS:    Ill-defined hypodense mass in the posterior right dome of the liver measures approximately 3.3 x 2.4 cm, unchanged from the prior exam.  Just inferior  is a stable 0.7 cm hypodense nodule, series 4, image 31.  No new liver lesions are identified.    Kidneys, spleen, adrenals, pancreas and gallbladder are normal.    Stomach and small bowel are normal.  Operative changes are in the sigmoid colon.  Appendix is normal in caliber.  Small free fluid is in the pelvis.  There is no free air.    No enlarged lymph nodes in the abdomen or pelvis.  Abdominal aorta is normal in caliber.    Urinary bladder is normal.  Uterus and adnexa are unremarkable.    No acute or suspicious osseous findings in the abdomen or pelvis.      Impression    1. No evidence of recurrent or new metastatic disease in the chest, abdomen or pelvis.  2. Stable liver lesions.  3. Small right pleural effusion.      Electronically signed by: David Zapata MD  Date:    04/21/2023  Time:    10:45   Results for orders placed or performed during the hospital encounter of 03/15/23 (from the past 2160 hour(s))   CT Head Without Contrast    Narrative    EXAMINATION:  CT HEAD WITHOUT CONTRAST    CLINICAL HISTORY:  Dizziness, persistent/recurrent, cardiac or vascular cause suspected; Other fatigue    TECHNIQUE:  Low dose axial images were obtained through the head.  Coronal and sagittal reformations were also performed. Contrast was not administered.    All CT scans at this location are performed using dose optimization techniques including the following: Automated exposure control; adjustment of the mA and/or kv; use of iterative reconstruction technique.    COMPARISON:  CT dated 04/20/2018    FINDINGS:  Cerebral and ventricular volumes are within normal limits.  No evidence of hydrocephalus.    No CT evidence of acute territorial infarct, intracranial hemorrhage, or mass lesion.  No midline shift or mass effect.    Midline structures and posterior fossa structures appear unremarkable.  Basal cisterns appear clear.  Mild bilateral carotid siphon calcification noted.    Calvarium is intact without acute or  aggressive abnormality.  Orbits and globes appear within normal limits.  Small right maxillary sinus mucosal retention cyst and mild mucosal thickening of the right sphenoid sinus.  Remaining paranasal sinuses and mastoid air cells are clear.      Impression    No evidence of acute intracranial abnormality.      Electronically signed by: Mohsen Lindsay  Date:    03/15/2023  Time:    13:24       CT CHEST ABDOMEN PELVIS WITH CONTRAST (XPD)     CLINICAL HISTORY:  restaging colon cancer; Malignant neoplasm of colon, unspecified     TECHNIQUE:  Low dose axial images, sagittal and coronal reformations were obtained from the thoracic inlet to the pubic symphysis following the IV administration of 75 mL of Omnipaque 350 and the oral administration of 30 ml of Omnipaque 350.     COMPARISON:  02/22/2021.     FINDINGS:  Chest:     Heart and great vessels: Status post median sternotomy and aortic valve replacement.  Aortic atherosclerosis.  No aneurysm.  No pericardial effusion.     Adenopathy: None demonstrated.     Lungs: Bilateral lung scarring or atelectasis.  No suspicious pulmonary nodules/masses.  No pleural effusion.     Abdomen:     Liver: Area of low attenuation compatible with post treatment change in the posterior right hepatic lobe, not significantly changed measuring 3.2 x 2.4 cm.  No abnormal enhancement.  Additional subcentimeter hypodense foci elsewhere in the posterior right hepatic lobe, unchanged.  No new hepatic lesions.     Gallbladder and biliary: Within normal limits.     Spleen: Within normal limits.     Pancreas: Within normal limits.     Adrenals: Within normal limits.     Kidneys: Within normal limits.     Bowel: Within normal limits.  No evidence of obstruction.     Peritoneum: No ascites or pneumoperitoneum.     Abdominal Adenopathy: None.     Vasculature: Within normal limits.     Pelvis:     Urinary bladder: Unremarkable.     Reproductive organs: Within normal limits.     Pelvis adenopathy:  None.     Bones: Multilevel chronic compression fractures in the thoracolumbar spine.  No acute findings.     Miscellaneous: None.     Impression:     Stable CT of the chest, abdomen, and pelvis.  ?   Assessment/Plan:       1. Colon adenocarcinoma    2. Anemia, unspecified type    3. Congestive heart failure, unspecified HF chronicity, unspecified heart failure type    4. Iron deficiency anemia due to chronic blood loss              Plan:     # rectosigmoid adenocarcinoma, pT3 pN2 pMx, Stage III vs IV possible oligometastatic disease in liver, MSI stable:  Initiated adjuvant chemotherapy C1D1 3/23/20.  Restaging PET-CT after 4 cycles showed interval resolution of mildly avid right hepatic lobe lesion previously nondiagnostic on multiple biopsies; I discussed that interval improvement while on chemotherapy does raise concern for potential malignant involvement.  Case discussed at multidisciplinary tumor board with review of imaging and recommendation to continue adjuvant chemotherapy with plan for follow-up with surgery for consideration of resection of likely oligo metastatic liver disease.   Completed 6 months FOLFOX on 09/09/2020.    Restaging PET negative for evidence of recurrence or metastatic disease.  We reviewed her case in multidisciplinary tumor board 09/25/2020.  We did discuss that despite negative biopsy x2 of liver abnormality on initial MRI/PET mild avidity response with chemotherapy is concerning for possible metastatic involvement.  Recommendation at tumor board for local treatment and patient pursued IR ablation completed November 2020.    1/21/21 MR liver with post treatment affects noted in no evidence of new/recurrent lesion.  Restaging CT chest abdomen pelvis 02/22/2021 with post treatment changes in right hepatic lobe and no other concerning findings for recurrent/metastatic disease.     - Labs   April 2023 with significant new anemia labs from hospitalization March 2023 with iron deficiency.   She denies any evidence of bleeding.   CEA with mild elevation to 7.  CT chest abdomen pelvis unrevealing. Given history of rectosigmoid adenocarcinoma  In setting of new iron deficiency anemia recommend follow-up with colorectal surgery, scope evaluation and consideration of MRI liver if unrevealing and persistent elevation in CEA.  Will send message to colorectal surgery.    Iron deficiency anemia:  Will repeat iron indices as she is received possible IV iron inpatient and will assess status and additional IV iron therapy as indicated.    # peripheral neuropathy: on duloxetine increased dose and lyrica now t.i.d. with some improvement        Follow-Up:   Route Chart for Scheduling    Med Onc Chart Routing      Follow up with physician 6 weeks.   Follow up with [facility]    Infusion scheduling note    Injection scheduling note    Labs CBC, CMP and CEA   Scheduling:  Preferred lab:  Lab interval:  In 6 weeks couple days prior to visit   Imaging    Pharmacy appointment    Other referrals             Therapy Plan Information  Flushes  heparin, porcine (PF) 100 unit/mL injection flush 500 Units  500 Units, Intravenous, Every visit  sodium chloride 0.9% flush 10 mL  10 mL, Intravenous, Every visit

## 2023-04-24 NOTE — Clinical Note
H/o Stage ?IV olimet colon ca with new BAILEY. CEA new elevation similar to prior when c/f liver met s/p local tx. CT unrevealing. Wanted to know if you wanted to see her back and scope vs GI EGD/colo and was considering MR liver

## 2023-04-24 NOTE — PATIENT INSTRUCTIONS
Labs today  Possible iv iron pending labs  Fup with Dr. Sarah HOLM in 6 wks with labs couple days prior

## 2023-04-25 ENCOUNTER — TELEPHONE (OUTPATIENT)
Dept: SURGERY | Facility: CLINIC | Age: 73
End: 2023-04-25
Payer: MEDICARE

## 2023-04-25 DIAGNOSIS — C18.9 COLON ADENOCARCINOMA: Primary | ICD-10-CM

## 2023-04-25 LAB — FERRITIN SERPL-MCNC: 35 NG/ML (ref 20–300)

## 2023-04-25 NOTE — TELEPHONE ENCOUNTER
Spoke with patient regarding appointment with Dr. Smith. Patient will return call when she gets in touch with her ride to see what her availability is.     ----- Message from Ankur Smith MD sent at 4/25/2023 12:13 PM CDT -----  Yes we can see her back. Can def get MRI liver to look at the lesions      ----- Message -----  From: Danika Quintanilla MD  Sent: 4/24/2023   1:21 PM CDT  To: Ankur Smith MD    H/o Stage ?IV olimet colon ca with new BAILEY. CEA new elevation similar to prior when c/f liver met s/p local tx. CT unrevealing. Wanted to know if you wanted to see her back and scope vs GI EGD/colo and was considering MR liver

## 2023-05-08 ENCOUNTER — OFFICE VISIT (OUTPATIENT)
Dept: SURGERY | Facility: CLINIC | Age: 73
End: 2023-05-08
Payer: MEDICARE

## 2023-05-08 VITALS
SYSTOLIC BLOOD PRESSURE: 122 MMHG | TEMPERATURE: 98 F | WEIGHT: 151 LBS | HEART RATE: 71 BPM | DIASTOLIC BLOOD PRESSURE: 80 MMHG | HEIGHT: 57 IN | BODY MASS INDEX: 32.58 KG/M2

## 2023-05-08 DIAGNOSIS — C19 ADENOCARCINOMA OF RECTOSIGMOID JUNCTION: Primary | ICD-10-CM

## 2023-05-08 DIAGNOSIS — K76.9 LIVER LESION: ICD-10-CM

## 2023-05-08 DIAGNOSIS — Z85.038 PERSONAL HISTORY OF COLON CANCER: ICD-10-CM

## 2023-05-08 PROCEDURE — 3288F FALL RISK ASSESSMENT DOCD: CPT | Mod: CPTII,S$GLB,, | Performed by: COLON & RECTAL SURGERY

## 2023-05-08 PROCEDURE — 3079F PR MOST RECENT DIASTOLIC BLOOD PRESSURE 80-89 MM HG: ICD-10-PCS | Mod: CPTII,S$GLB,, | Performed by: COLON & RECTAL SURGERY

## 2023-05-08 PROCEDURE — 1159F MED LIST DOCD IN RCRD: CPT | Mod: CPTII,S$GLB,, | Performed by: COLON & RECTAL SURGERY

## 2023-05-08 PROCEDURE — 99999 PR PBB SHADOW E&M-EST. PATIENT-LVL III: ICD-10-PCS | Mod: PBBFAC,,, | Performed by: COLON & RECTAL SURGERY

## 2023-05-08 PROCEDURE — 3008F PR BODY MASS INDEX (BMI) DOCUMENTED: ICD-10-PCS | Mod: CPTII,S$GLB,, | Performed by: COLON & RECTAL SURGERY

## 2023-05-08 PROCEDURE — 3074F SYST BP LT 130 MM HG: CPT | Mod: CPTII,S$GLB,, | Performed by: COLON & RECTAL SURGERY

## 2023-05-08 PROCEDURE — 1101F PR PT FALLS ASSESS DOC 0-1 FALLS W/OUT INJ PAST YR: ICD-10-PCS | Mod: CPTII,S$GLB,, | Performed by: COLON & RECTAL SURGERY

## 2023-05-08 PROCEDURE — 1126F AMNT PAIN NOTED NONE PRSNT: CPT | Mod: CPTII,S$GLB,, | Performed by: COLON & RECTAL SURGERY

## 2023-05-08 PROCEDURE — 1101F PT FALLS ASSESS-DOCD LE1/YR: CPT | Mod: CPTII,S$GLB,, | Performed by: COLON & RECTAL SURGERY

## 2023-05-08 PROCEDURE — 3079F DIAST BP 80-89 MM HG: CPT | Mod: CPTII,S$GLB,, | Performed by: COLON & RECTAL SURGERY

## 2023-05-08 PROCEDURE — 99214 PR OFFICE/OUTPT VISIT, EST, LEVL IV, 30-39 MIN: ICD-10-PCS | Mod: S$GLB,,, | Performed by: COLON & RECTAL SURGERY

## 2023-05-08 PROCEDURE — 1126F PR PAIN SEVERITY QUANTIFIED, NO PAIN PRESENT: ICD-10-PCS | Mod: CPTII,S$GLB,, | Performed by: COLON & RECTAL SURGERY

## 2023-05-08 PROCEDURE — 1159F PR MEDICATION LIST DOCUMENTED IN MEDICAL RECORD: ICD-10-PCS | Mod: CPTII,S$GLB,, | Performed by: COLON & RECTAL SURGERY

## 2023-05-08 PROCEDURE — 3288F PR FALLS RISK ASSESSMENT DOCUMENTED: ICD-10-PCS | Mod: CPTII,S$GLB,, | Performed by: COLON & RECTAL SURGERY

## 2023-05-08 PROCEDURE — 99999 PR PBB SHADOW E&M-EST. PATIENT-LVL III: CPT | Mod: PBBFAC,,, | Performed by: COLON & RECTAL SURGERY

## 2023-05-08 PROCEDURE — 3074F PR MOST RECENT SYSTOLIC BLOOD PRESSURE < 130 MM HG: ICD-10-PCS | Mod: CPTII,S$GLB,, | Performed by: COLON & RECTAL SURGERY

## 2023-05-08 PROCEDURE — 3008F BODY MASS INDEX DOCD: CPT | Mod: CPTII,S$GLB,, | Performed by: COLON & RECTAL SURGERY

## 2023-05-08 PROCEDURE — 99214 OFFICE O/P EST MOD 30 MIN: CPT | Mod: S$GLB,,, | Performed by: COLON & RECTAL SURGERY

## 2023-05-08 NOTE — PROGRESS NOTES
History & Physical    SUBJECTIVE:     CC: rectosigmoid cancer  Ref MD: Brenda Ochoa MD    History of Present Illness:  Patient is a 72 y.o. 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  Patient was undergoing a colonoscopy on November 7, 2019 where a rectosigmoid mass was found and biopsied which showed moderately differentiated adenocarcinoma.  Patient had no other intramucosal lesions at that time.  She is undergoing colonoscopy for iron deficiency anemia.  She had previously been found the month before to have iron deficiency anemia as well as blood when wiping after a bowel movement.  She is on chronic anticoagulation therapy of Coumadin for mechanical heart valve which was done in 2003.  She denies any fever, chills, nausea, vomiting, diarrhea, melena, weight loss or any other signs or symptoms.  She has no personal family history of colorectal cancer or IBD.  Her last colonoscopy prior to this was in 2017 were no lesion was found in this area. Her only significant past surgical history is this mechanical valve replacement.  No episodes of fecal incontinence per her report and some chronic constipation noted. She is referred to Colorectal surgery for evaluation.  I have personally spoken with her referring provider and reviewed her previous records.    2/13/2020: Robotic LAR with CR anastomosis  11/12/2020: liver ablation    Interval history:  Since last clinic visit, the patient has done well.  Denies any hematochezia or melena.  Is tolerating regular diet. Increasing CEA recently and some anemia. Pending liver MRI. Also recent Afib and possible ablation this week per patient report.      Review of patient's allergies indicates:   Allergen Reactions    Penicillins Rash       Current Outpatient Medications   Medication Sig Dispense Refill    acebutolol (SECTRAL) 400 mg capsule Take 400 mg by mouth 2 (two) times daily.      ergocalciferol (ERGOCALCIFEROL) 50,000 unit Cap Take 1 capsule by  mouth every 30 days.      furosemide (LASIX) 40 MG tablet Take 40 mg by mouth Daily.      multivitamin (THERAGRAN) tablet Take 1 tablet by mouth.      pantoprazole (PROTONIX) 20 MG tablet Take 1 tablet by mouth every morning.      pregabalin (LYRICA) 100 MG capsule Take 1 capsule (100 mg total) by mouth 2 (two) times daily. 60 capsule 2    warfarin (COUMADIN) 2.5 MG tablet Take 1 tablet every day EXCEPT on Mondays and Fridays. -- OR AS DIRECTED BY COUMADIN CLINIC 90 tablet 1    warfarin (COUMADIN) 5 MG tablet Take 1 tablet (5 mg total) by mouth Daily. Take one tablet by mouth on Mondays and Fridays. AS DIRECTED BY COUMADIN CLINIC. 30 tablet 3    DULoxetine (CYMBALTA) 60 MG capsule Take 1 capsule (60 mg total) by mouth once daily. 30 capsule 0     No current facility-administered medications for this visit.       Past Medical History:   Diagnosis Date    Anticoagulant long-term use     Aortic valve defect     Benign neoplasm of ascending colon 4/6/2017    Cancer     CHF (congestive heart failure)     DDD (degenerative disc disease), cervical 7/20/2018    GERD (gastroesophageal reflux disease)     Hemorrhoids     HLD (hyperlipidemia)     Hypertension     Insomnia 12/1/2014    Irritable bowel syndrome with constipation 4/9/2018    Postmenopausal osteoporosis 7/20/2018     Past Surgical History:   Procedure Laterality Date    AORTIC VALVE REPLACEMENT  2003    COLONOSCOPY Left 4/6/2017    Procedure: COLONOSCOPY;  Surgeon: Sander Ulloa MD;  Location: [facility];  Service: Endoscopy;  Laterality: Left;    COLONOSCOPY N/A 11/7/2019    Procedure: COLONOSCOPY;  Surgeon: Brenda Ochoa MD;  Location: [facility];  Service: Endoscopy;  Laterality: N/A;    COLONOSCOPY N/A 2/26/2021    Procedure: COLONOSCOPY;  Surgeon: Ankur Smith MD;  Location: [facility];  Service: General;  Laterality: N/A;    COMPUTED TOMOGRAPHY N/A 11/12/2020    Procedure: Liver microwave ablation;  Surgeon: Gonzalez Moraes MD;  Location: [facility];   Service: General;  Laterality: N/A;    ESOPHAGOGASTRODUODENOSCOPY N/A 11/7/2019    Procedure: ESOPHAGOGASTRODUODENOSCOPY (EGD) needs PT/INR;  Surgeon: Brenda Ochoa MD;  Location: [facility] ENDO;  Service: Endoscopy;  Laterality: N/A;    FLEXIBLE SIGMOIDOSCOPY N/A 2/13/2020    Procedure: SIGMOIDOSCOPY, FLEXIBLE;  Surgeon: Ankur Smith MD;  Location: [facility] OR;  Service: General;  Laterality: N/A;    INJECTION OF ANESTHETIC AGENT INTO TISSUE PLANE DEFINED BY TRANSVERSUS ABDOMINIS MUSCLE N/A 2/13/2020    Procedure: BLOCK, TRANSVERSUS ABDOMINIS PLANE;  Surgeon: Ankur Smith MD;  Location: [facility] OR;  Service: General;  Laterality: N/A;    INSERTION OF TUNNELED CENTRAL VENOUS CATHETER (CVC) WITH SUBCUTANEOUS PORT N/A 3/17/2020    Procedure: [id number];  Surgeon: Ankur Smith MD;  Location: [facility] OR;  Service: General;  Laterality: N/A;    ROBOT-ASSISTED LOW ANTERIOR RESECTION OF COLON N/A 2/13/2020    Procedure: XI ROBOTIC RESECTION, COLON, LOW ANTERIOR;  Surgeon: Ankur Smith MD;  Location: [facility] OR;  Service: General;  Laterality: N/A;     Family History   Problem Relation Age of Onset    Diabetes Mother     Heart disease Mother     Hypertension Mother     Heart disease Father     Hypertension Father     Hypertension Sister     Hypertension Son     Heart disease Son     Colon cancer Neg Hx      Social History     Tobacco Use    Smoking status: Never    Smokeless tobacco: Never   Substance Use Topics    Alcohol use: Not Currently    Drug use: No        Review of Systems:  Review of Systems   Constitutional:  Negative for activity change, appetite change, chills, fatigue, fever and unexpected weight change.   HENT:  Negative for congestion, ear pain, sore throat and trouble swallowing.    Eyes:  Negative for pain, redness and itching.   Respiratory:  Negative for cough, shortness of breath and wheezing.    Cardiovascular:  Negative for chest pain, palpitations and leg swelling.    Gastrointestinal:  Negative for abdominal distention, abdominal pain, anal bleeding, blood in stool, constipation, diarrhea, nausea, rectal pain and vomiting.   Endocrine: Negative for cold intolerance, heat intolerance and polyuria.   Genitourinary:  Negative for dysuria, flank pain, frequency and hematuria.   Musculoskeletal:  Negative for gait problem, joint swelling and neck pain.   Skin:  Negative for color change, rash and wound.   Allergic/Immunologic: Negative for environmental allergies and immunocompromised state.   Neurological:  Negative for dizziness, speech difficulty, weakness and numbness.   Psychiatric/Behavioral:  Negative for agitation, confusion and hallucinations.      OBJECTIVE:       Physical Exam:  Physical Exam  Constitutional:       Appearance: She is well-developed.   HENT:      Head: Normocephalic and atraumatic.   Eyes:      Conjunctiva/sclera: Conjunctivae normal.   Neck:      Thyroid: No thyromegaly.   Cardiovascular:      Rate and Rhythm: Normal rate and regular rhythm.   Pulmonary:      Effort: Pulmonary effort is normal. No respiratory distress.   Abdominal:      General: There is no distension.      Palpations: Abdomen is soft. There is no mass.      Tenderness: There is no abdominal tenderness.      Comments: Incisions well-healed; no erythema or drainage   Musculoskeletal:         General: No tenderness. Normal range of motion.      Cervical back: Normal range of motion.   Skin:     General: Skin is warm and dry.      Capillary Refill: Capillary refill takes less than 2 seconds.      Findings: No rash.   Neurological:      Mental Status: She is alert and oriented to person, place, and time.     Laboratory  Lab Results   Component Value Date    WBC 4.74 04/21/2023    HGB 8.9 (L) 04/21/2023    HCT 29.8 (L) 04/21/2023     04/21/2023    ALT 11 04/21/2023    AST 14 04/21/2023     04/21/2023    K 3.6 04/21/2023    CL 97 04/21/2023    CREATININE 1.8 (H) 04/21/2023    BUN  19 04/21/2023    CO2 29 04/21/2023    TSH 2.863 03/15/2023    INR 1.9 (H) 03/18/2023    HGBA1C 5.3 03/14/2022     Component Ref Range & Units 2 wk ago  (4/21/23) 4 mo ago  (1/3/23) 8 mo ago  (8/15/22) 8 mo ago  (8/15/22) 12 mo ago  (5/9/22) 1 yr ago  (1/25/22) 1 yr ago  (9/7/21)   CEA 0.0 - 5.0 ng/mL 7.2 High   4.8 CM  3.8 CM  3.8 CM  4.2 CM  3.2 CM  3.5 CM        Diagnostic Results:  CT April 2023:  CT CHEST:  There is respiratory motion throughout the lung fields somewhat limiting evaluation.  No focal consolidation, pneumothorax or pleural effusion.  Small right pleural fluid is present.  No pneumothorax.  No suspicious lung nodule or mass.     Heart is enlarged.  Thoracic aorta and coronary arteries are partially calcified.  Central airways are clear.     No enlarged lymph nodes are in the chest.     No acute or suspicious osseous findings in the chest.  Lower thoracic compression deformities are stable.     CT ABDOMEN AND PELVIS:     Ill-defined hypodense mass in the posterior right dome of the liver measures approximately 3.3 x 2.4 cm, unchanged from the prior exam.  Just inferior is a stable 0.7 cm hypodense nodule, series 4, image 31.  No new liver lesions are identified.     Kidneys, spleen, adrenals, pancreas and gallbladder are normal.     Stomach and small bowel are normal.  Operative changes are in the sigmoid colon.  Appendix is normal in caliber.  Small free fluid is in the pelvis.  There is no free air.     No enlarged lymph nodes in the abdomen or pelvis.  Abdominal aorta is normal in caliber.     Urinary bladder is normal.  Uterus and adnexa are unremarkable.     No acute or suspicious osseous findings in the abdomen or pelvis.     Impression:     1. No evidence of recurrent or new metastatic disease in the chest, abdomen or pelvis.  2. Stable liver lesions.  3. Small right pleural effusion.    ASSESSMENT/PLAN:     72-year-old female with adenocarcinoma of the rectosigmoid junction with liver  lesion with negative biopsy x2 and negative MRI liver for definitive metastatic disease who is now s/p robotic LAR on 2/13/2020 with final path showing Stage III (T3N2) rectal adenocarcinoma who has now received adjuvant chemotherapy and underwent IR CT guided liver ablation on 11/12/2020    - given concern with anemia and rising CEA, reasonable to perform colonoscopy at this time. Will await cardiac clearance from Dr. Kemp given her recent onset of AFib and possible ablation this week per her report.  If he clears her and is able to hold Coumadin, will plan for colonoscopy in early June 2023  - CEA [id number]  - RTC 3-6 months for surveillance    Ankur Smith MD  Colon and Rectal Surgery  [facility]

## 2023-05-30 ENCOUNTER — TELEPHONE (OUTPATIENT)
Dept: INFUSION THERAPY | Facility: HOSPITAL | Age: 73
End: 2023-05-30
Payer: MEDICARE

## 2023-05-30 NOTE — TELEPHONE ENCOUNTER
1622 Called patient in reference to missed venofer infusion scheduled at 1530. States hard to find someone to bring her. Informed that she has infusion scheduled for 6/6 for venofer and we will make adjustments for missed visit on the end of treatments.

## 2023-06-19 PROBLEM — N17.9 AKI (ACUTE KIDNEY INJURY): Status: RESOLVED | Noted: 2023-03-16 | Resolved: 2023-06-19

## 2023-06-20 ENCOUNTER — TELEPHONE (OUTPATIENT)
Dept: INFUSION THERAPY | Facility: HOSPITAL | Age: 73
End: 2023-06-20
Payer: MEDICARE

## 2023-06-20 NOTE — TELEPHONE ENCOUNTER
Spoke with patient who says she wasn't sure of time of appt today so she wanted to cancel. (Lives in [place])  when looking in her chart notices she has not had any of her iron infusions.  Patient last seen in April of this year.  Informed patient that she will need another f/u appt with Dr. Quintanilla to assess her lab to see if she still is anemic.  She acknowledged. Appt were already in place for a post iron f/u so patient will keep those. Had patient write appointments down with dates locations and times. She complied.  States she has a hard time getting anywhere. That she has to depend on someone to take her.  I acknowledged. Call ended well.

## 2023-06-20 NOTE — TELEPHONE ENCOUNTER
Spoke with patient's son who states he will call his mother and have her call us back to discuss infusion.   I acknowledged. Call ended well.

## 2023-08-07 ENCOUNTER — HOSPITAL ENCOUNTER (EMERGENCY)
Facility: HOSPITAL | Age: 73
Discharge: HOME | End: 2023-08-07
Attending: EMERGENCY MEDICINE
Payer: MEDICARE

## 2023-08-07 VITALS
RESPIRATION RATE: 19 BRPM | WEIGHT: 150 LBS | HEART RATE: 54 BPM | OXYGEN SATURATION: 96 % | BODY MASS INDEX: 32.36 KG/M2 | DIASTOLIC BLOOD PRESSURE: 78 MMHG | SYSTOLIC BLOOD PRESSURE: 121 MMHG | HEIGHT: 57 IN | TEMPERATURE: 98 F

## 2023-08-07 DIAGNOSIS — R42 DIZZY: Primary | ICD-10-CM

## 2023-08-07 DIAGNOSIS — I50.9 CONGESTIVE HEART FAILURE, UNSPECIFIED HF CHRONICITY, UNSPECIFIED HEART FAILURE TYPE: ICD-10-CM

## 2023-08-07 DIAGNOSIS — R79.1 ELEVATED INR: ICD-10-CM

## 2023-08-07 LAB
ALBUMIN SERPL BCP-MCNC: 4 G/DL (ref 3.5–5.2)
ALP SERPL-CCNC: 103 U/L (ref 55–135)
ALT SERPL W/O P-5'-P-CCNC: 12 U/L (ref 10–44)
ANION GAP SERPL CALC-SCNC: 13 MMOL/L (ref 8–16)
ANISOCYTOSIS BLD QL SMEAR: SLIGHT
APTT PPP: 44.7 SEC (ref 21–32)
AST SERPL-CCNC: 14 U/L (ref 10–40)
BASOPHILS # BLD AUTO: 0.02 K/UL (ref 0–0.2)
BASOPHILS NFR BLD: 0.6 % (ref 0–1.9)
BILIRUB SERPL-MCNC: 1 MG/DL (ref 0.1–1)
BNP SERPL-MCNC: 770 PG/ML (ref 0–99)
BUN SERPL-MCNC: 23 MG/DL (ref 8–23)
CALCIUM SERPL-MCNC: 9.4 MG/DL (ref 8.7–10.5)
CHLORIDE SERPL-SCNC: 98 MMOL/L (ref 95–110)
CK SERPL-CCNC: 43 U/L (ref 20–180)
CO2 SERPL-SCNC: 27 MMOL/L (ref 23–29)
CREAT SERPL-MCNC: 2.1 MG/DL (ref 0.5–1.4)
DIFFERENTIAL METHOD: ABNORMAL
EOSINOPHIL # BLD AUTO: 0 K/UL (ref 0–0.5)
EOSINOPHIL NFR BLD: 1.3 % (ref 0–8)
ERYTHROCYTE [DISTWIDTH] IN BLOOD BY AUTOMATED COUNT: 24.6 % (ref 11.5–14.5)
EST. GFR  (NO RACE VARIABLE): 24.7 ML/MIN/1.73 M^2
GLUCOSE SERPL-MCNC: 105 MG/DL (ref 70–110)
HCT VFR BLD AUTO: 28.9 % (ref 37–48.5)
HGB BLD-MCNC: 8.8 G/DL (ref 12–16)
IMM GRANULOCYTES # BLD AUTO: 0.03 K/UL (ref 0–0.04)
IMM GRANULOCYTES NFR BLD AUTO: 0.9 % (ref 0–0.5)
INR PPP: 6.3 (ref 0.8–1.2)
LYMPHOCYTES # BLD AUTO: 0.7 K/UL (ref 1–4.8)
LYMPHOCYTES NFR BLD: 20.9 % (ref 18–48)
MCH RBC QN AUTO: 26.1 PG (ref 27–31)
MCHC RBC AUTO-ENTMCNC: 30.4 G/DL (ref 32–36)
MCV RBC AUTO: 86 FL (ref 82–98)
MONOCYTES # BLD AUTO: 0.3 K/UL (ref 0.3–1)
MONOCYTES NFR BLD: 8.9 % (ref 4–15)
NEUTROPHILS # BLD AUTO: 2.1 K/UL (ref 1.8–7.7)
NEUTROPHILS NFR BLD: 67.4 % (ref 38–73)
NRBC BLD-RTO: 0 /100 WBC
OVALOCYTES BLD QL SMEAR: ABNORMAL
PLATELET # BLD AUTO: 144 K/UL (ref 150–450)
PMV BLD AUTO: ABNORMAL FL (ref 9.2–12.9)
POIKILOCYTOSIS BLD QL SMEAR: SLIGHT
POTASSIUM SERPL-SCNC: 3.8 MMOL/L (ref 3.5–5.1)
PROT SERPL-MCNC: 6.9 G/DL (ref 6–8.4)
PROTHROMBIN TIME: 61.8 SEC (ref 9–12.5)
RBC # BLD AUTO: 3.37 M/UL (ref 4–5.4)
SODIUM SERPL-SCNC: 138 MMOL/L (ref 136–145)
TARGETS BLD QL SMEAR: ABNORMAL
TROPONIN I SERPL DL<=0.01 NG/ML-MCNC: 0.04 NG/ML (ref 0–0.03)
WBC # BLD AUTO: 3.16 K/UL (ref 3.9–12.7)

## 2023-08-07 PROCEDURE — 96374 THER/PROPH/DIAG INJ IV PUSH: CPT | Mod: ER

## 2023-08-07 PROCEDURE — 63600175 PHARM REV CODE 636 W HCPCS: Mod: ER | Performed by: EMERGENCY MEDICINE

## 2023-08-07 PROCEDURE — 93010 ELECTROCARDIOGRAM REPORT: CPT | Mod: ,,, | Performed by: INTERNAL MEDICINE

## 2023-08-07 PROCEDURE — 93010 EKG 12-LEAD: ICD-10-PCS | Mod: ,,, | Performed by: INTERNAL MEDICINE

## 2023-08-07 PROCEDURE — 85025 COMPLETE CBC W/AUTO DIFF WBC: CPT | Mod: ER | Performed by: EMERGENCY MEDICINE

## 2023-08-07 PROCEDURE — 85730 THROMBOPLASTIN TIME PARTIAL: CPT | Mod: ER | Performed by: EMERGENCY MEDICINE

## 2023-08-07 PROCEDURE — 82550 ASSAY OF CK (CPK): CPT | Mod: ER | Performed by: EMERGENCY MEDICINE

## 2023-08-07 PROCEDURE — 99285 EMERGENCY DEPT VISIT HI MDM: CPT | Mod: 25,ER

## 2023-08-07 PROCEDURE — 84484 ASSAY OF TROPONIN QUANT: CPT | Mod: ER | Performed by: EMERGENCY MEDICINE

## 2023-08-07 PROCEDURE — 93005 ELECTROCARDIOGRAM TRACING: CPT | Mod: ER

## 2023-08-07 PROCEDURE — 83880 ASSAY OF NATRIURETIC PEPTIDE: CPT | Mod: ER | Performed by: EMERGENCY MEDICINE

## 2023-08-07 PROCEDURE — 85610 PROTHROMBIN TIME: CPT | Mod: ER | Performed by: EMERGENCY MEDICINE

## 2023-08-07 PROCEDURE — 80053 COMPREHEN METABOLIC PANEL: CPT | Mod: ER | Performed by: EMERGENCY MEDICINE

## 2023-08-07 RX ORDER — ALPRAZOLAM 1 MG/1
1.5 TABLET ORAL NIGHTLY PRN
COMMUNITY
Start: 2023-07-19

## 2023-08-07 RX ORDER — LOVASTATIN 40 MG/1
TABLET ORAL
COMMUNITY
Start: 2023-05-31

## 2023-08-07 RX ORDER — FUROSEMIDE 10 MG/ML
40 INJECTION INTRAMUSCULAR; INTRAVENOUS
Status: COMPLETED | OUTPATIENT
Start: 2023-08-07 | End: 2023-08-07

## 2023-08-07 RX ADMIN — FUROSEMIDE 40 MG: 10 INJECTION, SOLUTION INTRAMUSCULAR; INTRAVENOUS at 12:08

## 2023-08-07 NOTE — ED PROVIDER NOTES
Encounter Date: 8/7/2023       History     Chief Complaint   Patient presents with    Dizziness     Since yesterday. Also had a fall yesterday. Pt unsure if she lost consciousness. Abrasion to nose       Had a dizzy spell/ syncopal episode last night.    The history is provided by the patient.   Dizziness  This is a recurrent problem. The current episode started yesterday. The problem occurs constantly. The problem has not changed since onset.Pertinent negatives include no chest pain and no shortness of breath. The symptoms are aggravated by standing. Nothing relieves the symptoms.     Review of patient's allergies indicates:   Allergen Reactions    Penicillins Rash     Past Medical History:   Diagnosis Date    Anticoagulant long-term use     Aortic valve defect     Benign neoplasm of ascending colon 4/6/2017    Cancer     CHF (congestive heart failure)     DDD (degenerative disc disease), cervical 7/20/2018    GERD (gastroesophageal reflux disease)     Hemorrhoids     HLD (hyperlipidemia)     Hypertension     Insomnia 12/1/2014    Irritable bowel syndrome with constipation 4/9/2018    Postmenopausal osteoporosis 7/20/2018     Past Surgical History:   Procedure Laterality Date    AORTIC VALVE REPLACEMENT  2003    COLONOSCOPY Left 4/6/2017    Procedure: COLONOSCOPY;  Surgeon: Sander Ulloa MD;  Location: [facility];  Service: Endoscopy;  Laterality: Left;    COLONOSCOPY N/A 11/7/2019    Procedure: COLONOSCOPY;  Surgeon: Brenda Ochoa MD;  Location: [facility];  Service: Endoscopy;  Laterality: N/A;    COLONOSCOPY N/A 2/26/2021    Procedure: COLONOSCOPY;  Surgeon: Ankur Smith MD;  Location: [facility];  Service: General;  Laterality: N/A;    COMPUTED TOMOGRAPHY N/A 11/12/2020    Procedure: Liver microwave ablation;  Surgeon: Gonzalez Moraes MD;  Location: [facility];  Service: General;  Laterality: N/A;    ESOPHAGOGASTRODUODENOSCOPY N/A 11/7/2019    Procedure: ESOPHAGOGASTRODUODENOSCOPY (EGD) needs PT/INR;   Surgeon: Brenda Ochoa MD;  Location: [facility] ENDO;  Service: Endoscopy;  Laterality: N/A;    FLEXIBLE SIGMOIDOSCOPY N/A 2/13/2020    Procedure: SIGMOIDOSCOPY, FLEXIBLE;  Surgeon: Ankur Smith MD;  Location: [facility] OR;  Service: General;  Laterality: N/A;    INJECTION OF ANESTHETIC AGENT INTO TISSUE PLANE DEFINED BY TRANSVERSUS ABDOMINIS MUSCLE N/A 2/13/2020    Procedure: BLOCK, TRANSVERSUS ABDOMINIS PLANE;  Surgeon: Ankur Smith MD;  Location: [facility] OR;  Service: General;  Laterality: N/A;    INSERTION OF TUNNELED CENTRAL VENOUS CATHETER (CVC) WITH SUBCUTANEOUS PORT N/A 3/17/2020    Procedure: [id number];  Surgeon: Ankur Smith MD;  Location: [facility] OR;  Service: General;  Laterality: N/A;    ROBOT-ASSISTED LOW ANTERIOR RESECTION OF COLON N/A 2/13/2020    Procedure: XI ROBOTIC RESECTION, COLON, LOW ANTERIOR;  Surgeon: Ankur Smith MD;  Location: [facility] OR;  Service: General;  Laterality: N/A;     Family History   Problem Relation Age of Onset    Diabetes Mother     Heart disease Mother     Hypertension Mother     Heart disease Father     Hypertension Father     Hypertension Sister     Hypertension Son     Heart disease Son     Colon cancer Neg Hx      Social History     Tobacco Use    Smoking status: Never    Smokeless tobacco: Never   Substance Use Topics    Alcohol use: Not Currently    Drug use: No     Review of Systems   Constitutional:  Negative for fever.   HENT:  Negative for sore throat.    Respiratory:  Negative for shortness of breath.    Cardiovascular:  Negative for chest pain.   Gastrointestinal:  Negative for nausea.   Genitourinary:  Negative for dysuria.   Musculoskeletal:  Negative for back pain.   Skin:  Negative for rash.   Neurological:  Positive for dizziness. Negative for weakness.   Hematological:  Does not bruise/bleed easily.       Physical Exam     Initial Vitals [08/07/23 1122]   BP Pulse Resp Temp SpO2   124/72 (!) 52 16 97.9 °F (36.6 °C) 95 %      MAP        --         Physical Exam    Nursing note and vitals reviewed.  Constitutional: She appears well-developed and well-nourished. No distress.   HENT:   Head: Normocephalic.       Mouth/Throat: Oropharynx is clear and moist.   Eyes: Conjunctivae and EOM are normal. Pupils are equal, round, and reactive to light.   Neck: Neck supple.   Normal range of motion.  Cardiovascular:  Normal rate, regular rhythm and normal heart sounds.     Exam reveals no gallop and no friction rub.       No murmur heard.  Pulmonary/Chest: Breath sounds normal. No respiratory distress. She has no wheezes. She has no rhonchi. She has no rales.   Abdominal: Abdomen is soft. Bowel sounds are normal. She exhibits no distension and no mass. There is no abdominal tenderness. There is no rebound and no guarding.   Musculoskeletal:         General: Edema (2+) present. No tenderness. Normal range of motion.      Cervical back: Normal range of motion and neck supple.     Neurological: She is alert and oriented to person, place, and time. She has normal strength.   Skin: Skin is warm and dry. No rash noted.   Psychiatric: She has a normal mood and affect. Thought content normal.         ED Course   Procedures  Labs Reviewed   CBC W/ AUTO DIFFERENTIAL - Abnormal; Notable for the following components:       Result Value    WBC 3.16 (*)     RBC 3.37 (*)     Hemoglobin 8.8 (*)     Hematocrit 28.9 (*)     MCH 26.1 (*)     MCHC 30.4 (*)     RDW 24.6 (*)     Platelets 144 (*)     Immature Granulocytes 0.9 (*)     Lymph # 0.7 (*)     All other components within normal limits   COMPREHENSIVE METABOLIC PANEL - Abnormal; Notable for the following components:    Creatinine 2.1 (*)     eGFR 24.7 (*)     All other components within normal limits   B-TYPE NATRIURETIC PEPTIDE - Abnormal; Notable for the following components:     (*)     All other components within normal limits   TROPONIN I - Abnormal; Notable for the following components:    Troponin I 0.040  (*)     All other components within normal limits   APTT - Abnormal; Notable for the following components:    aPTT 44.7 (*)     All other components within normal limits   PROTIME-INR - Abnormal; Notable for the following components:    Prothrombin Time 61.8 (*)     INR 6.3 (*)     All other components within normal limits    Narrative:      INR  critical result(s) called and verbal readback obtained from   Koko Tilley by NRR 08/07/2023 11:58   CK     EKG Readings: (Independently Interpreted)   Rhythm: Normal Sinus Rhythm. Heart Rate: 53. Ectopy: No Ectopy. Conduction: Normal. ST Segments: Normal ST Segments. T Waves: Normal. Clinical Impression: Normal Sinus Rhythm     ECG Results              EKG 12-lead (In process)  Result time 08/07/23 11:47:04      In process by Trey, Lab In [facility] (08/07/23 11:47:04)                   Narrative:    Test Reason : R42,    Vent. Rate : 053 BPM     Atrial Rate : 068 BPM     P-R Int : 000 ms          QRS Dur : 164 ms      QT Int : 554 ms       P-R-T Axes : 000 -39 102 degrees     QTc Int : 519 ms    Undetermined rhythm  Left axis deviation  Right bundle branch block  LVH with repolarization abnormality  Abnormal ECG  When compared with ECG of 15-MAR-2023 12:36,  Current undetermined rhythm precludes rhythm comparison, needs review  T wave inversion less evident in Anterior-lateral leads    Referred By: [email or web address]   SELF           Confirmed By:                                   Imaging Results              CT Maxillofacial Without Contrast (Final result)  Result time 08/07/23 12:12:01      Final result by Rubén Espinal MD (08/07/23 12:12:01)                   Impression:      No acute findings are identified.    All CT scans at this facility are performed  using dose modulation techniques as appropriate to performed exam including the following:  automated exposure control; adjustment of mA and/or kV according to the patients size (this includes techniques or standardized  protocols for targeted exams where dose is matched to indication/reason for exam: i.e. extremities or head);  iterative reconstruction technique.      Electronically signed by: Rubén Espinal  Date:    08/07/2023  Time:    12:12               Narrative:    EXAMINATION:  CT MAXILLOFACIAL WITHOUT CONTRAST    TECHNIQUE:  Noncontrast CT scan of the face    COMPARISON:  None    FINDINGS:  The paranasal sinuses are clear.    No fractures are identified.    The orbital structures appear intact.    No intracranial hemorrhage is identified.                                       CT Head Without Contrast (Final result)  Result time 08/07/23 12:10:27      Final result by Rubén Espinal MD (08/07/23 12:10:27)                   Impression:      No acute intracranial findings.    All CT scans at this facility are performed  using dose modulation techniques as appropriate to performed exam including the following:  automated exposure control; adjustment of mA and/or kV according to the patients size (this includes techniques or standardized protocols for targeted exams where dose is matched to indication/reason for exam: i.e. extremities or head);  iterative reconstruction technique.      Electronically signed by: Rubén Espinal  Date:    08/07/2023  Time:    12:10               Narrative:    EXAMINATION:  CT HEAD WITHOUT CONTRAST    CLINICAL HISTORY:  Head trauma, moderate-severe;    TECHNIQUE:  Noncontrast CT scan of the head    COMPARISON:  03/15/2023    FINDINGS:  No intracranial hemorrhage or acute findings are demonstrated. The visualized paranasal sinuses are clear. The calvarium is intact.                                       X-Ray Chest AP Portable (Final result)  Result time 08/07/23 11:55:15      Final result by Rubén Espinal MD (08/07/23 11:55:15)                   Impression:      Cardiomegaly with pulmonary congestion.      Electronically signed by: Rubén Espinal  Date:    08/07/2023  Time:    11:55               Narrative:     EXAMINATION:  XR CHEST AP PORTABLE    CLINICAL HISTORY:  dizzy;    TECHNIQUE:  Portable chest    COMPARISON:  03/15/2023    FINDINGS:  Cardiomegaly sternotomy wires.  Aortic atherosclerosis.  Bilateral pulmonary vascular congestion.                                       Medications   furosemide injection 40 mg (40 mg Intravenous Given 8/7/23 1250)     Medical Decision Making:   Initial Assessment:   Dizzy since yesterday, questionable syncopal episode.  Feeling weak and sob on exertion  Differential Diagnosis:   CHF, Afib, NSTEMI  Clinical Tests:   Lab Tests: Reviewed and Ordered  The following lab test(s) were unremarkable: CMP, CBC, Urinalysis and Troponin  Radiological Study: Ordered and Reviewed  Medical Tests: Ordered and Reviewed  ED Management:  Labs and imaging reviewed by me.  No acute findings except for elevated BNP, and INR.  Considered admission, but patient does not want to be admitted. Family will take her home and monitor her home meds. T hey think she is not taking her meds acurately.                            Clinical Impression:   Final diagnoses:  [R42] Dizzy (Primary)  [I50.9] Congestive heart failure, unspecified HF chronicity, unspecified heart failure type  [R79.1] Elevated INR        ED Disposition Condition    Discharge Stable          ED Prescriptions    None       Follow-up Information       Follow up With Specialties Details Why Contact Info    Shan Stroud MD Internal Medicine In 2 days  [id number] [place] [id number]  [phone]               Serg Granger MD  08/07/23 3332

## 2023-08-07 NOTE — DISCHARGE INSTRUCTIONS
Do not take Coumadin (Warfarin) until after you have had your INR rechecked on Wednesday morning.

## 2023-10-23 ENCOUNTER — TELEPHONE (OUTPATIENT)
Dept: HEMATOLOGY/ONCOLOGY | Facility: CLINIC | Age: 73
End: 2023-10-23
Payer: MEDICARE

## 2023-10-23 NOTE — TELEPHONE ENCOUNTER
----- Message from Jessica Gaytan sent at 10/23/2023 12:40 PM CDT -----  Who Called: Pt    What is the request in detail: Requesting call back to discuss scheduling appt. Please advise    Can the clinic reply by [facility]? No    Best Call Back Number: [phone]      Additional Information:

## 2024-01-11 ENCOUNTER — TELEPHONE (OUTPATIENT)
Dept: INFUSION THERAPY | Facility: HOSPITAL | Age: 74
End: 2024-01-11
Payer: COMMERCIAL

## 2024-01-11 NOTE — TELEPHONE ENCOUNTER
----- Message from Pat David sent at 1/11/2024  4:22 PM CST -----  Contact: Adalgisa Hernandez is calling in regards to getting a follow up appot.please call back at [phone]             Thanks  ELIAN

## 2024-05-22 NOTE — ANESTHESIA PREPROCEDURE EVALUATION
04/06/2016  Adalgisa Wright is a 66 y.o., female.    [facility] Anesthesia Evaluation    I have reviewed the Patient Summary Reports.    I have reviewed the Nursing Notes.   I have reviewed the Medications.     Review of Systems  Anesthesia Hx:  No problems with previous Anesthesia  Denies Family Hx of Anesthesia complications.   Denies Personal Hx of Anesthesia complications.   Social:  Non-Smoker, No Alcohol Use    Hematology/Oncology:  Hematology Normal   Oncology Normal     Cardiovascular:   Hypertension Denies MI.   Denies CABG/stent.         Pulmonary:   Denies COPD.  Denies Asthma.  Denies Sleep Apnea.    Renal/:  Renal/ Normal     Hepatic/GI:   Bowel Prep. GERD Denies Liver Disease. Denies Hepatitis.    Musculoskeletal:  Musculoskeletal Normal    Neurological:   Denies CVA. Denies Seizures.    Endocrine:  Endocrine Normal        Physical Exam  General:  Well nourished    Airway/Jaw/Neck:  Airway Findings: Mouth Opening: Normal Tongue: Normal  General Airway Assessment: Adult  Mallampati: II      Dental:  Dental Findings: In tact, upper partial dentures   Chest/Lungs:  Chest/Lungs Findings: Clear to auscultation, Normal Respiratory Rate     Heart/Vascular:  Heart Findings: Rate: Normal  Rhythm: Regular Rhythm  Sounds: Normal             Anesthesia Plan  Type of Anesthesia, risks & benefits discussed:  Anesthesia Type:  MAC  Patient's Preference:   Intra-op Monitoring Plan: standard ASA monitors  Intra-op Monitoring Plan Comments:   Post Op Pain Control Plan:   Post Op Pain Control Plan Comments:   Induction:   IV  Beta Blocker:  Patient is not currently on a Beta-Blocker (No further documentation required).       Informed Consent: Patient understands risks and agrees with Anesthesia plan.  Questions answered. Anesthesia consent signed with patient.  ASA Score: 2     Day of Surgery Review of History &  Physical: I have interviewed and examined the patient. I have reviewed the patient's H&P dated:  There are no significant changes.  H&P update referred to the surgeon.         Ready For Surgery From Anesthesia Perspective.        no

## 2024-11-11 NOTE — DISCHARGE INSTRUCTIONS
If you develop fevers, severe abdominal pain, significant bleeding, nausea or vomiting, please contact us or come to our Emergency Department at [facility].  Our  contact number is [phone] available for emergencies or any question that you may have.      You may return to normal activities tomorrow.  Written discharge instructions were provided to you today and have them handy since you may not remember our conversation today.       I also recommend that you follow a high fiber diet and take calcium supplements daily as well as to continue your present medications.      If you take Aspirin, please resume taking it at your prior dose today. If we obtained biopsies or removed polyps during your procedure, we will contact you within 5 to 6 days with the results.      Thanks for trusting us with your healthcare needs.      Sincerely,     Sander Ulloa M.D.        To rate your experience with Dr. Ulloa, please click on the link below     [email or web address]    
Medical Assessment Completed on: 13-Nov-2024 12:01

## (undated) DEVICE — DRAPE COLUMN DAVINCI XI

## (undated) DEVICE — GLOVE SURG BIOGEL LATEX SZ 7.5

## (undated) DEVICE — DRAPE STERI LONG

## (undated) DEVICE — KIT WING PAD POSITIONING

## (undated) DEVICE — SUT PROLENE 2-0 SH 36IN BLU

## (undated) DEVICE — SYR 30CC LUER LOCK

## (undated) DEVICE — SEE MEDLINE ITEM 152622

## (undated) DEVICE — SYR ONLY LUER LOCK 20CC

## (undated) DEVICE — UNDERGLOVES BIOGEL PI SIZE 7.5

## (undated) DEVICE — SEE MEDLINE ITEM 157117

## (undated) DEVICE — STAPLER CIRCULAR 25MM

## (undated) DEVICE — COVER OVERHEAD SURG LT BLUE

## (undated) DEVICE — NDL SAFETY 25G X 1.5 ECLIPSE

## (undated) DEVICE — ADHESIVE DERMABOND ADVANCED

## (undated) DEVICE — CATH MALECOT 30FR 6/BX

## (undated) DEVICE — PACK DRAPE PERI/GYN TIBURON

## (undated) DEVICE — SUT MONOCRYL 4.0 PS2 CP496G

## (undated) DEVICE — SYR 3CC LUER LOC

## (undated) DEVICE — IRRIGATOR ENDOWRIST XI SUCTION

## (undated) DEVICE — TUBING HEATED INSUFFLATOR

## (undated) DEVICE — MANIFOLD 4 PORT

## (undated) DEVICE — SEE MEDLINE ITEM 146292

## (undated) DEVICE — DRAPE MOBILE C-ARM

## (undated) DEVICE — KIT ANTIFOG

## (undated) DEVICE — NDL SAFETY 22G X 1.5 ECLIPSE

## (undated) DEVICE — SEE MEDLINE ITEM 157027

## (undated) DEVICE — NDL PNEUMO INSUFFLATI 120MM

## (undated) DEVICE — CHLORAPREP 10.5 ML APPLICATOR

## (undated) DEVICE — SEALER VESSEL EXTEND

## (undated) DEVICE — SEE MEDLINE ITEM 152739

## (undated) DEVICE — SUPPORT ULNA NERVE PROTECTOR

## (undated) DEVICE — SEAL UNIVERSAL 5MM-8MM XI

## (undated) DEVICE — SET CYSTO IRRIGATION UNIV SPIK

## (undated) DEVICE — COVER TIP CURVED SCISSORS XI

## (undated) DEVICE — DRAPE ARM DAVINCI XI

## (undated) DEVICE — ELECTRODE REM PLYHSV RETURN 9

## (undated) DEVICE — SHEET THYROID W/ISO-BAC

## (undated) DEVICE — GAUZE SPONGE 4X4 12PLY

## (undated) DEVICE — DRAPE ABDOMINAL TIBURON 14X11

## (undated) DEVICE — Device

## (undated) DEVICE — SUT VICRYL PLUS 3-0 SH 18IN

## (undated) DEVICE — SYR 10CC LUER LOCK

## (undated) DEVICE — DECANTER VIAL ASEPTIC TRANSFER

## (undated) DEVICE — STAPLER SUREFORM 60 SPU

## (undated) DEVICE — SOL 9P NACL IRR PIC IL

## (undated) DEVICE — SPONGE DERMACEA 4X4IN 12PLY

## (undated) DEVICE — RELOAD SUREFORM 60 3.5 BLU 6R

## (undated) DEVICE — SOL NS 1000CC

## (undated) DEVICE — TROCAR ENDOPATH XCEL 5X100MM

## (undated) DEVICE — POSITIONER HEAD DONUT 9IN FOAM

## (undated) DEVICE — ELECTRODE BLADE E-Z CLEAN 4IN

## (undated) DEVICE — APPLICATOR CHLORAPREP ORN 26ML

## (undated) DEVICE — CONTAINER SPECIMEN STRL 4OZ

## (undated) DEVICE — SUT VICRYL CTD 2-0 GI 27 SH